# Patient Record
Sex: FEMALE | Race: WHITE | NOT HISPANIC OR LATINO | Employment: OTHER | URBAN - METROPOLITAN AREA
[De-identification: names, ages, dates, MRNs, and addresses within clinical notes are randomized per-mention and may not be internally consistent; named-entity substitution may affect disease eponyms.]

---

## 2017-01-23 ENCOUNTER — ALLIED HEALTH/NURSE VISIT (OUTPATIENT)
Dept: CARDIOLOGY | Facility: CLINIC | Age: 82
End: 2017-01-23
Payer: COMMERCIAL

## 2017-01-23 DIAGNOSIS — Z95.0 CARDIAC PACEMAKER IN SITU: Primary | ICD-10-CM

## 2017-01-23 PROCEDURE — 93296 REM INTERROG EVL PM/IDS: CPT | Performed by: INTERNAL MEDICINE

## 2017-01-23 PROCEDURE — 93294 REM INTERROG EVL PM/LDLS PM: CPT | Performed by: INTERNAL MEDICINE

## 2017-01-23 NOTE — PROGRESS NOTES
St Micky Assurity (D) Remote PPM Device Check  AP: <1 % : 88 %  Mode: DDD        Presenting Rhythm: AS/  Heart Rate: Adequate rates per histogram  Sensing: Stable    Pacing Threshold: Stable    Impedance: Stable  Battery Status: 9.3-9.6 years  Atrial Arrhythmia: 100 mode switch episodes comprising <1% of the time. Episodes lasted 6 seconds to 1 minute 16 seconds. Ventricular rates controlled. Taking ASA only.   Ventricular Arrhythmia: None  Other: 5 magnet response episodes. 1 EGM shows AS/, rates in the 90's.      Care Plan: F/u PPM Merlin q 3 months. Gave pt results over the phone. ZoniaCVT

## 2017-04-24 ENCOUNTER — ALLIED HEALTH/NURSE VISIT (OUTPATIENT)
Dept: CARDIOLOGY | Facility: CLINIC | Age: 82
End: 2017-04-24
Payer: COMMERCIAL

## 2017-04-24 DIAGNOSIS — Z95.0 CARDIAC PACEMAKER IN SITU: Primary | ICD-10-CM

## 2017-04-24 PROCEDURE — 93296 REM INTERROG EVL PM/IDS: CPT | Performed by: INTERNAL MEDICINE

## 2017-04-24 PROCEDURE — 93294 REM INTERROG EVL PM/LDLS PM: CPT | Performed by: INTERNAL MEDICINE

## 2017-04-24 NOTE — MR AVS SNAPSHOT
"              After Visit Summary   4/24/2017    Yoselyn Cui    MRN: [id number]           Patient Information     Date Of Birth          1/28/1932        Visit Information        Provider Department      4/24/2017 7:30 AM AVILES TECH1 [facility] AT [place]        Today's Diagnoses     Cardiac pacemaker in situ    -  1       Follow-ups after your visit        Additional Services     Follow-Up with Device Clinic                 Future tests that were ordered for you today     Open Future Orders        Priority Expected Expires Ordered    Follow-Up with Device Clinic Routine 7/24/2017 4/24/2018 4/24/2017            Who to contact     If you have questions or need follow up information about today's clinic visit or your schedule please contact [facility] directly at [phone].  Normal or non-critical lab and imaging results will be communicated to you by MyChart, letter or phone within 4 business days after the clinic has received the results. If you do not hear from us within 7 days, please contact the clinic through MyChart or phone. If you have a critical or abnormal lab result, we will notify you by phone as soon as possible.  Submit refill requests through media[employer]er or call your pharmacy and they will forward the refill request to us. Please allow 3 business days for your refill to be completed.          Additional Information About Your Visit        MyChart Information     media[employer]unker lets you send messages to your doctor, view your test results, renew your prescriptions, schedule appointments and more. To sign up, go to [email or web address] . Click on \"Log in\" on the left side of the screen, which will take you to the Welcome page. Then click on \"Sign up Now\" on the right side of the page.     You will be asked to enter the access code listed below, as well as some personal information. Please follow the directions to create your username " and password.     Your access code is: [email or web address]  Expires: 2017  8:54 AM     Your access code will  in 90 days. If you need help or a new code, please call your [place] clinic or [phone].        Care EveryWhere ID     This is your Care EveryWhere ID. This could be used by other organizations to access your [place] medical records  [id number]         Blood Pressure from Last 3 Encounters:   10/19/16 118/66   10/13/16 146/58   16 122/62    Weight from Last 3 Encounters:   10/19/16 54.2 kg (119 lb 8 oz)   10/12/16 56.6 kg (124 lb 12.8 oz)   16 52.6 kg (116 lb)              We Performed the Following     INTERROGATION DEVICE EVAL REMOTE, PACER/ICD (96838)     PM DEVICE INTERROGATE REMOTE (42780)        Primary Care Provider Office Phone # Fax #    Ira Barajas PA-C [phone]       [facility] [address] [address]        Thank you!     Thank you for choosing [facility] [place]  for your care. Our goal is always to provide you with excellent care. Hearing back from our patients is one way we can continue to improve our services. Please take a few minutes to complete the written survey that you may receive in the mail after your visit with us. Thank you!             Your Updated Medication List - Protect others around you: Learn how to safely use, store and throw away your medicines at www.disposemymeds.org.          This list is accurate as of: 17  8:54 AM.  Always use your most recent med list.                   Brand Name Dispense Instructions for use    acetaminophen 325 MG tablet    TYLENOL     Take 650 mg by mouth as needed for mild pain       apixaban ANTICOAGULANT 2.5 MG tablet    ELIQUIS    180 tablet    Take 1 tablet (2.5 mg) by mouth 2 times daily       ASPIRIN EC PO      Take 81 mg by mouth daily       carvedilol 25 MG tablet    COREG     Take 25 mg by mouth 2 times daily (with meals)        DETROL LA 4 MG 24 hr capsule   Generic drug:  tolterodine      Take 4 mg by mouth every morning       ferrous sulfate 325 (65 FE) MG tablet    IRON     Take 325 mg by mouth daily (with breakfast)       ICAPS AREDS FORMULA Tabs      Take 1 tablet by mouth daily       LIPITOR 20 MG tablet   Generic drug:  atorvastatin      Take 20 mg by mouth At Bedtime       lisinopril 40 MG tablet    PRINIVIL/ZESTRIL     Take 40 mg by mouth daily       OMEPRAZOLE PO      Take 20 mg by mouth every morning

## 2017-04-24 NOTE — PROGRESS NOTES
St Micky Assurity (D) Remote PPM Device Check  AP: <1 % : 62 %  Mode: DDD        Presenting Rhythm: AS/  Heart Rate: Adequate rates per histogram  Sensing: Stable    Pacing Threshold: Stable    Impedance: Stable  Battery Status: 9.3-10.2 years  Atrial Arrhythmia: 8 mode switch episodes comprising <1% of the time. EGMs show PAF/PAT. Longest episode lasted 18 minutes 46 seconds. Ventricular rates controlled. Taking Eliquis.   Ventricular Arrhythmia: None     Care Plan: F/u annual threshold in 3 months. LM with results. PATRICIA BrarT

## 2017-06-05 ENCOUNTER — APPOINTMENT (OUTPATIENT)
Dept: GENERAL RADIOLOGY | Facility: CLINIC | Age: 82
End: 2017-06-05
Attending: EMERGENCY MEDICINE
Payer: COMMERCIAL

## 2017-06-05 ENCOUNTER — HOSPITAL ENCOUNTER (EMERGENCY)
Facility: CLINIC | Age: 82
Discharge: HOME | End: 2017-06-05
Attending: EMERGENCY MEDICINE | Admitting: EMERGENCY MEDICINE
Payer: COMMERCIAL

## 2017-06-05 VITALS
RESPIRATION RATE: 16 BRPM | SYSTOLIC BLOOD PRESSURE: 170 MMHG | DIASTOLIC BLOOD PRESSURE: 90 MMHG | HEART RATE: 72 BPM | WEIGHT: 122 LBS | TEMPERATURE: 98.3 F | OXYGEN SATURATION: 99 % | BODY MASS INDEX: 23.83 KG/M2

## 2017-06-05 DIAGNOSIS — T59.811A SMOKE INHALATION: ICD-10-CM

## 2017-06-05 LAB
ANION GAP SERPL CALCULATED.3IONS-SCNC: 7 MMOL/L (ref 3–14)
BASOPHILS # BLD AUTO: 0.1 10E9/L (ref 0–0.2)
BASOPHILS NFR BLD AUTO: 1.2 %
BUN SERPL-MCNC: 22 MG/DL (ref 7–30)
CALCIUM SERPL-MCNC: 8.8 MG/DL (ref 8.5–10.1)
CHLORIDE SERPL-SCNC: 107 MMOL/L (ref 94–109)
CO2 SERPL-SCNC: 24 MMOL/L (ref 20–32)
COHGB MFR BLD: 1.3 % (ref 0–2)
CREAT SERPL-MCNC: 0.8 MG/DL (ref 0.52–1.04)
DIFFERENTIAL METHOD BLD: ABNORMAL
EOSINOPHIL # BLD AUTO: 0.3 10E9/L (ref 0–0.7)
EOSINOPHIL NFR BLD AUTO: 4.6 %
ERYTHROCYTE [DISTWIDTH] IN BLOOD BY AUTOMATED COUNT: 20.8 % (ref 10–15)
GFR SERPL CREATININE-BSD FRML MDRD: 68 ML/MIN/1.7M2
GLUCOSE SERPL-MCNC: 106 MG/DL (ref 70–99)
HCT VFR BLD AUTO: 35.3 % (ref 35–47)
HGB BLD-MCNC: 10.2 G/DL (ref 11.7–15.7)
IMM GRANULOCYTES # BLD: 0 10E9/L (ref 0–0.4)
IMM GRANULOCYTES NFR BLD: 0.3 %
INTERPRETATION ECG - MUSE: NORMAL
LYMPHOCYTES # BLD AUTO: 2.6 10E9/L (ref 0.8–5.3)
LYMPHOCYTES NFR BLD AUTO: 37.9 %
MCH RBC QN AUTO: 23.4 PG (ref 26.5–33)
MCHC RBC AUTO-ENTMCNC: 28.9 G/DL (ref 31.5–36.5)
MCV RBC AUTO: 81 FL (ref 78–100)
MONOCYTES # BLD AUTO: 0.8 10E9/L (ref 0–1.3)
MONOCYTES NFR BLD AUTO: 12.2 %
NEUTROPHILS # BLD AUTO: 3 10E9/L (ref 1.6–8.3)
NEUTROPHILS NFR BLD AUTO: 43.8 %
NRBC # BLD AUTO: 0 10*3/UL
NRBC BLD AUTO-RTO: 0 /100
PLATELET # BLD AUTO: 180 10E9/L (ref 150–450)
POTASSIUM SERPL-SCNC: 3.9 MMOL/L (ref 3.4–5.3)
RBC # BLD AUTO: 4.35 10E12/L (ref 3.8–5.2)
SODIUM SERPL-SCNC: 138 MMOL/L (ref 133–144)
TROPONIN I SERPL-MCNC: NORMAL UG/L (ref 0–0.04)
WBC # BLD AUTO: 6.8 10E9/L (ref 4–11)

## 2017-06-05 PROCEDURE — 71020 XR CHEST 2 VW: CPT

## 2017-06-05 PROCEDURE — 85025 COMPLETE CBC W/AUTO DIFF WBC: CPT | Performed by: EMERGENCY MEDICINE

## 2017-06-05 PROCEDURE — 80048 BASIC METABOLIC PNL TOTAL CA: CPT | Performed by: EMERGENCY MEDICINE

## 2017-06-05 PROCEDURE — 84484 ASSAY OF TROPONIN QUANT: CPT | Performed by: EMERGENCY MEDICINE

## 2017-06-05 PROCEDURE — 82375 ASSAY CARBOXYHB QUANT: CPT | Performed by: EMERGENCY MEDICINE

## 2017-06-05 PROCEDURE — 99285 EMERGENCY DEPT VISIT HI MDM: CPT | Mod: 25

## 2017-06-05 ASSESSMENT — ENCOUNTER SYMPTOMS
SHORTNESS OF BREATH: 0
ABDOMINAL PAIN: 0
HEADACHES: 0
COUGH: 0
NAUSEA: 0
VOMITING: 0
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
## 2017-06-05 NOTE — ED AVS SNAPSHOT
[facility] Emergency Department    [address]    [facility] [address]    Phone:  [phone]    Fax:  [phone]                                       Yoselyn Cui   MRN: [id number]    Department:  [facility] Emergency Department   Date of Visit:  6/5/2017           After Visit Summary Signature Page     I have received my discharge instructions, and my questions have been answered. I have discussed any challenges I see with this plan with the nurse or doctor.    ..........................................................................................................................................  Patient/Patient Representative Signature      ..........................................................................................................................................  Patient Representative Print Name and Relationship to Patient    ..................................................               ................................................  Date                                            Time    ..........................................................................................................................................  Reviewed by Signature/Title    ...................................................              ..............................................  Date                                                            Time
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
## 2017-07-26 ENCOUNTER — ALLIED HEALTH/NURSE VISIT (OUTPATIENT)
Dept: CARDIOLOGY | Facility: CLINIC | Age: 82
End: 2017-07-26
Attending: INTERNAL MEDICINE
Payer: COMMERCIAL

## 2017-07-26 DIAGNOSIS — Z95.0 CARDIAC PACEMAKER IN SITU: ICD-10-CM

## 2017-07-26 PROCEDURE — 93280 PM DEVICE PROGR EVAL DUAL: CPT | Performed by: INTERNAL MEDICINE

## 2017-07-26 NOTE — MR AVS SNAPSHOT
"              After Visit Summary   7/26/2017    Yoselyn Cui    MRN: [id number]           Patient Information     Date Of Birth          1/28/1932        Visit Information        Provider Department      7/26/2017 10:10 AM RU DCR2 [facility]        Today's Diagnoses     Cardiac pacemaker in situ           Follow-ups after your visit        Your next 10 appointments already scheduled     Oct 30, 2017  8:00 AM CDT   Remote PPM Check with AVILES TECH1   [facility] ([facility])    [address] [place] [id number] [phone]           This appointment is for a remote check of your pacemaker.  This is not an appointment at the office.              Who to contact     If you have questions or need follow up information about today's clinic visit or your schedule please contact [facility] directly at [phone].  Normal or non-critical lab and imaging results will be communicated to you by ViXS Systemshart, letter or phone within 4 business days after the clinic has received the results. If you do not hear from us within 7 days, please contact the clinic through [employer] Systemshart or phone. If you have a critical or abnormal lab result, we will notify you by phone as soon as possible.  Submit refill requests through Mavin or call your pharmacy and they will forward the refill request to us. Please allow 3 business days for your refill to be completed.          Additional Information About Your Visit        MyChart Information     Mavin lets you send messages to your doctor, view your test results, renew your prescriptions, schedule appointments and more. To sign up, go to [email or web address] . Click on \"Log in\" on the left side of the screen, which will take you to the Welcome page. Then click on \"Sign up Now\" on the right side of the page.     You will be asked to enter the " access code listed below, as well as some personal information. Please follow the directions to create your username and password.     Your access code is: [email or web address]  Expires: 10/24/2017 10:31 AM     Your access code will  in 90 days. If you need help or a new code, please call your [place] clinic or [phone].        Care EveryWhere ID     This is your Care EveryWhere ID. This could be used by other organizations to access your [place] medical records  [id number]         Blood Pressure from Last 3 Encounters:   17 170/90   10/19/16 118/66   10/13/16 146/58    Weight from Last 3 Encounters:   17 55.3 kg (122 lb)   10/19/16 54.2 kg (119 lb 8 oz)   10/12/16 56.6 kg (124 lb 12.8 oz)              We Performed the Following     Follow-Up with Device Clinic     PM DEVICE PROGRAMMING EVAL, DUAL LEAD PACER (95086)        Primary Care Provider Office Phone # Fax #    Ira Barajas PA-C [phone]       [facility] [address] [place] [address]        Equal Access to Services     SIMONE OCONNOR : Hadii aad ku hadasho Soomaali, waaxda luqadaha, qaybta kaalmada adeegyada, waxay malinain haymelodyn natty babcock. So [facility] [phone].    ATENCIÓN: Si habla español, tiene a goodwin disposición servicios gratuitos de asistencia lingüística. Llame al [phone].    We comply with applicable federal civil rights laws and [place] laws. We do not discriminate on the basis of race, color, national origin, age, disability sex, sexual orientation or gender identity.            Thank you!     Thank you for choosing [facility] AT [place]  for your care. Our goal is always to provide you with excellent care. Hearing back from our patients is one way we can continue to improve our services. Please take a few minutes to complete the written survey that you may receive in the mail after your visit with us. Thank you!             Your Updated  Medication List - Protect others around you: Learn how to safely use, store and throw away your medicines at www.disposemymeds.org.          This list is accurate as of: 7/26/17 10:31 AM.  Always use your most recent med list.                   Brand Name Dispense Instructions for use Diagnosis    acetaminophen 325 MG tablet    TYLENOL     Take 650 mg by mouth as needed for mild pain        apixaban ANTICOAGULANT 2.5 MG tablet    ELIQUIS    180 tablet    Take 1 tablet (2.5 mg) by mouth 2 times daily    Paroxysmal atrial fibrillation (H)       ASPIRIN EC PO      Take 81 mg by mouth daily        carvedilol 25 MG tablet    COREG     Take 25 mg by mouth 2 times daily (with meals)        DETROL LA 4 MG 24 hr capsule   Generic drug:  tolterodine      Take 4 mg by mouth every morning        ferrous sulfate 325 (65 FE) MG tablet    IRON     Take 325 mg by mouth daily (with breakfast)        ICAPS AREDS FORMULA Tabs      Take 1 tablet by mouth daily        LIPITOR 20 MG tablet   Generic drug:  atorvastatin      Take 20 mg by mouth At Bedtime        lisinopril 40 MG tablet    PRINIVIL/ZESTRIL     Take 40 mg by mouth daily        OMEPRAZOLE PO      Take 20 mg by mouth every morning

## 2017-07-26 NOTE — PROGRESS NOTES
[facility] [employer]  Pacemaker Device Check  AP: 1 % : 87 %  Mode: DDD        Underlying Rhythm: CHB with vent rate < 30  Heart Rate: Adequate variation per histogram  Sensing: stable    Pacing Threshold: stable   Impedance: stable  Battery Status: 10 years  Atrial Arrhythmia: 20 mode switches for PAF with longest 1 min. She is on eliquis  Ventricular Arrhythmia: none  Setting Change: none    Care Plan: f/u 3 months remote PPM check. Santiago

## 2017-10-30 ENCOUNTER — ALLIED HEALTH/NURSE VISIT (OUTPATIENT)
Dept: CARDIOLOGY | Facility: CLINIC | Age: 82
End: 2017-10-30
Payer: COMMERCIAL

## 2017-10-30 DIAGNOSIS — Z95.0 CARDIAC PACEMAKER IN SITU: Primary | ICD-10-CM

## 2017-10-30 PROCEDURE — 93296 REM INTERROG EVL PM/IDS: CPT | Performed by: INTERNAL MEDICINE

## 2017-10-30 PROCEDURE — 93294 REM INTERROG EVL PM/LDLS PM: CPT | Performed by: INTERNAL MEDICINE

## 2017-10-30 NOTE — MR AVS SNAPSHOT
"              After Visit Summary   10/30/2017    Yoselyn Cui    MRN: [id number]           Patient Information     Date Of Birth          1932        Visit Information        Provider Department      10/30/2017 8:00 AM AVILES TECH1 [facility] [place]        Today's Diagnoses     Cardiac pacemaker in situ    -  1       Follow-ups after your visit        Who to contact     If you have questions or need follow up information about today's clinic visit or your schedule please contact [facility] directly at [phone].  Normal or non-critical lab and imaging results will be communicated to you by CityINhart, letter or phone within 4 business days after the clinic has received the results. If you do not hear from us within 7 days, please contact the clinic through [employer] or phone. If you have a critical or abnormal lab result, we will notify you by phone as soon as possible.  Submit refill requests through Cloudnexa or call your pharmacy and they will forward the refill request to us. Please allow 3 business days for your refill to be completed.          Additional Information About Your Visit        MyChart Information     Cloudnexa lets you send messages to your doctor, view your test results, renew your prescriptions, schedule appointments and more. To sign up, go to [email or web address] . Click on \"Log in\" on the left side of the screen, which will take you to the Welcome page. Then click on \"Sign up Now\" on the right side of the page.     You will be asked to enter the access code listed below, as well as some personal information. Please follow the directions to create your username and password.     Your access code is: [email or web address]  Expires: 2018  9:34 AM     Your access code will  in 90 days. If you need help or a new code, please call your [place] clinic or [phone].        Care EveryWhere ID     This is your Care " EveryWhere ID. This could be used by other organizations to access your [place] medical records  [id number]         Blood Pressure from Last 3 Encounters:   06/05/17 170/90   10/19/16 118/66   10/13/16 146/58    Weight from Last 3 Encounters:   06/05/17 55.3 kg (122 lb)   10/19/16 54.2 kg (119 lb 8 oz)   10/12/16 56.6 kg (124 lb 12.8 oz)              We Performed the Following     INTERROGATION DEVICE EVAL REMOTE, PACER/ICD (11305)     PM DEVICE INTERROGATE REMOTE (48677)        Primary Care Provider Office Phone # Fax #    Ira ROBBY Barajas PA-C [phone]       [facility] [address]  [facility] [address]        Equal Access to Services     SIMONE OCONNOR : Hadii aad ku hadasho Soomaali, waaxda luqadaha, qaybta kaalmada adeegyada, waxay malinain hayaan natty martines . So [facility] [phone].    ATENCIÓN: Si habla español, tiene a goodwin disposición servicios gratuitos de asistencia lingüística. Alexandra al [phone].    We comply with applicable federal civil rights laws and [place] laws. We do not discriminate on the basis of race, color, national origin, age, disability, sex, sexual orientation, or gender identity.            Thank you!     Thank you for choosing [facility] AT [place]  for your care. Our goal is always to provide you with excellent care. Hearing back from our patients is one way we can continue to improve our services. Please take a few minutes to complete the written survey that you may receive in the mail after your visit with us. Thank you!             Your Updated Medication List - Protect others around you: Learn how to safely use, store and throw away your medicines at www.disposemymeds.org.          This list is accurate as of: 10/30/17  9:34 AM.  Always use your most recent med list.                   Brand Name Dispense Instructions for use Diagnosis    acetaminophen 325 MG tablet    TYLENOL     Take 650 mg by mouth as  needed for mild pain        apixaban ANTICOAGULANT 2.5 MG tablet    ELIQUIS    180 tablet    Take 1 tablet (2.5 mg) by mouth 2 times daily    Paroxysmal atrial fibrillation (H)       ASPIRIN EC PO      Take 81 mg by mouth daily        carvedilol 25 MG tablet    COREG     Take 25 mg by mouth 2 times daily (with meals)        DETROL LA 4 MG 24 hr capsule   Generic drug:  tolterodine      Take 4 mg by mouth every morning        ferrous sulfate 325 (65 FE) MG tablet    IRON     Take 325 mg by mouth daily (with breakfast)        ICAPS AREDS FORMULA Tabs      Take 1 tablet by mouth daily        LIPITOR 20 MG tablet   Generic drug:  atorvastatin      Take 20 mg by mouth At Bedtime        lisinopril 40 MG tablet    PRINIVIL/ZESTRIL     Take 40 mg by mouth daily        OMEPRAZOLE PO      Take 20 mg by mouth every morning

## 2017-10-30 NOTE — PROGRESS NOTES
[address] 2240 (D) Remote PPM Device Check  AP: <1% : >99%  Mode: DDD        Presenting Rhythm: AS/  Heart Rate: adequate heart rates per histogram  Sensing: stable    Pacing Threshold: stable    Impedance: stable  Battery Status: 10 - 10.7 years remaining  Atrial Arrhythmia: 91 mode switch episodes comprising <1% of the time. Longest episode lasted 11 min 44 sec. Controlled vent response while in mode switch. Taking Eliquis   Ventricular Arrhythmia: none     Care Plan: F/U [place] q 3 months.  Gave results and next transmission date over the phone to [place] PERDOMO

## 2017-11-06 DIAGNOSIS — I48.0 PAROXYSMAL ATRIAL FIBRILLATION (H): ICD-10-CM

## 2018-01-10 ENCOUNTER — OFFICE VISIT (OUTPATIENT)
Dept: CARDIOLOGY | Facility: CLINIC | Age: 83
End: 2018-01-10
Payer: COMMERCIAL

## 2018-01-10 VITALS
SYSTOLIC BLOOD PRESSURE: 136 MMHG | WEIGHT: 110 LBS | HEIGHT: 60 IN | DIASTOLIC BLOOD PRESSURE: 71 MMHG | BODY MASS INDEX: 21.6 KG/M2 | HEART RATE: 69 BPM

## 2018-01-10 DIAGNOSIS — I48.0 PAROXYSMAL ATRIAL FIBRILLATION (H): ICD-10-CM

## 2018-01-10 PROCEDURE — 99214 OFFICE O/P EST MOD 30 MIN: CPT | Performed by: NURSE PRACTITIONER

## 2018-01-10 RX ORDER — AMLODIPINE BESYLATE 5 MG/1
5 TABLET ORAL DAILY
COMMUNITY
Start: 2017-12-20 | End: 2021-01-01

## 2018-01-10 NOTE — PROGRESS NOTES
HPI and Plan:   See dictation    No orders of the defined types were placed in this encounter.      Orders Placed This Encounter   Medications     amLODIPine (NORVASC) 5 MG tablet     Sig: Take 5 mg by mouth     apixaban ANTICOAGULANT (ELIQUIS) 2.5 MG tablet     Sig: Take 1 tablet (2.5 mg) by mouth 2 times daily     Dispense:  180 tablet     Refill:  3       Medications Discontinued During This Encounter   Medication Reason     apixaban ANTICOAGULANT (ELIQUIS) 2.5 MG tablet Reorder         Encounter Diagnosis   Name Primary?     Paroxysmal atrial fibrillation (H)        CURRENT MEDICATIONS:  Current Outpatient Prescriptions   Medication Sig Dispense Refill     amLODIPine (NORVASC) 5 MG tablet Take 5 mg by mouth       apixaban ANTICOAGULANT (ELIQUIS) 2.5 MG tablet Take 1 tablet (2.5 mg) by mouth 2 times daily 180 tablet 3     lisinopril (PRINIVIL,ZESTRIL) 40 MG tablet Take 40 mg by mouth daily       carvedilol (COREG) 25 MG tablet Take 25 mg by mouth 2 times daily (with meals)       acetaminophen (TYLENOL) 325 MG tablet Take 650 mg by mouth as needed for mild pain       tolterodine (DETROL LA) 4 MG 24 hr capsule Take 4 mg by mouth every morning       atorvastatin (LIPITOR) 20 MG tablet Take 20 mg by mouth At Bedtime       ASPIRIN EC PO Take 81 mg by mouth daily       ferrous sulfate (IRON) 325 (65 FE) MG tablet Take 325 mg by mouth daily (with breakfast)        OMEPRAZOLE PO Take 20 mg by mouth every morning       Multiple Vitamins-Minerals (ICAPS AREDS FORMULA) TABS Take 1 tablet by mouth daily         ALLERGIES   No Known Allergies    PAST MEDICAL HISTORY:  Past Medical History:   Diagnosis Date     AV block, 2nd degree 5/16/2016     Cerebrovascular accident (H) 8/22/2016     COKER (dyspnea on exertion) 8/22/2016     Generalized weakness 10/12/2016     History of colonic polyps 8/22/2016     HTN (hypertension) 8/22/2016     Melanoma of skin (H)-rt arm 8/22/2016     Mixed hyperlipidemia 8/22/2016     Pacemaker      implanted 5-     SSS (sick sinus syndrome) (H) 8/22/2016       PAST SURGICAL HISTORY:  Past Surgical History:   Procedure Laterality Date     APPENDECTOMY  1960s     C LIGATE FALLOPIAN TUBE  1960s     C REMV CATARACT INTRACAP,INSERT LENS Right 09/20/2017     HC REMV CATARACT EXTRACAP,INSERT LENS Left 10/04/2017     IMPLANT PACEMAKER  05/17/2016       FAMILY HISTORY:  Family History   Problem Relation Age of Onset     Coronary Artery Disease Father      Coronary Artery Disease Brother      Coronary Artery Disease Sister        SOCIAL HISTORY:  Social History     Social History     Marital status:      Spouse name: N/A     Number of children: N/A     Years of education: N/A     Social History Main Topics     Smoking status: Never Smoker     Smokeless tobacco: None     Alcohol use No     Drug use: No     Sexual activity: Not Asked     Other Topics Concern     Caffeine Concern No     3+ cups daily     Special Diet No     trying to cut down on sodium, watching cholesterol     Exercise No     limited     Social History Narrative       Review of Systems:  Skin:  Positive for bruising;itching     Eyes:  Positive for glasses    ENT:  Negative for      Respiratory:  Positive for       Cardiovascular:    fatigue;Positive for    Gastroenterology: Negative for      Genitourinary:  Positive for urinary frequency    Musculoskeletal:  Negative for      Neurologic:  Positive for stroke    Psychiatric:  Negative      Heme/Lymph/Imm:  Negative      Endocrine:  Negative        Physical Exam:  Vitals: /71  Pulse 69  Ht 1.524 m (5')  Wt 49.9 kg (110 lb)  BMI 21.48 kg/m2    Constitutional:           Skin:             Head:           Eyes:           Lymph:      ENT:           Neck:           Respiratory:            Cardiac:                                                           GI:           Extremities and Muscular Skeletal:                 Neurological:           Psych:           CC  No referring provider  defined for this encounter.

## 2018-01-10 NOTE — LETTER
1/10/2018      RADHA Muller [place] [address]  [facility] [address]      RE: Yoselyn Cui       Dear Colleague,    I had the pleasure of seeing Yoselyn Cui in the [facility].    HISTORY OF PRESENT ILLNESS:  Yoselyn is an 85-year-old female presenting in clinic today for an annual followup visit.  She has a history of pacemaker implantation done last 05/2016 when she originally presented with AV block.  She also has hypertension, history of stroke, dyslipidemia, paroxysmal atrial fibrillation and is on chronic anticoagulation.  Her device checks have shown a high degree of ventricular pacing.  She has had episodes of atrial fibrillation on her device checks, most of them short in duration and asymptomatic.      In clinic today, Yoselyn tells me she has had an uneventful past year.  She has been feeling very well and denies any issues with chest pain, breathlessness, lightheadedness, dizziness, presyncope, syncope or palpitations.      In clinic today, blood pressure is 136/71, heart rate 69.      PHYSICAL EXAMINATION:   GENERAL:  Reveals a well-appearing elderly female in no acute distress.   HEENT:  Normocephalic, atraumatic.    NECK:  Supple.   LUNGS:  Clear.   CARDIAC:  Reveals an S1, S2, with regular rhythm.  Her device is well healed in the left infraclavicular area.    ABDOMEN:  Soft.   EXTREMITIES:  Showed no edema.      IMPRESSION AND PLAN:  Yoselyn Cui is a delightful 85-year-old female presenting in clinic today for an annual followup visit.  She has a device implanted which is functioning appropriately.  She does have some short paroxysms of atrial fibrillation which have been asymptomatic.  Because of an elevated CHADS-VASc score, she is on apixaban 2.5 mg twice daily which she will continue.  She appears to be very stable at this time.  We will plan on seeing her back in 1 year.        Outpatient Encounter Prescriptions as of 1/10/2018    Medication Sig Dispense Refill     amLODIPine (NORVASC) 5 MG tablet Take 5 mg by mouth       apixaban ANTICOAGULANT (ELIQUIS) 2.5 MG tablet Take 1 tablet (2.5 mg) by mouth 2 times daily 180 tablet 3     lisinopril (PRINIVIL,ZESTRIL) 40 MG tablet Take 40 mg by mouth daily       carvedilol (COREG) 25 MG tablet Take 25 mg by mouth 2 times daily (with meals)       acetaminophen (TYLENOL) 325 MG tablet Take 650 mg by mouth as needed for mild pain       tolterodine (DETROL LA) 4 MG 24 hr capsule Take 4 mg by mouth every morning       atorvastatin (LIPITOR) 20 MG tablet Take 20 mg by mouth At Bedtime       ASPIRIN EC PO Take 81 mg by mouth daily       ferrous sulfate (IRON) 325 (65 FE) MG tablet Take 325 mg by mouth daily (with breakfast)        OMEPRAZOLE PO Take 20 mg by mouth every morning       Multiple Vitamins-Minerals (ICAPS AREDS FORMULA) TABS Take 1 tablet by mouth daily       [DISCONTINUED] apixaban ANTICOAGULANT (ELIQUIS) 2.5 MG tablet Take 1 tablet (2.5 mg) by mouth 2 times daily 180 tablet 0     No facility-administered encounter medications on file as of 1/10/2018.        Again, thank you for allowing me to participate in the care of your patient.      Sincerely,    Porsche Mendoza, NP, APRN CNP     [facility]

## 2018-01-10 NOTE — MR AVS SNAPSHOT
After Visit Summary   1/10/2018    Yoselyn Cui    MRN: [id number]           Patient Information     Date Of Birth          1/28/1932        Visit Information        Provider Department      1/10/2018 10:30 AM Porsche Mendoza APRN CNP [facility]        Today's Diagnoses     Paroxysmal atrial fibrillation (H)           Follow-ups after your visit        Additional Services     Follow-Up with Cardiologist                 Your next 10 appointments already scheduled     Feb 05, 2018  4:15 PM CST   Remote PPM Check with AVILES TECH1   [facility] ([facility])    [address]  [facility] [address]   [phone]           This appointment is for a remote check of your pacemaker.  This is not an appointment at the office.              Future tests that were ordered for you today     Open Future Orders        Priority Expected Expires Ordered    Follow-Up with Cardiologist Routine 1/10/2019 1/10/2020 1/10/2018            Who to contact     If you have questions or need follow up information about today's clinic visit or your schedule please contact [facility] directly at [phone].  Normal or non-critical lab and imaging results will be communicated to you by broadbandchoiceshart, letter or phone within 4 business days after the clinic has received the results. If you do not hear from us within 7 days, please contact the clinic through broadbandchoiceshart or phone. If you have a critical or abnormal lab result, we will notify you by phone as soon as possible.  Submit refill requests through INSOMENIA or call your pharmacy and they will forward the refill request to us. Please allow 3 business days for your refill to be completed.          Additional Information About Your Visit        broadbandchoiceshart Information     INSOMENIA lets you send messages to your doctor, view your test  "results, renew your prescriptions, schedule appointments and more. To sign up, go to [email or web address] . Click on \"Log in\" on the left side of the screen, which will take you to the Welcome page. Then click on \"Sign up Now\" on the right side of the page.     You will be asked to enter the access code listed below, as well as some personal information. Please follow the directions to create your username and password.     Your access code is: [email or web address]  Expires: 2018  8:34 AM     Your access code will  in 90 days. If you need help or a new code, please call your [facility] clinic or [phone].        Care EveryWhere ID     This is your Care EveryWhere ID. This could be used by other organizations to access your [facility] medical records  [id number]        Your Vitals Were     Pulse Height BMI (Body Mass Index)             69 1.524 m (5') 21.48 kg/m2          Blood Pressure from Last 3 Encounters:   01/10/18 136/71   17 170/90   10/19/16 118/66    Weight from Last 3 Encounters:   01/10/18 49.9 kg (110 lb)   17 55.3 kg (122 lb)   10/19/16 54.2 kg (119 lb 8 oz)                 Where to get your medicines      These medications were sent to [facility] [address] [facility] [address] [address] [place] [address] & [facility]  [address] [address] [address]     Phone:  [phone]     apixaban ANTICOAGULANT 2.5 MG tablet          Primary Care Provider Office Phone # Fax #    Ira Barajas PA-C [phone]       [facility] [address]  [facility] [address]        Equal Access to Services     SIMONE OCONNOR : Juan Castellanos, wavietda luqadaha, qaybta kaalmada oswaldo, adrián babcock. [facility] [phone].    ATENCIÓN: Si habla español, tiene a goodwin disposición servicios gratuitos de asistencia lingüística. Llame al [phone].    We comply with applicable federal civil rights laws and " [place] laws. We do not discriminate on the basis of race, color, national origin, age, disability, sex, sexual orientation, or gender identity.            Thank you!     Thank you for choosing [facility]  for your care. Our goal is always to provide you with excellent care. Hearing back from our patients is one way we can continue to improve our services. Please take a few minutes to complete the written survey that you may receive in the mail after your visit with us. Thank you!             Your Updated Medication List - Protect others around you: Learn how to safely use, store and throw away your medicines at www.disposemymeds.org.          This list is accurate as of: 1/10/18  2:31 PM.  Always use your most recent med list.                   Brand Name Dispense Instructions for use Diagnosis    acetaminophen 325 MG tablet    TYLENOL     Take 650 mg by mouth as needed for mild pain        amLODIPine 5 MG tablet    NORVASC     Take 5 mg by mouth        apixaban ANTICOAGULANT 2.5 MG tablet    ELIQUIS    180 tablet    Take 1 tablet (2.5 mg) by mouth 2 times daily    Paroxysmal atrial fibrillation (H)       ASPIRIN EC PO      Take 81 mg by mouth daily        carvedilol 25 MG tablet    COREG     Take 25 mg by mouth 2 times daily (with meals)        DETROL LA 4 MG 24 hr capsule   Generic drug:  tolterodine      Take 4 mg by mouth every morning        ferrous sulfate 325 (65 FE) MG tablet    IRON     Take 325 mg by mouth daily (with breakfast)        ICAPS AREDS FORMULA Tabs      Take 1 tablet by mouth daily        LIPITOR 20 MG tablet   Generic drug:  atorvastatin      Take 20 mg by mouth At Bedtime        lisinopril 40 MG tablet    PRINIVIL/ZESTRIL     Take 40 mg by mouth daily        OMEPRAZOLE PO      Take 20 mg by mouth every morning

## 2018-01-10 NOTE — PROGRESS NOTES
HISTORY OF PRESENT ILLNESS:  Yoselyn is an 85-year-old female presenting in clinic today for an annual followup visit.  She has a history of pacemaker implantation done last 2016 when she originally presented with AV block.  She also has hypertension, history of stroke, dyslipidemia, paroxysmal atrial fibrillation and is on chronic anticoagulation.  Her device checks have shown a high degree of ventricular pacing.  She has had episodes of atrial fibrillation on her device checks, most of them short in duration and asymptomatic.      In clinic today, Yoselyn tells me she has had an uneventful past year.  She has been feeling very well and denies any issues with chest pain, breathlessness, lightheadedness, dizziness, presyncope, syncope or palpitations.      In clinic today, blood pressure is 136/71, heart rate 69.      PHYSICAL EXAMINATION:   GENERAL:  Reveals a well-appearing elderly female in no acute distress.   HEENT:  Normocephalic, atraumatic.    NECK:  Supple.   LUNGS:  Clear.   CARDIAC:  Reveals an S1, S2, with regular rhythm.  Her device is well healed in the left infraclavicular area.    ABDOMEN:  Soft.   EXTREMITIES:  Showed no edema.      IMPRESSION AND PLAN:  Yoselyn Ambrose is a delightful 85-year-old female presenting in clinic today for an annual followup visit.  She has a device implanted which is functioning appropriately.  She does have some short paroxysms of atrial fibrillation which have been asymptomatic.  Because of an elevated CHADS-VASc score, she is on apixaban 2.5 mg twice daily which she will continue.  She appears to be very stable at this time.  We will plan on seeing her back in 1 year.         STEFFANY MALCOLM, REBECCA             D: 01/10/2018 14:30   T: 01/10/2018 15:04   MT: ALVA      Name:     YOSELYN AMBROSE   MRN:      [id number]        Account:      [id number]   :      1932           Service Date: 01/10/2018      Document: [id number]

## 2018-02-05 ENCOUNTER — ALLIED HEALTH/NURSE VISIT (OUTPATIENT)
Dept: CARDIOLOGY | Facility: CLINIC | Age: 83
End: 2018-02-05
Payer: COMMERCIAL

## 2018-02-05 DIAGNOSIS — Z95.0 CARDIAC PACEMAKER IN SITU: Primary | ICD-10-CM

## 2018-02-05 PROCEDURE — 93296 REM INTERROG EVL PM/IDS: CPT | Performed by: INTERNAL MEDICINE

## 2018-02-05 PROCEDURE — 93294 REM INTERROG EVL PM/LDLS PM: CPT | Performed by: INTERNAL MEDICINE

## 2018-02-05 NOTE — MR AVS SNAPSHOT
"              After Visit Summary   2/5/2018    Yoselyn Cui    MRN: [id number]           Patient Information     Date Of Birth          1/28/1932        Visit Information        Provider Department      2/5/2018 4:15 PM TRACI MARTINI [facility]        Today's Diagnoses     Cardiac pacemaker in situ    -  1       Follow-ups after your visit        Your next 10 appointments already scheduled     Feb 05, 2018  4:15 PM CST   Remote PPM Check with AVILES TECH1   [facility] ([facility])    [address] [place], [place] [id number]  [facility] [id number] [phone]           This appointment is for a remote check of your pacemaker.  This is not an appointment at the office.              Who to contact     If you have questions or need follow up information about today's clinic visit or your schedule please contact [facility] directly at [phone].  Normal or non-critical lab and imaging results will be communicated to you by [employer] OnDemandhart, letter or phone within 4 business days after the clinic has received the results. If you do not hear from us within 7 days, please contact the clinic through Story[employer] OnDemandhart or phone. If you have a critical or abnormal lab result, we will notify you by phone as soon as possible.  Submit refill requests through [email or web address] or call your pharmacy and they will forward the refill request to us. Please allow 3 business days for your refill to be completed.          Additional Information About Your Visit        Storyworks OnDemandhart Information     [email or web address] lets you send messages to your doctor, view your test results, renew your prescriptions, schedule appointments and more. To sign up, go to [email or web address] . Click on \"Log in\" on the left side of the screen, which will take you to the Welcome page. Then click on \"Sign up Now\" on the right side of the page.     You will be asked to enter the access " code listed below, as well as some personal information. Please follow the directions to create your username and password.     Your access code is: [email or web address]  Expires: 2018  8:53 AM     Your access code will  in 90 days. If you need help or a new code, please call your [facility] clinic or [phone].        Care EveryWhere ID     This is your Care EveryWhere ID. This could be used by other organizations to access your [facility] medical records  [id number]         Blood Pressure from Last 3 Encounters:   01/10/18 136/71   17 170/90   10/19/16 118/66    Weight from Last 3 Encounters:   01/10/18 49.9 kg (110 lb)   17 55.3 kg (122 lb)   10/19/16 54.2 kg (119 lb 8 oz)              We Performed the Following     INTERROGATION DEVICE EVAL REMOTE, PACER/ICD (15813)     PM DEVICE INTERROGATE REMOTE (70708)        Primary Care Provider Office Phone # Fax #    Ira Barajas PA-C [phone]       [facility] [address] [place] [address]        Equal Access to Services     SIMONE OCONNOR : Hadii aad ku hadasho Soomaali, waaxda luqadaha, qaybta kaalmada adeegyada, waxay idiin hayaan natty martines . So [facility] [phone].    ATENCIÓN: Si habla español, tiene a goodwin disposición servicios gratuitos de asistencia lingüística. Llame al [phone].    We comply with applicable federal civil rights laws and [place] laws. We do not discriminate on the basis of race, color, national origin, age, disability, sex, sexual orientation, or gender identity.            Thank you!     Thank you for choosing [facility]  for your care. Our goal is always to provide you with excellent care. Hearing back from our patients is one way we can continue to improve our services. Please take a few minutes to complete the written survey that you may receive in the mail after your visit with us. Thank you!             Your Updated Medication List -  Protect others around you: Learn how to safely use, store and throw away your medicines at www.disposemymeds.org.          This list is accurate as of 2/5/18  8:53 AM.  Always use your most recent med list.                   Brand Name Dispense Instructions for use Diagnosis    acetaminophen 325 MG tablet    TYLENOL     Take 650 mg by mouth as needed for mild pain        amLODIPine 5 MG tablet    NORVASC     Take 5 mg by mouth        apixaban ANTICOAGULANT 2.5 MG tablet    ELIQUIS    180 tablet    Take 1 tablet (2.5 mg) by mouth 2 times daily    Paroxysmal atrial fibrillation (H)       ASPIRIN EC PO      Take 81 mg by mouth daily        carvedilol 25 MG tablet    COREG     Take 25 mg by mouth 2 times daily (with meals)        DETROL LA 4 MG 24 hr capsule   Generic drug:  tolterodine      Take 4 mg by mouth every morning        ferrous sulfate 325 (65 FE) MG tablet    IRON     Take 325 mg by mouth daily (with breakfast)        ICAPS AREDS FORMULA Tabs      Take 1 tablet by mouth daily        LIPITOR 20 MG tablet   Generic drug:  atorvastatin      Take 20 mg by mouth At Bedtime        lisinopril 40 MG tablet    PRINIVIL/ZESTRIL     Take 40 mg by mouth daily        OMEPRAZOLE PO      Take 20 mg by mouth every morning

## 2018-02-05 NOTE — PROGRESS NOTES
[facility] (D) Remote PPM Device Check  AP: <1 % : >99 %  Mode: DDD        Presenting Rhythm: AS/  Heart Rate: Adequate rates per histogram  Sensing: Stable    Pacing Threshold: Stable    Impedance: Stable  Battery Status: 9.9-10.6 years  Atrial Arrhythmia: 170 mode switch episodes comprising <1% of the time. Longest episode lasted 7 minutes 46 seconds, ventricular rates controlled. Taking Eliquis.    Ventricular Arrhythmia: None     Care Plan: F/u PPM Merlin q 3 months. Gave patient results over the phone. ZoniaCVT

## 2018-03-02 ENCOUNTER — APPOINTMENT (OUTPATIENT)
Dept: CT IMAGING | Facility: CLINIC | Age: 83
DRG: 871 | End: 2018-03-02
Attending: PHYSICIAN ASSISTANT
Payer: MEDICARE

## 2018-03-02 ENCOUNTER — APPOINTMENT (OUTPATIENT)
Dept: GENERAL RADIOLOGY | Facility: CLINIC | Age: 83
DRG: 871 | End: 2018-03-02
Attending: EMERGENCY MEDICINE
Payer: MEDICARE

## 2018-03-02 ENCOUNTER — HOSPITAL ENCOUNTER (INPATIENT)
Facility: CLINIC | Age: 83
LOS: 5 days | Discharge: HOME | DRG: 871 | End: 2018-03-07
Attending: EMERGENCY MEDICINE | Admitting: HOSPITALIST
Payer: MEDICARE

## 2018-03-02 DIAGNOSIS — J44.1 OBSTRUCTIVE CHRONIC BRONCHITIS WITH EXACERBATION (H): ICD-10-CM

## 2018-03-02 DIAGNOSIS — R53.1 GENERALIZED WEAKNESS: ICD-10-CM

## 2018-03-02 DIAGNOSIS — J98.01 ACUTE BRONCHOSPASM: Primary | ICD-10-CM

## 2018-03-02 DIAGNOSIS — R91.8 PULMONARY NODULES: ICD-10-CM

## 2018-03-02 DIAGNOSIS — J18.9 PNEUMONIA OF RIGHT LUNG DUE TO INFECTIOUS ORGANISM, UNSPECIFIED PART OF LUNG: ICD-10-CM

## 2018-03-02 LAB
ALBUMIN SERPL-MCNC: 3.6 G/DL (ref 3.4–5)
ALBUMIN UR-MCNC: NEGATIVE MG/DL
ALP SERPL-CCNC: 110 U/L (ref 40–150)
ALT SERPL W P-5'-P-CCNC: 17 U/L (ref 0–50)
ANION GAP SERPL CALCULATED.3IONS-SCNC: 10 MMOL/L (ref 3–14)
APPEARANCE UR: CLEAR
AST SERPL W P-5'-P-CCNC: 18 U/L (ref 0–45)
BASOPHILS # BLD AUTO: 0 10E9/L (ref 0–0.2)
BASOPHILS NFR BLD AUTO: 0.2 %
BILIRUB SERPL-MCNC: 0.5 MG/DL (ref 0.2–1.3)
BILIRUB UR QL STRIP: NEGATIVE
BUN SERPL-MCNC: 19 MG/DL (ref 7–30)
CALCIUM SERPL-MCNC: 8.7 MG/DL (ref 8.5–10.1)
CHLORIDE SERPL-SCNC: 104 MMOL/L (ref 94–109)
CO2 SERPL-SCNC: 23 MMOL/L (ref 20–32)
COLOR UR AUTO: YELLOW
CREAT SERPL-MCNC: 0.75 MG/DL (ref 0.52–1.04)
DIFFERENTIAL METHOD BLD: ABNORMAL
EOSINOPHIL # BLD AUTO: 0 10E9/L (ref 0–0.7)
EOSINOPHIL NFR BLD AUTO: 0.2 %
ERYTHROCYTE [DISTWIDTH] IN BLOOD BY AUTOMATED COUNT: 14.1 % (ref 10–15)
FLUAV+FLUBV AG SPEC QL: NEGATIVE
FLUAV+FLUBV AG SPEC QL: NEGATIVE
GFR SERPL CREATININE-BSD FRML MDRD: 73 ML/MIN/1.7M2
GLUCOSE SERPL-MCNC: 109 MG/DL (ref 70–99)
GLUCOSE UR STRIP-MCNC: NEGATIVE MG/DL
HCT VFR BLD AUTO: 38.7 % (ref 35–47)
HGB BLD-MCNC: 12.4 G/DL (ref 11.7–15.7)
HGB UR QL STRIP: NEGATIVE
IMM GRANULOCYTES # BLD: 0.1 10E9/L (ref 0–0.4)
IMM GRANULOCYTES NFR BLD: 0.4 %
INTERPRETATION ECG - MUSE: NORMAL
KETONES UR STRIP-MCNC: NEGATIVE MG/DL
LACTATE BLD-SCNC: 1.1 MMOL/L (ref 0.7–2)
LEUKOCYTE ESTERASE UR QL STRIP: NEGATIVE
LYMPHOCYTES # BLD AUTO: 1.5 10E9/L (ref 0.8–5.3)
LYMPHOCYTES NFR BLD AUTO: 7.1 %
MCH RBC QN AUTO: 28.4 PG (ref 26.5–33)
MCHC RBC AUTO-ENTMCNC: 32 G/DL (ref 31.5–36.5)
MCV RBC AUTO: 89 FL (ref 78–100)
MONOCYTES # BLD AUTO: 1.7 10E9/L (ref 0–1.3)
MONOCYTES NFR BLD AUTO: 7.9 %
MUCOUS THREADS #/AREA URNS LPF: PRESENT /LPF
NEUTROPHILS # BLD AUTO: 17.8 10E9/L (ref 1.6–8.3)
NEUTROPHILS NFR BLD AUTO: 84.2 %
NITRATE UR QL: NEGATIVE
NRBC # BLD AUTO: 0 10*3/UL
NRBC BLD AUTO-RTO: 0 /100
PH UR STRIP: 7 PH (ref 5–7)
PLATELET # BLD AUTO: 181 10E9/L (ref 150–450)
POTASSIUM SERPL-SCNC: 3.4 MMOL/L (ref 3.4–5.3)
PROCALCITONIN SERPL-MCNC: 0.82 NG/ML
PROT SERPL-MCNC: 7.3 G/DL (ref 6.8–8.8)
RBC # BLD AUTO: 4.37 10E12/L (ref 3.8–5.2)
RBC #/AREA URNS AUTO: 1 /HPF (ref 0–2)
SODIUM SERPL-SCNC: 137 MMOL/L (ref 133–144)
SOURCE: ABNORMAL
SP GR UR STRIP: 1.01 (ref 1–1.03)
SPECIMEN SOURCE: NORMAL
SQUAMOUS #/AREA URNS AUTO: <1 /HPF (ref 0–1)
UROBILINOGEN UR STRIP-MCNC: 0 MG/DL (ref 0–2)
WBC # BLD AUTO: 21.2 10E9/L (ref 4–11)
WBC #/AREA URNS AUTO: <1 /HPF (ref 0–5)

## 2018-03-02 PROCEDURE — 80053 COMPREHEN METABOLIC PANEL: CPT | Performed by: EMERGENCY MEDICINE

## 2018-03-02 PROCEDURE — 71046 X-RAY EXAM CHEST 2 VIEWS: CPT

## 2018-03-02 PROCEDURE — 36415 COLL VENOUS BLD VENIPUNCTURE: CPT | Performed by: PHYSICIAN ASSISTANT

## 2018-03-02 PROCEDURE — 84145 PROCALCITONIN (PCT): CPT | Performed by: PHYSICIAN ASSISTANT

## 2018-03-02 PROCEDURE — 96366 THER/PROPH/DIAG IV INF ADDON: CPT

## 2018-03-02 PROCEDURE — 85025 COMPLETE CBC W/AUTO DIFF WBC: CPT | Performed by: EMERGENCY MEDICINE

## 2018-03-02 PROCEDURE — 87804 INFLUENZA ASSAY W/OPTIC: CPT | Performed by: EMERGENCY MEDICINE

## 2018-03-02 PROCEDURE — 25000128 H RX IP 250 OP 636: Performed by: HOSPITALIST

## 2018-03-02 PROCEDURE — 25000128 H RX IP 250 OP 636: Performed by: PHYSICIAN ASSISTANT

## 2018-03-02 PROCEDURE — 87086 URINE CULTURE/COLONY COUNT: CPT | Performed by: EMERGENCY MEDICINE

## 2018-03-02 PROCEDURE — 71260 CT THORAX DX C+: CPT

## 2018-03-02 PROCEDURE — 87040 BLOOD CULTURE FOR BACTERIA: CPT | Performed by: EMERGENCY MEDICINE

## 2018-03-02 PROCEDURE — 81001 URINALYSIS AUTO W/SCOPE: CPT | Performed by: EMERGENCY MEDICINE

## 2018-03-02 PROCEDURE — 83605 ASSAY OF LACTIC ACID: CPT | Performed by: EMERGENCY MEDICINE

## 2018-03-02 PROCEDURE — 99223 1ST HOSP IP/OBS HIGH 75: CPT | Mod: AI | Performed by: HOSPITALIST

## 2018-03-02 PROCEDURE — 99285 EMERGENCY DEPT VISIT HI MDM: CPT | Mod: 25

## 2018-03-02 PROCEDURE — 96361 HYDRATE IV INFUSION ADD-ON: CPT

## 2018-03-02 PROCEDURE — 96365 THER/PROPH/DIAG IV INF INIT: CPT

## 2018-03-02 PROCEDURE — 93005 ELECTROCARDIOGRAM TRACING: CPT

## 2018-03-02 PROCEDURE — 25000132 ZZH RX MED GY IP 250 OP 250 PS 637: Mod: GY | Performed by: PHYSICIAN ASSISTANT

## 2018-03-02 PROCEDURE — 25000128 H RX IP 250 OP 636: Performed by: EMERGENCY MEDICINE

## 2018-03-02 PROCEDURE — A9270 NON-COVERED ITEM OR SERVICE: HCPCS | Mod: GY | Performed by: PHYSICIAN ASSISTANT

## 2018-03-02 PROCEDURE — 12000007 ZZH R&B INTERMEDIATE

## 2018-03-02 RX ORDER — NALOXONE HYDROCHLORIDE 0.4 MG/ML
.1-.4 INJECTION, SOLUTION INTRAMUSCULAR; INTRAVENOUS; SUBCUTANEOUS
Status: DISCONTINUED | OUTPATIENT
Start: 2018-03-02 | End: 2018-03-07 | Stop reason: HOSPADM

## 2018-03-02 RX ORDER — ONDANSETRON 2 MG/ML
4 INJECTION INTRAMUSCULAR; INTRAVENOUS EVERY 6 HOURS PRN
Status: DISCONTINUED | OUTPATIENT
Start: 2018-03-02 | End: 2018-03-07 | Stop reason: HOSPADM

## 2018-03-02 RX ORDER — CEFTRIAXONE SODIUM 2 G/50ML
2 INJECTION, SOLUTION INTRAVENOUS ONCE
Status: COMPLETED | OUTPATIENT
Start: 2018-03-02 | End: 2018-03-02

## 2018-03-02 RX ORDER — ATORVASTATIN CALCIUM 20 MG/1
20 TABLET, FILM COATED ORAL AT BEDTIME
Status: DISCONTINUED | OUTPATIENT
Start: 2018-03-02 | End: 2018-03-07 | Stop reason: HOSPADM

## 2018-03-02 RX ORDER — LIDOCAINE 40 MG/G
CREAM TOPICAL
Status: DISCONTINUED | OUTPATIENT
Start: 2018-03-02 | End: 2018-03-07 | Stop reason: HOSPADM

## 2018-03-02 RX ORDER — LISINOPRIL 40 MG/1
40 TABLET ORAL DAILY
Status: DISCONTINUED | OUTPATIENT
Start: 2018-03-02 | End: 2018-03-07 | Stop reason: HOSPADM

## 2018-03-02 RX ORDER — GUAIFENESIN/DEXTROMETHORPHAN 100-10MG/5
10 SYRUP ORAL EVERY 4 HOURS PRN
Status: DISCONTINUED | OUTPATIENT
Start: 2018-03-02 | End: 2018-03-07 | Stop reason: HOSPADM

## 2018-03-02 RX ORDER — PROCHLORPERAZINE MALEATE 5 MG
5 TABLET ORAL EVERY 6 HOURS PRN
Status: DISCONTINUED | OUTPATIENT
Start: 2018-03-02 | End: 2018-03-07 | Stop reason: HOSPADM

## 2018-03-02 RX ORDER — AMLODIPINE BESYLATE 5 MG/1
5 TABLET ORAL DAILY
Status: DISCONTINUED | OUTPATIENT
Start: 2018-03-02 | End: 2018-03-07 | Stop reason: HOSPADM

## 2018-03-02 RX ORDER — AMOXICILLIN 250 MG
2 CAPSULE ORAL 2 TIMES DAILY PRN
Status: DISCONTINUED | OUTPATIENT
Start: 2018-03-02 | End: 2018-03-07 | Stop reason: HOSPADM

## 2018-03-02 RX ORDER — ASPIRIN 81 MG/1
162 TABLET ORAL DAILY
Status: DISCONTINUED | OUTPATIENT
Start: 2018-03-02 | End: 2018-03-07 | Stop reason: HOSPADM

## 2018-03-02 RX ORDER — IOPAMIDOL 755 MG/ML
500 INJECTION, SOLUTION INTRAVASCULAR ONCE
Status: COMPLETED | OUTPATIENT
Start: 2018-03-02 | End: 2018-03-02

## 2018-03-02 RX ORDER — AMOXICILLIN 250 MG
1 CAPSULE ORAL 2 TIMES DAILY PRN
Status: DISCONTINUED | OUTPATIENT
Start: 2018-03-02 | End: 2018-03-07 | Stop reason: HOSPADM

## 2018-03-02 RX ORDER — ONDANSETRON 4 MG/1
4 TABLET, ORALLY DISINTEGRATING ORAL EVERY 6 HOURS PRN
Status: DISCONTINUED | OUTPATIENT
Start: 2018-03-02 | End: 2018-03-07 | Stop reason: HOSPADM

## 2018-03-02 RX ORDER — ACETAMINOPHEN 325 MG/1
650 TABLET ORAL EVERY 4 HOURS PRN
Status: DISCONTINUED | OUTPATIENT
Start: 2018-03-02 | End: 2018-03-07 | Stop reason: HOSPADM

## 2018-03-02 RX ORDER — PROCHLORPERAZINE 25 MG
12.5 SUPPOSITORY, RECTAL RECTAL EVERY 12 HOURS PRN
Status: DISCONTINUED | OUTPATIENT
Start: 2018-03-02 | End: 2018-03-07 | Stop reason: HOSPADM

## 2018-03-02 RX ORDER — CEFTRIAXONE SODIUM 2 G/50ML
2 INJECTION, SOLUTION INTRAVENOUS EVERY 24 HOURS
Status: DISCONTINUED | OUTPATIENT
Start: 2018-03-03 | End: 2018-03-07 | Stop reason: HOSPADM

## 2018-03-02 RX ORDER — METHYLPREDNISOLONE SODIUM SUCCINATE 40 MG/ML
40 INJECTION, POWDER, LYOPHILIZED, FOR SOLUTION INTRAMUSCULAR; INTRAVENOUS EVERY 8 HOURS
Status: DISCONTINUED | OUTPATIENT
Start: 2018-03-02 | End: 2018-03-03

## 2018-03-02 RX ORDER — CARVEDILOL 25 MG/1
25 TABLET ORAL 2 TIMES DAILY WITH MEALS
Status: DISCONTINUED | OUTPATIENT
Start: 2018-03-02 | End: 2018-03-07 | Stop reason: HOSPADM

## 2018-03-02 RX ORDER — FERROUS SULFATE 325(65) MG
325 TABLET ORAL
Status: DISCONTINUED | OUTPATIENT
Start: 2018-03-03 | End: 2018-03-07 | Stop reason: HOSPADM

## 2018-03-02 RX ORDER — SODIUM CHLORIDE 9 MG/ML
INJECTION, SOLUTION INTRAVENOUS CONTINUOUS
Status: DISCONTINUED | OUTPATIENT
Start: 2018-03-02 | End: 2018-03-03

## 2018-03-02 RX ORDER — POLYETHYLENE GLYCOL 3350 17 G/17G
17 POWDER, FOR SOLUTION ORAL DAILY PRN
Status: DISCONTINUED | OUTPATIENT
Start: 2018-03-02 | End: 2018-03-07 | Stop reason: HOSPADM

## 2018-03-02 RX ADMIN — SODIUM CHLORIDE 500 ML: 9 INJECTION, SOLUTION INTRAVENOUS at 09:55

## 2018-03-02 RX ADMIN — CEFTRIAXONE SODIUM 2 G: 2 INJECTION, SOLUTION INTRAVENOUS at 11:07

## 2018-03-02 RX ADMIN — CARVEDILOL 25 MG: 25 TABLET, FILM COATED ORAL at 19:48

## 2018-03-02 RX ADMIN — ACETAMINOPHEN 650 MG: 325 TABLET, FILM COATED ORAL at 15:24

## 2018-03-02 RX ADMIN — ATORVASTATIN CALCIUM 20 MG: 20 TABLET, FILM COATED ORAL at 21:49

## 2018-03-02 RX ADMIN — AZITHROMYCIN MONOHYDRATE 500 MG: 500 INJECTION, POWDER, LYOPHILIZED, FOR SOLUTION INTRAVENOUS at 11:48

## 2018-03-02 RX ADMIN — IOPAMIDOL 80 ML: 755 INJECTION, SOLUTION INTRAVENOUS at 14:57

## 2018-03-02 RX ADMIN — METHYLPREDNISOLONE SODIUM SUCCINATE 40 MG: 40 INJECTION, POWDER, FOR SOLUTION INTRAMUSCULAR; INTRAVENOUS at 16:52

## 2018-03-02 RX ADMIN — APIXABAN 2.5 MG: 2.5 TABLET, FILM COATED ORAL at 21:49

## 2018-03-02 RX ADMIN — SODIUM CHLORIDE 60 ML: 9 INJECTION, SOLUTION INTRAVENOUS at 14:58

## 2018-03-02 RX ADMIN — LISINOPRIL 40 MG: 40 TABLET ORAL at 19:47

## 2018-03-02 RX ADMIN — SODIUM CHLORIDE: 9 INJECTION, SOLUTION INTRAVENOUS at 19:39

## 2018-03-02 RX ADMIN — ASPIRIN 162 MG: 81 TABLET, COATED ORAL at 19:48

## 2018-03-02 ASSESSMENT — ACTIVITIES OF DAILY LIVING (ADL)
RETIRED_EATING: 0-->INDEPENDENT
WHICH_OF_THE_ABOVE_FUNCTIONAL_RISKS_HAD_A_RECENT_ONSET_OR_CHANGE?: AMBULATION;TRANSFERRING;TOILETING
TOILETING: 0-->INDEPENDENT
TRANSFERRING: 0-->INDEPENDENT
ADLS_ACUITY_SCORE: 14
FALL_HISTORY_WITHIN_LAST_SIX_MONTHS: NO
ADLS_ACUITY_SCORE: 14
BATHING: 0-->INDEPENDENT
COGNITION: 0 - NO COGNITION ISSUES REPORTED
AMBULATION: 0-->INDEPENDENT
SWALLOWING: 0-->SWALLOWS FOODS/LIQUIDS WITHOUT DIFFICULTY
RETIRED_COMMUNICATION: 0-->UNDERSTANDS/COMMUNICATES WITHOUT DIFFICULTY
DRESS: 0-->INDEPENDENT

## 2018-03-02 ASSESSMENT — ENCOUNTER SYMPTOMS
VOMITING: 0
SHORTNESS OF BREATH: 0
WEAKNESS: 1
COUGH: 1
DIARRHEA: 0
FEVER: 1
WOUND: 0

## 2018-03-02 NOTE — IP AVS SNAPSHOT
MRN:[id number]                      After Visit Summary   3/2/2018    Yoselyn Cui    MRN: [id number]           Thank you!     Thank you for choosing [facility] for your care. Our goal is always to provide you with excellent care. Hearing back from our patients is one way we can continue to improve our services. Please take a few minutes to complete the written survey that you may receive in the mail after you visit. If you would like to speak to someone directly about your visit please contact Patient Relations at [phone]. Thank you!          Patient Information     Date Of Birth          1/28/1932        Designated Caregiver       Most Recent Value    Caregiver    Will someone help with your care after discharge? yes    Name of designated caregiver Luiza hutton     Phone number of caregiver see chart     Caregiver address see chart       About your hospital stay     You were admitted on:  March 2, 2018 You last received care in the:  [place] [address] [facility]    You were discharged on:  March 7, 2018        Reason for your hospital stay       You were admitted for pneumonia with bronchospasm/wheezing.  You were treated with IV antibiotics, prednisone and breathing treatments.  At this point you are much better and will finish antibiotics at home.  We have started some inhalers as discussed.  Due to low oxygen saturations with activity we are arranging for home oxygen for use as needed with activity but hopefully you will not need this for long.                  Who to Call     For medical emergencies, please call 911.  For non-urgent questions about your medical care, please call your primary care provider or clinic, [phone]          Attending Provider     Provider Specialty    Shadi Zelaya MD Emergency Medicine    [facility] Lisa Mathews MD Internal Medicine       Primary Care Provider Office Phone # Fax #    Ira Barajas PA-C [phone]  [phone]      After Care Instructions     Activity       Your activity upon discharge: activity as tolerated            Diet       Follow this diet upon discharge: Orders Placed This Encounter      Combination Diet Regular Diet Adult            Oxygen Adult       Bath Oxygen Order 2 liter(s) by nasal cannula with activity with use of portable tank. Expected treatment length is indefinite (99 months).. Test on conserving device as applicable.    Patients who qualify for home O2 coverage under the CMS guidelines require ABG tests or O2 sat readings obtained closest to, but no earlier than 2 days prior to the discharge, as evidence of the need for home oxygen therapy. Testing must be performed while patient is in the chronic stable state. See notes for O2 sats.    I certify that this patient, Yoselyn Cui has been under my care and that I, or a nurse practitioner or physician's assistant working with me, had a face-to-face encounter that meets the face-to-face encounter requirements with this patient on 3/7/2018. The patient, Yoselyn Cui was evaluated or treated in whole, or in part, for the following medical condition, which necessitates the use of the ordered oxygen. Treatment Diagnosis: emphysema    Attending Provider: Ariel Bahena  Physician signature: See electronic signature associated with these discharge orders  Date of Order: March 7, 2018                  Follow-up Appointments     Follow-up and recommended labs and tests        Follow up with primary care provider, Ira Barajas, within 7 days for hospital follow- up.  The following labs/tests are recommended: please recheck oxygen saturations with activity and at rest to determine if you need ongoing home oxygen.    Also, as discussed, please arrange for pulmonary function tests with your primary care doctor to screen for COPD.    Finally, please arrange for a follow-up chest x-ray in 2-3 weeks to make sure your pulmonary  "infiltrates are completely gone.                  Your next 10 appointments already scheduled     May 14, 2018  4:00 PM CDT   Remote PPM Check with AVILES TECH1   [facility] ([facility])    [address]  [place] [id number] [phone]           This appointment is for a remote check of your pacemaker.  This is not an appointment at the office.              Pending Results     Date and Time Order Name Status Description    3/2/2018 0913 Blood culture Preliminary     3/2/2018 09 Blood culture Preliminary             Statement of Approval     Ordered          18 1024  I have reviewed and agree with all the recommendations and orders detailed in this document.  EFFECTIVE NOW     Approved and electronically signed by:  Ariel Bahena MD             Admission Information     Date & Time Provider Department Dept. Phone    3/2/2018 Lisa Nix MD [place] [address] [facility] [phone]      Your Vitals Were     Blood Pressure Pulse Temperature Respirations Weight Pulse Oximetry    164/66 (BP Location: Right arm) 68 97.9  F (36.6  C) (Temporal) 18 50.6 kg (111 lb 8 oz) 93%    BMI (Body Mass Index)                   21.78 kg/m2           MyChart Information     [facility] lets you send messages to your doctor, view your test results, renew your prescriptions, schedule appointments and more. To sign up, go to [email or web address] . Click on \"Log in\" on the left side of the screen, which will take you to the Welcome page. Then click on \"Sign up Now\" on the right side of the page.     You will be asked to enter the access code listed below, as well as some personal information. Please follow the directions to create your username and password.     Your access code is: [email or web address]  Expires: 2018  8:53 AM     Your access code will  in 90 days. If you need help or a new code, please call your [place] clinic or " [phone].        Care EveryWhere ID     This is your Care EveryWhere ID. This could be used by other organizations to access your [place] medical records  [id number]        Equal Access to Services     SIMONE OCONNOR : Hadii aad ku hadamichemo Castellanos, waaxda luqadaha, qaybta kaalmada oswaldo, adrián carmonalynda schultemack michel conrad babcock. So [facility] [phone].    ATENCIÓN: Si habla español, tiene a goodwin disposición servicios gratuitos de asistencia lingüística. Llame al [phone].    We comply with applicable federal civil rights laws and [place] laws. We do not discriminate on the basis of race, color, national origin, age, disability, sex, sexual orientation, or gender identity.               Review of your medicines      START taking        Dose / Directions    * albuterol 108 (90 BASE) MCG/ACT Inhaler   Commonly known as:  PROAIR HFA/PROVENTIL HFA/VENTOLIN HFA   Used for:  Acute bronchospasm        Dose:  2 puff   Inhale 2 puffs into the lungs 4 times daily as needed for other (dyspnea)   Quantity:  6.7 g   Refills:  0       * albuterol 108 (90 BASE) MCG/ACT Inhaler   Commonly known as:  PROAIR HFA/PROVENTIL HFA/VENTOLIN HFA   Used for:  Pneumonia of right lung due to infectious organism, unspecified part of lung        Dose:  2 puff   Inhale 2 puffs into the lungs 4 times daily for 5 days   Quantity:  6.7 g   Refills:  0       cefuroxime 500 MG tablet   Commonly known as:  CEFTIN   Used for:  Pneumonia of right lung due to infectious organism, unspecified part of lung        Dose:  500 mg   Take 1 tablet (500 mg) by mouth 2 times daily for 3 days   Quantity:  6 tablet   Refills:  0       fluticasone furoate 200 MCG/ACT inhalation powder   Commonly known as:  ARNUITY ELLIPTA   Used for:  Acute bronchospasm, Pneumonia of right lung due to infectious organism, unspecified part of lung        Dose:  1 puff   Inhale 1 puff into the lungs daily   Quantity:  3 Inhaler   Refills:  0       * Notice:  This list has 2  medication(s) that are the same as other medications prescribed for you. Read the directions carefully, and ask your doctor or other care provider to review them with you.      CONTINUE these medicines which have NOT CHANGED        Dose / Directions    acetaminophen 325 MG tablet   Commonly known as:  TYLENOL        Dose:  650 mg   Take 650 mg by mouth every 6 hours as needed for mild pain   Refills:  0       amLODIPine 5 MG tablet   Commonly known as:  NORVASC        Dose:  5 mg   Take 5 mg by mouth daily   Refills:  0       apixaban ANTICOAGULANT 2.5 MG tablet   Commonly known as:  ELIQUIS   Used for:  Paroxysmal atrial fibrillation (H)        Dose:  2.5 mg   Take 1 tablet (2.5 mg) by mouth 2 times daily   Quantity:  180 tablet   Refills:  3       ASPIRIN EC PO        Dose:  162 mg   Take 162 mg by mouth daily (states is taking half of 325 mg tablet daily).   Refills:  0       carvedilol 25 MG tablet   Commonly known as:  COREG        Dose:  25 mg   Take 25 mg by mouth 2 times daily (with meals)   Refills:  0       DETROL LA 4 MG 24 hr capsule   Generic drug:  tolterodine        Dose:  4 mg   Take 4 mg by mouth every morning   Refills:  0       ferrous sulfate 325 (65 FE) MG tablet   Commonly known as:  IRON        Dose:  325 mg   Take 325 mg by mouth daily (with breakfast)   Refills:  0       ICAPS AREDS FORMULA Tabs        Dose:  1 tablet   Take 1 tablet by mouth 2 times daily   Refills:  0       LIPITOR 20 MG tablet   Generic drug:  atorvastatin        Dose:  20 mg   Take 20 mg by mouth At Bedtime   Refills:  0       lisinopril 40 MG tablet   Commonly known as:  PRINIVIL/ZESTRIL        Dose:  40 mg   Take 40 mg by mouth daily   Refills:  0       OMEPRAZOLE PO        Dose:  20 mg   Take 20 mg by mouth 2 times daily (before meals)   Refills:  0            Where to get your medicines      These medications were sent to [place], [place] - [address]  [address] [address],  [facility] [address]     Phone:  [phone]     albuterol 108 (90 BASE) MCG/ACT Inhaler    albuterol 108 (90 BASE) MCG/ACT Inhaler    cefuroxime 500 MG tablet    fluticasone furoate 200 MCG/ACT inhalation powder                Protect others around you: Learn how to safely use, store and throw away your medicines at www.disposemymeds.org.        ANTIBIOTIC INSTRUCTION     You've Been Prescribed an Antibiotic - Now What?  Your healthcare team thinks that you or your loved one might have an infection. Some infections can be treated with antibiotics, which are powerful, life-saving drugs. Like all medications, antibiotics have side effects and should only be used when necessary. There are some important things you should know about your antibiotic treatment.      Your healthcare team may run tests before you start taking an antibiotic.    Your team may take samples (e.g., from your blood, urine or other areas) to run tests to look for bacteria. These test can be important to determine if you need an antibiotic at all and, if you do, which antibiotic will work best.      Within a few days, your healthcare team might change or even stop your antibiotic.    Your team may start you on an antibiotic while they are working to find out what is making you sick.    Your team might change your antibiotic because test results show that a different antibiotic would be better to treat your infection.    In some cases, once your team has more information, they learn that you do not need an antibiotic at all. They may find out that you don't have an infection, or that the antibiotic you're taking won't work against your infection. For example, an infection caused by a virus can't be treated with antibiotics. Staying on an antibiotic when you don't need it is more likely to be harmful than helpful.      You may experience side effects from your antibiotic.    Like all medications, antibiotics have side effects. Some of these can be  serious.    Let you healthcare team know if you have any known allergies when you are admitted to the hospital.    One significant side effect of nearly all antibiotics is the risk of severe and sometimes deadly diarrhea caused by Clostridium difficile (C. Difficile). This occurs when a person takes antibiotics because some good germs are destroyed. Antibiotic use allows C. diificile to take over, putting patients at high risk for this serious infection.    As a patient or caregiver, it is important to understand your or your loved one's antibiotic treatment. It is especially important for caregivers to speak up when patients can't speak for themselves. Here are some important questions to ask your healthcare team.    What infection is this antibiotic treating and how do you know I have that infection?    What side effects might occur from this antibiotic?    How long will I need to take this antibiotic?    Is it safe to take this antibiotic with other medications or supplements (e.g., vitamins) that I am taking?     Are there any special directions I need to know about taking this antibiotic? For example, should I take it with food?    How will I be monitored to know whether my infection is responding to the antibiotic?    What tests may help to make sure the right antibiotic is prescribed for me?      Information provided by:  www.cdc.gov/getsmart  U.S. Department of Health and Human Services  Centers for disease Control and Prevention  National Center for Emerging and Zoonotic Infectious Diseases  Division of Healthcare Quality Promotion             Medication List: This is a list of all your medications and when to take them. Check marks below indicate your daily home schedule. Keep this list as a reference.      Medications           Morning Afternoon Evening Bedtime As Needed    acetaminophen 325 MG tablet   Commonly known as:  TYLENOL   Take 650 mg by mouth every 6 hours as needed for mild pain   Last time  this was given:  650 mg on 3/5/2018  8:10 AM                                   * albuterol 108 (90 BASE) MCG/ACT Inhaler   Commonly known as:  PROAIR HFA/PROVENTIL HFA/VENTOLIN HFA   Inhale 2 puffs into the lungs 4 times daily as needed for other (dyspnea)   Last time this was given:  2 puffs on 3/7/2018 11:17 AM                                   * albuterol 108 (90 BASE) MCG/ACT Inhaler   Commonly known as:  PROAIR HFA/PROVENTIL HFA/VENTOLIN HFA   Inhale 2 puffs into the lungs 4 times daily for 5 days   Last time this was given:  2 puffs on 3/7/2018 11:17 AM   Next Dose Due:  This afternoon 3/7/18                                   amLODIPine 5 MG tablet   Commonly known as:  NORVASC   Take 5 mg by mouth daily   Last time this was given:  5 mg on 3/7/2018  8:57 AM   Next Dose Due:  Take tomorrow morning 3/8/18                                   apixaban ANTICOAGULANT 2.5 MG tablet   Commonly known as:  ELIQUIS   Take 1 tablet (2.5 mg) by mouth 2 times daily   Last time this was given:  2.5 mg on 3/7/2018  8:57 AM   Next Dose Due:  Take this afternoon 3/7/18                                      ASPIRIN EC PO   Take 162 mg by mouth daily (states is taking half of 325 mg tablet daily).   Last time this was given:  162 mg on 3/7/2018  9:00 AM   Next Dose Due:  Take tomorrow morning 3/8/18                                   carvedilol 25 MG tablet   Commonly known as:  COREG   Take 25 mg by mouth 2 times daily (with meals)   Last time this was given:  25 mg on 3/7/2018  8:57 AM   Next Dose Due:  Take this evening with evening meal 3/7/18                                      cefuroxime 500 MG tablet   Commonly known as:  CEFTIN   Take 1 tablet (500 mg) by mouth 2 times daily for 3 days   Next Dose Due:  Take this evening 3/7/18                                      DETROL LA 4 MG 24 hr capsule   Take 4 mg by mouth every morning   Generic drug:  tolterodine   Next Dose Due:  Take tomorrow morning 3/8/18                                    ferrous sulfate 325 (65 FE) MG tablet   Commonly known as:  IRON   Take 325 mg by mouth daily (with breakfast)   Last time this was given:  325 mg on 3/6/2018  9:29 AM   Next Dose Due:  Take tomorrow morning 3/8/18                                   fluticasone furoate 200 MCG/ACT inhalation powder   Commonly known as:  ARNUITY ELLIPTA   Inhale 1 puff into the lungs daily   Next Dose Due:  Take tomorrow 3/8/18                                   ICAPS AREDS FORMULA Tabs   Take 1 tablet by mouth 2 times daily                                      LIPITOR 20 MG tablet   Take 20 mg by mouth At Bedtime   Last time this was given:  20 mg on 3/6/2018 10:43 PM   Generic drug:  atorvastatin   Next Dose Due:  Take this evening 3/7/18                                   lisinopril 40 MG tablet   Commonly known as:  PRINIVIL/ZESTRIL   Take 40 mg by mouth daily   Last time this was given:  40 mg on 3/7/2018  8:57 AM   Next Dose Due:  Take tomorrow morning 3/8/18                                   OMEPRAZOLE PO   Take 20 mg by mouth 2 times daily (before meals)   Last time this was given:  20 mg on 3/7/2018  6:48 AM   Next Dose Due:  Take this evening 3/7/18                                      * Notice:  This list has 2 medication(s) that are the same as other medications prescribed for you. Read the directions carefully, and ask your doctor or other care provider to review them with you.              More Information        Preventing Pneumonia  Pneumonia is an infection in one or both of the lungs. Those most at risk include older adults, smokers, and people with chronic lung diseases. For example, those with conditions like chronic obstructive pulmonary disease (COPD), including emphysema or chronic bronchitis, asthma, and those with weak immune systems. There are some things you can do to lessen the chance that you will get pneumonia.    Avoid infection    Wash your hands often:    Use soap and warm water.    If soap  and water aren't available, use a hand  with alcohol in it.    Avoid touching your face and mouth with your hands.    Use disposable tissues instead of a handkerchief. Throw used tissues away.    Avoid people who have a cold or the flu.    Try to avoid crowded places.  Get vaccinated  Talk with your healthcare provider about vaccinations and whether you should get a yearly flu shot. There are now 2 different pneumonia vaccines. Both of these are needed if you have a chronic disease or fit into the higher risk category.      Get a flu shot every year as soon as it's available in your area. The flu shot helps prevent you from getting the flu and complications of the flu, such as pneumonia.    Get pneumococcal pneumonia vaccines. Talk with your healthcare provider about which pneumococcal vaccines are right for you.  Do suggested breathing exercises  Deep breathing and coughing exercises can help clear your lungs. Your healthcare provider may suggest them. If so, you will be shown how to do them. Do them as often as your healthcare provider instructs.  Take care of your body    Drink at least 6 to 8 glasses of water a day.    Eat well-balanced, healthy meals.    Avoid drinking alcohol.    Don t smoke. Avoid places where people are smoking.    Moving around helps keep your lungs clear. Ask your healthcare provider what type of activity is best for you. Walking is often a good choice.    Get enough rest. Sleep at least 8 hours each night. Rest or nap during the day as needed.    Ask your healthcare provider when you should schedule follow-up care to make sure the infection is gone.  Date Last Reviewed: 12/1/2016 2000-2017 [employer]. [address] [place] [address], [place] [address]. All rights reserved. This information is not intended as a substitute for professional medical care. Always follow your healthcare professional's instructions.                When You Have Pneumonia  You have been diagnosed  with pneumonia. This is a serious lung infection. Most cases of pneumonia are caused by bacteria. Pneumonia most often occurs in older adults, young children, and people with chronic health problems.  Home care    Take your medicine exactly as directed. Don t skip doses. Continue taking your antibiotics as until they are all gone, even if you start to feel better. This will prevent the pneumonia from coming back.    Drink at least 8 glasses of water daily, unless directed otherwise. This helps to loosen and thin secretions so that you can cough them up.    Use a cool-mist humidifier in your bedroom. Be sure to clean the humidifier daily.    Don t use medicines to suppress your cough unless your cough is dry, painful, or interferes with your sleep. Coughing up mucus is normal. You may use an expectorant if your healthcare provider says it s okay.    You can use warm compresses or a heating pad on the lowest setting to relieve chest discomfort. Use several times a day for 15-20 minutes at a time. To prevent injury to your skin, set the temperature to warm, not hot. Don t put the compress or pad directly on your skin. Make certain it has a cover or wrap it in a towel. This is to prevent skin burns.    Get plenty of rest until your fever, shortness of breath, and chest pain go away.    Plan to get a flu shot every year. The flu is a common cause of pneumonia. Getting a flu shot every year can help prevent both the flu and pneumonia.  Getting the pneumococcal vaccine  Talk with your healthcare provider about getting the pneumococcalvaccine. Pneumococcal pneumonia is caused by bacteria that spread from person to person. It can cause minor problems, such as ear infections. But it can also turn into life-threatening illnesses of the lungs (pneumonia), the covering of the brain and spinal cord (meningitis), and the blood (bacteremia).  Children under 2 years of age, adults over age 65, people with certain health conditions,  and smokers are at the highest risk of pneumococcal disease. This vaccine can help prevent pneumococcal disease in both adults and children. Some people should not have the vaccine. Make sure to ask your healthcare provider if you should have the vaccine.   Follow-up care  Make a follow-up appointment as directed by our staff.  When to call your healthcare provider  Call your healthcare provider if you have any of the following:    Fever above 100.4 F (38 C), or as directed by your healthcare provider    Mucus from the lungs (sputum) that s yellow, green, bloody, or smells bad    A large amount of sputum    Vomiting    Symptoms that get worse  When to call 911  Call 911 right away if you have any of the following:    Chest pain    Trouble breathing    Blue lips or fingernails   Date Last Reviewed: 11/1/2016 2000-2017 [employer]. [address] [place] [address], [place] [address]. All rights reserved. This information is not intended as a substitute for professional medical care. Always follow your healthcare professional's instructions.

## 2018-03-02 NOTE — ED NOTES
Pt presents via EMS. Pt reports that she started coughing last night. Pt called 911 early this morning for generalized weakness and when they arrived, refused transport because she was feeling better. She called them again a few hours later as she was unable to make it to the toilet. Pt was 92% on RA per EMS. VSS. Pt felt warm to touch per EMS. ABC intact. A/O x4.

## 2018-03-02 NOTE — ED NOTES
[facility]  ED Nurse Handoff Report    Yoselyn Cui is a 86 year old female   ED Chief complaint: Generalized Weakness  . ED Diagnosis:   Final diagnoses:   Pneumonia of right lung due to infectious organism, unspecified part of lung   Generalized weakness   Pulmonary nodules     Allergies: No Known Allergies    Code Status: Full Code  Activity level - Baseline/Home:  Independent. Activity Level - Current:   Stand with Assist of 2. Lift room needed: No. Bariatric: No   Needed: No   Isolation: No. Infection: Not Applicable.     Vital Signs:   Vitals:    03/02/18 0913 03/02/18 0915 03/02/18 0945   BP: 142/82  140/66   Temp:  99.9  F (37.7  C)    TempSrc:  Oral    SpO2: 93%     Weight: 49.9 kg (110 lb)         Cardiac Rhythm:  ,      Pain level: 0-10 Pain Scale: 0  Patient confused: No. Patient Falls Risk: Yes.   Elimination Status: Has voided   Patient Report - Initial Complaint: generalized weakness. 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 Workup in the ED reveals marked leukocytosis of 21.2 w/ absolute neutrophilia 17.8 on CBC w/ diff, o/w unremarkable CMP. Rapid influenza A/B swab negative. Blood cultures x 2 drawn and pending. CXR demonstrated ill-defined opacity projecting over right midlung; also nodular density projecting over left midlung new since 6/5/2017.      Tests Performed: CXR, labs, UA. Abnormal Results:   Labs Ordered and Resulted from Time of ED Arrival Up to the Time of Departure from the ED   CBC WITH PLATELETS DIFFERENTIAL - Abnormal; Notable for the following:        Result Value    WBC 21.2 (*)     Absolute Neutrophil 17.8 (*)     Absolute Monocytes 1.7 (*)     All other components within normal limits   COMPREHENSIVE METABOLIC PANEL - Abnormal; Notable for the  following:     Glucose 109 (*)     All other components within normal limits   LACTIC ACID WHOLE BLOOD   ROUTINE UA WITH MICROSCOPIC   PULSE OXIMETRY NURSING   PATIENT CARE ORDER   CARDIAC CONTINUOUS MONITORING   MEASURE URINE OUTPUT   BLOOD CULTURE   BLOOD CULTURE   INFLUENZA A/B ANTIGEN   URINE CULTURE AEROBIC BACTERIAL     XR Chest 2 Views   Final Result   IMPRESSION: 2-lead implanted cardiac pacer over the left chest in   unchanged position. Ill-defined opacity projects over the right   midlung, possibly representing infection. Additionally, nodular   density projects over the left midlung is new since 6/5/2017. This may   represent a pulmonary nodule, versus overlapping tissue. Recommend   short interval follow-up chest x-ray versus further characterization   with CT. No pleural effusion or pneumothorax seen on either side.      LEVAR MARIE MD      .   Treatments provided: abx, fluids  Family Comments: daughter,  present  OBS brochure/video discussed/provided to patient:  Yes  ED Medications:   Medications   azithromycin (ZITHROMAX) 500 mg in sodium chloride 0.9 % 250 mL intermittent infusion (not administered)   0.9% sodium chloride BOLUS (0 mLs Intravenous Stopped 3/2/18 1108)   cefTRIAXone IN D5W (ROCEPHIN) intermittent infusion 2 g (2 g Intravenous New Bag 3/2/18 1107)     Drips infusing:  Yes  For the majority of the shift, the patient's behavior Green. Interventions performed were frequent rounding.     Severe Sepsis OR Septic Shock Diagnosis Present: No      ED Nurse Name/Phone Number: Sherita Florez,   11:24 AM  .    RECEIVING UNIT ED HANDOFF REVIEW    Above ED Nurse Handoff Report was reviewed: Yes  Reviewed by: Mary Jo Severance on March 2, 2018 at 12:45 PM

## 2018-03-02 NOTE — PHARMACY-ADMISSION MEDICATION HISTORY
Admission medication history interview status for this patient is complete. See [facility] admission navigator for allergy information, prior to admission medications and immunization status.     Medication history interview source(s):Patient  Medication history resources (including written lists, pill bottles, clinic record): [facility] list, care everywhere  Primary pharmacy: [facility]    Changes made to PTA medication list:  Added: Tylenol PM  Deleted: none  Changed: ASA from 81 to 162 mg daily, ICAPS to bid, omeprazole to bid    Actions taken by pharmacist (provider contacted, etc):None     Additional medication history information:None    Medication reconciliation/reorder completed by provider prior to medication history? No      Prior to Admission medications    Medication Sig Last Dose Taking? Auth Provider   amLODIPine (NORVASC) 5 MG tablet Take 5 mg by mouth daily  3/1/2018 at Unknown time Yes Reported, Patient   apixaban ANTICOAGULANT (ELIQUIS) 2.5 MG tablet Take 1 tablet (2.5 mg) by mouth 2 times daily 3/1/2018 at PM Yes Porsche Mendoza APRN CNP   lisinopril (PRINIVIL,ZESTRIL) 40 MG tablet Take 40 mg by mouth daily 3/1/2018 at Unknown time Yes Reported, Patient   carvedilol (COREG) 25 MG tablet Take 25 mg by mouth 2 times daily (with meals) 3/1/2018 at Unknown time Yes Reported, Patient   acetaminophen (TYLENOL) 325 MG tablet Take 650 mg by mouth every 6 hours as needed for mild pain  3/1/2018 at Unknown time Yes Unknown, Entered By History   tolterodine (DETROL LA) 4 MG 24 hr capsule Take 4 mg by mouth every morning 3/1/2018 at Unknown time Yes Unknown, Entered By History   atorvastatin (LIPITOR) 20 MG tablet Take 20 mg by mouth At Bedtime 3/1/2018 at Unknown time Yes Unknown, Entered By History   ASPIRIN EC PO Take 162 mg by mouth daily (states is taking half of 325 mg tablet daily). 3/1/2018 at Unknown time Yes Unknown, Entered By History   ferrous sulfate (IRON) 325 (65 FE) MG tablet Take  325 mg by mouth daily (with breakfast)  Past Month at Unknown time Yes Unknown, Entered By History   OMEPRAZOLE PO Take 20 mg by mouth 2 times daily (before meals)  3/1/2018 at Unknown time Yes Unknown, Entered By History   Multiple Vitamins-Minerals (ICAPS AREDS FORMULA) TABS    Tylenol PM  Take 1 tablet by mouth 2 times daily       Take one tablet qhs prn 3/1/2018 at Unknown time    3/1/2018 Yes Unknown, Entered By History

## 2018-03-02 NOTE — LETTER
Transition Communication Hand-off for Care Transitions to Next Level of Care Provider    Name: Yoselyn Cui  MRN #: [id number]  Primary Care Provider: Ira Barajas  Primary Care MD Name: Ira Barajas PA-C  Primary Clinic: [facility] [address]  [facility] [address]  Primary Care Clinic Name: [facility]  Reason for Hospitalization:  Generalized weakness [R53.1]  Pulmonary nodules [R91.8]  Pneumonia of right lung due to infectious organism, unspecified part of lung [J18.9]  Admit Date/Time: 3/2/2018  9:04 AM  Discharge Date: ***  Payor Source: Payor: [employer] / Plan: [employer] FEDERAL / Product Type: HMO /     Key Recommendations:  Patient is fragile and was not feeling sick when she was admitted. She now has a productive cough. Discussed importance of completing all of the prescribed antibiotic after discharge.     Temitope Carpenter    AVS/Discharge Summary is the source of truth; this is a helpful guide for improved communication of patient story

## 2018-03-02 NOTE — IP AVS SNAPSHOT
[facility] [address] [facility]    [address]    [facility] [address]    Phone:  [phone]    Fax:  [phone]                                       After Visit Summary   3/2/2018    Yoselyn Cui    MRN: [id number]           After Visit Summary Signature Page     I have received my discharge instructions, and my questions have been answered. I have discussed any challenges I see with this plan with the nurse or doctor.    ..........................................................................................................................................  Patient/Patient Representative Signature      ..........................................................................................................................................  Patient Representative Print Name and Relationship to Patient    ..................................................               ................................................  Date                                            Time    ..........................................................................................................................................  Reviewed by Signature/Title    ...................................................              ..............................................  Date                                                            Time

## 2018-03-02 NOTE — H&P
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 She was found to be febrile 102.5 F in the ED with a a marked leukocytosis and CXR findings concerning for pneumonia.     1. Sepsis secondary to community acquired pneumonia: Generalized weakness, productive cough, subjective fever, and chills with known sick contacts, at least one was definitively confirmed to have influenza on outpatient testing a week or two ago.  Febrile up to 102.5 F and leukocytosis 21.2 with absolute neutrophilia of 17.8.  Rapid influenza swab negative. Blood cultures x 2 drawn and pending. CXR demonstrated ill-defined opacity projecting over right midlung; also showed ndular denisty projecting over left midlung new since 6/5/2017. May represent pulmonary nodule vs overlapping tissue. Short interval follow-up CXR vs further characterization w/ CT recommended by radiologist.  -Continue IV azithromycin and ceftriaxone  -Supportive measures with  cc/hr x 20 hours (EF 60-65% on TTE 5/2016), as needed Tylenol  -Incentive spirometry, Robitussin-DM as needed  -Blood cultures x 2 pending  -Influenza A/B PCR and procalcitonin to be added on  -Will obtain CT of chest to further characterize nodular findings on chest x-ray  -Will likely need PT and SW evaluation once clinically improved    2. Dysuria: Reported early this morning.  She is on IV ceftriaxone w/ coverage for CAP. Will add on urine culture to UA obtained in ED. Blood cultures pending.    3. History of high degree AV block s/p  pacemaker and paroxysmal atrial fibrillation: S/p pacemaker implantation 5/2016.  She has known short paroxysms of atrial fibrillation which have always been asymptomatic.  Because of an elevated chads vascular score, she is on apixaban 2.5 mg twice daily.  She follows with [facility] cardiology annually. Continue on apixaban 2.5 mg BID and carvedilol 25 mg BID w/ parameters.    4. History of prior CVA x 2: Patient reports she had a stroke when she was 50 years old and another when she was 75.  Continue PTA aspirin 162 mg daily and apixaban 2.5 mg BID.    5. HTN: Continue on PTA lisinopril 40 mg daily, carvedilol 25 mg BID, and amlodipine 5 mg daily w/ parameters.    6. HLD: Continue on PTA atorvastatin 20 mg HS.    7. GERD: Continue omeprazole 20 mg BID.    Pain Assessment:  Current Pain Score 3/2/2018 6/5/2017 10/13/2016   Patient currently in pain? - - no   Pain score (0-10) 0 0 -   Pain location - - -   Pain descriptors - - -   Yoselyn weiss pain level was assessed and she currently denies pain.        DVT Prophylaxis: On daily NOAC  Code Status: DNR  Disposition: Expected discharge in 2+ days once vitally stable and generalized weakness improved/safe discharge plan arranged.    Primary Care Physician   Ira Barajas    Chief Complaint   Cough and generalized weakness    History is obtained from the patient.    History of Present Illness   Yoselyn Cui is a 86 year old female with a PMH significant for CVA x 2, HTN, HLD, paroxysmal atrial fibrillation, hx of high degree AVB s/p permanent pacemaker placement (5/2016), GERD who presents for evaluation of generalized weakness and productive cough.     The patient currently lives in a townhouse with her  and daughter. Normally she ambulates independently in her townhouse and only requires a cane for ambulation outside her home. In the past two weeks, the patient's daughter was tested positive for influenza in the past couple weeks, and her  had similar  symptoms. She did not receive Tamiflu treatment herself despite these sick close contacts, and she reports she had actually been feeling quite well until yesterday evening. Last night she developed a deep cough that became productive as time went on overnight. She felt quite chilled and had significant nausea this morning, though no vomiting. Around 6:30 AM today the patient go up to go to the bathroom with the assistance of her daughter, as she felt profoundly weak. She was able to make it to the toilet but then ended up sliding off it to the floor because she felt so weak. Her daughter helped her back to bed and indicated that they had reached the point that they would need to call EMS for help. She was transported to [facility] Emergency Department via EMS.    Workup in the ED revealed T 99.9, , /82, SpO2 93% on room air. She had marked leukocytosis of 21.2 w/ absolute neutrophilia 17.8 on CBC w/ diff, o/w unremarkable CMP. Rapid influenza A/B swab negative. Blood cultures x 2 drawn and pending. CXR demonstrated ill-defined opacity projecting over right midlung; also nodular density projecting over left midlung new since 6/5/2017. Patient received 500 cc IV NS, 2 g IV ceftriaxone, and 500 mg IV azithromycin in the ED.    Past Medical History    Past Medical History:   Diagnosis Date     AV block, 2nd degree 5/16/2016     Cerebrovascular accident (H) 8/22/2016     COKER (dyspnea on exertion) 8/22/2016     Generalized weakness 10/12/2016     History of colonic polyps 8/22/2016     HTN (hypertension) 8/22/2016     Melanoma of skin (H)-rt arm 8/22/2016     Mixed hyperlipidemia 8/22/2016     Pacemaker     implanted 5-     SSS (sick sinus syndrome) (H) 8/22/2016         Past Surgical History   Past Surgical History:   Procedure Laterality Date     APPENDECTOMY  1960s     C LIGATE FALLOPIAN TUBE  1960s     C REMV CATARACT INTRACAP,INSERT LENS Right 09/20/2017     HC REMV CATARACT EXTRACAP,INSERT LENS Left  10/04/2017     IMPLANT PACEMAKER  05/17/2016       Prior to Admission Medications   Prior to Admission Medications   Prescriptions Last Dose Informant Patient Reported? Taking?   ASPIRIN EC PO   Yes No   Sig: Take 81 mg by mouth daily   Multiple Vitamins-Minerals (ICAPS AREDS FORMULA) TABS   Yes No   Sig: Take 1 tablet by mouth daily   OMEPRAZOLE PO   Yes No   Sig: Take 20 mg by mouth every morning   acetaminophen (TYLENOL) 325 MG tablet   Yes No   Sig: Take 650 mg by mouth as needed for mild pain   amLODIPine (NORVASC) 5 MG tablet   Yes No   Sig: Take 5 mg by mouth   apixaban ANTICOAGULANT (ELIQUIS) 2.5 MG tablet   No No   Sig: Take 1 tablet (2.5 mg) by mouth 2 times daily   atorvastatin (LIPITOR) 20 MG tablet   Yes No   Sig: Take 20 mg by mouth At Bedtime   carvedilol (COREG) 25 MG tablet   Yes No   Sig: Take 25 mg by mouth 2 times daily (with meals)   ferrous sulfate (IRON) 325 (65 FE) MG tablet   Yes No   Sig: Take 325 mg by mouth daily (with breakfast)    lisinopril (PRINIVIL,ZESTRIL) 40 MG tablet   Yes No   Sig: Take 40 mg by mouth daily   tolterodine (DETROL LA) 4 MG 24 hr capsule   Yes No   Sig: Take 4 mg by mouth every morning      Facility-Administered Medications: None     Allergies   No Known Allergies    Social History   Social History   Substance Use Topics     Smoking status: Never Smoker     Smokeless tobacco: Not on file     Alcohol use No     Social History     Social History Narrative       Family History   Family history reviewed with patient and is noncontributory.    Review of Systems   A Comprehensive greater than 10 system review of systems was carried out.  Pertinent positives and negatives are noted above.  Otherwise negative for contributory information.    Physical Exam   Temp: 99.9  F (37.7  C) Temp src: Oral BP: 142/82   Heart Rate: 105   SpO2: 93 % O2 Device: None (Room air)    Vital Signs with Ranges  Temp:  [99.9  F (37.7  C)] 99.9  F (37.7  C)  Heart Rate:  [105] 105  BP: (142)/(82)  142/82  SpO2:  [93 %] 93 %  110 lbs 0 oz    GEN:  Alert, frail-appearing, oriented x 3, appears comfortable, no overt distress  HEENT:  Normocephalic/atraumatic, no scleral icterus, no nasal discharge, mouth moist.  CV:  Regular rate and rhythm, no murmur or JVD.  S1 + S2 noted, no S3 or S4.  LUNGS:  Coarse wheezing in left lower lobe, o/w diminished lung sounds throughout.  Symmetric chest rise on inhalation noted.  ABD:  Active bowel sounds, soft, non-tender/non-distended.  No rebound/guarding/rigidity.  EXT:  No edema.  No cyanosis.  No acute joint synovitis noted.  SKIN:  Dry and quite warm to touch, no exanthems noted in the visualized areas.  NEURO:  Symmetric muscle strength, sensation to touch grossly intact.  Coordination symmetric on general exam.  No new focal deficits appreciated.    Data   Data reviewed today:  I personally reviewed EKG demonstrating atrial-sensed V-paced rhythm . CXR  demonstrated ill-defined opacity projecting over right midlung; also nodular density projecting over left midlung new since 6/5/2017.     Results for orders placed or performed during the hospital encounter of 03/02/18 (from the past 48 hour(s))   EKG 12-lead, tracing only   Result Value Ref Range    Interpretation ECG Click View Image link to view waveform and result    Influenza A/B antigen   Result Value Ref Range    Influenza A/B Agn Specimen Nasal     Influenza A Negative NEG^Negative    Influenza B Negative NEG^Negative   CBC with platelets differential   Result Value Ref Range    WBC 21.2 (H) 4.0 - 11.0 10e9/L    RBC Count 4.37 3.8 - 5.2 10e12/L    Hemoglobin 12.4 11.7 - 15.7 g/dL    Hematocrit 38.7 35.0 - 47.0 %    MCV 89 78 - 100 fl    MCH 28.4 26.5 - 33.0 pg    MCHC 32.0 31.5 - 36.5 g/dL    RDW 14.1 10.0 - 15.0 %    Platelet Count 181 150 - 450 10e9/L    Diff Method Automated Method     % Neutrophils 84.2 %    % Lymphocytes 7.1 %    % Monocytes 7.9 %    % Eosinophils 0.2 %    % Basophils 0.2 %    %  Immature Granulocytes 0.4 %    Nucleated RBCs 0 0 /100    Absolute Neutrophil 17.8 (H) 1.6 - 8.3 10e9/L    Absolute Lymphocytes 1.5 0.8 - 5.3 10e9/L    Absolute Monocytes 1.7 (H) 0.0 - 1.3 10e9/L    Absolute Eosinophils 0.0 0.0 - 0.7 10e9/L    Absolute Basophils 0.0 0.0 - 0.2 10e9/L    Abs Immature Granulocytes 0.1 0 - 0.4 10e9/L    Absolute Nucleated RBC 0.0    Comprehensive metabolic panel   Result Value Ref Range    Sodium 137 133 - 144 mmol/L    Potassium 3.4 3.4 - 5.3 mmol/L    Chloride 104 94 - 109 mmol/L    Carbon Dioxide 23 20 - 32 mmol/L    Anion Gap 10 3 - 14 mmol/L    Glucose 109 (H) 70 - 99 mg/dL    Urea Nitrogen 19 7 - 30 mg/dL    Creatinine 0.75 0.52 - 1.04 mg/dL    GFR Estimate 73 >60 mL/min/1.7m2    GFR Estimate If Black 88 >60 mL/min/1.7m2    Calcium 8.7 8.5 - 10.1 mg/dL    Bilirubin Total 0.5 0.2 - 1.3 mg/dL    Albumin 3.6 3.4 - 5.0 g/dL    Protein Total 7.3 6.8 - 8.8 g/dL    Alkaline Phosphatase 110 40 - 150 U/L    ALT 17 0 - 50 U/L    AST 18 0 - 45 U/L   Lactic acid whole blood   Result Value Ref Range    Lactic Acid 1.1 0.7 - 2.0 mmol/L   XR Chest 2 Views    Narrative    XR CHEST 2 VW 3/2/2018 10:20 AM    COMPARISON: 6/5/2017    HISTORY: Fever      Impression    IMPRESSION: 2-lead implanted cardiac pacer over the left chest in  unchanged position. Ill-defined opacity projects over the right  midlung, possibly representing infection. Additionally, nodular  density projects over the left midlung is new since 6/5/2017. This may  represent a pulmonary nodule, versus overlapping tissue. Recommend  short interval follow-up chest x-ray versus further characterization  with CT. No pleural effusion or pneumothorax seen on either side.    MD Kayla REYES PA-C    I discussed the patient with Dr. Nix who is in agreement with the above plan.

## 2018-03-02 NOTE — PLAN OF CARE
Settled and orientated to room at 1315.  Appears frail , weak and undistressed.   Lungs clear.  Down now for CT of chest

## 2018-03-02 NOTE — ED PROVIDER NOTES
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 The patient reports last night she developed a dry cough with associated weakness and subjective fever. She woke up with persistent symptoms this morning and unable to make it to the toilet, therefore, prompting the call to EMS and presents to the ED for further evaluation. EMS report the patient was hot to the touch, heart rate 108, respiration rate 14, blood pressure 157/84, orthostatic changes from lying to sitting, and initial O2 sat 92% on room air which improved to 95% on 2L nasal cannula. No nebs en route. Upon ED presentation, patient states she accidentally slid down to the ground this morning from a seating position, but did not sustain any injuries. Furthermore, patient has known ill flu like contacts within the past week. The patient denies vomiting, diarrhea, and shortness of breath. Of note, the patient got her flu shot this year.     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  Smoking Status: Former -  25 years  Alcohol Use: No  Marital Status:       Review of Systems   Constitutional: Positive for fever (subjective).   Respiratory: Positive for cough. Negative for shortness of breath.    Gastrointestinal: Negative for diarrhea and vomiting.   Skin: Negative for wound.   Neurological: Positive for weakness.   All other systems reviewed and are negative.    Physical Exam   First Vitals:  BP: 142/82  Heart Rate: 105  Temp: 99.9  F (37.7  C)  Weight: 49.9 kg (110 lb)  SpO2: 93 %    Physical Exam  General:                        Frail appearing elderly female                        Intermittent harsh, wet-sounding cough  Eyes:                        Conjunctiva without injection or scleral icterus                        PERRL  ENT:                        Moist mucous membranes                        Posterior oropharynx clear without erythema or exudate                        Nares patent                        Pinnae normal  Neck:                        Full ROM                        No stiffness appreciated  Resp:                        Diminished breath sounds to right base                        Upper lungs are CTA  CV:                                        Tachycardic rate, regular rhythm                        S1 and S2 present                        No murmur, gallop or rub  GI:                        BS present                        Abdomen soft without distention                        Non-tender to light and deep palpation                        No guarding or rebound tenderness  Skin:                        Warm, dry, well perfused                        Old bruise to left shoulder  MSK:                        Moves all extremities                        No focal deformities or swelling                        No calf tenderness  Neuro:                        Alert                        Answers questions appropriately                        Moves all extremities equally  Psych:                         Normal affect, normal mood       Emergency Department Course     ECG:  Indication: Generalized weakness  Completed at 0917.  Read at 0923.   Atrial-sensed ventricular-paced rhythm  Abnormal ECG   Rate 106 bpm. SC interval 172. QRS duration 142. QT/QTc 394/523. P-R-T axes 58 266 72.    Imaging:  Radiology findings were communicated with the patient who voiced understanding of the findings.  XR Chest 2 Views  IMPRESSION: 2-lead implanted cardiac pacer over the left chest in  unchanged position. Ill-defined opacity projects over the right  midlung, possibly representing infection. Additionally, nodular  density projects over the left midlung is new since 6/5/2017. This may  represent a pulmonary nodule, versus overlapping tissue. Recommend  short interval follow-up chest x-ray versus further characterization  with CT. No pleural effusion or pneumothorax seen on either side.  Report per radiology     Laboratory:  Laboratory findings were communicated with the patient who voiced understanding of the findings.  CBC: WBC 21.2 (H) o/w WNL. (HGB 12.4, )   CMP: Glucose 109 (H) o/w WNL (Creatinine 0.75)  Lactic Acid: 1.1  Influenza A/B Antigen: Negative   UA: clear yellow urine, mucous present o/w WNL    Blood cultures: Pending  Urine Culture Aerobic Bacterial: Pending    Interventions:  0955 NS Bolus 500mL IV  1107 Rocephin 2g IV  1148 Azithromycin 500mg IV     Emergency Department Course:  Nursing notes and vitals reviewed.  The patient was sent for a XR Chest 2 Views while in the emergency department, results above.   IV was inserted and blood was drawn for laboratory testing, results above.  The patient provided a urine sample here in the emergency department. This was sent for laboratory testing, findings above.  0910: I performed an exam of the patient as documented above.   1043: Patient rechecked and updated.   1054: I spoke with Kaylynn GARCIA for Dr. Nix of the hospitalist service regarding patient's  presentation, findings, and plan of care.  1059: Patient rechecked and updated.   Findings and plan explained to the Patient who consents to admission. Discussed the patient with Kaylynn GARCIA for Dr. Villarreal, who will admit the patient to a med/surg bed for further monitoring, evaluation, and treatment.  I personally reviewed the laboratory and imaging results with the Patient and answered all related questions prior to admission.    Impression & Plan      Medical Decision Making:  Yoselyn Cui is an 86-year-old female presenting to the ER for evaluation of generalized weakness via EMS.  VS on presentation reveal mild tachycardia though otherwise are unremarkable.  Workup included imaging and laboratory studies.  Clinical exam demonstrates diminished breath sounds to the right lower lobe and x-ray identifies an ill-defined opacity over the right midlung, concerning for infection.  There is additional nodular densities over the left midlung, which are new compared with June of last year.  This was discussed with the patient as well as need for follow-up..  Patient has not required noninvasive positive pressure ventilation nor further respiratory support.  White blood cell count is currently elevated.  Lactate presently normal.  Patient not meeting criteria for severe sepsis or septic shock.  Rapid influenza swab negative. BC x 2 obtained and are pending. Patient will be admitted to the hospitalist service under the care of .  She was given Rocephin and azithromycin here in the ED.  Questions answered prior to admission.    Diagnosis:    ICD-10-CM    1. Pneumonia of right lung due to infectious organism, unspecified part of lung J18.9    2. Generalized weakness R53.1    3. Pulmonary nodules R91.8      Disposition:  Admitted to med/surg    Scribe Disclosure:  Roxana PADILLA, am serving as a scribe at 9:06 AM on 3/2/2018 to document services personally performed by Shadi Zelaya MD based on my  observations and the provider's statements to me.     3/2/2018   [facility] EMERGENCY DEPARTMENT       Shadi Zelaya MD  03/02/18 1521

## 2018-03-02 NOTE — LETTER
Transition Communication Hand-off for Care Transitions to Next Level of Care Provider    Hand-off for Care Transitions to Next Level of Care Provider  Name: Yoselyn Cui  : 1932  MRN #: [id number]  Reason for Hospitalization:  Generalized weakness [R53.1]  Pulmonary nodules [R91.8]  Pneumonia of right lung due to infectious organism, unspecified part of lung [J18.9]  Admit Date/Time: 3/2/2018  9:04 AM  Discharge Date: 3/7/2018    Reason for Communication Hand-off Referral: Admission diagnoses: PN    Discharge Plan:  Discharged to: Home with support                   Patient agreeable to post-hospital support suggestions:  Yes  Discharge Plan:  Home with support           Patient is on new medications:   Yes           MTM follow up recommended: No           Tel-Assurance program:  Ineligible  Follow-up specialty is recommended: Yes. Pulmonary function tests to screen for COPD and repeat chest Xray in 2-3 weeks to make sure the pulmonary infiltrates have cleared.     Follow-up plan:  Future Appointments  Date Time Provider Department Center   2018 4:00 PM [facility]ILES TECH [facility]     Any outstanding tests or procedures: No. Recommend repeat chest Xray in 2-3 weeks.      Procedures     Future Labs/Procedures    Oxygen Adult     Comments:    New Home Oxygen Order 2 liter(s) by nasal cannula with activity with use of portable tank. Expected treatment length is indefinite (99 months).. Test on conserving device as applicable.    Patients who qualify for home O2 coverage under the CMS guidelines require ABG tests or O2 sat readings obtained closest to, but no earlier than 2 days prior to the discharge, as evidence of the need for home oxygen therapy. Testing must be performed while patient is in the chronic stable state. See notes for O2 sats.    I certify that this patient, Yoselyn Cui has been under my care and that I, or a nurse practitioner or physician's assistant working with me, had a  face-to-face encounter that meets the face-to-face encounter requirements with this patient on 3/7/2018. The patient, Yoselyn Cui was evaluated or treated in whole, or in part, for the following medical condition, which necessitates the use of the ordered oxygen. Treatment Diagnosis: emphysema    Attending Provider: Ariel Bahena  Physician signature: See electronic signature associated with these discharge orders  Date of Order: March 7, 2018          Key Recommendations:  Patient is fragile and was not feeling sick when she was admitted. She now has a productive cough. Discussed importance of completing all of the prescribed antibiotic after discharge. Patient discharged home on new home oxygen.     Communicated handoff via [employer] to (PCP's CC) at (fax number)     Jessica Carpenter RN, BSN, CTS  [facility]  [phone]    AVS/Discharge Summary is the source of truth; this is a helpful guide for improved communication of patient story

## 2018-03-03 LAB
ANION GAP SERPL CALCULATED.3IONS-SCNC: 11 MMOL/L (ref 3–14)
BACTERIA SPEC CULT: NO GROWTH
BASOPHILS # BLD AUTO: 0 10E9/L (ref 0–0.2)
BASOPHILS NFR BLD AUTO: 0.2 %
BUN SERPL-MCNC: 27 MG/DL (ref 7–30)
CALCIUM SERPL-MCNC: 8.2 MG/DL (ref 8.5–10.1)
CHLORIDE SERPL-SCNC: 110 MMOL/L (ref 94–109)
CO2 SERPL-SCNC: 20 MMOL/L (ref 20–32)
CREAT SERPL-MCNC: 0.68 MG/DL (ref 0.52–1.04)
DIFFERENTIAL METHOD BLD: ABNORMAL
EOSINOPHIL # BLD AUTO: 0 10E9/L (ref 0–0.7)
EOSINOPHIL NFR BLD AUTO: 0 %
ERYTHROCYTE [DISTWIDTH] IN BLOOD BY AUTOMATED COUNT: 14.6 % (ref 10–15)
FLUAV+FLUBV RNA SPEC QL NAA+PROBE: NEGATIVE
FLUAV+FLUBV RNA SPEC QL NAA+PROBE: NEGATIVE
GFR SERPL CREATININE-BSD FRML MDRD: 82 ML/MIN/1.7M2
GLUCOSE SERPL-MCNC: 146 MG/DL (ref 70–99)
HCT VFR BLD AUTO: 36.9 % (ref 35–47)
HGB BLD-MCNC: 11.9 G/DL (ref 11.7–15.7)
IMM GRANULOCYTES # BLD: 0.2 10E9/L (ref 0–0.4)
IMM GRANULOCYTES NFR BLD: 1 %
LYMPHOCYTES # BLD AUTO: 1.9 10E9/L (ref 0.8–5.3)
LYMPHOCYTES NFR BLD AUTO: 10 %
Lab: NORMAL
MCH RBC QN AUTO: 28.4 PG (ref 26.5–33)
MCHC RBC AUTO-ENTMCNC: 32.2 G/DL (ref 31.5–36.5)
MCV RBC AUTO: 88 FL (ref 78–100)
MONOCYTES # BLD AUTO: 0.3 10E9/L (ref 0–1.3)
MONOCYTES NFR BLD AUTO: 1.4 %
NEUTROPHILS # BLD AUTO: 16.9 10E9/L (ref 1.6–8.3)
NEUTROPHILS NFR BLD AUTO: 87.4 %
NRBC # BLD AUTO: 0 10*3/UL
NRBC BLD AUTO-RTO: 0 /100
PLATELET # BLD AUTO: 160 10E9/L (ref 150–450)
POTASSIUM SERPL-SCNC: 4 MMOL/L (ref 3.4–5.3)
RBC # BLD AUTO: 4.19 10E12/L (ref 3.8–5.2)
RSV RNA SPEC NAA+PROBE: NEGATIVE
SODIUM SERPL-SCNC: 141 MMOL/L (ref 133–144)
SPECIMEN SOURCE: NORMAL
SPECIMEN SOURCE: NORMAL
WBC # BLD AUTO: 19.4 10E9/L (ref 4–11)

## 2018-03-03 PROCEDURE — 25000128 H RX IP 250 OP 636: Performed by: PHYSICIAN ASSISTANT

## 2018-03-03 PROCEDURE — 25000132 ZZH RX MED GY IP 250 OP 250 PS 637: Mod: GY | Performed by: PHYSICIAN ASSISTANT

## 2018-03-03 PROCEDURE — 85025 COMPLETE CBC W/AUTO DIFF WBC: CPT | Performed by: PHYSICIAN ASSISTANT

## 2018-03-03 PROCEDURE — 12000007 ZZH R&B INTERMEDIATE

## 2018-03-03 PROCEDURE — 80048 BASIC METABOLIC PNL TOTAL CA: CPT | Performed by: PHYSICIAN ASSISTANT

## 2018-03-03 PROCEDURE — A9270 NON-COVERED ITEM OR SERVICE: HCPCS | Mod: GY | Performed by: PHYSICIAN ASSISTANT

## 2018-03-03 PROCEDURE — 36415 COLL VENOUS BLD VENIPUNCTURE: CPT | Performed by: PHYSICIAN ASSISTANT

## 2018-03-03 PROCEDURE — 99233 SBSQ HOSP IP/OBS HIGH 50: CPT | Performed by: HOSPITALIST

## 2018-03-03 PROCEDURE — 87631 RESP VIRUS 3-5 TARGETS: CPT | Performed by: PHYSICIAN ASSISTANT

## 2018-03-03 PROCEDURE — 25000128 H RX IP 250 OP 636: Performed by: HOSPITALIST

## 2018-03-03 RX ORDER — PREDNISONE 20 MG/1
40 TABLET ORAL DAILY
Status: DISCONTINUED | OUTPATIENT
Start: 2018-03-03 | End: 2018-03-07

## 2018-03-03 RX ADMIN — OMEPRAZOLE 20 MG: 20 CAPSULE, DELAYED RELEASE ORAL at 07:27

## 2018-03-03 RX ADMIN — METHYLPREDNISOLONE SODIUM SUCCINATE 40 MG: 40 INJECTION, POWDER, FOR SOLUTION INTRAMUSCULAR; INTRAVENOUS at 07:30

## 2018-03-03 RX ADMIN — APIXABAN 2.5 MG: 2.5 TABLET, FILM COATED ORAL at 07:27

## 2018-03-03 RX ADMIN — LISINOPRIL 40 MG: 40 TABLET ORAL at 07:27

## 2018-03-03 RX ADMIN — AZITHROMYCIN MONOHYDRATE 250 MG: 500 INJECTION, POWDER, LYOPHILIZED, FOR SOLUTION INTRAVENOUS at 11:18

## 2018-03-03 RX ADMIN — CEFTRIAXONE SODIUM 2 G: 2 INJECTION, SOLUTION INTRAVENOUS at 10:48

## 2018-03-03 RX ADMIN — CARVEDILOL 25 MG: 25 TABLET, FILM COATED ORAL at 07:27

## 2018-03-03 RX ADMIN — APIXABAN 2.5 MG: 2.5 TABLET, FILM COATED ORAL at 20:18

## 2018-03-03 RX ADMIN — OMEPRAZOLE 20 MG: 20 CAPSULE, DELAYED RELEASE ORAL at 16:04

## 2018-03-03 RX ADMIN — ASPIRIN 162 MG: 81 TABLET, COATED ORAL at 07:26

## 2018-03-03 RX ADMIN — METHYLPREDNISOLONE SODIUM SUCCINATE 40 MG: 40 INJECTION, POWDER, FOR SOLUTION INTRAMUSCULAR; INTRAVENOUS at 00:04

## 2018-03-03 RX ADMIN — AMLODIPINE BESYLATE 5 MG: 5 TABLET ORAL at 07:28

## 2018-03-03 RX ADMIN — SODIUM CHLORIDE: 9 INJECTION, SOLUTION INTRAVENOUS at 06:08

## 2018-03-03 RX ADMIN — ATORVASTATIN CALCIUM 20 MG: 20 TABLET, FILM COATED ORAL at 20:18

## 2018-03-03 RX ADMIN — CARVEDILOL 25 MG: 25 TABLET, FILM COATED ORAL at 16:55

## 2018-03-03 ASSESSMENT — ACTIVITIES OF DAILY LIVING (ADL)
ADLS_ACUITY_SCORE: 14

## 2018-03-03 NOTE — CONSULTS
Care Transition Initial Assessment - RN    Reason For Consult: care coordination/care conference, discharge planning   Met with: Patient and Family (spouse, Gordy & daughter, Pily).  DATA   Active Problems:    Community acquired pneumonia     PNA Action Plan discussed with pt, spouse & daughter.   Cognitive Status: awake, alert and oriented.  Primary Care Clinic Name: [facility]  Primary Care MD Name: Ira Barajas PA-C  Contact information and PCP information verified: Yes  Lives With: child(bill), adult, spouse  Living Arrangements: house     Description of Support System: Supportive, Involved   Who is your support system?: , Children       Insurance concerns: No Insurance issues identified  ASSESSMENT  Patient currently receives the following services:  None. Her  and daughter assist her with any needs.         Identified issues/concerns regarding health management: Patient is fragile and was not feeling sick when she was admitted. She now has a productive cough. Discussed importance of completing all of the prescribed antibiotic after discharge.   PLAN  Financial costs for the patient were not discussed.  Patient given options and choices for discharge.  Patient/family is agreeable to the plan?  Yes  Patient anticipates discharging back to home.     Other Resources: Other (see comment) (PNA Action Plan)  Patient anticipates needs for home equipment: No, she uses a straight cane outside of the home.   Discharge Planner   Discharge Plans in progress: Yes  Barriers to discharge plan: IV antibiotic  Plan/Disposition: Home   Appointments: CM offered to schedule f/u appt. Patient & family declined. They will make appt after daughter checks her schedule. CM stressed importance of f/u to make sure PNA is clearing.     CM will continue to follow patient until discharge for any additional needs.     Jessica Carpenter RN, BSN, CTS  [facility]  [phone]

## 2018-03-03 NOTE — PLAN OF CARE
Feeling much better today but still weak and non-productive cough   Ambulated in frausto, 50 feet or so with mild-mod dyspnea at the end. Recovered with rest.  Left side weakness and contracture from previous stroke .  SBA with cane. Appetite good today.   Tele demand pacing

## 2018-03-03 NOTE — PLAN OF CARE
Problem: Patient Care Overview  Goal: Plan of Care/Patient Progress Review  Outcome: Improving  A/O x 4. VSS. L sided weakness from previous CVA. IVF infusing at 100 ml/hr. Denies pain. Denies shortness of breath. Using bedside commode. Up with assist x 1. Calls appropriately.

## 2018-03-03 NOTE — PLAN OF CARE
Problem: Patient Care Overview  Goal: Plan of Care/Patient Progress Review  Outcome: Therapy, progress toward functional goals as expected  Orientation: Alert and oriented x4  VSS. 94% on RA. Afebrile.   Tele: SR, demand paced. HR 70s.   LS: clear and equal bilaterally.   GI: Passing gas. no BM. Denies N/V.   : Adequate urine output.   Skin: intact. No apparent issues  Activity: Assist of 1 to pivot to bedside commode. Pt slept comfortably throughout shift.   Pain: 0/10. Denies pain throughout shift. No prn intervention utilized.   Plan: Continue with current cares.

## 2018-03-04 LAB
ANION GAP SERPL CALCULATED.3IONS-SCNC: 6 MMOL/L (ref 3–14)
BUN SERPL-MCNC: 25 MG/DL (ref 7–30)
CALCIUM SERPL-MCNC: 8.5 MG/DL (ref 8.5–10.1)
CHLORIDE SERPL-SCNC: 109 MMOL/L (ref 94–109)
CO2 SERPL-SCNC: 26 MMOL/L (ref 20–32)
CREAT SERPL-MCNC: 0.64 MG/DL (ref 0.52–1.04)
ERYTHROCYTE [DISTWIDTH] IN BLOOD BY AUTOMATED COUNT: 14.6 % (ref 10–15)
GFR SERPL CREATININE-BSD FRML MDRD: 89 ML/MIN/1.7M2
GLUCOSE SERPL-MCNC: 127 MG/DL (ref 70–99)
HCT VFR BLD AUTO: 35.6 % (ref 35–47)
HGB BLD-MCNC: 11 G/DL (ref 11.7–15.7)
LACTATE BLD-SCNC: 1.7 MMOL/L (ref 0.7–2)
MCH RBC QN AUTO: 27.7 PG (ref 26.5–33)
MCHC RBC AUTO-ENTMCNC: 30.9 G/DL (ref 31.5–36.5)
MCV RBC AUTO: 90 FL (ref 78–100)
PLATELET # BLD AUTO: 174 10E9/L (ref 150–450)
POTASSIUM SERPL-SCNC: 3.7 MMOL/L (ref 3.4–5.3)
RBC # BLD AUTO: 3.97 10E12/L (ref 3.8–5.2)
SODIUM SERPL-SCNC: 141 MMOL/L (ref 133–144)
WBC # BLD AUTO: 23.9 10E9/L (ref 4–11)

## 2018-03-04 PROCEDURE — 80048 BASIC METABOLIC PNL TOTAL CA: CPT | Performed by: HOSPITALIST

## 2018-03-04 PROCEDURE — 12000007 ZZH R&B INTERMEDIATE

## 2018-03-04 PROCEDURE — 85027 COMPLETE CBC AUTOMATED: CPT | Performed by: HOSPITALIST

## 2018-03-04 PROCEDURE — 83605 ASSAY OF LACTIC ACID: CPT | Performed by: INTERNAL MEDICINE

## 2018-03-04 PROCEDURE — 25000132 ZZH RX MED GY IP 250 OP 250 PS 637: Mod: GY | Performed by: PHYSICIAN ASSISTANT

## 2018-03-04 PROCEDURE — 94150 VITAL CAPACITY TEST: CPT

## 2018-03-04 PROCEDURE — 94640 AIRWAY INHALATION TREATMENT: CPT | Mod: 76

## 2018-03-04 PROCEDURE — 36415 COLL VENOUS BLD VENIPUNCTURE: CPT | Performed by: HOSPITALIST

## 2018-03-04 PROCEDURE — 40000275 ZZH STATISTIC RCP TIME EA 10 MIN

## 2018-03-04 PROCEDURE — A9270 NON-COVERED ITEM OR SERVICE: HCPCS | Mod: GY | Performed by: PHYSICIAN ASSISTANT

## 2018-03-04 PROCEDURE — 99233 SBSQ HOSP IP/OBS HIGH 50: CPT | Performed by: INTERNAL MEDICINE

## 2018-03-04 PROCEDURE — 36415 COLL VENOUS BLD VENIPUNCTURE: CPT | Performed by: INTERNAL MEDICINE

## 2018-03-04 PROCEDURE — 25000125 ZZHC RX 250: Performed by: HOSPITALIST

## 2018-03-04 PROCEDURE — 40000274 ZZH STATISTIC RCP CONSULT EA 30 MIN

## 2018-03-04 PROCEDURE — 25000128 H RX IP 250 OP 636: Performed by: PHYSICIAN ASSISTANT

## 2018-03-04 PROCEDURE — 94640 AIRWAY INHALATION TREATMENT: CPT

## 2018-03-04 PROCEDURE — 25000125 ZZHC RX 250: Performed by: INTERNAL MEDICINE

## 2018-03-04 RX ORDER — ALBUTEROL SULFATE 0.83 MG/ML
2.5 SOLUTION RESPIRATORY (INHALATION) EVERY 4 HOURS PRN
Status: DISCONTINUED | OUTPATIENT
Start: 2018-03-04 | End: 2018-03-07 | Stop reason: HOSPADM

## 2018-03-04 RX ORDER — IPRATROPIUM BROMIDE AND ALBUTEROL SULFATE 2.5; .5 MG/3ML; MG/3ML
3 SOLUTION RESPIRATORY (INHALATION)
Status: DISCONTINUED | OUTPATIENT
Start: 2018-03-04 | End: 2018-03-04

## 2018-03-04 RX ORDER — IPRATROPIUM BROMIDE AND ALBUTEROL SULFATE 2.5; .5 MG/3ML; MG/3ML
3 SOLUTION RESPIRATORY (INHALATION)
Status: DISCONTINUED | OUTPATIENT
Start: 2018-03-04 | End: 2018-03-07

## 2018-03-04 RX ADMIN — ATORVASTATIN CALCIUM 20 MG: 20 TABLET, FILM COATED ORAL at 21:02

## 2018-03-04 RX ADMIN — ASPIRIN 162 MG: 81 TABLET, COATED ORAL at 07:51

## 2018-03-04 RX ADMIN — LISINOPRIL 40 MG: 40 TABLET ORAL at 07:55

## 2018-03-04 RX ADMIN — IPRATROPIUM BROMIDE AND ALBUTEROL SULFATE 3 ML: .5; 3 SOLUTION RESPIRATORY (INHALATION) at 08:12

## 2018-03-04 RX ADMIN — AZITHROMYCIN MONOHYDRATE 250 MG: 500 INJECTION, POWDER, LYOPHILIZED, FOR SOLUTION INTRAVENOUS at 12:33

## 2018-03-04 RX ADMIN — AMLODIPINE BESYLATE 5 MG: 5 TABLET ORAL at 07:54

## 2018-03-04 RX ADMIN — APIXABAN 2.5 MG: 2.5 TABLET, FILM COATED ORAL at 21:02

## 2018-03-04 RX ADMIN — APIXABAN 2.5 MG: 2.5 TABLET, FILM COATED ORAL at 07:52

## 2018-03-04 RX ADMIN — PREDNISONE 40 MG: 20 TABLET ORAL at 07:56

## 2018-03-04 RX ADMIN — CARVEDILOL 25 MG: 25 TABLET, FILM COATED ORAL at 07:54

## 2018-03-04 RX ADMIN — OMEPRAZOLE 20 MG: 20 CAPSULE, DELAYED RELEASE ORAL at 16:13

## 2018-03-04 RX ADMIN — CEFTRIAXONE SODIUM 2 G: 2 INJECTION, SOLUTION INTRAVENOUS at 10:29

## 2018-03-04 RX ADMIN — IPRATROPIUM BROMIDE AND ALBUTEROL SULFATE 3 ML: .5; 3 SOLUTION RESPIRATORY (INHALATION) at 19:29

## 2018-03-04 RX ADMIN — OMEPRAZOLE 20 MG: 20 CAPSULE, DELAYED RELEASE ORAL at 07:54

## 2018-03-04 RX ADMIN — IPRATROPIUM BROMIDE AND ALBUTEROL SULFATE 3 ML: .5; 3 SOLUTION RESPIRATORY (INHALATION) at 12:03

## 2018-03-04 RX ADMIN — CARVEDILOL 25 MG: 25 TABLET, FILM COATED ORAL at 17:37

## 2018-03-04 ASSESSMENT — ACTIVITIES OF DAILY LIVING (ADL)
ADLS_ACUITY_SCORE: 14

## 2018-03-04 NOTE — PLAN OF CARE
This morning was dyspneic even sitting in bed and worse with activity.  Loose course cough but non-productive,  Much improved after neb treatments ordered...  Continue with steroids and iv antibiotics Tele demand pacing

## 2018-03-04 NOTE — PLAN OF CARE
Problem: Patient Care Overview  Goal: Plan of Care/Patient Progress Review  Outcome: Improving  Patient alert and oriented x4.  VSS. Up w/ SBA and cane.  Left sided weakness.  Eating well and voiding well.  RA.  Tele demand pacing.  Saline locked.  Ambulated halls w/ staff.  Plans to d/c in 1-2 days.

## 2018-03-04 NOTE — PROGRESS NOTES
"[facility] RCAT     Date:3/4/2018  Admission Dx: Pneumonia  Pulmonary History: None  Home Nebulizer/MDI Use: None  Home Oxygen: Room air  Acuity Level (RCAT flow sheet): 3  Aerosol Therapy initiated: Changed Duoneb to QID and added albuterol Q4 hours prn      Pulmonary Hygiene initiated: Deep breath and cough TID      Volume Expansion initiated: IS TID      Current Oxygen Requirements: Room air  Current SpO2: 93%    Re-evaluation date: 3/7/2018    Patient Education: Informed Pt of RCAT.  Gave instruction in deep breathing and IS use.      See \"RT Assessments\" flow sheet for patient assessment scoring and Acuity Level Details.     Fco Medina  March 4, 2018.8:12 AM              "

## 2018-03-04 NOTE — PLAN OF CARE
Problem: Patient Care Overview  Goal: Plan of Care/Patient Progress Review  Outcome: Improving  Patient alert and oriented x4.  VSS. Up w/ SBA and cane to the  BR.   Left sided weakness from previous CVA. Denies pain. 100% V paced on tele.   Up and ambulating well to the BR.  Has intermittent non productive cough. Needs sputum specimen  Plans to d/c in 1-2 days.

## 2018-03-04 NOTE — PROGRESS NOTES
[facility]  Hospitalist Progress Note  Patient Name: Yoselyn Cui    MRN: [id number]  Provider:  Ariel Bahena MD  Date:03/04/2018    Initial presenting complaint/issue to hospital (Diagnosis): cough      Summary of Stay: Yoselyn Cui is a 86 year old female with a PMH significant for CVA x 2, HTN, HLD, paroxysmal atrial fibrillation, hx of high degree AVB s/p permanent pacemaker placement (5/2016), GERD who presented on 3/2/2018 for evaluation of generalized weakness and productive cough. She was found to be febrile 102.5 F in the ED with a a marked leukocytosis and CXR findings concerning for pneumonia.  She is a former smoker and likely has some degree of bronchospasm as well.  She is clinically improving and likely 1-2 days from discharge home with family.  Her persistent WBC elevation seems most likely due to steroids but would check in the outpatient setting to ensure normalization.         Assessment and Plan:      Sepsis secondary to community acquired pneumonia: Generalized weakness, productive cough, subjective fever, and chills with known sick contacts, at least one was definitively confirmed to have influenza on outpatient testing a week or two ago.  Febrile up to 102.5 F and leukocytosis 21.2 with absolute neutrophilia of 17.8.  Rapid influenza swab negative. Blood cultures x 2 drawn and pending. CXR demonstrated ill-defined opacity projecting over right midlung; also showed ndular denisty projecting over left midlung new since 6/5/2017. May represent pulmonary nodule vs overlapping tissue. Short interval follow-up CXR vs further characterization w/ CT recommended by radiologist.  -Continue azithromycin and ceftriaxone  -Incentive spirometry, Robitussin-DM as needed  -Blood cultures x 2 NGTD  -Influenza A/B PCR negative and procalcitonin elevated  -CT of chest to further characterize nodular findings on chest x-ray showed bilateral infiltrates w/ diffuse emphysematous  changes w/ mild posterior LLL opacity c/w atelectasis w/ bronchiectasis.  -continue prednisone 40, added scheduled nebs.  -needs outpatient PFTs, discussed w/ family.     Dysuria:   She is on IV ceftriaxone w/ coverage for CAP. UA bland.     History of high degree AV block s/p pacemaker and paroxysmal atrial fibrillation: S/p pacemaker implantation 5/2016.  She has known short paroxysms of atrial fibrillation which have always been asymptomatic.  Because of an elevated chads vascular score, she is on apixaban 2.5 mg twice daily.  She follows with [facility] cardiology annually. Continue on apixaban 2.5 mg BID and carvedilol 25 mg BID w/ parameters.     History of prior CVA x 2: Patient reports she had a stroke when she was 50 years old and another when she was 75.  Continue PTA aspirin 162 mg daily and apixaban 2.5 mg BID.     HTN: Continue on PTA lisinopril 40 mg daily, carvedilol 25 mg BID, and amlodipine 5 mg daily w/ parameters.     HLD: Continue on PTA atorvastatin 20 mg HS.     GERD: Continue omeprazole 20 mg BID.     DVT Prophylaxis: On daily NOAC  Code Status: DNR  Disposition: hopefully home tomorrow      # Pain Assessment:    Current Pain Score 3/4/2018 3/4/2018 3/3/2018   Patient currently in pain? denies denies denies   Pain score (0-10) - - -   Pain location - - -   Pain descriptors - - -   Yoselyn s pain level was assessed and she currently denies pain.          Interval History:      Was paged early this AM that she was working harder to breath, felt worse  Gave duoneb, she improved dramatically  Still weak but now feels she is improving  Met w/ family, hopefully home tomorrow with them if she continues to improve  WBC still elevated (on steroids)    Data reviewed today: I reviewed all new labs and imaging reports over the last 24 hours. I personally reviewed no images or EKG's today.         Physical Exam:      Last Vital Signs:  /72  Pulse 82  Temp 99.2  F (37.3  C) (Oral)  Resp 26  Wt 51.3 kg (113  lb 3.2 oz)  SpO2 93%  BMI 22.11 kg/m2     GENERAL: No apparent distress. Awake, alert, and fully oriented.  HEENT: Normocephalic, atraumatic. Extraocular movements intact.  CARDIOVASCULAR: Regular rate and rhythm without murmurs or rubs. No S3.  PULMONARY: Clear bilaterally.  ABDOMINAL: Soft, non-tender, non-distended. Bowel sounds normoactive. No hepatosplenomegaly.  EXTREMITIES: No cyanosis or clubbing. No edema.  NEUROLOGICAL: CN 2-12 grossly intact, no focal neurological deficits.  DERMATOLOGICAL: No rash, ulcer, ecchymoses, jaundice.  PSYCH: appropriate         Medications:      All current medications were reviewed.         Data:      All new lab and imaging data was reviewed.   Data       Recent Labs  Lab 03/04/18  0707 03/03/18 0712 03/02/18  0948   WBC 23.9* 19.4* 21.2*   HGB 11.0* 11.9 12.4   HCT 35.6 36.9 38.7   MCV 90 88 89    160 181       Recent Labs  Lab 03/04/18  0707 03/03/18  0712 03/02/18  0948    141 137   POTASSIUM 3.7 4.0 3.4   CHLORIDE 109 110* 104   CO2 26 20 23   ANIONGAP 6 11 10   * 146* 109*   BUN 25 27 19   CR 0.64 0.68 0.75   GFRESTIMATED 89 82 73   GFRESTBLACK >90 >90 88   GOLDY 8.5 8.2* 8.7       No results found for this or any previous visit (from the past 24 hour(s)).    Ariel Bahena MD

## 2018-03-05 PROCEDURE — 94640 AIRWAY INHALATION TREATMENT: CPT

## 2018-03-05 PROCEDURE — 94640 AIRWAY INHALATION TREATMENT: CPT | Mod: 76

## 2018-03-05 PROCEDURE — 99233 SBSQ HOSP IP/OBS HIGH 50: CPT | Performed by: INTERNAL MEDICINE

## 2018-03-05 PROCEDURE — A9270 NON-COVERED ITEM OR SERVICE: HCPCS | Mod: GY | Performed by: PHYSICIAN ASSISTANT

## 2018-03-05 PROCEDURE — 25000128 H RX IP 250 OP 636: Performed by: PHYSICIAN ASSISTANT

## 2018-03-05 PROCEDURE — 25000132 ZZH RX MED GY IP 250 OP 250 PS 637: Mod: GY | Performed by: PHYSICIAN ASSISTANT

## 2018-03-05 PROCEDURE — 25000125 ZZHC RX 250: Performed by: HOSPITALIST

## 2018-03-05 PROCEDURE — 25000125 ZZHC RX 250: Performed by: INTERNAL MEDICINE

## 2018-03-05 PROCEDURE — 12000007 ZZH R&B INTERMEDIATE

## 2018-03-05 PROCEDURE — 40000275 ZZH STATISTIC RCP TIME EA 10 MIN

## 2018-03-05 RX ORDER — ALBUTEROL SULFATE 90 UG/1
2 AEROSOL, METERED RESPIRATORY (INHALATION) 4 TIMES DAILY PRN
Status: DISCONTINUED | OUTPATIENT
Start: 2018-03-05 | End: 2018-03-07 | Stop reason: HOSPADM

## 2018-03-05 RX ADMIN — OMEPRAZOLE 20 MG: 20 CAPSULE, DELAYED RELEASE ORAL at 17:03

## 2018-03-05 RX ADMIN — FERROUS SULFATE TAB 325 MG (65 MG ELEMENTAL FE) 325 MG: 325 (65 FE) TAB at 08:10

## 2018-03-05 RX ADMIN — CEFTRIAXONE SODIUM 2 G: 2 INJECTION, SOLUTION INTRAVENOUS at 10:57

## 2018-03-05 RX ADMIN — LISINOPRIL 40 MG: 40 TABLET ORAL at 08:10

## 2018-03-05 RX ADMIN — IPRATROPIUM BROMIDE AND ALBUTEROL SULFATE 3 ML: .5; 3 SOLUTION RESPIRATORY (INHALATION) at 19:29

## 2018-03-05 RX ADMIN — ATORVASTATIN CALCIUM 20 MG: 20 TABLET, FILM COATED ORAL at 21:02

## 2018-03-05 RX ADMIN — ASPIRIN 162 MG: 81 TABLET, COATED ORAL at 08:10

## 2018-03-05 RX ADMIN — AMLODIPINE BESYLATE 5 MG: 5 TABLET ORAL at 08:10

## 2018-03-05 RX ADMIN — APIXABAN 2.5 MG: 2.5 TABLET, FILM COATED ORAL at 19:52

## 2018-03-05 RX ADMIN — IPRATROPIUM BROMIDE AND ALBUTEROL SULFATE 3 ML: .5; 3 SOLUTION RESPIRATORY (INHALATION) at 08:32

## 2018-03-05 RX ADMIN — CARVEDILOL 25 MG: 25 TABLET, FILM COATED ORAL at 08:10

## 2018-03-05 RX ADMIN — IPRATROPIUM BROMIDE AND ALBUTEROL SULFATE 3 ML: .5; 3 SOLUTION RESPIRATORY (INHALATION) at 12:08

## 2018-03-05 RX ADMIN — OMEPRAZOLE 20 MG: 20 CAPSULE, DELAYED RELEASE ORAL at 06:50

## 2018-03-05 RX ADMIN — PREDNISONE 40 MG: 20 TABLET ORAL at 08:10

## 2018-03-05 RX ADMIN — CARVEDILOL 25 MG: 25 TABLET, FILM COATED ORAL at 17:03

## 2018-03-05 RX ADMIN — IPRATROPIUM BROMIDE AND ALBUTEROL SULFATE 3 ML: .5; 3 SOLUTION RESPIRATORY (INHALATION) at 16:11

## 2018-03-05 RX ADMIN — ACETAMINOPHEN 650 MG: 325 TABLET, FILM COATED ORAL at 08:10

## 2018-03-05 RX ADMIN — APIXABAN 2.5 MG: 2.5 TABLET, FILM COATED ORAL at 08:10

## 2018-03-05 RX ADMIN — AZITHROMYCIN MONOHYDRATE 250 MG: 500 INJECTION, POWDER, LYOPHILIZED, FOR SOLUTION INTRAVENOUS at 12:37

## 2018-03-05 RX ADMIN — ALBUTEROL SULFATE 2.5 MG: 2.5 SOLUTION RESPIRATORY (INHALATION) at 00:58

## 2018-03-05 ASSESSMENT — ACTIVITIES OF DAILY LIVING (ADL)
ADLS_ACUITY_SCORE: 14
ADLS_ACUITY_SCORE: 14
ADLS_ACUITY_SCORE: 13
ADLS_ACUITY_SCORE: 14

## 2018-03-05 NOTE — PLAN OF CARE
Problem: Patient Care Overview  Goal: Plan of Care/Patient Progress Review  Outcome: Improving  Patient alert and oriented.  VSS.  Breathing much improved from this AM.  On RA.  Up w/ SBA and cane.  Left sided weakness from previous CVA.  Tolerating regular diet and voiding well. Tele 100% demand pacing. Possible d/c home tomorrow.

## 2018-03-05 NOTE — PLAN OF CARE
Problem: Patient Care Overview  Goal: Plan of Care/Patient Progress Review  Outcome: Improving  Cared for patient 4 hours: A/Ox4, VSS, tele is occasionally V paced, SBA w/cane, left sided wkns d/t previous CVA, ambulated to chair, LS dim, COKER, BS+, skin C/D/I, tolerating regular diet, voiding. IV zithromax and rocephin, PO prednisone and scheduled nebs. Plan is possible D/C to home tomorrow. POC reviewed with patient, questions answered.

## 2018-03-05 NOTE — PLAN OF CARE
Problem: Patient Care Overview  Goal: Plan of Care/Patient Progress Review  Outcome: Improving  Pt VSS, denies pain. o2 sats at 95% on 2L via NC. LS wheezing exp/insp and coarse. COKER. Nebs. IV Rocephin and Zithromax. Lt sided weakness due to previous CVA. SBA w/cane. Tele shows occ/Vpaced. Will continue with current POC. Possible discharge tomorrow.

## 2018-03-05 NOTE — PROGRESS NOTES
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 She was found to be febrile 102.5 F in the ED with a a marked leukocytosis and CXR findings concerning for pneumonia.  She was started on IV ceftriaxone/azithromycin for community acquired pneumonia.  She is a former smoker and likely has some degree of bronchospasm as well and was started on steroids/nebs.  She is clinically improving gradually and likely 1-2 days from discharge home with family.  Her persistent WBC elevation seems most likely due to steroids but would check in the outpatient setting to ensure normalization.         Assessment and Plan:      Sepsis secondary to community acquired pneumonia: Generalized weakness, productive cough, subjective fever, and chills with known sick contacts, at least one was definitively confirmed to have influenza on outpatient testing a week or two ago.  Febrile up to 102.5 F and leukocytosis 21.2 with absolute neutrophilia of 17.8.  Rapid influenza swab negative. Blood cultures x 2 drawn and NGTD. CXR demonstrated ill-defined opacity projecting over right midlung; also showed ndular denisty projecting over left midlung new since 6/5/2017. May represent pulmonary nodule vs overlapping tissue. Short interval follow-up CXR vs further characterization w/ CT recommended by radiologist.  -Continue azithromycin and ceftriaxone  -Incentive spirometry, Robitussin-DM as needed  -Blood cultures x 2 NGTD  -Influenza A/B PCR negative and procalcitonin elevated  -CT  of chest to further characterize nodular findings on chest x-ray showed bilateral infiltrates w/ diffuse emphysematous changes w/ mild posterior LLL opacity c/w atelectasis w/ bronchiectasis.  -continue prednisone 40, added scheduled nebs.  -needs outpatient PFTs, discussed w/ family.     Dysuria:   She is on IV ceftriaxone w/ coverage for CAP. UA bland.     History of high degree AV block s/p pacemaker and paroxysmal atrial fibrillation: S/p pacemaker implantation 5/2016.  She has known short paroxysms of atrial fibrillation which have always been asymptomatic.  Because of an elevated chads vascular score, she is on apixaban 2.5 mg twice daily.  She follows with [facility] cardiology annually. Continue on apixaban 2.5 mg BID and carvedilol 25 mg BID w/ parameters.     History of prior CVA x 2: Patient reports she had a stroke when she was 50 years old and another when she was 75.  Continue PTA aspirin 162 mg daily and apixaban 2.5 mg BID.     HTN: Continue on PTA lisinopril 40 mg daily, carvedilol 25 mg BID, and amlodipine 5 mg daily w/ parameters.     HLD: Continue on PTA atorvastatin 20 mg HS.     GERD: Continue omeprazole 20 mg BID.     DVT Prophylaxis: On daily NOAC  Code Status: DNR  Disposition: hopefully home tomorrow      # Pain Assessment:    Current Pain Score 3/5/2018 3/5/2018 3/5/2018   Patient currently in pain? denies denies -   Pain score (0-10) 0 - 0   Pain location - - -   Pain descriptors - - -   Yoselyn s pain level was assessed and she currently denies pain.          Interval History:      Back on supplemental O2 - she feels she has nasal congestion   Gave duoneb, she improved dramatically this AM  Still weak but now feels she is improving  Hopeful for DC home tomorrow    Data reviewed today: I reviewed all new labs and imaging reports over the last 24 hours. I personally reviewed no images or EKG's today.         Physical Exam:      Last Vital Signs:  /79 (BP Location: Right arm)  Pulse 80  Temp  99.5  F (37.5  C) (Oral)  Resp 20  Wt 50.9 kg (112 lb 3.2 oz)  SpO2 97%  BMI 21.91 kg/m2       GENERAL: No apparent distress. Awake, alert, and fully oriented.  HEENT: Normocephalic, atraumatic. Extraocular movements intact.  CARDIOVASCULAR: Regular rate and rhythm without murmurs or rubs. No S3.  PULMONARY: Clear bilaterally.  ABDOMINAL: Soft, non-tender, non-distended. Bowel sounds normoactive. No hepatosplenomegaly.  EXTREMITIES: No cyanosis or clubbing. No edema.  NEUROLOGICAL: CN 2-12 grossly intact, no focal neurological deficits.  DERMATOLOGICAL: No rash, ulcer, ecchymoses, jaundice.  PSYCH: appropriate         Medications:      All current medications were reviewed.         Data:      All new lab and imaging data was reviewed.   Data       Recent Labs  Lab 03/04/18  0707 03/03/18  0712 03/02/18  0948   WBC 23.9* 19.4* 21.2*   HGB 11.0* 11.9 12.4   HCT 35.6 36.9 38.7   MCV 90 88 89    160 181       Recent Labs  Lab 03/04/18  0707 03/03/18  0712 03/02/18  0948    141 137   POTASSIUM 3.7 4.0 3.4   CHLORIDE 109 110* 104   CO2 26 20 23   ANIONGAP 6 11 10   * 146* 109*   BUN 25 27 19   CR 0.64 0.68 0.75   GFRESTIMATED 89 82 73   GFRESTBLACK >90 >90 88   GOLDY 8.5 8.2* 8.7       No results found for this or any previous visit (from the past 24 hour(s)).    Ariel Bahena MD

## 2018-03-05 NOTE — PLAN OF CARE
Problem: Patient Care Overview  Goal: Plan of Care/Patient Progress Review  Outcome: No Change  Patient alert and oriented x4.  VSS except O2 sat 875 RA. Up w/ SBA and cane to the  BR.   Left sided weakness from previous CVA. Denies pain. 100% \ deman paced on tele.  Noted to be having Increase SOB and wheezing O2 sat 87% RA. Using IS with little improvement. Placed on 2 L of oxygen,  Paged RT and PRN neb administered, O2 sat  93%. Pt comfortable but noted to have COKER.  Has intermittent non productive cough.  Will continue to monitor

## 2018-03-06 LAB
ANION GAP SERPL CALCULATED.3IONS-SCNC: 6 MMOL/L (ref 3–14)
BASOPHILS # BLD AUTO: 0 10E9/L (ref 0–0.2)
BASOPHILS NFR BLD AUTO: 0.2 %
BUN SERPL-MCNC: 19 MG/DL (ref 7–30)
CALCIUM SERPL-MCNC: 8.7 MG/DL (ref 8.5–10.1)
CHLORIDE SERPL-SCNC: 105 MMOL/L (ref 94–109)
CO2 SERPL-SCNC: 30 MMOL/L (ref 20–32)
CREAT SERPL-MCNC: 0.73 MG/DL (ref 0.52–1.04)
DIFFERENTIAL METHOD BLD: ABNORMAL
EOSINOPHIL # BLD AUTO: 0.1 10E9/L (ref 0–0.7)
EOSINOPHIL NFR BLD AUTO: 0.5 %
ERYTHROCYTE [DISTWIDTH] IN BLOOD BY AUTOMATED COUNT: 14.4 % (ref 10–15)
GFR SERPL CREATININE-BSD FRML MDRD: 76 ML/MIN/1.7M2
GLUCOSE SERPL-MCNC: 88 MG/DL (ref 70–99)
HCT VFR BLD AUTO: 38.6 % (ref 35–47)
HGB BLD-MCNC: 12.1 G/DL (ref 11.7–15.7)
IMM GRANULOCYTES # BLD: 0.1 10E9/L (ref 0–0.4)
IMM GRANULOCYTES NFR BLD: 0.9 %
LYMPHOCYTES # BLD AUTO: 1.9 10E9/L (ref 0.8–5.3)
LYMPHOCYTES NFR BLD AUTO: 17.4 %
MCH RBC QN AUTO: 27.9 PG (ref 26.5–33)
MCHC RBC AUTO-ENTMCNC: 31.3 G/DL (ref 31.5–36.5)
MCV RBC AUTO: 89 FL (ref 78–100)
MONOCYTES # BLD AUTO: 1.5 10E9/L (ref 0–1.3)
MONOCYTES NFR BLD AUTO: 13.8 %
NEUTROPHILS # BLD AUTO: 7.4 10E9/L (ref 1.6–8.3)
NEUTROPHILS NFR BLD AUTO: 67.2 %
NRBC # BLD AUTO: 0 10*3/UL
NRBC BLD AUTO-RTO: 0 /100
PLATELET # BLD AUTO: 200 10E9/L (ref 150–450)
POTASSIUM SERPL-SCNC: 3.3 MMOL/L (ref 3.4–5.3)
RBC # BLD AUTO: 4.34 10E12/L (ref 3.8–5.2)
SODIUM SERPL-SCNC: 141 MMOL/L (ref 133–144)
WBC # BLD AUTO: 11 10E9/L (ref 4–11)

## 2018-03-06 PROCEDURE — 25000128 H RX IP 250 OP 636: Performed by: PHYSICIAN ASSISTANT

## 2018-03-06 PROCEDURE — 94640 AIRWAY INHALATION TREATMENT: CPT | Mod: 76

## 2018-03-06 PROCEDURE — 25000132 ZZH RX MED GY IP 250 OP 250 PS 637: Mod: GY | Performed by: PHYSICIAN ASSISTANT

## 2018-03-06 PROCEDURE — 94640 AIRWAY INHALATION TREATMENT: CPT

## 2018-03-06 PROCEDURE — 25000125 ZZHC RX 250: Performed by: HOSPITALIST

## 2018-03-06 PROCEDURE — 36415 COLL VENOUS BLD VENIPUNCTURE: CPT | Performed by: INTERNAL MEDICINE

## 2018-03-06 PROCEDURE — A9270 NON-COVERED ITEM OR SERVICE: HCPCS | Mod: GY | Performed by: PHYSICIAN ASSISTANT

## 2018-03-06 PROCEDURE — 85025 COMPLETE CBC W/AUTO DIFF WBC: CPT | Performed by: INTERNAL MEDICINE

## 2018-03-06 PROCEDURE — 80048 BASIC METABOLIC PNL TOTAL CA: CPT | Performed by: INTERNAL MEDICINE

## 2018-03-06 PROCEDURE — 40000275 ZZH STATISTIC RCP TIME EA 10 MIN

## 2018-03-06 PROCEDURE — 12000007 ZZH R&B INTERMEDIATE

## 2018-03-06 PROCEDURE — 99233 SBSQ HOSP IP/OBS HIGH 50: CPT | Performed by: INTERNAL MEDICINE

## 2018-03-06 PROCEDURE — 25000125 ZZHC RX 250: Performed by: INTERNAL MEDICINE

## 2018-03-06 RX ADMIN — APIXABAN 2.5 MG: 2.5 TABLET, FILM COATED ORAL at 20:20

## 2018-03-06 RX ADMIN — ASPIRIN 162 MG: 81 TABLET, COATED ORAL at 09:29

## 2018-03-06 RX ADMIN — CARVEDILOL 25 MG: 25 TABLET, FILM COATED ORAL at 18:54

## 2018-03-06 RX ADMIN — ATORVASTATIN CALCIUM 20 MG: 20 TABLET, FILM COATED ORAL at 22:43

## 2018-03-06 RX ADMIN — AMLODIPINE BESYLATE 5 MG: 5 TABLET ORAL at 09:29

## 2018-03-06 RX ADMIN — PREDNISONE 40 MG: 20 TABLET ORAL at 09:29

## 2018-03-06 RX ADMIN — IPRATROPIUM BROMIDE AND ALBUTEROL SULFATE 3 ML: .5; 3 SOLUTION RESPIRATORY (INHALATION) at 19:42

## 2018-03-06 RX ADMIN — IPRATROPIUM BROMIDE AND ALBUTEROL SULFATE 3 ML: .5; 3 SOLUTION RESPIRATORY (INHALATION) at 11:53

## 2018-03-06 RX ADMIN — FERROUS SULFATE TAB 325 MG (65 MG ELEMENTAL FE) 325 MG: 325 (65 FE) TAB at 09:29

## 2018-03-06 RX ADMIN — OMEPRAZOLE 20 MG: 20 CAPSULE, DELAYED RELEASE ORAL at 06:18

## 2018-03-06 RX ADMIN — CEFTRIAXONE SODIUM 2 G: 2 INJECTION, SOLUTION INTRAVENOUS at 11:19

## 2018-03-06 RX ADMIN — AZITHROMYCIN MONOHYDRATE 250 MG: 500 INJECTION, POWDER, LYOPHILIZED, FOR SOLUTION INTRAVENOUS at 12:41

## 2018-03-06 RX ADMIN — APIXABAN 2.5 MG: 2.5 TABLET, FILM COATED ORAL at 09:29

## 2018-03-06 RX ADMIN — OMEPRAZOLE 20 MG: 20 CAPSULE, DELAYED RELEASE ORAL at 18:54

## 2018-03-06 RX ADMIN — CARVEDILOL 25 MG: 25 TABLET, FILM COATED ORAL at 09:29

## 2018-03-06 RX ADMIN — IPRATROPIUM BROMIDE AND ALBUTEROL SULFATE 3 ML: .5; 3 SOLUTION RESPIRATORY (INHALATION) at 08:30

## 2018-03-06 RX ADMIN — IPRATROPIUM BROMIDE AND ALBUTEROL SULFATE 3 ML: .5; 3 SOLUTION RESPIRATORY (INHALATION) at 15:34

## 2018-03-06 RX ADMIN — LISINOPRIL 40 MG: 40 TABLET ORAL at 09:29

## 2018-03-06 ASSESSMENT — ACTIVITIES OF DAILY LIVING (ADL)
ADLS_ACUITY_SCORE: 14

## 2018-03-06 NOTE — PROGRESS NOTES
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 She was found to be febrile 102.5 F in the ED with a a marked leukocytosis and CXR findings concerning for pneumonia.  She was started on IV ceftriaxone/azithromycin for community acquired pneumonia.  She is a former smoker and likely has some degree of bronchospasm as well and was started on steroids/nebs.  She is clinically improving gradually and likely 1-2 days from discharge home with family.  She remains on low rate supplemental oxygen which we are weaning.             Assessment and Plan:      Sepsis secondary to community acquired pneumonia: Generalized weakness, productive cough, subjective fever, and chills with known sick contacts, at least one was definitively confirmed to have influenza on outpatient testing a week or two ago.  Febrile up to 102.5 F and leukocytosis 21.2 with absolute neutrophilia of 17.8.  Rapid influenza swab negative. Blood cultures x 2 drawn and NGTD. CXR demonstrated ill-defined opacity projecting over right midlung; also showed ndular denisty projecting over left midlung new since 6/5/2017.  CT scan was obtained and showed bilateral infiltrates with recommended follow-up imaging to ensure complete resolution.  -Continue azithromycin and ceftriaxone  -Incentive spirometry, Robitussin-DM as needed  -Blood cultures x 2 NGTD  -Influenza A/B PCR negative and procalcitonin elevated  -CT of chest to further characterize nodular findings on chest x-ray showed bilateral  infiltrates w/ diffuse emphysematous changes w/ mild posterior LLL opacity c/w atelectasis w/ bronchiectasis.  -continue prednisone 40, added scheduled nebs.  -needs outpatient PFTs, discussed w/ family.     Dysuria:   She is on IV ceftriaxone w/ coverage for CAP. UA bland.     History of high degree AV block s/p pacemaker and paroxysmal atrial fibrillation: S/p pacemaker implantation 5/2016.  She has known short paroxysms of atrial fibrillation which have always been asymptomatic.  Because of an elevated chads vascular score, she is on apixaban 2.5 mg twice daily.  She follows with [facility] cardiology annually. Continue on apixaban 2.5 mg BID and carvedilol 25 mg BID w/ parameters.     History of prior CVA x 2: Patient reports she had a stroke when she was 50 years old and another when she was 75.  Continue PTA aspirin 162 mg daily and apixaban 2.5 mg BID.     HTN: Continue on PTA lisinopril 40 mg daily, carvedilol 25 mg BID, and amlodipine 5 mg daily w/ parameters.     HLD: Continue on PTA atorvastatin 20 mg HS.     GERD: Continue omeprazole 20 mg BID.     DVT Prophylaxis: On daily NOAC  Code Status: DNR  Disposition: hopefully home tomorrow      # Pain Assessment:    Current Pain Score 3/6/2018 3/5/2018 3/5/2018   Patient currently in pain? denies denies denies   Pain score (0-10) 0 0 0   Pain location - - -   Pain descriptors - - -   Yoselyn s pain level was assessed and she currently denies pain.          Interval History:      Overall she feels better today, remains weak but is gradually improving  Remains on 2 L supplemental oxygen  I updated her family at the bedside  Denies pain complaints.    Data reviewed today: I reviewed all new labs and imaging reports over the last 24 hours. I personally reviewed no images or EKG's today.         Physical Exam:      Last Vital Signs:  /58 (BP Location: Left arm)  Pulse 73  Temp 99.5  F (37.5  C) (Oral)  Resp 20  Wt 49.9 kg (110 lb 1.6 oz)  SpO2 96%  BMI 21.5  kg/m2       GENERAL: No apparent distress. Awake, alert, and fully oriented.  HEENT: Normocephalic, atraumatic. Extraocular movements intact.  CARDIOVASCULAR: Regular rate and rhythm without murmurs or rubs. No S3.  PULMONARY: Clear bilaterally.  ABDOMINAL: Soft, non-tender, non-distended. Bowel sounds normoactive. No hepatosplenomegaly.  EXTREMITIES: No cyanosis or clubbing. No edema.  NEUROLOGICAL: CN 2-12 grossly intact, no focal neurological deficits.  DERMATOLOGICAL: No rash, ulcer, ecchymoses, jaundice.  PSYCH: appropriate         Medications:      All current medications were reviewed.         Data:      All new lab and imaging data was reviewed.   Data       Recent Labs  Lab 03/06/18 0700 03/04/18 0707 03/03/18 0712   WBC 11.0 23.9* 19.4*   HGB 12.1 11.0* 11.9   HCT 38.6 35.6 36.9   MCV 89 90 88    174 160       Recent Labs  Lab 03/06/18 0700 03/04/18 0707 03/03/18 0712    141 141   POTASSIUM 3.3* 3.7 4.0   CHLORIDE 105 109 110*   CO2 30 26 20   ANIONGAP 6 6 11   GLC 88 127* 146*   BUN 19 25 27   CR 0.73 0.64 0.68   GFRESTIMATED 76 89 82   GFRESTBLACK >90 >90 >90   GOLDY 8.7 8.5 8.2*       No results found for this or any previous visit (from the past 24 hour(s)).    Ariel Bahena MD

## 2018-03-06 NOTE — PLAN OF CARE
Problem: Pneumonia (Adult)  Goal: Signs and Symptoms of Listed Potential Problems Will be Absent, Minimized or Managed (Pneumonia)  Signs and symptoms of listed potential problems will be absent, minimized or managed by discharge/transition of care (reference Pneumonia (Adult) CPG).   Outcome: Improving  Pt VSS, denies pain. o2 sats at 96% on 2L via NC. LS dim, wheezing insp. COKER. Nebs. IV Rocephin and Zithromax. Eliquis.Lt sided weakness due to previous CVA. SBA w/cane. Urinary frequency/inc. Tele shows occ/Vpaced. Will continue with current POC. Possible discharge tomorrow.

## 2018-03-06 NOTE — PLAN OF CARE
Problem: Patient Care Overview  Goal: Plan of Care/Patient Progress Review  Outcome: No Change  Pt A&Ox4.  VSS.  V-paced on telemetry.  Occasionally 100% V-paced, also V-paced, 1st AVB with occasional non-paced beats.  VENITA MACHUCA at bedside. On 2L NC at night.  Lungs dim throughout.  +4 BS, last BM 3/5/18.  Tolerating regular diet.  Up SBA with cane.  Plan:  IV Abx, prednisone, monitor WBC, possible DC home today.  Will continue to monitor.

## 2018-03-07 ENCOUNTER — DOCUMENTATION ONLY (OUTPATIENT)
Dept: MEDSURG UNIT | Facility: CLINIC | Age: 83
End: 2018-03-07

## 2018-03-07 VITALS
BODY MASS INDEX: 21.78 KG/M2 | OXYGEN SATURATION: 93 % | TEMPERATURE: 97.9 F | DIASTOLIC BLOOD PRESSURE: 66 MMHG | HEART RATE: 68 BPM | WEIGHT: 111.5 LBS | SYSTOLIC BLOOD PRESSURE: 164 MMHG | RESPIRATION RATE: 18 BRPM

## 2018-03-07 PROCEDURE — 99239 HOSP IP/OBS DSCHRG MGMT >30: CPT | Performed by: INTERNAL MEDICINE

## 2018-03-07 PROCEDURE — 25000128 H RX IP 250 OP 636: Performed by: PHYSICIAN ASSISTANT

## 2018-03-07 PROCEDURE — A9270 NON-COVERED ITEM OR SERVICE: HCPCS | Mod: GY | Performed by: INTERNAL MEDICINE

## 2018-03-07 PROCEDURE — 94664 DEMO&/EVAL PT USE INHALER: CPT

## 2018-03-07 PROCEDURE — 25000125 ZZHC RX 250: Performed by: HOSPITALIST

## 2018-03-07 PROCEDURE — A9270 NON-COVERED ITEM OR SERVICE: HCPCS | Mod: GY | Performed by: PHYSICIAN ASSISTANT

## 2018-03-07 PROCEDURE — 25000125 ZZHC RX 250: Performed by: INTERNAL MEDICINE

## 2018-03-07 PROCEDURE — 25000132 ZZH RX MED GY IP 250 OP 250 PS 637: Mod: GY | Performed by: INTERNAL MEDICINE

## 2018-03-07 PROCEDURE — 25000132 ZZH RX MED GY IP 250 OP 250 PS 637: Mod: GY | Performed by: PHYSICIAN ASSISTANT

## 2018-03-07 PROCEDURE — 94640 AIRWAY INHALATION TREATMENT: CPT | Mod: 76

## 2018-03-07 PROCEDURE — 94640 AIRWAY INHALATION TREATMENT: CPT

## 2018-03-07 PROCEDURE — 40000275 ZZH STATISTIC RCP TIME EA 10 MIN

## 2018-03-07 RX ORDER — ALBUTEROL SULFATE 90 UG/1
2 AEROSOL, METERED RESPIRATORY (INHALATION) 4 TIMES DAILY PRN
Qty: 6.7 G | Refills: 0 | Status: ON HOLD | OUTPATIENT
Start: 2018-03-07 | End: 2019-07-16

## 2018-03-07 RX ORDER — FLUTICASONE PROPIONATE 220 UG/1
1 AEROSOL, METERED RESPIRATORY (INHALATION) EVERY 12 HOURS
Status: DISCONTINUED | OUTPATIENT
Start: 2018-03-07 | End: 2018-03-07 | Stop reason: CLARIF

## 2018-03-07 RX ORDER — CEFUROXIME AXETIL 500 MG/1
500 TABLET ORAL 2 TIMES DAILY
Qty: 6 TABLET | Refills: 0 | Status: SHIPPED | OUTPATIENT
Start: 2018-03-07 | End: 2019-01-30

## 2018-03-07 RX ORDER — ALBUTEROL SULFATE 90 UG/1
2 AEROSOL, METERED RESPIRATORY (INHALATION) 4 TIMES DAILY
Qty: 6.7 G | Refills: 0 | Status: SHIPPED | OUTPATIENT
Start: 2018-03-07 | End: 2019-01-30

## 2018-03-07 RX ORDER — ALBUTEROL SULFATE 90 UG/1
2 AEROSOL, METERED RESPIRATORY (INHALATION) 4 TIMES DAILY
Status: DISCONTINUED | OUTPATIENT
Start: 2018-03-07 | End: 2018-03-07 | Stop reason: HOSPADM

## 2018-03-07 RX ADMIN — IPRATROPIUM BROMIDE AND ALBUTEROL SULFATE 3 ML: .5; 3 SOLUTION RESPIRATORY (INHALATION) at 08:27

## 2018-03-07 RX ADMIN — PREDNISONE 40 MG: 20 TABLET ORAL at 08:57

## 2018-03-07 RX ADMIN — AMLODIPINE BESYLATE 5 MG: 5 TABLET ORAL at 08:57

## 2018-03-07 RX ADMIN — CEFTRIAXONE SODIUM 2 G: 2 INJECTION, SOLUTION INTRAVENOUS at 10:52

## 2018-03-07 RX ADMIN — APIXABAN 2.5 MG: 2.5 TABLET, FILM COATED ORAL at 08:57

## 2018-03-07 RX ADMIN — ALBUTEROL SULFATE 2 PUFF: 90 AEROSOL, METERED RESPIRATORY (INHALATION) at 11:17

## 2018-03-07 RX ADMIN — OMEPRAZOLE 20 MG: 20 CAPSULE, DELAYED RELEASE ORAL at 06:48

## 2018-03-07 RX ADMIN — ASPIRIN 162 MG: 81 TABLET, COATED ORAL at 09:00

## 2018-03-07 RX ADMIN — CARVEDILOL 25 MG: 25 TABLET, FILM COATED ORAL at 08:57

## 2018-03-07 RX ADMIN — LISINOPRIL 40 MG: 40 TABLET ORAL at 08:57

## 2018-03-07 ASSESSMENT — ACTIVITIES OF DAILY LIVING (ADL)
ADLS_ACUITY_SCORE: 14

## 2018-03-07 NOTE — PROGRESS NOTES
RT- Patient given verbal instruction on administration of Albuterol via spacer for home use. Patient then demonstrated understanding of self administration of inhaler at home with spacer.

## 2018-03-07 NOTE — PROGRESS NOTES
Patient was given an oxygen order and was shown how to use the [employer] unit. Patient was present with her daughter and her daughter understood instructions. Patient was offered choice of oxygen and patient was satisfied with [facility] providing the portable O2. Order originally had the wrong diagnosis for oxygen and so the order was corrected.

## 2018-03-07 NOTE — PLAN OF CARE
Problem: Pneumonia (Adult)  Goal: Signs and Symptoms of Listed Potential Problems Will be Absent, Minimized or Managed (Pneumonia)  Signs and symptoms of listed potential problems will be absent, minimized or managed by discharge/transition of care (reference Pneumonia (Adult) CPG).   Outcome: Improving  AOx4, assist x1 with ambulation using cane, VSS. Pt is dyspneic upon exertion, O2 95% and above 2L.. Lungs clear bilaterally upon auscultation. Pt continues IV Zithromax. Fluids encouraged.

## 2018-03-07 NOTE — PLAN OF CARE
Problem: Patient Care Overview  Goal: Plan of Care/Patient Progress Review  Outcome: Improving  VSS, 95% on 1L at rest, COKER, sats decreased to 81 while ambulating on RA. Tele: V-paced 90's. SBA with cane. Discharge to home today with home O2.       Pt discharged to home via family, verbalized understanding of discharge instructions.

## 2018-03-07 NOTE — PLAN OF CARE
Problem: Pneumonia (Adult)  Goal: Signs and Symptoms of Listed Potential Problems Will be Absent, Minimized or Managed (Pneumonia)  Signs and symptoms of listed potential problems will be absent, minimized or managed by discharge/transition of care (reference Pneumonia (Adult) CPG).   Outcome: Improving  A&OX4.VSS, on 1 L Nasal cannula sating 95% COKER.Wpkp-C-Guvew, denies pain. On regular diet. Up with SBA with cane. On IV Rocephin. Plan to discharge home today with family.

## 2018-03-07 NOTE — PROGRESS NOTES
Patient has been assessed for Home Oxygen needs. Oxygen readings:    *Pulse oximetry (SpO2) = 92% on room air at rest while awake.    *SpO2 improved to 95% on 1liters/minute at rest.    *SpO2 = 81% on room air during activity/with exercise.    *SpO2 improved to 95% on 1liters/minute during activity/with exercise.

## 2018-03-08 LAB
BACTERIA SPEC CULT: NO GROWTH
BACTERIA SPEC CULT: NO GROWTH
Lab: NORMAL
Lab: NORMAL
SPECIMEN SOURCE: NORMAL
SPECIMEN SOURCE: NORMAL

## 2018-03-08 NOTE — PROGRESS NOTES
Hand-off for Care Transitions to Next Level of Care Provider  Name: Yoselyn Cui  : 1932  MRN #: [id number]  Reason for Hospitalization:  Generalized weakness [R53.1]  Pulmonary nodules [R91.8]  Pneumonia of right lung due to infectious organism, unspecified part of lung [J18.9]  Admit Date/Time: 3/2/2018  9:04 AM  Discharge Date: 3/7/2018    Reason for Communication Hand-off Referral: Admission diagnoses: PN    Discharge Plan:  Discharged to: Home with support                   Patient agreeable to post-hospital support suggestions:  Yes  Discharge Plan:  Home with support           Patient is on new medications:   Yes           MTM follow up recommended: No           Tel-Assurance program:  Ineligible  Follow-up specialty is recommended: Yes. Pulmonary function tests to screen for COPD and repeat chest Xray in 2-3 weeks to make sure the pulmonary infiltrates have cleared.     Follow-up plan:  Future Appointments  Date Time Provider Department Center   2018 4:00 PM AVILES TECH1 [facility]     Any outstanding tests or procedures: No. Recommend repeat chest Xray in 2-3 weeks.      Procedures     Future Labs/Procedures    Oxygen Adult     Comments:    New Home Oxygen Order 2 liter(s) by nasal cannula with activity with use of portable tank. Expected treatment length is indefinite (99 months).. Test on conserving device as applicable.    Patients who qualify for home O2 coverage under the CMS guidelines require ABG tests or O2 sat readings obtained closest to, but no earlier than 2 days prior to the discharge, as evidence of the need for home oxygen therapy. Testing must be performed while patient is in the chronic stable state. See notes for O2 sats.    I certify that this patient, Yoselyn Cui has been under my care and that I, or a nurse practitioner or physician's assistant working with me, had a face-to-face encounter that meets the face-to-face encounter requirements with this patient on  3/7/2018. The patient, Yoselyn Cui was evaluated or treated in whole, or in part, for the following medical condition, which necessitates the use of the ordered oxygen. Treatment Diagnosis: emphysema    Attending Provider: Ariel Bahena  Physician signature: See electronic signature associated with these discharge orders  Date of Order: March 7, 2018          Key Recommendations:  Patient is fragile and was not feeling sick when she was admitted. She now has a productive cough. Discussed importance of completing all of the prescribed antibiotic after discharge. Patient discharged home on new home oxygen.     Communicated handoff via [employer] to (PCP's CC) at (fax number)     Jessica Carpenter RN, BSN, CTS  [facility]  [phone]

## 2018-05-14 ENCOUNTER — ALLIED HEALTH/NURSE VISIT (OUTPATIENT)
Dept: CARDIOLOGY | Facility: CLINIC | Age: 83
End: 2018-05-14
Payer: COMMERCIAL

## 2018-05-14 DIAGNOSIS — Z95.0 CARDIAC PACEMAKER IN SITU: Primary | ICD-10-CM

## 2018-05-14 PROCEDURE — 93296 REM INTERROG EVL PM/IDS: CPT | Performed by: INTERNAL MEDICINE

## 2018-05-14 PROCEDURE — 93294 REM INTERROG EVL PM/LDLS PM: CPT | Performed by: INTERNAL MEDICINE

## 2018-05-14 NOTE — MR AVS SNAPSHOT
After Visit Summary   5/14/2018    Yoselyn Cui    MRN: [id number]           Patient Information     Date Of Birth          1/28/1932        Visit Information        Provider Department      5/14/2018 4:00 PM AVILES TECH1 [facility]        Today's Diagnoses     Cardiac pacemaker in situ    -  1       Follow-ups after your visit        Your next 10 appointments already scheduled     May 14, 2018  4:00 PM CDT   Remote PPM Check with AVILES TECH1   [facility] ([facility])    [address]  [place] [id number] [phone] OPT 2           This appointment is for a remote check of your pacemaker.  This is not an appointment at the office.            Aug 15, 2018 10:50 AM CDT   Pacemaker Check with TOBY AMBRIZN   [facility] ([facility])    [address]  [place] [address] [phone]              Who to contact     If you have questions or need follow up information about today's clinic visit or your schedule please contact [facility] directly at [phone].  Normal or non-critical lab and imaging results will be communicated to you by [employer], letter or phone within 4 business days after the clinic has received the results. If you do not hear from us within 7 days, please contact the clinic through [employer] or phone. If you have a critical or abnormal lab result, we will notify you by phone as soon as possible.  Submit refill requests through [employer] or call your pharmacy and they will forward the refill request to us. Please allow 3 business days for your refill to be completed.          Additional Information About Your Visit        MyChart Information     [employer] lets you send messages to your doctor, view your test results, renew your prescriptions, schedule appointments and more. To sign up,  "go to [email or web address] . Click on \"Log in\" on the left side of the screen, which will take you to the Welcome page. Then click on \"Sign up Now\" on the right side of the page.     You will be asked to enter the access code listed below, as well as some personal information. Please follow the directions to create your username and password.     Your access code is: [email or web address]  Expires: 2018 11:02 AM     Your access code will  in 90 days. If you need help or a new code, please call your [facility] clinic or [phone].        Care EveryWhere ID     This is your Care EveryWhere ID. This could be used by other organizations to access your [facility] medical records  [id number]         Blood Pressure from Last 3 Encounters:   18 164/66   01/10/18 136/71   17 170/90    Weight from Last 3 Encounters:   18 50.6 kg (111 lb 8 oz)   01/10/18 49.9 kg (110 lb)   17 55.3 kg (122 lb)              We Performed the Following     INTERROGATION DEVICE EVAL REMOTE, PACER/ICD (23550)     PM DEVICE INTERROGATE REMOTE (25667)        Primary Care Provider Office Phone # Fax #    Ira Barajas PA-C [phone]       [facility] [address] [place] [address]        Equal Access to Services     [facility]: Hadii aad ku hadasho Soomaali, waaxda luqadaha, qaybta kaalmada adeegyada, adrián martines . So [facility] [phone].    ATENCIÓN: Si habla español, tiene a goodwin disposición servicios gratuitos de asistencia lingüística. Kaneame al [phone].    We comply with applicable federal civil rights laws and [place] laws. We do not discriminate on the basis of race, color, national origin, age, disability, sex, sexual orientation, or gender identity.            Thank you!     Thank you for choosing [facility]  for your care. Our goal is always to provide you with excellent care. Hearing back from our " patients is one way we can continue to improve our services. Please take a few minutes to complete the written survey that you may receive in the mail after your visit with us. Thank you!             Your Updated Medication List - Protect others around you: Learn how to safely use, store and throw away your medicines at www.disposemymeds.org.          This list is accurate as of 5/14/18 11:02 AM.  Always use your most recent med list.                   Brand Name Dispense Instructions for use Diagnosis    acetaminophen 325 MG tablet    TYLENOL     Take 650 mg by mouth every 6 hours as needed for mild pain        albuterol 108 (90 Base) MCG/ACT Inhaler    PROAIR HFA/PROVENTIL HFA/VENTOLIN HFA    6.7 g    Inhale 2 puffs into the lungs 4 times daily as needed for other (dyspnea)    Acute bronchospasm       amLODIPine 5 MG tablet    NORVASC     Take 5 mg by mouth daily        apixaban ANTICOAGULANT 2.5 MG tablet    ELIQUIS    180 tablet    Take 1 tablet (2.5 mg) by mouth 2 times daily    Paroxysmal atrial fibrillation (H)       ASPIRIN EC PO      Take 162 mg by mouth daily (states is taking half of 325 mg tablet daily).        carvedilol 25 MG tablet    COREG     Take 25 mg by mouth 2 times daily (with meals)        DETROL LA 4 MG 24 hr capsule   Generic drug:  tolterodine      Take 4 mg by mouth every morning        ferrous sulfate 325 (65 Fe) MG tablet    IRON     Take 325 mg by mouth daily (with breakfast)        fluticasone furoate 200 MCG/ACT inhalation powder    ARNUITY ELLIPTA    3 Inhaler    Inhale 1 puff into the lungs daily    Acute bronchospasm, Pneumonia of right lung due to infectious organism, unspecified part of lung       ICAPS AREDS FORMULA Tabs      Take 1 tablet by mouth 2 times daily        LIPITOR 20 MG tablet   Generic drug:  atorvastatin      Take 20 mg by mouth At Bedtime        lisinopril 40 MG tablet    PRINIVIL/ZESTRIL     Take 40 mg by mouth daily        OMEPRAZOLE PO      Take 20 mg by mouth  2 times daily (before meals)

## 2018-10-09 ENCOUNTER — TELEPHONE (OUTPATIENT)
Dept: CARDIOLOGY | Facility: CLINIC | Age: 83
End: 2018-10-09

## 2018-10-09 NOTE — TELEPHONE ENCOUNTER
Received call from ELLYN Rai needing hold orders for eliquis for upper endoscopy and colonoscopy.  Per periprocedural management of AC in AF patient to hold eliquis x3 days, no bridging and resume eliquis day after procedure.  Information given to Doug Membreno RN

## 2018-10-10 ENCOUNTER — ALLIED HEALTH/NURSE VISIT (OUTPATIENT)
Dept: CARDIOLOGY | Facility: CLINIC | Age: 83
End: 2018-10-10
Payer: COMMERCIAL

## 2018-10-10 DIAGNOSIS — Z95.0 CARDIAC PACEMAKER IN SITU: Primary | ICD-10-CM

## 2018-10-10 PROCEDURE — 93280 PM DEVICE PROGR EVAL DUAL: CPT | Performed by: INTERNAL MEDICINE

## 2018-10-10 NOTE — PROGRESS NOTES
[address] 2240 Pacemaker Device Check  AP: <1 % : 100 %  Mode: DDDR  bpm        Underlying Rhythm: CHB with junctional escape in the 30's  Heart Rate: Stable with good variability.  Sensing: WNL    Pacing Threshold: WNL   Impedance: WNL  Battery Status: Approximately 9.8-10 years longevity.  Device Site: Intact  Atrial Arrhythmia: 331 mode switches comprising <1% of the time, EGMs showed PAT with controlled VR, longest lasting 1 minute. Patient is on Eliquis.  Ventricular Arrhythmia: 0  Setting Change: Cybersecurity upgrade completed.    Care Plan: Reminded to make appt for annual OV. Follow up with Q 3 month remote checks. JEFF Marquis

## 2018-10-10 NOTE — MR AVS SNAPSHOT
"              After Visit Summary   10/10/2018    Yoselyn Cui    MRN: [id number]           Patient Information     Date Of Birth          1/28/1932        Visit Information        Provider Department      10/10/2018 8:50 AM RU DCR2 [facility]        Today's Diagnoses     Cardiac pacemaker in situ    -  1       Follow-ups after your visit        Your next 10 appointments already scheduled     Jan 21, 2019  2:15 PM CST   Remote PPM Check with AVILES TECH1   [facility] ([facility])    [address] [place], [place] [address]  [facility] [address] [phone] OPT 2           This appointment is for a remote check of your pacemaker.  This is not an appointment at the office.              Who to contact     If you have questions or need follow up information about today's clinic visit or your schedule please contact [facility] directly at [phone].  Normal or non-critical lab and imaging results will be communicated to you by [employer], letter or phone within 4 business days after the clinic has received the results. If you do not hear from us within 7 days, please contact the clinic through [employer] or phone. If you have a critical or abnormal lab result, we will notify you by phone as soon as possible.  Submit refill requests through [facility] or call your pharmacy and they will forward the refill request to us. Please allow 3 business days for your refill to be completed.          Additional Information About Your Visit        [employer] Information     [facility] lets you send messages to your doctor, view your test results, renew your prescriptions, schedule appointments and more. To sign up, go to [email or web address] . Click on \"Log in\" on the left side of the screen, which will take you to the Welcome page. Then click on \"Sign up Now\" on the right side of the page.     You will be asked to " enter the access code listed below, as well as some personal information. Please follow the directions to create your username and password.     Your access code is: [email or web address]  Expires: 2019  9:20 AM     Your access code will  in 90 days. If you need help or a new code, please call your [facility] clinic or [phone].        Care EveryWhere ID     This is your Care EveryWhere ID. This could be used by other organizations to access your [facility] medical records  [id number]         Blood Pressure from Last 3 Encounters:   18 164/66   01/10/18 136/71   17 170/90    Weight from Last 3 Encounters:   18 50.6 kg (111 lb 8 oz)   01/10/18 49.9 kg (110 lb)   17 55.3 kg (122 lb)              We Performed the Following     PM DEVICE PROGRAMMING EVAL, DUAL LEAD PACER (10871)        Primary Care Provider Office Phone # Fax #    Ira Barajas PA-C [phone]       [facility] [address] [place] [address]        Equal Access to Services     KEREN OCONNOR : Hadii aad ku hadasho Soomaali, waaxda luqadaha, qaybta kaalmada adeegyada, waxay idiin hayaan adeeg kharakai la'aan ah. So [facility] [phone].    ATENCIÓN: Si habla español, tiene a goodwin disposición servicios gratuitos de asistencia lingüística. Kaneame al [phone].    We comply with applicable federal civil rights laws and [place] laws. We do not discriminate on the basis of race, color, national origin, age, disability, sex, sexual orientation, or gender identity.            Thank you!     Thank you for choosing [facility]  for your care. Our goal is always to provide you with excellent care. Hearing back from our patients is one way we can continue to improve our services. Please take a few minutes to complete the written survey that you may receive in the mail after your visit with us. Thank you!             Your Updated Medication List - Protect others  around you: Learn how to safely use, store and throw away your medicines at www.disposemymeds.org.          This list is accurate as of 10/10/18  9:20 AM.  Always use your most recent med list.                   Brand Name Dispense Instructions for use Diagnosis    acetaminophen 325 MG tablet    TYLENOL     Take 650 mg by mouth every 6 hours as needed for mild pain        albuterol 108 (90 Base) MCG/ACT inhaler    PROAIR HFA/PROVENTIL HFA/VENTOLIN HFA    6.7 g    Inhale 2 puffs into the lungs 4 times daily as needed for other (dyspnea)    Acute bronchospasm       amLODIPine 5 MG tablet    NORVASC     Take 5 mg by mouth daily        apixaban ANTICOAGULANT 2.5 MG tablet    ELIQUIS    180 tablet    Take 1 tablet (2.5 mg) by mouth 2 times daily    Paroxysmal atrial fibrillation (H)       ASPIRIN EC PO      Take 162 mg by mouth daily (states is taking half of 325 mg tablet daily).        carvedilol 25 MG tablet    COREG     Take 25 mg by mouth 2 times daily (with meals)        DETROL LA 4 MG 24 hr capsule   Generic drug:  tolterodine      Take 4 mg by mouth every morning        ferrous sulfate 325 (65 Fe) MG tablet    IRON     Take 325 mg by mouth daily (with breakfast)        fluticasone furoate 200 MCG/ACT inhaler    ARNUITY ELLIPTA    3 Inhaler    Inhale 1 puff into the lungs daily    Acute bronchospasm, Pneumonia of right lung due to infectious organism, unspecified part of lung       ICAPS AREDS FORMULA Tabs      Take 1 tablet by mouth 2 times daily        LIPITOR 20 MG tablet   Generic drug:  atorvastatin      Take 20 mg by mouth At Bedtime        lisinopril 40 MG tablet    PRINIVIL/ZESTRIL     Take 40 mg by mouth daily        OMEPRAZOLE PO      Take 20 mg by mouth 2 times daily (before meals)

## 2018-10-11 ENCOUNTER — HOSPITAL ENCOUNTER (OUTPATIENT)
Facility: CLINIC | Age: 83
End: 2018-10-11
Attending: INTERNAL MEDICINE | Admitting: INTERNAL MEDICINE
Payer: COMMERCIAL

## 2018-11-19 ENCOUNTER — HOSPITAL ENCOUNTER (INPATIENT)
Facility: CLINIC | Age: 83
LOS: 3 days | Discharge: HOME | DRG: 812 | End: 2018-11-22
Attending: EMERGENCY MEDICINE | Admitting: INTERNAL MEDICINE
Payer: MEDICARE

## 2018-11-19 ENCOUNTER — TRANSFERRED RECORDS (OUTPATIENT)
Dept: HEALTH INFORMATION MANAGEMENT | Facility: CLINIC | Age: 83
End: 2018-11-19

## 2018-11-19 DIAGNOSIS — Z79.01 ANTICOAGULATED: ICD-10-CM

## 2018-11-19 DIAGNOSIS — D64.9 ANEMIA, UNSPECIFIED TYPE: ICD-10-CM

## 2018-11-19 DIAGNOSIS — R53.1 GENERALIZED WEAKNESS: ICD-10-CM

## 2018-11-19 LAB
ABO + RH BLD: NORMAL
ABO + RH BLD: NORMAL
ALBUMIN SERPL-MCNC: 3.5 G/DL (ref 3.4–5)
ALP SERPL-CCNC: 80 U/L (ref 40–150)
ALT SERPL W P-5'-P-CCNC: 19 U/L (ref 0–50)
ANION GAP SERPL CALCULATED.3IONS-SCNC: 4 MMOL/L (ref 3–14)
ANISOCYTOSIS BLD QL SMEAR: ABNORMAL
APTT PPP: 32 SEC (ref 22–37)
AST SERPL W P-5'-P-CCNC: 22 U/L (ref 0–45)
BASOPHILS # BLD AUTO: 0 10E9/L (ref 0–0.2)
BASOPHILS NFR BLD AUTO: 0.7 %
BILIRUB SERPL-MCNC: 0.3 MG/DL (ref 0.2–1.3)
BLD GP AB SCN SERPL QL: NORMAL
BLD PROD TYP BPU: NORMAL
BLD PROD TYP BPU: NORMAL
BLD UNIT ID BPU: 0
BLOOD BANK CMNT PATIENT-IMP: NORMAL
BLOOD PRODUCT CODE: NORMAL
BPU ID: NORMAL
BUN SERPL-MCNC: 16 MG/DL (ref 7–30)
CALCIUM SERPL-MCNC: 8.4 MG/DL (ref 8.5–10.1)
CHLORIDE SERPL-SCNC: 111 MMOL/L (ref 94–109)
CO2 SERPL-SCNC: 27 MMOL/L (ref 20–32)
CREAT SERPL-MCNC: 0.68 MG/DL (ref 0.52–1.04)
DIFFERENTIAL METHOD BLD: ABNORMAL
EOSINOPHIL # BLD AUTO: 0.2 10E9/L (ref 0–0.7)
EOSINOPHIL NFR BLD AUTO: 3.6 %
ERYTHROCYTE [DISTWIDTH] IN BLOOD BY AUTOMATED COUNT: 20.2 % (ref 10–15)
FERRITIN SERPL-MCNC: 7 NG/ML (ref 8–252)
GFR SERPL CREATININE-BSD FRML MDRD: 82 ML/MIN/1.7M2
GLUCOSE SERPL-MCNC: 105 MG/DL (ref 70–99)
HCT VFR BLD AUTO: 23.6 % (ref 35–47)
HEMOCCULT STL QL: POSITIVE
HGB BLD-MCNC: 6.5 G/DL (ref 11.7–15.7)
HGB BLD-MCNC: 8.4 G/DL (ref 11.7–15.7)
HYPOCHROMIA BLD QL: PRESENT
IMM GRANULOCYTES # BLD: 0 10E9/L (ref 0–0.4)
IMM GRANULOCYTES NFR BLD: 0.4 %
INR PPP: 1.31 (ref 0.86–1.14)
IRON SATN MFR SERPL: 6 % (ref 15–46)
IRON SERPL-MCNC: 24 UG/DL (ref 35–180)
LYMPHOCYTES # BLD AUTO: 2.1 10E9/L (ref 0.8–5.3)
LYMPHOCYTES NFR BLD AUTO: 38.9 %
MCH RBC QN AUTO: 20.6 PG (ref 26.5–33)
MCHC RBC AUTO-ENTMCNC: 27.5 G/DL (ref 31.5–36.5)
MCV RBC AUTO: 75 FL (ref 78–100)
MICROCYTES BLD QL SMEAR: PRESENT
MONOCYTES # BLD AUTO: 0.7 10E9/L (ref 0–1.3)
MONOCYTES NFR BLD AUTO: 12.1 %
NEUTROPHILS # BLD AUTO: 2.4 10E9/L (ref 1.6–8.3)
NEUTROPHILS NFR BLD AUTO: 44.3 %
NRBC # BLD AUTO: 0 10*3/UL
NRBC BLD AUTO-RTO: 0 /100
NUM BPU REQUESTED: 2
OVALOCYTES BLD QL SMEAR: SLIGHT
PLATELET # BLD AUTO: 207 10E9/L (ref 150–450)
PLATELET # BLD EST: ABNORMAL 10*3/UL
POTASSIUM SERPL-SCNC: 4.6 MMOL/L (ref 3.4–5.3)
PROT SERPL-MCNC: 6.4 G/DL (ref 6.8–8.8)
RBC # BLD AUTO: 3.16 10E12/L (ref 3.8–5.2)
SODIUM SERPL-SCNC: 142 MMOL/L (ref 133–144)
SPECIMEN EXP DATE BLD: NORMAL
TARGETS BLD QL SMEAR: SLIGHT
TIBC SERPL-MCNC: 372 UG/DL (ref 240–430)
TRANSFUSION STATUS PATIENT QL: NORMAL
TRANSFUSION STATUS PATIENT QL: NORMAL
WBC # BLD AUTO: 5.4 10E9/L (ref 4–11)

## 2018-11-19 PROCEDURE — 86923 COMPATIBILITY TEST ELECTRIC: CPT | Performed by: EMERGENCY MEDICINE

## 2018-11-19 PROCEDURE — 85025 COMPLETE CBC W/AUTO DIFF WBC: CPT | Performed by: EMERGENCY MEDICINE

## 2018-11-19 PROCEDURE — 85730 THROMBOPLASTIN TIME PARTIAL: CPT | Performed by: EMERGENCY MEDICINE

## 2018-11-19 PROCEDURE — 25000128 H RX IP 250 OP 636: Performed by: INTERNAL MEDICINE

## 2018-11-19 PROCEDURE — 12000000 ZZH R&B MED SURG/OB

## 2018-11-19 PROCEDURE — P9016 RBC LEUKOCYTES REDUCED: HCPCS | Performed by: EMERGENCY MEDICINE

## 2018-11-19 PROCEDURE — 86850 RBC ANTIBODY SCREEN: CPT | Performed by: EMERGENCY MEDICINE

## 2018-11-19 PROCEDURE — 86900 BLOOD TYPING SEROLOGIC ABO: CPT | Performed by: EMERGENCY MEDICINE

## 2018-11-19 PROCEDURE — 25000132 ZZH RX MED GY IP 250 OP 250 PS 637: Performed by: INTERNAL MEDICINE

## 2018-11-19 PROCEDURE — 93005 ELECTROCARDIOGRAM TRACING: CPT

## 2018-11-19 PROCEDURE — 82272 OCCULT BLD FECES 1-3 TESTS: CPT | Performed by: EMERGENCY MEDICINE

## 2018-11-19 PROCEDURE — 99285 EMERGENCY DEPT VISIT HI MDM: CPT | Mod: 25

## 2018-11-19 PROCEDURE — 80053 COMPREHEN METABOLIC PANEL: CPT | Performed by: EMERGENCY MEDICINE

## 2018-11-19 PROCEDURE — 83550 IRON BINDING TEST: CPT | Performed by: EMERGENCY MEDICINE

## 2018-11-19 PROCEDURE — C9113 INJ PANTOPRAZOLE SODIUM, VIA: HCPCS | Performed by: INTERNAL MEDICINE

## 2018-11-19 PROCEDURE — 85018 HEMOGLOBIN: CPT | Performed by: INTERNAL MEDICINE

## 2018-11-19 PROCEDURE — 82728 ASSAY OF FERRITIN: CPT | Performed by: EMERGENCY MEDICINE

## 2018-11-19 PROCEDURE — 86901 BLOOD TYPING SEROLOGIC RH(D): CPT | Performed by: EMERGENCY MEDICINE

## 2018-11-19 PROCEDURE — 85610 PROTHROMBIN TIME: CPT | Performed by: EMERGENCY MEDICINE

## 2018-11-19 PROCEDURE — 99223 1ST HOSP IP/OBS HIGH 75: CPT | Mod: AI | Performed by: INTERNAL MEDICINE

## 2018-11-19 PROCEDURE — A9270 NON-COVERED ITEM OR SERVICE: HCPCS | Performed by: INTERNAL MEDICINE

## 2018-11-19 PROCEDURE — 36415 COLL VENOUS BLD VENIPUNCTURE: CPT | Performed by: INTERNAL MEDICINE

## 2018-11-19 PROCEDURE — 83540 ASSAY OF IRON: CPT | Performed by: EMERGENCY MEDICINE

## 2018-11-19 RX ORDER — CARVEDILOL 25 MG/1
25 TABLET ORAL 2 TIMES DAILY WITH MEALS
Status: DISCONTINUED | OUTPATIENT
Start: 2018-11-19 | End: 2018-11-22 | Stop reason: HOSPADM

## 2018-11-19 RX ORDER — LIDOCAINE 40 MG/G
CREAM TOPICAL
Status: DISCONTINUED | OUTPATIENT
Start: 2018-11-19 | End: 2018-11-22 | Stop reason: HOSPADM

## 2018-11-19 RX ORDER — NALOXONE HYDROCHLORIDE 0.4 MG/ML
.1-.4 INJECTION, SOLUTION INTRAMUSCULAR; INTRAVENOUS; SUBCUTANEOUS
Status: DISCONTINUED | OUTPATIENT
Start: 2018-11-19 | End: 2018-11-22 | Stop reason: HOSPADM

## 2018-11-19 RX ORDER — ACETAMINOPHEN 325 MG/1
650 TABLET ORAL EVERY 4 HOURS PRN
Status: DISCONTINUED | OUTPATIENT
Start: 2018-11-19 | End: 2018-11-22 | Stop reason: HOSPADM

## 2018-11-19 RX ORDER — ONDANSETRON 2 MG/ML
4 INJECTION INTRAMUSCULAR; INTRAVENOUS EVERY 6 HOURS PRN
Status: DISCONTINUED | OUTPATIENT
Start: 2018-11-19 | End: 2018-11-22 | Stop reason: HOSPADM

## 2018-11-19 RX ORDER — ALBUTEROL SULFATE 90 UG/1
2 AEROSOL, METERED RESPIRATORY (INHALATION) 4 TIMES DAILY PRN
Status: DISCONTINUED | OUTPATIENT
Start: 2018-11-19 | End: 2018-11-22 | Stop reason: HOSPADM

## 2018-11-19 RX ORDER — LISINOPRIL 40 MG/1
40 TABLET ORAL DAILY
Status: DISCONTINUED | OUTPATIENT
Start: 2018-11-20 | End: 2018-11-22 | Stop reason: HOSPADM

## 2018-11-19 RX ORDER — TOLTERODINE 2 MG/1
4 CAPSULE, EXTENDED RELEASE ORAL EVERY MORNING
Status: DISCONTINUED | OUTPATIENT
Start: 2018-11-20 | End: 2018-11-22 | Stop reason: HOSPADM

## 2018-11-19 RX ORDER — PANTOPRAZOLE SODIUM 40 MG/10ML
40 INJECTION, POWDER, LYOPHILIZED, FOR SOLUTION INTRAVENOUS 2 TIMES DAILY
Status: DISCONTINUED | OUTPATIENT
Start: 2018-11-19 | End: 2018-11-21

## 2018-11-19 RX ORDER — ONDANSETRON 4 MG/1
4 TABLET, ORALLY DISINTEGRATING ORAL EVERY 6 HOURS PRN
Status: DISCONTINUED | OUTPATIENT
Start: 2018-11-19 | End: 2018-11-22 | Stop reason: HOSPADM

## 2018-11-19 RX ORDER — POLYETHYLENE GLYCOL 3350 17 G/17G
17 POWDER, FOR SOLUTION ORAL DAILY PRN
Status: DISCONTINUED | OUTPATIENT
Start: 2018-11-19 | End: 2018-11-22 | Stop reason: HOSPADM

## 2018-11-19 RX ORDER — ATORVASTATIN CALCIUM 20 MG/1
20 TABLET, FILM COATED ORAL AT BEDTIME
Status: DISCONTINUED | OUTPATIENT
Start: 2018-11-19 | End: 2018-11-22 | Stop reason: HOSPADM

## 2018-11-19 RX ORDER — AMLODIPINE BESYLATE 5 MG/1
5 TABLET ORAL DAILY
Status: DISCONTINUED | OUTPATIENT
Start: 2018-11-20 | End: 2018-11-22 | Stop reason: HOSPADM

## 2018-11-19 RX ADMIN — PANTOPRAZOLE SODIUM 40 MG: 40 INJECTION, POWDER, FOR SOLUTION INTRAVENOUS at 18:14

## 2018-11-19 RX ADMIN — ATORVASTATIN CALCIUM 20 MG: 20 TABLET, FILM COATED ORAL at 21:01

## 2018-11-19 RX ADMIN — CARVEDILOL 25 MG: 25 TABLET, FILM COATED ORAL at 18:03

## 2018-11-19 ASSESSMENT — ACTIVITIES OF DAILY LIVING (ADL): ADLS_ACUITY_SCORE: 13

## 2018-11-19 ASSESSMENT — ENCOUNTER SYMPTOMS
WEAKNESS: 1
BLOOD IN STOOL: 0
VOMITING: 0
CONSTIPATION: 1
NAUSEA: 0
SHORTNESS OF BREATH: 1
HEMATURIA: 0
ABDOMINAL PAIN: 0
DIARRHEA: 0
DYSURIA: 0

## 2018-11-19 NOTE — IP AVS SNAPSHOT
` ` Patient Information     Patient Name Sex     Yoselyn Cui ([id number]) Female 1932       Room Bed    [facility] 0523-01      Patient Demographics     Address Phone    [address] [place] [address] [phone] (Home) *Preferred*  NONE (Work)  [phone] (Mobile)      Patient Ethnicity & Race     Ethnic Group Patient Race     White      Emergency Contact(s)     Name Relation Home Work Mobile    Gordy Cui Spouse   [phone]    Pily Cui Daughter [phone]    Argelia Carroll  [phone]      Maryjane Beltran Daughter   [phone]    Sylvie Jasbir  [phone]        Documents on File        Status Date Received Description       Documents for the Patient    Consent for EHR Access Received 16     Insurance Card Received 01/10/18  Fed     External Medication Information Consent Accepted 16     Patient ID Received 01/10/18     [facility] Specified Other       Consent for Services/Privacy Notice - Hospital/Clinic Received () 16     Privacy Notice - [facility] Received 16     Consent for Services/Privacy Notice - Hospital/Clinic Received () 17     Care Everywhere Prospective Auth Received 10/30/17     Consent for Services - Hospital and Clinic Received 10/10/18     HIE Auth Received 10/10/18     Consent to Communicate Received 10/10/18     Advance Directives and Living Will Not Received  Validation of AD 2010    Advance Directives and Living Will Received 18 Health Care Directive 2010    HIM CORNELIO Authorization  18 INFO CHECKED       Documents for the Encounter    CMS IM for Patient Signature Received 18       Admission Information     Attending Provider Admitting Provider Admission Type Admission Date/Time     Kris Moran MD Emergency 18  1137    Discharge Date Hospital Service Auth/Cert Status Service Area     [facility]  SERVICES    Unit Room/Bed Admission Status        5 MEDICAL SURGICAL 0523/0523-01 Admission (Confirmed)       Admission     Complaint    Anemia, anemia, anemia      Hospital Account     Name Acct ID Class Status Primary Coverage    Yoselyn Cui [id number] Inpatient Open MEDICARE - MEDICARE PT A ONLY            Guarantor Account (for Hospital Account #[id number])     Name Relation to Pt Service Area Active? Acct Type    Yoselyn Cui Self FCS Yes Personal/Family    Address Phone          [address] [place] [address] [phone](H)  none(O)              Coverage Information (for Hospital Account #[id number])     1. MEDICARE/MEDICARE PT A ONLY     F/O Payor/Plan Precert #    MEDICARE/MEDICARE PT A ONLY     Subscriber Subscriber #    Yoselyn Cui [id number]    Address Phone    [employer]  [address]  [place] [address] [phone]          2. HEALTH[employer]NERS/HEALTH[employer]ERS FEDERAL     F/O Payor/Plan Precert #    HEALTH[employer]NERS/HEALTH[employer]NERS FEDERAL     Subscriber Subscriber #    Yoselyn Cui [id number]    Address Phone    [address]  [place] [address] [phone]

## 2018-11-19 NOTE — PHARMACY-ADMISSION MEDICATION HISTORY
Admission medication history interview status for this patient is complete. See [facility] admission navigator for allergy information, prior to admission medications and immunization status.     Medication history interview source(s):Patient and Family (daughter)  Medication history resources (including written lists, pill bottles, clinic record):None  Primary pharmacy: [facility] pharmacy in [place], [place].    Changes made to PTA medication list:  Added: none  Deleted: none  Changed: omeprazole from bid to daily.    Actions taken by pharmacist (provider contacted, etc): called [facility] pharmacy in [place] to clarify Omeprazole strength - omeprazole is not on File with them (might be taking OTC), per Care Everywhere - Omeprazole 20mg.     Additional medication history information:None    Medication reconciliation/reorder completed by provider prior to medication history? No    Do you take OTC medications (eg tylenol, ibuprofen, fish oil, eye/ear drops, etc)? Yes (Y/N)    For patients on insulin therapy: No (Y/N)    Prior to Admission medications    Medication Sig Last Dose Taking? Auth Provider   acetaminophen (TYLENOL) 325 MG tablet Take 650 mg by mouth every 6 hours as needed for mild pain  prn Yes Unknown, Entered By History   albuterol (PROAIR HFA/PROVENTIL HFA/VENTOLIN HFA) 108 (90 BASE) MCG/ACT Inhaler Inhale 2 puffs into the lungs 4 times daily as needed for other (dyspnea) prn Yes Ariel Bahena MD   amLODIPine (NORVASC) 5 MG tablet Take 5 mg by mouth daily  11/19/2018 at Unknown time Yes Reported, Patient   apixaban ANTICOAGULANT (ELIQUIS) 2.5 MG tablet Take 1 tablet (2.5 mg) by mouth 2 times daily 11/19/2018 at am Yes Porsceh Mendoza APRN CNP   ASPIRIN EC PO Take 162 mg by mouth daily (states is taking half of 325 mg tablet daily). 11/19/2018 at Unknown time Yes Unknown, Entered By History   atorvastatin (LIPITOR) 20 MG tablet Take 20 mg by mouth At Bedtime 11/18/2018 at Unknown time Yes Unknown,  Entered By History   carvedilol (COREG) 25 MG tablet Take 25 mg by mouth 2 times daily (with meals) 11/19/2018 at am Yes Reported, Patient   ferrous sulfate (IRON) 325 (65 FE) MG tablet Take 325 mg by mouth daily (with breakfast)  Past Week at Unknown time Yes Unknown, Entered By History   fluticasone furoate (ARNUITY ELLIPTA) 200 MCG/ACT inhalation powder Inhale 1 puff into the lungs daily 11/18/2018 at Unknown time Yes Ariel Bahena MD   lisinopril (PRINIVIL,ZESTRIL) 40 MG tablet Take 40 mg by mouth daily 11/19/2018 at am Yes Reported, Patient   Multiple Vitamins-Minerals (ICAPS AREDS FORMULA) TABS Take 1 tablet by mouth 2 times daily  11/19/2018 at am Yes Unknown, Entered By History   OMEPRAZOLE PO Take 20 mg by mouth every morning  11/19/2018 at Unknown time Yes Unknown, Entered By History   tolterodine (DETROL LA) 4 MG 24 hr capsule Take 4 mg by mouth every morning 11/19/2018 at Unknown time Yes Unknown, Entered By History

## 2018-11-19 NOTE — ED PROVIDER NOTES
"  History     Chief Complaint:  Generalized Weakness    HPI   Yoselyn Cui is a 86 year old female, anticoagulated on Eliquis, with a history of hypertension, hyperlipidemia, and a cerebrovascular accident who presents to the emergency department for evaluation of generalized weakness. She was at her primary clinic this morning where her hemoglobin was noted to be low at 6.8. With her history of anemia and blood transfusions, she was referred to the ED. Here, she reports that she has been \"feeling weak as a kitten\" for the last week, notably she has been having troubles walking or with any form of exertion. She denies any chest pain, diarrhea, vomiting, nausea, abdominal pain, melena, hematuria, dysuria, or increased shortness of breath or constipation from baseline. She reports that she has not had trouble eating or drinking over the past week, however her daughter reports she eats \"like a bird\".  She also reports a weight loss of 1-2 pounds a week over the last year. She denies noticing any abnormal bleeding. She has needed a blood transfusion in the past for her anemia.     Allergies:  NKDA    Medications:    Albuterol inhaler  Amlodipine  Eliquis  Aspirin  Atorvastatin  Carvedilol  Fluticasone powder  Lisinopril  Omeprazole  Tolterodine    Past Medical History:    CVA  HTN  Hyperlipidemia  SSS    Past Surgical History:    Appendectomy  Tubal ligation  Cataract removal, lens insertion - bilateral  Pacemaker implanted    Family History:    CAD    Social History:  Marital Status:   [2]  Presents with daughter.   Negative for tobacco use.  Negative for alcohol use.     Review of Systems   Respiratory: Positive for shortness of breath ( Chronic).    Cardiovascular: Negative for chest pain and leg swelling.   Gastrointestinal: Positive for constipation (chronic). Negative for abdominal pain, blood in stool, diarrhea, nausea and vomiting.   Genitourinary: Negative for dysuria and hematuria.   Neurological: " "Positive for weakness.   All other systems reviewed and are negative.      Physical Exam     Patient Vitals for the past 24 hrs:   BP Temp Heart Rate Resp SpO2 Height Weight   11/19/18 1336 142/64 98.6  F (37  C) 83 18 96 % - -   11/19/18 1326 127/56 98.6  F (37  C) 78 18 - - -   11/19/18 1245 139/60 - 77 18 98 % - -   11/19/18 1230 135/80 - - - 98 % - -   11/19/18 1215 141/61 - - - 98 % - -   11/19/18 1200 138/62 - - - (!) 88 % - -   11/19/18 1145 163/77 - - - 97 % - -   11/19/18 1144 146/69 98  F (36.7  C) 83 18 96 % 1.499 m (4' 11\") 47.6 kg (105 lb)     Physical Exam  General: Elderly female sitting upright  Eyes: PERRL, Pale conjunctiva  ENT: Moist mucous membranes, oropharynx clear. Dentures in place.  CV: Normal S1S2, no murmur, rub or gallop. Regular rate and rhythm. Pacemaker in place left upper chest wall.  Resp: Clear to auscultation bilaterally, no wheezes, rales or rhonchi. Normal respiratory effort.  GI: Abdomen is soft, nontender and nondistended. No palpable masses. No rebound or guarding.  : Normal rectal tone. Soft dark brown/maroon stool in the rectal vault. Nontender.  MSK: No edema. Nontender. Normal active range of motion.  Skin: Warm and dry. Generalized pallor. Scattered ecchymoses over bilateral upper extremities. No visible rashes.  Neuro: Alert and oriented. Responds appropriately to all questions and commands. No focal findings appreciated. Normal muscle tone.  Psych: Normal mood and affect. Pleasant.      Emergency Department Course   ECG:  Indication: Weakness  Time: 1154  Vent. Rate 78 bpm. HI interval 196. QRS duration 150. QT/QTc 448/510. P-R-T axis 63 262 62.  Atrial-sensed ventricular paced rhythm. Abnormal ECG. Read time: 1157.    Laboratory:  CBC: WBC: 5.4, HGB: 6.5 (LL), PLT: 207    CMP: Glucose 105 (H), Cl: 111 (H), Ca: 8.4 (L), Protein: 6.4 (L), o/w WNL (Creatinine: 0.68)    INR: 1.31 (H)    PTT: 32    Blood type: O+    Occult blood: Positive (A)    Emergency Department " Course:  Nursing notes and vitals reviewed. (1150) I performed an exam of the patient as documented above.      IV inserted. Medicine administered as documented above. Blood drawn. This was sent to the lab for further testing, results above.     EKG obtained in the ED, see results above.      She was given one unit of blood in the the emergency department.     (1300) I rechecked the patient and discussed the results of her workup thus far. She denies any new concerns. She is agreeable to blood tranfusion and admission.     (1352) I consulted with Dr. Moran of the hospitalist services. He is in agreement to accept the patient for admission.    Findings and plan explained to the Patient who consents to admission. Discussed the patient with Dr. Morel, who will admit the patient to a medical bed for further monitoring, evaluation, and treatment.     I personally reviewed the laboratory results with the Patient and answered all related questions prior to admission.      Impression & Plan      Medical Decision Making:  Yoselyn Cui is a 86 year old female, on Eliquis, with a history of previous upper GI bleed, who presents to the emergency department with concerns for generalized weakness, specifically worse over the last week. Seems to be primarily exertional. She was noted to be anemic at outpatient testing today, and was sent here for further evaluation. She denies any abnormal bleeding, she has some bruising of the upper extremities, but otherwise no increase in bruising. She has occult positive stool, but no black or bloody stool. She was not hypotensive, nor tachycardic, nor otherwise symptomatic with regards to examination or history. She is anemic with a hemoglobin of 6.5, I suspect possible GI cause again, and she is scheduled for outpatient endoscopy, but may require this on admission. She was given one unit of packed red blood cells here in the emergency department after discussion on risks and benefits.  She will be admitted for further care and evaluation. I discussed this with the patient and her family. They expressed understanding. All questions were answered prior to admission.     Diagnosis:    ICD-10-CM    1. Anemia, unspecified type D64.9    2. Generalized weakness R53.1    3. Anticoagulated Z79.01        Disposition:  Admitted to a medical bed under the care of Dr. Moran in stable condition    Scribe Disclosure:  I, Brittany Heredia, am serving as a scribe on 11/19/2018 at 11:50 AM to personally document services performed by Ira Montalvo MD based on my observations and the provider's statements to me.       Brittany Heredia  11/19/2018   [facility] EMERGENCY DEPARTMENT       Ira Montalvo MD  11/19/18 1400

## 2018-11-19 NOTE — H&P
Admitted:     11/19/2018      CHIEF COMPLAINT:  Fatigue, weakness, and anemia.      HISTORY OF PRESENT ILLNESS:  Ms. Cui is a very pleasant 86-year-old female with a history of CVA and left-sided residual weakness, history of pacemaker placement, and history of a distant upper GI bleed related to gastric ulcer.  The patient is currently on anticoagulation with Eliquis for stroke prevention.  She also takes an aspirin every day, along with omeprazole.      The patient presented with concerns about generalized weakness and fatigue.  She has noticed worsening over the past 2-3 days.  She has not noticed any bloody stools or black stools.      No other concerning symptoms.      On arrival to the ER today, vital signs include a blood pressure 165/75 with a heart rate of 85.  Afebrile, saturation 97% on room air.  Workup included labs.  Hemoglobin 6.5 with a low MCV at 75.  Complete metabolic panel fairly unremarkable.  The INR was 1.3.  Stool guaiac was positive.  She is being transfused 1 unit packed red blood cell currently.  I met with both the patient and her daughter who is helping provide historical information.  I also spoke with Dr. Montalvo of the ER as well.  Plan is for admission to the hospital with a gastroenterology consultation.      PAST MEDICAL HISTORY:   1.  History of pacemaker placement.   2.  History of sick sinus syndrome.   3.  Distant history of peptic ulcer.   4.  Previous need for blood transfusion, for anemia.  Last transfusion in 2012.   5.  History of CVA with residual left-sided weakness.  The patient is able to mobilize with the use of a cane and went out with family using a wheelchair.  She is able to move her left arm and left leg, but has significant weakness throughout the entire left side, and difficulty grasping things with the left arm.   6.  Chronic anticoagulation with Eliquis for CVA prevention.   7.  Macular degeneration.      CURRENT MEDICATIONS:   1.  Albuterol.   2.   Amlodipine.   3.  Atorvastatin.   4.  Carvedilol.   5.  Fluticasone.   6.  Lisinopril.   7.  Omeprazole.   8.  Detrol.   9.  Acetaminophen.   10.  Eliquis.   11.  Aspirin.   12.  Ferrous sulfate.   13.  Multivitamin.      ALLERGIES:  NONE.      FAMILY HISTORY:  Reviewed.  Nothing contributory to this admission.      SOCIAL HISTORY:  The patient quit smoking in 2007.  Denies alcohol use.  Lives with her daughter and her .  She is a full code.      REVIEW OF SYSTEMS:  Please see chart for details.  Comprehensive greater than 10-point review of systems otherwise negative except as detailed above.      PHYSICAL EXAMINATION:   VITAL SIGNS:  Blood pressure is currently 165/70 with a heart rate of 85.  Afebrile.  Saturation 95% on room air.   GENERAL:  The patient appears nontoxic, in no acute distress.  She is awake, alert and oriented x3.  She is a  very pleasant woman.  She is a good historian.  Daughter  Britni was at bedside.   HEAD/NECK EXAM:  Head is atraumatic.  Sclerae are white.  Eyelids are normal.  Conjunctivae are normal.  Extraocular movements are intact.   NECK:  Supple.   LYMPH:  No cervical or supraclavicular lymphadenopathy.   CARDIOVASCULAR:  Heart is regular rate and rhythm.  No significant murmurs.  No lower extremity edema.   LUNGS:  Clear to auscultation bilaterally.  No intercostal retractions.  No conversational dyspnea.   ABDOMEN:  Nontender, nondistended, soft.  No masses.  No organomegaly.   EXTREMITIES:  Show no edema.     SKIN:  Exam shows reveals no rash.  No jaundice.  Skin is red to touch.   NEUROLOGIC:  Cranial nerves II-XII are intact.  Moves all extremities well.  Sensation intact to light touch in the upper and lower extremities bilaterally.   PSYCHIATRIC:  Patient is awake, alert and oriented x3.  Mood and affect are normal and appropriate.      LABORATORY DATA AND IMAGING DATA:  Reviewed above in HPI.      IMPRESSION AND PLAN:  Ms. Cui is a very pleasant 86-year-old  female with a history of pacemaker placement, chronic anticoagulation for prevention of CVA which includes both Eliquis and aspirin, chronic use of omeprazole, who presents to the hospital today for concerns about fatigue and generalized weakness.  She was found to have a severe anemia.   1.  Symptomatic microcytic anemia:  Suspect likely iron deficiency anemia.  Unclear if upper GI losses or lower GI losses at this point.   2.  Distant history of peptic ulcer, for which the patient takes a daily omeprazole.   3.  History of cerebrovascular accident for which the patient takes Eliquis and aspirin.   4.  Previous pacemaker placement.      PLAN:   1.  Admit to inpatient status in the setting of her symptomatic anemia and need for blood transfusion, and further GI workup.   2.  Gastroenterology consultation for consideration of endoscopy.   3.  We will transfuse another unit of packed red blood cells.  She is receiving 1 here in the ER.   4.  Repeat hemoglobin this evening and again in the morning.   5.  Check ferritin and TIBC.   6.  For now hold aspirin and Eliquis.   7.  If the patient is found to have blood loss, would consider stopping aspirin and treating instead with Eliquis alone.  No history of myocardial infarction.   8.  N.p.o. after midnight for possible GI workup tomorrow.         HAVEN AMES MD             D: 2018   T: 2018   MT: BARTOLO      Name:     ANNE MARIE AMBROSE   MRN:      [id number]        Account:      [id number]   :      1932        Admitted:     2018                   Document: [id number]

## 2018-11-19 NOTE — IP AVS SNAPSHOT
[place] [address] [facility]    [address]    [facility] [address]    Phone:  [phone]    Fax:  [phone]                                       After Visit Summary   11/19/2018    Yoselyn Cui    MRN: [id number]           After Visit Summary Signature Page     I have received my discharge instructions, and my questions have been answered. I have discussed any challenges I see with this plan with the nurse or doctor.    ..........................................................................................................................................  Patient/Patient Representative Signature      ..........................................................................................................................................  Patient Representative Print Name and Relationship to Patient    ..................................................               ................................................  Date                                   Time    ..........................................................................................................................................  Reviewed by Signature/Title    ...................................................              ..............................................  Date                                               Time          22EPIC Rev 08/18

## 2018-11-19 NOTE — IP AVS SNAPSHOT
` [place] [address] MEDICAL SURGICAL: [phone]            Medication Administration Report for Yoselyn Cui as of 11/22/18 1159   Legend:    Given Hold Not Given Due Canceled Entry Other Actions    Time Time (Time) Time  Time-Action       Inactive    Active    Linked        Medications 11/16/18 11/17/18 11/18/18 11/19/18 11/20/18 11/21/18 11/22/18    acetaminophen (TYLENOL) tablet 650 mg  Dose: 650 mg  Freq: EVERY 4 HOURS PRN Route: PO  PRN Reason: mild pain  Start: 11/19/18 1612   Admin Instructions: Alternate ibuprofen (if ordered) with acetaminophen.  Maximum acetaminophen dose from all sources = 75 mg/kg/day not to exceed 4 grams/day.    Admin. Amount: 2 tablet (2 × 325 mg tablet)  Dispense Loc: [facility]               albuterol (PROAIR HFA/PROVENTIL HFA/VENTOLIN HFA) 108 (90 Base) MCG/ACT inhaler 2 puff  Dose: 2 puff  Freq: 4 TIMES DAILY PRN Route: IN  PRN Reason: other  PRN Comment: dyspnea  Start: 11/19/18 1612   Admin. Amount: 2 puff  Dispense Loc: [facility]               amLODIPine (NORVASC) tablet 5 mg  Dose: 5 mg  Freq: DAILY Route: PO  Start: 11/20/18 0900   Admin. Amount: 1 tablet (1 × 5 mg tablet)  Last Admin: 11/22/18 0810  Dispense Loc: [facility]         1028 (5 mg)-Given        1357 (5 mg)-Given        0810 (5 mg)-Given           apixaban ANTICOAGULANT (ELIQUIS) tablet 2.5 mg  Dose: 2.5 mg  Freq: 2 TIMES DAILY Route: PO  Start: 11/22/18 0900   Admin. Amount: 1 tablet (1 × 2.5 mg tablet)  Last Admin: 11/22/18 0926  Dispense Loc: [facility]           0926 (2.5 mg)-Given       [ ] 2100           atorvastatin (LIPITOR) tablet 20 mg  Dose: 20 mg  Freq: AT BEDTIME Route: PO  Start: 11/19/18 2200   Admin. Amount: 1 tablet (1 × 20 mg tablet)  Last Admin: 11/21/18 2059  Dispense Loc: [facility]        2101 (20 mg)-Given        2212 (20 mg)-Given        2059 (20 mg)-Given               [ ] 2200           carvedilol (COREG) tablet 25 mg  Dose: 25 mg  Freq: 2 TIMES DAILY WITH MEALS Route:  "PO  Start: 11/19/18 1800   Admin. Amount: 1 tablet (1 × 25 mg tablet)  Last Admin: 11/22/18 0810  Dispense Loc:  [facility]        1803 (25 mg)-Given        1029 (25 mg)-Given       1836 (25 mg)-Given        (1402)-Not Given [C]       1919 (25 mg)-Given        0810 (25 mg)-Given       [ ] 1800             Dose: 0.2 mg  Freq: EVERY 1 MIN PRN Route: IV  PRN Reason: benzodiazepine reversal  PRN Comment: over sedation  Start: 11/21/18 1244   End: 11/22/18 0043   Admin Instructions: Give over 15 seconds. If inadequate response after 45 seconds, may repeat up to a MAX total dose of 1 mg)  Irritant. For ordered IV doses 0.1-1 mg, give IV Push undiluted. Administer each 0.2mg over 15 seconds.    Admin. Amount: 0.2 mg = 2 mL Conc: 0.1 mg/mL  Dispense Loc: [facility]  Infused Over: 1 Minutes  Volume: 5 mL  POC: Post-procedure           0043-Med Discontinued       fluticasone furoate (ARNUITY ELLIPTA) 200 MCG/ACT inhaler 1 puff  Dose: 1 puff  Freq: DAILY Route: IN  Start: 11/19/18 1615   Admin Instructions: *Do not use more frequently than once daily.*  Rinse mouth after use.    Admin. Amount: 1 puff  Last Admin: 11/22/18 0917  Dispense Loc:  Main Pharmacy                0746 (1 puff)-Given        0745 (1 puff)-Given        0917 (1 puff)-Given           lidocaine (LMX4) cream  Freq: EVERY 1 HOUR PRN Route: Top  PRN Reason: pain  PRN Comment: with VAD insertion or accessing implanted port.  Start: 11/19/18 1146   Admin Instructions: Do NOT give if patient has a history of allergy to any local anesthetic or any \"janice\" product.   Apply 30 minutes prior to VAD insertion or port access.  MAX Dose:  2.5 g (  of 5 g tube)    Dispense Loc: [facility]               lidocaine 1 % 1 mL  Dose: 1 mL  Freq: EVERY 1 HOUR PRN Route: OTHER  PRN Comment: mild pain with VAD insertion or accessing implanted port  Start: 11/19/18 1146   Admin Instructions: Do NOT give if patient has a history of allergy to any local anesthetic or any \"janice\" " product. MAX dose 1 mL subcutaneous OR intradermal in divided doses.    Admin. Amount: 1 mL  Dispense Loc: [facility] Floor Stock  Volume: 2 mL               lisinopril (PRINIVIL/ZESTRIL) tablet 40 mg  Dose: 40 mg  Freq: DAILY Route: PO  Start: 18 0900   Admin. Amount: 1 tablet (1 × 40 mg tablet)  Last Admin: 18 0810  Dispense Loc:  [facility] MS[facility]         1029 (40 mg)-Given        1357 (40 mg)-Given        0810 (40 mg)-Given           magnesium citrate solution 296 mL  Dose: 296 mL  Freq: ONCE Route: PO  Start: 18 0600   Admin. Amount: 296 mL  Dispense Loc:  Main Pharmacy  Administrations Remainin  Volume: 296 mL          (636)-Not Given [C]            May continue current IV fluids if patient has IV fluids infusing.  Freq: CONTINUOUS PRN Route: XX  Start: 18 1244   Dispense Loc: Non Rx dispense  POC: Post-procedure               melatonin tablet 1 mg  Dose: 1 mg  Freq: AT BEDTIME PRN Route: PO  PRN Reason: sleep  Start: 18 1613   Admin Instructions: Do not give unless at least 6 hours of uninterrupted sleep is expected.    Admin. Amount: 1 tablet (1 × 1 mg tablet)  Dispense Loc:  [facility]               naloxone (NARCAN) injection 0.1-0.4 mg  Dose: 0.1-0.4 mg  Freq: EVERY 2 MIN PRN Route: IV  PRN Reason: opioid reversal  Start: 18   End: 18 1243   Admin Instructions: For apnea or imminent respiratory arrest: give 0.4 mg IV undiluted Q 2 minutes PRN until desired degree of reversal is obtained, stop opioid and notify provider. Continue monitoring until discharge criteria are met for a minimum of 2 hours.  For severe sedation, decrease in respiratory depth, quality or respiratory rate less than 8: give 0.1 mg IV Q 2 minutes x 3 doses, stop opioid and notify provider.  Try to minimize reversal of analgesia especially in end-of-life patients  For ordered IV doses 0.1-2mg give IVP. Give each 0.4mg over 15 seconds in emergency situations. For non-emergent situations further  dilute in 9mL of NS to facilitate titration of response.    Admin. Amount: 0.1-0.4 mg = 0.25-1 mL Conc: 0.4 mg/mL  Dispense Loc: [facility]  Volume: 1 mL  POC: Post-procedure           1243-Med Discontinued       naloxone (NARCAN) injection 0.1-0.4 mg  Dose: 0.1-0.4 mg  Freq: EVERY 2 MIN PRN Route: IV  PRN Reason: opioid reversal  Start: 11/19/18 1613   Admin Instructions: For respiratory rate LESS than or EQUAL to 8.  Partial reversal dose:  0.1 mg titrated q 2 minutes for Analgesia Side Effects Monitoring Sedation Level of 3 (frequently drowsy, arousable, drifts to sleep during conversation).Full reversal dose:  0.4 mg bolus for Analgesia Side Effects Monitoring Sedation Level of 4 (somnolent, minimal or no response to stimulation).  For ordered IV doses 0.1-2mg give IVP. Give each 0.4mg over 15 seconds in emergency situations. For non-emergent situations further dilute in 9mL of NS to facilitate titration of response.    Admin. Amount: 0.1-0.4 mg = 0.25-1 mL Conc: 0.4 mg/mL  Dispense Loc: [facility]  Volume: 1 mL               ondansetron (ZOFRAN-ODT) ODT tab 4 mg  Dose: 4 mg  Freq: EVERY 6 HOURS PRN Route: PO  PRN Reasons: nausea,vomiting  Start: 11/19/18 1613   Admin Instructions: This is Step 1 of nausea and vomiting management.  If nausea not resolved in 15 minutes, go to Step 2 prochlorperazine (COMPAZINE). Do not push through foil backing. Peel back foil and gently remove. Place on tongue immediately. Administration with liquid unnecessary  With dry hands, peel back foil backing and gently remove tablet; do not push oral disintegrating tablet through foil backing; administer immediately on tongue and oral disintegrating tablet dissolves in seconds; then swallow with saliva; liquid not required.    Admin. Amount: 1 tablet (1 × 4 mg tablet)  Dispense Loc: [facility]              Or  ondansetron (ZOFRAN) injection 4 mg  Dose: 4 mg  Freq: EVERY 6 HOURS PRN Route: IV  PRN Reasons: nausea,vomiting  Start:  11/19/18 1613   Admin Instructions: This is Step 1 of nausea and vomiting management.  If nausea not resolved in 15 minutes, go to Step 2 prochlorperazine (COMPAZINE).  Irritant. For ordered IV doses 0.1-4 mg, give IV Push undiluted over 2-5 minutes.    Admin. Amount: 4 mg = 2 mL Conc: 4 mg/2 mL  Dispense Loc: [facility]  Infused Over: 2-5 Minutes  Volume: 2 mL               pantoprazole (PROTONIX) EC tablet 40 mg  Dose: 40 mg  Freq: 2 TIMES DAILY BEFORE MEALS Route: PO  Start: 11/21/18 1630   Admin Instructions: DO NOT CRUSH.  Iv to po per protocol.    Admin. Amount: 1 tablet (1 × 40 mg tablet)  Last Admin: 11/22/18 0640  Dispense Loc: [facility]          1735 (40 mg)-Given        0640 (40 mg)-Given       [ ] 1630           polyethylene glycol (MIRALAX/GLYCOLAX) Packet 17 g  Dose: 17 g  Freq: DAILY PRN Route: PO  PRN Reason: constipation  Start: 11/19/18 1613   Admin Instructions: Give in 8oz of  water, juice, or soda. Hold for loose stools.  This is the second step of a three step constipation treatment.  1 Packet = 17 grams. Mixed prescribed dose in 8 ounces of water. Follow with 8 oz. of water.    Admin. Amount: 17 g  Dispense Loc: [facility]               sodium chloride (PF) 0.9% PF flush 3 mL  Dose: 3 mL  Freq: EVERY 1 MIN PRN Route: IV  PRN Reason: line flush  PRN Comment: after medication administration. For peripheral IV flush post IV meds  Start: 11/21/18 1244   Admin. Amount: 3 mL  Dispense Loc: [facility] Floor Stock  Volume: 4 mL  POC: Post-procedure               sodium chloride (PF) 0.9% PF flush 3 mL  Dose: 3 mL  Freq: EVERY 8 HOURS Route: IK  Start: 11/19/18 1148   Admin Instructions: And Q1H PRN, to lock peripheral IV dormant line.    Admin. Amount: 3 mL  Last Admin: 11/20/18 2213  Dispense Loc: [facility] Floor Stock  Volume: 4 mL   Current Line: Peripheral IV 11/19/18 Right Hand       2030 (3 mL)-Given               0828 (3 mL)-Given       1544 (3 mL)-Given       2213 (3 mL)-Given               (1402)-Not  Given       ()-Not Given        (622)-Not Given       [ ] 1400       [ ] 2200           sodium chloride (PF) 0.9% PF flush 3 mL  Dose: 3 mL  Freq: EVERY 1 HOUR PRN Route: IK  PRN Reason: line flush  PRN Comment: for peripheral IV flush post IV meds  Start: 18 1146   Admin. Amount: 3 mL  Last Admin: 18 09  Dispense Loc: [facility] Floor Stock  Volume: 4 mL   Current Line: Peripheral IV 18 Right Hand          0926 (3 mL)-Given           tolterodine (DETROL LA) 24 hr capsule 4 mg  Dose: 4 mg  Freq: EVERY MORNING Route: PO  Start: 18 0900   Admin Instructions: DO NOT CRUSH.    Admin. Amount: 2 capsule (2 × 2 mg capsule)  Last Admin: 18 0809  Dispense Loc:  [facility] MS5S         1028 (4 mg)-Given        1357 (4 mg)-Given        0809 (4 mg)-Given          Completed Medications  Medications 18         Dose: 4,000 mL  Freq: ONCE Route: ORAL OR NG T  Start: 18 1500   End: 18   Admin Instructions: FOR Procedure PREP ONLY   If patient is scheduled BEFORE 12 noon, give DAY BEFORE PROCEDURE from [id number].  One 8 ounce glass every 15 minutes until finished.   If unable to drink Golytely/Colyte, give per NG at rate of 8 ounces every 15 minutes until finished.      Admin. Amount: 4,000 mL  Last Admin: 18  Dispense Loc:  Main Pharmacy  Administrations Remainin  Volume: 4,000 mL          (4,000 mL)-Given            Discontinued Medications  Medications 18         Freq: PRN  Start: 18 1303   End: 18 1302   Last Admin: 18 1303  POC: Intra-procedure         1303 (1 spray)-Given        1302-Med Discontinued          Freq: PRN  Start: 18 1201   End: 18 1302   Last Admin: 18 1201  Infused Over: 3-5 Minutes  POC: Intra-procedure          1201 (50 mcg)-Given       1302-Med Discontinued          Freq: PRN  Start: 18  "1303   End: 11/20/18 1408   Last Admin: 11/20/18 1303  Infused Over: 3-5 Minutes  POC: Intra-procedure         1303 (25 mcg)-Given       1408-Med Discontinued           Freq: EVERY 1 HOUR PRN Route: Top  PRN Reason: pain  PRN Comment: with VAD insertion or accessing implanted port.  Start: 11/21/18 1244   End: 11/21/18 1302   Admin Instructions: Do NOT give if patient has a history of allergy to any local anesthetic or any \"janice\" product.   Apply 30 minutes prior to VAD insertion or port access.  MAX Dose:  2.5 g (  of 5 g tube)    Dispense Loc:  Main Pharmacy  POC: Pre-procedure          1302-Med Discontinued          Freq: EVERY 1 HOUR PRN Route: Top  PRN Reason: pain  PRN Comment: with VAD insertion or accessing implanted port.  Start: 11/20/18 1334   End: 11/20/18 1408   Admin Instructions: Do NOT give if patient has a history of allergy to any local anesthetic or any \"janice\" product.   Apply 30 minutes prior to VAD insertion or port access.  MAX Dose:  2.5 g (  of 5 g tube)    Dispense Loc:  Main Pharmacy  POC: Pre-procedure         1408-Med Discontinued           Freq: EVERY 1 HOUR PRN Route: Top  PRN Reason: pain  PRN Comment: with VAD insertion or accessing implanted port.  Start: 11/20/18 1240   End: 11/20/18 1408   Admin Instructions: Do NOT give if patient has a history of allergy to any local anesthetic or any \"janice\" product.   Apply 30 minutes prior to VAD insertion or port access.  MAX Dose:  2.5 g (  of 5 g tube)    Dispense Loc:  Main Pharmacy  POC: Pre-procedure         1408-Med Discontinued           Dose: 1 mL  Freq: EVERY 1 HOUR PRN Route: OTHER  PRN Comment: mild pain with VAD insertion or accessing implanted port  Start: 11/21/18 1244   End: 11/21/18 1302   Admin Instructions: Do NOT give if patient has a history of allergy to any local anesthetic or any \"janice\" product. MAX dose 1 mL subcutaneous OR intradermal in divided doses.    Admin. Amount: 1 mL  Dispense Loc:  ADS " "ENDO  Volume: 30 mL  POC: Pre-procedure          1302-Med Discontinued          Dose: 1 mL  Freq: EVERY 1 HOUR PRN Route: OTHER  PRN Comment: mild pain with VAD insertion or accessing implanted port  Start: 11/20/18 1334   End: 11/20/18 1408   Admin Instructions: Do NOT give if patient has a history of allergy to any local anesthetic or any \"janice\" product. MAX dose 1 mL subcutaneous OR intradermal in divided doses.    Admin. Amount: 1 mL  Dispense Loc: [facility] ENDO  Volume: 30 mL  POC: Pre-procedure         1408-Med Discontinued           Dose: 1 mL  Freq: EVERY 1 HOUR PRN Route: OTHER  PRN Comment: mild pain with VAD insertion or accessing implanted port  Start: 11/20/18 1240   End: 11/20/18 1408   Admin Instructions: Do NOT give if patient has a history of allergy to any local anesthetic or any \"janice\" product. MAX dose 1 mL subcutaneous OR intradermal in divided doses.    Admin. Amount: 1 mL  Dispense Loc: [facility] END[facility]  Volume: 30 mL  POC: Pre-procedure         1408-Med Discontinued           Freq: CONTINUOUS PRN Route: XX  Start: 11/21/18 1244   End: 11/21/18 1302   Dispense Loc: Non Rx dispense  POC: Pre-procedure          1302-Med Discontinued          Freq: CONTINUOUS PRN Route: XX  Start: 11/20/18 1334   End: 11/20/18 1408   Dispense Loc: Non Rx dispense  POC: Pre-procedure         1408-Med Discontinued           Freq: CONTINUOUS PRN Route: XX  Start: 11/20/18 1240   End: 11/20/18 1408   Dispense Loc: Non Rx dispense  POC: Pre-procedure         1408-Med Discontinued           Freq: CONTINUOUS PRN Route: XX  Start: 11/21/18 1244   End: 11/21/18 1302   Admin Instructions: May take regular AM medications except those listed below    Dispense Loc: Non Rx dispense  POC: Pre-procedure          1302-Med Discontinued          Freq: CONTINUOUS PRN Route: XX  Start: 11/20/18 1334   End: 11/20/18 1408   Admin Instructions: May take regular AM medications except those listed below    Dispense Loc: Non Rx dispense  POC: " Pre-procedure         1408-Med Discontinued           Freq: CONTINUOUS PRN Route: XX  Start: 11/20/18 1240   End: 11/20/18 1408   Admin Instructions: May take regular AM medications except those listed below    Dispense Loc: Non Rx dispense  POC: Pre-procedure         1408-Med Discontinued           Freq: PRN  Start: 11/21/18 1202   End: 11/21/18 1302   Last Admin: 11/21/18 1205  Infused Over: 2 Minutes  POC: Intra-procedure          1202 (1 mg)-Given       1205 (1 mg)-Given [C]       1302-Med Discontinued          Freq: PRN  Start: 11/20/18 1303   End: 11/20/18 1408   Last Admin: 11/20/18 1308  Infused Over: 2 Minutes  POC: Intra-procedure         1303 (1 mg)-Given       1308 (1 mg)-Given [C]       1408-Med Discontinued           Dose: 20 mg  Freq: EVERY MORNING Route: PO  Start: 11/20/18 0900   End: 11/19/18 1717   Admin. Amount: 1 capsule (1 × 20 mg capsule)  Dispense Loc:  [facility] MS5S        1717-Med Discontinued            Dose: 40 mg  Freq: 2 TIMES DAILY Route: IV  Start: 11/19/18 1730   End: 11/21/18 1524   Admin. Amount: 40 mg  Last Admin: 11/21/18 1358  Dispense Loc:  [facility] MS5S  Infused Over: 15 Minutes  Volume: 10 mL   Current Line: Peripheral IV 11/19/18 Right Hand       1814 (40 mg)-Given               1029 (40 mg)-Given       2212 (40 mg)-Given        1358 (40 mg)-Given       1524-Med Discontinued          Dose: 3 mL  Freq: EVERY 8 HOURS Route: IK  Start: 11/21/18 1245   End: 11/21/18 1302   Admin Instructions: And Q1H PRN, to lock peripheral IV dormant line.    Admin. Amount: 3 mL  Dispense Loc: [facility] Stock  Volume: 4 mL  POC: Pre-procedure                 1302-Med Discontinued          Dose: 3 mL  Freq: EVERY 1 HOUR PRN Route: IK  PRN Reason: line flush  PRN Comment: for peripheral IV flush post IV meds  Start: 11/21/18 1244   End: 11/21/18 1302   Admin. Amount: 3 mL  Dispense Loc: [facility] Floor Stock  Volume: 4 mL  POC: Pre-procedure          1302-Med Discontinued          Dose: 3 mL  Freq: EVERY 8  HOURS Route: IK  Start: 11/20/18 1345   End: 11/20/18 1408   Admin Instructions: And Q1H PRN, to lock peripheral IV dormant line.    Admin. Amount: 3 mL  Dispense Loc: [facility] Floor Stock  Volume: 4 mL  POC: Pre-procedure                1408-Med Discontinued           Dose: 3 mL  Freq: EVERY 1 HOUR PRN Route: IK  PRN Reason: line flush  PRN Comment: for peripheral IV flush post IV meds  Start: 11/20/18 1334   End: 11/20/18 1408   Admin. Amount: 3 mL  Dispense Loc: [facility] Floor Stock  Volume: 4 mL  POC: Pre-procedure         1408-Med Discontinued           Dose: 3 mL  Freq: EVERY 8 HOURS Route: IK  Start: 11/20/18 1245   End: 11/20/18 1408   Admin Instructions: And Q1H PRN, to lock peripheral IV dormant line.    Admin. Amount: 3 mL  Dispense Loc: [facility] Floor Stock  Volume: 4 mL  POC: Pre-procedure                1408-Med Discontinued           Dose: 3 mL  Freq: EVERY 1 HOUR PRN Route: IK  PRN Reason: line flush  PRN Comment: for peripheral IV flush post IV meds  Start: 11/20/18 1240   End: 11/20/18 1408   Admin. Amount: 3 mL  Dispense Loc: [facility] Floor Stock  Volume: 4 mL  POC: Pre-procedure         1408-Med Discontinued           Rate: 50 mL/hr   Freq: CONTINUOUS Route: IV  Last Dose: Stopped (11/22/18 0926)  Start: 11/20/18 1545   End: 11/22/18 0827   Last Admin: 11/22/18 0749  [facility]  Volume: 1,000 mL   Current Line: Peripheral IV 11/19/18 Right Hand        1546 ( )-New Bag       1838 ( )-Rate/Dose Verify        1703 ( )-Rate/Dose Verify        0749 ( )-Rate/Dose Verify       0827-Med Discontinued  0926-Stopped [C]        Medications 11/16/18 11/17/18 11/18/18 11/19/18 11/20/18 11/21/18 11/22/18

## 2018-11-19 NOTE — IP AVS SNAPSHOT
MRN:[id number]                      After Visit Summary   11/19/2018    Yoselyn Cui    MRN: [id number]           Thank you!     Thank you for choosing [facility] for your care. Our goal is always to provide you with excellent care. Hearing back from our patients is one way we can continue to improve our services. Please take a few minutes to complete the written survey that you may receive in the mail after you visit. If you would like to speak to someone directly about your visit please contact Patient Relations at [phone]. Thank you!          Patient Information     Date Of Birth          1/28/1932        Designated Caregiver       Most Recent Value    Caregiver    Will someone help with your care after discharge? yes    Name of designated caregiver Pily    Phone number of caregiver [phone]     Caregiver address see chart      About your hospital stay     You were admitted on:  November 19, 2018 You last received care in the:  [place] [address] [facility]    You were discharged on:  November 22, 2018        Reason for your hospital stay       Symptomatic anemia, GI bleeding, no identifiable source                  Who to Call     For medical emergencies, please call 911.  For non-urgent questions about your medical care, please call your primary care provider or clinic, [phone]  For questions related to your surgery, please call your surgery clinic        Attending Provider     Provider Specialty    Ira Montalvo MD Emergency Medicine    [facility]       Primary Care Provider Office Phone # Fax #    Ira Barajas PA-C [phone]      After Care Instructions     Activity       Your activity upon discharge: activity as tolerated            Diet       Follow this diet upon discharge: Orders Placed This Encounter      Regular Diet Adult                  Follow-up Appointments     Follow-up and  recommended labs and tests        Follow up with primary care provider, Ira Barajas, within 5 days for hospital follow- up.  The following labs/tests are recommended: CBC in 5 days result to PCP.  CT chest Abd /plevis with contrast per GI recommendation as an out patient in 1 weeks, ordered, result will go to PCP and Gastroentologist, Dr. Soto.                  Your next 10 appointments already scheduled     Jignesh 10, 2019 10:15 AM CST   Return Visit with Hunter Gregorio MD   [facility] ([facility])    [address]  [facility] [address] [phone]            Jan 21, 2019  2:15 PM CST   Remote PPM Check with AVILES TECH1   [facility] ([facility])    [address]  [facility] [address] [phone] OPT 2           This appointment is for a remote check of your pacemaker.  This is not an appointment at the office.              Future tests that were ordered for you     CT Abdomen pelvis w contrast*                 Further instructions from your care team         Understanding Colon and Rectal Polyps     The colon has a smooth lining composed of millions of cells.     The colon (also called the large intestine) is a muscular tube that forms the last part of the digestive tract. It absorbs water and stores food waste. The colon is about 4 to 6 feet long. The rectum is the last 6 inches of the colon. The colon and rectum have a smooth lining composed of millions of cells. Changes in these cells can lead to growths in the colon that can become cancerous and should be removed.     When the Colon Lining Changes  Changes that occur in the cells that line the colon or rectum can lead to growths called polyps. Over a period of years, polyps can turn cancerous. Removing polyps early may prevent cancer from ever forming.      Polyps  Polyps are fleshy clumps of tissue that  form on the lining of the colon or rectum. Small polyps are usually benign (not cancerous). However, over time, cells in a polyp can change and become cancerous. The larger a polyp grows, the more likely this is to happen. Also, certain types of polyps known as adenomatous polyps are considered premalignant. This means that they will almost always become cancerous if they re not removed.          Cancer  Almost all colorectal cancers start when polyp cells begin growing abnormally. As a cancerous tumor grows, it may involve more and more of the colon or rectum. In time, cancer can also grow beyond the colon or rectum and spread to nearby organs or to glands called lymph nodes. The cells can also travel to other parts of the body. This is known as metastasis. The earlier a cancerous tumor is removed, the better the chance of preventing its spread.        5589-9037 [facility], [address] [place] [address]. All rights reserved. This information is not intended as a substitute for professional medical care. Always follow your healthcare professional's instructions.    Understanding Diverticulosis and Diverticulitis     Pouches or diverticula usually occur in the lower part of the colon called the sigmoid.      Diverticulitis occurs when the pouches become inflamed.     The colon (large intestine) is the last part of the digestive tract. It absorbs water from stool and changes it from a liquid to a solid. In certain cases, small pouches called diverticula can form in the colon wall. This condition is called diverticulosis. The pouches can become infected. If this happens, it becomes a more serious problem called diverticulitis. These problems can be painful. But they can be managed.   Managing Your Condition  Diet changes or taking medications are often tried first. These may be enough to bring relief. If the case is bad, surgery may be done. You and your doctor can discuss the plan that is best for  you.  If You Have Diverticulosis  Diet changes are often enough to control symptoms. The main changes are adding fiber (roughage) and drinking more water. Fiber absorbs water as it travels through your colon. This helps your stool stay soft and move smoothly. Water helps this process. If needed, you may be told to take over-the-counter stool softeners. To help relieve pain, antispasmodic medications may be prescribed.  If You Have Diverticulitis  Treatment depends on how bad your symptoms are.  For mild symptoms: You may be put on a liquid diet for a short time. You may also be prescribed antibiotics. If these two steps relieve your symptoms, you may then be prescribed a high-fiber diet. If you still have symptoms, your doctor will discuss further treatment options with you.  For severe symptoms: You may need to be admitted to the hospital. There, you can be given IV antibiotics and fluids. Once symptoms are under control, the above treatments may be tried. If these don t control your condition, your doctor may discuss the option of having surgery with you.  [place] to Colon Health  Help keep your colon healthy with a diet that includes plenty of high-fiber fruits, vegetables, and whole grains. Drink plenty of liquids like water and juice. Your doctor may also recommend avoiding seeds and nuts.          9590-0021 [facility], [address] [place] [address], [place] [id number]. All rights reserved. This information is not intended as a substitute for professional medical care. Always follow your healthcare professional's instructions.    Eating a High-Fiber Diet  Fiber is what gives strength and structure to plants. Most grains, beans, vegetables, and fruits contain fiber. Foods rich in fiber are often low in calories and fat, and they fill you up more. They may also reduce your risks for certain health problems. To find out the amount of fiber in canned, packaged, or frozen foods, read the  Nutrition Facts  label. It tells  you how much fiber is in a serving.      Types of Fiber and Their Benefits  There are two types of fiber: insoluble and soluble. They both aid digestion and help you maintain a healthy weight.  Insoluble fiber: This is found in whole grains, cereals, certain fruits and vegetables (such as apple skin, corn, and carrots). Insoluble fiber may prevent constipation and reduce the risk of certain types of cancer.   Soluble fiber: This type of fiber is in oats, beans, and certain fruits and vegetables (such as strawberries and peas). Soluble fiber can reduce cholesterol (which may help lower the risk of heart disease), and helps control blood sugar levels.  Look for High-Fiber Foods  Whole-grain breads and cereals: Try to eat 6-8 ounces a day. Include wheat and oat bran cereals, whole-wheat muffins or toast, and corn tortillas in your meals.  Fruits: Try to eat 2 cups a day. Apples, oranges, strawberries, pears, and bananas are good sources. (Note: Fruit juice is low in fiber.)  Vegetables: Try to eat 3 cups a day. Add asparagus, carrots, broccoli, peas, and corn to your meals.  Legumes (beans): One cup of cooked lentils gives you over 15 grams of fiber. Try navy beans, lentils, and chickpeas.  Seeds:  A small handful of seeds gives you about 3 grams of fiber. Try sunflower seeds.    Keep Track of Your Fiber  A healthy diet includes 31 grams of fiber a day if you have a 2,000-calorie diet. Keep track of how much fiber you eat. Start by reading food labels. Then eat a variety of foods high in fiber. Ask your doctor about supplemental fiber products.            2440-3901 [employer], [address] [place] [address], [place] [address]. All rights reserved. This information is not intended as a substitute for professional medical care. Always follow your healthcare professional's instructions.    Pending Results     Date and Time Order Name Status Description    11/21/2018 1220 Surgical pathology exam In process            "  Statement of Approval     Ordered          18 1155  I have reviewed and agree with all the recommendations and orders detailed in this document.  EFFECTIVE NOW     Approved and electronically signed by:  Jose R Tiwari MD             Admission Information     Date & Time Department Dept. Phone    [address] [place] [address] Medical Surgical [phone]      Your Vitals Were     Blood Pressure Pulse Temperature Respirations Height Weight    143/65 (BP Location: Right arm) 81 96.6  F (35.9  C) (Oral) 18 1.499 m (4' 11\") 47.8 kg (105 lb 6.4 oz)    Pulse Oximetry BMI (Body Mass Index)                96% 21.29 kg/m2          MyChart Information     [employer] lets you send messages to your doctor, view your test results, renew your prescriptions, schedule appointments and more. To sign up, go to [email or web address] . Click on \"Log in\" on the left side of the screen, which will take you to the Welcome page. Then click on \"Sign up Now\" on the right side of the page.     You will be asked to enter the access code listed below, as well as some personal information. Please follow the directions to create your username and password.     Your access code is: [email or web address]  Expires: 2019  8:20 AM     Your access code will  in 90 days. If you need help or a new code, please call your [facility] clinic or [phone].        Care EveryWhere ID     This is your Care EveryWhere ID. This could be used by other organizations to access your [facility] medical records  [id number]        Equal Access to Services     [facility]: Hadii bear nolen hadvicki Sokimberly, waaxda luqadaha, qaybta kaalmaadrián gonzalez . So [facility] [phone].    ATENCIÓN: Si habla español, tiene a goodwin disposición servicios gratuitos de asistencia lingüística. Llame al [phone].    We comply with applicable federal civil rights laws and [place] laws. We do not discriminate on the basis of race, " color, national origin, age, disability, sex, sexual orientation, or gender identity.               Review of your medicines      CONTINUE these medicines which have NOT CHANGED        Dose / Directions    acetaminophen 325 MG tablet   Commonly known as:  TYLENOL        Dose:  650 mg   Take 650 mg by mouth every 6 hours as needed for mild pain   Refills:  0       albuterol 108 (90 Base) MCG/ACT inhaler   Commonly known as:  PROAIR HFA/PROVENTIL HFA/VENTOLIN HFA   Used for:  Acute bronchospasm        Dose:  2 puff   Inhale 2 puffs into the lungs 4 times daily as needed for other (dyspnea)   Quantity:  6.7 g   Refills:  0       amLODIPine 5 MG tablet   Commonly known as:  NORVASC        Dose:  5 mg   Take 5 mg by mouth daily   Refills:  0       apixaban ANTICOAGULANT 2.5 MG tablet   Commonly known as:  ELIQUIS   Used for:  Paroxysmal atrial fibrillation (H)        Dose:  2.5 mg   Take 1 tablet (2.5 mg) by mouth 2 times daily   Quantity:  180 tablet   Refills:  3       carvedilol 25 MG tablet   Commonly known as:  COREG        Dose:  25 mg   Take 25 mg by mouth 2 times daily (with meals)   Refills:  0       DETROL LA 4 MG 24 hr capsule   Generic drug:  tolterodine        Dose:  4 mg   Take 4 mg by mouth every morning   Refills:  0       ferrous sulfate 325 (65 Fe) MG tablet   Commonly known as:  IRON        Dose:  325 mg   Take 325 mg by mouth daily (with breakfast)   Refills:  0       fluticasone furoate 200 MCG/ACT inhaler   Commonly known as:  ARNUITY ELLIPTA   Used for:  Acute bronchospasm, Pneumonia of right lung due to infectious organism, unspecified part of lung        Dose:  1 puff   Inhale 1 puff into the lungs daily   Quantity:  3 Inhaler   Refills:  0       ICAPS AREDS FORMULA Tabs        Dose:  1 tablet   Take 1 tablet by mouth 2 times daily   Refills:  0       LIPITOR 20 MG tablet   Generic drug:  atorvastatin        Dose:  20 mg   Take 20 mg by mouth At Bedtime   Refills:  0       lisinopril 40 MG tablet    Commonly known as:  PRINIVIL/ZESTRIL        Dose:  40 mg   Take 40 mg by mouth daily   Refills:  0       OMEPRAZOLE PO        Dose:  20 mg   Take 20 mg by mouth every morning   Refills:  0         STOP taking     ASPIRIN EC PO                    Protect others around you: Learn how to safely use, store and throw away your medicines at www.disposemymeds.org.             Medication List: This is a list of all your medications and when to take them. Check marks below indicate your daily home schedule. Keep this list as a reference.      Medications           Morning Afternoon Evening Bedtime As Needed    acetaminophen 325 MG tablet   Commonly known as:  TYLENOL   Take 650 mg by mouth every 6 hours as needed for mild pain   Last time this was given:  Was not given during this hospital stay.   Next Dose Due:  Take as directed.                                albuterol 108 (90 Base) MCG/ACT inhaler   Commonly known as:  PROAIR HFA/PROVENTIL HFA/VENTOLIN HFA   Inhale 2 puffs into the lungs 4 times daily as needed for other (dyspnea)   Last time this was given:  Was not given during this hospital stay.   Next Dose Due:  Take as directed.                                amLODIPine 5 MG tablet   Commonly known as:  NORVASC   Take 5 mg by mouth daily   Last time this was given:  5 mg on 11/22/2018  8:10 AM   Next Dose Due:  Tomorrow morning.                                   apixaban ANTICOAGULANT 2.5 MG tablet   Commonly known as:  ELIQUIS   Take 1 tablet (2.5 mg) by mouth 2 times daily   Last time this was given:  2.5 mg on 11/22/2018  9:26 AM   Next Dose Due:  This evening.                                      carvedilol 25 MG tablet   Commonly known as:  COREG   Take 25 mg by mouth 2 times daily (with meals)   Last time this was given:  25 mg on 11/22/2018  8:10 AM   Next Dose Due:  This evening, 11/22                                      DETROL LA 4 MG 24 hr capsule   Take 4 mg by mouth every morning   Last time this was  given:  4 mg on 11/22/2018  8:09 AM   Generic drug:  tolterodine   Next Dose Due:  Tomorrow morning.                                   ferrous sulfate 325 (65 Fe) MG tablet   Commonly known as:  IRON   Take 325 mg by mouth daily (with breakfast)   Last time this was given:  Was not given during this hospital stay.   Next Dose Due:  Take as directed.                                fluticasone furoate 200 MCG/ACT inhaler   Commonly known as:  ARNUITY ELLIPTA   Inhale 1 puff into the lungs daily   Last time this was given:  1 puff on 11/22/2018  9:17 AM   Next Dose Due:  Tomorrow morning.                                    ICAPS AREDS FORMULA Tabs   Take 1 tablet by mouth 2 times daily   Last time this was given:  Was not given during this hospital stay.   Next Dose Due:  Take as directed.                                LIPITOR 20 MG tablet   Take 20 mg by mouth At Bedtime   Last time this was given:  20 mg on 11/21/2018  8:59 PM   Generic drug:  atorvastatin   Next Dose Due:  At bedtime.                                   lisinopril 40 MG tablet   Commonly known as:  PRINIVIL/ZESTRIL   Take 40 mg by mouth daily   Last time this was given:  40 mg on 11/22/2018  8:10 AM   Next Dose Due:  Tomorrow morning.                                   OMEPRAZOLE PO   Take 20 mg by mouth every morning   Last time this was given:  Was not given during this hospital stay.   Next Dose Due:  Take as directed.

## 2018-11-19 NOTE — LETTER
Transition Communication Hand-off for Care Transitions to Next Level of Care Provider    Name: Yoselyn Cui  : 1932  MRN #: [id number]  Primary Care Provider: Ira Barajas     Primary Clinic: [facility] [place] [address]  [facility] [address]     Reason for Hospitalization:  Generalized weakness [R53.1]  Anticoagulated [Z79.01]  Anemia, unspecified type [D64.9]  Admit Date/Time: 2018 11:37 AM  Discharge Date: 18  Payor Source: Payor: [employer] / Plan: [employer] ([employer]) / Product Type: HMO /     Readmission Assessment Measure (RACHELLE) Risk Score/category: Elevated           Reason for Communication Hand-off Referral: Fragility    Discharge Plan: Home       Concern for non-adherence with plan of care:   No  Discharge Needs Assessment:      Already enrolled in Tele-monitoring program and name of program:  NA  Follow-up specialty is recommended: Yes    Follow-up plan:  Future Appointments  Date Time Provider Department Center   1/10/2019 10:15 AM Hunter Gregorio MD [facility]   2019 2:15 PM AVILES TECH1 [facility]       Any outstanding tests or procedures:        Radiology & Cardiology Orders     Future Labs/Procedures Complete By Expires    CT Abdomen pelvis w contrast*  As directed 2019    Process Instructions:    Administration of IV contrast (contrast agent, dose, and amount) will be tailored to this examination per the appropriate written protocol listed in the Protocol E-Book, or by the supervising imaging provider.           Key Recommendations:  Pt was admitted with Anemia.  GI was consulted and an EGD and a colonoscopy were performed and found to be negative.  Pt is frail and has left sided weakness.    It is recommended that patient follow up with PCP and have CBC labs.   Patient is to follow up with GI for an Abdominal/ Pelvis CT.       Eloise Dick & Brittany Williamson    AVS/Discharge Summary is the source of truth; this is a  helpful guide for improved communication of patient story

## 2018-11-19 NOTE — IP AVS SNAPSHOT
"` `     [place] [address] MEDICAL SURGICAL: [phone]                                              INTERAGENCY TRANSFER FORM - NURSING   2018                    Hospital Admission Date: 2018  ANNE MARIE AMBROSE   : 1932  Sex: Female        Attending Provider: (none)    Allergies:  No Known Allergies    Infection:  None   Service:  GENERAL MEDI    Ht:  1.499 m (4' 11\")   Wt:  47.8 kg (105 lb 6.4 oz)   Admission Wt:  47.6 kg (105 lb)    BMI:  21.29 kg/m 2   BSA:  1.41 m 2            Patient PCP Information     Provider PCP Type    Ira Barajas PA-C General      Current Code Status     Date Active Code Status Order ID Comments User Context       Prior      Code Status History     Date Active Date Inactive Code Status Order ID Comments User Context    2018 11:55 AM  Full Code [id number]  Jose R Tiwari MD Outpatient    2018  4:13 PM 2018 11:55 AM Full Code [id number]  Kris Moran MD Inpatient    3/7/2018 10:23 AM 2018  4:13 PM Full Code [id number]  Ariel Bahena MD Outpatient    3/2/2018  7:16 PM 3/7/2018 10:23 AM DNR [id number]  Kayla Chaidez PA-C Inpatient    3/2/2018  2:05 PM 3/2/2018  7:16 PM Full Code [id number]  Kayla Chaidez PA-C Inpatient    10/12/2016  4:55 PM 10/13/2016  7:36 PM Full Code [id number]  Kayla Chaidez PA-C ED    2016 10:30 AM 10/12/2016  4:55 PM Full Code [id number]  Darrell Whitehead MD Outpatient    2016  4:33 PM 2016 10:30 AM Full Code [id number]  Shruthi Hwang MD Inpatient      Advance Directives        Scanned docmt in ACP Activity?           Yes, scanned ACP docmt        Hospital Problems as of 2018              Priority Class Noted POA    Anemia Medium  2018 Yes      Non-Hospital Problems as of 2018              Priority Class Noted    AV block, 2nd degree Medium  2016    SSS (sick sinus syndrome) (H) Medium  2016    HTN (hypertension) " Medium  8/22/2016    Cerebrovascular accident (H) Medium  8/22/2016    COKER (dyspnea on exertion) Medium  8/22/2016    Mixed hyperlipidemia Medium  8/22/2016    History of colonic polyps Medium  8/22/2016    Melanoma of skin (H)-rt arm Medium  8/22/2016    Generalized weakness Medium  10/12/2016    Community acquired pneumonia Medium  3/2/2018      Immunizations     None         END      ASSESSMENT     Discharge Profile Flowsheet     EXPECTED DISCHARGE     FINAL RESOURCES      Expected Discharge Date  11/22/18 ( /dtg) 11/21/18 0819   Other Resources  Other (see comment) (PNA Action Plan) 03/03/18 1144    DISCHARGE NEEDS ASSESSMENT     SKIN      # of Referrals Placed by CTS  Communication hand-offs to next level of Care Providers 03/08/18 1314   Inspection of bony prominences  Full 11/22/18 1021    Does Patient Need a Referral for Clinic CC  Yes 03/03/18 1144   Inspection under devices  Full 11/22/18 1021    Primary Care Clinic Name  [facility] [place] 03/03/18 1144   Skin WDL  ex 11/22/18 1021    Primary Care MD Name  Ira Barajas PA-C 03/03/18 1144   Skin Color/Characteristics  bruised (ecchymotic) 11/22/18 1021    GASTROINTESTINAL (ADULT,PEDIATRIC,OB)     Skin Temperature  warm 11/22/18 1021    GI WDL  WDL 11/22/18 1020   Skin Moisture  dry 11/22/18 1021    Last Bowel Movement  11/21/18 11/22/18 1021   Skin Elasticity  quick return to original state 11/22/18 1021    GI Signs/Symptoms  -- (bowel prep) 11/21/18 1348   Skin Integrity  bruise(s) (thin skin) 11/22/18 1021    Passing flatus  yes 11/22/18 1020   SAFETY      COMMUNICATION ASSESSMENT     Safety WDL  WDL 11/22/18 1026    Patient's communication style  spoken language (English or Bilingual) 11/19/18 1142   All Alarms  none present 11/22/18 1026                 Assessment WDL (Within Defined Limits) Definitions           Safety WDL     Effective: 09/28/15    Row Information: <b>WDL Definition:</b> Bed in low position, wheels locked; call light  "in reach; upper side rails up x 2; ID band on<br> <font color=\"gray\"><i>Item=AS safety wdl>>List=AS safety wdl>>Version=F14</i></font>      Skin WDL     Effective: 09/28/15    Row Information: <b>WDL Definition:</b> Warm; dry; intact; elastic; without discoloration; pressure points without redness<br> <font color=\"gray\"><i>Item=AS skin wdl>>List=AS skin wdl>>Version=F14</i></font>      Vitals     Vital Signs Flowsheet     VITAL SIGNS     Height  1.499 m (4' 11\") 11/19/18 1146    Temp  96.6  F (35.9  C) 11/22/18 0807   Height Method  Stated 11/19/18 1146    Temp src  Oral 11/22/18 0807   Weight  47.8 kg (105 lb 6.4 oz) 11/19/18 1614    Resp  18 11/22/18 0807   Weight Method  Standing scale 11/19/18 1614    Pulse  81 11/21/18 1919   BSA (Calculated - sq m)  1.41 11/19/18 1146    Heart Rate  86 11/22/18 0807   BMI (Calculated)  21.25 11/19/18 1146    Pulse/Heart Rate Source  Monitor 11/22/18 0807   POSITIONING      BP  143/65 11/22/18 0807   Body Position  independently positioning 11/22/18 0150    BP Location  Right arm 11/22/18 0807   Head of Bed (HOB)  HOB flat 11/22/18 0150    OXYGEN THERAPY     Positioning/Transfer Devices  pillows;in use 11/22/18 0150    SpO2  96 % 11/22/18 0919   Chair  Upright in chair 11/22/18 0812    O2 Device  None (Room air) 11/22/18 0919   DAILY CARE      Oxygen Delivery  2 LPM 11/21/18 1220   Activity Management  activity adjusted per tolerance 11/22/18 1026    PACEMAKER     Activity Assistance Provided  assistance, 1 person 11/22/18 1017    Pacemaker  Permanent 11/22/18 1020   Assistive Device Utilized  gait belt 11/22/18 1017    PAIN/COMFORT     Additional Documentation  Activity Device Assistance (Row) 11/20/18 1919    Patient Currently in Pain  denies 11/21/18 1250   ECG      Preferred Pain Scale  number (Numeric Rating Pain Scale) 11/20/18 1723   ECG Rhythm  Paced rhythm 11/21/18 1006    Patient's Stated Pain Goal  1 11/19/18 2014   Ectopy  None 11/21/18 1006    0-10 Pain Scale  0 " 11/19/18 2014   Lead Monitored  Lead II 11/21/18 1006    Pain Location  Abdomen 11/21/18 1206   ANALGESIA SIDE EFFECTS MONITORING      Pain Descriptors  Cramping 11/21/18 1206   Side Effects Monitoring: Respiratory Quality  R 11/21/18 1243    Haylee Scale Score  4-->exhibits brisk response to light glabellar tap or loud auditory stimulus 11/21/18 1220   Side Effects Monitoring: Respiratory Depth  N 11/21/18 1243    Pain Intervention(s)  Medication (See eMAR) 11/21/18 1206   Side Effects Monitoring: Sedation Level  1 11/21/18 1243    HEIGHT AND WEIGHT                   Patient Lines/Drains/Airways Status    Active LINES/DRAINS/AIRWAYS     Name: Placement date: Placement time: Site: Days: Last dressing change:    Peripheral IV 11/19/18 Right Hand 11/19/18   1215   Hand   2             Patient Lines/Drains/Airways Status    Active PICC/CVC     None            Intake/Output Detail Report     Date Intake       Output Net    Shift P.O. I.V. IV Piggyback Blood Components Total Urine Total       Noc 11/20/18 2300 - 11/21/18 0659 -- -- -- -- -- -- -- 0    Day 11/21/18 0700 - 11/21/18 1459 480 -- -- -- 480 -- -- 480    Heather 11/21/18 1500 - 11/21/18 2259 -- 930 -- -- 930 -- -- 930    Noc 11/21/18 2300 - 11/22/18 0659 -- -- -- -- -- -- -- 0    Day 11/22/18 0700 - 11/22/18 1459 240 212 -- -- 452 -- -- 452      Last Void/BM       Most Recent Value    Urine Occurrence 1 at 11/22/2018 0807    Stool Occurrence 1 at 11/21/2018 0900      Case Management/Discharge Planning     Case Management/Discharge Planning Flowsheet     REFERRAL INFORMATION     DISCHARGE PLANNING      Arrived From  home or self-care 03/03/18 1141   Does Patient Need a Referral for Clinic CC  Yes 03/03/18 1144    # of Referrals Placed by CTS  Communication hand-offs to next level of Care Providers 03/08/18 1314   FINAL RESOURCES      CTS Assigned to Orlando Greene RN CTS 11/20/18 1435   Other Resources  Other (see comment) (PNA Action Plan) 03/03/18 1144    Primary  Care Clinic Name  [facility] 03/03/18 1144   ABUSE RISK SCREEN      Primary Care MD Name  Ira Barajas PA-C 03/03/18 1144   QUESTION TO PATIENT:  Has a member of your family or a partner(now or in the past) intimidated, hurt, manipulated, or controlled you in any way?  no 11/19/18 1147    LIVING ENVIRONMENT     QUESTION TO PATIENT: Do you feel safe going back to the place where you are living?  yes 11/19/18 1147    Lives With  child(bill), adult;spouse 11/19/18 1835   OBSERVATION: Is there reason to believe there has been maltreatment of a vulnerable adult (ie. Physical/Sexual/Emotional abuse, self neglect, lack of adequate food, shelter, medical care, or financial exploitation)?  no 11/19/18 1147    Living Arrangements  [place] 03/03/18 1144   OTHER      COPING/STRESS     Are you depressed or being treated for depression?  No 11/19/18 1835    Major Change/Loss/Stressor  denies 11/19/18 1835   HOMICIDE RISK      EXPECTED DISCHARGE     Feels Like Hurting Others  no 11/19/18 1147    Expected Discharge Date  11/22/18 ( /dtg) 11/21/18 0819

## 2018-11-19 NOTE — PROGRESS NOTES
Consult received late today.  Chart reviewed. Spoke with patient's RN.  Patient clinically quite stable with no evidence of active GI bleeding.   We will formally see the patient tomorrow morning.  NPO after midnight in case of EGD.    Ayala Soto MD  [facility]  Cell/Pager: [phone]  After 5pm please call [phone]

## 2018-11-19 NOTE — ED NOTES
[facility]  ED Nurse Handoff Report    Yoselyn Cui is a 86 year old female   ED Chief complaint: Anemia; Generalized Weakness; and Shortness of Breath  . ED Diagnosis:   Final diagnoses:   Anemia, unspecified type   Generalized weakness   Anticoagulated     Allergies: No Known Allergies    Code Status: pt has a living will at home at daughter instructed to bring in a copy for hospital.  Activity level - Baseline/Home:  Independent. Activity Level - Current:   Stand with Assist. Lift room needed: No. Bariatric: No   Needed: No   Isolation: No. Infection: Not Applicable.     Vital Signs:   Vitals:    11/19/18 1200 11/19/18 1215 11/19/18 1230 11/19/18 1245   BP: 138/62 141/61 135/80 139/60   Resp:    18   Temp:       SpO2: (!) 88% 98% 98% 98%   Weight:       Height:           Cardiac Rhythm:  ,      Pain level: 0-10 Pain Scale: 0  Patient confused: No. Patient Falls Risk: Yes.   Elimination Status: no void while in ED   Patient Report - Initial Complaint: Pt has generalized weakness with shortness of breath for the past week.  Hgb done in clinic is 6.  Pt has hx of stroke with left sided weakness.. Focused Assessment:  Cardiac - Cardiac Comment:  (Pt has generalized weakness with shortness of breath for past week. Hgb noted to be low in clnic this AM. Pt has left sided weakness from previous stroke. Pt has pacemaker. Monitor shows rate in 70s.)  Tests Performed: IV, labs. Abnormal Results:   Labs Ordered and Resulted from Time of ED Arrival Up to the Time of Departure from the ED   CBC WITH PLATELETS DIFFERENTIAL - Abnormal; Notable for the following:        Result Value    RBC Count 3.16 (*)     Hemoglobin 6.5 (*)     Hematocrit 23.6 (*)     MCV 75 (*)     MCH 20.6 (*)     MCHC 27.5 (*)     RDW 20.2 (*)     All other components within normal limits   COMPREHENSIVE METABOLIC PANEL - Abnormal; Notable for the following:     Chloride 111 (*)     Glucose 105 (*)     Calcium 8.4 (*)     Protein  Total 6.4 (*)     All other components within normal limits   INR - Abnormal; Notable for the following:     INR 1.31 (*)     All other components within normal limits   OCCULT BLOOD STOOL - Abnormal; Notable for the following:     Occult Blood Positive (*)     All other components within normal limits   PARTIAL THROMBOPLASTIN TIME   PULSE OXIMETRY NURSING   PERIPHERAL IV CATHETER   CARDIAC CONTINUOUS MONITORING   ABO/RH TYPE AND SCREEN   RED BLOOD CELL PREPARE ORDER UNIT     .   Treatments provided: IV access, plan for blood transfusion.  Family Comments: daughter is at bedside.  Many family members live nearby.  Pt lives with  who recently had admission with CHF  OBS brochure/video discussed/provided to patient:  N/A  ED Medications:   Medications   lidocaine 1 % 1 mL (not administered)   lidocaine (LMX4) cream (not administered)   sodium chloride (PF) 0.9% PF flush 3 mL (not administered)   sodium chloride (PF) 0.9% PF flush 3 mL (not administered)     Drips infusing:  Yes blood transfusion  For the majority of the shift, the patient's behavior Green. Interventions performed were routine care.     Severe Sepsis OR Septic Shock Diagnosis Present: No      ED Nurse Name/Phone Number: Gayathri Christianreza,   12:59 PM    RECEIVING UNIT ED HANDOFF REVIEW    Above ED Nurse Handoff Report was reviewed: Yes  Reviewed by: Katlin Jimenez on November 19, 2018 at 3:35 PM

## 2018-11-19 NOTE — ED TRIAGE NOTES
Pt has generalized weakness with shortness of breath for the past week.  Hgb done in clinic is 6.  Pt has hx of stroke with left sided weakness.

## 2018-11-19 NOTE — IP AVS SNAPSHOT
"[place] [address] MEDICAL SURGICAL: [phone]                                              INTERAGENCY TRANSFER FORM - PHYSICIAN ORDERS   2018                    Hospital Admission Date: 2018  ANNE MARIE AMBROSE   : 1932  Sex: Female        Attending Provider: (none)    Allergies:  No Known Allergies    Infection:  None   Service:  GENERAL MEDI    Ht:  1.499 m (4' 11\")   Wt:  47.8 kg (105 lb 6.4 oz)   Admission Wt:  47.6 kg (105 lb)    BMI:  21.29 kg/m 2   BSA:  1.41 m 2            Patient PCP Information     Provider PCP Type    Ira Barajas PA-C General      ED Clinical Impression     Diagnosis Description Comment Added By Time Added    Anemia, unspecified type [D64.9] Anemia, unspecified type [D64.9]  Ira Montalvo MD 2018 12:49 PM    Generalized weakness [R53.1] Generalized weakness [R53.1]  Ira Montalvo MD 2018 12:49 PM    Anticoagulated [Z79.01] Anticoagulated [Z79.01]  Ira Monatlvo MD 2018 12:50 PM      Hospital Problems as of 2018              Priority Class Noted POA    Anemia Medium  2018 Yes      Non-Hospital Problems as of 2018              Priority Class Noted    AV block, 2nd degree Medium  2016    SSS (sick sinus syndrome) (H) Medium  2016    HTN (hypertension) Medium  2016    Cerebrovascular accident (H) Medium  2016    COKER (dyspnea on exertion) Medium  2016    Mixed hyperlipidemia Medium  2016    History of colonic polyps Medium  2016    Melanoma of skin (H)-rt arm Medium  2016    Generalized weakness Medium  10/12/2016    Community acquired pneumonia Medium  3/2/2018      Code Status History     Date Active Date Inactive Code Status Order ID Comments User Context    2018 11:55 AM  Full Code [id number]  Jose R Tiwari MD Outpatient    2018  4:13 PM 2018 11:55 AM Full Code [id number]  Kris Moran MD Inpatient    3/7/2018 10:23 AM " 11/19/2018  4:13 PM Full Code [id number]  Arile Bahena MD Outpatient    3/2/2018  7:16 PM 3/7/2018 10:23 AM DNR [id number]  Kayla Chaidez PA-C Inpatient    3/2/2018  2:05 PM 3/2/2018  7:16 PM Full Code [id number]  Kayla Chaidez PA-C Inpatient    10/12/2016  4:55 PM 10/13/2016  7:36 PM Full Code [id number]  Kayla Chaidez PA-C ED    5/18/2016 10:30 AM 10/12/2016  4:55 PM Full Code [id number]  Darrell Whitehead MD Outpatient    5/16/2016  4:33 PM 5/18/2016 10:30 AM Full Code [id number]  Shruthi Hwang MD Inpatient         Medication Review      CONTINUE these medications which have NOT CHANGED        Dose / Directions Comments    acetaminophen 325 MG tablet   Commonly known as:  TYLENOL        Dose:  650 mg   Take 650 mg by mouth every 6 hours as needed for mild pain   Refills:  0        albuterol 108 (90 Base) MCG/ACT inhaler   Commonly known as:  PROAIR HFA/PROVENTIL HFA/VENTOLIN HFA   Used for:  Acute bronchospasm        Dose:  2 puff   Inhale 2 puffs into the lungs 4 times daily as needed for other (dyspnea)   Quantity:  6.7 g   Refills:  0        amLODIPine 5 MG tablet   Commonly known as:  NORVASC        Dose:  5 mg   Take 5 mg by mouth daily   Refills:  0        apixaban ANTICOAGULANT 2.5 MG tablet   Commonly known as:  ELIQUIS   Used for:  Paroxysmal atrial fibrillation (H)        Dose:  2.5 mg   Take 1 tablet (2.5 mg) by mouth 2 times daily   Quantity:  180 tablet   Refills:  3        carvedilol 25 MG tablet   Commonly known as:  COREG        Dose:  25 mg   Take 25 mg by mouth 2 times daily (with meals)   Refills:  0        DETROL LA 4 MG 24 hr capsule   Generic drug:  tolterodine        Dose:  4 mg   Take 4 mg by mouth every morning   Refills:  0        ferrous sulfate 325 (65 Fe) MG tablet   Commonly known as:  IRON        Dose:  325 mg   Take 325 mg by mouth daily (with breakfast)   Refills:  0        fluticasone furoate 200 MCG/ACT inhaler   Commonly known as:   ARNUITY ELLIPTA   Used for:  Acute bronchospasm, Pneumonia of right lung due to infectious organism, unspecified part of lung        Dose:  1 puff   Inhale 1 puff into the lungs daily   Quantity:  3 Inhaler   Refills:  0        ICAPS AREDS FORMULA Tabs        Dose:  1 tablet   Take 1 tablet by mouth 2 times daily   Refills:  0        LIPITOR 20 MG tablet   Generic drug:  atorvastatin        Dose:  20 mg   Take 20 mg by mouth At Bedtime   Refills:  0        lisinopril 40 MG tablet   Commonly known as:  PRINIVIL/ZESTRIL        Dose:  40 mg   Take 40 mg by mouth daily   Refills:  0        OMEPRAZOLE PO        Dose:  20 mg   Take 20 mg by mouth every morning   Refills:  0          STOP taking     ASPIRIN EC PO                     Further instructions from your care team         Understanding Colon and Rectal Polyps     The colon has a smooth lining composed of millions of cells.     The colon (also called the large intestine) is a muscular tube that forms the last part of the digestive tract. It absorbs water and stores food waste. The colon is about 4 to 6 feet long. The rectum is the last 6 inches of the colon. The colon and rectum have a smooth lining composed of millions of cells. Changes in these cells can lead to growths in the colon that can become cancerous and should be removed.     When the Colon Lining Changes  Changes that occur in the cells that line the colon or rectum can lead to growths called polyps. Over a period of years, polyps can turn cancerous. Removing polyps early may prevent cancer from ever forming.      Polyps  Polyps are fleshy clumps of tissue that form on the lining of the colon or rectum. Small polyps are usually benign (not cancerous). However, over time, cells in a polyp can change and become cancerous. The larger a polyp grows, the more likely this is to happen. Also, certain types of polyps known as adenomatous polyps are considered premalignant. This means that they will almost  always become cancerous if they re not removed.          Cancer  Almost all colorectal cancers start when polyp cells begin growing abnormally. As a cancerous tumor grows, it may involve more and more of the colon or rectum. In time, cancer can also grow beyond the colon or rectum and spread to nearby organs or to glands called lymph nodes. The cells can also travel to other parts of the body. This is known as metastasis. The earlier a cancerous tumor is removed, the better the chance of preventing its spread.        [id number] [facility], [address] [place], [place] [id number]. All rights reserved. This information is not intended as a substitute for professional medical care. Always follow your healthcare professional's instructions.    Understanding Diverticulosis and Diverticulitis     Pouches or diverticula usually occur in the lower part of the colon called the sigmoid.      Diverticulitis occurs when the pouches become inflamed.     The colon (large intestine) is the last part of the digestive tract. It absorbs water from stool and changes it from a liquid to a solid. In certain cases, small pouches called diverticula can form in the colon wall. This condition is called diverticulosis. The pouches can become infected. If this happens, it becomes a more serious problem called diverticulitis. These problems can be painful. But they can be managed.   Managing Your Condition  Diet changes or taking medications are often tried first. These may be enough to bring relief. If the case is bad, surgery may be done. You and your doctor can discuss the plan that is best for you.  If You Have Diverticulosis  Diet changes are often enough to control symptoms. The main changes are adding fiber (roughage) and drinking more water. Fiber absorbs water as it travels through your colon. This helps your stool stay soft and move smoothly. Water helps this process. If needed, you may be told to take over-the-counter stool  softeners. To help relieve pain, antispasmodic medications may be prescribed.  If You Have Diverticulitis  Treatment depends on how bad your symptoms are.  For mild symptoms: You may be put on a liquid diet for a short time. You may also be prescribed antibiotics. If these two steps relieve your symptoms, you may then be prescribed a high-fiber diet. If you still have symptoms, your doctor will discuss further treatment options with you.  For severe symptoms: You may need to be admitted to the hospital. There, you can be given IV antibiotics and fluids. Once symptoms are under control, the above treatments may be tried. If these don t control your condition, your doctor may discuss the option of having surgery with you.  [place] to Colon Health  Help keep your colon healthy with a diet that includes plenty of high-fiber fruits, vegetables, and whole grains. Drink plenty of liquids like water and juice. Your doctor may also recommend avoiding seeds and nuts.          0247-7798 [facility], [address] [place] [address], [place] [address]. All rights reserved. This information is not intended as a substitute for professional medical care. Always follow your healthcare professional's instructions.    Eating a High-Fiber Diet  Fiber is what gives strength and structure to plants. Most grains, beans, vegetables, and fruits contain fiber. Foods rich in fiber are often low in calories and fat, and they fill you up more. They may also reduce your risks for certain health problems. To find out the amount of fiber in canned, packaged, or frozen foods, read the  Nutrition Facts  label. It tells you how much fiber is in a serving.      Types of Fiber and Their Benefits  There are two types of fiber: insoluble and soluble. They both aid digestion and help you maintain a healthy weight.  Insoluble fiber: This is found in whole grains, cereals, certain fruits and vegetables (such as apple skin, corn, and carrots). Insoluble fiber may  prevent constipation and reduce the risk of certain types of cancer.   Soluble fiber: This type of fiber is in oats, beans, and certain fruits and vegetables (such as strawberries and peas). Soluble fiber can reduce cholesterol (which may help lower the risk of heart disease), and helps control blood sugar levels.  Look for High-Fiber Foods  Whole-grain breads and cereals: Try to eat 6-8 ounces a day. Include wheat and oat bran cereals, whole-wheat muffins or toast, and corn tortillas in your meals.  Fruits: Try to eat 2 cups a day. Apples, oranges, strawberries, pears, and bananas are good sources. (Note: Fruit juice is low in fiber.)  Vegetables: Try to eat 3 cups a day. Add asparagus, carrots, broccoli, peas, and corn to your meals.  Legumes (beans): One cup of cooked lentils gives you over 15 grams of fiber. Try navy beans, lentils, and chickpeas.  Seeds:  A small handful of seeds gives you about 3 grams of fiber. Try sunflower seeds.    Keep Track of Your Fiber  A healthy diet includes 31 grams of fiber a day if you have a 2,000-calorie diet. Keep track of how much fiber you eat. Start by reading food labels. Then eat a variety of foods high in fiber. Ask your doctor about supplemental fiber products.            [address] [place] [address]. All rights reserved. This information is not intended as a substitute for professional medical care. Always follow your healthcare professional's instructions.    Summary of Visit     Reason for your hospital stay       Symptomatic anemia, GI bleeding, no identifiable source             After Care     Activity       Your activity upon discharge: activity as tolerated       Diet       Follow this diet upon discharge: Orders Placed This Encounter      Regular Diet Adult             Radiology & Cardiology Orders     Future Labs/Procedures Complete By Expires    CT Abdomen pelvis w contrast*  As directed 2/20/2019    Process Instructions:     Administration of IV contrast (contrast agent, dose, and amount) will be tailored to this examination per the appropriate written protocol listed in the Protocol E-Book, or by the supervising imaging provider.       Radiology & Cardiology Orders     CT Abdomen pelvis w contrast*       Administration of IV contrast (contrast agent, dose, and amount) will be tailored to this examination per the appropriate written protocol listed in the Protocol E-Book, or by the supervising imaging provider.              Your next 10 appointments already scheduled     Jignesh 10, 2019 10:15 AM CST   Return Visit with Hunter Gregorio MD   [facility] ([facility])    [address]  [facility] [address]   [phone]            Jan 21, 2019  2:15 PM CST   Remote PPM Check with AVILES TECH1   [facility] ([facility])    [address]  [facility] [address] [phone] OPT 2           This appointment is for a remote check of your pacemaker.  This is not an appointment at the office.              Follow-Up Appointment Instructions     Future Labs/Procedures    Follow-up and recommended labs and tests      Comments:    Follow up with primary care provider, Ira Barajas, within 5 days for hospital follow- up.  The following labs/tests are recommended: CBC in 5 days result to PCP.  CT chest Abd /plevis with contrast per GI recommendation as an out patient in 1 weeks, ordered, result will go to PCP and Gastroentologist, Dr. Soto.      Follow-Up Appointment Instructions     Follow-up and recommended labs and tests        Follow up with primary care provider, Ira Barajas, within 5 days for hospital follow- up.  The following labs/tests are recommended: CBC in 5 days result to PCP.  CT chest Abd /plevis with contrast per GI recommendation as an out patient in 1 weeks, ordered, result will  go to PCP and Gastroentologist, Dr. Soto.             Statement of Approval     Ordered          11/22/18 1155  I have reviewed and agree with all the recommendations and orders detailed in this document.  EFFECTIVE NOW     Approved and electronically signed by:  Jose R Tiwari MD

## 2018-11-20 LAB
ANION GAP SERPL CALCULATED.3IONS-SCNC: 5 MMOL/L (ref 3–14)
BUN SERPL-MCNC: 13 MG/DL (ref 7–30)
CALCIUM SERPL-MCNC: 8.5 MG/DL (ref 8.5–10.1)
CHLORIDE SERPL-SCNC: 109 MMOL/L (ref 94–109)
CO2 SERPL-SCNC: 28 MMOL/L (ref 20–32)
CREAT SERPL-MCNC: 0.8 MG/DL (ref 0.52–1.04)
ERYTHROCYTE [DISTWIDTH] IN BLOOD BY AUTOMATED COUNT: 21.5 % (ref 10–15)
GFR SERPL CREATININE-BSD FRML MDRD: 68 ML/MIN/1.7M2
GLUCOSE SERPL-MCNC: 90 MG/DL (ref 70–99)
HCT VFR BLD AUTO: 30.4 % (ref 35–47)
HGB BLD-MCNC: 8.7 G/DL (ref 11.7–15.7)
INTERPRETATION ECG - MUSE: NORMAL
MCH RBC QN AUTO: 22 PG (ref 26.5–33)
MCHC RBC AUTO-ENTMCNC: 28.6 G/DL (ref 31.5–36.5)
MCV RBC AUTO: 77 FL (ref 78–100)
PLATELET # BLD AUTO: 194 10E9/L (ref 150–450)
POTASSIUM SERPL-SCNC: 3.6 MMOL/L (ref 3.4–5.3)
RBC # BLD AUTO: 3.95 10E12/L (ref 3.8–5.2)
SODIUM SERPL-SCNC: 142 MMOL/L (ref 133–144)
UPPER GI ENDOSCOPY: NORMAL
WBC # BLD AUTO: 6.4 10E9/L (ref 4–11)

## 2018-11-20 PROCEDURE — 40000275 ZZH STATISTIC RCP TIME EA 10 MIN

## 2018-11-20 PROCEDURE — 25000125 ZZHC RX 250: Performed by: INTERNAL MEDICINE

## 2018-11-20 PROCEDURE — 25000128 H RX IP 250 OP 636: Performed by: INTERNAL MEDICINE

## 2018-11-20 PROCEDURE — 88305 TISSUE EXAM BY PATHOLOGIST: CPT | Performed by: INTERNAL MEDICINE

## 2018-11-20 PROCEDURE — A9270 NON-COVERED ITEM OR SERVICE: HCPCS | Performed by: INTERNAL MEDICINE

## 2018-11-20 PROCEDURE — 94640 AIRWAY INHALATION TREATMENT: CPT

## 2018-11-20 PROCEDURE — 36415 COLL VENOUS BLD VENIPUNCTURE: CPT | Performed by: INTERNAL MEDICINE

## 2018-11-20 PROCEDURE — 25000132 ZZH RX MED GY IP 250 OP 250 PS 637: Performed by: INTERNAL MEDICINE

## 2018-11-20 PROCEDURE — 12000000 ZZH R&B MED SURG/OB

## 2018-11-20 PROCEDURE — 88305 TISSUE EXAM BY PATHOLOGIST: CPT | Mod: 26 | Performed by: INTERNAL MEDICINE

## 2018-11-20 PROCEDURE — 99232 SBSQ HOSP IP/OBS MODERATE 35: CPT | Performed by: INTERNAL MEDICINE

## 2018-11-20 PROCEDURE — 85027 COMPLETE CBC AUTOMATED: CPT | Performed by: INTERNAL MEDICINE

## 2018-11-20 PROCEDURE — C9113 INJ PANTOPRAZOLE SODIUM, VIA: HCPCS | Performed by: INTERNAL MEDICINE

## 2018-11-20 PROCEDURE — 43239 EGD BIOPSY SINGLE/MULTIPLE: CPT | Performed by: INTERNAL MEDICINE

## 2018-11-20 PROCEDURE — 80048 BASIC METABOLIC PNL TOTAL CA: CPT | Performed by: INTERNAL MEDICINE

## 2018-11-20 PROCEDURE — 0DB98ZX EXCISION OF DUODENUM, VIA NATURAL OR ARTIFICIAL OPENING ENDOSCOPIC, DIAGNOSTIC: ICD-10-PCS | Performed by: INTERNAL MEDICINE

## 2018-11-20 PROCEDURE — 40000104 ZZH STATISTIC MODERATE SEDATION < 10 MIN: Performed by: INTERNAL MEDICINE

## 2018-11-20 RX ORDER — SODIUM CHLORIDE 9 MG/ML
INJECTION, SOLUTION INTRAVENOUS CONTINUOUS
Status: DISCONTINUED | OUTPATIENT
Start: 2018-11-20 | End: 2018-11-22

## 2018-11-20 RX ORDER — MAGNESIUM CARB/ALUMINUM HYDROX 105-160MG
296 TABLET,CHEWABLE ORAL ONCE
Status: DISCONTINUED | OUTPATIENT
Start: 2018-11-21 | End: 2018-11-22 | Stop reason: HOSPADM

## 2018-11-20 RX ORDER — LIDOCAINE 40 MG/G
CREAM TOPICAL
Status: DISCONTINUED | OUTPATIENT
Start: 2018-11-20 | End: 2018-11-20 | Stop reason: HOSPADM

## 2018-11-20 RX ORDER — FENTANYL CITRATE 50 UG/ML
INJECTION, SOLUTION INTRAMUSCULAR; INTRAVENOUS PRN
Status: DISCONTINUED | OUTPATIENT
Start: 2018-11-20 | End: 2018-11-20 | Stop reason: HOSPADM

## 2018-11-20 RX ADMIN — TOLTERODINE TARTRATE 4 MG: 2 CAPSULE, EXTENDED RELEASE ORAL at 10:28

## 2018-11-20 RX ADMIN — SODIUM CHLORIDE: 9 INJECTION, SOLUTION INTRAVENOUS at 15:46

## 2018-11-20 RX ADMIN — CARVEDILOL 25 MG: 25 TABLET, FILM COATED ORAL at 18:36

## 2018-11-20 RX ADMIN — CARVEDILOL 25 MG: 25 TABLET, FILM COATED ORAL at 10:29

## 2018-11-20 RX ADMIN — PANTOPRAZOLE SODIUM 40 MG: 40 INJECTION, POWDER, FOR SOLUTION INTRAVENOUS at 22:12

## 2018-11-20 RX ADMIN — AMLODIPINE BESYLATE 5 MG: 5 TABLET ORAL at 10:28

## 2018-11-20 RX ADMIN — PANTOPRAZOLE SODIUM 40 MG: 40 INJECTION, POWDER, FOR SOLUTION INTRAVENOUS at 10:29

## 2018-11-20 RX ADMIN — FLUTICASONE FUROATE 1 PUFF: 200 POWDER RESPIRATORY (INHALATION) at 07:46

## 2018-11-20 RX ADMIN — ATORVASTATIN CALCIUM 20 MG: 20 TABLET, FILM COATED ORAL at 22:12

## 2018-11-20 RX ADMIN — LISINOPRIL 40 MG: 40 TABLET ORAL at 10:29

## 2018-11-20 RX ADMIN — POLYETHYLENE GLYCOL-3350 AND ELECTROLYTES 4000 ML: 236; 6.74; 5.86; 2.97; 22.74 POWDER, FOR SOLUTION ORAL at 17:14

## 2018-11-20 ASSESSMENT — ACTIVITIES OF DAILY LIVING (ADL)
ADLS_ACUITY_SCORE: 13

## 2018-11-20 NOTE — PLAN OF CARE
Problem: Patient Care Overview  Goal: Plan of Care/Patient Progress Review  Outcome: No Change  Patient hospitalized for Aneima due to possible GI bleed this evening 11/19.   Pertinent assessments: Alert and oriented. LS diminished, COKER. BS active and audible. LBM this am. Patient has left sided weakness. SBA. Patient uses cane for longer distances at home. VSS.   Plan (Upcoming Events): GI consult placed. Possible EGD tomorrow. Hgb recheck 8.4  Discharge/Transfer Needs: TBD.

## 2018-11-20 NOTE — PLAN OF CARE
Problem: Patient Care Overview  Goal: Plan of Care/Patient Progress Review  Outcome: Improving  Uneventful night. Slept well. Up with assist of 1 to BR. Reports feeling much better after transfusion. NPO for EGD today, patient aware of POC.

## 2018-11-20 NOTE — CONSULTS
"CLINICAL NUTRITION SERVICES  -  ASSESSMENT NOTE      Future/Additional Recommendations:   Once diet advances, chocolate Boost BID, 10am and 2pm    Malnutrition:   % Weight Loss:  Up to 10% in 6 months (non-severe malnutrition)  % Intake:  </= 50% for >/= 1 month (severe malnutrition)  Subcutaneous Fat Loss:  Orbital region mild depletion  Muscle Loss:  Temporal region mild depletion and Clavicle bone region mild depletion  Fluid Retention:  None noted    Malnutrition Diagnosis: Non-Severe malnutrition  In Context of:  Acute on chronic illness or injury        REASON FOR ASSESSMENT  Yoselyn Cui is a 86 year old female seen by Registered Dietitian for Admission Nutrition Risk Screen - unintentional loss of 10lbs or more over the last 2 months, reduced oral intake over the last month       NUTRITION HISTORY  - Information obtained from pt and daughter at bedside  -Pt follows a regular diet, consuming meals 3 times daily  -Diet recall:  Breakfast: special K, with strawberries or english muffin with coffee  Lunch: apple sauce, cottage cheese, english muffin   Dinner: varies, turkey, chicken, pork, spaghetti, chili   -Pt lives with daughter (and son-in-law) and her .  and daughter do most of the cooking and grocery shopping       CURRENT NUTRITION ORDERS  Diet Order:     Clear Liquid     Current Intake/Tolerance:  -Pt has consumed less than 50% of her typical intake over the last month   -Per MD note : \"She reports that she has not had trouble eating or drinking over the past week, however her daughter reports she eats \"like a bird\"; She also reports a weight loss of 1-2 pounds a week over the last year \"        PHYSICAL FINDINGS  Observed  Muscle Wasting   Subcutaneous fat loss   Obtained from Chart/Interdisciplinary Team  11/20: Per GI \"EGD unremarkable, small bowel biopsies taken for celiac.  Given the 2010 colonoscopy report were 2 polyps were biopsied (adenomas) but not removed, I think we should " "repeat a colonoscopy to rule out colon cancer as the cause of the anemia.\"  11/21: Plan for colonoscopy     ANTHROPOMETRICS  Height: 4' 11\"  Weight: 105 lbs 6.4 oz (47.8 kg)  Body mass index is 21.29 kg/(m^2).  Weight Status:  Normal BMI  IBW:44.3 kg  % IBW: 108%  Weight History: wt loss of 2.8kg (5.5%) over the last 8 months  Wt Readings from Last 10 Encounters:   11/19/18 47.8 kg (105 lb 6.4 oz)   03/07/18 50.6 kg (111 lb 8 oz)   01/10/18 49.9 kg (110 lb)   06/05/17 55.3 kg (122 lb)   10/19/16 54.2 kg (119 lb 8 oz)   10/12/16 56.6 kg (124 lb 12.8 oz)   08/23/16 52.6 kg (116 lb)   05/18/16 52.7 kg (116 lb 3.2 oz)         LABS  Labs reviewed    MEDICATIONS  Medications reviewed      ASSESSED NUTRITION NEEDS PER APPROVED PRACTICE GUIDELINES:    Dosing Weight 47.8 kg (actual wt)  Estimated Energy Needs: 6816-4986 kcals (25-30 Kcal/Kg)  Justification: maintenance  Estimated Protein Needs: 57-72  grams protein (1.2-1.5 g pro/Kg)  Justification: maintenance  Estimated Fluid Needs: 2857-1809 mL (1 mL/Kcal)  Justification: maintenance    MALNUTRITION:  % Weight Loss:  Up to 10% in 6 months (non-severe malnutrition)  % Intake:  </= 50% for >/= 1 month (severe malnutrition)  Subcutaneous Fat Loss:  Orbital region mild depletion  Muscle Loss:  Temporal region mild depletion and Clavicle bone region mild depletion  Fluid Retention:  None noted    Malnutrition Diagnosis: Non-Severe malnutrition  In Context of:  Acute on chronic illness or injury    NUTRITION DIAGNOSIS:  Inadequate oral intake related to decreased appetite and overall medical status as evidenced by wt loss of 2.8kg (5.5%) over the last 8 monhts      NUTRITION INTERVENTIONS  Recommendations / Nutrition Prescription  Diet per MD and SLP  Once diet advancement, chocolate Boost BID    Implementation  Nutrition education: Provided education on oral nutrition supplements and protein. Discussed protein sources and increasing protein with supplements       Nutrition " Goals  Diet advancement with in the next 24 hours       MONITORING AND EVALUATION:  Progress towards goals will be monitored and evaluated per protocol and Practice Guidelines      Luana Arredondo RD, LD

## 2018-11-20 NOTE — CONSULTS
GASTROENTEROLOGY CONSULTATION      Yoselyn Cui  [address] [place] [id number]  86 year old female     Admission Date/Time: 11/19/2018  Primary Care Provider: Ira Barajas  Referring / Attending Physician:  Dr. Moran     We were asked to see the patient in consultation by Dr. Moran for evaluation of anemia.        HPI:  Yoselyn Cui is a 86 year old female with medical history of stroke with left-sided weakness on antiplatelet therapy, pacemaker, history of gastric ulcer who presents to the hospital for evaluation of generalized weakness and fatigue.    Patient is a good historian.  She reports that over the past few weeks she has noticed decreased exercise capacity.  Typically she is able to go up a few stairs and walk around her apartment.  However in the last few days she has been short of breath and fatigued.  A few years ago she was also found to be anemic.  She was given a blood transfusion but cannot recall with any further workup.  The emergency room her hemoglobin was 6.5 with an MCV of 75.  INR was 1.3.  Stool guaiac was positive.  Patient denies any melena, hematochezia, diarrhea, nausea or vomiting.  At baseline she tends towards constipation.    Last colonoscopy was in 2010. She has had a history of polyps   And was to be seen in 2012 for repeat colonoscopy.  Patient cannot recall any previous upper endoscopy except for a number of years ago when she was found to have a gastric ulcer.  The patient does use aspirin along with her Eliquis.  She denies any ibuprofen use.         PAST MEDICAL HISTORY:  Patient Active Problem List    Diagnosis Date Noted     Anemia 11/19/2018     Priority: Medium     Community acquired pneumonia 03/02/2018     Priority: Medium     Generalized weakness 10/12/2016     Priority: Medium     SSS (sick sinus syndrome) (H) 08/22/2016     Priority: Medium     HTN (hypertension) 08/22/2016     Priority: Medium     Cerebrovascular accident (H)  08/22/2016     Priority: Medium     COKER (dyspnea on exertion) 08/22/2016     Priority: Medium     Mixed hyperlipidemia 08/22/2016     Priority: Medium     History of colonic polyps 08/22/2016     Priority: Medium     Melanoma of skin (H)-rt arm 08/22/2016     Priority: Medium     AV block, 2nd degree 05/16/2016     Priority: Medium          ROS: A comprehensive ten point review of systems was negative aside from those in mentioned in the HPI.       MEDICATIONS:   Prior to Admission medications    Medication Sig Start Date End Date Taking? Authorizing Provider   acetaminophen (TYLENOL) 325 MG tablet Take 650 mg by mouth every 6 hours as needed for mild pain    Yes Unknown, Entered By History   albuterol (PROAIR HFA/PROVENTIL HFA/VENTOLIN HFA) 108 (90 BASE) MCG/ACT Inhaler Inhale 2 puffs into the lungs 4 times daily as needed for other (dyspnea) 3/7/18  Yes Ariel Bahena MD   amLODIPine (NORVASC) 5 MG tablet Take 5 mg by mouth daily  12/20/17  Yes Reported, Patient   apixaban ANTICOAGULANT (ELIQUIS) 2.5 MG tablet Take 1 tablet (2.5 mg) by mouth 2 times daily 1/10/18  Yes Porsche Mendoza, APRN CNP   ASPIRIN EC PO Take 162 mg by mouth daily (states is taking half of 325 mg tablet daily).   Yes Unknown, Entered By History   atorvastatin (LIPITOR) 20 MG tablet Take 20 mg by mouth At Bedtime   Yes Unknown, Entered By History   carvedilol (COREG) 25 MG tablet Take 25 mg by mouth 2 times daily (with meals)   Yes Reported, Patient   ferrous sulfate (IRON) 325 (65 FE) MG tablet Take 325 mg by mouth daily (with breakfast)    Yes Unknown, Entered By History   fluticasone furoate (ARNUITY ELLIPTA) 200 MCG/ACT inhalation powder Inhale 1 puff into the lungs daily 3/7/18  Yes Ariel Bahena MD   lisinopril (PRINIVIL,ZESTRIL) 40 MG tablet Take 40 mg by mouth daily   Yes Reported, Patient   Multiple Vitamins-Minerals (ICAPS AREDS FORMULA) TABS Take 1 tablet by mouth 2 times daily    Yes  "Unknown, Entered By History   OMEPRAZOLE PO Take 20 mg by mouth every morning    Yes Unknown, Entered By History   tolterodine (DETROL LA) 4 MG 24 hr capsule Take 4 mg by mouth every morning   Yes Unknown, Entered By History        ALLERGIES: No Known Allergies     SOCIAL HISTORY:  Social History   Substance Use Topics     Smoking status: Never Smoker     Smokeless tobacco: Not on file     Alcohol use No        FAMILY HISTORY:  Family History   Problem Relation Age of Onset     Coronary Artery Disease Father      Coronary Artery Disease Brother      Coronary Artery Disease Sister         PHYSICAL EXAM:     /64 (BP Location: Right arm)  Temp 97.1  F (36.2  C) (Oral)  Resp 20  Ht 1.499 m (4' 11\")  Wt 47.8 kg (105 lb 6.4 oz)  SpO2 97%  BMI 21.29 kg/m2     PHYSICAL EXAM:  GENERAL:  NAD, thin, elderly  SKIN: no suspicious lesions, rashes, jaundice  HEAD: Normocephalic. Atraumatic.  NECK: Neck supple. No adenopathy.   EYES: No scleral icterus  RESPIRATORY: Good transmission. CTA bilaterally.   CARDIOVASCULAR: RRR, normal S1, S2,  No murmur appreciated  GASTROINTESTINAL: +BS, soft, non tender, non distended, no guarding/rebound  NEURO: CN 2-12 grossly intact, left sided weakness  PSYCH: Normal affect        ADDITIONAL COMMENTS:   I reviewed the patient's new clinical lab test results.   Recent Labs   Lab Test  11/20/18 0639  11/19/18   2217  11/19/18   1216  03/06/18   0700   10/12/16   1300  05/17/16   1006   WBC  6.4   --   5.4  11.0   < >  9.3  8.9   HGB  8.7*  8.4*  6.5*  12.1   < >  11.5*  13.9   MCV  77*   --   75*  89   < >  90  90   PLT  194   --   207  200   < >  181  237   INR   --    --   1.31*   --    --   1.16*  1.06    < > = values in this interval not displayed.     Recent Labs   Lab Test  11/20/18   0639  11/19/18   1216  03/06/18   0700   POTASSIUM  3.6  4.6  3.3*   CHLORIDE  109  111*  105   CO2  28  27  30   BUN  13  16  19   ANIONGAP  5  4  6     Recent Labs   Lab Test  11/19/18   1216  " 03/02/18   1140  03/02/18   0948  10/12/16   1405  10/12/16   1300  05/16/16   1321   ALBUMIN  3.5   --   3.6   --   3.2*   --    BILITOTAL  0.3   --   0.5   --   0.6   --    ALT  19   --   17   --   14   --    AST  22   --   18   --   18   --    PROTEIN   --   Negative   --   30*   --   Negative        CONSULTATION ASSESSMENT AND PLAN:    Yoselyn ROBBY Cui is a 86 year old with medical history of stroke with left-sided weakness on antiplatelet therapy, pacemaker, history of gastric ulcer who presents to the hospital for evaluation of generalized weakness and fatigue found to be anemic with a hemoglobin 6.5.    1.  Microcytic anemia: Hemoglobin 6.5 on admission.  Iron stores are low.  She was given 2 units of packed red cells with good response.  Hemoglobin now 8.7.  She had been using both aspirin and Eliquis.  Both are on hold currently.  She continues to be hemodynamically stable without evidence of occult GI bleeding.  Continue PPI.  She does use oral iron at home in addition to omeprazole 20 mg daily.    --Plan for EGD this afternoon.  Patient is n.p.o.      I discussed the patient plan with Dr. Soto. Thank you for asking us to participate in the care of this patient.    Saadia Deluca PA-C  [facility]

## 2018-11-20 NOTE — PROGRESS NOTES
GI Brief Note    EGD unremarkable, small bowel biopsies taken for celiac.    Given the 2010 colonoscopy report were 2 polyps were biopsied (adenomas) but not removed, I think we should repeat a colonoscopy to rule out colon cancer as the cause of the anemia.    Clear liquids today, I will order a prep.    Ayala Soto MD  [facility]  Cell/Pager: [phone]  After 5pm please call [phone]

## 2018-11-20 NOTE — PROGRESS NOTES
PRE-PROCEDURE NOTE    CHIEF COMPLAINT / REASON FOR PROCEDURE:  anemia    History and Physical Reviewed:  yes    PRE-SEDATION ASSESSMENT:    Lung Exam:  normal  Heart Exam:  normal  Mallampati Airway Classification:  2   Previous reaction to anesthesia/sedation:   no  Sedation plan based on assessment:  moderate  Comment(s):  none  ASA Classification:  3    IMPRESSION:  Rule out upper GI bleeding    PLAN:  egd    Ayala Soto MD

## 2018-11-20 NOTE — PROGRESS NOTES
"  [facility]  Hospitalist Progress Note  Kris Moran MD 11/20/2018    Reason for Stay (Diagnosis): Iron deficiency anemia         Assessment and Plan:      Summary of Stay: Yoselyn ROBBY Cui is a 86-year-old female with a history of pacemaker placement, chronic anticoagulation for prevention of CVA which includes both Eliquis and aspirin, chronic use of omeprazole, who presents to the hospital today for concerns about fatigue and generalized weakness.  She was found to have a severe microcytic anemia.  Workup has included ferritin, which confirmed iron deficiency anemia    Patient underwent EGD on 11/20/2018, with no explanation for her anemia.  Plan is to undergo colonoscopy tomorrow.  Appreciate GI input this admission.  No evidence of active GI bleeding this admission.  Status post 2 units packed red blood cell transfusion this admission with good response      1.  Symptomatic iron deficiency anemia:   EGD unremarkable, plan for colonoscopy tomorrow.  Appreciate GI input.  For now aspirin and Eliquis on hold.  No evidence of active GI bleeding and I suspect chronic losses over time.  Status post 2 units packed red blood cell transfusion  2.  Distant history of peptic ulcer, for which the patient takes a daily omeprazole chronically.   3.  History of cerebrovascular accident for which the patient takes Eliquis and aspirin.   4.  Previous pacemaker placement.     DVT Prophylaxis: Low Risk/Ambulatory with no VTE prophylaxis indicated  Code Status: Full Code  Discharge Dispo: home  Estimated Disch Date / # of Days until Disch: 1-2 days, after workup for anemia is completed        Interval History (Subjective):      No complaints.  Feels well following 2 units of packed red blood cell transfusion                  Physical Exam:      Last Vital Signs:  BP 98/45  Temp 97.1  F (36.2  C) (Oral)  Resp 20  Ht 1.499 m (4' 11\")  Wt 47.8 kg (105 lb 6.4 oz)  SpO2 100%  BMI 21.29 " kg/m2      Intake/Output Summary (Last 24 hours) at 11/20/18 1454  Last data filed at 11/20/18 1013   Gross per 24 hour   Intake                0 ml   Output              750 ml   Net             -750 ml       Constitutional: Awake, alert, cooperative, no apparent distress   Respiratory: Clear to auscultation bilaterally, no crackles or wheezing   Cardiovascular: Regular rate and rhythm, normal S1 and S2, and no murmur noted   Abdomen: Normal bowel sounds, soft, non-distended, non-tender   Skin: No rashes, no cyanosis, dry to touch   Neuro: Alert and oriented x3, no weakness, numbness, memory loss   Extremities: No edema, normal range of motion   Other(s):        All other systems: Negative          Medications:      All current medications were reviewed with changes reflected in problem list.         Data:      All new lab and imaging data was reviewed.   Labs:    Recent Labs  Lab 11/20/18  0639   WBC 6.4   HGB 8.7*   HCT 30.4*   MCV 77*         Imaging:   No results found for this or any previous visit (from the past 24 hour(s)).

## 2018-11-20 NOTE — PLAN OF CARE
Problem: Patient Care Overview  Goal: Plan of Care/Patient Progress Review  Outcome: No Change  A&Ox4, VSS, SBA, tolerating regular diet, NPO after midnight, heart sounds-WNL, lungs- clear, bowel sounds- active, voiding without difficulty- some frequency, denies pain, PIV SL. GI consult. Hgb recheck this evening

## 2018-11-20 NOTE — CONSULTS
RACHELLE ELEVATED.  Will follow along for needs.  Will also assist in making f/u appts and give hand off to Clinic RN CTS.  Ramona RN CTS [phone]

## 2018-11-21 LAB
COLONOSCOPY: NORMAL
COPATH REPORT: NORMAL
HGB BLD-MCNC: 9 G/DL (ref 11.7–15.7)

## 2018-11-21 PROCEDURE — 94640 AIRWAY INHALATION TREATMENT: CPT

## 2018-11-21 PROCEDURE — 40000275 ZZH STATISTIC RCP TIME EA 10 MIN

## 2018-11-21 PROCEDURE — A9270 NON-COVERED ITEM OR SERVICE: HCPCS | Performed by: INTERNAL MEDICINE

## 2018-11-21 PROCEDURE — 45385 COLONOSCOPY W/LESION REMOVAL: CPT | Performed by: INTERNAL MEDICINE

## 2018-11-21 PROCEDURE — 36415 COLL VENOUS BLD VENIPUNCTURE: CPT | Performed by: INTERNAL MEDICINE

## 2018-11-21 PROCEDURE — 25000132 ZZH RX MED GY IP 250 OP 250 PS 637: Performed by: INTERNAL MEDICINE

## 2018-11-21 PROCEDURE — C9113 INJ PANTOPRAZOLE SODIUM, VIA: HCPCS | Performed by: INTERNAL MEDICINE

## 2018-11-21 PROCEDURE — 99232 SBSQ HOSP IP/OBS MODERATE 35: CPT | Performed by: INTERNAL MEDICINE

## 2018-11-21 PROCEDURE — 45382 COLONOSCOPY W/CONTROL BLEED: CPT | Performed by: INTERNAL MEDICINE

## 2018-11-21 PROCEDURE — 85018 HEMOGLOBIN: CPT | Performed by: INTERNAL MEDICINE

## 2018-11-21 PROCEDURE — G0500 MOD SEDAT ENDO SERVICE >5YRS: HCPCS | Performed by: INTERNAL MEDICINE

## 2018-11-21 PROCEDURE — 88305 TISSUE EXAM BY PATHOLOGIST: CPT | Performed by: INTERNAL MEDICINE

## 2018-11-21 PROCEDURE — 25000128 H RX IP 250 OP 636: Performed by: INTERNAL MEDICINE

## 2018-11-21 PROCEDURE — 88305 TISSUE EXAM BY PATHOLOGIST: CPT | Mod: 26 | Performed by: INTERNAL MEDICINE

## 2018-11-21 PROCEDURE — 99207 ZZC CDG-MDM COMPONENT: MEETS LOW - DOWN CODED: CPT | Performed by: INTERNAL MEDICINE

## 2018-11-21 PROCEDURE — 0DBL8ZX EXCISION OF TRANSVERSE COLON, VIA NATURAL OR ARTIFICIAL OPENING ENDOSCOPIC, DIAGNOSTIC: ICD-10-PCS | Performed by: INTERNAL MEDICINE

## 2018-11-21 PROCEDURE — 12000000 ZZH R&B MED SURG/OB

## 2018-11-21 RX ORDER — FLUMAZENIL 0.1 MG/ML
0.2 INJECTION, SOLUTION INTRAVENOUS
Status: ACTIVE | OUTPATIENT
Start: 2018-11-21 | End: 2018-11-22

## 2018-11-21 RX ORDER — PANTOPRAZOLE SODIUM 40 MG/1
40 TABLET, DELAYED RELEASE ORAL
Status: DISCONTINUED | OUTPATIENT
Start: 2018-11-21 | End: 2018-11-22 | Stop reason: HOSPADM

## 2018-11-21 RX ORDER — NALOXONE HYDROCHLORIDE 0.4 MG/ML
.1-.4 INJECTION, SOLUTION INTRAMUSCULAR; INTRAVENOUS; SUBCUTANEOUS
Status: DISCONTINUED | OUTPATIENT
Start: 2018-11-21 | End: 2018-11-22 | Stop reason: HOSPADM

## 2018-11-21 RX ORDER — LIDOCAINE 40 MG/G
CREAM TOPICAL
Status: DISCONTINUED | OUTPATIENT
Start: 2018-11-21 | End: 2018-11-21 | Stop reason: HOSPADM

## 2018-11-21 RX ORDER — FENTANYL CITRATE 50 UG/ML
INJECTION, SOLUTION INTRAMUSCULAR; INTRAVENOUS PRN
Status: DISCONTINUED | OUTPATIENT
Start: 2018-11-21 | End: 2018-11-21 | Stop reason: HOSPADM

## 2018-11-21 RX ADMIN — TOLTERODINE TARTRATE 4 MG: 2 CAPSULE, EXTENDED RELEASE ORAL at 13:57

## 2018-11-21 RX ADMIN — ATORVASTATIN CALCIUM 20 MG: 20 TABLET, FILM COATED ORAL at 20:59

## 2018-11-21 RX ADMIN — CARVEDILOL 25 MG: 25 TABLET, FILM COATED ORAL at 19:19

## 2018-11-21 RX ADMIN — LISINOPRIL 40 MG: 40 TABLET ORAL at 13:57

## 2018-11-21 RX ADMIN — PANTOPRAZOLE SODIUM 40 MG: 40 TABLET, DELAYED RELEASE ORAL at 17:35

## 2018-11-21 RX ADMIN — PANTOPRAZOLE SODIUM 40 MG: 40 INJECTION, POWDER, FOR SOLUTION INTRAVENOUS at 13:58

## 2018-11-21 RX ADMIN — FLUTICASONE FUROATE 1 PUFF: 200 POWDER RESPIRATORY (INHALATION) at 07:45

## 2018-11-21 RX ADMIN — AMLODIPINE BESYLATE 5 MG: 5 TABLET ORAL at 13:57

## 2018-11-21 ASSESSMENT — ACTIVITIES OF DAILY LIVING (ADL)
ADLS_ACUITY_SCORE: 13

## 2018-11-21 NOTE — PLAN OF CARE
Problem: Patient Care Overview  Goal: Plan of Care/Patient Progress Review  Outcome: Improving  Pt up x5 tonight with fecal urgency and some incontinence as she preps for colonoscopy today. NPO.  Denies pain. A/Ox4. Slept lightly between bathroom visits. Refused Mag Citrate this am. Uneventful night.

## 2018-11-21 NOTE — PLAN OF CARE
Problem: Patient Care Overview  Goal: Plan of Care/Patient Progress Review  Outcome: No Change  Ambulatory Status:  Pt up A1 with cane and gait belt.  VS:  Stable; afebrile.  Pain:  Denies.  Resp: LS clear on RA.  GI:  denies nausea.  good appetite and on clear liquid diet.  NPO at midnight  BS active.  Passing flatus.  Last BM 11/20. Started colon prep for colonoscopy on 11/21.  :  Voiding.  Disposition:  TBD.

## 2018-11-21 NOTE — OR NURSING
Patient tolerated the procedure well. Report called to patients charge nurse.patient in recovery in a stable condition.

## 2018-11-21 NOTE — DISCHARGE INSTRUCTIONS

## 2018-11-21 NOTE — PROGRESS NOTES
PRE-PROCEDURE NOTE    CHIEF COMPLAINT / REASON FOR PROCEDURE:  anemia    History and Physical Reviewed:  yes    PRE-SEDATION ASSESSMENT:    Lung Exam:  normal  Heart Exam:  janelle  Mallampati Airway Classification:  2   Previous reaction to anesthesia/sedation:   no  Sedation plan based on assessment:  IV sed  Comment(s):  none  ASA Classification:  3    IMPRESSION:  Rule out lower GI blood loss    PLAN:  colonoscopy    Ayala Soto MD  [facility]  Cell/Pager: [phone]  After 5pm please call [phone]

## 2018-11-21 NOTE — PROGRESS NOTES
[facility]  Hospitalist Progress Note  Jose R Tiwari MD 11/21/2018    Reason for Stay (Diagnosis): Iron deficiency anemia.         Assessment and Plan:      Summary of Stay: Yoselyn Cui is a 86-year-old female with a history of pacemaker placement, chronic anticoagulation for prevention of CVA which includes both Eliquis and aspirin, chronic use of omeprazole, who presents to the hospital today for concerns about fatigue and generalized weakness.  She was found to have a severe microcytic anemia.  Workup has included ferritin, which confirmed iron deficiency anemia    Patient underwent EGD on 11/20/2018, with no explanation for her anemia.  Plan is to undergo colonoscopy tomorrow.  Appreciate GI input this admission.  No evidence of active GI bleeding this admission.  Status post 2 units packed red blood cell transfusion this admission with good response    1.  Symptomatic iron deficiency anemia:   EGD unremarkable, colonoscopy done today is also unremarkable, except 1 polyp removed, no cause for the GI bleeding identified.  Appreciate GI input.    -Continue to hold Aspirin and Eliquis.  No evidence of active GI bleeding and I suspect chronic losses over time.  Status post 2 units packed red blood cell transfusion  2.  Distant history of peptic ulcer, for which the patient takes a daily omeprazole.   3.  History of cerebrovascular accident for which the patient takes Eliquis and aspirin, needs to be on hold for now.   4.  Previous pacemaker placement.     DVT Prophylaxis: Low Risk/Ambulatory with no VTE prophylaxis indicated  Code Status: Full Code  Discharge Dispo: home  Estimated Disch Date / # of Days until Disch: 1 day, after workup for anemia is completed, if hemoglobin is stable after patient starts oral intake        Interval History (Subjective):      Patient seen and examined, assumed care today, no new issues overnight, her energy level improved after blood transfusion.          "         Physical Exam:      Last Vital Signs:  /50  Temp 97.8  F (36.6  C) (Oral)  Resp 16  Ht 1.499 m (4' 11\")  Wt 47.8 kg (105 lb 6.4 oz)  SpO2 97%  BMI 21.29 kg/m2    Intake/Output Summary (Last 24 hours)    Intake/Output Summary (Last 24 hours) at 11/21/18 1239  Last data filed at 11/20/18 2224   Gross per 24 hour   Intake              320 ml   Output                0 ml   Net              320 ml       Constitutional: Awake, alert, cooperative, no apparent distress   Respiratory: Clear to auscultation bilaterally, no crackles or wheezing   Cardiovascular: Regular rate and rhythm, normal S1 and S2, and no murmur noted   Abdomen: Normal bowel sounds, soft, non-distended, non-tender   Skin: No rashes, no cyanosis, dry to touch   Neuro: Alert and oriented x3, no weakness, numbness, memory loss   Extremities: No edema, normal range of motion   Other(s):        All other systems: Negative          Medications:      All current medications were reviewed with changes reflected in problem list.         Data:      All new lab and imaging data was reviewed.   Labs:       Recent Labs  Lab 11/21/18  0724 11/20/18  0639 11/19/18  2217 11/19/18  1216   WBC  --  6.4  --  5.4   HGB 9.0* 8.7* 8.4* 6.5*   HCT  --  30.4*  --  23.6*   MCV  --  77*  --  75*   PLT  --  194  --  207       Recent Labs  Lab 11/20/18  0639 11/19/18  1216    142   POTASSIUM 3.6 4.6   CHLORIDE 109 111*   CO2 28 27   ANIONGAP 5 4   GLC 90 105*   BUN 13 16   CR 0.80 0.68   GFRESTIMATED 68 82   GFRESTBLACK 82 >90   GOLDY 8.5 8.4*       Imaging:   No results found for this or any previous visit (from the past 24 hour(s)).   "

## 2018-11-21 NOTE — PLAN OF CARE
Problem: Patient Care Overview  Goal: Plan of Care/Patient Progress Review  Outcome: Improving  Ambulatory Status:  Pt up Ax1.  VS:  VSS  Pain:  Denies  Resp: LS CTA  GI:  Denies nausea.  fair appetite and on Regular diet.  BS +.  Passing flatus.  Last BM today.  :  Without difficulty  Disposition:  Potential Discharge tomorrow    Pt had colonoscopy this shift 1 polyp removed and clipped  Will continue to monitor and provide supportive cares.

## 2018-11-21 NOTE — PROGRESS NOTES
Gi Brief Note    Colonoscopy completed. Please see full report under Chart Review --> Procedures tab    Findings:  Prep only fair, small polyps could have been missed.  Severe left sided diverticulosis  10mm polyp removed from transverse colon with cold snare - clip placed due to likely restarting of Eliquis.  No cause for anemia identified.    A/P:  -EGD and colonoscopy without source of anemia.  Celiac biopsies pending - but unlikely celiac.  -I would recommend CT abdomen/pelvis to rule out pancreatic, pelvic malignancy. This could be completed as outpatient.  -Small bowel pill capsule could be considered -but with lack of GI bleeding symptoms likely to be low yield and CT will see some of bowel.  - If CT negative, consider outpatient Hematology input.    Ok for regular diet from my standpoint.  Ok for restart of Eliquis tomorrow morning (if able to hold until then) given polypectomy today.    We will not actively follow. Please call if further GI issues arise and we would be happy to see the patient again.    Ayala Soto MD  [facility]  Cell/Pager: [phone]  After 5pm please call [phone]

## 2018-11-22 VITALS
DIASTOLIC BLOOD PRESSURE: 65 MMHG | OXYGEN SATURATION: 96 % | HEART RATE: 81 BPM | RESPIRATION RATE: 18 BRPM | HEIGHT: 59 IN | TEMPERATURE: 96.6 F | SYSTOLIC BLOOD PRESSURE: 143 MMHG | WEIGHT: 105.4 LBS | BODY MASS INDEX: 21.25 KG/M2

## 2018-11-22 PROCEDURE — 94640 AIRWAY INHALATION TREATMENT: CPT

## 2018-11-22 PROCEDURE — 25000132 ZZH RX MED GY IP 250 OP 250 PS 637: Performed by: INTERNAL MEDICINE

## 2018-11-22 PROCEDURE — A9270 NON-COVERED ITEM OR SERVICE: HCPCS | Performed by: INTERNAL MEDICINE

## 2018-11-22 PROCEDURE — 40000275 ZZH STATISTIC RCP TIME EA 10 MIN

## 2018-11-22 PROCEDURE — 99239 HOSP IP/OBS DSCHRG MGMT >30: CPT | Performed by: INTERNAL MEDICINE

## 2018-11-22 RX ADMIN — TOLTERODINE TARTRATE 4 MG: 2 CAPSULE, EXTENDED RELEASE ORAL at 08:09

## 2018-11-22 RX ADMIN — FLUTICASONE FUROATE 1 PUFF: 200 POWDER RESPIRATORY (INHALATION) at 09:17

## 2018-11-22 RX ADMIN — AMLODIPINE BESYLATE 5 MG: 5 TABLET ORAL at 08:10

## 2018-11-22 RX ADMIN — CARVEDILOL 25 MG: 25 TABLET, FILM COATED ORAL at 08:10

## 2018-11-22 RX ADMIN — APIXABAN 2.5 MG: 2.5 TABLET, FILM COATED ORAL at 09:26

## 2018-11-22 RX ADMIN — LISINOPRIL 40 MG: 40 TABLET ORAL at 08:10

## 2018-11-22 RX ADMIN — PANTOPRAZOLE SODIUM 40 MG: 40 TABLET, DELAYED RELEASE ORAL at 06:40

## 2018-11-22 ASSESSMENT — ACTIVITIES OF DAILY LIVING (ADL)
ADLS_ACUITY_SCORE: 12
ADLS_ACUITY_SCORE: 12
ADLS_ACUITY_SCORE: 13
ADLS_ACUITY_SCORE: 13

## 2018-11-22 NOTE — PLAN OF CARE
Pt up Ax1. A&O. L side deficit. LS clear. BS+ Voiding adequate amount, freq-baseline. Tolerating regular diet, denies nausea. IVF infusing via PIV.

## 2018-11-22 NOTE — PLAN OF CARE
Problem: Patient Care Overview  Goal: Plan of Care/Patient Progress Review  Outcome: Improving  Ambulatory Status:  Pt up SBA with cane. Walking to restroom with cane.  VS:  Vital signs:  Temp: 96.6  F (35.9  C) Temp src: Oral BP: 143/65 Pulse: 81 Heart Rate: 86 Resp: 18 SpO2: 96 % O2 Device: None (Room air)  Pain:  None noted  Resp: LS clear  GI:  no nausea.  fair appetite and on regauylr diet.  BS active.  Passing flatus.  :  Voiding well  Skin:  normal  Tx:  PO aspirin discontinued and will continue rest of home medication  Labs:  K 3.6 and hemoglobin 9  Consults:  Gastro   Disposition:  Home later today

## 2018-11-22 NOTE — PLAN OF CARE
Problem: Patient Care Overview  Goal: Plan of Care/Patient Progress Review  Outcome: Adequate for Discharge Date Met: 11/22/18  Pt to D/C to home.  Pt provided with d/c instructions, including new medications, when medications were last given, and when to take them again. PO Asprin was discontinued and patient showed understanding. Pt also informed to f/u with primary care provider in 5 days and CT scan in 1 week. Pt verbalized understanding of all d/c and f/u instructions.  All questions were answered at this time.  Copy of paperwork sent with pt.  No medications were sent with pt.  Family to provide transport. Patient left via wheelchair to family member's car.  All personal belongings sent with pt.

## 2018-11-22 NOTE — DISCHARGE SUMMARY
Admit Date:     11/19/2018   Discharge Date:     11/22/2018      PRIMARY CARE PHYSICIAN:  Ira Barajsa MD      DISCHARGE DIAGNOSES:   1.  Symptomatic iron deficiency anemia, suspected secondary to chronic gastrointestinal blood loss with negative esophagogastroduodenoscopy and colonoscopy.   2.  Previous history of peptic ulcer disease.   3.  History of cerebrovascular accident.   4.  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      DISPOSITION:  Home.      DISCHARGE MEDICATIONS:  Reviewed and reconciled in electronic medical record.      Review of your medicines      CONTINUE these medicines which have NOT CHANGED       Dose / Directions    acetaminophen 325 MG tablet   Commonly known as:  TYLENOL        Dose:  650 mg   Take 650 mg by mouth every 6 hours as needed for mild pain   Refills:  0       albuterol 108 (90 Base) MCG/ACT inhaler   Commonly known as:  PROAIR HFA/PROVENTIL HFA/VENTOLIN HFA   Used for:  Acute bronchospasm        Dose:  2 puff   Inhale 2 puffs into the lungs 4 times daily as needed for other (dyspnea)   Quantity:  6.7 g   Refills:  0       amLODIPine 5 MG tablet   Commonly known as:  NORVASC        Dose:  5 mg   Take 5 mg by mouth daily   Refills:  0       apixaban ANTICOAGULANT 2.5 MG tablet   Commonly known as:  ELIQUIS   Used for:  Paroxysmal atrial fibrillation (H)        Dose:  2.5 mg   Take 1 tablet (2.5 mg) by mouth 2 times daily   Quantity:  180 tablet   Refills:  3       carvedilol 25 MG tablet   Commonly known as:  COREG        Dose:  25 mg   Take 25 mg by mouth 2 times daily (with meals)   Refills:  0       DETROL LA 4 MG 24 hr capsule   Generic drug:  tolterodine ER        Dose:  4 mg   Take 4 mg by mouth every morning   Refills:  0       ferrous sulfate 325 (65 Fe) MG tablet   Commonly known as:  FEROSUL        Dose:  325 mg   Take 325 mg by mouth daily (with breakfast)   Refills:  0       fluticasone 200 MCG/ACT inhaler   Commonly known as:  ARNUITY  ELLIPTA   Used for:  Acute bronchospasm, Pneumonia of right lung due to infectious organism, unspecified part of lung        Dose:  1 puff   Inhale 1 puff into the lungs daily   Quantity:  3 Inhaler   Refills:  0       ICAPS AREDS FORMULA Tabs        Dose:  1 tablet   Take 1 tablet by mouth 2 times daily   Refills:  0       LIPITOR 20 MG tablet   Generic drug:  atorvastatin        Dose:  20 mg   Take 20 mg by mouth At Bedtime   Refills:  0       lisinopril 40 MG tablet   Commonly known as:  PRINIVIL/ZESTRIL        Dose:  40 mg   Take 40 mg by mouth daily   Refills:  0       OMEPRAZOLE PO        Dose:  20 mg   Take 20 mg by mouth every morning   Refills:  0         STOP taking          ASPIRIN EC PO                 DISCHARGE CONDITION:  Stable.      DISCHARGE VITAL SIGNS:  Blood pressure 140/60, pulse rate 81, temperature 98.3, oxygen saturation 96%.      DISCHARGE PHYSICAL EXAMINATION:   GENERAL:  Awake, alert, oriented, comfortable, not in any form of distress.   HEENT:  Pink, anicteric.  Extraocular muscle movement intact.   NECK:  Supple, no JVD, no thyromegaly.   CHEST:  Good air entry bilaterally.  No wheezing, crackles or rales.   CARDIOVASCULAR:  S1, S2 were heard.  No gallop or murmur.   ABDOMEN:  Obese, soft, nontender, nondistended, positive bowel sounds.   EXTREMITIES:  No edema, cyanosis or clubbing.   NEUROLOGIC:  No focal neurologic deficits.      PROCEDURES DONE DURING ADMISSION:  Esophagogastroduodenoscopy and colonoscopy.      CONSULTATIONS:  Obtained from Gastroenterology and Nutrition.      PENDING LABS:  Biopsy for possible celiac illness pending.      DISCHARGE DIET:  Regular.      DISCHARGE ACTIVITY:  As tolerated.      DISCHARGE FOLLOWUP:    Followup with primary care provider in 1 week.    The patient will followup with GI as an outpatient.    CT of abdomen and pelvis with contrast to rule out any underlying pancreatic lesion or pelvic lesion as an outpatient.      BRIEF HISTORY AND HOSPITAL  COURSE:  Yoselyn Ambrose is an 86-year-old female with history of high-grade AV block status post permanent pacemaker placement, on chronic anticoagulation with Eliquis for CVA prophylaxis and also aspirin, who presented to the hospital with generalized weakness and fatigue.  The patient was found to have severe microcytic anemia.  On arrival, her hemoglobin was 6.5 and iron studies showed significant iron deficiency.  The patient has had EGD done on 11/20/2018 with no explanation for her anemia from the upper GI.  She then underwent colonoscopy the next day which showed no evidence of active GI bleeding from the lower GI either.  She got a total of 2 units of packed red blood cell transfusion this admission with good response.  Her energy level improved.  Initially, her aspirin and Eliquis were held.  At this point, Eliquis was resumed, but completely discontinued aspirin as the risk of bleeding outweighs the benefit.  Discussed with the patient regarding this and will only be on Eliquis.  The patient advised to take her medications as instructed and have followup as an outpatient.  The patient will have CT scan of the abdomen and pelvis in 1 week and results will go to her primary care provider as well as GI doctor, Dr. Soto.  CT scan was recommended by the gastroenterologist.  If there are no abnormal findings on the CT scan, there could be a capsule endoscopy as a second line planned and GI will decide regarding that.  The patient wanted to be discharged.  Her hemoglobin is stable today.  Electrolytes were normal.  Hemoglobin is 9.  At this point, there is no sign of bleeding from any site.  All her questions and concerns addressed, she showed understanding and was discharged.      Total time spent coordinating her discharge face-to-face was over 30 minutes.         ALEXANDRA DALEY MD             D: 11/22/2018   T: 11/22/2018   MT: PHILLIP      Name:     YOSELYN AMBROSE   MRN:      [id number]        Account:         [id number]   :      1932           Admit Date:     2018                                  Discharge Date: 2018      Document: [id number]       cc: Ira BERNAL

## 2018-11-22 NOTE — PLAN OF CARE
Problem: Patient Care Overview  Goal: Plan of Care/Patient Progress Review  Outcome: Improving  Uneventful night. Slept well, denies pain. No stools, voiding well. Anticipate home today

## 2018-11-23 LAB
BLD PROD TYP BPU: NORMAL
BLD UNIT ID BPU: 0
BLOOD PRODUCT CODE: NORMAL
BPU ID: NORMAL
COPATH REPORT: NORMAL
TRANSFUSION STATUS PATIENT QL: NORMAL
TRANSFUSION STATUS PATIENT QL: NORMAL

## 2018-11-26 NOTE — PROGRESS NOTES
Transition Communication Hand-off for Care Transitions to Next Level of Care Provider    Name: Yoselyn Cui  : 1932  MRN #: [id number]  Primary Care Provider: Ira Barajas     Primary Clinic: [facility] [address]  [facility] [address]     Reason for Hospitalization:  Generalized weakness [R53.1]  Anticoagulated [Z79.01]  Anemia, unspecified type [D64.9]  Admit Date/Time: 2018 11:37 AM  Discharge Date: 18  Payor Source: Payor: [employer] / Plan: [employer] / Product Type: HMO /     Readmission Assessment Measure (RACHELLE) Risk Score/category: Elevated           Reason for Communication Hand-off Referral: Fragility    Discharge Plan: Home       Concern for non-adherence with plan of care:   No  Discharge Needs Assessment:      Already enrolled in Tele-monitoring program and name of program:  NA  Follow-up specialty is recommended: Yes    Follow-up plan:  Future Appointments  Date Time Provider Department Center   1/10/2019 10:15 AM Hunter Gregorio MD [facility]   2019 2:15 PM [facility] TE[facility]1 [facility]       Any outstanding tests or procedures:        Radiology & Cardiology Orders     Future Labs/Procedures Complete By Expires    CT Abdomen pelvis w contrast*  As directed 2019    Process Instructions:    Administration of IV contrast (contrast agent, dose, and amount) will be tailored to this examination per the appropriate written protocol listed in the Protocol E-Book, or by the supervising imaging provider.           Key Recommendations:  Pt was admitted with Anemia.  GI was consulted and an EGD and a colonoscopy were performed and found to be negative.  Pt is frail and has left sided weakness.    It is recommended that patient follow up with PCP and have CBC labs.   Patient is to follow up with GI for an Abdominal/ Pelvis CT.       Eloise Dick & Brittany Williamson    AVS/Discharge Summary is the source of truth; this is a  helpful guide for improved communication of patient story

## 2019-01-21 ENCOUNTER — ANCILLARY PROCEDURE (OUTPATIENT)
Dept: CARDIOLOGY | Facility: CLINIC | Age: 84
End: 2019-01-21
Attending: INTERNAL MEDICINE
Payer: COMMERCIAL

## 2019-01-21 DIAGNOSIS — I44.1 AV BLOCK, 2ND DEGREE: ICD-10-CM

## 2019-01-21 PROCEDURE — 93296 REM INTERROG EVL PM/IDS: CPT | Performed by: INTERNAL MEDICINE

## 2019-01-21 PROCEDURE — 93294 REM INTERROG EVL PM/LDLS PM: CPT | Performed by: INTERNAL MEDICINE

## 2019-01-23 DIAGNOSIS — I48.0 PAROXYSMAL ATRIAL FIBRILLATION (H): ICD-10-CM

## 2019-01-30 ENCOUNTER — OFFICE VISIT (OUTPATIENT)
Dept: CARDIOLOGY | Facility: CLINIC | Age: 84
End: 2019-01-30
Payer: COMMERCIAL

## 2019-01-30 VITALS
BODY MASS INDEX: 21.37 KG/M2 | WEIGHT: 106 LBS | HEIGHT: 59 IN | SYSTOLIC BLOOD PRESSURE: 144 MMHG | HEART RATE: 80 BPM | DIASTOLIC BLOOD PRESSURE: 80 MMHG

## 2019-01-30 DIAGNOSIS — I48.0 PAROXYSMAL ATRIAL FIBRILLATION (H): ICD-10-CM

## 2019-01-30 DIAGNOSIS — I44.1 AV BLOCK, 2ND DEGREE: Primary | ICD-10-CM

## 2019-01-30 PROCEDURE — 93005 ELECTROCARDIOGRAM TRACING: CPT | Performed by: INTERNAL MEDICINE

## 2019-01-30 PROCEDURE — 99213 OFFICE O/P EST LOW 20 MIN: CPT | Performed by: INTERNAL MEDICINE

## 2019-01-30 ASSESSMENT — MIFFLIN-ST. JEOR: SCORE: 821.44

## 2019-01-30 NOTE — PROGRESS NOTES
Service Date: 01/30/2019      HISTORY OF PRESENT ILLNESS:    I had the pleasure of seeing Ms. Yoselyn Cui, a pleasant 87-year-old female with the following cardiac/medical issues:   A.  Symptomatic second-degree AV block 2:1 with permanent pacemaker implantation in 05/2016.   B.  History of CVA x 2 in the past with residual deficits.   C.  Hypertension.   D.  Dyslipidemia.   E.  Iron deficiency anemia with recent negative EGD and colonoscopy.   F.  Admission in 03/2018 with pneumonia and sepsis.      Ms. Cui was hospitalized in 11/2018 with severe anemia due to iron deficiency of unclear source.  Endoscopy and colonoscopy failed to disclose a source of bleeding.  The patient had been taking both low-dose Eliquis and aspirin, and the aspirin was discontinued.  She has been stable since then.      The patient has taken Eliquis for the past 2 years because of paroxysmal atrial fibrillation documented on device interrogation.  AF burden has been less than 1%, but certain episodes last for several hours.      PHYSICAL EXAMINATION:   VITAL SIGNS:  Blood pressure 144/80, pulse 80 and regular.  Weight 48 kilos, height 150 cm.   GENERAL:  She is an elderly woman sitting in a wheelchair.  She is accompanied by her daughter.   NECK:  Supple, with 2+ carotid pulses, no bruits.   LUNGS:  Clear.  No crackles or wheezes.   CARDIOVASCULAR:  Nicely healed left pectoral incision.  Regular rhythm.  No gallop, murmur or rub.   ABDOMEN:  Soft, nontender.  Negative HJR.   EXTREMITIES:  No edema.      IMPRESSION:   1.  Second-degree AV block with pacemaker implantation in 2016.  The patient's pacemaker is functioning well, with estimated battery longevity of approximately 10 years.     2.  Paroxysmal atrial fibrillation.  Iron-deficiency anemia.  She has a [id number]-[id number] score of at least 6.  Apixaban was briefly held in 11/2018 when she presented with iron-deficiency anemia, but has been resumed since then.  She is no longer on  aspirin.  She follows with her primary care physician for this issue.      RECOMMENDATIONS:    Stable from a cardiac standpoint.  Follow-up visit with provider requested in 2 years.  Continue routine Device Clinic checks.        TOMÁS ROSARIO MD, FACC         cc:   GABE Ramos    [facility] [place] [facility] [address] [place] [address]          D: 2019   T: 2019   MT: KARLI      Name:     ANNE MARIE AMBROSE   MRN:      [id number]        Account:      [id number]   :      1932           Service Date: 2019      Document: [id number]

## 2019-01-30 NOTE — PROGRESS NOTES
HPI and Plan:   See dictation    Orders Placed This Encounter   Procedures     Follow-Up with Cardiac Advanced Practice Provider     EKG 12-lead complete w/read - Clinics (performed today)       No orders of the defined types were placed in this encounter.      Medications Discontinued During This Encounter   Medication Reason     albuterol (PROAIR HFA/PROVENTIL HFA/VENTOLIN HFA) 108 (90 BASE) MCG/ACT Inhaler Medication Reconciliation Clean Up     cefuroxime (CEFTIN) 500 MG tablet Medication Reconciliation Clean Up         Encounter Diagnosis   Name Primary?     Paroxysmal atrial fibrillation (H)        CURRENT MEDICATIONS:  Current Outpatient Medications   Medication Sig Dispense Refill     acetaminophen (TYLENOL) 325 MG tablet Take 650 mg by mouth every 6 hours as needed for mild pain        albuterol (PROAIR HFA/PROVENTIL HFA/VENTOLIN HFA) 108 (90 BASE) MCG/ACT Inhaler Inhale 2 puffs into the lungs 4 times daily as needed for other (dyspnea) 6.7 g 0     amLODIPine (NORVASC) 5 MG tablet Take 5 mg by mouth daily        apixaban ANTICOAGULANT (ELIQUIS) 2.5 MG tablet Take 1 tablet (2.5 mg) by mouth 2 times daily 180 tablet 0     atorvastatin (LIPITOR) 20 MG tablet Take 20 mg by mouth At Bedtime       carvedilol (COREG) 25 MG tablet Take 25 mg by mouth 2 times daily (with meals)       ferrous sulfate (IRON) 325 (65 FE) MG tablet Take 325 mg by mouth daily (with breakfast)        fluticasone furoate (ARNUITY ELLIPTA) 200 MCG/ACT inhalation powder Inhale 1 puff into the lungs daily 3 Inhaler 0     lisinopril (PRINIVIL,ZESTRIL) 40 MG tablet Take 40 mg by mouth daily       Multiple Vitamins-Minerals (ICAPS AREDS FORMULA) TABS Take 1 tablet by mouth 2 times daily        OMEPRAZOLE PO Take 20 mg by mouth every morning        tolterodine (DETROL LA) 4 MG 24 hr capsule Take 4 mg by mouth every morning         ALLERGIES   No Known Allergies    PAST MEDICAL HISTORY:  Past Medical History:   Diagnosis Date     AV block, 2nd  degree 5/16/2016     Cerebrovascular accident (H) 8/22/2016     COKER (dyspnea on exertion) 8/22/2016     Generalized weakness 10/12/2016     GIB (gastrointestinal bleeding)      History of colonic polyps 8/22/2016     HTN (hypertension) 8/22/2016     Iron deficiency anemia      Melanoma of skin (H)-rt arm 8/22/2016     Mixed hyperlipidemia 8/22/2016     Pacemaker     implanted 5-     PAF (paroxysmal atrial fibrillation) (H)      SSS (sick sinus syndrome) (H) 8/22/2016       PAST SURGICAL HISTORY:  Past Surgical History:   Procedure Laterality Date     APPENDECTOMY  1960s     C LIGATE FALLOPIAN TUBE  1960s     C REMV CATARACT INTRACAP,INSERT LENS Right 09/20/2017     ESOPHAGOSCOPY, GASTROSCOPY, DUODENOSCOPY (EGD), COMBINED N/A 11/20/2018    Procedure: ESOPHAGOSCOPY, GASTROSCOPY, DUODENOSCOPY (EGD)  Room 523 with biopsies using cold biopsy forceps;  Surgeon: Ayala Soto MD;  Location: [facility]     HC REMV CATARACT EXTRACAP,INSERT LENS Left 10/04/2017     IMPLANT PACEMAKER  05/17/2016       FAMILY HISTORY:  Family History   Problem Relation Age of Onset     Coronary Artery Disease Father      Coronary Artery Disease Brother      Coronary Artery Disease Sister      Colon Cancer No family hx of        SOCIAL HISTORY:  Social History     Socioeconomic History     Marital status:      Spouse name: None     Number of children: None     Years of education: None     Highest education level: None   Social Needs     Financial resource strain: None     Food insecurity - worry: None     Food insecurity - inability: None     Transportation needs - medical: None     Transportation needs - non-medical: None   Occupational History     None   Tobacco Use     Smoking status: Never Smoker     Smokeless tobacco: Never Used   Substance and Sexual Activity     Alcohol use: No     Alcohol/week: 0.0 oz     Drug use: No     Sexual activity: None   Other Topics Concern     Parent/sibling w/ CABG, MI or angioplasty before  65F 55M? Not Asked      Service Not Asked     Blood Transfusions Not Asked     Caffeine Concern No     Comment: 3+ cups daily     Occupational Exposure Not Asked     Hobby Hazards Not Asked     Sleep Concern Not Asked     Stress Concern Not Asked     Weight Concern Not Asked     Special Diet No     Comment: trying to cut down on sodium, watching cholesterol     Back Care Not Asked     Exercise No     Comment: limited     Bike Helmet Not Asked     Seat Belt Not Asked     Self-Exams Not Asked   Social History Narrative     None       Review of Systems:  Skin:  Negative       Eyes:  Positive for glasses    ENT:  Negative for hearing loss    Respiratory:  Positive for shortness of breath;dyspnea on exertion     Cardiovascular:    fatigue;Positive for    Gastroenterology: Negative for      Genitourinary:         Musculoskeletal:  Negative      Neurologic:  Positive for headaches    Psychiatric:  Negative      Heme/Lymph/Imm:  Negative      Endocrine:  Negative        [id number]

## 2019-01-30 NOTE — LETTER
1/30/2019      RADHA Muller Peggs [address]  [facility] [address]      RE: Yoselyn Cui       Dear Colleague,    I had the pleasure of seeing Yoselyn Cui in the [facility].    Service Date: 01/30/2019      HISTORY OF PRESENT ILLNESS:    I had the pleasure of seeing Ms. Yoselyn Cui, a pleasant 87-year-old female with the following cardiac/medical issues:   A.  Symptomatic second-degree AV block 2:1 with permanent pacemaker implantation in 05/2016.   B.  History of CVA x 2 in the past with residual deficits.   C.  Hypertension.   D.  Dyslipidemia.   E.  Iron deficiency anemia with recent negative EGD and colonoscopy.   F.  Admission in 03/2018 with pneumonia and sepsis.      Ms. Cui was hospitalized in 11/2018 with severe anemia due to iron deficiency of unclear source.  Endoscopy and colonoscopy failed to disclose a source of bleeding.  The patient had been taking both low-dose Eliquis and aspirin, and the aspirin was discontinued.  She has been stable since then.      The patient has taken Eliquis for the past 2 years because of paroxysmal atrial fibrillation documented on device interrogation.  AF burden has been less than 1%, but certain episodes last for several hours.      PHYSICAL EXAMINATION:   VITAL SIGNS:  Blood pressure 144/80, pulse 80 and regular.  Weight 48 kilos, height 150 cm.   GENERAL:  She is an elderly woman sitting in a wheelchair.  She is accompanied by her daughter.   NECK:  Supple, with 2+ carotid pulses, no bruits.   LUNGS:  Clear.  No crackles or wheezes.   CARDIOVASCULAR:  Nicely healed left pectoral incision.  Regular rhythm.  No gallop, murmur or rub.   ABDOMEN:  Soft, nontender.  Negative HJR.   EXTREMITIES:  No edema.      IMPRESSION:   1.  Second-degree AV block with pacemaker implantation in 2016.  The patient's pacemaker is functioning well, with estimated battery longevity of approximately 10 years.      2.  Paroxysmal atrial fibrillation.  Iron-deficiency anemia.  She has a [id number]-TRQp score of at least 6.  Apixaban was briefly held in 2018 when she presented with iron-deficiency anemia, but has been resumed since then.  She is no longer on aspirin.  She follows with her primary care physician for this issue.      RECOMMENDATIONS:    Stable from a cardiac standpoint.  Follow-up visit with provider requested in 2 years.  Continue routine Device Clinic checks.        TOMÁS ROSARIO MD, [facility]         cc:   GABE Ramos    [facility] [place] [facility] [address] [place] [address]          D: 2019   T: 2019   MT: KARLI      Name:     ANNE MARIE AMBROSE   MRN:      [id number]        Account:      [id number]   :      1932           Service Date: 2019      Document: [id number]           Outpatient Encounter Medications as of 2019   Medication Sig Dispense Refill     acetaminophen (TYLENOL) 325 MG tablet Take 650 mg by mouth every 6 hours as needed for mild pain        albuterol (PROAIR HFA/PROVENTIL HFA/VENTOLIN HFA) 108 (90 BASE) MCG/ACT Inhaler Inhale 2 puffs into the lungs 4 times daily as needed for other (dyspnea) 6.7 g 0     amLODIPine (NORVASC) 5 MG tablet Take 5 mg by mouth daily        apixaban ANTICOAGULANT (ELIQUIS) 2.5 MG tablet Take 1 tablet (2.5 mg) by mouth 2 times daily 180 tablet 0     atorvastatin (LIPITOR) 20 MG tablet Take 20 mg by mouth At Bedtime       carvedilol (COREG) 25 MG tablet Take 25 mg by mouth 2 times daily (with meals)       ferrous sulfate (IRON) 325 (65 FE) MG tablet Take 325 mg by mouth daily (with breakfast)        fluticasone furoate (ARNUITY ELLIPTA) 200 MCG/ACT inhalation powder Inhale 1 puff into the lungs daily 3 Inhaler 0     lisinopril (PRINIVIL,ZESTRIL) 40 MG tablet Take 40 mg by mouth daily       Multiple Vitamins-Minerals (ICAPS AREDS FORMULA) TABS Take 1 tablet by mouth 2 times daily        OMEPRAZOLE PO Take 20 mg  by mouth every morning        tolterodine (DETROL LA) 4 MG 24 hr capsule Take 4 mg by mouth every morning       [DISCONTINUED] albuterol (PROAIR HFA/PROVENTIL HFA/VENTOLIN HFA) 108 (90 BASE) MCG/ACT Inhaler Inhale 2 puffs into the lungs 4 times daily for 5 days 6.7 g 0     [DISCONTINUED] cefuroxime (CEFTIN) 500 MG tablet Take 1 tablet (500 mg) by mouth 2 times daily for 3 days 6 tablet 0     No facility-administered encounter medications on file as of 1/30/2019.        Again, thank you for allowing me to participate in the care of your patient.      Sincerely,    Jose R Goel MD     [facility]

## 2019-01-30 NOTE — LETTER
1/30/2019    RADHA Muller [place] [address]  [facility] [address]    RE: Yoselyn Cui       Dear Colleague,    I had the pleasure of seeing Yoselyn Cui in the [facility].    HPI and Plan:   See dictation    Orders Placed This Encounter   Procedures     Follow-Up with Cardiac Advanced Practice Provider     EKG 12-lead complete w/read - Clinics (performed today)       No orders of the defined types were placed in this encounter.      Medications Discontinued During This Encounter   Medication Reason     albuterol (PROAIR HFA/PROVENTIL HFA/VENTOLIN HFA) 108 (90 BASE) MCG/ACT Inhaler Medication Reconciliation Clean Up     cefuroxime (CEFTIN) 500 MG tablet Medication Reconciliation Clean Up         Encounter Diagnosis   Name Primary?     Paroxysmal atrial fibrillation (H)        CURRENT MEDICATIONS:  Current Outpatient Medications   Medication Sig Dispense Refill     acetaminophen (TYLENOL) 325 MG tablet Take 650 mg by mouth every 6 hours as needed for mild pain        albuterol (PROAIR HFA/PROVENTIL HFA/VENTOLIN HFA) 108 (90 BASE) MCG/ACT Inhaler Inhale 2 puffs into the lungs 4 times daily as needed for other (dyspnea) 6.7 g 0     amLODIPine (NORVASC) 5 MG tablet Take 5 mg by mouth daily        apixaban ANTICOAGULANT (ELIQUIS) 2.5 MG tablet Take 1 tablet (2.5 mg) by mouth 2 times daily 180 tablet 0     atorvastatin (LIPITOR) 20 MG tablet Take 20 mg by mouth At Bedtime       carvedilol (COREG) 25 MG tablet Take 25 mg by mouth 2 times daily (with meals)       ferrous sulfate (IRON) 325 (65 FE) MG tablet Take 325 mg by mouth daily (with breakfast)        fluticasone furoate (ARNUITY ELLIPTA) 200 MCG/ACT inhalation powder Inhale 1 puff into the lungs daily 3 Inhaler 0     lisinopril (PRINIVIL,ZESTRIL) 40 MG tablet Take 40 mg by mouth daily       Multiple Vitamins-Minerals (ICAPS AREDS FORMULA) TABS Take 1 tablet by mouth 2 times daily        OMEPRAZOLE PO  Take 20 mg by mouth every morning        tolterodine (DETROL LA) 4 MG 24 hr capsule Take 4 mg by mouth every morning         ALLERGIES   No Known Allergies    PAST MEDICAL HISTORY:  Past Medical History:   Diagnosis Date     AV block, 2nd degree 5/16/2016     Cerebrovascular accident (H) 8/22/2016     COKER (dyspnea on exertion) 8/22/2016     Generalized weakness 10/12/2016     GIB (gastrointestinal bleeding)      History of colonic polyps 8/22/2016     HTN (hypertension) 8/22/2016     Iron deficiency anemia      Melanoma of skin (H)-rt arm 8/22/2016     Mixed hyperlipidemia 8/22/2016     Pacemaker     implanted 5-     PAF (paroxysmal atrial fibrillation) (H)      SSS (sick sinus syndrome) (H) 8/22/2016       PAST SURGICAL HISTORY:  Past Surgical History:   Procedure Laterality Date     APPENDECTOMY  1960s     C LIGATE FALLOPIAN TUBE  1960s     C REMV CATARACT INTRACAP,INSERT LENS Right 09/20/2017     ESOPHAGOSCOPY, GASTROSCOPY, DUODENOSCOPY (EGD), COMBINED N/A 11/20/2018    Procedure: ESOPHAGOSCOPY, GASTROSCOPY, DUODENOSCOPY (EGD)  Room 523 with biopsies using cold biopsy forceps;  Surgeon: Ayala Soto MD;  Location: [facility]     HC REMV CATARACT EXTRACAP,INSERT LENS Left 10/04/2017     IMPLANT PACEMAKER  05/17/2016       FAMILY HISTORY:  Family History   Problem Relation Age of Onset     Coronary Artery Disease Father      Coronary Artery Disease Brother      Coronary Artery Disease Sister      Colon Cancer No family hx of        SOCIAL HISTORY:  Social History     Socioeconomic History     Marital status:      Spouse name: None     Number of children: None     Years of education: None     Highest education level: None   Social Needs     Financial resource strain: None     Food insecurity - worry: None     Food insecurity - inability: None     Transportation needs - medical: None     Transportation needs - non-medical: None   Occupational History     None   Tobacco Use     Smoking status:  Never Smoker     Smokeless tobacco: Never Used   Substance and Sexual Activity     Alcohol use: No     Alcohol/week: 0.0 oz     Drug use: No     Sexual activity: None   Other Topics Concern     Parent/sibling w/ CABG, MI or angioplasty before 65F 55M? Not Asked      Service Not Asked     Blood Transfusions Not Asked     Caffeine Concern No     Comment: 3+ cups daily     Occupational Exposure Not Asked     Hobby Hazards Not Asked     Sleep Concern Not Asked     Stress Concern Not Asked     Weight Concern Not Asked     Special Diet No     Comment: trying to cut down on sodium, watching cholesterol     Back Care Not Asked     Exercise No     Comment: limited     Bike Helmet Not Asked     Seat Belt Not Asked     Self-Exams Not Asked   Social History Narrative     None       Review of Systems:  Skin:  Negative       Eyes:  Positive for glasses    ENT:  Negative for hearing loss    Respiratory:  Positive for shortness of breath;dyspnea on exertion     Cardiovascular:    fatigue;Positive for    Gastroenterology: Negative for      Genitourinary:         Musculoskeletal:  Negative      Neurologic:  Positive for headaches    Psychiatric:  Negative      Heme/Lymph/Imm:  Negative      Endocrine:  Negative        [id number]            Thank you for allowing me to participate in the care of your patient.      Sincerely,     Jose R Goel MD     [facility]    cc:   Porsche Mendoza, APRN CNP  [address]  [place] [address]

## 2019-02-15 LAB
MDC_IDC_EPISODE_DTM: NORMAL
MDC_IDC_EPISODE_DURATION: 10 S
MDC_IDC_EPISODE_DURATION: 12 S
MDC_IDC_EPISODE_DURATION: 12 S
MDC_IDC_EPISODE_DURATION: 14 S
MDC_IDC_EPISODE_DURATION: 156 S
MDC_IDC_EPISODE_DURATION: 16 S
MDC_IDC_EPISODE_DURATION: 18 S
MDC_IDC_EPISODE_DURATION: 22 S
MDC_IDC_EPISODE_DURATION: 276 S
MDC_IDC_EPISODE_DURATION: 30 S
MDC_IDC_EPISODE_DURATION: 46 S
MDC_IDC_EPISODE_DURATION: 48 S
MDC_IDC_EPISODE_DURATION: 56 S
MDC_IDC_EPISODE_DURATION: 6 S
MDC_IDC_EPISODE_DURATION: 748 S
MDC_IDC_EPISODE_DURATION: 8 S
MDC_IDC_EPISODE_DURATION: 8 S
MDC_IDC_EPISODE_ID: NORMAL
MDC_IDC_EPISODE_TYPE: NORMAL
MDC_IDC_LEAD_IMPLANT_DT: NORMAL
MDC_IDC_LEAD_IMPLANT_DT: NORMAL
MDC_IDC_LEAD_LOCATION: NORMAL
MDC_IDC_LEAD_LOCATION: NORMAL
MDC_IDC_LEAD_LOCATION_DETAIL_1: NORMAL
MDC_IDC_LEAD_LOCATION_DETAIL_1: NORMAL
MDC_IDC_LEAD_MFG: NORMAL
MDC_IDC_LEAD_MFG: NORMAL
MDC_IDC_LEAD_MODEL: NORMAL
MDC_IDC_LEAD_MODEL: NORMAL
MDC_IDC_LEAD_POLARITY_TYPE: NORMAL
MDC_IDC_LEAD_POLARITY_TYPE: NORMAL
MDC_IDC_LEAD_SERIAL: NORMAL
MDC_IDC_LEAD_SERIAL: NORMAL
MDC_IDC_MSMT_BATTERY_DTM: NORMAL
MDC_IDC_MSMT_BATTERY_REMAINING_LONGEVITY: 122 MO
MDC_IDC_MSMT_BATTERY_REMAINING_PERCENTAGE: 95.5 %
MDC_IDC_MSMT_BATTERY_RRT_TRIGGER: NORMAL
MDC_IDC_MSMT_BATTERY_STATUS: NORMAL
MDC_IDC_MSMT_BATTERY_VOLTAGE: 2.99 V
MDC_IDC_MSMT_LEADCHNL_RA_IMPEDANCE_VALUE: 430 OHM
MDC_IDC_MSMT_LEADCHNL_RA_LEAD_CHANNEL_STATUS: NORMAL
MDC_IDC_MSMT_LEADCHNL_RA_PACING_THRESHOLD_AMPLITUDE: 0.75 V
MDC_IDC_MSMT_LEADCHNL_RA_PACING_THRESHOLD_PULSEWIDTH: 0.5 MS
MDC_IDC_MSMT_LEADCHNL_RA_SENSING_INTR_AMPL: 2.6 MV
MDC_IDC_MSMT_LEADCHNL_RV_IMPEDANCE_VALUE: 460 OHM
MDC_IDC_MSMT_LEADCHNL_RV_LEAD_CHANNEL_STATUS: NORMAL
MDC_IDC_MSMT_LEADCHNL_RV_PACING_THRESHOLD_AMPLITUDE: 0.62 V
MDC_IDC_MSMT_LEADCHNL_RV_PACING_THRESHOLD_PULSEWIDTH: 0.5 MS
MDC_IDC_MSMT_LEADCHNL_RV_SENSING_INTR_AMPL: 11 MV
MDC_IDC_PG_IMPLANT_DTM: NORMAL
MDC_IDC_PG_MFG: NORMAL
MDC_IDC_PG_MODEL: NORMAL
MDC_IDC_PG_SERIAL: NORMAL
MDC_IDC_PG_TYPE: NORMAL
MDC_IDC_SESS_CLINIC_NAME: NORMAL
MDC_IDC_SESS_DTM: NORMAL
MDC_IDC_SESS_REPROGRAMMED: NO
MDC_IDC_SESS_TYPE: NORMAL
MDC_IDC_SET_BRADY_AT_MODE_SWITCH_MODE: NORMAL
MDC_IDC_SET_BRADY_AT_MODE_SWITCH_RATE: 180 {BEATS}/MIN
MDC_IDC_SET_BRADY_LOWRATE: 60 {BEATS}/MIN
MDC_IDC_SET_BRADY_MAX_SENSOR_RATE: 120 {BEATS}/MIN
MDC_IDC_SET_BRADY_MAX_TRACKING_RATE: 120 {BEATS}/MIN
MDC_IDC_SET_BRADY_MODE: NORMAL
MDC_IDC_SET_BRADY_PAV_DELAY_HIGH: 100 MS
MDC_IDC_SET_BRADY_PAV_DELAY_LOW: 200 MS
MDC_IDC_SET_BRADY_SAV_DELAY_HIGH: 100 MS
MDC_IDC_SET_BRADY_SAV_DELAY_LOW: 170 MS
MDC_IDC_SET_LEADCHNL_RA_PACING_AMPLITUDE: 1.75 V
MDC_IDC_SET_LEADCHNL_RA_PACING_ANODE_ELECTRODE_1: NORMAL
MDC_IDC_SET_LEADCHNL_RA_PACING_ANODE_LOCATION_1: NORMAL
MDC_IDC_SET_LEADCHNL_RA_PACING_CAPTURE_MODE: NORMAL
MDC_IDC_SET_LEADCHNL_RA_PACING_CATHODE_ELECTRODE_1: NORMAL
MDC_IDC_SET_LEADCHNL_RA_PACING_CATHODE_LOCATION_1: NORMAL
MDC_IDC_SET_LEADCHNL_RA_PACING_POLARITY: NORMAL
MDC_IDC_SET_LEADCHNL_RA_PACING_PULSEWIDTH: 0.5 MS
MDC_IDC_SET_LEADCHNL_RA_SENSING_ADAPTATION_MODE: NORMAL
MDC_IDC_SET_LEADCHNL_RA_SENSING_ANODE_ELECTRODE_1: NORMAL
MDC_IDC_SET_LEADCHNL_RA_SENSING_ANODE_LOCATION_1: NORMAL
MDC_IDC_SET_LEADCHNL_RA_SENSING_CATHODE_ELECTRODE_1: NORMAL
MDC_IDC_SET_LEADCHNL_RA_SENSING_CATHODE_LOCATION_1: NORMAL
MDC_IDC_SET_LEADCHNL_RA_SENSING_POLARITY: NORMAL
MDC_IDC_SET_LEADCHNL_RA_SENSING_SENSITIVITY: 0.5 MV
MDC_IDC_SET_LEADCHNL_RV_PACING_AMPLITUDE: 0.88
MDC_IDC_SET_LEADCHNL_RV_PACING_ANODE_ELECTRODE_1: NORMAL
MDC_IDC_SET_LEADCHNL_RV_PACING_ANODE_LOCATION_1: NORMAL
MDC_IDC_SET_LEADCHNL_RV_PACING_CAPTURE_MODE: NORMAL
MDC_IDC_SET_LEADCHNL_RV_PACING_CATHODE_ELECTRODE_1: NORMAL
MDC_IDC_SET_LEADCHNL_RV_PACING_CATHODE_LOCATION_1: NORMAL
MDC_IDC_SET_LEADCHNL_RV_PACING_POLARITY: NORMAL
MDC_IDC_SET_LEADCHNL_RV_PACING_PULSEWIDTH: 0.5 MS
MDC_IDC_SET_LEADCHNL_RV_SENSING_ADAPTATION_MODE: NORMAL
MDC_IDC_SET_LEADCHNL_RV_SENSING_ANODE_ELECTRODE_1: NORMAL
MDC_IDC_SET_LEADCHNL_RV_SENSING_ANODE_LOCATION_1: NORMAL
MDC_IDC_SET_LEADCHNL_RV_SENSING_CATHODE_ELECTRODE_1: NORMAL
MDC_IDC_SET_LEADCHNL_RV_SENSING_CATHODE_LOCATION_1: NORMAL
MDC_IDC_SET_LEADCHNL_RV_SENSING_POLARITY: NORMAL
MDC_IDC_SET_LEADCHNL_RV_SENSING_SENSITIVITY: 2 MV
MDC_IDC_STAT_AT_BURDEN_PERCENT: 1 %
MDC_IDC_STAT_AT_DTM_END: NORMAL
MDC_IDC_STAT_AT_DTM_START: NORMAL
MDC_IDC_STAT_AT_MODE_SW_COUNT: 95
MDC_IDC_STAT_AT_MODE_SW_COUNT_PER_DAY: 1
MDC_IDC_STAT_AT_MODE_SW_MAX_DURATION: 748 S
MDC_IDC_STAT_AT_MODE_SW_PERCENT_TIME: 1 %
MDC_IDC_STAT_BRADY_AP_VP_PERCENT: 1 %
MDC_IDC_STAT_BRADY_AP_VS_PERCENT: 1 %
MDC_IDC_STAT_BRADY_AS_VP_PERCENT: 99 %
MDC_IDC_STAT_BRADY_AS_VS_PERCENT: 1 %
MDC_IDC_STAT_BRADY_DTM_END: NORMAL
MDC_IDC_STAT_BRADY_DTM_START: NORMAL
MDC_IDC_STAT_BRADY_RA_PERCENT_PACED: 1 %
MDC_IDC_STAT_BRADY_RV_PERCENT_PACED: 99 %
MDC_IDC_STAT_CRT_DTM_END: NORMAL
MDC_IDC_STAT_CRT_DTM_START: NORMAL
MDC_IDC_STAT_HEART_RATE_ATRIAL_MAX: 330 {BEATS}/MIN
MDC_IDC_STAT_HEART_RATE_ATRIAL_MEAN: 79 {BEATS}/MIN
MDC_IDC_STAT_HEART_RATE_ATRIAL_MIN: 30 {BEATS}/MIN
MDC_IDC_STAT_HEART_RATE_DTM_END: NORMAL
MDC_IDC_STAT_HEART_RATE_DTM_START: NORMAL
MDC_IDC_STAT_HEART_RATE_VENTRICULAR_MAX: 180 {BEATS}/MIN
MDC_IDC_STAT_HEART_RATE_VENTRICULAR_MEAN: 79 {BEATS}/MIN
MDC_IDC_STAT_HEART_RATE_VENTRICULAR_MIN: 40 {BEATS}/MIN

## 2019-05-06 ENCOUNTER — ANCILLARY PROCEDURE (OUTPATIENT)
Dept: CARDIOLOGY | Facility: CLINIC | Age: 84
End: 2019-05-06
Payer: COMMERCIAL

## 2019-05-06 DIAGNOSIS — I44.1 AV BLOCK, 2ND DEGREE: ICD-10-CM

## 2019-05-06 LAB
MDC_IDC_EPISODE_DTM: NORMAL
MDC_IDC_EPISODE_DURATION: 10 S
MDC_IDC_EPISODE_DURATION: 10 S
MDC_IDC_EPISODE_DURATION: 12 S
MDC_IDC_EPISODE_DURATION: 16 S
MDC_IDC_EPISODE_DURATION: 16 S
MDC_IDC_EPISODE_DURATION: 20 S
MDC_IDC_EPISODE_DURATION: 20 S
MDC_IDC_EPISODE_DURATION: 28 S
MDC_IDC_EPISODE_DURATION: 28 S
MDC_IDC_EPISODE_DURATION: 38 S
MDC_IDC_EPISODE_DURATION: 44 S
MDC_IDC_EPISODE_DURATION: 8 S
MDC_IDC_EPISODE_DURATION: 96 S
MDC_IDC_EPISODE_DURATION: 96 S
MDC_IDC_EPISODE_ID: NORMAL
MDC_IDC_EPISODE_TYPE: NORMAL
MDC_IDC_LEAD_IMPLANT_DT: NORMAL
MDC_IDC_LEAD_IMPLANT_DT: NORMAL
MDC_IDC_LEAD_LOCATION: NORMAL
MDC_IDC_LEAD_LOCATION: NORMAL
MDC_IDC_LEAD_LOCATION_DETAIL_1: NORMAL
MDC_IDC_LEAD_LOCATION_DETAIL_1: NORMAL
MDC_IDC_LEAD_MFG: NORMAL
MDC_IDC_LEAD_MFG: NORMAL
MDC_IDC_LEAD_MODEL: NORMAL
MDC_IDC_LEAD_MODEL: NORMAL
MDC_IDC_LEAD_POLARITY_TYPE: NORMAL
MDC_IDC_LEAD_POLARITY_TYPE: NORMAL
MDC_IDC_LEAD_SERIAL: NORMAL
MDC_IDC_LEAD_SERIAL: NORMAL
MDC_IDC_MSMT_BATTERY_DTM: NORMAL
MDC_IDC_MSMT_BATTERY_REMAINING_LONGEVITY: 126 MO
MDC_IDC_MSMT_BATTERY_REMAINING_PERCENTAGE: 95.5 %
MDC_IDC_MSMT_BATTERY_RRT_TRIGGER: NORMAL
MDC_IDC_MSMT_BATTERY_STATUS: NORMAL
MDC_IDC_MSMT_BATTERY_VOLTAGE: 2.99 V
MDC_IDC_MSMT_LEADCHNL_RA_IMPEDANCE_VALUE: 490 OHM
MDC_IDC_MSMT_LEADCHNL_RA_LEAD_CHANNEL_STATUS: NORMAL
MDC_IDC_MSMT_LEADCHNL_RA_PACING_THRESHOLD_AMPLITUDE: 0.62 V
MDC_IDC_MSMT_LEADCHNL_RA_PACING_THRESHOLD_PULSEWIDTH: 0.5 MS
MDC_IDC_MSMT_LEADCHNL_RA_SENSING_INTR_AMPL: 3.5 MV
MDC_IDC_MSMT_LEADCHNL_RV_IMPEDANCE_VALUE: 490 OHM
MDC_IDC_MSMT_LEADCHNL_RV_LEAD_CHANNEL_STATUS: NORMAL
MDC_IDC_MSMT_LEADCHNL_RV_PACING_THRESHOLD_AMPLITUDE: 0.5 V
MDC_IDC_MSMT_LEADCHNL_RV_PACING_THRESHOLD_PULSEWIDTH: 0.5 MS
MDC_IDC_MSMT_LEADCHNL_RV_SENSING_INTR_AMPL: 12 MV
MDC_IDC_PG_IMPLANT_DTM: NORMAL
MDC_IDC_PG_MFG: NORMAL
MDC_IDC_PG_MODEL: NORMAL
MDC_IDC_PG_SERIAL: NORMAL
MDC_IDC_PG_TYPE: NORMAL
MDC_IDC_SESS_CLINIC_NAME: NORMAL
MDC_IDC_SESS_DTM: NORMAL
MDC_IDC_SESS_REPROGRAMMED: NO
MDC_IDC_SESS_TYPE: NORMAL
MDC_IDC_SET_BRADY_AT_MODE_SWITCH_MODE: NORMAL
MDC_IDC_SET_BRADY_AT_MODE_SWITCH_RATE: 180 {BEATS}/MIN
MDC_IDC_SET_BRADY_LOWRATE: 60 {BEATS}/MIN
MDC_IDC_SET_BRADY_MAX_SENSOR_RATE: 120 {BEATS}/MIN
MDC_IDC_SET_BRADY_MAX_TRACKING_RATE: 120 {BEATS}/MIN
MDC_IDC_SET_BRADY_MODE: NORMAL
MDC_IDC_SET_BRADY_PAV_DELAY_HIGH: 100 MS
MDC_IDC_SET_BRADY_PAV_DELAY_LOW: 200 MS
MDC_IDC_SET_BRADY_SAV_DELAY_HIGH: 100 MS
MDC_IDC_SET_BRADY_SAV_DELAY_LOW: 170 MS
MDC_IDC_SET_LEADCHNL_RA_PACING_AMPLITUDE: 1.62
MDC_IDC_SET_LEADCHNL_RA_PACING_ANODE_ELECTRODE_1: NORMAL
MDC_IDC_SET_LEADCHNL_RA_PACING_ANODE_LOCATION_1: NORMAL
MDC_IDC_SET_LEADCHNL_RA_PACING_CAPTURE_MODE: NORMAL
MDC_IDC_SET_LEADCHNL_RA_PACING_CATHODE_ELECTRODE_1: NORMAL
MDC_IDC_SET_LEADCHNL_RA_PACING_CATHODE_LOCATION_1: NORMAL
MDC_IDC_SET_LEADCHNL_RA_PACING_POLARITY: NORMAL
MDC_IDC_SET_LEADCHNL_RA_PACING_PULSEWIDTH: 0.5 MS
MDC_IDC_SET_LEADCHNL_RA_SENSING_ADAPTATION_MODE: NORMAL
MDC_IDC_SET_LEADCHNL_RA_SENSING_ANODE_ELECTRODE_1: NORMAL
MDC_IDC_SET_LEADCHNL_RA_SENSING_ANODE_LOCATION_1: NORMAL
MDC_IDC_SET_LEADCHNL_RA_SENSING_CATHODE_ELECTRODE_1: NORMAL
MDC_IDC_SET_LEADCHNL_RA_SENSING_CATHODE_LOCATION_1: NORMAL
MDC_IDC_SET_LEADCHNL_RA_SENSING_POLARITY: NORMAL
MDC_IDC_SET_LEADCHNL_RA_SENSING_SENSITIVITY: 0.5 MV
MDC_IDC_SET_LEADCHNL_RV_PACING_AMPLITUDE: 0.75 V
MDC_IDC_SET_LEADCHNL_RV_PACING_ANODE_ELECTRODE_1: NORMAL
MDC_IDC_SET_LEADCHNL_RV_PACING_ANODE_LOCATION_1: NORMAL
MDC_IDC_SET_LEADCHNL_RV_PACING_CAPTURE_MODE: NORMAL
MDC_IDC_SET_LEADCHNL_RV_PACING_CATHODE_ELECTRODE_1: NORMAL
MDC_IDC_SET_LEADCHNL_RV_PACING_CATHODE_LOCATION_1: NORMAL
MDC_IDC_SET_LEADCHNL_RV_PACING_POLARITY: NORMAL
MDC_IDC_SET_LEADCHNL_RV_PACING_PULSEWIDTH: 0.5 MS
MDC_IDC_SET_LEADCHNL_RV_SENSING_ADAPTATION_MODE: NORMAL
MDC_IDC_SET_LEADCHNL_RV_SENSING_ANODE_ELECTRODE_1: NORMAL
MDC_IDC_SET_LEADCHNL_RV_SENSING_ANODE_LOCATION_1: NORMAL
MDC_IDC_SET_LEADCHNL_RV_SENSING_CATHODE_ELECTRODE_1: NORMAL
MDC_IDC_SET_LEADCHNL_RV_SENSING_CATHODE_LOCATION_1: NORMAL
MDC_IDC_SET_LEADCHNL_RV_SENSING_POLARITY: NORMAL
MDC_IDC_SET_LEADCHNL_RV_SENSING_SENSITIVITY: 2 MV
MDC_IDC_STAT_AT_BURDEN_PERCENT: 1 %
MDC_IDC_STAT_AT_DTM_END: NORMAL
MDC_IDC_STAT_AT_DTM_START: NORMAL
MDC_IDC_STAT_AT_MODE_SW_COUNT: 34
MDC_IDC_STAT_AT_MODE_SW_COUNT_PER_DAY: 0
MDC_IDC_STAT_AT_MODE_SW_MAX_DURATION: 96 S
MDC_IDC_STAT_AT_MODE_SW_PERCENT_TIME: 1 %
MDC_IDC_STAT_BRADY_AP_VP_PERCENT: 1 %
MDC_IDC_STAT_BRADY_AP_VS_PERCENT: 1 %
MDC_IDC_STAT_BRADY_AS_VP_PERCENT: 99 %
MDC_IDC_STAT_BRADY_AS_VS_PERCENT: 1 %
MDC_IDC_STAT_BRADY_DTM_END: NORMAL
MDC_IDC_STAT_BRADY_DTM_START: NORMAL
MDC_IDC_STAT_BRADY_RA_PERCENT_PACED: 1 %
MDC_IDC_STAT_BRADY_RV_PERCENT_PACED: 99 %
MDC_IDC_STAT_CRT_DTM_END: NORMAL
MDC_IDC_STAT_CRT_DTM_START: NORMAL
MDC_IDC_STAT_HEART_RATE_ATRIAL_MAX: 330 {BEATS}/MIN
MDC_IDC_STAT_HEART_RATE_ATRIAL_MEAN: 79 {BEATS}/MIN
MDC_IDC_STAT_HEART_RATE_ATRIAL_MIN: 40 {BEATS}/MIN
MDC_IDC_STAT_HEART_RATE_DTM_END: NORMAL
MDC_IDC_STAT_HEART_RATE_DTM_START: NORMAL
MDC_IDC_STAT_HEART_RATE_VENTRICULAR_MAX: 190 {BEATS}/MIN
MDC_IDC_STAT_HEART_RATE_VENTRICULAR_MEAN: 79 {BEATS}/MIN
MDC_IDC_STAT_HEART_RATE_VENTRICULAR_MIN: 40 {BEATS}/MIN

## 2019-05-06 PROCEDURE — 93296 REM INTERROG EVL PM/IDS: CPT | Performed by: INTERNAL MEDICINE

## 2019-05-06 PROCEDURE — 93294 REM INTERROG EVL PM/LDLS PM: CPT | Performed by: INTERNAL MEDICINE

## 2019-05-11 NOTE — PROGRESS NOTES
[address] 2240 (D) Remote PPM Device Check  AP: <1% : >99%  Mode: DDDR        Presenting Rhythm: AS/  Heart Rate: adequate heart rates per histogram  Sensing: RA: stable RV: not performed    Pacing Threshold: stable    Impedance: stable  Battery Status: 9.8 - 10.5 years remaining  Atrial Arrhythmia: 64 mode switch episodes comprising <1% of the time. Longest episode lasted 4 hrs 24 min. Controlled vent response while in mode switch. Taking Eliquis.  Ventricular Arrhythmia: none     Care Plan: Annual threshold scheduled in August. Order in for annual OV to be scheduled in January 2019. Gave results over the phone to [place] PERDOMO                     Yes

## 2019-07-16 ENCOUNTER — APPOINTMENT (OUTPATIENT)
Dept: CARDIOLOGY | Facility: CLINIC | Age: 84
DRG: 247 | End: 2019-07-16
Attending: PHYSICIAN ASSISTANT
Payer: MEDICARE

## 2019-07-16 ENCOUNTER — HOSPITAL ENCOUNTER (INPATIENT)
Facility: CLINIC | Age: 84
LOS: 1 days | Discharge: ANOTHER HOSPITAL | DRG: 247 | End: 2019-07-17
Attending: EMERGENCY MEDICINE | Admitting: INTERNAL MEDICINE
Payer: MEDICARE

## 2019-07-16 ENCOUNTER — APPOINTMENT (OUTPATIENT)
Dept: GENERAL RADIOLOGY | Facility: CLINIC | Age: 84
DRG: 247 | End: 2019-07-16
Attending: EMERGENCY MEDICINE
Payer: MEDICARE

## 2019-07-16 DIAGNOSIS — I20.0 UNSTABLE ANGINA (H): ICD-10-CM

## 2019-07-16 DIAGNOSIS — I21.4 NSTEMI (NON-ST ELEVATED MYOCARDIAL INFARCTION) (H): ICD-10-CM

## 2019-07-16 PROBLEM — I24.9 ACS (ACUTE CORONARY SYNDROME) (H): Status: ACTIVE | Noted: 2019-07-16

## 2019-07-16 LAB
ANION GAP SERPL CALCULATED.3IONS-SCNC: 6 MMOL/L (ref 3–14)
APTT PPP: 42 SEC (ref 22–37)
BASOPHILS # BLD AUTO: 0.1 10E9/L (ref 0–0.2)
BASOPHILS NFR BLD AUTO: 0.6 %
BUN SERPL-MCNC: 18 MG/DL (ref 7–30)
CALCIUM SERPL-MCNC: 8.7 MG/DL (ref 8.5–10.1)
CHLORIDE SERPL-SCNC: 108 MMOL/L (ref 94–109)
CO2 SERPL-SCNC: 25 MMOL/L (ref 20–32)
CREAT SERPL-MCNC: 0.67 MG/DL (ref 0.52–1.04)
DIFFERENTIAL METHOD BLD: ABNORMAL
EOSINOPHIL # BLD AUTO: 0.2 10E9/L (ref 0–0.7)
EOSINOPHIL NFR BLD AUTO: 2.2 %
ERYTHROCYTE [DISTWIDTH] IN BLOOD BY AUTOMATED COUNT: 14.5 % (ref 10–15)
ERYTHROCYTE [DISTWIDTH] IN BLOOD BY AUTOMATED COUNT: 14.6 % (ref 10–15)
GFR SERPL CREATININE-BSD FRML MDRD: 79 ML/MIN/{1.73_M2}
GLUCOSE SERPL-MCNC: 120 MG/DL (ref 70–99)
HCT VFR BLD AUTO: 40.5 % (ref 35–47)
HCT VFR BLD AUTO: 42.2 % (ref 35–47)
HGB BLD-MCNC: 13 G/DL (ref 11.7–15.7)
HGB BLD-MCNC: 13.2 G/DL (ref 11.7–15.7)
IMM GRANULOCYTES # BLD: 0.1 10E9/L (ref 0–0.4)
IMM GRANULOCYTES NFR BLD: 0.6 %
INTERPRETATION ECG - MUSE: NORMAL
LMWH PPP CHRO-ACNC: 0.42 IU/ML
LYMPHOCYTES # BLD AUTO: 2.2 10E9/L (ref 0.8–5.3)
LYMPHOCYTES NFR BLD AUTO: 22.2 %
MCH RBC QN AUTO: 29.1 PG (ref 26.5–33)
MCH RBC QN AUTO: 29.6 PG (ref 26.5–33)
MCHC RBC AUTO-ENTMCNC: 31.3 G/DL (ref 31.5–36.5)
MCHC RBC AUTO-ENTMCNC: 32.1 G/DL (ref 31.5–36.5)
MCV RBC AUTO: 92 FL (ref 78–100)
MCV RBC AUTO: 93 FL (ref 78–100)
MONOCYTES # BLD AUTO: 1 10E9/L (ref 0–1.3)
MONOCYTES NFR BLD AUTO: 10 %
NEUTROPHILS # BLD AUTO: 6.5 10E9/L (ref 1.6–8.3)
NEUTROPHILS NFR BLD AUTO: 64.4 %
NRBC # BLD AUTO: 0 10*3/UL
NRBC BLD AUTO-RTO: 0 /100
PLATELET # BLD AUTO: 171 10E9/L (ref 150–450)
PLATELET # BLD AUTO: 203 10E9/L (ref 150–450)
POTASSIUM SERPL-SCNC: 4 MMOL/L (ref 3.4–5.3)
RBC # BLD AUTO: 4.39 10E12/L (ref 3.8–5.2)
RBC # BLD AUTO: 4.54 10E12/L (ref 3.8–5.2)
SODIUM SERPL-SCNC: 139 MMOL/L (ref 133–144)
TROPONIN I SERPL-MCNC: 0.99 UG/L (ref 0–0.04)
TROPONIN I SERPL-MCNC: 18.29 UG/L (ref 0–0.04)
TROPONIN I SERPL-MCNC: 23.94 UG/L (ref 0–0.04)
WBC # BLD AUTO: 10.1 10E9/L (ref 4–11)
WBC # BLD AUTO: 11.7 10E9/L (ref 4–11)

## 2019-07-16 PROCEDURE — 99223 1ST HOSP IP/OBS HIGH 75: CPT | Mod: AI | Performed by: INTERNAL MEDICINE

## 2019-07-16 PROCEDURE — 80048 BASIC METABOLIC PNL TOTAL CA: CPT | Performed by: EMERGENCY MEDICINE

## 2019-07-16 PROCEDURE — 85027 COMPLETE CBC AUTOMATED: CPT | Performed by: PHYSICIAN ASSISTANT

## 2019-07-16 PROCEDURE — 93306 TTE W/DOPPLER COMPLETE: CPT | Mod: 26 | Performed by: INTERNAL MEDICINE

## 2019-07-16 PROCEDURE — 84484 ASSAY OF TROPONIN QUANT: CPT | Performed by: EMERGENCY MEDICINE

## 2019-07-16 PROCEDURE — 85730 THROMBOPLASTIN TIME PARTIAL: CPT | Performed by: PHYSICIAN ASSISTANT

## 2019-07-16 PROCEDURE — 85025 COMPLETE CBC W/AUTO DIFF WBC: CPT | Performed by: EMERGENCY MEDICINE

## 2019-07-16 PROCEDURE — 25500064 ZZH RX 255 OP 636: Performed by: INTERNAL MEDICINE

## 2019-07-16 PROCEDURE — 96376 TX/PRO/DX INJ SAME DRUG ADON: CPT

## 2019-07-16 PROCEDURE — 99223 1ST HOSP IP/OBS HIGH 75: CPT | Performed by: INTERNAL MEDICINE

## 2019-07-16 PROCEDURE — 12000000 ZZH R&B MED SURG/OB

## 2019-07-16 PROCEDURE — 99285 EMERGENCY DEPT VISIT HI MDM: CPT | Mod: 25

## 2019-07-16 PROCEDURE — 25000128 H RX IP 250 OP 636: Performed by: INTERNAL MEDICINE

## 2019-07-16 PROCEDURE — 84484 ASSAY OF TROPONIN QUANT: CPT | Performed by: PHYSICIAN ASSISTANT

## 2019-07-16 PROCEDURE — 36415 COLL VENOUS BLD VENIPUNCTURE: CPT | Performed by: PHYSICIAN ASSISTANT

## 2019-07-16 PROCEDURE — 71046 X-RAY EXAM CHEST 2 VIEWS: CPT

## 2019-07-16 PROCEDURE — 85520 HEPARIN ASSAY: CPT | Performed by: PHYSICIAN ASSISTANT

## 2019-07-16 PROCEDURE — 25000132 ZZH RX MED GY IP 250 OP 250 PS 637: Performed by: PHYSICIAN ASSISTANT

## 2019-07-16 PROCEDURE — 96365 THER/PROPH/DIAG IV INF INIT: CPT

## 2019-07-16 PROCEDURE — 25000128 H RX IP 250 OP 636

## 2019-07-16 PROCEDURE — 93005 ELECTROCARDIOGRAM TRACING: CPT

## 2019-07-16 PROCEDURE — 40000264 ECHOCARDIOGRAM COMPLETE

## 2019-07-16 RX ORDER — ASPIRIN 81 MG/1
81 TABLET, CHEWABLE ORAL DAILY
Status: DISCONTINUED | OUTPATIENT
Start: 2019-07-17 | End: 2019-07-16

## 2019-07-16 RX ORDER — AMOXICILLIN 250 MG
1 CAPSULE ORAL 2 TIMES DAILY PRN
Status: DISCONTINUED | OUTPATIENT
Start: 2019-07-16 | End: 2019-07-17 | Stop reason: HOSPADM

## 2019-07-16 RX ORDER — ASPIRIN 81 MG/1
324 TABLET, CHEWABLE ORAL ONCE
Status: COMPLETED | OUTPATIENT
Start: 2019-07-16 | End: 2019-07-16

## 2019-07-16 RX ORDER — LISINOPRIL 40 MG/1
40 TABLET ORAL DAILY
Status: DISCONTINUED | OUTPATIENT
Start: 2019-07-16 | End: 2019-07-17 | Stop reason: HOSPADM

## 2019-07-16 RX ORDER — AMLODIPINE BESYLATE 5 MG/1
5 TABLET ORAL DAILY
Status: DISCONTINUED | OUTPATIENT
Start: 2019-07-16 | End: 2019-07-17 | Stop reason: HOSPADM

## 2019-07-16 RX ORDER — ASPIRIN 81 MG/1
162 TABLET, CHEWABLE ORAL ONCE
Status: DISCONTINUED | OUTPATIENT
Start: 2019-07-16 | End: 2019-07-16

## 2019-07-16 RX ORDER — PROCHLORPERAZINE MALEATE 5 MG
5 TABLET ORAL EVERY 6 HOURS PRN
Status: DISCONTINUED | OUTPATIENT
Start: 2019-07-16 | End: 2019-07-17 | Stop reason: HOSPADM

## 2019-07-16 RX ORDER — LIDOCAINE 40 MG/G
CREAM TOPICAL
Status: DISCONTINUED | OUTPATIENT
Start: 2019-07-16 | End: 2019-07-17

## 2019-07-16 RX ORDER — PROCHLORPERAZINE 25 MG
12.5 SUPPOSITORY, RECTAL RECTAL EVERY 12 HOURS PRN
Status: DISCONTINUED | OUTPATIENT
Start: 2019-07-16 | End: 2019-07-16

## 2019-07-16 RX ORDER — ALUMINA, MAGNESIA, AND SIMETHICONE 2400; 2400; 240 MG/30ML; MG/30ML; MG/30ML
30 SUSPENSION ORAL EVERY 4 HOURS PRN
Status: DISCONTINUED | OUTPATIENT
Start: 2019-07-16 | End: 2019-07-17 | Stop reason: HOSPADM

## 2019-07-16 RX ORDER — MORPHINE SULFATE 4 MG/ML
1-2 INJECTION, SOLUTION INTRAMUSCULAR; INTRAVENOUS
Status: DISCONTINUED | OUTPATIENT
Start: 2019-07-16 | End: 2019-07-17 | Stop reason: HOSPADM

## 2019-07-16 RX ORDER — ALUMINA, MAGNESIA, AND SIMETHICONE 2400; 2400; 240 MG/30ML; MG/30ML; MG/30ML
30 SUSPENSION ORAL EVERY 4 HOURS PRN
Status: DISCONTINUED | OUTPATIENT
Start: 2019-07-16 | End: 2019-07-16

## 2019-07-16 RX ORDER — ATORVASTATIN CALCIUM 20 MG/1
20 TABLET, FILM COATED ORAL AT BEDTIME
Status: DISCONTINUED | OUTPATIENT
Start: 2019-07-16 | End: 2019-07-17 | Stop reason: HOSPADM

## 2019-07-16 RX ORDER — ACETAMINOPHEN 650 MG/1
650 SUPPOSITORY RECTAL EVERY 4 HOURS PRN
Status: DISCONTINUED | OUTPATIENT
Start: 2019-07-16 | End: 2019-07-17 | Stop reason: HOSPADM

## 2019-07-16 RX ORDER — ACETAMINOPHEN 650 MG/1
650 SUPPOSITORY RECTAL EVERY 4 HOURS PRN
Status: DISCONTINUED | OUTPATIENT
Start: 2019-07-16 | End: 2019-07-16

## 2019-07-16 RX ORDER — NALOXONE HYDROCHLORIDE 0.4 MG/ML
.1-.4 INJECTION, SOLUTION INTRAMUSCULAR; INTRAVENOUS; SUBCUTANEOUS
Status: DISCONTINUED | OUTPATIENT
Start: 2019-07-16 | End: 2019-07-17 | Stop reason: HOSPADM

## 2019-07-16 RX ORDER — ONDANSETRON 2 MG/ML
4 INJECTION INTRAMUSCULAR; INTRAVENOUS EVERY 6 HOURS PRN
Status: DISCONTINUED | OUTPATIENT
Start: 2019-07-16 | End: 2019-07-17 | Stop reason: HOSPADM

## 2019-07-16 RX ORDER — ONDANSETRON 4 MG/1
4 TABLET, ORALLY DISINTEGRATING ORAL EVERY 6 HOURS PRN
Status: DISCONTINUED | OUTPATIENT
Start: 2019-07-16 | End: 2019-07-17

## 2019-07-16 RX ORDER — AMOXICILLIN 250 MG
2 CAPSULE ORAL 2 TIMES DAILY PRN
Status: DISCONTINUED | OUTPATIENT
Start: 2019-07-16 | End: 2019-07-17 | Stop reason: HOSPADM

## 2019-07-16 RX ORDER — ASPIRIN 81 MG/1
81 TABLET, CHEWABLE ORAL DAILY
Status: DISCONTINUED | OUTPATIENT
Start: 2019-07-17 | End: 2019-07-17 | Stop reason: HOSPADM

## 2019-07-16 RX ORDER — POLYETHYLENE GLYCOL 3350 17 G/17G
17 POWDER, FOR SOLUTION ORAL DAILY PRN
Status: DISCONTINUED | OUTPATIENT
Start: 2019-07-16 | End: 2019-07-17 | Stop reason: HOSPADM

## 2019-07-16 RX ORDER — ACETAMINOPHEN 325 MG/1
650 TABLET ORAL EVERY 4 HOURS PRN
Status: DISCONTINUED | OUTPATIENT
Start: 2019-07-16 | End: 2019-07-16

## 2019-07-16 RX ORDER — FERROUS SULFATE 325(65) MG
325 TABLET ORAL
Status: DISCONTINUED | OUTPATIENT
Start: 2019-07-17 | End: 2019-07-17 | Stop reason: HOSPADM

## 2019-07-16 RX ORDER — ONDANSETRON 4 MG/1
4 TABLET, ORALLY DISINTEGRATING ORAL EVERY 6 HOURS PRN
Status: DISCONTINUED | OUTPATIENT
Start: 2019-07-16 | End: 2019-07-17 | Stop reason: HOSPADM

## 2019-07-16 RX ORDER — PROCHLORPERAZINE MALEATE 5 MG
5 TABLET ORAL EVERY 6 HOURS PRN
Status: DISCONTINUED | OUTPATIENT
Start: 2019-07-16 | End: 2019-07-16

## 2019-07-16 RX ORDER — ACETAMINOPHEN 325 MG/1
650 TABLET ORAL EVERY 4 HOURS PRN
Status: DISCONTINUED | OUTPATIENT
Start: 2019-07-16 | End: 2019-07-17 | Stop reason: HOSPADM

## 2019-07-16 RX ORDER — ONDANSETRON 2 MG/ML
4 INJECTION INTRAMUSCULAR; INTRAVENOUS EVERY 6 HOURS PRN
Status: DISCONTINUED | OUTPATIENT
Start: 2019-07-16 | End: 2019-07-17

## 2019-07-16 RX ORDER — NALOXONE HYDROCHLORIDE 0.4 MG/ML
.1-.4 INJECTION, SOLUTION INTRAMUSCULAR; INTRAVENOUS; SUBCUTANEOUS
Status: DISCONTINUED | OUTPATIENT
Start: 2019-07-16 | End: 2019-07-17

## 2019-07-16 RX ORDER — TOLTERODINE 2 MG/1
4 CAPSULE, EXTENDED RELEASE ORAL EVERY MORNING
Status: DISCONTINUED | OUTPATIENT
Start: 2019-07-17 | End: 2019-07-17 | Stop reason: HOSPADM

## 2019-07-16 RX ORDER — CARVEDILOL 25 MG/1
25 TABLET ORAL 2 TIMES DAILY WITH MEALS
Status: DISCONTINUED | OUTPATIENT
Start: 2019-07-16 | End: 2019-07-17 | Stop reason: HOSPADM

## 2019-07-16 RX ORDER — PROCHLORPERAZINE 25 MG
12.5 SUPPOSITORY, RECTAL RECTAL EVERY 12 HOURS PRN
Status: DISCONTINUED | OUTPATIENT
Start: 2019-07-16 | End: 2019-07-17 | Stop reason: HOSPADM

## 2019-07-16 RX ORDER — MORPHINE SULFATE 4 MG/ML
1-2 INJECTION, SOLUTION INTRAMUSCULAR; INTRAVENOUS
Status: DISCONTINUED | OUTPATIENT
Start: 2019-07-16 | End: 2019-07-17

## 2019-07-16 RX ADMIN — ATORVASTATIN CALCIUM 20 MG: 20 TABLET, FILM COATED ORAL at 21:19

## 2019-07-16 RX ADMIN — HEPARIN SODIUM 550 UNITS/HR: 10000 INJECTION, SOLUTION INTRAVENOUS at 10:54

## 2019-07-16 RX ADMIN — LISINOPRIL 40 MG: 40 TABLET ORAL at 14:32

## 2019-07-16 RX ADMIN — HUMAN ALBUMIN MICROSPHERES AND PERFLUTREN 3 ML: 10; .22 INJECTION, SOLUTION INTRAVENOUS at 13:05

## 2019-07-16 RX ADMIN — AMLODIPINE BESYLATE 5 MG: 5 TABLET ORAL at 14:32

## 2019-07-16 RX ADMIN — FLUTICASONE FUROATE 1 PUFF: 200 POWDER RESPIRATORY (INHALATION) at 14:32

## 2019-07-16 RX ADMIN — ASPIRIN 81 MG 324 MG: 81 TABLET ORAL at 14:32

## 2019-07-16 RX ADMIN — HEPARIN SODIUM 650 UNITS/HR: 10000 INJECTION, SOLUTION INTRAVENOUS at 18:07

## 2019-07-16 RX ADMIN — Medication 1400 UNITS: at 10:54

## 2019-07-16 RX ADMIN — CARVEDILOL 25 MG: 25 TABLET, FILM COATED ORAL at 17:50

## 2019-07-16 RX ADMIN — Medication 1000 UNITS: at 18:07

## 2019-07-16 ASSESSMENT — ENCOUNTER SYMPTOMS
HEADACHES: 1
CONSTIPATION: 1
MYALGIAS: 1

## 2019-07-16 ASSESSMENT — ACTIVITIES OF DAILY LIVING (ADL)
ADLS_ACUITY_SCORE: 19
ADLS_ACUITY_SCORE: 21
ADLS_ACUITY_SCORE: 18

## 2019-07-16 ASSESSMENT — MIFFLIN-ST. JEOR
SCORE: 837.31
SCORE: 848.65

## 2019-07-16 NOTE — ED TRIAGE NOTES
Patient presents with complaints of midsternum chest pain which woke her from a sleep at 0300 this morning. Patient has had similar episodes in the past week and say PCP. Patient was given 324 mg ASA, 4 mg Zofran, and 0.4 mg nitroglycerin PTA. ABC intact without need for intervention at this time.

## 2019-07-16 NOTE — PLAN OF CARE
VS /89   Pulse 99   Temp 96.2  F (35.7  C) (Axillary)   Resp 25   Ht 1.524 m (5')   Wt 49.2 kg (108 lb 8 oz)   SpO2 98%   BMI 21.19 kg/m    Lung sounds clear  O2 weaned to room air  Tele SR BB long QT  Bowel sounds active, BM yesterday  Tolerating 2 gm Na diet  IVF heparin drip infusing  Tests echo completed  CMS intact  Activity up with stand by assist  Pain denies  Patient/family centered care family present at bedside during and following admission  Discharge plan plan for angiogram tomorrow

## 2019-07-16 NOTE — PROGRESS NOTES
Cardiology consult dictated.  5 days of nocturnal unstable angina pectoris with chest pressure radiating to the arms and to the jaw.  Prolonged episode today which was relieved by nitroglycerin in the rig.  Troponin rise to approximately 18 on the second troponin.  EKG is unhelpful as the patient is ventricularly paced and atrially sensed.  Patient was rendered pain-free in the emergency room.  Hemodynamically stable.  Multiple risk factors for coronary artery disease.  Agree with aspirin heparin and carvedilol.  For coronary angiography tomorrow.  Awaiting the results of the echocardiogram which was performed today.

## 2019-07-16 NOTE — H&P
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    1. NSTEMI  ECG difficult to interpret due to paced rhythm. Troponin elevated at 0.990. Patient has been pain-free since arrival to ER after receiving sublingual nitroglycerin x 1 en route from EMS. Given 325 mg ASA prior to arrival in ER. Started on heparin drip in ER.  -Continue heparin drip started in ER (last dose of apixaban was last evening)  -Hold PTA apixaban while on heparin drip  -Continue PTA carvedilol 25 mg twice daily, lisinopril 40 mg daily, and atorvastatin 20 mg at bedtime  -Defer further aspirin dosing to cardiology service given history of remote upper GI bleed from gastric ulcer as well as hospitalization in 11/2018 for severe microcytic anemia (was taken off aspirin during that hospital stay and resumed on apixaban alone)  -Monitor on telemetry  -Trend troponins  -PRN oxygen, nitroglycerin, morphine available  -TTE  -Cardiology consult  -NPO status for possible coronary catheterization today      2. Second degree AV block s/p pacemaker implantation (5/2016)  5/2019 cardiac device check demonstrated normal functioning within device parameters. Did have brief episodes of asymptomatic PAF, longest episode lasting 1 minute 36 seconds, over all ventricular rates controlled.  Follows with Dr. Goel.    3. Paroxysmal atrial fibrillation on apixaban  ECG today shows paced rhythm with HR 80-90s. CHADSVASC score is at least 6. Last dose of apixaban was last evening.  -Hold PTA apixaban as she is on a heparin drip at this  time  -Continue PTA carvedilol 25 mg twice daily    4. HTN  -Resume PTA lisinopril 40 mg daily, carvedilol 25 mg twice daily, and amlodipine 5 mg daily    5. HLD  -Resume PTA atorvastatin 20 mg at bedtime    6. Prior CVA x 2 with residual left-sided weakness  Patient reports she had a stroke when she was 50 years old and another when she was 75.  Regarding mobility status, baseline is that she can use of cane at home, and when out with family uses a wheelchair.  Able to move her left arm and leg, but has significant weakness throughout the entire left side and difficulty grasping things with the left arm.  Prior to admission on apixaban for CVA prevention.  -Hold PTA apixaban as she is on a heparin drip at this time  -Resume PTA atorvastatin 20 mg at bedtime    7. Distant upper GI bleed secondary to gastric ulcer  History of bleeding peptic ulcer over 30 years ago and has been on lifelong omeprazole since.  Had EGD and colonoscopy in 2018 that were negative for acute pathology (see #8).    8. History of iron deficiency anemia  Hospitalized in 11/2018 for generalized weakness and fatigue and found to have severe microcytic anemia.  During that hospital stay she had an EGD done with no explanation for her anemia.  Colonoscopy the next day was negative for active GI bleeding from the lower GI standpoint either.  She had a total of 2 units packed red blood cells transfusion during that admission with good response and improvement in her symptoms.  Resumed on Eliquis, aspirin was discontinued at that time given risk for bleeding outweighing the benefit.   -Resume PTA iron supplementation daily     Diet: NPO for Medical/Clinical Reasons Except for: Meds    DVT Prophylaxis: On heparin drip at this time  Garcia Catheter: not present  Code Status: DNR/DNI, discussed with patient, 2 of her 4 daughters at bedside during that conversation    Disposition Plan   Expected discharge: 2 - 3 days, recommended to prior living  arrangement once cleared from cardiac standpoint.  Entered: Kayla Chaidez PA-C 07/16/2019, 10:53 AM     The patient's care was discussed with the Attending Physician, Dr. Gudino, Patient and Patient's Family.    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    Of note, the patient states that her  of 61 years passed away 8 days ago. His memorial service is in 2 days' time and she would like to attend that event if possible. She reports that for the past week she has had multiple evenings where she has been woken up around 3 AM with a burning central chest pain with radiation to her neck and jaw. The pain would last approximately 15-60 minutes and then dissipate on its own. No associated diaphoresis, shortness of breath, or nausea or vomiting. No exertional chest discomfort. She saw her PCP yesterday, but at that time was asymptomatic and encouraged to seek evaluation in the ER if her symptoms recurred.  However, this morning when she had a recurrent episode of chest pain that did not resolve, so she came to the ER for further evaluation. EMS gave her 324 mg ASA, 4 mg Zofran, and 0.4 mg sublingual nitroglycerin en route with resolution in the patient's pain by the time she arrived to the ER.     In the ER, temp 99, pulse 91, blood pressure 164/83, respiratory rate 18, SPO2 92% on room air.  ECG demonstrates an atrial sensed ventricular paced rhythm that is difficult  to interpret for ischemic findings.  BMP within normal limits aside from blood glucose of 120.  Creatinine is 0.67.  CBC with differential demonstrates a stable hemoglobin of 13.2, normal white count of 10.1, and normal platelet count of 203.  Troponin elevated at 0.990.     Regarding cardiovascular risk factors, she does have a 55 pack-year history of cigarette smoking, but quit after her 2nd stroke at age 75 years old. She notes that her father  of an MI in his 70s and she has had multiple brothers and 1 sister who had heart attacks in their 70s. She has a history of HTN and HLD but no personal history of DM II. No prior known history of CAD or MI personally.    Review of Systems    The 10 point Review of Systems is negative other than noted in the HPI.    Past Medical History    I have reviewed this patient's medical history and updated it with pertinent information if needed.   Past Medical History:   Diagnosis Date     AV block, 2nd degree 2016     Cerebrovascular accident (H) 2016     COKER (dyspnea on exertion) 2016     Generalized weakness 10/12/2016     GIB (gastrointestinal bleeding)      History of colonic polyps 2016     HTN (hypertension) 2016     Iron deficiency anemia      Melanoma of skin (H)-rt arm 2016     Mixed hyperlipidemia 2016     Pacemaker     implanted 2016     PAF (paroxysmal atrial fibrillation) (H)      SSS (sick sinus syndrome) (H) 2016     Past Surgical History   I have reviewed this patient's surgical history and updated it with pertinent information if needed.  Past Surgical History:   Procedure Laterality Date     APPENDECTOMY  1960s     C LIGATE FALLOPIAN TUBE  1960s     C REMV CATARACT INTRACAP,INSERT LENS Right 2017     ESOPHAGOSCOPY, GASTROSCOPY, DUODENOSCOPY (EGD), COMBINED N/A 2018    Procedure: ESOPHAGOSCOPY, GASTROSCOPY, DUODENOSCOPY (EGD)  Room 523 with biopsies using cold biopsy forceps;  Surgeon: Ayala Soto  MD Maryjane;  Location: [facility]     HC REMV CATARACT EXTRACAP,INSERT LENS Left 10/04/2017     IMPLANT PACEMAKER  05/17/2016       Social History   I have reviewed this patient's social history and updated it with pertinent information if needed.  Social History     Tobacco Use     Smoking status: Never Smoker     Smokeless tobacco: Never Used   Substance Use Topics     Alcohol use: No     Alcohol/week: 0.0 oz     Drug use: No       Family History   I have reviewed this patient's family history and updated it with pertinent information if needed.   Family History   Problem Relation Age of Onset     Coronary Artery Disease Father      Coronary Artery Disease Brother      Coronary Artery Disease Sister      Colon Cancer No family hx of        Prior to Admission Medications   Prior to Admission Medications   Prescriptions Last Dose Informant Patient Reported? Taking?   Multiple Vitamins-Minerals (ICAPS AREDS FORMULA) TABS   Yes No   Sig: Take 1 tablet by mouth 2 times daily    OMEPRAZOLE PO   Yes No   Sig: Take 20 mg by mouth every morning    acetaminophen (TYLENOL) 325 MG tablet   Yes No   Sig: Take 650 mg by mouth every 6 hours as needed for mild pain    albuterol (PROAIR HFA/PROVENTIL HFA/VENTOLIN HFA) 108 (90 BASE) MCG/ACT Inhaler   No No   Sig: Inhale 2 puffs into the lungs 4 times daily as needed for other (dyspnea)   amLODIPine (NORVASC) 5 MG tablet   Yes No   Sig: Take 5 mg by mouth daily    apixaban ANTICOAGULANT (ELIQUIS) 2.5 MG tablet   No No   Sig: Take 1 tablet (2.5 mg) by mouth 2 times daily   atorvastatin (LIPITOR) 20 MG tablet   Yes No   Sig: Take 20 mg by mouth At Bedtime   carvedilol (COREG) 25 MG tablet   Yes No   Sig: Take 25 mg by mouth 2 times daily (with meals)   ferrous sulfate (IRON) 325 (65 FE) MG tablet   Yes No   Sig: Take 325 mg by mouth daily (with breakfast)    fluticasone furoate (ARNUITY ELLIPTA) 200 MCG/ACT inhalation powder   No No   Sig: Inhale 1 puff into the lungs daily   lisinopril  (PRINIVIL,ZESTRIL) 40 MG tablet   Yes No   Sig: Take 40 mg by mouth daily   tolterodine (DETROL LA) 4 MG 24 hr capsule   Yes No   Sig: Take 4 mg by mouth every morning      Facility-Administered Medications: None     Allergies   No Known Allergies    Physical Exam   Vital Signs: Temp: 99  F (37.2  C)   BP: 144/68 Pulse: 83 Heart Rate: 85 Resp: 24 SpO2: 97 % O2 Device: Nasal cannula Oxygen Delivery: 3 LPM  Weight: 106 lbs 0 oz    GENERAL:  Comfortable.  PSYCH: pleasant, oriented, No acute distress.  HEENT:  Atraumatic, normocephalic. PERRLA. Normal conjunctiva, normal hearing, and oropharynx is normal.  NECK:  Supple, no neck vein distention, adenopathy or bruits, normal thyroid.  HEART:  Normal S1, S2 with no murmur, no pericardial rub, gallops or S3 or S4.  LUNGS:  Clear to auscultation, normal respiratory effort. No wheezing, rales or ronchi.  GI:  Soft, no hepatosplenomegaly, normal bowel sounds. Non-tender, non distended.   EXTREMITIES:  No pedal edema, +2 pulses bilateral and equal.  SKIN:  Dry to touch, No rash, wound or ulcerations.  NEUROLOGIC:  CN 2-12 intact, BL 5/5 symmetric upper and lower extremity strength, sensation is intact with no focal deficits.     Data   Data reviewed today: I reviewed all medications, new labs and imaging results over the last 24 hours. I personally reviewed:    Results for orders placed or performed during the hospital encounter of 07/16/19   CBC with platelets differential   Result Value Ref Range    WBC 10.1 4.0 - 11.0 10e9/L    RBC Count 4.54 3.8 - 5.2 10e12/L    Hemoglobin 13.2 11.7 - 15.7 g/dL    Hematocrit 42.2 35.0 - 47.0 %    MCV 93 78 - 100 fl    MCH 29.1 26.5 - 33.0 pg    MCHC 31.3 (L) 31.5 - 36.5 g/dL    RDW 14.6 10.0 - 15.0 %    Platelet Count 203 150 - 450 10e9/L    Diff Method Automated Method     % Neutrophils 64.4 %    % Lymphocytes 22.2 %    % Monocytes 10.0 %    % Eosinophils 2.2 %    % Basophils 0.6 %    % Immature Granulocytes 0.6 %    Nucleated RBCs 0 0  /100    Absolute Neutrophil 6.5 1.6 - 8.3 10e9/L    Absolute Lymphocytes 2.2 0.8 - 5.3 10e9/L    Absolute Monocytes 1.0 0.0 - 1.3 10e9/L    Absolute Eosinophils 0.2 0.0 - 0.7 10e9/L    Absolute Basophils 0.1 0.0 - 0.2 10e9/L    Abs Immature Granulocytes 0.1 0 - 0.4 10e9/L    Absolute Nucleated RBC 0.0    Basic metabolic panel   Result Value Ref Range    Sodium 139 133 - 144 mmol/L    Potassium 4.0 3.4 - 5.3 mmol/L    Chloride 108 94 - 109 mmol/L    Carbon Dioxide 25 20 - 32 mmol/L    Anion Gap 6 3 - 14 mmol/L    Glucose 120 (H) 70 - 99 mg/dL    Urea Nitrogen 18 7 - 30 mg/dL    Creatinine 0.67 0.52 - 1.04 mg/dL    GFR Estimate 79 >60 mL/min/[1.73_m2]    GFR Estimate If Black >90 >60 mL/min/[1.73_m2]    Calcium 8.7 8.5 - 10.1 mg/dL   Troponin I   Result Value Ref Range    Troponin I ES 0.990 (HH) 0.000 - 0.045 ug/L   EKG 12 lead   Result Value Ref Range    Interpretation ECG Click View Image link to view waveform and result

## 2019-07-16 NOTE — ED PROVIDER NOTES
"  History     Chief Complaint:  Chest Pain      HPI   Yoselyn Cui is an anticoagulated 87 year old female with a history of hypertension, hyperlipidemia, CVA, pneumonia, and sick sinus syndrome s/p pacemaker implant, who presents to the ED via EMS for evaluation of chest pain. The patient reports that for the past several days she has been experiencing multiple episodes of nightly chest pain. She describes this as a \"burning\" sensation that typically wakes her from sleep at 0300 and resolved on its own after 30-60 minutes. The patient had this evaluated by her PCP yesterday, at which time she was asymptomatic and was encouraged to present to the ED should symptoms recur. This morning, the patient was again woken from sleep by midsternal discomfort radiating to her left arm and jaw. She tried drinking water secondary to concerns for indigestion and called EMS after her symptoms did not resolve. EMS reportedly administered 324 mg Aspirin, 4 mg Zofran, and 0.4 mg Nitroglycerin en route with improvement in the patient's discomfort from a 10 to 7 in severity. This has resolved here in the ED, although the patient does endorse a headache. She does report having similar unprovoked jaw pain in the past and denies any association with exertion. The patient has no history of of stent placement or CABG. She does have residual left-sided weakness from her stroke. Here in the ED, she additionally reports several years of constipation and states that her pain is better after having a bowel movement. She denies any significant history of reflux aside from an ulcer several years ago. Of note, the patient states that her  of 61 years passed away 8 days ago. His memorial service is in 2 days' time and she would like to attend.     Allergies:  NKDA     Medications:    Albuterol  Amlodipine  Eliquis  Lipitor  Coreg  Iron  Arnuity ellipta  Lisinopril  Omeprazole  Tolterodine    Past Medical History:    Anemia  CAP  Sick sinus " syndrome  Hypertension  CVA  Hyperlipidemia  Colonic polyps  Melanoma of skin  AV block, 2nd degree  GI bleed  Paroxysmal Afib    Past Surgical History:    Appendectomy  Fallopian tube ligation  EGD  Bilateral cataract removal and lens insertion  Pacemaker implant    Family History:    CAD    Social History:  Negative for tobacco use.  Negative for alcohol use.  Negative for drug use.   Marital Status:   [2]     Review of Systems   HENT:        Jaw pain   Cardiovascular: Positive for chest pain.   Gastrointestinal: Positive for constipation.   Musculoskeletal: Positive for myalgias (L arm).   Neurological: Positive for headaches.   All other systems reviewed and are negative.      Physical Exam     Patient Vitals for the past 24 hrs:   BP Temp Pulse Heart Rate Resp SpO2 Height Weight   07/16/19 1142 142/83 -- 88 -- -- -- -- --   07/16/19 1140 142/83 -- 88 -- -- 95 % -- --   07/16/19 1120 -- -- -- 85 -- 98 % -- --   07/16/19 1115 140/73 -- 80 84 25 98 % -- --   07/16/19 1110 -- -- -- 81 21 99 % -- --   07/16/19 1105 -- -- -- 81 28 99 % -- --   07/16/19 1100 144/70 -- 81 83 22 99 % -- --   07/16/19 1055 -- -- -- 81 23 98 % -- --   07/16/19 1050 -- -- -- 81 21 98 % -- --   07/16/19 1045 142/73 -- 80 81 22 98 % -- --   07/16/19 1040 -- -- -- 82 22 99 % -- --   07/16/19 1035 -- -- -- 81 22 98 % -- --   07/16/19 1030 144/68 -- 83 85 24 97 % -- --   07/16/19 1025 -- -- -- 85 27 98 % -- --   07/16/19 1024 -- -- -- -- -- -- 1.524 m (5') 48.1 kg (106 lb)   07/16/19 1020 -- -- -- 84 24 98 % -- --   07/16/19 1015 142/75 -- 85 84 22 97 % -- --   07/16/19 1010 -- -- -- 83 25 98 % -- --   07/16/19 1005 -- -- -- 85 22 98 % -- --   07/16/19 1000 126/69 -- 79 80 22 98 % -- --   07/16/19 0945 139/75 -- 82 84 24 99 % -- --   07/16/19 0930 144/75 -- 84 85 25 97 % -- --   07/16/19 0915 148/77 -- 89 91 18 91 % -- --   07/16/19 0909 -- -- -- -- -- 95 % -- --   07/16/19 0908 -- -- -- -- -- 91 % -- --   07/16/19 0905 164/83 99  F  (37.2  C) 91 91 18 92 % -- --   07/16/19 0900 164/83 -- -- -- -- -- -- --        Physical Exam  Vital signs and nursing notes reviewed.     Constitutional: laying on gurney appears comfortable  HENT: Oropharynx is clear and moist  Eyes: Conjunctivae are normal bilaterally. Pupils equal  Neck: normal range of motion  Cardiovascular: Normal rate, regular rhythm, normal heart sounds.   Pulmonary/Chest: Pacemaker in place left anterior chest. No overlying erythema or pain to palpation. Effort normal and breath sounds normal. No respiratory distress.   Abdominal: Soft. Bowel sounds are normal. No tenderness to palpation. No rebound or guarding.   Musculoskeletal: No joint swelling or edema.   Neurological: Alert and oriented. No focal weakness  Skin: Skin is warm and dry. No rash noted.   Psych: normal affect     Emergency Department Course   ECG:  Indication: Chest pain  Time: 0903  Vent. Rate 93 bpm. MI interval 190. QRS duration 146. QT/QTc 414/514. P-R-T axis 68 269 76.  Atrial-sensed ventricular-paced rhythm. Abnormal ECG. Read time: 0908     Imaging:  Radiographic findings were communicated with the patient who voiced understanding of the findings.  XR Chest 2 views:   No acute cardiopulmonary disease, as per radiology.    Laboratory:  CBC: WBC: 10.1, HGB: 13.2, PLT: 203  BMP: Glucose 120 (H), o/w WNL (Creatinine: 0.67)    0902 Troponin: 0.990 (HH)     Interventions:  1054 Heparin bolus 1,400 units IV   Heparin infusion 25,000 units IV, 550 units/hr    Emergency Department Course:  EKG obtained in the ED, see results above.     Nursing notes and vitals reviewed. (0904) I performed an exam of the patient as documented above.     IV inserted. Medicine administered as documented above. Blood drawn. This was sent to the lab for further testing, results above.    (0959) I was notified of the patient's critically elevated troponin.    (1003) I rechecked the patient and discussed the results of her workup thus far. Her  pain had almost entirely resolved.    (1012) I consulted with Dr. Meza, Cardiology, regarding the patient's history and presentation here in the emergency department. He advised that we start the patient on Heparin despite her anticoagulation status.     (1055)  I consulted with Kayla Chaidez PA-C of the hospitalist services. They are in agreement to accept the patient for admission to Dr. Gudino.    The patient was sent for a chest x-ray while in the emergency department, findings above.     Findings and plan explained to the Patient who consents to admission. Discussed the patient with Kayla Chaidez for Dr. Gudino, who will admit the patient to a cardiac telemetry bed for further monitoring, evaluation, and treatment.    Impression & Plan    Medical Decision Making:  Yoselyn Cui is a 87 year old female who presents with chest pain. Patient has had symptoms of what sounds like probable unstable angina over the past week. On arrival the patient's pain had improved after receiving aspirin and nitroglycerin from EMS. EKG shows a paced rhythm so I cannot delineate acute ST segment elevation changes. Her initial troponin was 0.99. Therefore I contacted Dr. Meza from Cardiology, who recommended starting Heparin despite her being on Eliquis. Her last dose of Eliquis was last night, so I talked with Pharmacy and we will do a half loading dose, followed by a drip. I reviewed all of the results and the plan with patient and family, and they understand and have no questions. On recheck the patient was essentially pain-free. Chest x-ray is essentially normal. Patient and daughter understand the plan and reasons for admission and are in agreement.     Diagnosis:    ICD-10-CM    1. NSTEMI (non-ST elevated myocardial infarction) (H) I21.4    2. Unstable angina (H) I20.0        Disposition:  Admitted to Dr. Shahab Chand Disclosure:  VALERIE, Kayla Bradford, am serving as a scribe on 7/16/2019 at 9:04 AM to  personally document services performed by Klaus Elizabeth MD based on my observations and the provider's statements to me.      Kayla Bradford  7/16/2019   [facility] EMERGENCY DEPARTMENT       Klaus Elizabeth MD  07/16/19 153

## 2019-07-16 NOTE — Clinical Note
The ECG shows a paced rhythm. The patient has permanent pacemaker. Pacer on demand mode. ECG rate  = 79 bpm.

## 2019-07-16 NOTE — ED NOTES
"[facility]  ED Nurse Handoff Report    Yoselyn Cui is a 87 year old female   ED Chief complaint: Chest Pain  . ED Diagnosis:   Final diagnoses:   Unstable angina pectoris (H)   Elevated troponin     Allergies: No Known Allergies    Code Status:  Activity level - Baseline/Home:  Independent. Activity Level - Current:   Stand by Assist. Lift room needed: No. Bariatric: No   Needed: No   Isolation: No. Infection: Not Applicable.     Vital Signs:   Vitals:    07/16/19 1000 07/16/19 1015 07/16/19 1024 07/16/19 1030   BP: 126/69 142/75  144/68   Pulse: 79 85  83   Resp: 22 22  24   Temp:       SpO2: 98% 97%  97%   Weight:   48.1 kg (106 lb)    Height:   1.524 m (5')        Cardiac Rhythm:  ,   Cardiac  Cardiac Rhythm: Ventricular paced  Pain level:    Patient confused: No. Patient Falls Risk: Yes.   Elimination Status: Has not voided yet   Patient Report - Initial Complaint: Chest Pain. Focused Assessment: Yoselyn Cui is an anticoagulated 87 year old female with a history of hypertension, hyperlipidemia, CVA, pneumonia, and sick sinus syndrome s/p pacemaker implant, who presents to the ED via EMS for evaluation of chest pain. The patient reports that for the past several days she has been experiencing multiple episodes of nightly chest pain. She describes this as a \"burning\" sensation that typically wakes her from sleep at 0300 and resolved on its own after 30-60 minutes. The patient had this evaluated by her PCP yesterday, at which time she was asymptomatic and was encouraged to present to the ED should symptoms recur. This morning, the patient was again woken from sleep by midsternal discomfort radiating to her left arm and jaw. She tried drinking water secondary to concerns for indigestion and called EMS after her symptoms did not resolve. EMS reportedly administered 324 mg Aspirin, 4 mg Zofran, and 0.4 mg Nitroglycerin en route with improvement in the patient's discomfort from a 10 to " 7 in severity. This has resolved here in the ED, although the patient does endorse a headache. She does report having similar unprovoked jaw pain in the past and denies any association with exertion. The patient has no history of of stent placement or CABG. She does have residual left-sided weakness from her stroke. Here in the ED, she additionally reports several years of constipation and states that her pain is better after having a bowel movement. She denies any significant history of reflux aside from an ulcer several years ago. Of note, the patient states that her  of 61 years passed away 8 days ago. His [facility] service is in 2 days' time and she would like to attend.      09:09 Cardiac Cardiac - Cardiac WDL: -WDL except; chest pain   Chest Pain Assessment - Chest Pain Location: midsternal  Chest Pain Radiation: jaw; shoulder (Left side) Precipitating Factors: unknown  Associated Signs/Symptoms: nausea   Cardiac Monitoring - EKG Monitoring: Yes  Cardiac Regularity: Regular  Cardiac Rhythm: Ventricular paced   Chest Pain Assessment - Character: heart burn   Manage Acute Chest Pain - Chest Pain Intervention: cardiac biomarkers drawn; cardiac monitor placed; 12-lead ECG obtained; activity minimized; aspirin given; nitroglycerin SL given; oxygen applied        Tests Performed: EKG, Blood Work. Abnormal Results:   Labs Ordered and Resulted from Time of ED Arrival Up to the Time of Departure from the ED   CBC WITH PLATELETS DIFFERENTIAL - Abnormal; Notable for the following components:       Result Value    MCHC 31.3 (*)     All other components within normal limits   BASIC METABOLIC PANEL - Abnormal; Notable for the following components:    Glucose 120 (*)     All other components within normal limits   TROPONIN I - Abnormal; Notable for the following components:    Troponin I ES 0.990 (*)     All other components within normal limits   PULSE OXIMETRY NURSING   CARDIAC CONTINUOUS MONITORING   PERIPHERAL IV  CATHETER   PATIENT CARE ORDER   MEASURE WEIGHT   NOTIFY PHYSICIAN   NOTIFY PHYSICIAN        Treatments provided: nitroglycerin, Oxygen   Family Comments: Present in room   OBS brochure/video discussed/provided to patient:  N/A  ED Medications:   Medications   heparin infusion 25,000 units in 0.45% NaCl 250 mL (550 Units/hr Intravenous New Bag 7/16/19 1054)   heparin Loading Dose bolus dose from infusion pump 1,400 Units (1,400 Units Intravenous Given 7/16/19 1054)     Drips infusing:  Yes  For the majority of the shift, the patient's behavior Green. Interventions performed were NA.     Severe Sepsis OR Septic Shock Diagnosis Present: No      ED Nurse Name/Phone Number: Haylee Garzon,   10:34 AM    RECEIVING UNIT ED HANDOFF REVIEW    Above ED Nurse Handoff Report was reviewed: Yes  Reviewed by: Margarita Gonzalez on July 16, 2019 at 11:25 AM  (for MS3)  Did you vocera the ED RN: Yes

## 2019-07-16 NOTE — PROGRESS NOTES
SPIRITUAL HEALTH SERVICES Progress Note  FR Med Surg 3    Spiritual Health visit per consult order for emotional/spiritual support.   Pt spouse  8 days ago and pt is hoping to attend [facility] service scheduled for Thursday in [place].    Two of pt four daughters and grand daughter at bedside.      Yoselyn engaged in life review and shared about her spouse, their 62 year marriage and love of family.  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  Prayer requested and provided.    Plan: Spiritual Health Services remains available for additional emotional/spiritual support.    Duane Rand MA  Staff   Pager: [phone]  Phone: [phone]

## 2019-07-16 NOTE — PROGRESS NOTES
Preliminary report on echo shows severe hypokinesis of the mid to basal inferolateral wall with septal motion consistent with paced rhythm. This is a new wall motion abnormality which was not reported on previous echoes.

## 2019-07-16 NOTE — CONSULTS
Consult Date:  07/16/2019      CARDIOLOGY CONSULTATION      REQUESTING PROVIDER:  Hospitalist Service.      INDICATION FOR THE CARDIAC CONSULTATION:  Non-Q-wave myocardial infarct.   Angina pectoris.      Dear Doctors,       It is a pleasure to see your patient, Yoselyn Cui, who is a patient followed by Dr. Elbert Goel.  This is a patient, who in the past, was diagnosed with high-grade AV block, for which she had a permanent pacemaker implanted.  The patient was doing relatively well, until about 5 days ago when she began to develop chest discomfort, which she describes as a pressure feeling of fullness in her chest radiating up into her jaw and down both of her arms.  The discomfort was occurring at nighttime, at around 3:00 a.m., and would last anywhere between a half an hour and an hour.  She would notice if she would sit up or stand up or go to the bathroom that the discomfort would go away.  She had an episode again this morning, and however, this discomfort was not going away, so therefore 911 was called.  The EMT did give the patient sublingual nitroglycerin, which significantly improved the chest discomfort.  The chest discomfort, then was eliminated in the emergency room.  The first troponin was already raised at 0.990.  The second troponin is significantly raised at 18.294.  The 12-lead electrocardiogram revealed that the patient was atrially sensed and ventricularly paced.  The patient has multiple risk factors for coronary artery disease.  She smoked until 2007.  Her father, two of her brothers, and her sister had heart attacks.  She has a history of treated hypertension, and she has a history of treated hyperlipidemia.      This patient has a history of paroxysmal atrial fibrillation, for which she takes Eliquis.       PAST MEDICAL HISTORY:     1.  Second-degree AV block, which resulted in the placement of a dual-chamber permanent pacemaker.   2.  Two cerebrovascular accidents, the most recent one  being in .   3.  Gastrointestinal bleeding in the past.   4.  Colonic polyps.   5.  Essential hypertension.   6.  Iron deficiency anemia.   7.  Melanoma removal on the right arm.     8.  Essential hypertension.   9.  Hyperlipidemia.   10.  Paroxysmal atrial fibrillation.      PAST SURGICAL HISTORY:     1.  Appendectomy.   2.  Fallopian tube ligation.   3.  Cataract removal with lens insertion on the right.   4.  Esophagoscopy, gastroscopy, duodenoscopy in 2018.     5.  Cataract removal and insertion of lens on the left eye, laser in 2017.   6.  Permanent pacemaker implantation in .      FAMILY HISTORY:  Strong family history of coronary artery disease with her father, 2 of her brothers, and her sister having myocardial infarcts.  It appears that her father and her 2 brothers  of the myocardial infarcts, but the sister survived.      SOCIAL HISTORY:  She just became a  in the last week and a half.  Her   approximately a week ago, and his [employer] service is actually in 2 days' time.      MEDICATIONS PRIOR TO ADMISSION:   1.  Omeprazole 20 mg per day.   2.  Acetaminophen 325 mg per day.   3.  Amlodipine 5 mg per day.   4.  Apixaban 2.5 mg twice a day.   5.  Atorvastatin 20 mg at bedtime.   6.  Carvedilol 25 mg 2 times a day.   7.  Ferrous sulfate 325 mg by mouth per day with breakfast.   8.  Fluticasone furoate 200 mcg inhaled into the lungs daily.   9.  Lisinopril 40 mg per day.   10.  Detrol-LA 4 mg every 24 hours.      ALLERGIES:  SHE HAS NO KNOWN DRUG ALLERGIES.      REVIEW OF SYSTEMS:   CONSTITUTIONAL:  Negative.   EYES:  Negative.   ENT:  Negative.   CARDIOVASCULAR:  As above.   RESPIRATORY:  Nil.   GASTROINTESTINAL:  Nil.   GENITOURINARY:  Nil.   MUSCULOSKELETAL:  Nil.   NEUROLOGICAL:  Nil.   PSYCHIATRIC:  Nil.   ENDOCRINE:  Nil.   HEMATOLYMPHATIC:  Nil.   ALLERGY/IMMUNOLOGY:  Nil.      PHYSICAL EXAMINATION:   GENERAL:  She is a very pleasant patient, and he is in no apparent  distress.   VITAL SIGNS:  Blood pressure is 142/86.  Her pulse is 85 beats per minute, atrially sensed, ventricularly paced.  Respirations are 18.  O2 sats 98%.  Temperature 96.2 degrees Fahrenheit.   HEAD, EYES, EARS, NECK, NOSE, THROAT:  The patient has normal facial symmetry.  She has geographic wrinkling on her face, consistent with chronic tobacco abuse in the past.  She has normal facial symmetry.  Her jugular venous pulse is not raised.  Her carotids are normal with no bruits.   HEART:  [place] beat is not displaced.  Heart sounds 1 and 2 are normal.  No added sounds.  There is a pacemaker generator in the left infraclavicular region.  The pacemaker site is well healed.   CHEST:  Clear to percussion and auscultation with no added sounds.   ABDOMEN:  Unremarkable with no organomegaly or tenderness.   EXTREMITIES:  She has no peripheral edema.  Her pedal pulses are 1 to 2+ bilaterally.   NEUROLOGIC:  She moves all 4 limbs appropriately with no obvious sensory or motor loss.  She is fully oriented to time, place and person with a pleasant affect.      LABORATORY INVESTIGATIONS:  Sodium is 139, potassium is 4.0, BUN is 18, creatinine is 0.67, GFR is normal at 79.  Troponin as described above.  White cell count 10.1, hemoglobin 13.2, platelet count is 203,000.      Chest x-ray:  No acute cardiopulmonary disease.        A 12-lead electrocardiogram:  Atrial sensed, ventricularly paced.      IMPRESSION:   1.  New onset unstable nocturnal angina pectoris, which has started 5 days ago, culminating in a non-Q-wave myocardial infarct today with so far a peak troponin of 18.294.  The patient is pain-free now and comfortable.   2.  Multiple risk factors for coronary artery disease.   3.  Borderline hypertension.      PLAN:  Coronary angiography is indicated in this patient, who has had a new onset of nocturnal unstable angina pectoris, 5 days ago, culminating in a prolonged episode of discomfort this morning with a troponin  rise of 18.9.  I explained the risks and benefits of the procedure to the patient including the risk of stroke, heart attack, death, and bleeding, bruising, hematoma formation, vascular damage, dye allergy, dye nephropathy, limb loss, the risks associated with conscious sedation including aspiration, intubation, and the risks associated with blood transfusions.  I have also explained the special risks associated with coronary interventions including increased risk of death, myocardial infarction and emergency bypass surgery.  The patient told me she understood why we are doing the procedure.  She told me she understood the risks associated with the procedure, she told me she understood the alternatives to the procedure and finally she told me that she wished to proceed.      I fully agree with the introduction of aspirin and intravenous heparin.  The apixaban will be put on hold until the coronary angiogram has been performed.  I agree also with the introduction of amlodipine for better blood pressure control.      We await the results of the echocardiogram, which has just been performed.      Many thanks for allowing me to be involved in the care of this nice patient.         RENEE STACY MD, [facility]             D: 2019   T: 2019   MT: SUZIE      Name:     ANNE MARIE AMBROSE   MRN:      [id number]        Account:       [id number]   :      1932           Consult Date:  2019      Document: [id number]

## 2019-07-16 NOTE — ED NOTES
Bed: ED14  Expected date: 7/16/19  Expected time: 8:53 AM  Means of arrival: Hospital Transport  Comments:  JEANNE 88yo F CP

## 2019-07-16 NOTE — PHARMACY-ADMISSION MEDICATION HISTORY
Admission medication history interview status for this patient is complete. See [facility] admission navigator for allergy information, prior to admission medications and immunization status.     Medication history interview source(s):Patient and Family  Medication history resources (including written lists, pill bottles, clinic record):family list  [facility] pharmacy[facility]    Changes made to PTA medication list:  Added: none  Deleted: albuterol  Changed: multiple-vitamins-minerals     Actions taken by pharmacist (provider contacted, etc):None     Additional medication history information:None    Medication reconciliation/reorder completed by provider prior to medication history? No    Do you take OTC medications (eg tylenol, ibuprofen, fish oil, eye/ear drops, etc)? Y    For patients on insulin therapy: N    Prior to Admission medications    Medication Sig Last Dose Taking? Auth Provider   acetaminophen (TYLENOL) 325 MG tablet Take 650 mg by mouth every 6 hours as needed for mild pain  Past Week at Unknown time Yes Unknown, Entered By History   amLODIPine (NORVASC) 5 MG tablet Take 5 mg by mouth daily  7/15/2019 at Unknown time Yes Reported, Patient   apixaban ANTICOAGULANT (ELIQUIS) 2.5 MG tablet Take 1 tablet (2.5 mg) by mouth 2 times daily 7/15/2019 at Unknown time Yes Jose R Goel MD   atorvastatin (LIPITOR) 20 MG tablet Take 20 mg by mouth At Bedtime 7/15/2019 at Unknown time Yes Unknown, Entered By History   carvedilol (COREG) 25 MG tablet Take 25 mg by mouth 2 times daily (with meals) 7/15/2019 at Unknown time Yes Reported, Patient   ferrous sulfate (IRON) 325 (65 FE) MG tablet Take 325 mg by mouth daily (with breakfast)  7/15/2019 at Unknown time Yes Unknown, Entered By History   fluticasone furoate (ARNUITY ELLIPTA) 200 MCG/ACT inhalation powder Inhale 1 puff into the lungs daily 7/15/2019 at Unknown time Yes Ariel Bahena MD   lisinopril (PRINIVIL,ZESTRIL) 40 MG  tablet Take 40 mg by mouth daily 7/15/2019 at Unknown time Yes Reported, Patient   Multiple Vitamins-Minerals (ICAPS AREDS FORMULA) TABS Take 1 tablet by mouth daily  7/15/2019 at Unknown time Yes Unknown, Entered By History   OMEPRAZOLE PO Take 20 mg by mouth every morning  7/15/2019 at Unknown time Yes Unknown, Entered By History   tolterodine (DETROL LA) 4 MG 24 hr capsule Take 4 mg by mouth every morning 7/15/2019 at Unknown time Yes Unknown, Entered By History

## 2019-07-17 ENCOUNTER — HOSPITAL ENCOUNTER (INPATIENT)
Facility: CLINIC | Age: 84
LOS: 7 days | Discharge: HOME | DRG: 246 | End: 2019-07-24
Attending: INTERNAL MEDICINE | Admitting: INTERNAL MEDICINE
Payer: MEDICARE

## 2019-07-17 ENCOUNTER — APPOINTMENT (OUTPATIENT)
Dept: CT IMAGING | Facility: CLINIC | Age: 84
DRG: 247 | End: 2019-07-17
Attending: INTERNAL MEDICINE
Payer: MEDICARE

## 2019-07-17 VITALS
HEART RATE: 81 BPM | BODY MASS INDEX: 21.42 KG/M2 | HEIGHT: 60 IN | DIASTOLIC BLOOD PRESSURE: 58 MMHG | RESPIRATION RATE: 19 BRPM | WEIGHT: 109.1 LBS | SYSTOLIC BLOOD PRESSURE: 134 MMHG | TEMPERATURE: 97.1 F | OXYGEN SATURATION: 92 %

## 2019-07-17 DIAGNOSIS — I25.10 CORONARY ARTERY DISEASE INVOLVING NATIVE CORONARY ARTERY OF NATIVE HEART WITHOUT ANGINA PECTORIS: Primary | ICD-10-CM

## 2019-07-17 DIAGNOSIS — R53.81 PHYSICAL DECONDITIONING: ICD-10-CM

## 2019-07-17 LAB
APTT PPP: 55 SEC (ref 22–37)
APTT PPP: 55 SEC (ref 22–37)
CATH EF ESTIMATED: 53 %
GLUCOSE BLDC GLUCOMTR-MCNC: 83 MG/DL (ref 70–99)
LMWH PPP CHRO-ACNC: 0.35 IU/ML
LMWH PPP CHRO-ACNC: 0.4 IU/ML
POTASSIUM SERPL-SCNC: 4.2 MMOL/L (ref 3.4–5.3)
TROPONIN I SERPL-MCNC: 7.85 UG/L (ref 0–0.04)

## 2019-07-17 PROCEDURE — 85730 THROMBOPLASTIN TIME PARTIAL: CPT | Performed by: PHYSICIAN ASSISTANT

## 2019-07-17 PROCEDURE — 84484 ASSAY OF TROPONIN QUANT: CPT | Performed by: INTERNAL MEDICINE

## 2019-07-17 PROCEDURE — 25000128 H RX IP 250 OP 636: Performed by: INTERNAL MEDICINE

## 2019-07-17 PROCEDURE — 85730 THROMBOPLASTIN TIME PARTIAL: CPT | Performed by: INTERNAL MEDICINE

## 2019-07-17 PROCEDURE — 71260 CT THORAX DX C+: CPT

## 2019-07-17 PROCEDURE — 21000001 ZZH R&B HEART CARE

## 2019-07-17 PROCEDURE — 99223 1ST HOSP IP/OBS HIGH 75: CPT | Mod: AI | Performed by: INTERNAL MEDICINE

## 2019-07-17 PROCEDURE — 36415 COLL VENOUS BLD VENIPUNCTURE: CPT | Performed by: INTERNAL MEDICINE

## 2019-07-17 PROCEDURE — 25000125 ZZHC RX 250: Performed by: INTERNAL MEDICINE

## 2019-07-17 PROCEDURE — 85520 HEPARIN ASSAY: CPT | Performed by: PHYSICIAN ASSISTANT

## 2019-07-17 PROCEDURE — 99232 SBSQ HOSP IP/OBS MODERATE 35: CPT | Performed by: INTERNAL MEDICINE

## 2019-07-17 PROCEDURE — 25800030 ZZH RX IP 258 OP 636: Performed by: INTERNAL MEDICINE

## 2019-07-17 PROCEDURE — 84132 ASSAY OF SERUM POTASSIUM: CPT | Performed by: PHYSICIAN ASSISTANT

## 2019-07-17 PROCEDURE — 99239 HOSP IP/OBS DSCHRG MGMT >30: CPT | Performed by: INTERNAL MEDICINE

## 2019-07-17 PROCEDURE — 94640 AIRWAY INHALATION TREATMENT: CPT

## 2019-07-17 PROCEDURE — 25000132 ZZH RX MED GY IP 250 OP 250 PS 637: Performed by: INTERNAL MEDICINE

## 2019-07-17 PROCEDURE — 027035Z DILATION OF CORONARY ARTERY, ONE ARTERY WITH TWO DRUG-ELUTING INTRALUMINAL DEVICES, PERCUTANEOUS APPROACH: ICD-10-PCS | Performed by: INTERNAL MEDICINE

## 2019-07-17 PROCEDURE — 99207 ZZC CDG-MDM COMPONENT: MEETS LOW - DOWN CODED: CPT | Performed by: INTERNAL MEDICINE

## 2019-07-17 PROCEDURE — 99152 MOD SED SAME PHYS/QHP 5/>YRS: CPT | Performed by: INTERNAL MEDICINE

## 2019-07-17 PROCEDURE — B2111ZZ FLUOROSCOPY OF MULTIPLE CORONARY ARTERIES USING LOW OSMOLAR CONTRAST: ICD-10-PCS | Performed by: INTERNAL MEDICINE

## 2019-07-17 PROCEDURE — C1769 GUIDE WIRE: HCPCS | Performed by: INTERNAL MEDICINE

## 2019-07-17 PROCEDURE — 25000132 ZZH RX MED GY IP 250 OP 250 PS 637: Performed by: PHYSICIAN ASSISTANT

## 2019-07-17 PROCEDURE — 00000146 ZZHCL STATISTIC GLUCOSE BY METER IP

## 2019-07-17 PROCEDURE — 99233 SBSQ HOSP IP/OBS HIGH 50: CPT | Mod: 25 | Performed by: INTERNAL MEDICINE

## 2019-07-17 PROCEDURE — 4A023N7 MEASUREMENT OF CARDIAC SAMPLING AND PRESSURE, LEFT HEART, PERCUTANEOUS APPROACH: ICD-10-PCS | Performed by: INTERNAL MEDICINE

## 2019-07-17 PROCEDURE — 93458 L HRT ARTERY/VENTRICLE ANGIO: CPT | Performed by: INTERNAL MEDICINE

## 2019-07-17 PROCEDURE — 27210794 ZZH OR GENERAL SUPPLY STERILE: Performed by: INTERNAL MEDICINE

## 2019-07-17 PROCEDURE — C1894 INTRO/SHEATH, NON-LASER: HCPCS | Performed by: INTERNAL MEDICINE

## 2019-07-17 PROCEDURE — 85520 HEPARIN ASSAY: CPT | Performed by: INTERNAL MEDICINE

## 2019-07-17 PROCEDURE — 40000275 ZZH STATISTIC RCP TIME EA 10 MIN

## 2019-07-17 PROCEDURE — 36415 COLL VENOUS BLD VENIPUNCTURE: CPT | Performed by: PHYSICIAN ASSISTANT

## 2019-07-17 RX ORDER — NITROGLYCERIN 5 MG/ML
VIAL (ML) INTRAVENOUS
Status: DISCONTINUED
Start: 2019-07-17 | End: 2019-07-17 | Stop reason: HOSPADM

## 2019-07-17 RX ORDER — MORPHINE SULFATE 2 MG/ML
1 INJECTION, SOLUTION INTRAMUSCULAR; INTRAVENOUS EVERY 4 HOURS PRN
Status: DISCONTINUED | OUTPATIENT
Start: 2019-07-17 | End: 2019-07-24 | Stop reason: HOSPADM

## 2019-07-17 RX ORDER — SODIUM CHLORIDE 9 MG/ML
INJECTION, SOLUTION INTRAVENOUS CONTINUOUS
Status: DISCONTINUED | OUTPATIENT
Start: 2019-07-17 | End: 2019-07-17 | Stop reason: HOSPADM

## 2019-07-17 RX ORDER — HEPARIN SODIUM 1000 [USP'U]/ML
INJECTION, SOLUTION INTRAVENOUS; SUBCUTANEOUS
Status: DISCONTINUED | OUTPATIENT
Start: 2019-07-17 | End: 2019-07-17 | Stop reason: HOSPADM

## 2019-07-17 RX ORDER — LIDOCAINE HYDROCHLORIDE 10 MG/ML
INJECTION, SOLUTION EPIDURAL; INFILTRATION; INTRACAUDAL; PERINEURAL
Status: DISCONTINUED
Start: 2019-07-17 | End: 2019-07-17 | Stop reason: HOSPADM

## 2019-07-17 RX ORDER — ONDANSETRON 2 MG/ML
4 INJECTION INTRAMUSCULAR; INTRAVENOUS EVERY 6 HOURS PRN
Status: DISCONTINUED | OUTPATIENT
Start: 2019-07-17 | End: 2019-07-24 | Stop reason: HOSPADM

## 2019-07-17 RX ORDER — NITROGLYCERIN 0.4 MG/1
0.4 TABLET SUBLINGUAL EVERY 5 MIN PRN
Status: DISCONTINUED | OUTPATIENT
Start: 2019-07-17 | End: 2019-07-18

## 2019-07-17 RX ORDER — FENTANYL CITRATE 50 UG/ML
INJECTION, SOLUTION INTRAMUSCULAR; INTRAVENOUS
Status: DISCONTINUED
Start: 2019-07-17 | End: 2019-07-17 | Stop reason: HOSPADM

## 2019-07-17 RX ORDER — NALOXONE HYDROCHLORIDE 0.4 MG/ML
.1-.4 INJECTION, SOLUTION INTRAMUSCULAR; INTRAVENOUS; SUBCUTANEOUS
Status: DISCONTINUED | OUTPATIENT
Start: 2019-07-17 | End: 2019-07-18

## 2019-07-17 RX ORDER — ACETAMINOPHEN 325 MG/1
650 TABLET ORAL EVERY 4 HOURS PRN
Status: DISCONTINUED | OUTPATIENT
Start: 2019-07-17 | End: 2019-07-17

## 2019-07-17 RX ORDER — LIDOCAINE 40 MG/G
CREAM TOPICAL
Status: DISCONTINUED | OUTPATIENT
Start: 2019-07-17 | End: 2019-07-17 | Stop reason: HOSPADM

## 2019-07-17 RX ORDER — SODIUM NITROPRUSSIDE 25 MG/ML
INJECTION INTRAVENOUS
Status: DISCONTINUED | OUTPATIENT
Start: 2019-07-17 | End: 2019-07-17 | Stop reason: HOSPADM

## 2019-07-17 RX ORDER — ATROPINE SULFATE 0.1 MG/ML
0.5 INJECTION INTRAVENOUS EVERY 5 MIN PRN
Status: DISCONTINUED | OUTPATIENT
Start: 2019-07-17 | End: 2019-07-17 | Stop reason: HOSPADM

## 2019-07-17 RX ORDER — AMLODIPINE BESYLATE 5 MG/1
5 TABLET ORAL DAILY
Status: DISCONTINUED | OUTPATIENT
Start: 2019-07-18 | End: 2019-07-19

## 2019-07-17 RX ORDER — HYDROCODONE BITARTRATE AND ACETAMINOPHEN 5; 325 MG/1; MG/1
1-2 TABLET ORAL EVERY 4 HOURS PRN
Status: DISCONTINUED | OUTPATIENT
Start: 2019-07-17 | End: 2019-07-18

## 2019-07-17 RX ORDER — LISINOPRIL 40 MG/1
40 TABLET ORAL DAILY
Status: DISCONTINUED | OUTPATIENT
Start: 2019-07-18 | End: 2019-07-24 | Stop reason: HOSPADM

## 2019-07-17 RX ORDER — CARVEDILOL 25 MG/1
25 TABLET ORAL 2 TIMES DAILY WITH MEALS
Status: DISCONTINUED | OUTPATIENT
Start: 2019-07-18 | End: 2019-07-22

## 2019-07-17 RX ORDER — FENTANYL CITRATE 50 UG/ML
25-50 INJECTION, SOLUTION INTRAMUSCULAR; INTRAVENOUS
Status: DISCONTINUED | OUTPATIENT
Start: 2019-07-17 | End: 2019-07-17 | Stop reason: HOSPADM

## 2019-07-17 RX ORDER — DOPAMINE HYDROCHLORIDE 160 MG/100ML
2-20 INJECTION, SOLUTION INTRAVENOUS CONTINUOUS PRN
Status: DISCONTINUED | OUTPATIENT
Start: 2019-07-17 | End: 2019-07-17 | Stop reason: HOSPADM

## 2019-07-17 RX ORDER — ARGATROBAN 1 MG/ML
150 INJECTION, SOLUTION INTRAVENOUS
Status: DISCONTINUED | OUTPATIENT
Start: 2019-07-17 | End: 2019-07-17 | Stop reason: HOSPADM

## 2019-07-17 RX ORDER — LORAZEPAM 0.5 MG/1
0.5 TABLET ORAL
Status: DISCONTINUED | OUTPATIENT
Start: 2019-07-17 | End: 2019-07-17 | Stop reason: HOSPADM

## 2019-07-17 RX ORDER — ONDANSETRON 4 MG/1
4 TABLET, ORALLY DISINTEGRATING ORAL EVERY 6 HOURS PRN
Status: DISCONTINUED | OUTPATIENT
Start: 2019-07-17 | End: 2019-07-24 | Stop reason: HOSPADM

## 2019-07-17 RX ORDER — EPTIFIBATIDE 2 MG/ML
2 INJECTION, SOLUTION INTRAVENOUS CONTINUOUS PRN
Status: DISCONTINUED | OUTPATIENT
Start: 2019-07-17 | End: 2019-07-17 | Stop reason: HOSPADM

## 2019-07-17 RX ORDER — FERROUS SULFATE 325(65) MG
325 TABLET ORAL
Status: DISCONTINUED | OUTPATIENT
Start: 2019-07-18 | End: 2019-07-24 | Stop reason: HOSPADM

## 2019-07-17 RX ORDER — FLUMAZENIL 0.1 MG/ML
0.2 INJECTION, SOLUTION INTRAVENOUS
Status: DISCONTINUED | OUTPATIENT
Start: 2019-07-17 | End: 2019-07-17 | Stop reason: HOSPADM

## 2019-07-17 RX ORDER — VERAPAMIL HYDROCHLORIDE 2.5 MG/ML
INJECTION, SOLUTION INTRAVENOUS
Status: DISCONTINUED | OUTPATIENT
Start: 2019-07-17 | End: 2019-07-17 | Stop reason: HOSPADM

## 2019-07-17 RX ORDER — NALOXONE HYDROCHLORIDE 0.4 MG/ML
.1-.4 INJECTION, SOLUTION INTRAMUSCULAR; INTRAVENOUS; SUBCUTANEOUS
Status: DISCONTINUED | OUTPATIENT
Start: 2019-07-17 | End: 2019-07-17

## 2019-07-17 RX ORDER — POTASSIUM CHLORIDE 1500 MG/1
20 TABLET, EXTENDED RELEASE ORAL
Status: DISCONTINUED | OUTPATIENT
Start: 2019-07-17 | End: 2019-07-17 | Stop reason: HOSPADM

## 2019-07-17 RX ORDER — IOPAMIDOL 755 MG/ML
500 INJECTION, SOLUTION INTRAVASCULAR ONCE
Status: COMPLETED | OUTPATIENT
Start: 2019-07-17 | End: 2019-07-17

## 2019-07-17 RX ORDER — HYDROCODONE BITARTRATE AND ACETAMINOPHEN 5; 325 MG/1; MG/1
1-2 TABLET ORAL EVERY 4 HOURS PRN
Status: DISCONTINUED | OUTPATIENT
Start: 2019-07-17 | End: 2019-07-17 | Stop reason: HOSPADM

## 2019-07-17 RX ORDER — IOPAMIDOL 755 MG/ML
INJECTION, SOLUTION INTRAVASCULAR
Status: DISCONTINUED | OUTPATIENT
Start: 2019-07-17 | End: 2019-07-17 | Stop reason: HOSPADM

## 2019-07-17 RX ORDER — NITROGLYCERIN 20 MG/100ML
.07-2 INJECTION INTRAVENOUS CONTINUOUS PRN
Status: DISCONTINUED | OUTPATIENT
Start: 2019-07-17 | End: 2019-07-17 | Stop reason: HOSPADM

## 2019-07-17 RX ORDER — ATORVASTATIN CALCIUM 20 MG/1
20 TABLET, FILM COATED ORAL AT BEDTIME
Status: DISCONTINUED | OUTPATIENT
Start: 2019-07-18 | End: 2019-07-24 | Stop reason: HOSPADM

## 2019-07-17 RX ORDER — NOREPINEPHRINE BITARTRATE 0.06 MG/ML
.03-.4 INJECTION, SOLUTION INTRAVENOUS CONTINUOUS PRN
Status: DISCONTINUED | OUTPATIENT
Start: 2019-07-17 | End: 2019-07-17 | Stop reason: HOSPADM

## 2019-07-17 RX ORDER — DOCUSATE SODIUM 100 MG/1
100 CAPSULE, LIQUID FILLED ORAL 2 TIMES DAILY PRN
Status: DISCONTINUED | OUTPATIENT
Start: 2019-07-17 | End: 2019-07-24 | Stop reason: HOSPADM

## 2019-07-17 RX ORDER — LIDOCAINE 40 MG/G
CREAM TOPICAL
Status: DISCONTINUED | OUTPATIENT
Start: 2019-07-17 | End: 2019-07-24 | Stop reason: HOSPADM

## 2019-07-17 RX ORDER — EPTIFIBATIDE 2 MG/ML
180 INJECTION, SOLUTION INTRAVENOUS EVERY 10 MIN PRN
Status: DISCONTINUED | OUTPATIENT
Start: 2019-07-17 | End: 2019-07-17 | Stop reason: HOSPADM

## 2019-07-17 RX ORDER — HEPARIN SODIUM 1000 [USP'U]/ML
INJECTION, SOLUTION INTRAVENOUS; SUBCUTANEOUS
Status: DISCONTINUED
Start: 2019-07-17 | End: 2019-07-17 | Stop reason: HOSPADM

## 2019-07-17 RX ORDER — ARGATROBAN 1 MG/ML
350 INJECTION, SOLUTION INTRAVENOUS
Status: DISCONTINUED | OUTPATIENT
Start: 2019-07-17 | End: 2019-07-17 | Stop reason: HOSPADM

## 2019-07-17 RX ORDER — LORAZEPAM 2 MG/ML
0.5 INJECTION INTRAMUSCULAR
Status: DISCONTINUED | OUTPATIENT
Start: 2019-07-17 | End: 2019-07-17 | Stop reason: HOSPADM

## 2019-07-17 RX ORDER — NALOXONE HYDROCHLORIDE 0.4 MG/ML
.2-.4 INJECTION, SOLUTION INTRAMUSCULAR; INTRAVENOUS; SUBCUTANEOUS
Status: DISCONTINUED | OUTPATIENT
Start: 2019-07-17 | End: 2019-07-17 | Stop reason: HOSPADM

## 2019-07-17 RX ORDER — EPTIFIBATIDE 2 MG/ML
1 INJECTION, SOLUTION INTRAVENOUS CONTINUOUS PRN
Status: DISCONTINUED | OUTPATIENT
Start: 2019-07-17 | End: 2019-07-17 | Stop reason: HOSPADM

## 2019-07-17 RX ORDER — ACETAMINOPHEN 325 MG/1
650 TABLET ORAL EVERY 6 HOURS PRN
Status: DISCONTINUED | OUTPATIENT
Start: 2019-07-17 | End: 2019-07-18

## 2019-07-17 RX ORDER — DOBUTAMINE HYDROCHLORIDE 200 MG/100ML
2-20 INJECTION INTRAVENOUS CONTINUOUS PRN
Status: DISCONTINUED | OUTPATIENT
Start: 2019-07-17 | End: 2019-07-17 | Stop reason: HOSPADM

## 2019-07-17 RX ORDER — POLYETHYLENE GLYCOL 3350 17 G/17G
17 POWDER, FOR SOLUTION ORAL DAILY PRN
Status: DISCONTINUED | OUTPATIENT
Start: 2019-07-17 | End: 2019-07-24 | Stop reason: HOSPADM

## 2019-07-17 RX ORDER — FENTANYL CITRATE 50 UG/ML
INJECTION, SOLUTION INTRAMUSCULAR; INTRAVENOUS
Status: DISCONTINUED | OUTPATIENT
Start: 2019-07-17 | End: 2019-07-17 | Stop reason: HOSPADM

## 2019-07-17 RX ORDER — TOLTERODINE 4 MG/1
4 CAPSULE, EXTENDED RELEASE ORAL EVERY MORNING
Status: DISCONTINUED | OUTPATIENT
Start: 2019-07-18 | End: 2019-07-24 | Stop reason: HOSPADM

## 2019-07-17 RX ORDER — VERAPAMIL HYDROCHLORIDE 2.5 MG/ML
INJECTION, SOLUTION INTRAVENOUS
Status: DISCONTINUED
Start: 2019-07-17 | End: 2019-07-17 | Stop reason: HOSPADM

## 2019-07-17 RX ADMIN — ASPIRIN 325 MG: 325 TABLET, DELAYED RELEASE ORAL at 08:36

## 2019-07-17 RX ADMIN — CARVEDILOL 25 MG: 25 TABLET, FILM COATED ORAL at 08:35

## 2019-07-17 RX ADMIN — SODIUM CHLORIDE 60 ML: 9 INJECTION, SOLUTION INTRAVENOUS at 10:16

## 2019-07-17 RX ADMIN — ACETAMINOPHEN 650 MG: 325 TABLET, FILM COATED ORAL at 04:19

## 2019-07-17 RX ADMIN — IOPAMIDOL 80 ML: 755 INJECTION, SOLUTION INTRAVENOUS at 10:14

## 2019-07-17 RX ADMIN — OMEPRAZOLE 20 MG: 20 CAPSULE, DELAYED RELEASE ORAL at 08:35

## 2019-07-17 RX ADMIN — FERROUS SULFATE TAB 325 MG (65 MG ELEMENTAL FE) 325 MG: 325 (65 FE) TAB at 08:36

## 2019-07-17 RX ADMIN — CARVEDILOL 25 MG: 25 TABLET, FILM COATED ORAL at 18:20

## 2019-07-17 RX ADMIN — SODIUM CHLORIDE, PRESERVATIVE FREE: 5 INJECTION INTRAVENOUS at 15:31

## 2019-07-17 RX ADMIN — LISINOPRIL 40 MG: 40 TABLET ORAL at 08:36

## 2019-07-17 RX ADMIN — AMLODIPINE BESYLATE 5 MG: 5 TABLET ORAL at 08:36

## 2019-07-17 RX ADMIN — FLUTICASONE FUROATE 1 PUFF: 200 POWDER RESPIRATORY (INHALATION) at 07:51

## 2019-07-17 RX ADMIN — SODIUM CHLORIDE: 9 INJECTION, SOLUTION INTRAVENOUS at 11:22

## 2019-07-17 RX ADMIN — TOLTERODINE TARTRATE 4 MG: 2 CAPSULE, EXTENDED RELEASE ORAL at 08:35

## 2019-07-17 RX ADMIN — ATORVASTATIN CALCIUM 20 MG: 20 TABLET, FILM COATED ORAL at 21:13

## 2019-07-17 ASSESSMENT — ACTIVITIES OF DAILY LIVING (ADL)
ADLS_ACUITY_SCORE: 21

## 2019-07-17 ASSESSMENT — MIFFLIN-ST. JEOR: SCORE: 851.37

## 2019-07-17 NOTE — PROGRESS NOTES
[facility] Cardiology Progress Note            Interval History:   Non-Q wave myocardial infarction.  Mid to basal inferolateral and mid to basal anterolateral severe hypokinesis.  Troponin has risen to 23.94.  Echocardiogram suggests possible mass in the right atrium.  I reviewed the echo myself it is more likely that this is due to echoes from the atrial and ventricular leads in the right atrium.  In the subcostal view there is no evidence of the mass at all.  This patient is also been anticoagulated for over 2 years so it is highly unlikely given that clinical scenario that thrombus is present in the right atrium.  We can perform transesophageal echocardiography tomorrow after the cardiac catheterization.  However I will discuss this with the family.  We may also be able to perform a CT scan but the quite amount of shadowing from the pacemaker leads.  No chest discomfort.  Blood pressure is better controlled.              Medications:       amLODIPine  5 mg Oral Daily     aspirin  81 mg Oral Daily     aspirin  325 mg Oral Once     atorvastatin  20 mg Oral At Bedtime     carvedilol  25 mg Oral BID w/meals     ferrous sulfate  325 mg Oral Daily with breakfast     fluticasone  1 puff Inhalation Daily     lisinopril  40 mg Oral Daily     omeprazole  20 mg Oral QAM     sodium chloride (PF)  3 mL Intracatheter Q8H     sodium chloride (PF)  3 mL Intracatheter Q8H     tolterodine ER  4 mg Oral QAM               Physical Exam:   Blood pressure 122/68, pulse 81, temperature 98.6  F (37  C), temperature source Oral, resp. rate 20, height 1.524 m (5'), weight 49.5 kg (109 lb 1.6 oz), SpO2 94 %, not currently breastfeeding.  Rhythm: Sinus with ventricular pacing.   Constitutional:   awake, alert, cooperative, no apparent distress, and appears stated age     Neck:   no jugular venous distension and no carotid bruits     Lungs:   No increased work of breathing, good air exchange, clear to auscultation bilaterally, no  crackles or wheezing     Cardiovascular:   regular rate and rhythm, normal S1 and S2, S4 present, no murmur noted and no edema            Data:          Lab Results   Component Value Date     2019     2018     2018    Lab Results   Component Value Date    CHLORIDE 108 2019    CHLORIDE 109 2018    CHLORIDE 111 2018    Lab Results   Component Value Date    BUN 18 2019    BUN 13 2018    BUN 16 2018      Lab Results   Component Value Date    POTASSIUM 4.2 2019    POTASSIUM 4.0 2019    POTASSIUM 3.6 2018    Lab Results   Component Value Date    CO2 25 2019    CO2 28 2018    CO2 27 2018    Lab Results   Component Value Date    CR 0.67 2019    CR 0.80 2018    CR 0.68 2018        Lab Results   Component Value Date    HGB 13.0 2019    HGB 13.2 2019    HGB 9.0 (L) 2018     Lab Results   Component Value Date     2019     2019     2018     Lab Results   Component Value Date    TROPONIN <0.04 10/19/2007    TROPI 23.940 (HH) 2019    TROPI 18.294 (HH) 2019    TROPI 0.990 (HH) 2019                   Assessment and Plan:   Assessment:   Problem List    ACS (acute coronary syndrome) (H)  Myocardial infarct appears to involve the inferolateral and anterolateral walls from mid to base.  Possible mass in the right atrium however this most likely is artifact from the pacemaker leads.  We will perform a CT today rule out and then going to an invasive procedure like MIKE in this elderly patient who has had an MI and is going for coronary angiography today.  If the CT does not resolve the issue then MIKE can be performed as an outpatient.  The patient's   only 6 days ago when she wants to go to his memorial service tomorrow so I think that the MIKE can be postponed until that time if the CT does not answer the question.  We will check  another troponin this morning.    * No resolved hospital problems. *       Plan:   Coronary angiogram with or without intervention today.  CT of the chest to look at the right atrium to see if there is any evidence of a mass.  If the CT does not answer the question then transesophageal echocardiography can be performed as an outpatient.       Attestation:  I have reviewed today's vital signs, notes, medications, labs and imaging.  Care coordination / counseling time: 25 minutes  Total time: 35 minutes         Philipp Meza MD, MD 7/17/2019  7:49 AM

## 2019-07-17 NOTE — PROGRESS NOTES
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    1. NSTEMI  -Continue heparin drip started in ER   -Hold PTA apixaban while on heparin drip  -Continue PTA carvedilol 25 mg twice daily, lisinopril 40 mg daily, and atorvastatin 20 mg at bedtime  -on ASA.  -Monitor on telemetry  -Trend troponins which has been as high 23.9  -PRN oxygen, nitroglycerin, morphine available  -TTE showed EF 35-40%, with severe hypokinesis to akinesis of the mid to basal inferolateral and anterolateral walls. ? RA thrombus and MIKE recommended. CT chest pending.  -Cardiology following.  -NPO status for possible coronary catheterization today      2. Second degree AV block s/p pacemaker implantation (5/2016)  5/2019 cardiac device check demonstrated normal functioning within device parameters. Did have brief episodes of asymptomatic PAF, longest episode lasting 1 minute 36 seconds, over all ventricular rates controlled.   Follows with Dr. Goel.    3. Paroxysmal atrial fibrillation on apixaban  -Hold PTA apixaban as she is on a heparin drip at this time  -Continue PTA carvedilol 25 mg twice daily    4. HTN  -Resume PTA lisinopril 40 mg daily, carvedilol 25 mg twice daily, and amlodipine 5 mg daily    5. HLD  -Resume PTA atorvastatin 20 mg at bedtime    6. Prior CVA x 2 with residual left-sided weakness  Patient reports she had a stroke when she was 50 years old and another when she was 75.  Regarding mobility status, baseline is that she can use of cane at home, and when out with family uses a wheelchair.   Able to move her left arm and leg, but has significant weakness throughout the entire left side and difficulty grasping things with the left arm.  Prior to admission on apixaban for CVA prevention.  -Hold PTA apixaban as she is on a heparin drip at this time  -Resume PTA atorvastatin 20 mg at bedtime    7. Distant upper GI bleed secondary to gastric ulcer  History of bleeding peptic ulcer over 30 years ago and has been on lifelong omeprazole since.  Had EGD and colonoscopy in 2018 that were negative for acute pathology (see #8).    8. History of iron deficiency anemia  Hospitalized in 11/2018 for generalized weakness and fatigue and found to have severe microcytic anemia.  During that hospital stay she had an EGD done with no explanation for her anemia.  Colonoscopy the next day was negative for active GI bleeding from the lower GI standpoint either.  She had a total of 2 units packed red blood cells transfusion during that admission with good response and improvement in her symptoms.  Resumed on Eliquis, aspirin was discontinued at that time given risk for bleeding outweighing the benefit.   -Resume PTA iron supplementation daily     Diet: NPO for Medical/Clinical Reasons Except for: Meds  NPO for Medical/Clinical Reasons Except for: Meds, Ice Chips    DVT Prophylaxis: IV heparin.  Garcia Catheter: not present  Code Status: DNR/DNI      Disposition Plan   Expected discharge: 1-2 days, recommended to prior living arrangement once cath done.  Entered: All Gudino MD 07/17/2019, 8:10 AM       The patient's care was discussed with the Patient and Patient's Family.    All Gudino MD  Hospitalist Service  [facility]    ______________________________________________________________________    Interval History     No chest pain overnight. No SOB/fever/chills.    Data reviewed today: I reviewed all medications, new labs and imaging results over the last 24 hours. I personally reviewed no images or  EKG's today.    Physical Exam   Vital Signs: Temp: 98.6  F (37  C) Temp src: Oral BP: 122/68 Pulse: 81 Heart Rate: 80 Resp: 20 SpO2: 92 % O2 Device: None (Room air) Oxygen Delivery: 1 LPM  Weight: 109 lbs 1.6 oz    Gen - AAO x 3 in NAD.  Lungs - CTA B.  Heart - RR,S1+S2 nml, no m/g/r.  Abd - soft, NT, ND, + BS.  Ext - no edema, decreased power LUE and LLE chronic.    Data   Recent Labs   Lab 07/17/19  0643 07/16/19  1703 07/16/19  1219 07/16/19  0902   WBC  --   --  11.7* 10.1   HGB  --   --  13.0 13.2   MCV  --   --  92 93   PLT  --   --  171 203   NA  --   --   --  139   POTASSIUM 4.2  --   --  4.0   CHLORIDE  --   --   --  108   CO2  --   --   --  25   BUN  --   --   --  18   CR  --   --   --  0.67   ANIONGAP  --   --   --  6   GOLDY  --   --   --  8.7   GLC  --   --   --  120*   TROPI  --  23.940* 18.294* 0.990*     Recent Results (from the past 24 hour(s))   XR Chest 2 Views    Narrative    CHEST TWO VIEWS   7/16/2019 11:36 AM     HISTORY: Chest pain.    COMPARISON: Chest x-ray 3/2/2018.      Impression    IMPRESSION: No acute cardiopulmonary disease.    ROSA PEREZ MD     Medications     Continuing ACE inhibitor/ARB/ARNI from home medication list OR ACE inhibitor/ARB order already placed during this visit       Continuing ACE inhibitor/ARB/ARNI from home medication list OR ACE inhibitor/ARB order already placed during this visit       - MEDICATION INSTRUCTIONS -       - MEDICATION INSTRUCTIONS -       - MEDICATION INSTRUCTIONS -       HEParin 650 Units/hr (07/16/19 4255)     - MEDICATION INSTRUCTIONS -       - MEDICATION INSTRUCTIONS -       - MEDICATION INSTRUCTIONS -       - MEDICATION INSTRUCTIONS -       - MEDICATION INSTRUCTIONS -       sodium chloride         amLODIPine  5 mg Oral Daily     aspirin  81 mg Oral Daily     aspirin  325 mg Oral Once     atorvastatin  20 mg Oral At Bedtime     carvedilol  25 mg Oral BID w/meals     ferrous sulfate  325 mg Oral Daily with breakfast     fluticasone  1 puff  Inhalation Daily     lisinopril  40 mg Oral Daily     omeprazole  20 mg Oral QAM     sodium chloride (PF)  3 mL Intracatheter Q8H     sodium chloride (PF)  3 mL Intracatheter Q8H     tolterodine ER  4 mg Oral QAM

## 2019-07-17 NOTE — Clinical Note
Stent deployed in the proximal left anterior descending. Max pressure = 10 sharon. Total duration = 30 seconds.

## 2019-07-17 NOTE — PLAN OF CARE
Tele-SR w/BBB.  Pt is A&O x4.  NPO, VSS, Tylenol x1 for upper abd pain, 1L O2 via nc.  AM recheck Hep10a, last result 0.40, and has hep gtt at 6.5.  EF 35-40%. Angio this morning.  CArds and OT following.  SBA.  Possible discharge in 2-3 days.  Continue plan of care.

## 2019-07-17 NOTE — Clinical Note
The first balloon was inserted into the left main.Max pressure = 6 sharon. Total duration = 5 seconds.     Max pressure = 10 sharon. Total duration = 13 seconds.    Balloon reinflated a second time: Max pressure = 10 sharon. Total duration = 13 seconds.

## 2019-07-17 NOTE — Clinical Note
The first balloon was inserted into the left anterior descending.Max pressure = 10 sharon. Total duration = 60 seconds.     Max pressure = 12 sharon. Total duration = 12 seconds.    Balloon reinflated a second time: Max pressure = 12 sharon. Total duration = 12 seconds.

## 2019-07-17 NOTE — Clinical Note
The first balloon was inserted into the left main.Max pressure = 12 sharon. Total duration = 12 seconds.

## 2019-07-17 NOTE — Clinical Note
Max pressure = 14 sharon. Total duration = 12 seconds. Balloon reinflated a second time: Max pressure = 20 sharon. Total duration = 10 seconds.

## 2019-07-17 NOTE — Clinical Note
The first balloon was inserted into the left anterior descending.Max pressure = 12 sharon. Total duration = 45 seconds.

## 2019-07-17 NOTE — PROGRESS NOTES
Severe three-vessel coronary artery disease.  The catheter damped significantly in the ostium of the right coronary artery indicating flow-limiting disease and there is also a 90% stenosis in the distal right coronary artery.  The proximal circumflex is the culprit lesion with a hazy eccentric stenosis which is also calcified.  The left anterior descending artery in its midportion with the associated diagonal artery have severe stenoses also.  This vessel is significantly calcified also.  I discussed the case at length with Dr. Rogers.  He felt and I also feel that the patient at least needs to get the opinion of a cardiac surgeon since in general bypass surgery would be best for a patient with these lesions.  However, the patient is 87 years of age and somewhat frail and she may not survive bypass surgery.  Then one could consider coronary intervention.  Dr. Rogers feels a Rotablator would probably be necessary given the calcification of the vessels.  We will transfer the patient to [facility] so the patient can be seen by the surgeons in the opinion of the interventional cardiologist if the surgeon refuses to operate.  I will contact All Gudino MD for transfer to the hospitalist service.  I also spoke to Balaji Hillman MD, cardiologist at [facility] about the transfer.  The CT of the heart did not show any convincing evidence of a mass in the right atrium which is strong evidence that the findings on the echo were due to artifact from the pacemaker leads in the right atrium.  This is not surprising given that the patient has been chronically anticoagulated since 2016 and will be highly unlikely that thrombus would ever develop in that area.  Also at the time of placement of the permanent pacemaker 2016 there was no evidence of a mass.  Given her unstable angina pectoris and recent myocardial infarct and three-vessel coronary artery disease and her frail condition MIKE probably would not be the best approach given  the significant lack of evidence and low probability that there is a mass there.

## 2019-07-17 NOTE — Clinical Note
The first balloon was inserted into the left anterior descending.Max pressure = 20 sharon. Total duration = 52 seconds.

## 2019-07-17 NOTE — DISCHARGE SUMMARY
Admit Date:     2019   Discharge Date:           DISCHARGE DIAGNOSES:   1.  Non-ST elevation myocardial infarction with peak troponin of 23.940.   2.  Coronary angiogram, which showed triple-vessel disease in the distal right coronary artery (RCA), proximal circumflex, mid left anterior descending (LAD) and ostial second diagonal.   3.  Paroxysmal atrial fibrillation.   4.  Hypertension.   5.  Hyperlipidemia.   6.  Prior cerebrovascular accident with residual left-sided weakness.   7.  Distal upper gastrointestinal bleed secondary to gastric ulcer.   8.  History of iron-deficiency anemia.      HOSPITAL COURSE:  This is an 87-year-old female who presented with chest pain, found to have a non-ST elevation MI.  Her apixaban was held, and she was placed on IV heparin.  She was monitored on telemetry, and her troponins were trended.  She was placed on IV unfractionated heparin.  Her troponin peaked at 23.9.  She was chest pain-free during her hospital course over here.  She was seen by Cardiology and had an echocardiogram, which showed multiple wall motion abnormalities with an EF of 35-40%.  It also showed a question of possible mass in her right atrium; and hence, she underwent a CT of the chest which showed no evidence of a right atrial mass.  She underwent a coronary angiogram today, results of which are in Epic.  Cardiology is seeing her post-angiogram, and they plan to transfer her to [facility] for consideration of possible bypass and if not deemed a good candidate for further intervention on her multiple vessels, Dr. eKvin Carpenter has spoken to his colleagues over there.  Please see the progress note today for further details.     > 30 mins on discharge summary, with coordination.        JESSICA ESPINOZA MD             D: 2019   T: 2019   MT:       Name:     ANNE MARIE AMBROSE   MRN:      [id number]        Account:        [id number]   :      1932           Admit Date:      07/16/2019                                  Discharge Date:       Document: [id number]

## 2019-07-17 NOTE — Clinical Note
Max pressure = 20 sharon. Total duration = 10 seconds.     Max pressure = 20 sharon. Total duration = 11 seconds.    Balloon reinflated a second time: Max pressure = 20 sharon. Total duration = 11 seconds.  Balloon reinflated a third time: Max pressure = 20 sharon. Total duration = 10 seconds.

## 2019-07-17 NOTE — PLAN OF CARE
ORIENTATION: A&O  VS: Temp 99.1  TELE: SR w/BBB  LUNGS: 92% 1L O2; Diminished w/fine crackles bilateral bases  : WNL  GI: Last BM 7/16/19  IV: Hep 6.5mL/hr  PAIN: Denies  ACTIVITY: SBA  DIET: 2gm Na+  PLAN: angiogram tomorrow; discharge 2-3 days  OTHER: Trop 18.294 and 23.940

## 2019-07-18 ENCOUNTER — SURGERY (OUTPATIENT)
Age: 84
End: 2019-07-18
Payer: COMMERCIAL

## 2019-07-18 ENCOUNTER — APPOINTMENT (OUTPATIENT)
Dept: CARDIOLOGY | Facility: CLINIC | Age: 84
DRG: 246 | End: 2019-07-18
Attending: INTERNAL MEDICINE
Payer: MEDICARE

## 2019-07-18 ENCOUNTER — APPOINTMENT (OUTPATIENT)
Dept: PHYSICAL THERAPY | Facility: CLINIC | Age: 84
DRG: 246 | End: 2019-07-18
Attending: INTERNAL MEDICINE
Payer: MEDICARE

## 2019-07-18 LAB
ABO + RH BLD: NORMAL
ABO + RH BLD: NORMAL
ANION GAP SERPL CALCULATED.3IONS-SCNC: 8 MMOL/L (ref 3–14)
BASOPHILS # BLD AUTO: NORMAL 10E9/L (ref 0–0.2)
BASOPHILS NFR BLD AUTO: NORMAL %
BLD GP AB SCN SERPL QL: NORMAL
BLOOD BANK CMNT PATIENT-IMP: NORMAL
BUN SERPL-MCNC: 15 MG/DL (ref 7–30)
CALCIUM SERPL-MCNC: 8.5 MG/DL (ref 8.5–10.1)
CHLORIDE SERPL-SCNC: 108 MMOL/L (ref 94–109)
CHOLEST SERPL-MCNC: 118 MG/DL
CHOLEST SERPL-MCNC: NORMAL MG/DL
CO2 SERPL-SCNC: 26 MMOL/L (ref 20–32)
CREAT SERPL-MCNC: 0.75 MG/DL (ref 0.52–1.04)
DIFFERENTIAL METHOD BLD: NORMAL
EOSINOPHIL # BLD AUTO: NORMAL 10E9/L (ref 0–0.7)
EOSINOPHIL NFR BLD AUTO: NORMAL %
ERYTHROCYTE [DISTWIDTH] IN BLOOD BY AUTOMATED COUNT: 14.8 % (ref 10–15)
ERYTHROCYTE [DISTWIDTH] IN BLOOD BY AUTOMATED COUNT: NORMAL % (ref 10–15)
GFR SERPL CREATININE-BSD FRML MDRD: 72 ML/MIN/{1.73_M2}
GLUCOSE BLDC GLUCOMTR-MCNC: 97 MG/DL (ref 70–99)
GLUCOSE SERPL-MCNC: 89 MG/DL (ref 70–99)
HCT VFR BLD AUTO: 35 % (ref 35–47)
HCT VFR BLD AUTO: NORMAL % (ref 35–47)
HDLC SERPL-MCNC: 38 MG/DL
HDLC SERPL-MCNC: NORMAL MG/DL
HGB BLD-MCNC: 11.2 G/DL (ref 11.7–15.7)
HGB BLD-MCNC: 11.4 G/DL (ref 11.7–15.7)
HGB BLD-MCNC: NORMAL G/DL (ref 11.7–15.7)
IMM GRANULOCYTES # BLD: NORMAL 10E9/L (ref 0–0.4)
IMM GRANULOCYTES NFR BLD: NORMAL %
INR PPP: NORMAL (ref 0.86–1.14)
KCT BLD-ACNC: 138 SEC (ref 75–150)
KCT BLD-ACNC: 219 SEC (ref 75–150)
KCT BLD-ACNC: 243 SEC (ref 75–150)
KCT BLD-ACNC: 308 SEC (ref 75–150)
LDLC SERPL CALC-MCNC: 60 MG/DL
LDLC SERPL CALC-MCNC: NORMAL MG/DL (ref 0–130)
LMWH PPP CHRO-ACNC: 0.13 IU/ML
LYMPHOCYTES # BLD AUTO: NORMAL 10E9/L (ref 0.8–5.3)
LYMPHOCYTES NFR BLD AUTO: NORMAL %
MCH RBC QN AUTO: 29.6 PG (ref 26.5–33)
MCH RBC QN AUTO: NORMAL PG (ref 26.5–33)
MCHC RBC AUTO-ENTMCNC: 32.6 G/DL (ref 31.5–36.5)
MCHC RBC AUTO-ENTMCNC: NORMAL G/DL (ref 31.5–36.5)
MCV RBC AUTO: 91 FL (ref 78–100)
MCV RBC AUTO: NORMAL FL (ref 78–100)
MONOCYTES # BLD AUTO: NORMAL 10E9/L (ref 0–1.3)
MONOCYTES NFR BLD AUTO: NORMAL %
NEUTROPHILS # BLD AUTO: NORMAL 10E9/L (ref 1.6–8.3)
NEUTROPHILS NFR BLD AUTO: NORMAL %
NONHDLC SERPL-MCNC: 80 MG/DL
NONHDLC SERPL-MCNC: NORMAL MG/DL
NRBC # BLD AUTO: NORMAL 10*3/UL
NRBC BLD AUTO-RTO: NORMAL /100
PLATELET # BLD AUTO: 154 10E9/L (ref 150–450)
PLATELET # BLD AUTO: NORMAL 10E9/L (ref 150–450)
POTASSIUM SERPL-SCNC: 3.9 MMOL/L (ref 3.4–5.3)
RBC # BLD AUTO: 3.85 10E12/L (ref 3.8–5.2)
RBC # BLD AUTO: NORMAL 10E12/L (ref 3.8–5.2)
SODIUM SERPL-SCNC: 142 MMOL/L (ref 133–144)
SPECIMEN EXP DATE BLD: NORMAL
TRIGL SERPL-MCNC: 100 MG/DL
TRIGL SERPL-MCNC: NORMAL MG/DL
WBC # BLD AUTO: 8 10E9/L (ref 4–11)
WBC # BLD AUTO: NORMAL 10E9/L (ref 4–11)

## 2019-07-18 PROCEDURE — 93308 TTE F-UP OR LMTD: CPT

## 2019-07-18 PROCEDURE — 97161 PT EVAL LOW COMPLEX 20 MIN: CPT | Mod: GP

## 2019-07-18 PROCEDURE — C1725 CATH, TRANSLUMIN NON-LASER: HCPCS | Performed by: INTERNAL MEDICINE

## 2019-07-18 PROCEDURE — 80048 BASIC METABOLIC PNL TOTAL CA: CPT | Performed by: INTERNAL MEDICINE

## 2019-07-18 PROCEDURE — 93321 DOPPLER ECHO F-UP/LMTD STD: CPT | Mod: 26 | Performed by: INTERNAL MEDICINE

## 2019-07-18 PROCEDURE — 85520 HEPARIN ASSAY: CPT | Performed by: INTERNAL MEDICINE

## 2019-07-18 PROCEDURE — 93010 ELECTROCARDIOGRAM REPORT: CPT | Mod: 76 | Performed by: INTERNAL MEDICINE

## 2019-07-18 PROCEDURE — 00000146 ZZHCL STATISTIC GLUCOSE BY METER IP

## 2019-07-18 PROCEDURE — 25000132 ZZH RX MED GY IP 250 OP 250 PS 637: Performed by: INTERNAL MEDICINE

## 2019-07-18 PROCEDURE — 20000003 ZZH R&B ICU

## 2019-07-18 PROCEDURE — 27210794 ZZH OR GENERAL SUPPLY STERILE: Performed by: INTERNAL MEDICINE

## 2019-07-18 PROCEDURE — C9600 PERC DRUG-EL COR STENT SING: HCPCS | Mod: LM | Performed by: INTERNAL MEDICINE

## 2019-07-18 PROCEDURE — 99153 MOD SED SAME PHYS/QHP EA: CPT | Performed by: INTERNAL MEDICINE

## 2019-07-18 PROCEDURE — 99152 MOD SED SAME PHYS/QHP 5/>YRS: CPT | Performed by: INTERNAL MEDICINE

## 2019-07-18 PROCEDURE — 99233 SBSQ HOSP IP/OBS HIGH 50: CPT | Mod: 25 | Performed by: INTERNAL MEDICINE

## 2019-07-18 PROCEDURE — 85347 COAGULATION TIME ACTIVATED: CPT

## 2019-07-18 PROCEDURE — 25000128 H RX IP 250 OP 636

## 2019-07-18 PROCEDURE — 85027 COMPLETE CBC AUTOMATED: CPT | Performed by: INTERNAL MEDICINE

## 2019-07-18 PROCEDURE — C1769 GUIDE WIRE: HCPCS | Performed by: INTERNAL MEDICINE

## 2019-07-18 PROCEDURE — 25800030 ZZH RX IP 258 OP 636: Performed by: INTERNAL MEDICINE

## 2019-07-18 PROCEDURE — 86901 BLOOD TYPING SEROLOGIC RH(D): CPT | Performed by: INTERNAL MEDICINE

## 2019-07-18 PROCEDURE — C1874 STENT, COATED/COV W/DEL SYS: HCPCS | Performed by: INTERNAL MEDICINE

## 2019-07-18 PROCEDURE — 36415 COLL VENOUS BLD VENIPUNCTURE: CPT | Performed by: SURGERY

## 2019-07-18 PROCEDURE — 99232 SBSQ HOSP IP/OBS MODERATE 35: CPT | Performed by: HOSPITALIST

## 2019-07-18 PROCEDURE — 93325 DOPPLER ECHO COLOR FLOW MAPG: CPT | Mod: 26 | Performed by: INTERNAL MEDICINE

## 2019-07-18 PROCEDURE — 93308 TTE F-UP OR LMTD: CPT | Mod: 26 | Performed by: INTERNAL MEDICINE

## 2019-07-18 PROCEDURE — 85018 HEMOGLOBIN: CPT | Performed by: SURGERY

## 2019-07-18 PROCEDURE — 25000132 ZZH RX MED GY IP 250 OP 250 PS 637: Performed by: SURGERY

## 2019-07-18 PROCEDURE — 93454 CORONARY ARTERY ANGIO S&I: CPT | Performed by: INTERNAL MEDICINE

## 2019-07-18 PROCEDURE — 92928 PRQ TCAT PLMT NTRAC ST 1 LES: CPT | Mod: LD | Performed by: INTERNAL MEDICINE

## 2019-07-18 PROCEDURE — 97530 THERAPEUTIC ACTIVITIES: CPT | Mod: GP

## 2019-07-18 PROCEDURE — 25000125 ZZHC RX 250: Performed by: INTERNAL MEDICINE

## 2019-07-18 PROCEDURE — 36415 COLL VENOUS BLD VENIPUNCTURE: CPT | Performed by: INTERNAL MEDICINE

## 2019-07-18 PROCEDURE — 86850 RBC ANTIBODY SCREEN: CPT | Performed by: INTERNAL MEDICINE

## 2019-07-18 PROCEDURE — 93005 ELECTROCARDIOGRAM TRACING: CPT

## 2019-07-18 PROCEDURE — 027036Z DILATION OF CORONARY ARTERY, ONE ARTERY WITH THREE DRUG-ELUTING INTRALUMINAL DEVICES, PERCUTANEOUS APPROACH: ICD-10-PCS | Performed by: INTERNAL MEDICINE

## 2019-07-18 PROCEDURE — 25000128 H RX IP 250 OP 636: Performed by: INTERNAL MEDICINE

## 2019-07-18 PROCEDURE — 80061 LIPID PANEL: CPT | Performed by: INTERNAL MEDICINE

## 2019-07-18 PROCEDURE — C1887 CATHETER, GUIDING: HCPCS | Performed by: INTERNAL MEDICINE

## 2019-07-18 PROCEDURE — B2111ZZ FLUOROSCOPY OF MULTIPLE CORONARY ARTERIES USING LOW OSMOLAR CONTRAST: ICD-10-PCS | Performed by: INTERNAL MEDICINE

## 2019-07-18 PROCEDURE — C9600 PERC DRUG-EL COR STENT SING: HCPCS | Mod: LD

## 2019-07-18 PROCEDURE — C1894 INTRO/SHEATH, NON-LASER: HCPCS | Performed by: INTERNAL MEDICINE

## 2019-07-18 PROCEDURE — 86900 BLOOD TYPING SEROLOGIC ABO: CPT | Performed by: INTERNAL MEDICINE

## 2019-07-18 DEVICE — STENT SYNERGY DRUG ELUTING 4.00X12MM  H7493926012400: Type: IMPLANTABLE DEVICE | Status: FUNCTIONAL

## 2019-07-18 DEVICE — IMPLANTABLE DEVICE: Type: IMPLANTABLE DEVICE | Status: FUNCTIONAL

## 2019-07-18 DEVICE — STENT RESOLUTE ONYX DE 2.7FR 3.00X12MM RONYX DE30012UX: Type: IMPLANTABLE DEVICE | Status: FUNCTIONAL

## 2019-07-18 RX ORDER — SODIUM CHLORIDE 9 MG/ML
INJECTION, SOLUTION INTRAVENOUS CONTINUOUS
Status: ACTIVE | OUTPATIENT
Start: 2019-07-18 | End: 2019-07-19

## 2019-07-18 RX ORDER — EPTIFIBATIDE 2 MG/ML
2 INJECTION, SOLUTION INTRAVENOUS CONTINUOUS PRN
Status: DISCONTINUED | OUTPATIENT
Start: 2019-07-18 | End: 2019-07-18 | Stop reason: HOSPADM

## 2019-07-18 RX ORDER — CLOPIDOGREL BISULFATE 75 MG/1
TABLET ORAL
Status: DISCONTINUED | OUTPATIENT
Start: 2019-07-18 | End: 2019-07-18 | Stop reason: HOSPADM

## 2019-07-18 RX ORDER — NITROGLYCERIN 20 MG/100ML
.07-2 INJECTION INTRAVENOUS CONTINUOUS PRN
Status: DISCONTINUED | OUTPATIENT
Start: 2019-07-18 | End: 2019-07-18 | Stop reason: HOSPADM

## 2019-07-18 RX ORDER — LORAZEPAM 0.5 MG/1
0.5 TABLET ORAL
Status: DISCONTINUED | OUTPATIENT
Start: 2019-07-18 | End: 2019-07-18 | Stop reason: HOSPADM

## 2019-07-18 RX ORDER — NOREPINEPHRINE BITARTRATE 0.06 MG/ML
.03-.4 INJECTION, SOLUTION INTRAVENOUS CONTINUOUS PRN
Status: DISCONTINUED | OUTPATIENT
Start: 2019-07-18 | End: 2019-07-18 | Stop reason: HOSPADM

## 2019-07-18 RX ORDER — EPTIFIBATIDE 2 MG/ML
1 INJECTION, SOLUTION INTRAVENOUS CONTINUOUS PRN
Status: DISCONTINUED | OUTPATIENT
Start: 2019-07-18 | End: 2019-07-18 | Stop reason: HOSPADM

## 2019-07-18 RX ORDER — NALOXONE HYDROCHLORIDE 0.4 MG/ML
.1-.4 INJECTION, SOLUTION INTRAMUSCULAR; INTRAVENOUS; SUBCUTANEOUS
Status: DISCONTINUED | OUTPATIENT
Start: 2019-07-18 | End: 2019-07-24 | Stop reason: HOSPADM

## 2019-07-18 RX ORDER — DOBUTAMINE HYDROCHLORIDE 200 MG/100ML
2-20 INJECTION INTRAVENOUS CONTINUOUS PRN
Status: DISCONTINUED | OUTPATIENT
Start: 2019-07-18 | End: 2019-07-18 | Stop reason: HOSPADM

## 2019-07-18 RX ORDER — NITROGLYCERIN 0.4 MG/1
0.4 TABLET SUBLINGUAL EVERY 5 MIN PRN
Status: DISCONTINUED | OUTPATIENT
Start: 2019-07-18 | End: 2019-07-24 | Stop reason: HOSPADM

## 2019-07-18 RX ORDER — NALOXONE HYDROCHLORIDE 0.4 MG/ML
.2-.4 INJECTION, SOLUTION INTRAMUSCULAR; INTRAVENOUS; SUBCUTANEOUS
Status: ACTIVE | OUTPATIENT
Start: 2019-07-18 | End: 2019-07-19

## 2019-07-18 RX ORDER — NITROGLYCERIN 20 MG/100ML
0.07-2 INJECTION INTRAVENOUS CONTINUOUS
Status: DISCONTINUED | OUTPATIENT
Start: 2019-07-18 | End: 2019-07-24 | Stop reason: HOSPADM

## 2019-07-18 RX ORDER — CALCIUM CARBONATE 500 MG/1
1000 TABLET, CHEWABLE ORAL EVERY 4 HOURS PRN
Status: DISCONTINUED | OUTPATIENT
Start: 2019-07-18 | End: 2019-07-24 | Stop reason: HOSPADM

## 2019-07-18 RX ORDER — SODIUM CHLORIDE 9 MG/ML
INJECTION, SOLUTION INTRAVENOUS CONTINUOUS
Status: DISCONTINUED | OUTPATIENT
Start: 2019-07-18 | End: 2019-07-18 | Stop reason: HOSPADM

## 2019-07-18 RX ORDER — FENTANYL CITRATE 50 UG/ML
INJECTION, SOLUTION INTRAMUSCULAR; INTRAVENOUS
Status: DISCONTINUED | OUTPATIENT
Start: 2019-07-18 | End: 2019-07-18 | Stop reason: HOSPADM

## 2019-07-18 RX ORDER — METOPROLOL TARTRATE 1 MG/ML
5 INJECTION, SOLUTION INTRAVENOUS EVERY 6 HOURS PRN
Status: DISCONTINUED | OUTPATIENT
Start: 2019-07-18 | End: 2019-07-24 | Stop reason: HOSPADM

## 2019-07-18 RX ORDER — CLOPIDOGREL BISULFATE 75 MG/1
75 TABLET ORAL DAILY
Status: DISCONTINUED | OUTPATIENT
Start: 2019-07-19 | End: 2019-07-24 | Stop reason: HOSPADM

## 2019-07-18 RX ORDER — HEPARIN SODIUM 1000 [USP'U]/ML
INJECTION, SOLUTION INTRAVENOUS; SUBCUTANEOUS
Status: DISCONTINUED | OUTPATIENT
Start: 2019-07-18 | End: 2019-07-18 | Stop reason: HOSPADM

## 2019-07-18 RX ORDER — ARGATROBAN 1 MG/ML
150 INJECTION, SOLUTION INTRAVENOUS
Status: DISCONTINUED | OUTPATIENT
Start: 2019-07-18 | End: 2019-07-18 | Stop reason: HOSPADM

## 2019-07-18 RX ORDER — ATROPINE SULFATE 0.1 MG/ML
0.5 INJECTION INTRAVENOUS EVERY 5 MIN PRN
Status: ACTIVE | OUTPATIENT
Start: 2019-07-18 | End: 2019-07-19

## 2019-07-18 RX ORDER — ASPIRIN 81 MG/1
81 TABLET ORAL DAILY
Status: DISCONTINUED | OUTPATIENT
Start: 2019-07-19 | End: 2019-07-24 | Stop reason: HOSPADM

## 2019-07-18 RX ORDER — EPTIFIBATIDE 2 MG/ML
180 INJECTION, SOLUTION INTRAVENOUS EVERY 10 MIN PRN
Status: DISCONTINUED | OUTPATIENT
Start: 2019-07-18 | End: 2019-07-18 | Stop reason: HOSPADM

## 2019-07-18 RX ORDER — HYDROCODONE BITARTRATE AND ACETAMINOPHEN 5; 325 MG/1; MG/1
1-2 TABLET ORAL EVERY 4 HOURS PRN
Status: DISCONTINUED | OUTPATIENT
Start: 2019-07-18 | End: 2019-07-24 | Stop reason: HOSPADM

## 2019-07-18 RX ORDER — NITROGLYCERIN 5 MG/ML
VIAL (ML) INTRAVENOUS CONTINUOUS PRN
Status: COMPLETED | OUTPATIENT
Start: 2019-07-18 | End: 2019-07-18

## 2019-07-18 RX ORDER — FENTANYL CITRATE 50 UG/ML
25-50 INJECTION, SOLUTION INTRAMUSCULAR; INTRAVENOUS
Status: ACTIVE | OUTPATIENT
Start: 2019-07-18 | End: 2019-07-19

## 2019-07-18 RX ORDER — POTASSIUM CHLORIDE 1500 MG/1
20 TABLET, EXTENDED RELEASE ORAL
Status: DISCONTINUED | OUTPATIENT
Start: 2019-07-18 | End: 2019-07-18 | Stop reason: HOSPADM

## 2019-07-18 RX ORDER — FLUMAZENIL 0.1 MG/ML
0.2 INJECTION, SOLUTION INTRAVENOUS
Status: ACTIVE | OUTPATIENT
Start: 2019-07-18 | End: 2019-07-19

## 2019-07-18 RX ORDER — LORAZEPAM 2 MG/ML
0.5 INJECTION INTRAMUSCULAR
Status: DISCONTINUED | OUTPATIENT
Start: 2019-07-18 | End: 2019-07-18 | Stop reason: HOSPADM

## 2019-07-18 RX ORDER — LIDOCAINE 40 MG/G
CREAM TOPICAL
Status: DISCONTINUED | OUTPATIENT
Start: 2019-07-18 | End: 2019-07-18 | Stop reason: HOSPADM

## 2019-07-18 RX ORDER — DOPAMINE HYDROCHLORIDE 160 MG/100ML
2-20 INJECTION, SOLUTION INTRAVENOUS CONTINUOUS PRN
Status: DISCONTINUED | OUTPATIENT
Start: 2019-07-18 | End: 2019-07-18 | Stop reason: HOSPADM

## 2019-07-18 RX ORDER — ARGATROBAN 1 MG/ML
350 INJECTION, SOLUTION INTRAVENOUS
Status: DISCONTINUED | OUTPATIENT
Start: 2019-07-18 | End: 2019-07-18 | Stop reason: HOSPADM

## 2019-07-18 RX ORDER — ACETAMINOPHEN 325 MG/1
650 TABLET ORAL EVERY 4 HOURS PRN
Status: DISCONTINUED | OUTPATIENT
Start: 2019-07-18 | End: 2019-07-24 | Stop reason: HOSPADM

## 2019-07-18 RX ADMIN — FERROUS SULFATE TAB 325 MG (65 MG ELEMENTAL FE) 325 MG: 325 (65 FE) TAB at 08:49

## 2019-07-18 RX ADMIN — CARVEDILOL 25 MG: 25 TABLET, FILM COATED ORAL at 20:09

## 2019-07-18 RX ADMIN — AMLODIPINE BESYLATE 5 MG: 5 TABLET ORAL at 08:49

## 2019-07-18 RX ADMIN — FENTANYL CITRATE 25 MCG: 50 INJECTION, SOLUTION INTRAMUSCULAR; INTRAVENOUS at 17:08

## 2019-07-18 RX ADMIN — NITROGLYCERIN 0.15 MCG/KG/MIN: 5 INJECTION, SOLUTION INTRAVENOUS at 17:03

## 2019-07-18 RX ADMIN — MIDAZOLAM 0.5 MG: 1 INJECTION INTRAMUSCULAR; INTRAVENOUS at 15:07

## 2019-07-18 RX ADMIN — CALCIUM CARBONATE (ANTACID) CHEW TAB 500 MG 1000 MG: 500 CHEW TAB at 20:18

## 2019-07-18 RX ADMIN — DOCUSATE SODIUM 100 MG: 100 CAPSULE, LIQUID FILLED ORAL at 08:49

## 2019-07-18 RX ADMIN — FENTANYL CITRATE 25 MCG: 50 INJECTION, SOLUTION INTRAMUSCULAR; INTRAVENOUS at 16:13

## 2019-07-18 RX ADMIN — HEPARIN SODIUM 5000 UNITS: 1000 INJECTION, SOLUTION INTRAVENOUS; SUBCUTANEOUS at 15:19

## 2019-07-18 RX ADMIN — NITROGLYCERIN 0.07 MCG/KG/MIN: 20 INJECTION INTRAVENOUS at 16:50

## 2019-07-18 RX ADMIN — CARVEDILOL 25 MG: 25 TABLET, FILM COATED ORAL at 08:49

## 2019-07-18 RX ADMIN — FENTANYL CITRATE 25 MCG: 50 INJECTION, SOLUTION INTRAMUSCULAR; INTRAVENOUS at 17:00

## 2019-07-18 RX ADMIN — ASPIRIN 325 MG: 325 TABLET, DELAYED RELEASE ORAL at 14:31

## 2019-07-18 RX ADMIN — FENTANYL CITRATE 25 MCG: 50 INJECTION, SOLUTION INTRAMUSCULAR; INTRAVENOUS at 16:20

## 2019-07-18 RX ADMIN — ATORVASTATIN CALCIUM 20 MG: 20 TABLET, FILM COATED ORAL at 21:38

## 2019-07-18 RX ADMIN — MIDAZOLAM 0.5 MG: 1 INJECTION INTRAMUSCULAR; INTRAVENOUS at 17:02

## 2019-07-18 RX ADMIN — LIDOCAINE HYDROCHLORIDE 8 ML: 10 INJECTION, SOLUTION EPIDURAL; INFILTRATION; INTRACAUDAL; PERINEURAL at 15:10

## 2019-07-18 RX ADMIN — SODIUM CHLORIDE: 9 INJECTION, SOLUTION INTRAVENOUS at 19:53

## 2019-07-18 RX ADMIN — MIDAZOLAM 0.5 MG: 1 INJECTION INTRAMUSCULAR; INTRAVENOUS at 15:20

## 2019-07-18 RX ADMIN — Medication 1400 UNITS: at 10:36

## 2019-07-18 RX ADMIN — HEPARIN SODIUM 1000 UNITS: 1000 INJECTION, SOLUTION INTRAVENOUS; SUBCUTANEOUS at 16:24

## 2019-07-18 RX ADMIN — LISINOPRIL 40 MG: 40 TABLET ORAL at 08:49

## 2019-07-18 RX ADMIN — OMEPRAZOLE 20 MG: 20 CAPSULE, DELAYED RELEASE ORAL at 08:49

## 2019-07-18 RX ADMIN — MIDAZOLAM 0.5 MG: 1 INJECTION INTRAMUSCULAR; INTRAVENOUS at 16:23

## 2019-07-18 RX ADMIN — CLOPIDOGREL BISULFATE 600 MG: 75 TABLET ORAL at 16:59

## 2019-07-18 RX ADMIN — HEPARIN SODIUM 550 UNITS/HR: 10000 INJECTION, SOLUTION INTRAVENOUS at 00:16

## 2019-07-18 RX ADMIN — TOLTERODINE 4 MG: 4 CAPSULE, EXTENDED RELEASE ORAL at 08:49

## 2019-07-18 RX ADMIN — SODIUM CHLORIDE: 9 INJECTION, SOLUTION INTRAVENOUS at 14:31

## 2019-07-18 RX ADMIN — FENTANYL CITRATE 25 MCG: 50 INJECTION, SOLUTION INTRAMUSCULAR; INTRAVENOUS at 15:07

## 2019-07-18 ASSESSMENT — ACTIVITIES OF DAILY LIVING (ADL)
ADLS_ACUITY_SCORE: 19
ADLS_ACUITY_SCORE: 22
ADLS_ACUITY_SCORE: 19

## 2019-07-18 NOTE — PLAN OF CARE
VSS on RA. Tele- V-Paced. Hep gtt infusing. Left sided weakness d/t prior CVA. Assist of 1. Previous angio on 7/17, right radial CDI. Angio showed 3-vessel CAD.  Pt and family decided PCI vs CABG. Left for cath lab ~1430. Continue to monitor.

## 2019-07-18 NOTE — PROGRESS NOTES
SPIRITUAL HEALTH SERVICES  Spiritual Assessment Progress Note  [facility] Cath Lab    Responded to code blue in the cath lab.   met with pt's daughter and son in law in [facility].  Family tearful as pt's   10 days ago here at [facility] and their family is at his [facility] service at this time.      Pt is Buddhism and Fr Villela has been notified and will come and anoint pt once she is out of the Cath lab.       listened to family stories and saw pictures of pt's life on families I pad.  Provided emotional support.     SH will continue to follow for support.                                                                                                                                   Mary Ann Pearson M.Div., [facility]  Staff    Pager [phone]

## 2019-07-18 NOTE — PLAN OF CARE
Discharge Planner PT   Patient plan for discharge: Home with family.  Current status: PT eval completed, treatment initiated. Pt is a 87 yr old female admitted for chest pain and NSTEMI. Pt is s/p day 1 for right radial angiogram. Pt is scheduled for a PCI later today. Pt lives with family (daughter and son -in-law) in a split level home with no NOMAN. Daughter is home to assist pt as needed. Pt has to mange 8 steps into the bottom floor with L rail support, where the bed and bathroom is located. Pt has hx of CVA with L sided residual weakness. Pt's daughter has been assisting pt going up/down the stairway. Pt reported being ind with ambulation inside the house and relying on WC when going out. Pt owns a RW, std cane and WC at home.   Currently pt at min assist with supine>sit due to L sided residual weakness and R radial angiogram precautions. Pt c/o 2/10 SOB at rest and 3/10 after PT session. Pt sat at EOB x 12 min with supervision for safety. Pt stood with min assist x 1 and verbal cues. Pt ambulated 30 ft using L hand held assistance and min assist x 1. Pt was left sitting up in the WC with multiple family members present. Discussed PT session with RN.   Barriers to return to prior living situation: Multiple steps to mange at home, decreased strength, balance and activity tolerance.   Recommendations for discharge: TCU  Rationale for recommendations: Pt will need continued skilled PT at TCU to achieve PLOF and independence. If pt progress all mobility to SBA, then pt may be able to return home with assist of family and HH PT.        Entered by: Rhina Alanis 07/18/2019 11:34 AM

## 2019-07-18 NOTE — CONSULTS
Consult Date:  2019      CARDIOVASCULAR CONSULTATION      REFERRING CARDIOLOGIST:  Philipp Meza MD      REASON FOR CONSULTATION:  Evaluation for possible coronary artery bypass grafting.      HISTORY OF PRESENT ILLNESS:  Ms. Cui is a very pleasant 87-year-old female who presented to [facility] yesterday with chest pain radiating to her jaw and both of her arms.  Coronary angiogram was performed that demonstrated severe 3-vessel coronary artery disease including severe 3-vessel coronary artery disease.  The patient was referred to us for evaluation for coronary bypass surgery.      PAST MEDICAL HISTORY:  Second-degree AV block resulting in a dual-chamber pacemaker placement, prior strokes, history of GI bleed, hypertension, iron deficiency anemia, melanoma of the right arm, hypertension, hyperlipidemia, paroxysmal atrial fibrillation.      PAST SURGICAL HISTORY:  Appendectomy, fallopian tube ligation, cataract removal on the right and left, permanent pacemaker implantation in 2016.      ALLERGIES:  NO KNOWN DRUG ALLERGIES.      CURRENT MEDICATIONS:  Reviewed in Epic chart.      FAMILY HISTORY:  Significant for coronary artery disease.      SOCIAL HISTORY:  She was  in the last 1-1/2 weeks.  Her 's  is today.      REVIEW OF SYSTEMS:  As per HPI.  All other 10-point review of systems were completed and were otherwise negative unless stated above.      PHYSICAL EXAMINATION:   VITAL SIGNS:  All vital signs are stable.   GENERAL:  She appears frail but in no acute distress.   HEENT:  Within normal limits.   NECK:  Supple, no lymphadenopathy.   CARDIOVASCULAR:  Regular rate and rhythm, normal S1 and S2.  No murmurs.   LUNGS:  Clear bilaterally.   ABDOMEN:  Soft, nontender, nondistended.   EXTREMITIES:  Negative for edema, cyanosis or clubbing.   NEUROLOGIC:  She is A and O x3, no focal deficits.      LABORATORY DATA:  Coronary angiogram performed yesterday demonstrates  severe 3-vessel coronary artery disease with a culprit lesion appearing to be in the proximal circumflex that is heavily calcified and 95% stenosed.  There is 60% ostial RCA disease and very calcified mid-LAD disease as well.  Her LV function is preserved.      IMPRESSION AND PLAN:  This is a very pleasant 87-year-old female with newly diagnosed severe 3-vessel coronary artery disease.  I think given her frailty and advanced age, she is not a good surgical candidate and I think if PCI is anatomically feasible, that will be the preferred route.  I discussed this with Cardiology and they are in agreement.  The patient will be taken to the Cath Lab later today for PCI.      Thank you very much for this referral.         DONALD SANTORO MD             D: 2019   T: 2019   MT: PHILLIP      Name:     ANNE MARIE AMBROSE   MRN:      [id number]        Account:       [id number]   :      1932           Consult Date:  2019      Document: [id number]       cc: Philipp Carpenter MD, [facility]

## 2019-07-18 NOTE — PROGRESS NOTES
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  Transferred to [facility] on 7/17/2019     NSTEMI  Cardiomyopathy/CHF with reduced LVEF  -Patient initially presented to [facility] on 7/16/2019 with chest pain going on for about a week with burning central chest pain with radiation to her neck and jaw, lasting 15 to 60 minutes. Troponin on arrival was 0.990 and peaked to 23.90 then down trended. EKG showed paced rhythm.  Patient was started on heparin drip, aspirin and continued on PTA carvedilol 25 mg twice daily, lisinopril 40 mg daily and atorvastatin 20 mg at bedtime  -She underwent coronary angiogram on 7/17/2019 which showed severe three-vessel coronary artery disease.  She was found to have significant obstruction at the ostium of right coronary artery disease and also 90% stenosis in the distal right coronary artery with proximal circumflex with hazy eccentric stenosis calcified and left anterior descending artery in its mid midportion with associated diagonal artery with severe stenosis.  -Patient transferred to [facility] to get an opinion from cardiovascular surgery with possible bypass surgery versus Rotablator/PCI  -evaluated CV surgery and cardiology this am, plan is PCI.  -PRN oxygen, nitroglycerin, morphine available  -TTE showed EF 35-40%, with severe hypokinesis to akinesis of the mid to basal inferolateral and anterolateral walls, presumed ischemic. On BB and ACEI.  -Continue to  monitor in telemetry     Second degree AV block s/p pacemaker implantation (5/2016)  5/2019 cardiac device check demonstrated normal functioning within device parameters. Did have brief episodes of asymptomatic PAF, longest episode lasting 1 minute 36 seconds, over all ventricular rates controlled.   Follows with Dr. Goel.  -Echocardiogram done this admission shows possible mass on right atrium, however CT scan shows quite amount of shadowing from pacemakers leads so cardiology believes it is probably her pacer leads.     Paroxysmal atrial fibrillation on apixaban  -Holding PTA apixaban as she is on a heparin drip at this time  -Continue PTA carvedilol 25 mg twice daily     HTN  -Resumed PTA lisinopril 40 mg daily, carvedilol 25 mg twice daily and amlodipine 5 mg daily with holding parameters  -Monitor and adjust medication as needed     HLD  -Resumed PTA atorvastatin 20 mg at bedtime     Prior CVA x 2 with residual left-sided weakness  Patient reports she had a stroke when she was 50 years old and another when she was 75.  Regarding mobility status, baseline is that she can use of cane at home, and when out with family uses a wheelchair.  Able to move her left arm and leg, but has significant weakness throughout the entire left side and difficulty grasping things with the left arm.  Prior to admission on apixaban for CVA prevention.  -Holding PTA apixaban as she is on a heparin drip at this time  -Resumed PTA atorvastatin 20 mg at bedtime     Distant upper GI bleed secondary to gastric ulcer  History of bleeding peptic ulcer over 30 years ago and has been on lifelong omeprazole since.  Had EGD and colonoscopy in 2018 that were negative for acute pathology (see #8).     History of iron deficiency anemia  Hospitalized in 11/2018 for generalized weakness and fatigue and found to have severe microcytic anemia.  During that hospital stay she had an EGD done with no explanation for her anemia.  Colonoscopy the next day  was negative for active GI bleeding from the lower GI standpoint either.  She had a total of 2 units packed red blood cells transfusion during that admission with good response and improvement in her symptoms.  Resumed on Eliquis, aspirin was discontinued at that time given risk for bleeding outweighing the benefit.   -Resumed PTA iron supplementation daily      Diet: Combination Diet Low Saturated Fat Na <2400mg Diet, No Caffeine Diet  NPO for Medical/Clinical Reasons Except for: Meds    DVT Prophylaxis: heparin drip  Garcia Catheter: not present  Code Status: DNR/DNI      Disposition Plan   Expected discharge: 2+ days, discharge planning based on therapy evals post procedure.    Entered: Atul Ramos MD 07/18/2019, 12:12 PM       The patient's care was discussed with the Bedside Nurse and Patient.    Atul Ramos MD  Hospitalist Service  [facility]  07/18/2019    ______________________________________________________________________    Interval History   Patient felt slightly dyspneic while moving to a different room and has resolved already, denies chest pain or dizziness.     Data reviewed today: I reviewed all medications, new labs and imaging results over the last 24 hours. I personally reviewed no images or EKG's today.    Physical Exam   Vital Signs: Temp: 98.6  F (37  C) Temp src: Oral BP: 144/63   Heart Rate: 80 Resp: 18 SpO2: 99 % O2 Device: Nasal cannula Oxygen Delivery: 2 LPM  Weight: 109 lbs 1.6 oz    Constitutional: Awake, alert and no distress. Appears comfortable  HEENT: PERRLA EOMI  Cardiovascular: RRR. 3+ murmurs   Respiratory: Normal WOB,b/l equal air entry, no wheezes or crackles.   Gastrointestinal: Abdomen soft, non-tender. BS normal. No masses, organomegaly  : Deferred  Extremities : No edema , no clubbing or cyanosis.  Has a splint in place on the right arm at the site of recent angiogram  Neurologic: Cranial nerves II-XII grossly intact , power symmetrical  bilaterally, Reflexes normal and symmetric. Sensation grossly WNL.    Data   Recent Labs   Lab 07/18/19  0523 07/17/19  0904 07/17/19  0643 07/16/19  1703 07/16/19  1219 07/16/19  0902   WBC 8.0  --   --   --  11.7* 10.1   HGB 11.4*  --   --   --  13.0 13.2   MCV 91  --   --   --  92 93     --   --   --  171 203     --   --   --   --  139   POTASSIUM 3.9  --  4.2  --   --  4.0   CHLORIDE 108  --   --   --   --  108   CO2 26  --   --   --   --  25   BUN 15  --   --   --   --  18   CR 0.75  --   --   --   --  0.67   ANIONGAP 8  --   --   --   --  6   GOLDY 8.5  --   --   --   --  8.7   GLC 89  --   --   --   --  120*   TROPI  --  7.854*  --  23.940* 18.294* 0.990*     No results found for this or any previous visit (from the past 24 hour(s)).  Medications     HEParin 600 Units/hr (07/18/19 1035)     - MEDICATION INSTRUCTIONS -       - MEDICATION INSTRUCTIONS -       sodium chloride         amLODIPine  5 mg Oral Daily     aspirin  325 mg Oral Once     atorvastatin  20 mg Oral At Bedtime     carvedilol  25 mg Oral BID w/meals     ferrous sulfate  325 mg Oral Daily with breakfast     fluticasone  1 puff Inhalation Daily     lisinopril  40 mg Oral Daily     omeprazole  20 mg Oral QAM     sodium chloride (PF)  3 mL Intracatheter Q8H     sodium chloride (PF)  3 mL Intracatheter Q8H     tolterodine ER  4 mg Oral QAM

## 2019-07-18 NOTE — PROGRESS NOTES
07/18/19 1100   Quick Adds   Type of Visit Initial PT Evaluation   Living Environment   Lives With child(bill), adult   Living Arrangements house   Home Accessibility stairs within home   Number of Stairs, Within Home, Primary none   Stair Railings, Within Home, Primary railing on right side (ascending)   Transportation Anticipated family or friend will provide   Living Environment Comment Pt lives with daughter and son in law in a split level home with no steps to enter, had 8 steps to access bed and bathroom downstairs with L rail support. Pt has L sided residual weakness from CVA. Therfore daughter assisst with stair management at home.    Self-Care   Usual Activity Tolerance good   Current Activity Tolerance fair   Regular Exercise No   Equipment Currently Used at Home none   Activity/Exercise/Self-Care Comment Pt owns a RW, std cane and WC at home. Pt reproted being ind with all ADL's.   Functional Level Prior   Ambulation 0-->independent   Transferring 0-->independent   Toileting 0-->independent   Bathing 0-->independent   Communication 0-->understands/communicates without difficulty   Swallowing 0-->swallows foods/liquids without difficulty   Cognition 1 - attention or memory deficits   Fall history within last six months yes  (per pt, however family denied having any falls.)   Number of times patient has fallen within last six months 2   Which of the above functional risks had a recent onset or change? none   Prior Functional Level Comment Pt reproted being ind with ambualtion. Denied furniture walking.    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  Transferred to [facility] on 7/17/2019   Precautions/Limitations fall precautions   Weight-Bearing Status - LLE full weight-bearing   Weight-Bearing Status - RLE full weight-bearing   General Observations Pleasant,cooperative, [place]   Cognitive Status Examination   Orientation orientation to person, place and time   Level of Consciousness alert   Follows Commands and Answers Questions 100% of the time   Personal Safety and Judgment intact   Memory intact   Pain Assessment   Patient Currently in Pain No   Range of Motion (ROM)   ROM Comment BLE: WFL   Strength   Strength Comments LLE: 4-/5, RLE: 4/5   Bed Mobility   Bed Mobility Comments Supine>sit: min assist x 1 with verbal cues on safety   Transfer Skills   Transfer Comments STS at min assist x 1    Gait   Gait Comments 20 ft using HHA for support and min assist x 1    Balance   Balance Comments Sitting: good   Sensory Examination   Sensory Perception no deficits were identified   Sensory Perception Comments For touch awareness. Pt reported having numbness in the L dorsum of the foot.   Muscle Tone   Muscle Tone no deficits were identified   General Therapy Interventions   Planned Therapy Interventions balance training;bed mobility training;gait training;strengthening;transfer training;wheelchair management/propulsion training;risk factor education;home program guidelines;progressive activity/exercise   Clinical Impression   Criteria for Skilled Therapeutic Intervention yes, treatment indicated   PT Diagnosis Impaired gait   Influenced by the following impairments decreased strength, activity toelrance and balance   Functional limitations due to impairments assistance needed with mobility  "  Clinical Presentation Evolving/Changing   Clinical Presentation Rationale clinical judgement   Clinical Decision Making (Complexity) Moderate complexity   Therapy Frequency Daily   Predicted Duration of Therapy Intervention (days/wks) 7   Anticipated Discharge Disposition Transitional Care Facility   Risk & Benefits of therapy have been explained Yes   Patient, Family & other staff in agreement with plan of care Yes   Clinical Impression Comments Pt presents with decreased strength, activity toelrance and balance limiting fucntional mobility. Pt will benefit from continued skilled PT to acheive PLOF and independence.    [facility] AM-PAC  \"6 Clicks\" V.2 Basic Mobility Inpatient Short Form   1. Turning from your back to your side while in a flat bed without using bedrails? 3 - A Little   2. Moving from lying on your back to sitting on the side of a flat bed without using bedrails? 3 - A Little   3. Moving to and from a bed to a chair (including a wheelchair)? 3 - A Little   4. Standing up from a chair using your arms (e.g., wheelchair, or bedside chair)? 3 - A Little   5. To walk in hospital room? 3 - A Little   6. Climbing 3-5 steps with a railing? 2 - A Lot   Basic Mobility Raw Score (Score out of 24.Lower scores equate to lower levels of function) 17   Total Evaluation Time   Total Evaluation Time (Minutes) 17     "

## 2019-07-18 NOTE — CODE/RAPID RESPONSE
Brief House SCARLET Note  7/18/2019  2182    A/P:  The Code Blue team was summoned to the cath lab d/t a suspected dissection during coronary cath, high risk of cardiopulmonary arrest. Upon our arrival, the patient had stable vitals signs (BP /60-70s, HR 70-80s, sats 95%) and appeared in no respiratory distress of any kind. The code blue team (including anesthesia) remained on standby until dismissed by CV interventionalist. The OR was prepared.     Interventions by House NP  --STAT type and screen  --STAT 2 units PRBCs prepared on standby  --STAT CBC, k+, Mag  --will repeat Hgb this evening at 10    The patient remained stable, and the code team was dismissed after echo completed. The House team will remain available should the patient deteriorate further.       STEFFANY Fuchs [facility]  Hospitalist Service  House Officer  Pager: [phone] (5f - 6p)

## 2019-07-18 NOTE — PROGRESS NOTES
Cardiology Progress Note          Assessment and Plan:     1) NSTEMI  Patient with significant 3V CAD  Transferred to [facility] for consideration of CABG vs PCI  Patient 87, frail, and limited mobility   Prior stroke  Feel PCI better approach.  Patient and family not wanting CABG  Awaiting their decision and [facility] consultation    2) S/P PPM    3) Afib    On apixaban being held  On IV heparin    4) htn  Lisinopril    5) Prior CVA X 2    6) Frail  Only walks with walker.  Minimal activity at home          Interval History:   no new complaints and doing well; no cp, sob, n/v/d, or abd pain.                   Review of Systems:   The 10 point Review of Systems is negative other than noted in the HPI          Medications:   I have reviewed this patient's current medications             Physical Exam:   Blood pressure 144/63, temperature 98.6  F (37  C), temperature source Oral, resp. rate 18, weight 49.5 kg (109 lb 1.6 oz), SpO2 99 %, not currently breastfeeding.        Vital Sign Ranges  Temperature Temp  Av.9  F (36.6  C)  Min: 97.1  F (36.2  C)  Max: 98.6  F (37  C)   Blood pressure Systolic (24hrs), Av , Min:116 , Max:146        Diastolic (24hrs), Av, Min:52, Max:73      Pulse No data recorded   Respirations Resp  Av.4  Min: 18  Max: 24   Pulse oximetry SpO2  Av.8 %  Min: 92 %  Max: 99 %         Intake/Output Summary (Last 24 hours) at 2019 0950  Last data filed at 2019 0846  Gross per 24 hour   Intake 240 ml   Output --   Net 240 ml       Constitutional:   Alert and oriented, well developed, in no acute distress.   Skin:   Warm and dry to touch; no apparent skin lesions.   Head:   Normocephalic, atraumatic.   Eyes:   Pupils equal and round, conjunctivae and lids unremarkable, no xanthalasma.   Neck:   cartoid pulses are full and equalbilaterally, JVP normal, no carotid bruit.   Chest:   No tenderness to palpation.   Lungs:   No increased work of breathing, good air exchange, clear to  auscultation bilaterally.   Cardiovascular:   Regular rhythm, S1 normal, S2 normal, No S3 or S4, no murmurs or rubs detected.   Abdomen:   Abdomen soft, bowel sounds normoactive, no hepatosplenomegaly, non-tender.   Peripheral Pulses:   Pulses full and equal in all extremities, no bruits auscultated.   Extremities and Back:   No clubbing, cyanosis.  No edema observed.   Neurological:   No gross motor deficits noted, affect appropriate, oriented to time, person and place.   Hematologic/Lymphatic:    Hematologic / Lymphatic: No cervical lymphadenopathy and no supraclavicular lymphadenopathy.   Additional Exam Findings:                    Data:     Lab Results   Component Value Date    WBC 8.0 07/18/2019    HGB 11.4 (L) 07/18/2019    HCT 35.0 07/18/2019     07/18/2019     07/18/2019    POTASSIUM 3.9 07/18/2019    CHLORIDE 108 07/18/2019    CO2 26 07/18/2019    BUN 15 07/18/2019    CR 0.75 07/18/2019    GLC 89 07/18/2019    SED 12 11/05/2007    DD 0.7 (H) 11/05/2007    NTBNPI 1,950 (H) 05/16/2016    TROPONIN <0.04 10/19/2007    TROPI 7.854 (HH) 07/17/2019    AST 22 11/19/2018    ALT 19 11/19/2018    ALKPHOS 80 11/19/2018    BILITOTAL 0.3 11/19/2018    ELAINE <10 (L) 10/12/2016    INR 1.31 (H) 11/19/2018

## 2019-07-18 NOTE — PLAN OF CARE
Pt VSS on 1L NC. v paced. A&Ox4. Left-sided weakness due to hx of CVA. Up with assist of 1. Denies pain. Slept. Right radial angio site CDI. Waiting for surgical consult. Continue to monitor.

## 2019-07-18 NOTE — PROGRESS NOTES
CV Surgery    Patient seen and examined, [facility] reviewed. Recommend PCI. Full consult note to follow.    Stephy Henderson MD

## 2019-07-18 NOTE — H&P
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  Transferred to [facility] on 7/17/2019     NSTEMI  -Patient initially presented to [facility] on 7/16/2019 with chest pain going on for about a week with burning central chest pain with radiation to her neck and jaw, lasting 15 to 60 minutes.  Troponin on arrival was 0.990 and peaked to 23.90.  Now has down trended to 7.8.  EKG showed paced rhythm.  Patient was started on heparin drip, aspirin and continued on PTA carvedilol 25 mg twice daily, lisinopril 40 mg daily, and atorvastatin 20 mg at bedtime  -She underwent coronary angiogram on 7/17/2019 which showed severe three-vessel coronary artery disease.  She was found to have significant obstruction at the ostium of right coronary artery disease and also 90% stenosis in the distal right coronary artery with proximal circumflex with hazy eccentric stenosis calcified and left anterior descending artery in its mid midportion with associated diagonal artery with severe stenosis.  -Patient transferred to [facility] to get an opinion from cardiovascular surgery with possible bypass surgery versus Rotablator  -PRN oxygen, nitroglycerin, morphine available  -TTE showed EF 35-40%, with severe hypokinesis to akinesis of the mid to basal inferolateral and anterolateral walls.  -Consult cardiology and CV surgery  -Continue to monitor in telemetry     2. Second degree AV block s/p pacemaker implantation  (5/2016)  5/2019 cardiac device check demonstrated normal functioning within device parameters. Did have brief episodes of asymptomatic PAF, longest episode lasting 1 minute 36 seconds, over all ventricular rates controlled.   Follows with Dr. Goel.  -Echocardiogram done this admission shows possible mass on right atrium, however CT scan shows quite amount of shadowing from pacemakers leads so cardiology believes it is probably her pacer leads.     3. Paroxysmal atrial fibrillation on apixaban  -Hold PTA apixaban as she is on a heparin drip at this time  -Continue PTA carvedilol 25 mg twice daily     4. HTN  -Resume PTA lisinopril 40 mg daily, carvedilol 25 mg twice daily, and amlodipine 5 mg daily with holding parameters  -Monitor and adjust medication as needed     5. HLD  -Resume PTA atorvastatin 20 mg at bedtime     6. Prior CVA x 2 with residual left-sided weakness  Patient reports she had a stroke when she was 50 years old and another when she was 75.  Regarding mobility status, baseline is that she can use of cane at home, and when out with family uses a wheelchair.  Able to move her left arm and leg, but has significant weakness throughout the entire left side and difficulty grasping things with the left arm.  Prior to admission on apixaban for CVA prevention.  -Hold PTA apixaban as she is on a heparin drip at this time  -Resume PTA atorvastatin 20 mg at bedtime     7. Distant upper GI bleed secondary to gastric ulcer  History of bleeding peptic ulcer over 30 years ago and has been on lifelong omeprazole since.  Had EGD and colonoscopy in 2018 that were negative for acute pathology (see #8).     8. History of iron deficiency anemia  Hospitalized in 11/2018 for generalized weakness and fatigue and found to have severe microcytic anemia.  During that hospital stay she had an EGD done with no explanation for her anemia.  Colonoscopy the next day was negative for active GI bleeding from the lower GI standpoint  either.  She had a total of 2 units packed red blood cells transfusion during that admission with good response and improvement in her symptoms.  Resumed on Eliquis, aspirin was discontinued at that time given risk for bleeding outweighing the benefit.   -Resume PTA iron supplementation daily     Diet: Combination Diet Low Saturated Fat Na <2400mg Diet, No Caffeine Diet    DVT Prophylaxis: Heparin drip  Garcia Catheter: not present  Code Status: DNR/DNI.  CODE STATUS was discussed with the patient with her daughter by bedside and confirmed DNR/DNI CODE STATUS    Disposition Plan   Expected discharge: 2 - 3 days, recommended to Prior living arrangements versus TCU pending recovery and PT OT evaluation once All cardiac evaluation and procedure completed and patient clinical improvement and patient.  Entered: Denice Rashid MD 07/17/2019, 10:53 PM     The patient's care was discussed with the Bedside Nurse, Patient and Patient's Family.    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  Troponin on arrival was 0.990 and peaked to 23.90.  Now has down trended to 7.8.  EKG showed paced rhythm.  Patient was started on heparin drip, aspirin and continued on PTA carvedilol 25 mg twice daily, lisinopril 40 mg daily, and atorvastatin 20 mg at bedtime  She underwent coronary  angiogram on 7/17/2019 which showed severe three-vessel coronary artery disease with significant obstruction at the ostium of right coronary artery disease and also 90% stenosis in the distal right coronary artery with proximal circumflex with hazy eccentric stenosis calcified and left anterior descending artery in its mid midportion with associated diagonal artery with severe stenosis.  Therefore, patient transferred to [facility] to get an opinion from cardiovascular surgery .    Currently patient is chest pain-free and denies any shortness of breath nausea vomiting.  Of note her  passed away about a week ago and tomorrow is her 's memorial and patient is disappointed that she will not be able to go to his Memorial tomorrow otherwise she denies any other new complaints.    Review of Systems    The 10 point Review of Systems is negative other than noted in the HPI or here.     Past Medical History    I have reviewed this patient's medical history and updated it with pertinent information if needed.   Past Medical History:   Diagnosis Date     AV block, 2nd degree 5/16/2016     Cerebrovascular accident (H) 8/22/2016     COKER (dyspnea on exertion) 8/22/2016     Generalized weakness 10/12/2016     GIB (gastrointestinal bleeding)      History of colonic polyps 8/22/2016     HTN (hypertension) 8/22/2016     Iron deficiency anemia      Melanoma of skin (H)-rt arm 8/22/2016     Mixed hyperlipidemia 8/22/2016     Pacemaker     implanted 5-     PAF (paroxysmal atrial fibrillation) (H)      SSS (sick sinus syndrome) (H) 8/22/2016       Past Surgical History   I have reviewed this patient's surgical history and updated it with pertinent information if needed.  Past Surgical History:   Procedure Laterality Date     APPENDECTOMY  1960s     C LIGATE FALLOPIAN TUBE  1960s     C REMV CATARACT INTRACAP,INSERT LENS Right 09/20/2017     CV CORONARY ANGIOGRAM N/A 7/17/2019    Procedure: Coronary  Angiogram;  Surgeon: Irvin Hutson MD;  Location:  HEART CARDIAC CATH LAB     CV LEFT HEART CATH N/A 7/17/2019    Procedure: Left Heart Cath;  Surgeon: Irvin Hutson MD;  Location:  HEART CARDIAC CATH LAB     CV LEFT VENTRICULOGRAM N/A 7/17/2019    Procedure: Left Ventriculogram;  Surgeon: Irvin Hutson MD;  Location:  HEART CARDIAC CATH LAB     ESOPHAGOSCOPY, GASTROSCOPY, DUODENOSCOPY (EGD), COMBINED N/A 11/20/2018    Procedure: ESOPHAGOSCOPY, GASTROSCOPY, DUODENOSCOPY (EGD)  Room 523 with biopsies using cold biopsy forceps;  Surgeon: Ayala Soto MD;  Location:  GI     HC REMV CATARACT EXTRACAP,INSERT LENS Left 10/04/2017     IMPLANT PACEMAKER  05/17/2016       Social History   I have reviewed this patient's social history and updated it with pertinent information if needed.  Social History     Tobacco Use     Smoking status: Never Smoker     Smokeless tobacco: Never Used   Substance Use Topics     Alcohol use: No     Alcohol/week: 0.0 oz     Drug use: No       Family History   I have reviewed this patient's family history and updated it with pertinent information if needed.   Family History   Problem Relation Age of Onset     Coronary Artery Disease Father      Coronary Artery Disease Brother      Coronary Artery Disease Sister      Colon Cancer No family hx of        Prior to Admission Medications   Prior to Admission Medications   Prescriptions Last Dose Informant Patient Reported? Taking?   Multiple Vitamins-Minerals (ICAPS AREDS FORMULA) TABS   Yes No   Sig: Take 1 tablet by mouth daily    OMEPRAZOLE PO   Yes No   Sig: Take 20 mg by mouth every morning    acetaminophen (TYLENOL) 325 MG tablet   Yes No   Sig: Take 650 mg by mouth every 6 hours as needed for mild pain    amLODIPine (NORVASC) 5 MG tablet   Yes No   Sig: Take 5 mg by mouth daily    apixaban ANTICOAGULANT (ELIQUIS) 2.5 MG tablet   No No   Sig: Take 1 tablet (2.5 mg) by mouth 2 times daily   atorvastatin  (LIPITOR) 20 MG tablet   Yes No   Sig: Take 20 mg by mouth At Bedtime   carvedilol (COREG) 25 MG tablet   Yes No   Sig: Take 25 mg by mouth 2 times daily (with meals)   ferrous sulfate (IRON) 325 (65 FE) MG tablet   Yes No   Sig: Take 325 mg by mouth daily (with breakfast)    fluticasone furoate (ARNUITY ELLIPTA) 200 MCG/ACT inhalation powder   No No   Sig: Inhale 1 puff into the lungs daily   lisinopril (PRINIVIL,ZESTRIL) 40 MG tablet   Yes No   Sig: Take 40 mg by mouth daily   tolterodine (DETROL LA) 4 MG 24 hr capsule   Yes No   Sig: Take 4 mg by mouth every morning      Facility-Administered Medications: None     Allergies   No Known Allergies    Physical Exam   Vital Signs:     BP: 146/71   Heart Rate: 79 Resp: 18 SpO2: 96 % O2 Device: Nasal cannula Oxygen Delivery: 2 LPM  Weight: 0 lbs 0 oz    Exam:  Constitutional: Awake, alert and no distress. Appears comfortable  Head: Normocephalic. No masses, lesions, tenderness or abnormalities  ENT: ENT exam normal, no neck nodes or sinus tenderness  Cardiovascular: RRR.  3+ murmurs, no rubs or JVD  Respiratory: Normal WOB,b/l equal air entry, no wheezes or crackles   Gastrointestinal: Abdomen soft, non-tender. BS normal. No masses, organomegaly  : Deferred  Extremities : No edema , no clubbing or cyanosis.  Has a splint in place on the right arm at the site of recent angiogram  Neurologic: Cranial nerves II-XII grossly intact , power symmetrical bilaterally, Reflexes normal and symmetric. Sensation grossly WNL.      Data   Data reviewed today: I reviewed all medications, new labs and imaging results over the last 24 hours. I personally reviewed the chest CT image(s) showing No convincing evidence for right atrial mass.  Indeterminate left upper lobe pulmonary nodule unchanged since 3/2/2018.    Recent Labs   Lab 07/17/19  0904 07/17/19  0643 07/16/19  1703 07/16/19  1219 07/16/19  0902   WBC  --   --   --  11.7* 10.1   HGB  --   --   --  13.0 13.2   MCV  --   --   --   92 93   PLT  --   --   --  171 203   NA  --   --   --   --  139   POTASSIUM  --  4.2  --   --  4.0   CHLORIDE  --   --   --   --  108   CO2  --   --   --   --  25   BUN  --   --   --   --  18   CR  --   --   --   --  0.67   ANIONGAP  --   --   --   --  6   GOLDY  --   --   --   --  8.7   GLC  --   --   --   --  120*   TROPI 7.854*  --  23.940* 18.294* 0.990*     Recent Results (from the past 24 hour(s))   CT Chest w Contrast    Narrative    CT CHEST WITH CONTRAST July 17, 2019 10:24 AM    HISTORY: Possible right atrial mass.    TECHNIQUE: 80mL Isovue-370 were injected intravenously. After contrast  injection, volumetric helical acquisition was performed from the  thoracic inlet through the upper abdomen. Coronal images were also  reconstructed. Radiation dose for this scan was reduced using  automated exposure control, adjustment of the mA and/or kV according  to patient size, or iterative reconstruction technique.    COMPARISON: Chest CT performed 3/2/2018.    FINDINGS: Emphysematous changes are again noted in both upper lungs. A  0.4 cm indeterminate pulmonary nodule in the left upper lobe (series 6  image 156) is not significantly changed, given differences in  technique. Mild scattered scarring and/or atelectasis at both lung  bases. No pleural or pericardial effusions. Dual-lead cardiac device  left chest wall, with lead tips in the RA and RV. No convincing  evidence for a right atrial mass.     Atherosclerotic calcification of the thoracic aorta and coronary  arteries. No enlarged lymph nodes are identified in the chest.  Cortical scarring in the upper pole of the right kidney is unchanged.  Limited views of the upper abdomen are otherwise unremarkable.  Degenerative changes are noted throughout the visualized thoracolumbar  spine.        Impression    IMPRESSION:   1. Emphysema.  2. Indeterminate left upper lobe pulmonary nodule measures 0.4 cm, and  is unchanged since 3/2/2018. Please refer to pulmonary  nodule  follow-up guidelines below.  3. No convincing evidence for a right atrial mass.    Recommendations for one or multiple incidental lung nodules < 6mm :    Low risk patients: No routine follow-up.    High risk patients: Optional follow-up CT at 12 months; if  unchanged, no further follow-up.    *Low Risk: Minimal or absent history of smoking or other known risk  factors.  *Nonsolid (ground glass) or partly solid nodules may require longer  follow-up to exclude indolent adenocarcinoma.  *Recommendations based on Guidelines for the Management of Incidental  Pulmonary Nodules Detected at CT: From the Fleischner Society 2017,  Radiology 2017.       YAA JUNIOR MD

## 2019-07-18 NOTE — PROGRESS NOTES
Transfer from cath lab. A&O. On NC 4L. Clear lung sounds. Paced rhythm. SBP goal of 100-140. Nitroglycerin gtt infusing. Titrating gtt according to BP cuff. Arterial sheath WDL and transducing. Waveform is dampened and inaccurate. Patient reporting pain in chest, heart burn, headache, and nausea. Declined tylenol or pain meds at this time. Requested sips of water. Daughter updated on POC. Will continue to monitor.

## 2019-07-18 NOTE — PROGRESS NOTES
SPIRITUAL HEALTH SERVICES Progress Note  [facility] Cath Lab     responded to code blue in the cath lab.   met with pt's daughter and son in law in [facility]. Pt's daughter was tearful and concerned about her pt's health since her father passed away 10 days ago. Pt is Shinto.  offered emotional support and saw family photos on ipad.  paged Fr Villela for anointing of the sick and stayed with family until Fr Villela arrived.      remains available should any needs arise.     Stephen Mishra   Intern  Pager:  [phone]

## 2019-07-19 ENCOUNTER — APPOINTMENT (OUTPATIENT)
Dept: CARDIOLOGY | Facility: CLINIC | Age: 84
DRG: 246 | End: 2019-07-19
Attending: INTERNAL MEDICINE
Payer: MEDICARE

## 2019-07-19 LAB
ANION GAP SERPL CALCULATED.3IONS-SCNC: 10 MMOL/L (ref 3–14)
BUN SERPL-MCNC: 16 MG/DL (ref 7–30)
CALCIUM SERPL-MCNC: 8.8 MG/DL (ref 8.5–10.1)
CHLORIDE SERPL-SCNC: 107 MMOL/L (ref 94–109)
CO2 SERPL-SCNC: 23 MMOL/L (ref 20–32)
CREAT SERPL-MCNC: 0.65 MG/DL (ref 0.52–1.04)
ERYTHROCYTE [DISTWIDTH] IN BLOOD BY AUTOMATED COUNT: 14.5 % (ref 10–15)
GFR SERPL CREATININE-BSD FRML MDRD: 79 ML/MIN/{1.73_M2}
GLUCOSE BLDC GLUCOMTR-MCNC: 104 MG/DL (ref 70–99)
GLUCOSE BLDC GLUCOMTR-MCNC: 79 MG/DL (ref 70–99)
GLUCOSE SERPL-MCNC: 80 MG/DL (ref 70–99)
HCT VFR BLD AUTO: 34.2 % (ref 35–47)
HGB BLD-MCNC: 11.3 G/DL (ref 11.7–15.7)
INTERPRETATION ECG - MUSE: NORMAL
INTERPRETATION ECG - MUSE: NORMAL
MCH RBC QN AUTO: 29.7 PG (ref 26.5–33)
MCHC RBC AUTO-ENTMCNC: 33 G/DL (ref 31.5–36.5)
MCV RBC AUTO: 90 FL (ref 78–100)
PLATELET # BLD AUTO: 158 10E9/L (ref 150–450)
POTASSIUM SERPL-SCNC: 3.5 MMOL/L (ref 3.4–5.3)
RBC # BLD AUTO: 3.8 10E12/L (ref 3.8–5.2)
SODIUM SERPL-SCNC: 140 MMOL/L (ref 133–144)
TROPONIN I SERPL-MCNC: 2.94 UG/L (ref 0–0.04)
TROPONIN I SERPL-MCNC: 3.11 UG/L (ref 0–0.04)
TROPONIN I SERPL-MCNC: 3.34 UG/L (ref 0–0.04)
WBC # BLD AUTO: 10.4 10E9/L (ref 4–11)

## 2019-07-19 PROCEDURE — 80048 BASIC METABOLIC PNL TOTAL CA: CPT | Performed by: INTERNAL MEDICINE

## 2019-07-19 PROCEDURE — 85027 COMPLETE CBC AUTOMATED: CPT | Performed by: INTERNAL MEDICINE

## 2019-07-19 PROCEDURE — 25800030 ZZH RX IP 258 OP 636: Performed by: HOSPITALIST

## 2019-07-19 PROCEDURE — 25000132 ZZH RX MED GY IP 250 OP 250 PS 637: Performed by: SURGERY

## 2019-07-19 PROCEDURE — 36415 COLL VENOUS BLD VENIPUNCTURE: CPT | Performed by: INTERNAL MEDICINE

## 2019-07-19 PROCEDURE — 25000132 ZZH RX MED GY IP 250 OP 250 PS 637: Performed by: INTERNAL MEDICINE

## 2019-07-19 PROCEDURE — 84484 ASSAY OF TROPONIN QUANT: CPT | Performed by: INTERNAL MEDICINE

## 2019-07-19 PROCEDURE — 93308 TTE F-UP OR LMTD: CPT

## 2019-07-19 PROCEDURE — 93308 TTE F-UP OR LMTD: CPT | Mod: 26 | Performed by: INTERNAL MEDICINE

## 2019-07-19 PROCEDURE — 25000131 ZZH RX MED GY IP 250 OP 636 PS 637: Performed by: INTERNAL MEDICINE

## 2019-07-19 PROCEDURE — 99232 SBSQ HOSP IP/OBS MODERATE 35: CPT | Performed by: HOSPITALIST

## 2019-07-19 PROCEDURE — 93321 DOPPLER ECHO F-UP/LMTD STD: CPT | Mod: 26 | Performed by: INTERNAL MEDICINE

## 2019-07-19 PROCEDURE — 25000132 ZZH RX MED GY IP 250 OP 250 PS 637: Performed by: HOSPITALIST

## 2019-07-19 PROCEDURE — 21000001 ZZH R&B HEART CARE

## 2019-07-19 PROCEDURE — 93325 DOPPLER ECHO COLOR FLOW MAPG: CPT | Mod: 26 | Performed by: INTERNAL MEDICINE

## 2019-07-19 PROCEDURE — 25000125 ZZHC RX 250: Performed by: HOSPITALIST

## 2019-07-19 PROCEDURE — 99233 SBSQ HOSP IP/OBS HIGH 50: CPT | Performed by: INTERNAL MEDICINE

## 2019-07-19 PROCEDURE — 00000146 ZZHCL STATISTIC GLUCOSE BY METER IP

## 2019-07-19 RX ORDER — AMLODIPINE BESYLATE 5 MG/1
5 TABLET ORAL 2 TIMES DAILY
Status: DISCONTINUED | OUTPATIENT
Start: 2019-07-19 | End: 2019-07-22

## 2019-07-19 RX ORDER — SODIUM CHLORIDE 9 MG/ML
INJECTION, SOLUTION INTRAVENOUS CONTINUOUS
Status: DISCONTINUED | OUTPATIENT
Start: 2019-07-19 | End: 2019-07-24 | Stop reason: HOSPADM

## 2019-07-19 RX ORDER — PROCHLORPERAZINE MALEATE 5 MG
5 TABLET ORAL EVERY 6 HOURS PRN
Status: DISCONTINUED | OUTPATIENT
Start: 2019-07-19 | End: 2019-07-24 | Stop reason: HOSPADM

## 2019-07-19 RX ORDER — PROCHLORPERAZINE 25 MG
12.5 SUPPOSITORY, RECTAL RECTAL EVERY 12 HOURS PRN
Status: DISCONTINUED | OUTPATIENT
Start: 2019-07-19 | End: 2019-07-24 | Stop reason: HOSPADM

## 2019-07-19 RX ORDER — SODIUM CHLORIDE 9 MG/ML
INJECTION, SOLUTION INTRAVENOUS CONTINUOUS
Status: ACTIVE | OUTPATIENT
Start: 2019-07-19 | End: 2019-07-20

## 2019-07-19 RX ADMIN — CARVEDILOL 25 MG: 25 TABLET, FILM COATED ORAL at 08:38

## 2019-07-19 RX ADMIN — OMEPRAZOLE 20 MG: 20 CAPSULE, DELAYED RELEASE ORAL at 17:46

## 2019-07-19 RX ADMIN — SODIUM CHLORIDE: 9 INJECTION, SOLUTION INTRAVENOUS at 00:57

## 2019-07-19 RX ADMIN — CLOPIDOGREL BISULFATE 75 MG: 75 TABLET ORAL at 08:38

## 2019-07-19 RX ADMIN — DOCUSATE SODIUM 100 MG: 100 CAPSULE, LIQUID FILLED ORAL at 02:33

## 2019-07-19 RX ADMIN — CARVEDILOL 25 MG: 25 TABLET, FILM COATED ORAL at 17:46

## 2019-07-19 RX ADMIN — TOLTERODINE 4 MG: 4 CAPSULE, EXTENDED RELEASE ORAL at 08:39

## 2019-07-19 RX ADMIN — AMLODIPINE BESYLATE 5 MG: 5 TABLET ORAL at 08:38

## 2019-07-19 RX ADMIN — SODIUM CHLORIDE: 9 INJECTION, SOLUTION INTRAVENOUS at 23:02

## 2019-07-19 RX ADMIN — LIDOCAINE HYDROCHLORIDE 30 ML: 20 SOLUTION ORAL; TOPICAL at 10:31

## 2019-07-19 RX ADMIN — ONDANSETRON 4 MG: 4 TABLET, ORALLY DISINTEGRATING ORAL at 10:37

## 2019-07-19 RX ADMIN — CALCIUM CARBONATE (ANTACID) CHEW TAB 500 MG 1000 MG: 500 CHEW TAB at 09:12

## 2019-07-19 RX ADMIN — ASPIRIN 81 MG: 81 TABLET, COATED ORAL at 08:39

## 2019-07-19 RX ADMIN — ATORVASTATIN CALCIUM 20 MG: 20 TABLET, FILM COATED ORAL at 21:41

## 2019-07-19 RX ADMIN — AMLODIPINE BESYLATE 5 MG: 5 TABLET ORAL at 21:41

## 2019-07-19 RX ADMIN — FERROUS SULFATE TAB 325 MG (65 MG ELEMENTAL FE) 325 MG: 325 (65 FE) TAB at 08:38

## 2019-07-19 RX ADMIN — DOCUSATE SODIUM 100 MG: 100 CAPSULE, LIQUID FILLED ORAL at 21:42

## 2019-07-19 RX ADMIN — LISINOPRIL 40 MG: 40 TABLET ORAL at 08:38

## 2019-07-19 RX ADMIN — OMEPRAZOLE 20 MG: 20 CAPSULE, DELAYED RELEASE ORAL at 08:38

## 2019-07-19 ASSESSMENT — ACTIVITIES OF DAILY LIVING (ADL)
ADLS_ACUITY_SCORE: 19
ADLS_ACUITY_SCORE: 18
ADLS_ACUITY_SCORE: 19

## 2019-07-19 NOTE — CONSULTS
Received standard consult to instruct patient on AHA diet guidelines s/p angio.  Patient currently resting in ICU with [facility].  Will check education needs once out of the ICU, closer to discharge.     Colleen Hollingsworth RD, LD, CNSC   Clinical Dietitian - [facility]

## 2019-07-19 NOTE — PROGRESS NOTES
SPIRITUAL HEALTH SERVICES  Spiritual Assessment Progress Note  [facility] ICU   had a follow up visit with pt.  She was doing well and very reflective of her life, her family and the recent death of he .  Pt talked about her loving family and all that they do for her.    Pt talked about her love for dogs and was visited by the therapy dogKirk.    Pt is Spiritism and was anointed by the  yesterday.      Overall, pt is coping well with her health issues and her grief.  She has good support from her family and strenght from her raffaele.                                                                                                                                              Mary Ann Pearson M.Div., [facility]  Staff    Pager [phone]

## 2019-07-19 NOTE — PROGRESS NOTES
Cardiology Progress Note          Assessment and Plan:     1) CAD   S/P Left main and LAD stenting.  Complicated by dissection into the left main and aortic hematoma.  Patient currently stable.  No chest pain. At this point remove femoral line and transfer to CCU.     Check serial troponins.    2) Aortic Wall Hematoma   Stable by echocardiogram and organizing.  NO HEPARIN.  BP control  Increase Norvasc 5 bid  Continue IV NTG for now    3) LVEF    50-55%    4) PPM    Atrial paced rhythm.  No OAC at present.         Interval History:   GERD.  No angina or SOB                   Review of Systems:   The 10 point Review of Systems is negative other than noted in the HPI          Medications:   I have reviewed this patient's current medications             Physical Exam:   Blood pressure 141/74, pulse 104, temperature 97.9  F (36.6  C), resp. rate 14, weight 50.1 kg (110 lb 7.2 oz), SpO2 94 %, not currently breastfeeding.        Vital Sign Ranges  Temperature Temp  Av.7  F (36.5  C)  Min: 94.6  F (34.8  C)  Max: 99  F (37.2  C)   Blood pressure Systolic (24hrs), Av , Min:86 , Max:154        Diastolic (24hrs), Av, Min:43, Max:82      Pulse Pulse  Av.8  Min: 69  Max: 104   Respirations Resp  Av.4  Min: 12  Max: 28   Pulse oximetry SpO2  Av.2 %  Min: 91 %  Max: 99 %         Intake/Output Summary (Last 24 hours) at 2019 0909  Last data filed at 2019 0800  Gross per 24 hour   Intake 583.68 ml   Output 1265 ml   Net -681.32 ml       Constitutional:   Alert and oriented, well developed, in no acute distress.   Skin:   Warm and dry to touch; no apparent skin lesions.   Head:   Normocephalic, atraumatic.   Eyes:   Pupils equal and round, conjunctivae and lids unremarkable, no xanthalasma.   Neck:   cartoid pulses are full and equalbilaterally, JVP normal, no carotid bruit.   Chest:   No tenderness to palpation.   Lungs:   No increased work of breathing, good air exchange, clear to  auscultation bilaterally.   Cardiovascular:   Regular rhythm, S1 normal, S2 normal, No S3 or S4, no murmurs or rubs detected.   Abdomen:   Abdomen soft, bowel sounds normoactive, no hepatosplenomegaly, non-tender.   Peripheral Pulses:   Pulses full and equal in all extremities, no bruits auscultated.   Extremities and Back:   No clubbing, cyanosis.  No edema observed.   Neurological:   No gross motor deficits noted, affect appropriate, oriented to time, person and place.   Hematologic/Lymphatic:    Hematologic / Lymphatic: No cervical lymphadenopathy and no supraclavicular lymphadenopathy.   Additional Exam Findings:                    Data:     Lab Results   Component Value Date    WBC 10.4 07/19/2019    HGB 11.3 (L) 07/19/2019    HCT 34.2 (L) 07/19/2019     07/19/2019     07/19/2019    POTASSIUM 3.5 07/19/2019    CHLORIDE 107 07/19/2019    CO2 23 07/19/2019    BUN 16 07/19/2019    CR 0.65 07/19/2019    GLC 80 07/19/2019    SED 12 11/05/2007    DD 0.7 (H) 11/05/2007    NTBNPI 1,950 (H) 05/16/2016    TROPONIN <0.04 10/19/2007    TROPI 7.854 (HH) 07/17/2019    AST 22 11/19/2018    ALT 19 11/19/2018    ALKPHOS 80 11/19/2018    BILITOTAL 0.3 11/19/2018    ELAINE <10 (L) 10/12/2016    INR Canceled, Test credited, specimen discarded 07/18/2019         EKG results:  Reviewed if available.   Atrial paced rhythm.  No acute changes.

## 2019-07-19 NOTE — PLAN OF CARE
Patient A&O, on RA sats in 90s. C/o heartburn, tums ordered and given providing relief. C/o headache and off and on jaw pain earlier in night, symptoms resolved later. Titrating nitroglycerin drip according to ordered BP goal according to cuff pressure due to dampened arterial waveform. Was able to turn off for couple hours but BP later high causing to titrate back on. Family at bedside and on telephone updated on care plan, questions answered. Continue to monitor.

## 2019-07-19 NOTE — PLAN OF CARE
Continue nitroglycerin gtt for tight BP control. Pt denies chest pain. Midsternal heartburn relieved w/ GI cocktail. Nausea resolved. R femoral arterial sheath dc'd at 1133 w/o complications. Baseline left hemiparesis. Minimal PO intake. DEEPTHI waters MD notified- awaiting call back. Pt's daughters updated via phone and at bedside on POC, supportive. Spiritual health services following r/t recent death of spouse. Bedrest until 1730. Report called to JEFF Jean. Tx to CCU.  Kayla Laws RN

## 2019-07-19 NOTE — PROGRESS NOTES
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  Transferred to [facility] on 7/17/2019     NSTEMI  Cardiomyopathy/CHF with reduced LVEF  -Patient initially presented to [facility] on 7/16/2019 with chest pain going on for about a week with burning central chest pain with radiation to her neck and jaw, lasting 15 to 60 minutes. Troponin on arrival was 0.990 and peaked to 23.90 then down trended. EKG showed paced rhythm. Patient was started on heparin drip, aspirin and continued on PTA carvedilol 25 mg twice daily, lisinopril 40 mg daily and atorvastatin 20 mg at bedtime  -She underwent coronary angiogram on 7/17/2019 which showed severe three-vessel coronary artery disease. TTE showed EF 35-40%, with severe hypokinesis to akinesis of the mid to basal inferolateral and anterolateral walls, presumed ischemic.    -Patient transferred to [facility] to get an opinion from cardiovascular surgery, and deemed not a candidate for for CABG per CV surgery.  -Then underwent successful PCI of mid LAD with BK 7/18 but had LAD dissection with retrograde extension into the left main and left coronary cusp. Successfully treated by placement of BK in the left main proximal to the distal left main bifurcation. Additional stent placed in the proximal LAD.  -now on ASA 81 mg daily and Plavix 75 mg p.o. daily.  -Managed by cardiology post procedure, on nitroglycerin drip for tight BP control.  On BB and ACEI.  -PRN  oxygen, morphine available  -On telemetry, paced rhythm.      Paroxysmal atrial fibrillation on apixaban  Second degree AV block s/p pacemaker implantation (5/2016)  5/2019 cardiac device check demonstrated normal functioning within device parameters. Did have brief episodes of asymptomatic PAF, longest episode lasting 1 minute 36 seconds, over all ventricular rates controlled.  Follows with Dr. Goel.  -TTE prior to transfer showed possible mass on right atrium, however CT scan shows quite amount of shadowing from pacemakers leads so cardiology believes it is probably her pacer leads.  - PTA Apixaban was transitioned to heparin given ACS, now post PCI, on aspirin Plavix.  Defer anticoagulation to cardiology.      HTN  -Resumed PTA lisinopril 40 mg daily, carvedilol 25 mg twice daily and amlodipine 5 mg daily with holding parameters  -On nitro drip post PCI for more strict BP control given coronary dissection.     HLD  -Resumed PTA atorvastatin 20 mg at bedtime     Prior CVA x 2 with residual left-sided weakness  Patient reports she had a stroke when she was 50 years old and another when she was 75.  Regarding mobility status, baseline is that she can use of cane at home, and when out with family uses a wheelchair.  Able to move her left arm and leg, but has significant weakness throughout the entire left side and difficulty grasping things with the left arm.  Prior to admission on apixaban for CVA prevention.  -Holding PTA apixaban and now on ASA and Plavix.   -Resumed PTA atorvastatin 20 mg at bedtime     Distant upper GI bleed secondary to gastric ulcer  GERD  History of bleeding peptic ulcer over 30 years ago and has been on lifelong omeprazole since.  Had EGD and colonoscopy in 2018 that were negative for acute pathology   -now with increased heart burn, resolved with GI cocktail.  - increase PPI to BID.  - PRN tums, if severe, repeat GI cocktail.      History of iron deficiency anemia  Hospitalized in 11/2018  for generalized weakness and fatigue and found to have severe microcytic anemia.  During that hospital stay she had an EGD done with no explanation for her anemia.  Colonoscopy the next day was negative for active GI bleeding from the lower GI standpoint either.  She had a total of 2 units packed red blood cells transfusion during that admission with good response and improvement in her symptoms.  Resumed on Eliquis, aspirin was discontinued at that time given risk for bleeding outweighing the benefit.   -Resumed PTA iron supplementation daily      Diet: Regular Diet Adult    DVT Prophylaxis: heparin drip  Garcia Catheter: in place, indication: Strict 1-2 Hour I&O  Code Status: DNR/DNI      Disposition Plan   Expected discharge: 2-3, pending course and when okay with cardiology. Transferring to CICU today.   Entered: Atul Ramos MD 07/19/2019, 2:31 PM       The patient's care was discussed with the Bedside Nurse and Patient and patient's daughter/son-in-law.    Atul Ramos MD  Hospitalist Service  [facility]  07/19/2019    ______________________________________________________________________    Interval History   Patient reported heartburn and increased nausea this morning.  Her troponin was trending down. GI cocktail was given with good relief.  Currently denies nausea, heartburn, dyspnea, chest pain.  Denies dizziness.  Reports poor appetite.  No BM for 2 days but has poor appetite and decreased p.o.    Data reviewed today: I reviewed all medications, new labs and imaging results over the last 24 hours. I personally reviewed result of ECHO done today.     Physical Exam   Vital Signs: Temp: 100.2  F (37.9  C) Temp src: Bladder BP: 108/55 Pulse: 70 Heart Rate: 70 Resp: 26 SpO2: 97 % O2 Device: None (Room air) Oxygen Delivery: 2 LPM  Weight: 110 lbs 7.21 oz    Constitutional: Awake, alert and no distress. Appears comfortable  HEENT: PERRLA EOMI  Cardiovascular: RRR. 3+ murmurs    Respiratory: Normal WOB,b/l equal air entry, no wheezes or crackles.   Gastrointestinal: Abdomen soft, non-tender. BS normal. No masses, organomegaly  Extremities : No edema , no clubbing or cyanosis.    Skin: dry dressing over the Rt groin. No bruise or swelling.   Neurologic: AAOx3.     Data   Recent Labs   Lab 07/19/19  1032 07/19/19  0518 07/18/19  2152 07/18/19  1615 07/18/19  0523 07/17/19  0904 07/17/19  0643 07/16/19  1703  07/16/19  0902   WBC  --  10.4  --  Canceled, Test credited, specimen discarded 8.0  --   --   --    < > 10.1   HGB  --  11.3* 11.2* Canceled, Test credited, specimen discarded 11.4*  --   --   --    < > 13.2   MCV  --  90  --  Canceled, Test credited, specimen discarded 91  --   --   --    < > 93   PLT  --  158  --  Canceled, Test credited, specimen discarded 154  --   --   --    < > 203   INR  --   --   --  Canceled, Test credited, specimen discarded  --   --   --   --   --   --    NA  --  140  --   --  142  --   --   --   --  139   POTASSIUM  --  3.5  --   --  3.9  --  4.2  --   --  4.0   CHLORIDE  --  107  --   --  108  --   --   --   --  108   CO2  --  23  --   --  26  --   --   --   --  25   BUN  --  16  --   --  15  --   --   --   --  18   CR  --  0.65  --   --  0.75  --   --   --   --  0.67   ANIONGAP  --  10  --   --  8  --   --   --   --  6   GOLDY  --  8.8  --   --  8.5  --   --   --   --  8.7   GLC  --  80  --   --  89  --   --   --   --  120*   TROPI 3.339*  --   --   --   --  7.854*  --  23.940*   < > 0.990*    < > = values in this interval not displayed.     No results found for this or any previous visit (from the past 24 hour(s)).  Medications     Continuing ACE inhibitor/ARB/ARNI from home medication list OR ACE inhibitor/ARB order already placed during this visit       - MEDICATION INSTRUCTIONS -       - MEDICATION INSTRUCTIONS -       nitroGLYcerin 0.1 mcg/kg/min (07/19/19 0758)     - MEDICATION INSTRUCTIONS -       sodium chloride 10 mL/hr at 07/19/19 1451        amLODIPine  5 mg Oral BID     aspirin  81 mg Oral Daily     atorvastatin  20 mg Oral At Bedtime     carvedilol  25 mg Oral BID w/meals     clopidogrel  75 mg Oral Daily     ferrous sulfate  325 mg Oral Daily with breakfast     fluticasone  1 puff Inhalation Daily     lisinopril  40 mg Oral Daily     omeprazole  20 mg Oral BID AC     sodium chloride (PF)  3 mL Intracatheter Q8H     tolterodine ER  4 mg Oral QAM

## 2019-07-20 ENCOUNTER — APPOINTMENT (OUTPATIENT)
Dept: PHYSICAL THERAPY | Facility: CLINIC | Age: 84
DRG: 246 | End: 2019-07-20
Attending: INTERNAL MEDICINE
Payer: MEDICARE

## 2019-07-20 LAB
ANION GAP SERPL CALCULATED.3IONS-SCNC: 3 MMOL/L (ref 3–14)
BASOPHILS # BLD AUTO: 0 10E9/L (ref 0–0.2)
BASOPHILS NFR BLD AUTO: 0.2 %
BUN SERPL-MCNC: 19 MG/DL (ref 7–30)
CALCIUM SERPL-MCNC: 7.9 MG/DL (ref 8.5–10.1)
CHLORIDE SERPL-SCNC: 111 MMOL/L (ref 94–109)
CO2 SERPL-SCNC: 27 MMOL/L (ref 20–32)
CREAT SERPL-MCNC: 0.64 MG/DL (ref 0.52–1.04)
DIFFERENTIAL METHOD BLD: ABNORMAL
EOSINOPHIL # BLD AUTO: 0.1 10E9/L (ref 0–0.7)
EOSINOPHIL NFR BLD AUTO: 1.1 %
ERYTHROCYTE [DISTWIDTH] IN BLOOD BY AUTOMATED COUNT: 14.8 % (ref 10–15)
GFR SERPL CREATININE-BSD FRML MDRD: 80 ML/MIN/{1.73_M2}
GLUCOSE SERPL-MCNC: 80 MG/DL (ref 70–99)
HCT VFR BLD AUTO: 32.2 % (ref 35–47)
HGB BLD-MCNC: 10.4 G/DL (ref 11.7–15.7)
IMM GRANULOCYTES # BLD: 0 10E9/L (ref 0–0.4)
IMM GRANULOCYTES NFR BLD: 0.1 %
LYMPHOCYTES # BLD AUTO: 2 10E9/L (ref 0.8–5.3)
LYMPHOCYTES NFR BLD AUTO: 21.1 %
MCH RBC QN AUTO: 29.5 PG (ref 26.5–33)
MCHC RBC AUTO-ENTMCNC: 32.3 G/DL (ref 31.5–36.5)
MCV RBC AUTO: 92 FL (ref 78–100)
MONOCYTES # BLD AUTO: 1.2 10E9/L (ref 0–1.3)
MONOCYTES NFR BLD AUTO: 13.3 %
NEUTROPHILS # BLD AUTO: 6 10E9/L (ref 1.6–8.3)
NEUTROPHILS NFR BLD AUTO: 64.2 %
PLATELET # BLD AUTO: 142 10E9/L (ref 150–450)
POTASSIUM SERPL-SCNC: 3.8 MMOL/L (ref 3.4–5.3)
RBC # BLD AUTO: 3.52 10E12/L (ref 3.8–5.2)
SODIUM SERPL-SCNC: 141 MMOL/L (ref 133–144)
WBC # BLD AUTO: 9.3 10E9/L (ref 4–11)

## 2019-07-20 PROCEDURE — 99232 SBSQ HOSP IP/OBS MODERATE 35: CPT | Performed by: INTERNAL MEDICINE

## 2019-07-20 PROCEDURE — 97116 GAIT TRAINING THERAPY: CPT | Mod: GP | Performed by: PHYSICAL THERAPIST

## 2019-07-20 PROCEDURE — 25000132 ZZH RX MED GY IP 250 OP 250 PS 637: Performed by: HOSPITALIST

## 2019-07-20 PROCEDURE — 85025 COMPLETE CBC W/AUTO DIFF WBC: CPT | Performed by: HOSPITALIST

## 2019-07-20 PROCEDURE — 97530 THERAPEUTIC ACTIVITIES: CPT | Mod: GP | Performed by: PHYSICAL THERAPIST

## 2019-07-20 PROCEDURE — 25000132 ZZH RX MED GY IP 250 OP 250 PS 637: Performed by: INTERNAL MEDICINE

## 2019-07-20 PROCEDURE — 97110 THERAPEUTIC EXERCISES: CPT | Mod: GP | Performed by: PHYSICAL THERAPIST

## 2019-07-20 PROCEDURE — 25800030 ZZH RX IP 258 OP 636: Performed by: HOSPITALIST

## 2019-07-20 PROCEDURE — 80048 BASIC METABOLIC PNL TOTAL CA: CPT | Performed by: HOSPITALIST

## 2019-07-20 PROCEDURE — 21000001 ZZH R&B HEART CARE

## 2019-07-20 PROCEDURE — 36415 COLL VENOUS BLD VENIPUNCTURE: CPT | Performed by: HOSPITALIST

## 2019-07-20 RX ADMIN — CLOPIDOGREL BISULFATE 75 MG: 75 TABLET ORAL at 09:01

## 2019-07-20 RX ADMIN — SODIUM CHLORIDE: 9 INJECTION, SOLUTION INTRAVENOUS at 12:31

## 2019-07-20 RX ADMIN — TOLTERODINE 4 MG: 4 CAPSULE, EXTENDED RELEASE ORAL at 09:01

## 2019-07-20 RX ADMIN — AMLODIPINE BESYLATE 5 MG: 5 TABLET ORAL at 09:01

## 2019-07-20 RX ADMIN — OMEPRAZOLE 20 MG: 20 CAPSULE, DELAYED RELEASE ORAL at 16:23

## 2019-07-20 RX ADMIN — OMEPRAZOLE 20 MG: 20 CAPSULE, DELAYED RELEASE ORAL at 06:56

## 2019-07-20 RX ADMIN — FERROUS SULFATE TAB 325 MG (65 MG ELEMENTAL FE) 325 MG: 325 (65 FE) TAB at 09:02

## 2019-07-20 RX ADMIN — ASPIRIN 81 MG: 81 TABLET, COATED ORAL at 09:02

## 2019-07-20 RX ADMIN — CARVEDILOL 25 MG: 25 TABLET, FILM COATED ORAL at 09:01

## 2019-07-20 RX ADMIN — LISINOPRIL 40 MG: 40 TABLET ORAL at 09:02

## 2019-07-20 RX ADMIN — AMLODIPINE BESYLATE 5 MG: 5 TABLET ORAL at 22:11

## 2019-07-20 RX ADMIN — CARVEDILOL 25 MG: 25 TABLET, FILM COATED ORAL at 18:53

## 2019-07-20 RX ADMIN — ATORVASTATIN CALCIUM 20 MG: 20 TABLET, FILM COATED ORAL at 22:11

## 2019-07-20 NOTE — PLAN OF CARE
Discharge Planner PT   Patient plan for discharge: Home with family  Current status: Pt now POD#1 Left main and LAD stenting, procedure complicated by dissection into the left main and aortic hematoma. Pt presents today alert and oriented. Family at bedside. Sup>sit Min A. Sit<>Stand Min A. Min A for balance ambulating around the bed to the chair-difficulty with balance as pt has limited use of the L UE and currently restricted on the R after R radial angio/stent placement. (Usually uses a cane on the R). Up in chair, all needs in reach upon PT exit. Tolerated activity with normal CV response. Will increase pt to twice daily with CR.    PM: Ambulated 15', 5;' and 15' with CGA, use of single end cane on the R-did well with use only for balance and doesn't place much weight into the can. Required a seated rest, requested to return to bed. Not BP did drop systolic from 130s to 110s supine to sitting post activity. Pt asymptomatic, see VSFS. Recommended getting back up to the chair for her dinner.   Barriers to return to prior living situation: Assist with all mobility, decreased balance and strength from baseline. Fall risk.  Recommendations for discharge: TCU  Rationale for recommendations: At this time recommend TCU at discharge for ongoing strength and balance training, prior to return home. Should pt decrease assist needed for household distances, and ease with transfers and bed mobility, could discharge to home with 24 hr supervision and assistance on the stairs, as well as OP CR phase II.       Entered by: Palak Ny 07/20/2019 10:07 AM

## 2019-07-20 NOTE — PLAN OF CARE
Plan: Monitor groin site and blood pressures    Neuro: Alert and oriented x4, Left foot numbness baseline d/t stroke history    Cardiac: V paced with PPM, intermittent afib-on eliquis, keep systolic blood pressures between 100-125.     Respiratory: room air, 92%, lungs clear    Drips: nitroglycerin for pressure support, sodium chloride for urine output    Drains/Tubes: waters, R radial-CDI, R groin - thrombix patch in place, CDI, dopplerable pulses    GI/: had severe heartburn today. Prilosec started. constipation. Urine output low today. Started on fluids    Activity: up with cane or walker. Left side weaker due to history of strokes.    Discharge:  TBD, home with daughter Maryjane ALICEA: please have spiritual health see patient during stay.  recently passed away.

## 2019-07-20 NOTE — PLAN OF CARE
7am-3pm Saturday July 20th.   Pt doing well, she sat up in the chair for breakfast and lunch. Denies any pain or discomfort. Angio sites to right radial and right groin stable, CDI. Offers no c/o anything, Vpaced on the monitor. Bp stable on ntg gtt.

## 2019-07-20 NOTE — PROGRESS NOTES
Cardiac Electrophysiology Progress Note          Assessment and Plan:     CAD (coronary artery disease) with severe 3v disease, opted for complex PCI over CABG, s/p BK to mid LAD complicated by dissection into LM and aorta on 7/18. Successful rescue PCI. Residual intramural hematoma noted at the aortic root stable from echo    pAF with h/o CVA - Eliquis held    HTN    Continue with DAPT, bb, acei and statins. Unclear of optimal timing in restarting Eliquis in light of aortic root intramural hematoma and DAPT. Will defer to our interventional colleague. Continue to hold Eliquis for now.                 Interval History:   doing well; no cp, sob, n/v/d, or abd pain.              Review of Systems:   As per subjective, otherwise 5 systems reviewed and negative.           Physical Exam:   Blood pressure 120/64, pulse 81, temperature 98.6  F (37  C), temperature source Oral, resp. rate 18, weight 50.3 kg (111 lb), SpO2 90 %, not currently breastfeeding.          Intake/Output Summary (Last 24 hours) at 7/20/2019 1244  Last data filed at 7/20/2019 0725  Gross per 24 hour   Intake 46.5 ml   Output 470 ml   Net -423.5 ml       Constitutional:   NAD   Skin:   Warm and dry   Head:   Nontraumatic   Neck:   Supple, symmetrical, trachea midline, no adenopathy, thyroid symmetric, not enlarged and no tenderness, skin normal   Lungs:   normal   Cardiovascular:   Normal apical impulse, regular rate and rhythm, normal S1 and S2, no S3 or S4, and no murmur noted    Abdomen:   Benign   Extremities and Back:   Symmetric, no curvature, spinous processes are non-tender on palpation, paraspinous muscles are non-tender on palpation, no costal vertebral tenderness   Neurological:   Grossly nonfocal            Medications:       amLODIPine  5 mg Oral BID     aspirin  81 mg Oral Daily     atorvastatin  20 mg Oral At Bedtime     carvedilol  25 mg Oral BID w/meals     clopidogrel  75 mg Oral Daily     ferrous sulfate  325 mg Oral Daily with  breakfast     fluticasone  1 puff Inhalation Daily     lisinopril  40 mg Oral Daily     omeprazole  20 mg Oral BID AC     sodium chloride (PF)  3 mL Intracatheter Q8H     tolterodine ER  4 mg Oral QAM     Admission on 07/16/2019, Discharged on 07/17/2019   Component Date Value Ref Range Status     WBC 07/16/2019 10.1  4.0 - 11.0 10e9/L Final     RBC Count 07/16/2019 4.54  3.8 - 5.2 10e12/L Final     Hemoglobin 07/16/2019 13.2  11.7 - 15.7 g/dL Final     Hematocrit 07/16/2019 42.2  35.0 - 47.0 % Final     MCV 07/16/2019 93  78 - 100 fl Final     MCH 07/16/2019 29.1  26.5 - 33.0 pg Final     MCHC 07/16/2019 31.3* 31.5 - 36.5 g/dL Final     RDW 07/16/2019 14.6  10.0 - 15.0 % Final     Platelet Count 07/16/2019 203  150 - 450 10e9/L Final     Diff Method 07/16/2019 Automated Method   Final     % Neutrophils 07/16/2019 64.4  % Final     % Lymphocytes 07/16/2019 22.2  % Final     % Monocytes 07/16/2019 10.0  % Final     % Eosinophils 07/16/2019 2.2  % Final     % Basophils 07/16/2019 0.6  % Final     % Immature Granulocytes 07/16/2019 0.6  % Final     Nucleated RBCs 07/16/2019 0  0 /100 Final     Absolute Neutrophil 07/16/2019 6.5  1.6 - 8.3 10e9/L Final     Absolute Lymphocytes 07/16/2019 2.2  0.8 - 5.3 10e9/L Final     Absolute Monocytes 07/16/2019 1.0  0.0 - 1.3 10e9/L Final     Absolute Eosinophils 07/16/2019 0.2  0.0 - 0.7 10e9/L Final     Absolute Basophils 07/16/2019 0.1  0.0 - 0.2 10e9/L Final     Abs Immature Granulocytes 07/16/2019 0.1  0 - 0.4 10e9/L Final     Absolute Nucleated RBC 07/16/2019 0.0   Final     Sodium 07/16/2019 139  133 - 144 mmol/L Final     Potassium 07/16/2019 4.0  3.4 - 5.3 mmol/L Final     Chloride 07/16/2019 108  94 - 109 mmol/L Final     Carbon Dioxide 07/16/2019 25  20 - 32 mmol/L Final     Anion Gap 07/16/2019 6  3 - 14 mmol/L Final     Glucose 07/16/2019 120* 70 - 99 mg/dL Final     Urea Nitrogen 07/16/2019 18  7 - 30 mg/dL Final     Creatinine 07/16/2019 0.67  0.52 - 1.04 mg/dL Final      GFR Estimate 07/16/2019 79  >60 mL/min/[1.73_m2] Final    Comment: Non  GFR Calc  Starting 12/18/2018, serum creatinine based estimated GFR (eGFR) will be   calculated using the Chronic Kidney Disease Epidemiology Collaboration   (CKD-EPI) equation.       GFR Estimate If Black 07/16/2019 >90  >60 mL/min/[1.73_m2] Final    Comment:  GFR Calc  Starting 12/18/2018, serum creatinine based estimated GFR (eGFR) will be   calculated using the Chronic Kidney Disease Epidemiology Collaboration   (CKD-EPI) equation.       Calcium 07/16/2019 8.7  8.5 - 10.1 mg/dL Final     Troponin I ES 07/16/2019 0.990* 0.000 - 0.045 ug/L Final    Comment: The 99th percentile for upper reference range is 0.045 ug/L.  Troponin values   in the range of 0.045 - 0.120 ug/L may be associated with risks of adverse   clinical events.  Critical Value called to and read back by  YESY GASCA IN [facility] @ 0959 7.16.19 LNP       Interpretation ECG 07/16/2019 Click View Image link to view waveform and result   Final     WBC 07/16/2019 11.7* 4.0 - 11.0 10e9/L Final     RBC Count 07/16/2019 4.39  3.8 - 5.2 10e12/L Final     Hemoglobin 07/16/2019 13.0  11.7 - 15.7 g/dL Final     Hematocrit 07/16/2019 40.5  35.0 - 47.0 % Final     MCV 07/16/2019 92  78 - 100 fl Final     MCH 07/16/2019 29.6  26.5 - 33.0 pg Final     MCHC 07/16/2019 32.1  31.5 - 36.5 g/dL Final     RDW 07/16/2019 14.5  10.0 - 15.0 % Final     Platelet Count 07/16/2019 171  150 - 450 10e9/L Final     Troponin I ES 07/16/2019 18.294* 0.000 - 0.045 ug/L Final    Comment: The 99th percentile for upper reference range is 0.045 ug/L.  Troponin values   in the range of 0.045 - 0.120 ug/L may be associated with risks of adverse   clinical events.  Critical result, provider not notified due to previous critical result   notification.       Troponin I ES 07/16/2019 23.940* 0.000 - 0.045 ug/L Final    Comment: The 99th percentile for upper reference range is 0.045  ug/L.  Troponin values   in the range of 0.045 - 0.120 ug/L may be associated with risks of adverse   clinical events.  Critical result, provider not notified due to previous critical result   notification.       Heparin 10A Level 2019 0.42  IU/mL Final    Comment: Therapeutic Range:  UFH: 0.15-0.35 IU/mL for low intensity dosing       0.30-0.70 IU/mL for high intensity dosing       for DVT and PE  Enoxaparin:       If administered once daily with dose       1.5 mg/k.0-2.0 IU/mL       If administered twice daily with dose       1 mg/k.60-1.0 IU/mL  This test is not validated for other direct factor X inhibitors (e.g.   rivaroxaban, apixaban, edoxaban, betrixaban, fondaparinux) and should not be   used for monitoring of other medications.       PTT 2019 42* 22 - 37 sec Final     Cath EF Estimated 2019 53  % Final     Heparin 10A Level 2019 0.40  IU/mL Final    Comment: Therapeutic Range:  UFH: 0.15-0.35 IU/mL for low intensity dosing       0.30-0.70 IU/mL for high intensity dosing       for DVT and PE  Enoxaparin:       If administered once daily with dose       1.5 mg/k.0-2.0 IU/mL       If administered twice daily with dose       1 mg/k.60-1.0 IU/mL  This test is not validated for other direct factor X inhibitors (e.g.   rivaroxaban, apixaban, edoxaban, betrixaban, fondaparinux) and should not be   used for monitoring of other medications.       PTT 2019 55* 22 - 37 sec Final     Heparin 10A Level 2019 0.35  IU/mL Final    Comment: Therapeutic Range:  UFH: 0.15-0.35 IU/mL for low intensity dosing       0.30-0.70 IU/mL for high intensity dosing       for DVT and PE  Enoxaparin:       If administered once daily with dose       1.5 mg/k.0-2.0 IU/mL       If administered twice daily with dose       1 mg/k.60-1.0 IU/mL  This test is not validated for other direct factor X inhibitors (e.g.   rivaroxaban, apixaban, edoxaban, betrixaban, fondaparinux) and should  not be   used for monitoring of other medications.       PTT 07/17/2019 55* 22 - 37 sec Final     Troponin I ES 07/17/2019 7.854* 0.000 - 0.045 ug/L Final    Comment: The 99th percentile for upper reference range is 0.045 ug/L.  Troponin values   in the range of 0.045 - 0.120 ug/L may be associated with risks of adverse   clinical events.  Critical result, provider not notified due to previous critical result   notification.       Potassium 07/17/2019 4.2  3.4 - 5.3 mmol/L Final     Glucose 07/17/2019 83  70 - 99 mg/dL Final                  Rohith Dobbs MD

## 2019-07-20 NOTE — PLAN OF CARE
Neuro- A&Ox4  Most Recent Vitals- Temp: 98.6  F (37  C) Temp src: Oral BP: 113/55 Pulse: 82 Heart Rate: 75 Resp: 20 SpO2: 90 % O2 Device: None (Room air) Oxygen Delivery: 2 LPM  Tele/Cardiac- 100% V-Paced, denies CP, BP within MD set parameters  Resp- stable on RA  Activity- A-1 with cane or walker  Pain- denies  Drips- nitroglycerin @ 0.1 mcg/min and NS @ 75ml/hr  Drains/Tubes- waters-patent  Skin- Blanchable coccyx, right groin CMS intact  GI/- constipated, given one dose colace PRN  Plan- Continue to monitor BP's  Misc- Pt had 2 V runs, 1- 8 beat and 1-9 beat run- asymptomatic

## 2019-07-20 NOTE — PROGRESS NOTES
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  Transferred to [facility] on 7/17/2019     NSTEMI  Cardiomyopathy/CHF with reduced LVEF  -Patient initially presented to [facility] on 7/16/2019 with chest pain going on for about a week with burning central chest pain with radiation to her neck and jaw, lasting 15 to 60 minutes. Troponin on arrival was 0.990 and peaked to 23.90 then down trended. EKG showed paced rhythm. Patient was started on heparin drip, aspirin and continued on PTA carvedilol 25 mg twice daily, lisinopril 40 mg daily and atorvastatin 20 mg at bedtime  -She underwent coronary angiogram on 7/17/2019 which showed severe three-vessel coronary artery disease. TTE showed EF 35-40%, with severe hypokinesis to akinesis of the mid to basal inferolateral and anterolateral walls, presumed ischemic.    -Patient transferred to [facility] to get an opinion from cardiovascular surgery, and deemed not a candidate for for CABG per CV surgery.  -Then underwent successful PCI of mid LAD with BK 7/18 but complicated with LAD dissection with retrograde extension into the left main and left coronary cusp and the aortic hematoma. Successfully treated by placement of BK in the left main proximal to the distal left main bifurcation. Additional stent placed in the proximal LAD.  -now on ASA 81 mg daily and Plavix 75 mg p.o. daily.  -Managed by cardiology post procedure, on nitroglycerin drip for tight  BP control.  On BB and ACEI.  -PRN oxygen, morphine available  -On telemetry, paced rhythm.   I will let the cardiology manage her nitroglycerin drip and the blood pressure targets.  Once okay with the cardiology they can discontinue the nitroglycerin drip.  Patient is asymptomatic at this time.  Her IV heparin is not discontinued.     Paroxysmal atrial fibrillation on apixaban  Second degree AV block s/p pacemaker implantation (5/2016)  5/2019 cardiac device check demonstrated normal functioning within device parameters. Did have brief episodes of asymptomatic PAF, longest episode lasting 1 minute 36 seconds, over all ventricular rates controlled.  Follows with Dr. Goel.  -TTE prior to transfer showed possible mass on right atrium, however CT scan shows quite amount of shadowing from pacemakers leads so cardiology believes it is probably her pacer leads.  - PTA Apixaban was transitioned to heparin given ACS, now post PCI, on aspirin Plavix.  Defer anticoagulation to cardiology.   Anticoagulation is on hold at this time because of aortic hematoma.  CT chest with contrast showed no convincing evidence of right atrial mass.     HTN  -Resumed PTA lisinopril 40 mg daily, carvedilol 25 mg twice daily and amlodipine 5 mg daily with holding parameters  -On nitro drip post PCI for more strict BP control given coronary dissection and aortic hematoma.     HLD  -Resumed PTA atorvastatin 20 mg at bedtime     Prior CVA x 2 with residual left-sided weakness  Patient reports she had a stroke when she was 50 years old and another when she was 75.  Regarding mobility status, baseline is that she can use of cane at home, and when out with family uses a wheelchair.  Able to move her left arm and leg, but has significant weakness throughout the entire left side and difficulty grasping things with the left arm.  Prior to admission on apixaban for CVA prevention.  -Holding PTA apixaban and now on ASA and Plavix.   -Resumed PTA  atorvastatin 20 mg at bedtime     Distant upper GI bleed secondary to gastric ulcer  GERD  History of bleeding peptic ulcer over 30 years ago and has been on lifelong omeprazole since.  Had EGD and colonoscopy in 2018 that were negative for acute pathology   -now with increased heart burn, resolved with GI cocktail.  - increase PPI to BID.  - PRN tums, if severe, repeat GI cocktail.      History of iron deficiency anemia  Hospitalized in 11/2018 for generalized weakness and fatigue and found to have severe microcytic anemia.  During that hospital stay she had an EGD done with no explanation for her anemia.  Colonoscopy the next day was negative for active GI bleeding from the lower GI standpoint either.  She had a total of 2 units packed red blood cells transfusion during that admission with good response and improvement in her symptoms.  Resumed on Eliquis, aspirin was discontinued at that time given risk for bleeding outweighing the benefit.   -Resumed PTA iron supplementation daily       Overall stable at this time.  Cardiology is following and appreciate their recommendation.  IV nitroglycerin and tight blood pressure control as per cardiology.    Diet: Regular Diet Adult    DVT Prophylaxis: heparin drip  Garcia Catheter: in place, indication: Strict 1-2 Hour I&O  Code Status: DNR/DNI      Disposition Plan   Expected discharge: 2-3, pending course and when okay with cardiology. Transferring to CICU today.   Entered: Alex Ramsay MD 07/20/2019, 1:50 PM       The patient's care was discussed with the Bedside Nurse and Patient and patient's daughter/son-in-law.    Alex Ramsay MD  Hospitalist Service  [facility]  07/20/2019    ______________________________________________________________________    Interval History   Patient feeling well this morning, offers no complaints.  Specifically denies chest pain shortness of breath orthopnea PND palpitation headache or dizziness.    No other significant  event overnight    Data reviewed today: I reviewed all medications, new labs and imaging results over the last 24 hours. I personally reviewed result of ECHO done today.     Physical Exam   Vital Signs: Temp: 98.6  F (37  C) Temp src: Oral BP: 120/64 Pulse: 81 Heart Rate: 81 Resp: 18 SpO2: 90 % O2 Device: None (Room air)    Weight: 111 lbs 0 oz    Constitutional: Awake, alert and no distress. Appears comfortable  HEENT: PERRLA EOMI  Cardiovascular   S1-S2 normal  Respiratory: Normal WOB,b/l equal air entry, no wheezes or crackles.   Gastrointestinal: Abdomen soft, non-tender. BS normal. No masses, organomegaly  Extremities : No edema , no clubbing or cyanosis.    Skin: dry dressing over the Rt groin. No bruise or swelling.   Neurologic: AAOx3.     Data   Recent Labs   Lab 07/20/19  0535 07/19/19  1854 07/19/19  1412 07/19/19  1032 07/19/19  0518 07/18/19  2152 07/18/19  1615 07/18/19  0523   WBC 9.3  --   --   --  10.4  --  Canceled, Test credited, specimen discarded 8.0   HGB 10.4*  --   --   --  11.3* 11.2* Canceled, Test credited, specimen discarded 11.4*   MCV 92  --   --   --  90  --  Canceled, Test credited, specimen discarded 91   *  --   --   --  158  --  Canceled, Test credited, specimen discarded 154   INR  --   --   --   --   --   --  Canceled, Test credited, specimen discarded  --      --   --   --  140  --   --  142   POTASSIUM 3.8  --   --   --  3.5  --   --  3.9   CHLORIDE 111*  --   --   --  107  --   --  108   CO2 27  --   --   --  23  --   --  26   BUN 19  --   --   --  16  --   --  15   CR 0.64  --   --   --  0.65  --   --  0.75   ANIONGAP 3  --   --   --  10  --   --  8   GOLDY 7.9*  --   --   --  8.8  --   --  8.5   GLC 80  --   --   --  80  --   --  89   TROPI  --  2.940* 3.113* 3.339*  --   --   --   --      No results found for this or any previous visit (from the past 24 hour(s)).  Medications     Continuing ACE inhibitor/ARB/ARNI from home medication list OR ACE inhibitor/ARB  order already placed during this visit       - MEDICATION INSTRUCTIONS -       - MEDICATION INSTRUCTIONS -       nitroGLYcerin 0.1 mcg/kg/min (07/19/19 5072)     - MEDICATION INSTRUCTIONS -       sodium chloride 10 mL/hr at 07/20/19 1231       amLODIPine  5 mg Oral BID     aspirin  81 mg Oral Daily     atorvastatin  20 mg Oral At Bedtime     carvedilol  25 mg Oral BID w/meals     clopidogrel  75 mg Oral Daily     ferrous sulfate  325 mg Oral Daily with breakfast     fluticasone  1 puff Inhalation Daily     lisinopril  40 mg Oral Daily     omeprazole  20 mg Oral BID AC     sodium chloride (PF)  3 mL Intracatheter Q8H     tolterodine ER  4 mg Oral QAM

## 2019-07-21 ENCOUNTER — APPOINTMENT (OUTPATIENT)
Dept: PHYSICAL THERAPY | Facility: CLINIC | Age: 84
DRG: 246 | End: 2019-07-21
Attending: INTERNAL MEDICINE
Payer: MEDICARE

## 2019-07-21 ENCOUNTER — APPOINTMENT (OUTPATIENT)
Dept: CARDIOLOGY | Facility: CLINIC | Age: 84
DRG: 246 | End: 2019-07-21
Attending: INTERNAL MEDICINE
Payer: MEDICARE

## 2019-07-21 LAB
ALBUMIN SERPL-MCNC: 2.5 G/DL (ref 3.4–5)
ANION GAP SERPL CALCULATED.3IONS-SCNC: 8 MMOL/L (ref 3–14)
BASOPHILS # BLD AUTO: 0 10E9/L (ref 0–0.2)
BASOPHILS NFR BLD AUTO: 0.3 %
BUN SERPL-MCNC: 19 MG/DL (ref 7–30)
CALCIUM SERPL-MCNC: 8 MG/DL (ref 8.5–10.1)
CHLORIDE SERPL-SCNC: 108 MMOL/L (ref 94–109)
CO2 SERPL-SCNC: 24 MMOL/L (ref 20–32)
CREAT SERPL-MCNC: 0.71 MG/DL (ref 0.52–1.04)
DIFFERENTIAL METHOD BLD: ABNORMAL
EOSINOPHIL # BLD AUTO: 0.1 10E9/L (ref 0–0.7)
EOSINOPHIL NFR BLD AUTO: 0.7 %
ERYTHROCYTE [DISTWIDTH] IN BLOOD BY AUTOMATED COUNT: 14.6 % (ref 10–15)
GFR SERPL CREATININE-BSD FRML MDRD: 76 ML/MIN/{1.73_M2}
GLUCOSE SERPL-MCNC: 103 MG/DL (ref 70–99)
HCT VFR BLD AUTO: 31.2 % (ref 35–47)
HGB BLD-MCNC: 10.4 G/DL (ref 11.7–15.7)
IMM GRANULOCYTES # BLD: 0.1 10E9/L (ref 0–0.4)
IMM GRANULOCYTES NFR BLD: 0.5 %
LYMPHOCYTES # BLD AUTO: 2.4 10E9/L (ref 0.8–5.3)
LYMPHOCYTES NFR BLD AUTO: 22.9 %
MCH RBC QN AUTO: 29.8 PG (ref 26.5–33)
MCHC RBC AUTO-ENTMCNC: 33.3 G/DL (ref 31.5–36.5)
MCV RBC AUTO: 89 FL (ref 78–100)
MONOCYTES # BLD AUTO: 1.6 10E9/L (ref 0–1.3)
MONOCYTES NFR BLD AUTO: 15.3 %
NEUTROPHILS # BLD AUTO: 6.4 10E9/L (ref 1.6–8.3)
NEUTROPHILS NFR BLD AUTO: 60.3 %
NRBC # BLD AUTO: 0 10*3/UL
NRBC BLD AUTO-RTO: 0 /100
PHOSPHATE SERPL-MCNC: 2.3 MG/DL (ref 2.5–4.5)
PLATELET # BLD AUTO: 160 10E9/L (ref 150–450)
POTASSIUM SERPL-SCNC: 3.6 MMOL/L (ref 3.4–5.3)
RBC # BLD AUTO: 3.49 10E12/L (ref 3.8–5.2)
SODIUM SERPL-SCNC: 140 MMOL/L (ref 133–144)
WBC # BLD AUTO: 10.6 10E9/L (ref 4–11)

## 2019-07-21 PROCEDURE — 93308 TTE F-UP OR LMTD: CPT | Mod: 26 | Performed by: INTERNAL MEDICINE

## 2019-07-21 PROCEDURE — 97110 THERAPEUTIC EXERCISES: CPT | Mod: GP | Performed by: PHYSICAL THERAPIST

## 2019-07-21 PROCEDURE — 93308 TTE F-UP OR LMTD: CPT

## 2019-07-21 PROCEDURE — 93321 DOPPLER ECHO F-UP/LMTD STD: CPT | Mod: 26 | Performed by: INTERNAL MEDICINE

## 2019-07-21 PROCEDURE — 25000132 ZZH RX MED GY IP 250 OP 250 PS 637: Performed by: INTERNAL MEDICINE

## 2019-07-21 PROCEDURE — 80069 RENAL FUNCTION PANEL: CPT | Performed by: INTERNAL MEDICINE

## 2019-07-21 PROCEDURE — 99291 CRITICAL CARE FIRST HOUR: CPT | Mod: 25 | Performed by: INTERNAL MEDICINE

## 2019-07-21 PROCEDURE — 85025 COMPLETE CBC W/AUTO DIFF WBC: CPT | Performed by: INTERNAL MEDICINE

## 2019-07-21 PROCEDURE — 25000132 ZZH RX MED GY IP 250 OP 250 PS 637: Performed by: HOSPITALIST

## 2019-07-21 PROCEDURE — 99232 SBSQ HOSP IP/OBS MODERATE 35: CPT | Performed by: INTERNAL MEDICINE

## 2019-07-21 PROCEDURE — 97116 GAIT TRAINING THERAPY: CPT | Mod: GP | Performed by: PHYSICAL THERAPIST

## 2019-07-21 PROCEDURE — 93325 DOPPLER ECHO COLOR FLOW MAPG: CPT | Mod: 26 | Performed by: INTERNAL MEDICINE

## 2019-07-21 PROCEDURE — 97530 THERAPEUTIC ACTIVITIES: CPT | Mod: GP | Performed by: PHYSICAL THERAPIST

## 2019-07-21 PROCEDURE — 21000001 ZZH R&B HEART CARE

## 2019-07-21 PROCEDURE — 36415 COLL VENOUS BLD VENIPUNCTURE: CPT | Performed by: INTERNAL MEDICINE

## 2019-07-21 RX ADMIN — OMEPRAZOLE 20 MG: 20 CAPSULE, DELAYED RELEASE ORAL at 07:05

## 2019-07-21 RX ADMIN — ASPIRIN 81 MG: 81 TABLET, COATED ORAL at 09:23

## 2019-07-21 RX ADMIN — TOLTERODINE 4 MG: 4 CAPSULE, EXTENDED RELEASE ORAL at 09:23

## 2019-07-21 RX ADMIN — OMEPRAZOLE 20 MG: 20 CAPSULE, DELAYED RELEASE ORAL at 16:43

## 2019-07-21 RX ADMIN — DOCUSATE SODIUM 100 MG: 100 CAPSULE, LIQUID FILLED ORAL at 09:23

## 2019-07-21 RX ADMIN — CARVEDILOL 25 MG: 25 TABLET, FILM COATED ORAL at 17:48

## 2019-07-21 RX ADMIN — DIBASIC SODIUM PHOSPHATE, MONOBASIC POTASSIUM PHOSPHATE AND MONOBASIC SODIUM PHOSPHATE 500 MG: 852; 155; 130 TABLET ORAL at 20:59

## 2019-07-21 RX ADMIN — AMLODIPINE BESYLATE 5 MG: 5 TABLET ORAL at 09:23

## 2019-07-21 RX ADMIN — AMLODIPINE BESYLATE 5 MG: 5 TABLET ORAL at 21:00

## 2019-07-21 RX ADMIN — ATORVASTATIN CALCIUM 20 MG: 20 TABLET, FILM COATED ORAL at 21:01

## 2019-07-21 RX ADMIN — FERROUS SULFATE TAB 325 MG (65 MG ELEMENTAL FE) 325 MG: 325 (65 FE) TAB at 09:23

## 2019-07-21 RX ADMIN — DIBASIC SODIUM PHOSPHATE, MONOBASIC POTASSIUM PHOSPHATE AND MONOBASIC SODIUM PHOSPHATE 500 MG: 852; 155; 130 TABLET ORAL at 13:25

## 2019-07-21 RX ADMIN — CARVEDILOL 25 MG: 25 TABLET, FILM COATED ORAL at 09:23

## 2019-07-21 RX ADMIN — CLOPIDOGREL BISULFATE 75 MG: 75 TABLET ORAL at 09:23

## 2019-07-21 RX ADMIN — LISINOPRIL 40 MG: 40 TABLET ORAL at 09:23

## 2019-07-21 ASSESSMENT — ACTIVITIES OF DAILY LIVING (ADL)
BATHING: 2-->ASSISTIVE PERSON
FALL_HISTORY_WITHIN_LAST_SIX_MONTHS: NO
DRESS: 2-->ASSISTIVE PERSON
TRANSFERRING: 3-->ASSISTIVE EQUIPMENT AND PERSON
RETIRED_COMMUNICATION: 0-->UNDERSTANDS/COMMUNICATES WITHOUT DIFFICULTY
AMBULATION: 3-->ASSISTIVE EQUIPMENT AND PERSON
COGNITION: 0 - NO COGNITION ISSUES REPORTED
RETIRED_EATING: 2-->ASSISTIVE PERSON
NUMBER_OF_TIMES_PATIENT_HAS_FALLEN_WITHIN_LAST_SIX_MONTHS: 0
TOILETING: 3-->ASSISTIVE EQUIPMENT AND PERSON
SWALLOWING: 0-->SWALLOWS FOODS/LIQUIDS WITHOUT DIFFICULTY
WHICH_OF_THE_ABOVE_FUNCTIONAL_RISKS_HAD_A_RECENT_ONSET_OR_CHANGE?: AMBULATION;TRANSFERRING

## 2019-07-21 NOTE — PROVIDER NOTIFICATION
Late Entry:    Spoke with Dr Mace with Cards this am who gave verbal orders for repeat limited echo to check status of pt's aortic root hematoma. Orders placed. Also received verbal order to wean off nitroglycerin gtt which is now off. Pt BP stable.

## 2019-07-21 NOTE — PLAN OF CARE
VSS. Tele VPaced. Up with A1 to chair. Denies pain, sob. Right groin site CDI, CMS intact to RLE. Ntg gtt continues. Pt working with therapies. Garcia in place, slightly leaky. Appetite improving.

## 2019-07-21 NOTE — PROGRESS NOTES
EP- Cardiology Progress Note           Assessment and Plan:     87-yo F with Hx of HTN, HL, PAF, prior CVA and upper GI bleed, who was admitted with NSTEMI (7/16/2019).  She was found to have 3V-disease and underwent complex PCI. Procedure was complicated by LM/ Aorta dissection resulting in intramural hematoma in the aortic root.    Plan:  1. Ao root hematoma.  Stable on echo.  Continue to hold Eliquis. We will defer to interventional team regarding ideal time to start OAC (as patient had strokes in the past).  Repeat a limited echo today.  2. CAD.  S/p PCI to LAD. She is CP free. Continue ASA, plavix, coreg, lipitor and amlodipine.  3. HTN.  BP is well controlled.  She is current on nitro drip.  Will wean off the drip.  We will consider adding hydralazine if blood pressure starts to increase.  4. PAF with h/o CVA. SERENA. Eliquis held.    I spent 40 minutes in the case, and over 50% of the time was spent reviewing chart, counseling and coordinating critical care as described above.    Physical Exam:  Vitals: /54   Pulse 77   Temp 98.2  F (36.8  C) (Oral)   Resp 28   Wt 50.8 kg (112 lb)   SpO2 95%   BMI 21.87 kg/m        Intake/Output Summary (Last 24 hours) at 7/21/2019 0759  Last data filed at 7/21/2019 0530  Gross per 24 hour   Intake 390 ml   Output 750 ml   Net -360 ml     Vitals:    07/18/19 0610 07/19/19 0630 07/20/19 0700 07/21/19 0530   Weight: 49.5 kg (109 lb 1.6 oz) 50.1 kg (110 lb 7.2 oz) 50.3 kg (111 lb) 50.8 kg (112 lb)       Constitutional:  AAO x3.  Pt is in NAD.  HEAD: Normocephalic.  SKIN: Skin normal color, texture and turgor with no lesions or eruptions.  Eyes: PERRL, EOMI.  ENT:  Supple, normal JVP. No lymphadenopathy or thyroid enlargement.  Chest:  Decrease breath sound in base B/L.   Cardiac: RRR, normal  S1 and S2.  No murmurs rubs or gallop.    Abdomen:  Normal BS.  Soft, non-tender and non-distended.  No rebound or guarding.    Extremities:  Pedious pulses palpable B/L.  No LE  edema noticed.   Neurological: Strength and sensation grossly symmetric and intact throughout.                            Review of Systems:   As per subjective, otherwise 5 systems reviewed and negative.         Medications:          amLODIPine  5 mg Oral BID     aspirin  81 mg Oral Daily     atorvastatin  20 mg Oral At Bedtime     carvedilol  25 mg Oral BID w/meals     clopidogrel  75 mg Oral Daily     ferrous sulfate  325 mg Oral Daily with breakfast     fluticasone  1 puff Inhalation Daily     lisinopril  40 mg Oral Daily     omeprazole  20 mg Oral BID AC     sodium chloride (PF)  3 mL Intracatheter Q8H     tolterodine ER  4 mg Oral QAM     PRN Meds: acetaminophen, calcium carbonate, Continuing ACE inhibitor/ARB/ARNI from home medication list OR ACE inhibitor/ARB order already placed during this visit, - MEDICATION INSTRUCTIONS -, - MEDICATION INSTRUCTIONS -, docusate sodium, HYDROcodone-acetaminophen, hypromellose-dextran, lidocaine 4%, lidocaine (buffered or not buffered), melatonin, metoprolol, morphine, naloxone, nitroGLYcerin, ondansetron **OR** ondansetron, - MEDICATION INSTRUCTIONS -, polyethylene glycol, prochlorperazine **OR** prochlorperazine **OR** prochlorperazine, sodium chloride (PF)             Data:     Recent Labs   Lab 07/21/19  0542 07/20/19  0535 07/19/19  1854 07/19/19  1412 07/19/19  1032 07/19/19  0518  07/18/19  1615   WBC 10.6 9.3  --   --   --  10.4  --  Canceled, Test credited, specimen discarded   HGB 10.4* 10.4*  --   --   --  11.3*   < > Canceled, Test credited, specimen discarded   MCV 89 92  --   --   --  90  --  Canceled, Test credited, specimen discarded    142*  --   --   --  158  --  Canceled, Test credited, specimen discarded   INR  --   --   --   --   --   --   --  Canceled, Test credited, specimen discarded    141  --   --   --  140  --   --    POTASSIUM 3.6 3.8  --   --   --  3.5  --   --    CHLORIDE 108 111*  --   --   --  107  --   --    CO2 24 27  --   --    --  23  --   --    BUN 19 19  --   --   --  16  --   --    CR 0.71 0.64  --   --   --  0.65  --   --    ANIONGAP 8 3  --   --   --  10  --   --    GOLDY 8.0* 7.9*  --   --   --  8.8  --   --    * 80  --   --   --  80  --   --    ALBUMIN 2.5*  --   --   --   --   --   --   --    TROPI  --   --  2.940* 3.113* 3.339*  --   --   --     < > = values in this interval not displayed.

## 2019-07-21 NOTE — PLAN OF CARE
"Discharge Planner PT   Patient plan for discharge: None stated  Current status: sup>sit SBA. Sit>supine Min A. Sit>stand Min A. Ambulated 60' with single end cane and Min A for balance, pt with improved stability today. Pt states she feels better with \"less wires.\" Stayed stable with room air 94% post ambulation, did have softer BPs, but asymptomatic. See VS FS.  Barriers to return to prior living situation: Assist with all mobility, decreased balance and strength from baseline. Fall risk.  Recommendations for discharge: TCU  Rationale for recommendations: At this time recommend TCU at discharge for ongoing strength and balance training, prior to return home. Should pt decrease assist needed for household distances, and ease with transfers and bed mobility, could discharge to home with 24 hr supervision and assistance on the stairs, as well as OP CR phase II.       Entered by: Palak Ny 07/21/2019 12:36 PM       "

## 2019-07-21 NOTE — PLAN OF CARE
VSS. Denies CP. C/o some COKER. Up A1 with gait belt and cane, tolerating well.  Garcia removed. NTG gtt stopped, BP stable. Limited echo done today, report states no changes to aortic hematoma. Family at bedside.

## 2019-07-21 NOTE — PLAN OF CARE
VSS, no c/o pain, voided post waters removal, radial and groin sites CDI, no hematoma, pulses intact.  Up in chair for meals. Working with cardiac rehab, continue to monitor closely.

## 2019-07-21 NOTE — PLAN OF CARE
Pt stable over night, VSS on NTG gtt.  Angio site c/d/I.  Pt in paced rhythm without ectopy.  Pt c/o SOB this morning after getting up to have her weight taken, LS are clear, heart tones are normal, O2 sat's 89-92 on RA.  O2 per NC was started and sat's increased to mid 90's at 2L.  Pt also becomes very fatigued with ambulation to the BR.  Pt slept well, will continue to monitor.

## 2019-07-21 NOTE — PROGRESS NOTES
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 on 7/16/2019 for NSTEMI.  Transferred to 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 on 7/16/2019 with chest pain going on for about a week with burning central chest pain with radiation to her neck and jaw, lasting 15 to 60 minutes. Troponin on arrival was 0.990 and peaked to 23.90 then down trended. EKG showed paced rhythm. Patient was started on heparin drip, aspirin and continued on PTA carvedilol 25 mg twice daily, lisinopril 40 mg daily and atorvastatin 20 mg at bedtime  -She underwent coronary angiogram on 7/17/2019 which showed severe three-vessel coronary artery disease. TTE showed EF 35-40%, with severe hypokinesis to akinesis of the mid to basal inferolateral and anterolateral walls, presumed ischemic.    -Patient transferred to [facility] to get an opinion from cardiovascular surgery, and deemed not a candidate for for CABG per CV surgery.  -Then underwent successful PCI of mid LAD with BK 7/18 but complicated with LAD dissection with retrograde extension into the left main and left coronary cusp and the aortic hematoma. Successfully treated by placement of BK in the left main proximal to the distal left main bifurcation. Additional stent placed in the proximal LAD.  -now on ASA 81 mg daily and Plavix 75 mg  p.o. daily.  -Managed by cardiology post procedure, on nitroglycerin drip for tight BP control.  On BB and ACEI.  -PRN oxygen, morphine available  -On telemetry, paced rhythm.   I will let the cardiology manage her nitroglycerin drip and the blood pressure targets.  Once okay with the cardiology they can discontinue the nitroglycerin drip.  Patient is asymptomatic at this time.  Her IV heparin is now discontinued.  We will recommend to wean off of nitroglycerin drip as blood pressure is on lower side.     Paroxysmal atrial fibrillation on apixaban  Second degree AV block s/p pacemaker implantation (5/2016)  5/2019 cardiac device check demonstrated normal functioning within device parameters. Did have brief episodes of asymptomatic PAF, longest episode lasting 1 minute 36 seconds, over all ventricular rates controlled.  Follows with Dr. Goel.  -TTE prior to transfer showed possible mass on right atrium, however CT scan shows quite amount of shadowing from pacemakers leads so cardiology believes it is probably her pacer leads.  - PTA Apixaban was transitioned to heparin given ACS, now post PCI, on aspirin Plavix.  Defer anticoagulation to cardiology.   Anticoagulation is on hold at this time because of aortic hematoma.  CT chest with contrast showed no convincing evidence of right atrial mass.     HTN  -Resumed PTA lisinopril 40 mg daily, carvedilol 25 mg twice daily and amlodipine 5 mg daily with holding parameters  -On nitro drip post PCI for more strict BP control given coronary dissection and aortic hematoma.  Agree with weaning of the nitroglycerin drip as the blood pressure is in good control and in fact on the lower side at this time.     HLD  -Resumed PTA atorvastatin 20 mg at bedtime     Prior CVA x 2 with residual left-sided weakness  Patient reports she had a stroke when she was 50 years old and another when she was 75.  Regarding mobility status, baseline is that she can use of cane at home, and when out  with family uses a wheelchair.  Able to move her left arm and leg, but has significant weakness throughout the entire left side and difficulty grasping things with the left arm.  Prior to admission on apixaban for CVA prevention.  -Holding PTA apixaban and now on ASA and Plavix.   -Resumed PTA atorvastatin 20 mg at bedtime     Distant upper GI bleed secondary to gastric ulcer  GERD  History of bleeding peptic ulcer over 30 years ago and has been on lifelong omeprazole since.  Had EGD and colonoscopy in 2018 that were negative for acute pathology   -now with increased heart burn, resolved with GI cocktail.  - increase PPI to BID.  - PRN tums, if severe, repeat GI cocktail.      History of iron deficiency anemia  Hospitalized in 11/2018 for generalized weakness and fatigue and found to have severe microcytic anemia.  During that hospital stay she had an EGD done with no explanation for her anemia.  Colonoscopy the next day was negative for active GI bleeding from the lower GI standpoint either.  She had a total of 2 units packed red blood cells transfusion during that admission with good response and improvement in her symptoms.  Resumed on Eliquis, aspirin was discontinued at that time given risk for bleeding outweighing the benefit.   -Resumed PTA iron supplementation daily       Overall stable at this time.  Cardiology is following and appreciate their recommendation.  IV nitroglycerin and tight blood pressure control as per cardiology.  Try to wean off from the nitroglycerin drip at this time.    Diet: Regular Diet Adult    DVT Prophylaxis: heparin drip  Garcia Catheter: not present  Code Status: DNR/DNI      Disposition Plan   Expected discharge: 2-3, pending course and when okay with cardiology. Transferring to CICU today.   Entered: Alex Ramsay MD 07/21/2019, 11:40 AM       The patient's care was discussed with the Bedside Nurse and Patient and patient's daughter/son-in-law.    Alex Ramsay MD  Hospitalist  Service  [facility]  07/21/2019    ______________________________________________________________________    Interval History   Patient was complaining of some shortness of breath earlier this morning but now feeling much better.  Unable to sleep well overnight because of the heart pillows.  At this point denies any fever chills chest pain shortness of breath orthopnea PND palpitation.    Pressure on the lower side at this time.    No other significant event overnight    Data reviewed today: I reviewed all medications, new labs and imaging results over the last 24 hours. I personally reviewed result of ECHO done today.     Physical Exam   Vital Signs: Temp: 98.5  F (36.9  C) Temp src: Oral BP: 107/69 Pulse: 80 Heart Rate: 81 Resp: 20 SpO2: 97 % O2 Device: Nasal cannula Oxygen Delivery: 2 LPM  Weight: 112 lbs 0 oz    Constitutional: Awake, alert and no distress. Appears comfortable  HEENT: PERRLA EOMI  Cardiovascular   S1-S2 normal  Respiratory: Normal WOB,b/l equal air entry, no wheezes or crackles.   Gastrointestinal: Abdomen soft, non-tender. BS normal. No masses, organomegaly  Extremities : No edema , no clubbing or cyanosis.    Skin: dry dressing over the Rt groin. No bruise or swelling.   Neurologic: AAOx3.     Data   Recent Labs   Lab 07/21/19  0542 07/20/19  0535 07/19/19  1854 07/19/19  1412 07/19/19  1032 07/19/19  0518  07/18/19  1615   WBC 10.6 9.3  --   --   --  10.4  --  Canceled, Test credited, specimen discarded   HGB 10.4* 10.4*  --   --   --  11.3*   < > Canceled, Test credited, specimen discarded   MCV 89 92  --   --   --  90  --  Canceled, Test credited, specimen discarded    142*  --   --   --  158  --  Canceled, Test credited, specimen discarded   INR  --   --   --   --   --   --   --  Canceled, Test credited, specimen discarded    141  --   --   --  140  --   --    POTASSIUM 3.6 3.8  --   --   --  3.5  --   --    CHLORIDE 108 111*  --   --   --  107  --   --    CO2  24 27  --   --   --  23  --   --    BUN 19 19  --   --   --  16  --   --    CR 0.71 0.64  --   --   --  0.65  --   --    ANIONGAP 8 3  --   --   --  10  --   --    GOLDY 8.0* 7.9*  --   --   --  8.8  --   --    * 80  --   --   --  80  --   --    ALBUMIN 2.5*  --   --   --   --   --   --   --    TROPI  --   --  2.940* 3.113* 3.339*  --   --   --     < > = values in this interval not displayed.     No results found for this or any previous visit (from the past 24 hour(s)).  Medications     Continuing ACE inhibitor/ARB/ARNI from home medication list OR ACE inhibitor/ARB order already placed during this visit       - MEDICATION INSTRUCTIONS -       - MEDICATION INSTRUCTIONS -       nitroGLYcerin Stopped (07/21/19 1049)     - MEDICATION INSTRUCTIONS -       sodium chloride 10 mL/hr at 07/20/19 1231       amLODIPine  5 mg Oral BID     aspirin  81 mg Oral Daily     atorvastatin  20 mg Oral At Bedtime     carvedilol  25 mg Oral BID w/meals     clopidogrel  75 mg Oral Daily     ferrous sulfate  325 mg Oral Daily with breakfast     fluticasone  1 puff Inhalation Daily     lisinopril  40 mg Oral Daily     omeprazole  20 mg Oral BID AC     phosphorus tablet 250 mg  500 mg Oral BID     sodium chloride (PF)  3 mL Intracatheter Q8H     tolterodine ER  4 mg Oral QAM

## 2019-07-21 NOTE — PROGRESS NOTES
Garcia catheter removed, OK per verbal from hospitalist. Pt tolerated well. Will use bedside commode d/t incontinence and urinary frequency.

## 2019-07-22 ENCOUNTER — APPOINTMENT (OUTPATIENT)
Dept: PHYSICAL THERAPY | Facility: CLINIC | Age: 84
DRG: 246 | End: 2019-07-22
Attending: INTERNAL MEDICINE
Payer: MEDICARE

## 2019-07-22 ENCOUNTER — APPOINTMENT (OUTPATIENT)
Dept: GENERAL RADIOLOGY | Facility: CLINIC | Age: 84
DRG: 246 | End: 2019-07-22
Attending: INTERNAL MEDICINE
Payer: MEDICARE

## 2019-07-22 PROCEDURE — 99232 SBSQ HOSP IP/OBS MODERATE 35: CPT | Performed by: INTERNAL MEDICINE

## 2019-07-22 PROCEDURE — 25000132 ZZH RX MED GY IP 250 OP 250 PS 637: Performed by: INTERNAL MEDICINE

## 2019-07-22 PROCEDURE — 25000132 ZZH RX MED GY IP 250 OP 250 PS 637: Performed by: HOSPITALIST

## 2019-07-22 PROCEDURE — 71045 X-RAY EXAM CHEST 1 VIEW: CPT

## 2019-07-22 PROCEDURE — 21000000 ZZH R&B IMCU HEART CARE

## 2019-07-22 PROCEDURE — 97530 THERAPEUTIC ACTIVITIES: CPT | Mod: GP | Performed by: PHYSICAL THERAPIST

## 2019-07-22 PROCEDURE — 25000132 ZZH RX MED GY IP 250 OP 250 PS 637: Performed by: NURSE PRACTITIONER

## 2019-07-22 RX ORDER — AMLODIPINE BESYLATE 5 MG/1
5 TABLET ORAL DAILY
Status: DISCONTINUED | OUTPATIENT
Start: 2019-07-23 | End: 2019-07-24 | Stop reason: HOSPADM

## 2019-07-22 RX ORDER — CARVEDILOL 12.5 MG/1
12.5 TABLET ORAL 2 TIMES DAILY WITH MEALS
Status: DISCONTINUED | OUTPATIENT
Start: 2019-07-22 | End: 2019-07-22

## 2019-07-22 RX ORDER — CARVEDILOL 12.5 MG/1
12.5 TABLET ORAL 2 TIMES DAILY WITH MEALS
Status: DISCONTINUED | OUTPATIENT
Start: 2019-07-22 | End: 2019-07-24 | Stop reason: HOSPADM

## 2019-07-22 RX ORDER — POLYETHYLENE GLYCOL 3350 17 G/17G
17 POWDER, FOR SOLUTION ORAL 2 TIMES DAILY
Status: DISCONTINUED | OUTPATIENT
Start: 2019-07-22 | End: 2019-07-24 | Stop reason: HOSPADM

## 2019-07-22 RX ADMIN — CARVEDILOL 12.5 MG: 12.5 TABLET, FILM COATED ORAL at 21:36

## 2019-07-22 RX ADMIN — POLYETHYLENE GLYCOL 3350 17 G: 17 POWDER, FOR SOLUTION ORAL at 12:41

## 2019-07-22 RX ADMIN — ATORVASTATIN CALCIUM 20 MG: 20 TABLET, FILM COATED ORAL at 21:36

## 2019-07-22 RX ADMIN — OMEPRAZOLE 20 MG: 20 CAPSULE, DELAYED RELEASE ORAL at 08:51

## 2019-07-22 RX ADMIN — ASPIRIN 81 MG: 81 TABLET, COATED ORAL at 08:51

## 2019-07-22 RX ADMIN — FERROUS SULFATE TAB 325 MG (65 MG ELEMENTAL FE) 325 MG: 325 (65 FE) TAB at 08:51

## 2019-07-22 RX ADMIN — DIBASIC SODIUM PHOSPHATE, MONOBASIC POTASSIUM PHOSPHATE AND MONOBASIC SODIUM PHOSPHATE 500 MG: 852; 155; 130 TABLET ORAL at 21:36

## 2019-07-22 RX ADMIN — DIBASIC SODIUM PHOSPHATE, MONOBASIC POTASSIUM PHOSPHATE AND MONOBASIC SODIUM PHOSPHATE 500 MG: 852; 155; 130 TABLET ORAL at 08:51

## 2019-07-22 RX ADMIN — TOLTERODINE 4 MG: 4 CAPSULE, EXTENDED RELEASE ORAL at 08:51

## 2019-07-22 RX ADMIN — POLYETHYLENE GLYCOL 3350 17 G: 17 POWDER, FOR SOLUTION ORAL at 21:39

## 2019-07-22 RX ADMIN — ACETAMINOPHEN 650 MG: 325 TABLET, FILM COATED ORAL at 21:36

## 2019-07-22 RX ADMIN — AMLODIPINE BESYLATE 5 MG: 5 TABLET ORAL at 08:51

## 2019-07-22 RX ADMIN — LISINOPRIL 40 MG: 40 TABLET ORAL at 08:50

## 2019-07-22 RX ADMIN — DOCUSATE SODIUM 100 MG: 100 CAPSULE, LIQUID FILLED ORAL at 08:51

## 2019-07-22 RX ADMIN — OMEPRAZOLE 20 MG: 20 CAPSULE, DELAYED RELEASE ORAL at 16:45

## 2019-07-22 RX ADMIN — CARVEDILOL 25 MG: 25 TABLET, FILM COATED ORAL at 08:51

## 2019-07-22 RX ADMIN — CLOPIDOGREL BISULFATE 75 MG: 75 TABLET ORAL at 08:51

## 2019-07-22 ASSESSMENT — ACTIVITIES OF DAILY LIVING (ADL)
ADLS_ACUITY_SCORE: 30
ADLS_ACUITY_SCORE: 31

## 2019-07-22 NOTE — CONSULTS
CLINICAL NUTRITION SERVICES  -  ASSESSMENT NOTE      Recommendations Ordered by Registered Dietitian (RD):   Continue on regular diet. Pt does not follow any diet restrictions at home and currently with poor po.   Boost Plus BID between meals (chocolate)   Malnutrition:   % Weight Loss:  None noted  % Intake:  </= 50% for >/= 5 days (severe malnutrition)  Subcutaneous Fat Loss:  None observed  Muscle Loss:  Temporal region depletion and Clavicle bone region depletion: Moderate   Fluid Retention:  Mild (likely not related to malnutrition)     Malnutrition Diagnosis: Non-Severe malnutrition  In Context of:  Acute illness or injury     REASON FOR ASSESSMENT  Yoselyn Cui is a 87 year old female seen by Registered Dietitian for Provider Order - Nutrition Education - Dietitian to see for Heart Healthy Diet education      NUTRITION HISTORY  - Information obtained from the pt and her daughters  - Pt was eating well at home PTA. Pt lives with her daughter and she cooks all 3 meals for the pt. Yoselyn typically eats 3 meals/day and snacks.   - The pt does not have any dietary restrictions and does not follow any specific diet at home  -Pt was seen by [facility] RD ~6 months ago for unintentional weight loss and reduced intake. Pt was receiving Boost BID at this time.     CURRENT NUTRITION ORDERS  Diet Order:     Regular     Current Intake/Tolerance:  Per flowsheets, pt is eating 50% of meals TID.   Pt's daughter reported that her appetite is below baseline, however it is improving from where it was at the beginning of her hospital stay  7/17:  Low Na diet   7/18: NPO   7/19: CL Diet --> Regular Diet   7/18:  Code blue during angio   7/18:  Left main and LAD stenting    NUTRITION FOCUSED PHYSICAL ASSESSMENT FOR DIAGNOSING MALNUTRITION)  Completed:  Yes Full assessment         Observed:    Muscle wasting (refer to documentation in Malnutrition section)    Obtained from Chart/Interdisciplinary Team:  1+ Generalized Trace Edema      ANTHROPOMETRICS  Height: 5'  Weight: 50.6 kg (7/22)  Body mass index is 21.8 kg/m .  Weight Status:  Normal BMI  IBW: 45.5 kg   % IBW: 111%  Weight History:   Wt Readings from Last 10 Encounters:   07/22/19 50.6 kg (111 lb 9.6 oz)   07/17/19 49.5 kg (109 lb 1.6 oz)   01/30/19 48.1 kg (106 lb)   11/19/18 47.8 kg (105 lb 6.4 oz)   03/07/18 50.6 kg (111 lb 8 oz)   01/10/18 49.9 kg (110 lb)   06/05/17 55.3 kg (122 lb)   10/19/16 54.2 kg (119 lb 8 oz)   10/12/16 56.6 kg (124 lb 12.8 oz)   08/23/16 52.6 kg (116 lb)   Pt stated a #.       LABS  Labs reviewed    MEDICATIONS  Medications reviewed      ASSESSED NUTRITION NEEDS PER APPROVED PRACTICE GUIDELINES:    Dosing Weight 49.5 kg (admit weight)  Estimated Energy Needs: 9198-3858 kcals (25-30 Kcal/Kg)  Justification: maintenance  Estimated Protein Needs: 60-74 grams protein (1.2-1.5 g pro/Kg)  Justification: preservation of lean body mass  Estimated Fluid Needs: 2442-1543  mL (25-30 mL/kg)  Justification: maintenance    MALNUTRITION:  % Weight Loss:  None noted  % Intake:  </= 50% for >/= 5 days (severe malnutrition)  Subcutaneous Fat Loss:  None observed  Muscle Loss:  Temporal region depletion and Clavicle bone region depletion: Moderate   Fluid Retention:  Mild (likely not related to malnutrition)     Malnutrition Diagnosis: Non-Severe malnutrition  In Context of:  Acute illness or injury    NUTRITION DIAGNOSIS:  Inadequate oral intake related to hospital treatment course and poor appetite as evidenced by intake </= 50% for the past 5 days.       NUTRITION INTERVENTIONS  Recommendations / Nutrition Prescription  Continue on regular diet. Pt does not follow any diet restrictions at home and currently with poor po.   Boost Plus BID between meals (chocolate)    Implementation  Nutrition education: Provided education on increasing oral intake, discussed low sodium diet  Medical Food Supplement      Nutrition Goals  Pt will consume >/= 75% of meals TID and  supplements BID.       MONITORING AND EVALUATION:  Progress towards goals will be monitored and evaluated per protocol and Practice Guidelines      [facility]

## 2019-07-22 NOTE — PROVIDER NOTIFICATION
MD Notification    Notified Person: MD    Notified Person Name: Jennyfer Hopkins     Notification Date/Time: 7/22 1230     Purpose of Notification: SBP 80's during therapy. Pt felt slightly weak/dizzy. Jennyfer Hopkins at bedside shortly after and updated.     Orders Received: Will adjust cardiac meds

## 2019-07-22 NOTE — PROGRESS NOTES
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  Transferred to [facility] on 7/17/2019     NSTEMI  Cardiomyopathy/CHF with reduced LVEF  Coronary artery dissection and aortic dissection with intramural hematoma post PCI  -Patient initially presented to [facility] on 7/16/2019 with chest pain going on for about a week with burning central chest pain with radiation to her neck and jaw, lasting 15 to 60 minutes. Troponin on arrival was 0.990 and peaked to 23.90 then down trended. EKG showed paced rhythm. Patient was started on heparin drip, aspirin and continued on PTA carvedilol 25 mg twice daily, lisinopril 40 mg daily and atorvastatin 20 mg at bedtime  -She underwent coronary angiogram on 7/17/2019 which showed severe three-vessel coronary artery disease. TTE showed EF 35-40%, with severe hypokinesis to akinesis of the mid to basal inferolateral and anterolateral walls, presumed ischemic.    -Patient transferred to [facility] to get an opinion from cardiovascular surgery, and deemed not a candidate for for CABG per CV surgery.  -Then underwent successful PCI of mid LAD with BK 7/18 but complicated with LAD dissection with retrograde extension into the left main and left coronary cusp and the aortic hematoma. Successfully treated by placement of BK in the left main proximal to the distal left main bifurcation. Additional stent placed in the proximal LAD.  -now on ASA 81 mg daily and Plavix 75 mg  p.o. daily.  -Managed by cardiology post procedure, on nitroglycerin drip for tight BP control.  On BB and ACEI.  -PRN oxygen, morphine available  -On telemetry, paced rhythm.   I will let the cardiology manage her nitroglycerin drip and the blood pressure targets.  Once okay with the cardiology they can discontinue the nitroglycerin drip.  Patient is asymptomatic at this time.  Her IV heparin is now discontinued.  We will recommend to wean off of nitroglycerin drip as blood pressure is on lower side.     Paroxysmal atrial fibrillation on apixaban  Second degree AV block s/p pacemaker implantation (5/2016)  5/2019 cardiac device check demonstrated normal functioning within device parameters. Did have brief episodes of asymptomatic PAF, longest episode lasting 1 minute 36 seconds, over all ventricular rates controlled.  Follows with Dr. Goel.  -TTE prior to transfer showed possible mass on right atrium, however CT scan shows quite amount of shadowing from pacemakers leads so cardiology believes it is probably her pacer leads.  - PTA Apixaban was transitioned to heparin given ACS, now post PCI, on aspirin Plavix.  Defer anticoagulation to cardiology.   Anticoagulation is on hold at this time because of aortic hematoma.  CT chest with contrast showed no convincing evidence of right atrial mass.     HTN  -Resumed PTA lisinopril 40 mg daily, carvedilol 25 mg twice daily and amlodipine 5 mg daily with holding parameters  -On nitro drip post PCI for more strict BP control given coronary dissection and aortic hematoma.  Agree with weaning of the nitroglycerin drip as the blood pressure is in good control and in fact on the lower side at this time.     HLD  -Resumed PTA atorvastatin 20 mg at bedtime     Prior CVA x 2 with residual left-sided weakness  Patient reports she had a stroke when she was 50 years old and another when she was 75.  Regarding mobility status, baseline is that she can use of cane at home, and when out  with family uses a wheelchair.  Able to move her left arm and leg, but has significant weakness throughout the entire left side and difficulty grasping things with the left arm.  Prior to admission on apixaban for CVA prevention.  -Holding PTA apixaban and now on ASA and Plavix.   -Resumed PTA atorvastatin 20 mg at bedtime     Distant upper GI bleed secondary to gastric ulcer  GERD  History of bleeding peptic ulcer over 30 years ago and has been on lifelong omeprazole since.  Had EGD and colonoscopy in 2018 that were negative for acute pathology   -now with increased heart burn, resolved with GI cocktail.  - increase PPI to BID.  - PRN tums, if severe, repeat GI cocktail.      History of iron deficiency anemia  Hospitalized in 11/2018 for generalized weakness and fatigue and found to have severe microcytic anemia.  During that hospital stay she had an EGD done with no explanation for her anemia.  Colonoscopy the next day was negative for active GI bleeding from the lower GI standpoint either.  She had a total of 2 units packed red blood cells transfusion during that admission with good response and improvement in her symptoms.  Resumed on Eliquis, aspirin was discontinued at that time given risk for bleeding outweighing the benefit.   -Resumed PTA iron supplementation daily       Constipation  Started on MiraLAX 17 g twice daily    Overall stable at this time.  Cardiology is following and appreciate their recommendation.  Blood pressure stable in good control at this time.  Chest x-ray this morning and changes from before.  Continue to hold anticoagulation as per cardiology recommendation.  Overall remains stable, will await for cardiology recommendation about disposition.    Diet: Regular Diet Adult    DVT Prophylaxis: PCD  Garcia Catheter: not present  Code Status: DNR/DNI      Disposition Plan   Expected discharge: 1 to 2 days once cleared by the cardiology..   Entered: Alex Ramsay MD 07/22/2019, 12:00 PM        The patient's care was discussed with the Bedside Nurse and Patient and patient's daughter/son-in-law.    Alex Ramsay MD  Hospitalist Service  [facility]  07/22/2019    ______________________________________________________________________    Interval History   Some shortness of breath on exertion this morning when going to the bathroom.  Feeling better now denies any chest pain headache dizziness lightheadedness no fever chills or cough.  She told me that the main problem is the constipation she did not had any bowel movement since admission        No other significant event overnight    Data reviewed today: I reviewed all medications, new labs and imaging results over the last 24 hours. I personally reviewed result of ECHO done today.     Physical Exam   Vital Signs: Temp: 99.1  F (37.3  C) Temp src: Oral BP: 118/83 Pulse: 71   Resp: 16 SpO2: 90 % O2 Device: None (Room air)    Weight: 111 lbs 9.6 oz    Constitutional: Awake, alert and no distress. Appears comfortable  HEENT: PERRLA EOMI  Cardiovascular   S1-S2 normal  Respiratory: Normal WOB,b/l equal air entry, occasional crackles at the bases..   Gastrointestinal: Abdomen soft, non-tender. BS normal. No masses, organomegaly  Extremities : No edema , no clubbing or cyanosis.    Skin: dry dressing over the Rt groin. No bruise or swelling.   Neurologic: AAOx3.     Data   Recent Labs   Lab 07/21/19  0542 07/20/19  0535 07/19/19  1854 07/19/19  1412 07/19/19  1032 07/19/19  0518  07/18/19  1615   WBC 10.6 9.3  --   --   --  10.4  --  Canceled, Test credited, specimen discarded   HGB 10.4* 10.4*  --   --   --  11.3*   < > Canceled, Test credited, specimen discarded   MCV 89 92  --   --   --  90  --  Canceled, Test credited, specimen discarded    142*  --   --   --  158  --  Canceled, Test credited, specimen discarded   INR  --   --   --   --   --   --   --  Canceled, Test credited, specimen discarded    141  --   --   --  140  --    --    POTASSIUM 3.6 3.8  --   --   --  3.5  --   --    CHLORIDE 108 111*  --   --   --  107  --   --    CO2 24 27  --   --   --  23  --   --    BUN 19 19  --   --   --  16  --   --    CR 0.71 0.64  --   --   --  0.65  --   --    ANIONGAP 8 3  --   --   --  10  --   --    GOLDY 8.0* 7.9*  --   --   --  8.8  --   --    * 80  --   --   --  80  --   --    ALBUMIN 2.5*  --   --   --   --   --   --   --    TROPI  --   --  2.940* 3.113* 3.339*  --   --   --     < > = values in this interval not displayed.     No results found for this or any previous visit (from the past 24 hour(s)).  Medications     Continuing ACE inhibitor/ARB/ARNI from home medication list OR ACE inhibitor/ARB order already placed during this visit       - MEDICATION INSTRUCTIONS -       - MEDICATION INSTRUCTIONS -       nitroGLYcerin Stopped (07/21/19 1049)     - MEDICATION INSTRUCTIONS -       sodium chloride 10 mL/hr at 07/20/19 1231       amLODIPine  5 mg Oral BID     aspirin  81 mg Oral Daily     atorvastatin  20 mg Oral At Bedtime     carvedilol  25 mg Oral BID w/meals     clopidogrel  75 mg Oral Daily     ferrous sulfate  325 mg Oral Daily with breakfast     fluticasone  1 puff Inhalation Daily     lisinopril  40 mg Oral Daily     omeprazole  20 mg Oral BID AC     phosphorus tablet 250 mg  500 mg Oral BID     polyethylene glycol  17 g Oral BID     sodium chloride (PF)  3 mL Intracatheter Q8H     tolterodine ER  4 mg Oral QAM

## 2019-07-22 NOTE — PROGRESS NOTES
[facility]  Cardiology Progress Note  Date of Service: 07/22/2019  Primary Cardiologist: Dr. Goel, EP, will be Dr. 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     Assessment:  1. 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  s/p successful  PCI of mid LAD [2.5x18mm Biotronik BK], BK to LM [4.0x12mm Synergy BK], additional stent placed in proximal LAD [3.0x 12mm Medtronic [place] stent].   Presented with chest pain, troponin peaked at 23.9, coronary angiogram 7/17/19 demonstrated 3 vessel complex coronary artery disease, culprit lesion appears to be ruptured placed in proximal circumflex, 95% stenosis, LAD heavily calcified 90% stenosis, ostium of 2nd diagonal 80% stenosis, ost RCA 60%, distal RCA 90% stenosis. LVEDP 12mmHg. CV surgery evaluated given frailty and advanced age she is not a good surgical candidate, recommended PCI. Patient went for complex PCI 7/18/2019, s/p successful  PCI of mid LAD [2.5x18mm Biotronik BK]. Complicated by LAD dissection into the left main/left coronary cusp and aortic hematoma. Successfully treated with placement of BK to LM [4.0x12mm Synergy BK], additional stent placed in proximal LAD [3.0x 12mm Medtronic Tes[place]aye stent]. Stable no signs/symptoms of angina.    - Echo 7/18/19 showed EF 45-50%, 7/16/19 EF 35-40%. 7/19 50-55%. 7/21 55-60%. Most recent echo 7/21 demonstrates aortic root at left coronary cusp thickened, echogenic,consisitent with small left cusp hematoma, no significant change compared to prior studies.   - Continue aspirin and plavix 75mg daily   - Continue  carvedilol (decreased due to hypotension), atorvastatin and lisinopril   - Cardiac rehab  2. Paroxsymal atrial fibrillation - stable, rate controlled, telemetry continues to show 100% . Most recent device interrogation 5/6/19 showed 34 mode switches, which compromise < 1% of the time, longest episode 1 min 36 seconds.    - Remain off oral anticoagulation [eliquis] due to aortic ehmatoma.    - Continue carvedilol 12.5mg BID (decreased r/t hypotension)   - [id number] score at least 7 (age, female, HTN, CVA, vascular)  3. Second degree block s/p PPM  5. History of CVA x 2, residual left sided weakness  6. Hypertension - hypotensive this morning, 86/46, symptomatic   - DECREASE amlodipine to 5mg daily    - DECREASE carvedilol to 12.5mg BID  7. Hyperlipidemia   - Lipid panel 7/18/19 total cholesterol 118, HDL 38, LDL 60, triglycerides 100   - Continue atorvastatin 20mg daily   Plan:   1. DECREASE amlodipine to 5mg daily   2. DECREASE carvedilol to 12.5  3. Limited echocardiogram tomorrow  4. Cardiology follow up with [facility] in [place] 1 week after discharge  5. Follow up with Dr. Meza in 1 month in [place]          STEFFANY Manzano CNP  Text Page  (M-F, 7:30 - 4:00 pm)     Interval History   Patient resting in bed, reports today feeling a little off. Blood pressure low this morning 86/46, patient reports feeling in a fog. Denies chest pain or chest tightness. Denies dizziness, lightheadedness or other presyncopal symptoms currently. Denies palpitations. Denies shortness of breath or exertional dyspnea. Denies lower extremity edema. Denies orthopnea or PND. Sleeping good.     Physical Exam   Temp: 98.7  F (37.1  C) Temp src: Oral BP: 99/49 Pulse: 68 Heart Rate: 69 Resp: 16 SpO2: 95 % O2 Device: None (Room air)    Vitals:    07/20/19 0700 07/21/19 0530 07/22/19 0126   Weight: 50.3 kg (111 lb) 50.8 kg (112 lb) 50.6 kg (111 lb 9.6 oz)       Constitutional alert and oriented, in no acute distress.  Skin warm  and dry to touch  ENT no pallor or cyanosis  Neck JVP slightly elevated (known mild to moderate TR)  Lungs clear bilaterally  Cardiac regular rate/rhythm, no murmur appreciated  Abdomen  abdomen soft, bowel sounds normoactive, no hepatosplenomegaly.   Extremities and Back   no clubbing, cyanosis. No edema. Right groin access site with dressing, no drainage, non tender, soft, no ecchymosis, no bruit. Bilateral pedal pulses +2   Neurological no gross motor deficits noted, affect appropriate, oriented to time, person and place.    Medications     Continuing ACE inhibitor/ARB/ARNI from home medication list OR ACE inhibitor/ARB order already placed during this visit       - MEDICATION INSTRUCTIONS -       - MEDICATION INSTRUCTIONS -       nitroGLYcerin Stopped (07/21/19 1049)     - MEDICATION INSTRUCTIONS -       sodium chloride 10 mL/hr at 07/20/19 1231       amLODIPine  5 mg Oral BID     aspirin  81 mg Oral Daily     atorvastatin  20 mg Oral At Bedtime     carvedilol  25 mg Oral BID w/meals     clopidogrel  75 mg Oral Daily     ferrous sulfate  325 mg Oral Daily with breakfast     fluticasone  1 puff Inhalation Daily     lisinopril  40 mg Oral Daily     omeprazole  20 mg Oral BID AC     phosphorus tablet 250 mg  500 mg Oral BID     polyethylene glycol  17 g Oral BID     sodium chloride (PF)  3 mL Intracatheter Q8H     tolterodine ER  4 mg Oral QAM       Data   Most Recent 3 CBC's:  Recent Labs   Lab Test 07/21/19  0542 07/20/19  0535 07/19/19  0518   WBC 10.6 9.3 10.4   HGB 10.4* 10.4* 11.3*   MCV 89 92 90    142* 158     Most Recent 3 BMP's:  Recent Labs   Lab Test 07/21/19  0542 07/20/19  0535 07/19/19  0518    141 140   POTASSIUM 3.6 3.8 3.5   CHLORIDE 108 111* 107   CO2 24 27 23   BUN 19 19 16   CR 0.71 0.64 0.65   ANIONGAP 8 3 10   GOLDY 8.0* 7.9* 8.8   * 80 80     Most Recent 3 Troponin's:  Recent Labs   Lab Test 07/19/19  1854 07/19/19  1412 07/19/19  1032   TROPI 2.940* 3.113* 3.339*     Most  Recent 3 BNP's:  Recent Labs   Lab Test 05/16/16  1120   NTBNPI 1,950*     Last 24 hours labs reviewed     Tele: 100% V paced    Echo: limited 7/21/19  Left ventricular systolic function is normal.  The visual ejection fraction is estimated at 55-60%.  The aortic root at left coronary cusp again appears thickened, echogenic,  consisitent with small left cusp hematoma, no significant change compared to  prior studies.  There is moderate (2+) mitral regurgitation.  The study was technically limited.    Last ischemic eval: coronary angiogram 7/18/19    Mid LM to Ost LAD lesion is 10% stenosed.    Ost 2nd Diag to 2nd Diag lesion is 80% stenosed.    Mid LAD lesion is 90% stenosed.    Prox Cx to Mid Cx lesion is 95% stenosed.    Dist Cx lesion is 50% stenosed.    Mid Cx lesion is 50% stenosed.    Ost RCA lesion is 60% stenosed.    Dist RCA lesion is 90% stenosed.    Device: PPM, most recent device interrogation 5/6/19  [employer] 2240 Assurity (D) Remote PPM Device CheckAP: 1%: 99%Mode: DDDPresenting Rhythm: AS/VPHeart Rate: Adequate heart rates per histogram Sensing: RA: stable RV: not preformed Pacing Threshold: stableImpedance: stable Battery Status: 10.5 years remaining Atrial Arrhythmia: 34 mode switch episodes comprising <1% of the time. 18 EGMs available show As>Vs for PAT/PAF, longest episode lasting 1 minute 36 seconds, over all ventricular rates controlled.  Taking Eliquis. Ventricular Arrhythmia: none Care Plan: FU merlin q 3 months. Left message with results and next transmission date. [employer] CVT/Mark CVTI have reviewed and interpreted the device interrogation, settings, programming and nurse's summary. The device is functioning within normal device parameters. I agree with the current findings, assessment and plan.

## 2019-07-22 NOTE — PLAN OF CARE
VSS.  Pt denies pain, nausea, dizziness and shortness of breath.  Pt alert and oriented x4.  Tele: 100% V-paced. R groin angio site is covered/CDI.  R radial angio site is open to air, WDL.  CMS intact.  Left sided weakness from prior stroke.  Pt up with A1, GB and cane.  Plan of care: continue to monitor.

## 2019-07-22 NOTE — PLAN OF CARE
A&O x4. VSS on room air, except BP. SBP 80's while working with CR. Cards notified. Tele v paced. C/o SOB on exertion this AM. Up with min assist and cane, hx of left sided weakness. Colace and miralax given, small BM. Pt transferred to [facility], report given and all belongings sent with patient. Daughter Maryjane updated.

## 2019-07-22 NOTE — PLAN OF CARE
"Discharge Planner PT   Patient plan for discharge: Home with Daughter  Current status: Sit<>supine SBA. Sit<>stand SBA. Able to take a few steps and reports feeling \"not right.\" BP re-assessed with a drop in BP. 80s/40s. Sat in the chair for a few minutes, still not feeling well and requested to return to bed. BP still 80s/40s-HR 69-70 and O2 95% on room air. RN notified.   Note pt's daughter had requested PT call her regarding pt's mobility and activity tolerance. Will hold off on calling the daughter until after the PM session, to see if pt is feeling better and able to participate. Of note yesterday's PM session pt was able to tolerate ambulation with Min A and use of cane x 200' and stable CV response.    PM: Pt able to ambulate 60' use of single end cane and Min A for balance, improved BPs from this am; however, with time upright pt states she isn't feeling \"as good.\" BPs trending down from 110 to 95 systolic. Returned to supine with improved comfort following activity. Discussed with RN.     Barriers to return to prior living situation: Assist with all mobility, decreased balance and strength from baseline. Fall risk.  Recommendations for discharge: TCU  Rationale for recommendations: At this time recommend TCU at discharge for ongoing strength and balance training, prior to return home. Should pt decrease assist needed for household distances, and ease with transfers and bed mobility, could discharge to home with 24 hr supervision and assistance on the stairs, as well as OP CR phase II.       Entered by: Palak Ny 07/22/2019 12:36 PM       "

## 2019-07-22 NOTE — PLAN OF CARE
AOx4/Forgetful at times, VSS RA, Tele: 100% , up SBA w/GB/Cane, Radial and Groin sites CDI/WDL, CMS intact, pt rested well overnight - Continue to monitor/discharge pending

## 2019-07-23 ENCOUNTER — APPOINTMENT (OUTPATIENT)
Dept: PHYSICAL THERAPY | Facility: CLINIC | Age: 84
DRG: 246 | End: 2019-07-23
Attending: INTERNAL MEDICINE
Payer: MEDICARE

## 2019-07-23 ENCOUNTER — APPOINTMENT (OUTPATIENT)
Dept: CARDIOLOGY | Facility: CLINIC | Age: 84
DRG: 246 | End: 2019-07-23
Attending: NURSE PRACTITIONER
Payer: MEDICARE

## 2019-07-23 LAB
ALBUMIN SERPL-MCNC: 2.4 G/DL (ref 3.4–5)
ANION GAP SERPL CALCULATED.3IONS-SCNC: 6 MMOL/L (ref 3–14)
BASOPHILS # BLD AUTO: 0 10E9/L (ref 0–0.2)
BASOPHILS NFR BLD AUTO: 0.2 %
BUN SERPL-MCNC: 29 MG/DL (ref 7–30)
CALCIUM SERPL-MCNC: 7.9 MG/DL (ref 8.5–10.1)
CHLORIDE SERPL-SCNC: 108 MMOL/L (ref 94–109)
CO2 SERPL-SCNC: 28 MMOL/L (ref 20–32)
CREAT SERPL-MCNC: 0.9 MG/DL (ref 0.52–1.04)
DIFFERENTIAL METHOD BLD: ABNORMAL
EOSINOPHIL # BLD AUTO: 0.1 10E9/L (ref 0–0.7)
EOSINOPHIL NFR BLD AUTO: 1.3 %
ERYTHROCYTE [DISTWIDTH] IN BLOOD BY AUTOMATED COUNT: 14.6 % (ref 10–15)
GFR SERPL CREATININE-BSD FRML MDRD: 57 ML/MIN/{1.73_M2}
GLUCOSE SERPL-MCNC: 101 MG/DL (ref 70–99)
HCT VFR BLD AUTO: 29.1 % (ref 35–47)
HGB BLD-MCNC: 9.7 G/DL (ref 11.7–15.7)
IMM GRANULOCYTES # BLD: 0 10E9/L (ref 0–0.4)
IMM GRANULOCYTES NFR BLD: 0.1 %
LYMPHOCYTES # BLD AUTO: 2.1 10E9/L (ref 0.8–5.3)
LYMPHOCYTES NFR BLD AUTO: 21.1 %
MCH RBC QN AUTO: 29.8 PG (ref 26.5–33)
MCHC RBC AUTO-ENTMCNC: 33.3 G/DL (ref 31.5–36.5)
MCV RBC AUTO: 89 FL (ref 78–100)
MONOCYTES # BLD AUTO: 1.6 10E9/L (ref 0–1.3)
MONOCYTES NFR BLD AUTO: 16.6 %
NEUTROPHILS # BLD AUTO: 6 10E9/L (ref 1.6–8.3)
NEUTROPHILS NFR BLD AUTO: 60.7 %
NRBC # BLD AUTO: 0 10*3/UL
NRBC BLD AUTO-RTO: 0 /100
PHOSPHATE SERPL-MCNC: 4.1 MG/DL (ref 2.5–4.5)
PLATELET # BLD AUTO: 179 10E9/L (ref 150–450)
POTASSIUM SERPL-SCNC: 3.4 MMOL/L (ref 3.4–5.3)
RBC # BLD AUTO: 3.26 10E12/L (ref 3.8–5.2)
SODIUM SERPL-SCNC: 142 MMOL/L (ref 133–144)
WBC # BLD AUTO: 9.9 10E9/L (ref 4–11)

## 2019-07-23 PROCEDURE — 93308 TTE F-UP OR LMTD: CPT

## 2019-07-23 PROCEDURE — 21000000 ZZH R&B IMCU HEART CARE

## 2019-07-23 PROCEDURE — 99232 SBSQ HOSP IP/OBS MODERATE 35: CPT | Performed by: INTERNAL MEDICINE

## 2019-07-23 PROCEDURE — 25000132 ZZH RX MED GY IP 250 OP 250 PS 637: Performed by: INTERNAL MEDICINE

## 2019-07-23 PROCEDURE — 93325 DOPPLER ECHO COLOR FLOW MAPG: CPT | Mod: 26 | Performed by: INTERNAL MEDICINE

## 2019-07-23 PROCEDURE — 93321 DOPPLER ECHO F-UP/LMTD STD: CPT | Mod: 26 | Performed by: INTERNAL MEDICINE

## 2019-07-23 PROCEDURE — 99232 SBSQ HOSP IP/OBS MODERATE 35: CPT | Mod: 25 | Performed by: INTERNAL MEDICINE

## 2019-07-23 PROCEDURE — 25000132 ZZH RX MED GY IP 250 OP 250 PS 637: Performed by: HOSPITALIST

## 2019-07-23 PROCEDURE — 36415 COLL VENOUS BLD VENIPUNCTURE: CPT | Performed by: INTERNAL MEDICINE

## 2019-07-23 PROCEDURE — 97530 THERAPEUTIC ACTIVITIES: CPT | Mod: GP | Performed by: PHYSICAL THERAPIST

## 2019-07-23 PROCEDURE — 93308 TTE F-UP OR LMTD: CPT | Mod: 26 | Performed by: INTERNAL MEDICINE

## 2019-07-23 PROCEDURE — 80069 RENAL FUNCTION PANEL: CPT | Performed by: INTERNAL MEDICINE

## 2019-07-23 PROCEDURE — 25000132 ZZH RX MED GY IP 250 OP 250 PS 637: Performed by: NURSE PRACTITIONER

## 2019-07-23 PROCEDURE — 97116 GAIT TRAINING THERAPY: CPT | Mod: GP | Performed by: PHYSICAL THERAPIST

## 2019-07-23 PROCEDURE — 85025 COMPLETE CBC W/AUTO DIFF WBC: CPT | Performed by: INTERNAL MEDICINE

## 2019-07-23 RX ADMIN — ATORVASTATIN CALCIUM 20 MG: 20 TABLET, FILM COATED ORAL at 20:41

## 2019-07-23 RX ADMIN — AMLODIPINE BESYLATE 5 MG: 5 TABLET ORAL at 09:15

## 2019-07-23 RX ADMIN — FERROUS SULFATE TAB 325 MG (65 MG ELEMENTAL FE) 325 MG: 325 (65 FE) TAB at 09:15

## 2019-07-23 RX ADMIN — CARVEDILOL 12.5 MG: 12.5 TABLET, FILM COATED ORAL at 09:15

## 2019-07-23 RX ADMIN — CLOPIDOGREL BISULFATE 75 MG: 75 TABLET ORAL at 09:15

## 2019-07-23 RX ADMIN — POLYETHYLENE GLYCOL 3350 17 G: 17 POWDER, FOR SOLUTION ORAL at 09:16

## 2019-07-23 RX ADMIN — OMEPRAZOLE 20 MG: 20 CAPSULE, DELAYED RELEASE ORAL at 17:46

## 2019-07-23 RX ADMIN — TOLTERODINE 4 MG: 4 CAPSULE, EXTENDED RELEASE ORAL at 09:15

## 2019-07-23 RX ADMIN — LISINOPRIL 40 MG: 40 TABLET ORAL at 09:15

## 2019-07-23 RX ADMIN — CARVEDILOL 12.5 MG: 12.5 TABLET, FILM COATED ORAL at 20:41

## 2019-07-23 RX ADMIN — OMEPRAZOLE 20 MG: 20 CAPSULE, DELAYED RELEASE ORAL at 09:15

## 2019-07-23 RX ADMIN — ASPIRIN 81 MG: 81 TABLET, COATED ORAL at 09:15

## 2019-07-23 ASSESSMENT — ACTIVITIES OF DAILY LIVING (ADL)
ADLS_ACUITY_SCORE: 30
ADLS_ACUITY_SCORE: 31
ADLS_ACUITY_SCORE: 30

## 2019-07-23 NOTE — PROGRESS NOTES
[facility]  Cardiology Progress Note  Date of Service: 07/23/2019  Primary Cardiologist: Dr. Goel, EP, will be Dr. 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     Assessment:  1. 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  s/p successful  PCI of mid LAD [2.5x18mm Biotronik BK], BK to LM [4.0x12mm Synergy BK], additional stent placed in proximal LAD [3.0x 12mm Medtronic [place] stent].   Presented with chest pain, troponin peaked at 23.9, coronary angiogram 7/17/19 demonstrated 3 vessel complex coronary artery disease, culprit lesion appears to be ruptured placed in proximal circumflex, 95% stenosis, LAD heavily calcified 90% stenosis, ostium of 2nd diagonal 80% stenosis, ost RCA 60%, distal RCA 90% stenosis. LVEDP 12mmHg. CV surgery evaluated given frailty and advanced age she is not a good surgical candidate, recommended PCI. Patient went for complex PCI 7/18/2019, s/p successful  PCI of mid LAD [2.5x18mm Biotronik BK]. Complicated by LAD dissection into the left main/left coronary cusp and aortic hematoma. Successfully treated with placement of BK to LM [4.0x12mm Synergy BK], additional stent placed in proximal LAD [3.0x 12mm Medtronic Tesfaye stent]. Stable no signs/symptoms of angina.    - Echo 7/18/19 showed EF 45-50%, 7/16/19 EF 35-40%. 7/19 50-55%. 7/21 55-60%. Most recent echo 7/21 demonstrates aortic root at left coronary cusp thickened, echogenic,consisitent with small left cusp hematoma, no significant change compared to prior studies.   - Continue aspirin and plavix 75mg daily   - Continue  carvedilol (decreased due to hypotension), atorvastatin and lisinopril   - Cardiac rehab  2. Paroxsymal atrial fibrillation - stable, rate controlled, telemetry continues to show 100% . Most recent device interrogation 5/6/19 showed 34 mode switches, which compromise < 1% of the time, longest episode 1 min 36 seconds.    - Remain off oral anticoagulation [eliquis] due to aortic hematoma, reinitiation can be determined outpatient.    - Continue carvedilol 12.5mg BID (decreased r/t hypotension)   - [id number] score at least 7 (age, female, HTN, CVA, vascular)  3. Second degree block s/p PPM  5. History of CVA x 2, residual left sided weakness  6. Hypertension - controlled, episodes of hypotension improved with decrease in amlodipine and carvedilol.   7. Hyperlipidemia   - Lipid panel 7/18/19 total cholesterol 118, HDL 38, LDL 60, triglycerides 100   - Continue atorvastatin 20mg daily   8. Anemia - hemoglobin 9.7 today, down slightly further from 10.4. Limited echocardiogram today.       Plan:   1. Limited echocardiogram today  2. Cardiology follow up with [facility] in [place] 1 week after discharge, with Cheikh Carrizales 8/2/19 with labs prior (BMP/CBC)  3. Follow up with Dr. Meza in [place], scheduled for 10/25/19        STEFFANY Manzano CNP  Text Page  (M-F, 7:30 - 4:00 pm)     Interval History   Patient up in chair, reports feeling significantly better today. Blood pressures have improved with decrease in amlodipine/carvedilol. Denies chest pain or chest tightness. Denies dizziness, lightheadedness or other presyncopal symptoms. Denies palpitations. Denies shortness of breath. Some exertional dyspnea when working with PT or when doing her morning routine. Denies lower extremity edema. Denies orthopnea or PND.     Physical Exam   Temp: 98.6  F (37  C) Temp src: Oral BP: 115/52 Pulse: 70 Heart Rate: 67 Resp: 19 SpO2: 94 % O2 Device: None (Room air)    Vitals:    07/21/19 0530 07/22/19 0126 07/23/19 0607    Weight: 50.8 kg (112 lb) 50.6 kg (111 lb 9.6 oz) 50.6 kg (111 lb 9.6 oz)       Constitutional alert and oriented, in no acute distress.  Skin warm and dry to touch  ENT no pallor or cyanosis  Neck JVP appears normal at 90 degrees  Lungs clear bilaterally  Cardiac regular rate/rhythm, no murmur appreciated  Abdomen  abdomen soft, bowel sounds normoactive, no hepatosplenomegaly.   Extremities and Back   no clubbing, cyanosis. No edema. Right groin access site with dressing, no drainage, non tender, soft, no ecchymosis, no bruit. Bilateral pedal pulses +2   Neurological no gross motor deficits noted, affect appropriate, oriented to time, person and place.    Medications     Continuing ACE inhibitor/ARB/ARNI from home medication list OR ACE inhibitor/ARB order already placed during this visit       - MEDICATION INSTRUCTIONS -       - MEDICATION INSTRUCTIONS -       nitroGLYcerin Stopped (07/21/19 1049)     - MEDICATION INSTRUCTIONS -       sodium chloride 10 mL/hr at 07/20/19 1231       amLODIPine  5 mg Oral Daily     aspirin  81 mg Oral Daily     atorvastatin  20 mg Oral At Bedtime     carvedilol  12.5 mg Oral BID w/meals     clopidogrel  75 mg Oral Daily     ferrous sulfate  325 mg Oral Daily with breakfast     fluticasone  1 puff Inhalation Daily     lisinopril  40 mg Oral Daily     omeprazole  20 mg Oral BID AC     polyethylene glycol  17 g Oral BID     sodium chloride (PF)  3 mL Intracatheter Q8H     tolterodine ER  4 mg Oral QAM       Data   Most Recent 3 CBC's:  Recent Labs   Lab Test 07/23/19  0538 07/21/19  0542 07/20/19  0535   WBC 9.9 10.6 9.3   HGB 9.7* 10.4* 10.4*   MCV 89 89 92    160 142*     Most Recent 3 BMP's:  Recent Labs   Lab Test 07/23/19  0538 07/21/19  0542 07/20/19  0535    140 141   POTASSIUM 3.4 3.6 3.8   CHLORIDE 108 108 111*   CO2 28 24 27   BUN 29 19 19   CR 0.90 0.71 0.64   ANIONGAP 6 8 3   GOLDY 7.9* 8.0* 7.9*   * 103* 80     Most Recent 3 Troponin's:  Recent Labs    Lab Test 07/19/19  1854 07/19/19  1412 07/19/19  1032   TROPI 2.940* 3.113* 3.339*     Most Recent 3 BNP's:  Recent Labs   Lab Test 05/16/16  1120   NTBNPI 1,950*     Last 24 hours labs reviewed     Tele: 100% V paced    Echo: limited 7/21/19  Left ventricular systolic function is normal.  The visual ejection fraction is estimated at 55-60%.  The aortic root at left coronary cusp again appears thickened, echogenic,  consisitent with small left cusp hematoma, no significant change compared to  prior studies.  There is moderate (2+) mitral regurgitation.  The study was technically limited.    Last ischemic eval: coronary angiogram 7/18/19    Mid LM to Ost LAD lesion is 10% stenosed.    Ost 2nd Diag to 2nd Diag lesion is 80% stenosed.    Mid LAD lesion is 90% stenosed.    Prox Cx to Mid Cx lesion is 95% stenosed.    Dist Cx lesion is 50% stenosed.    Mid Cx lesion is 50% stenosed.    Ost RCA lesion is 60% stenosed.    Dist RCA lesion is 90% stenosed.    Device: PPM, most recent device interrogation 5/6/19  [employer] 2240 Assurity (D) Remote PPM Device CheckAP: 1%: 99%Mode: DDDPresenting Rhythm: AS/VPHeart Rate: Adequate heart rates per histogram Sensing: RA: stable RV: not preformed Pacing Threshold: stableImpedance: stable Battery Status: 10.5 years remaining Atrial Arrhythmia: 34 mode switch episodes comprising <1% of the time. 18 EGMs available show As>Vs for PAT/PAF, longest episode lasting 1 minute 36 seconds, over all ventricular rates controlled.  Taking Eliquis. Ventricular Arrhythmia: none Care Plan: FU merlin q 3 months. Left message with results and next transmission date. [employer] CVT/Mark CVTI have reviewed and interpreted the device interrogation, settings, programming and nurse's summary. The device is functioning within normal device parameters. I agree with the current findings, assessment and plan.

## 2019-07-23 NOTE — PLAN OF CARE
"Discharge Planner PT   Patient plan for discharge: Home with Daughter's Assist  Current status: Pt feeling much stronger today, BP much improved pre/during and post activity. Remained 120's systolic throughout. Ambulated with single end cane, CGA at the gait belt 150' and 200'. Also negotiated 6 stairs (2x3) with single rail and CGA. Pt able to lead therapist on the stairs states \"This is how Maryjane helps me at home.\" Pt's daughter Britni present, reports pt does look like she is moving close to her baseline. Pt admits she is \"just a little SOB,\" and takes a minute or two to recover after activity.   Barriers to return to prior living situation: None.  Recommendations for discharge: Home with 24/7 supervision for ambulation, CGA (contact guard assist) on the stairs. Home PT/OT services.   Rationale for recommendations: Pt will benefit from continued skilled rehab services in home, in order to progress strength and activity tolerance for optimal independence and safety with ADLs and functional mobility. Pt's daughter, Maryjane, reports assist with medication management and nursing services would be a great deal of help. Pt would require significant, taxing effort to leave the home and therefore would benefit from in-home services.        Entered by: Palak Ny 07/23/2019 12:09 PM       "

## 2019-07-23 NOTE — PROGRESS NOTES
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 Troponin on arrival was 0.990 and peaked to 23.90 then down trended. EKG showed paced rhythm. Patient was started on heparin drip, aspirin and continued on PTA carvedilol 25 mg twice daily, lisinopril 40 mg daily and atorvastatin 20 mg at bedtime  -She underwent coronary angiogram on 7/17/2019 which showed severe three-vessel coronary artery disease. TTE showed EF 35-40%, with severe hypokinesis to akinesis of the mid to basal inferolateral and anterolateral walls, presumed ischemic.    -Patient transferred to [facility] to get an opinion from cardiovascular surgery, and deemed not a candidate for for CABG per CV surgery.  -Then underwent successful PCI of mid LAD with BK 7/18 but complicated with LAD dissection with retrograde extension into the left main and left coronary cusp and the aortic hematoma. Successfully treated by placement of BK in the left main proximal to the distal left main bifurcation. Additional stent placed in the proximal LAD.  -now on ASA 81 mg daily  and Plavix 75 mg p.o. daily.  -Managed by cardiology post procedure, on nitroglycerin drip for tight BP control.  On BB and ACEI.  -PRN oxygen, morphine available  -On telemetry, paced rhythm.   I will let the cardiology manage her nitroglycerin drip and the blood pressure targets.  Once okay with the cardiology they can discontinue the nitroglycerin drip.  Patient is asymptomatic at this time.  Her IV heparin is now discontinued.  Off nitroglycerin drip as well.  Cardiology is following, recommending repeating the echo today     Paroxysmal atrial fibrillation on apixaban  Second degree AV block s/p pacemaker implantation (5/2016)  5/2019 cardiac device check demonstrated normal functioning within device parameters. Did have brief episodes of asymptomatic PAF, longest episode lasting 1 minute 36 seconds, over all ventricular rates controlled.  Follows with Dr. Goel.  -TTE prior to transfer showed possible mass on right atrium, however CT scan shows quite amount of shadowing from pacemakers leads so cardiology believes it is probably her pacer leads.  - PTA Apixaban was transitioned to heparin given ACS, now post PCI, on aspirin Plavix.  Defer anticoagulation to cardiology.   Anticoagulation is on hold at this time because of aortic hematoma.  CT chest with contrast showed no convincing evidence of right atrial mass.     HTN  -Resumed PTA lisinopril 40 mg daily, carvedilol 25 mg twice daily and amlodipine 5 mg daily with holding parameters  -On nitro drip post PCI for more strict BP control given coronary dissection and aortic hematoma.  Agree with weaning of the nitroglycerin drip as the blood pressure is in good control and in fact on the lower side at this time.     HLD  -Resumed PTA atorvastatin 20 mg at bedtime     Prior CVA x 2 with residual left-sided weakness  Patient reports she had a stroke when she was 50 years old and another when she was 75.  Regarding mobility status, baseline is that she can use of  cane at home, and when out with family uses a wheelchair.  Able to move her left arm and leg, but has significant weakness throughout the entire left side and difficulty grasping things with the left arm.  Prior to admission on apixaban for CVA prevention.  -Holding PTA apixaban and now on ASA and Plavix.   -Resumed PTA atorvastatin 20 mg at bedtime     Distant upper GI bleed secondary to gastric ulcer  GERD  History of bleeding peptic ulcer over 30 years ago and has been on lifelong omeprazole since.  Had EGD and colonoscopy in 2018 that were negative for acute pathology   -now with increased heart burn, resolved with GI cocktail.  - increase PPI to BID.  - PRN tums, if severe, repeat GI cocktail.      History of iron deficiency anemia  Hospitalized in 11/2018 for generalized weakness and fatigue and found to have severe microcytic anemia.  During that hospital stay she had an EGD done with no explanation for her anemia.  Colonoscopy the next day was negative for active GI bleeding from the lower GI standpoint either.  She had a total of 2 units packed red blood cells transfusion during that admission with good response and improvement in her symptoms.  Resumed on Eliquis, aspirin was discontinued at that time given risk for bleeding outweighing the benefit.   -Resumed PTA iron supplementation daily       Constipation  Started on MiraLAX 17 g twice daily    Overall stable at this time.  Cardiology is following and appreciate their recommendation.  Blood pressure stable in good control at this time.  Continue to hold anticoagulation as per cardiology recommendation.  Overall remains stable, will await for cardiology recommendation about disposition.  Consult  for disposition.    Diet: Regular Diet Adult  Snacks/Supplements Adult: Boost Plus; Between Meals    DVT Prophylaxis: PCD  Garcia Catheter: not present  Code Status: DNR/DNI      Disposition Plan   Expected discharge: 1 to 2 days once cleared by the  cardiology..   Entered: Alex Ramsay MD 07/23/2019, 12:36 PM       The patient's care was discussed with the Bedside Nurse and Patient and patient's daughter/son-in-law.    Alex Ramsay MD  Hospitalist Service  [facility]  07/23/2019    ______________________________________________________________________    Interval History   Feeling better this morning, no active complaint denies any chest pain fever chills nausea vomiting headache dizziness lightheadedness.      No other significant event overnight    Data reviewed today: I reviewed all medications, new labs and imaging results over the last 24 hours. I personally reviewed result of ECHO done today.     Physical Exam   Vital Signs: Temp: 98.5  F (36.9  C) Temp src: Oral BP: 120/66 Pulse: 70 Heart Rate: 90 Resp: 18 SpO2: 91 % O2 Device: None (Room air)    Weight: 111 lbs 9.6 oz    Constitutional: Awake, alert and no distress. Appears comfortable  HEENT: PERRLA EOMI  Cardiovascular   S1-S2 normal  Respiratory: Normal WOB,b/l equal air entry, occasional crackles at the bases..   Gastrointestinal: Abdomen soft, non-tender. BS normal. No masses, organomegaly  Extremities : No edema , no clubbing or cyanosis.    Skin: dry dressing over the Rt groin. No bruise or swelling.   Neurologic: AAOx3.     Data   Recent Labs   Lab 07/23/19  0538 07/21/19  0542 07/20/19  0535 07/19/19  1854 07/19/19  1412 07/19/19  1032  07/18/19  1615   WBC 9.9 10.6 9.3  --   --   --    < > Canceled, Test credited, specimen discarded   HGB 9.7* 10.4* 10.4*  --   --   --    < > Canceled, Test credited, specimen discarded   MCV 89 89 92  --   --   --    < > Canceled, Test credited, specimen discarded    160 142*  --   --   --    < > Canceled, Test credited, specimen discarded   INR  --   --   --   --   --   --   --  Canceled, Test credited, specimen discarded    140 141  --   --   --    < >  --    POTASSIUM 3.4 3.6 3.8  --   --   --    < >  --    CHLORIDE 108 108  111*  --   --   --    < >  --    CO2 28 24 27  --   --   --    < >  --    BUN 29 19 19  --   --   --    < >  --    CR 0.90 0.71 0.64  --   --   --    < >  --    ANIONGAP 6 8 3  --   --   --    < >  --    GOLDY 7.9* 8.0* 7.9*  --   --   --    < >  --    * 103* 80  --   --   --    < >  --    ALBUMIN 2.4* 2.5*  --   --   --   --   --   --    TROPI  --   --   --  2.940* 3.113* 3.339*  --   --     < > = values in this interval not displayed.     No results found for this or any previous visit (from the past 24 hour(s)).  Medications     Continuing ACE inhibitor/ARB/ARNI from home medication list OR ACE inhibitor/ARB order already placed during this visit       - MEDICATION INSTRUCTIONS -       - MEDICATION INSTRUCTIONS -       nitroGLYcerin Stopped (07/21/19 1049)     - MEDICATION INSTRUCTIONS -       sodium chloride 10 mL/hr at 07/20/19 1231       amLODIPine  5 mg Oral Daily     aspirin  81 mg Oral Daily     atorvastatin  20 mg Oral At Bedtime     carvedilol  12.5 mg Oral BID w/meals     clopidogrel  75 mg Oral Daily     ferrous sulfate  325 mg Oral Daily with breakfast     fluticasone  1 puff Inhalation Daily     lisinopril  40 mg Oral Daily     omeprazole  20 mg Oral BID AC     polyethylene glycol  17 g Oral BID     sodium chloride (PF)  3 mL Intracatheter Q8H     tolterodine ER  4 mg Oral QAM

## 2019-07-23 NOTE — PROGRESS NOTES
SPIRITUAL HEALTH SERVICES  Spiritual Assessment Progress Note  [facility]   attempted visit with pt but she was sleeping.  SH chose not to wake her.    SH is available as needed.                                                                                                                                           Mary Ann Pearson M.Div., [facility]  Staff    Pager [phone]

## 2019-07-23 NOTE — CONSULTS
Therapy has recommended C RN/PT/OT, no need for TCU as daughter Maryjane lives with her and 24/7 assist. Discussed with Maryjane- she would like to use [facility]. Referral sent- poss discharge tomorrow

## 2019-07-23 NOTE — PLAN OF CARE
A&Ox4 w/ VSS. RA. Tele is 100% V paced. Tylenol given x1 for temp of 100.3. Small BM x1 overnight. Possible discharge today to TCU.

## 2019-07-23 NOTE — PLAN OF CARE
Pt is A&Ox4 and an assist of 1 with a gait belt. VS were stable throughout shift. Heart sounds heard were full S1 and S2 sounds with random spurts of irregular heart sounds. Lung sounds were clear and equal bilaterally. Pt was in positive spirits and appreciated talking about the passing of her  and her feelings throughout todays shift. She would tear up but stated she felt better after this and it lifted her spirits.

## 2019-07-24 VITALS
SYSTOLIC BLOOD PRESSURE: 119 MMHG | RESPIRATION RATE: 16 BRPM | HEART RATE: 88 BPM | OXYGEN SATURATION: 95 % | BODY MASS INDEX: 21.93 KG/M2 | DIASTOLIC BLOOD PRESSURE: 64 MMHG | WEIGHT: 112.3 LBS | TEMPERATURE: 98.6 F

## 2019-07-24 PROCEDURE — 99239 HOSP IP/OBS DSCHRG MGMT >30: CPT | Performed by: INTERNAL MEDICINE

## 2019-07-24 PROCEDURE — 25000132 ZZH RX MED GY IP 250 OP 250 PS 637: Performed by: INTERNAL MEDICINE

## 2019-07-24 PROCEDURE — 99232 SBSQ HOSP IP/OBS MODERATE 35: CPT | Performed by: INTERNAL MEDICINE

## 2019-07-24 PROCEDURE — 25000132 ZZH RX MED GY IP 250 OP 250 PS 637: Performed by: HOSPITALIST

## 2019-07-24 PROCEDURE — 25000132 ZZH RX MED GY IP 250 OP 250 PS 637: Performed by: NURSE PRACTITIONER

## 2019-07-24 RX ORDER — CLOPIDOGREL BISULFATE 75 MG/1
75 TABLET ORAL DAILY
Qty: 90 TABLET | Refills: 0 | Status: SHIPPED | OUTPATIENT
Start: 2019-07-25 | End: 2019-10-28

## 2019-07-24 RX ORDER — NITROGLYCERIN 0.4 MG/1
TABLET SUBLINGUAL
Qty: 30 TABLET | Refills: 0 | Status: SHIPPED | OUTPATIENT
Start: 2019-07-24 | End: 2022-01-01

## 2019-07-24 RX ORDER — CARVEDILOL 12.5 MG/1
12.5 TABLET ORAL 2 TIMES DAILY WITH MEALS
Qty: 60 TABLET | Refills: 0 | Status: SHIPPED | OUTPATIENT
Start: 2019-07-24 | End: 2020-02-18

## 2019-07-24 RX ADMIN — CARVEDILOL 12.5 MG: 12.5 TABLET, FILM COATED ORAL at 08:32

## 2019-07-24 RX ADMIN — TOLTERODINE 4 MG: 4 CAPSULE, EXTENDED RELEASE ORAL at 08:32

## 2019-07-24 RX ADMIN — AMLODIPINE BESYLATE 5 MG: 5 TABLET ORAL at 08:32

## 2019-07-24 RX ADMIN — FERROUS SULFATE TAB 325 MG (65 MG ELEMENTAL FE) 325 MG: 325 (65 FE) TAB at 08:32

## 2019-07-24 RX ADMIN — CLOPIDOGREL BISULFATE 75 MG: 75 TABLET ORAL at 08:32

## 2019-07-24 RX ADMIN — ASPIRIN 81 MG: 81 TABLET, COATED ORAL at 08:32

## 2019-07-24 RX ADMIN — LISINOPRIL 40 MG: 40 TABLET ORAL at 08:32

## 2019-07-24 RX ADMIN — OMEPRAZOLE 20 MG: 20 CAPSULE, DELAYED RELEASE ORAL at 08:32

## 2019-07-24 ASSESSMENT — ACTIVITIES OF DAILY LIVING (ADL)
ADLS_ACUITY_SCORE: 30

## 2019-07-24 NOTE — PROGRESS NOTES
[facility]  Cardiology Progress Note  Date of Service: 07/24/2019  Primary Cardiologist: Dr. Goel, EP, will be Dr. Meza    Assessment & Plan    Yoselyn Cui is a 87 year old female with past medical history significant for second degree block s/p PPM (5/2016), paroxsymal atrial fibrillation, hx CVA x 2 with residual left-sided weakness, hypertension, hyperlipidemia admitted to [facility] on 7/16/19 for an NSTEMI transferred to [facility] on 7/17/2019.     Assessment:  1. 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  s/p successful  PCI of mid LAD [2.5x18mm Biotronik BK], BK to LM [4.0x12mm Synergy BK], additional stent placed in proximal LAD [3.0x 12mm Medtronic [place] stent].   Presented with chest pain, troponin peaked at 23.9, coronary angiogram 7/17/19 demonstrated 3 vessel complex coronary artery disease, culprit lesion appears to be ruptured placed in proximal circumflex, 95% stenosis, LAD heavily calcified 90% stenosis, ostium of 2nd diagonal 80% stenosis, ost RCA 60%, distal RCA 90% stenosis. LVEDP 12mmHg. CV surgery evaluated given frailty and advanced age she is not a good surgical candidate, recommended PCI. Patient went for complex PCI 7/18/2019, s/p successful  PCI of mid LAD [2.5x18mm Biotronik BK]. Complicated by LAD dissection into the left main/left coronary cusp and aortic hematoma. Successfully treated with placement of BK to LM [4.0x12mm Synergy BK], additional stent placed in proximal LAD [3.0x 12mm Medtronic [place] stent]. Stable no signs/symptoms of angina.    - Echo 7/18/19 showed EF 45-50%, 7/16/19 EF 35-40%. 7/19 50-55%. 7/21 55-60%. Most recent echo 7/23 demonstrates aortic root at left coronary cusp thickened, unchanged from prior studies.   - Continue aspirin and plavix 75mg daily   - Continue carvedilol (decreased due to hypotension), atorvastatin and lisinopril   -  Cardiac rehab  2. Paroxsymal atrial fibrillation - stable, rate controlled, telemetry continues to show sinus rhythm with . Most recent device interrogation 5/6/19 showed 34 mode switches, which compromise < 1% of the time, longest episode 1 min 36 seconds.    - Remain off oral anticoagulation [eliquis] due to aortic hematoma, timing of reinitiation can be determined outpatient.    - Continue carvedilol 12.5mg BID (decreased r/t hypotension)   - [id number] score at least 7 (age, female, HTN, CVA, vascular)  3. Second degree block s/p PPM  5. History of CVA x 2, residual left sided weakness  6. Hypertension - controlled, episodes of hypotension improved with adjustments in medication (decrease in amlodipine and carvedilol).   7. Hyperlipidemia   - Lipid panel 7/18/19 total cholesterol 118, HDL 38, LDL 60, triglycerides 100   - Continue atorvastatin 20mg daily   8. Anemia - hemoglobin 9.7 7/23/19, down slightly further from 10.4. Limited echocardiogram showed stable aortic root at left coronary cusp. Will check CBC at outpatient follow up.       Plan:   1. Stable for discharge from cardiology perspective. Plan for discharge home with daughter with HHC.   2. Cardiology follow up with [facility] in [place] 1 week after discharge, with Cheikh Carrizales 8/2/19 with labs prior (BMP/CBC)  3. Follow up with Dr. Meza in [place], scheduled for 10/25/19        STEFFANY Manzano CNP  Text Page  (M-F, 7:30 - 4:00 pm)     Interval History   Patient reports feeling good. Good appetite. Denies chest pain or chest tightness. Denies dizziness, lightheadedness or other presyncopal symptoms. Denies palpitations. Denies shortness of breath. Some exertional dyspnea when working with PT or when doing her morning routine. Denies lower extremity edema. Denies orthopnea or PND. Denies tenderness or drainage from right groin access site.     Physical Exam   Temp: 98.9  F (37.2  C) Temp src: Oral BP: 148/66 Pulse: 88 Heart Rate: 90  Resp: 16 SpO2: 98 % O2 Device: None (Room air)    Vitals:    07/22/19 0126 07/23/19 0607 07/24/19 0500   Weight: 50.6 kg (111 lb 9.6 oz) 50.6 kg (111 lb 9.6 oz) 50.9 kg (112 lb 4.8 oz)       Constitutional alert and oriented, in no acute distress.  Skin warm and dry to touch  ENT no pallor or cyanosis  Neck JVP appears normal   Lungs clear bilaterally  Cardiac regular rate/rhythm, no murmur appreciated  Abdomen  abdomen soft, bowel sounds normoactive, no hepatosplenomegaly.   Extremities and Back   no clubbing, cyanosis. No edema. Right groin access site with dressing, no drainage, non tender, soft, no ecchymosis, no bruit. Bilateral pedal pulses +2   Neurological no gross motor deficits noted, affect appropriate, oriented to time, person and place.    Medications     Continuing ACE inhibitor/ARB/ARNI from home medication list OR ACE inhibitor/ARB order already placed during this visit       - MEDICATION INSTRUCTIONS -       - MEDICATION INSTRUCTIONS -       nitroGLYcerin Stopped (07/21/19 1049)     - MEDICATION INSTRUCTIONS -       sodium chloride 10 mL/hr at 07/20/19 1231       amLODIPine  5 mg Oral Daily     aspirin  81 mg Oral Daily     atorvastatin  20 mg Oral At Bedtime     carvedilol  12.5 mg Oral BID w/meals     clopidogrel  75 mg Oral Daily     ferrous sulfate  325 mg Oral Daily with breakfast     fluticasone  1 puff Inhalation Daily     lisinopril  40 mg Oral Daily     omeprazole  20 mg Oral BID AC     polyethylene glycol  17 g Oral BID     sodium chloride (PF)  3 mL Intracatheter Q8H     tolterodine ER  4 mg Oral QAM       Data   Most Recent 3 CBC's:  Recent Labs   Lab Test 07/23/19  0538 07/21/19  0542 07/20/19  0535   WBC 9.9 10.6 9.3   HGB 9.7* 10.4* 10.4*   MCV 89 89 92    160 142*     Most Recent 3 BMP's:  Recent Labs   Lab Test 07/23/19  0538 07/21/19  0542 07/20/19  0535    140 141   POTASSIUM 3.4 3.6 3.8   CHLORIDE 108 108 111*   CO2 28 24 27   BUN 29 19 19   CR 0.90 0.71 0.64    ANIONGAP 6 8 3   GOLDY 7.9* 8.0* 7.9*   * 103* 80     Most Recent 3 Troponin's:  Recent Labs   Lab Test 07/19/19  1854 07/19/19  1412 07/19/19  1032   TROPI 2.940* 3.113* 3.339*     Most Recent 3 BNP's:  Recent Labs   Lab Test 05/16/16  1120   NTBNPI 1,950*     Last 24 hours labs reviewed     Tele: sinus rhythm with ventricular pacing     Echo: limited 7/21/19  Left ventricular systolic function is normal.  The visual ejection fraction is estimated at 55-60%.  The aortic root at left coronary cusp again appears thickened, echogenic,  consisitent with small left cusp hematoma, no significant change compared to  prior studies.  There is moderate (2+) mitral regurgitation.  The study was technically limited.    Last ischemic eval: coronary angiogram 7/18/19    Mid LM to Ost LAD lesion is 10% stenosed.    Ost 2nd Diag to 2nd Diag lesion is 80% stenosed.    Mid LAD lesion is 90% stenosed.    Prox Cx to Mid Cx lesion is 95% stenosed.    Dist Cx lesion is 50% stenosed.    Mid Cx lesion is 50% stenosed.    Ost RCA lesion is 60% stenosed.    Dist RCA lesion is 90% stenosed.    Device: PPM, most recent device interrogation 5/6/19  [employer] (D) Remote PPM Device CheckAP: 1%: 99%Mode: DDDPresenting Rhythm: AS/VPHeart Rate: Adequate heart rates per histogram Sensing: RA: stable RV: not preformed Pacing Threshold: stableImpedance: stable Battery Status: 10.5 years remaining Atrial Arrhythmia: 34 mode switch episodes comprising <1% of the time. 18 EGMs available show As>Vs for PAT/PAF, longest episode lasting 1 minute 36 seconds, over all ventricular rates controlled.  Taking Eliquis. Ventricular Arrhythmia: none Care Plan: FU merlin q 3 months. Left message with results and next transmission date. [employer]ana CVT/Mark CVTI have reviewed and interpreted the device interrogation, settings, programming and nurse's summary. The device is functioning within normal device parameters. I agree with the  current findings, assessment and plan.

## 2019-07-24 NOTE — PROVIDER NOTIFICATION
MD Notification    Notified Person: MD    Notified Person Name: Dr. Ramsay    Notification Date/Time: 0900 7/24/2019    Notification Interaction: Web page and phone call    Purpose of Notification: Loss of IV access    Orders Received: No need for new IV    Comments:

## 2019-07-24 NOTE — DISCHARGE SUMMARY
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  Transferred to [facility] on 7/17/2019     NSTEMI  Coronary artery disease  Left main/aortic dissection resulting in intramural hematoma in the aortic root status post PCI  Cardiomyopathy/acute on chronic CHF with reduced LVEF    -Patient initially presented to [facility] on 7/16/2019 with chest pain going on for about a week with burning central chest pain with radiation to her neck and jaw, lasting 15 to 60 minutes. Troponin on arrival was 0.990 and peaked to 23.90 then down trended. EKG showed paced rhythm. Patient was started on heparin drip, aspirin and continued on PTA carvedilol 25 mg twice daily, lisinopril 40 mg daily and atorvastatin 20 mg at bedtime  -She underwent coronary angiogram on 7/17/2019 which showed severe three-vessel coronary artery disease. TTE showed EF 35-40%, with severe hypokinesis to akinesis of the mid to basal inferolateral and anterolateral walls, presumed ischemic.    -Patient transferred to [facility]  hospital to get an opinion from cardiovascular surgery, and deemed not a candidate for for CABG per CV surgery.  -Then underwent successful PCI of mid LAD with BK 7/18 but complicated with LAD dissection with retrograde extension into the left main and left coronary cusp and the aortic hematoma. Successfully treated by placement of BK in the left main proximal to the distal left main bifurcation. Additional stent placed in the proximal LAD.  -now on ASA 81 mg daily and Plavix 75 mg p.o. daily.  -Managed by cardiology post procedure, on nitroglycerin drip for tight BP control.  On BB and ACEI.  -PRN oxygen, morphine available  -On telemetry, paced rhythm.   Blood pressure was tightly controlled while she was in the hospital initially with IV nitroglycerin drip and was weaned off successfully.  Her blood pressure has been good control with current oral medications.  We have a serial echocardiogram done to see the stability of aortic hematoma and if remains stable.  Etiologies were falling her very closely, She is on aspirin and Plavix, cardiology recommended to keep holding her apixaban at this time because of the aortic hematoma.  She will be evaluated by the cardiology as an outpatient to determine when to put her back on anticoagulation.  She is euvolemic at this time.  And her EF improved to 50 to 55% from initial 30 to 40% EF.       Paroxysmal atrial fibrillation on apixaban  Second degree AV block s/p pacemaker implantation (5/2016)  5/2019 cardiac device check demonstrated normal functioning within device parameters. Did have brief episodes of asymptomatic PAF, longest episode lasting 1 minute 36 seconds, over all ventricular rates controlled.  Follows with Dr. Goel.  -TTE prior to transfer showed possible mass on right atrium, however CT scan shows quite amount of shadowing from pacemakers leads so cardiology believes it is probably her pacer leads.  - PTA Apixaban was transitioned to heparin given ACS, now  post PCI, on aspirin Plavix.  Defer anticoagulation to cardiology.   Anticoagulation is on hold at this time because of aortic hematoma.  CT chest with contrast showed no convincing evidence of right atrial mass.     HTN  -Resumed PTA lisinopril 40 mg daily, carvedilol 25 mg twice daily and amlodipine 5 mg daily with holding parameters  -On nitro drip post PCI for more strict BP control given coronary dissection and aortic hematoma.  Now weaned off  Now weaned off the nitroglycerin drip as the blood pressure is in good control and in fact on the lower side at this time.     HLD  -Resumed PTA atorvastatin 20 mg at bedtime     Prior CVA x 2 with residual left-sided weakness  Patient reports she had a stroke when she was 50 years old and another when she was 75.  Regarding mobility status, baseline is that she can use of cane at home, and when out with family uses a wheelchair.  Able to move her left arm and leg, but has significant weakness throughout the entire left side and difficulty grasping things with the left arm.  Prior to admission on apixaban for CVA prevention.  -Holding PTA apixaban and now on ASA and Plavix.   -Resumed PTA atorvastatin 20 mg at bedtime  Cardiology will decide when to put her back on apixaban as an outpatient basis.           Constipation  Started on MiraLAX 17 g twice daily  Patient resolved with that.     At this time the patient is doing really well, she denies any chest pain shortness of breath orthopnea PND palpitation no headache dizziness lightheadedness.  She is up and about and walking with the use of a cane without any problem.  She will be discharged home in a stable condition at this time.  Continue aspirin Plavix at this time.  And all other antihypertensive medication  Follow-up with PCP in 1 week  Follow with the cardiology as scheduled  Keep holding the apixaban at this time, cardiology will decide when to restart her back on anticoagulation as an outpatient  basis.    Patient discharged home with home health in a stable condition today.     Alex Ramsay MD    Significant Results and Procedures   Please refer to left heart catheterization and PCI reports    Pending Results   These results will be followed up by PCP  Unresulted Labs Ordered in the Past 30 Days of this Admission     No orders found from 6/17/2019 to 7/18/2019.          Code Status   DNR / DNI       Primary Care Physician   Brennen Collier    Physical Exam   Temp: 98.6  F (37  C) Temp src: Oral BP: 119/64 Pulse: 88 Heart Rate: 82 Resp: 16 SpO2: 95 % O2 Device: None (Room air)    Vitals:    07/22/19 0126 07/23/19 0607 07/24/19 0500   Weight: 50.6 kg (111 lb 9.6 oz) 50.6 kg (111 lb 9.6 oz) 50.9 kg (112 lb 4.8 oz)     Vital Signs with Ranges  Temp:  [98.6  F (37  C)-99  F (37.2  C)] 98.6  F (37  C)  Pulse:  [88] 88  Heart Rate:  [82-90] 82  Resp:  [16-18] 16  BP: (109-148)/(54-66) 119/64  SpO2:  [95 %-98 %] 95 %  I/O last 3 completed shifts:  In: -   Out: 100 [Urine:100]    Constitutional: awake, alert, cooperative, no apparent distress, and appears stated age  Eyes: Lids and lashes normal, pupils equal, round and reactive to light, extra ocular muscles intact, sclera clear, conjunctiva normal  Respiratory: No increased work of breathing, good air exchange, clear to auscultation bilaterally, no crackles or wheezing  Cardiovascular: Normal apical impulse, regular rate and rhythm, normal S1 and S2, no S3 or S4, and no murmur noted  GI: No scars, normal bowel sounds, soft, non-distended, non-tender, no masses palpated, no hepatosplenomegally  Skin: no bruising or bleeding  Musculoskeletal: no lower extremity pitting edema present  Neurologic: No focal deficit    Discharge Disposition   Discharged to home with home health  Condition at discharge: Stable    Consultations This Hospital Stay   CARDIOLOGY IP CONSULT  PHARMACY TO DOSE HEPARIN  CARDIOVASCULAR SURGERY IP CONSULT  PHYSICAL THERAPY ADULT IP  CONSULT  NUTRITION SERVICES ADULT IP CONSULT  CARDIAC REHAB IP CONSULT  PHARMACY IP CONSULT  PHARMACY IP CONSULT  SOCIAL WORK IP CONSULT  CARE TRANSITION RN/SW IP CONSULT  SMOKING CESSATION PROGRAM IP CONSULT    Time Spent on this Encounter   IAlex, personally saw the patient today and spent greater than 30 minutes discharging this patient.    Discharge Orders      Basic metabolic panel     CBC with platelets    Last Lab Result: Hemoglobin (g/dL)       Date                     Value                 07/23/2019               9.7 (L)          ----------     CARDIAC REHAB REFERRAL      Discharge Order: F/U with Cardiac  [facility]      Follow-Up with Cardiologist      Home care nursing referral      Home Care PT Referral for Hospital Discharge      Home Care OT Referral for Hospital Discharge      Reason for your hospital stay    NSTEMI     Follow-up and recommended labs and tests     Follow up with primary care provider, Brennen Collier, within 7 days for hospital follow- up.  The following labs/tests are recommended: CBC, BMP.  Please call to schedule this appointment: [phone]     Activity    Your activity upon discharge: activity as tolerated     MD face to face encounter    Documentation of Face to Face and Certification for Home Health Services    I certify that patient: Yoselyn Cui is under my care and that I, or a nurse practitioner or physician's assistant working with me, had a face-to-face encounter that meets the physician face-to-face encounter requirements with this patient on: 7/24/2019.    This encounter with the patient was in whole, or in part, for the following medical condition, which is the primary reason for home health care: Patient Active Problem List: NSTEMI, ACS, LAD and aorta dissection and hematoma. Deconditioning.        AV block, 2nd degree     SSS (sick sinus syndrome) (H)     HTN (hypertension)     Cerebrovascular accident (H)     COKER (dyspnea on exertion)     Mixed  hyperlipidemia     History of colonic polyps     Melanoma of skin (H)-rt arm     Generalized weakness     Community acquired pneumonia     Anemia     ACS (acute coronary syndrome) (H)     CAD (coronary artery disease)    .    I certify that, based on my findings, the following services are medically necessary home health services: Nursing, Occupational Therapy and Physical Therapy.    My clinical findings support the need for the above services because: Nurse is needed: To assess medical condtion after changes in medications or other medical regimen. and To teach and train about the disease and treatments for CHF, NSTEMI illness, because of patient age and comorbidites, Occupational Therapy Services are needed to assess and treat cognitive ability and address ADL safety due to impairment because of decondition and illness. and Physical Therapy Services are needed to assess and treat the following functional impairments:  because of decondition and illness.  As per social service, PT and OT recommendations.     Further, I certify that my clinical findings support that this patient is homebound (i.e. absences from home require considerable and taxing effort and are for medical reasons or Christian services or infrequently or of short duration when for other reasons) because: Requires assistance of another person or specialized equipment to access medical services because patient: Requires supervision of another for safe transfer...    Based on the above findings. I certify that this patient is confined to the home and needs intermittent skilled nursing care, physical therapy and/or speech therapy.  The patient is under my care, and I have initiated the establishment of the plan of care.  This patient will be followed by a physician who will periodically review the plan of care.  Physician/Provider to provide follow up care: Brennen Collier    Attending hospital physician (the Medicare certified [facility] provider): Alex  GILBERTO Ramsay  Physician Signature: See electronic signature associated with these discharge orders.  Date: 7/24/2019     DNR/DNI     Diet    Follow this diet upon discharge: Orders Placed This Encounter      Snacks/Supplements Adult: Boost Plus; Between Meals      Regular Diet Adult     Discharge Medications   Current Discharge Medication List      START taking these medications    Details   aspirin (ASA) 81 MG EC tablet Take 1 tablet (81 mg) by mouth daily  Qty: 60 tablet, Refills: 0    Comments: Further refill from her PCP  Associated Diagnoses: Coronary artery disease involving native coronary artery of native heart without angina pectoris      clopidogrel (PLAVIX) 75 MG tablet Take 1 tablet (75 mg) by mouth daily  Qty: 90 tablet, Refills: 0    Comments: Further refill from cardiology or PCP  Associated Diagnoses: Coronary artery disease involving native coronary artery of native heart without angina pectoris      nitroGLYcerin (NITROSTAT) 0.4 MG sublingual tablet For chest pain place 1 tablet under the tongue every 5 minutes for 3 doses. If symptoms persist 5 minutes after 1st dose call 911.  Qty: 30 tablet, Refills: 0    Associated Diagnoses: Coronary artery disease involving native coronary artery of native heart without angina pectoris         CONTINUE these medications which have CHANGED    Details   carvedilol (COREG) 12.5 MG tablet Take 1 tablet (12.5 mg) by mouth 2 times daily (with meals)  Qty: 60 tablet, Refills: 0    Comments: Further refill from her PCP  Associated Diagnoses: Coronary artery disease involving native coronary artery of native heart without angina pectoris         CONTINUE these medications which have NOT CHANGED    Details   acetaminophen (TYLENOL) 325 MG tablet Take 650 mg by mouth every 6 hours as needed for mild pain       amLODIPine (NORVASC) 5 MG tablet Take 5 mg by mouth daily       atorvastatin (LIPITOR) 20 MG tablet Take 20 mg by mouth At Bedtime      ferrous sulfate (IRON) 325 (65  FE) MG tablet Take 325 mg by mouth daily (with breakfast)       fluticasone furoate (ARNUITY ELLIPTA) 200 MCG/ACT inhalation powder Inhale 1 puff into the lungs daily  Qty: 3 Inhaler, Refills: 0    Associated Diagnoses: Acute bronchospasm; Pneumonia of right lung due to infectious organism, unspecified part of lung      lisinopril (PRINIVIL,ZESTRIL) 40 MG tablet Take 40 mg by mouth daily      Multiple Vitamins-Minerals (ICAPS AREDS FORMULA) TABS Take 1 tablet by mouth daily       OMEPRAZOLE PO Take 20 mg by mouth every morning       tolterodine (DETROL LA) 4 MG 24 hr capsule Take 4 mg by mouth every morning         STOP taking these medications       apixaban ANTICOAGULANT (ELIQUIS) 2.5 MG tablet Comments:   Reason for Stopping:             Allergies   No Known Allergies  Data   Most Recent 3 CBC's:  Recent Labs   Lab Test 07/23/19  0538 07/21/19  0542 07/20/19  0535   WBC 9.9 10.6 9.3   HGB 9.7* 10.4* 10.4*   MCV 89 89 92    160 142*      Most Recent 3 BMP's:  Recent Labs   Lab Test 07/23/19  0538 07/21/19  0542 07/20/19  0535    140 141   POTASSIUM 3.4 3.6 3.8   CHLORIDE 108 108 111*   CO2 28 24 27   BUN 29 19 19   CR 0.90 0.71 0.64   ANIONGAP 6 8 3   GOLDY 7.9* 8.0* 7.9*   * 103* 80     Most Recent 2 LFT's:  Recent Labs   Lab Test 11/19/18  1216 03/02/18  0948   AST 22 18   ALT 19 17   ALKPHOS 80 110   BILITOTAL 0.3 0.5     Most Recent INR's and Anticoagulation Dosing History:  Anticoagulation Dose History     Recent Dosing and Labs Latest Ref Rng & Units 6/10/2007 5/16/2016 5/17/2016 10/12/2016 11/19/2018 7/18/2019    INR 0.86 - 1.14 1.03 1.10 1.06 1.16(H) 1.31(H) Canceled, Test credited, specimen discarded         Most Recent 3 Troponin's:  Recent Labs   Lab Test 07/19/19  1854 07/19/19  1412 07/19/19  1032   TROPI 2.940* 3.113* 3.339*     Most Recent Cholesterol Panel:  Recent Labs   Lab Test 07/18/19  1615   CHOL Canceled, Test credited   LDL Canceled, Test credited   HDL Canceled, Test  credited   TRIG Canceled, Test credited     Most Recent 6 Bacteria Isolates From Any Culture (See EPIC Reports for Culture Details):  Recent Labs   Lab Test 03/02/18  1140 03/02/18  0948 03/02/18  0940 10/12/16  1425 10/12/16  1405 10/12/16  1300   CULT No growth No growth No growth No growth No growth No growth     Most Recent TSH, T4 and A1c Labs:  Recent Labs   Lab Test 10/12/16  1300   TSH 1.00     Results for orders placed or performed during the hospital encounter of 07/17/19   XR Chest Port 1 View    Narrative    CHEST PORTABLE ONE VIEW July 22, 2019 10:30 AM     HISTORY: Shortness of breath on exertion, crackles at the bases.    COMPARISON: Chest x-ray 7/16/2019.      Impression    IMPRESSION: Stable cardiac silhouette and left chest pacemaker.  Ill-defined focal new opacity at the right costophrenic angle. This  could be atelectasis or small developing airspace disease. Mild  atelectasis at the left costophrenic angle as well. Mild bilateral  vascular and interstitial prominence again identified, stable.    ROSA PEREZ MD     Most Recent 3 CBC's:  Recent Labs   Lab Test 07/23/19  0538 07/21/19  0542 07/20/19  0535   WBC 9.9 10.6 9.3   HGB 9.7* 10.4* 10.4*   MCV 89 89 92    160 142*     Most Recent 3 BMP's:  Recent Labs   Lab Test 07/23/19  0538 07/21/19  0542 07/20/19  0535    140 141   POTASSIUM 3.4 3.6 3.8   CHLORIDE 108 108 111*   CO2 28 24 27   BUN 29 19 19   CR 0.90 0.71 0.64   ANIONGAP 6 8 3   GOLDY 7.9* 8.0* 7.9*   * 103* 80

## 2019-07-25 ENCOUNTER — CARE COORDINATION (OUTPATIENT)
Dept: CARDIOLOGY | Facility: CLINIC | Age: 84
End: 2019-07-25

## 2019-07-25 NOTE — PROGRESS NOTES
Patient was evaluated by cardiology while inpatient for Non-Q-wave myocardial infarct, PCI/BK stent to left main/LAD. Developed an aorta dissection. Called patient to discuss any post hospital d/c questions, review medication changes, and confirm f/u appts. RN confirmed patient was d/c with the antiplatelet Plavix. Pt has an Rx for PRN SL Nitroglycerin. Patient denied any questions regarding new medications or changes to PTA medications. Patient denied any chest pain, fever or light headedness. Is having some exertional SOB but resolves with rest. Advised pt to seek medical help for increase in frequency or duration. Right groin cardiac cath site is without bleeding, swelling, redness or tenderness. RN confirmed patient has an apt scheduled on 8/2/19 with [facility]. Cardiac rehab is ordered but not yet scheduled. Patient advised to call clinic with any cardiac related questions or concerns prior to this [facility]. Patient verbalized understanding and agreed with plan.

## 2019-07-26 NOTE — PROGRESS NOTES
LATE ENTRY  [facility]  Spoke with daughter Maryjane to discuss plans for HC. Patient to be discharged home 7/24/19 and family has agreed to have [facility] follow with Skilled Nursing, PT, OT. Patient care support center processing referral. Patient verbalized understanding that initial visit is scheduled for 7/25 or 7/26. Patient has 24 hour phone number for [facility] for any questions or concerns.

## 2019-08-04 ENCOUNTER — HOSPITAL ENCOUNTER (INPATIENT)
Facility: CLINIC | Age: 84
LOS: 4 days | Discharge: HOME HEALTH | DRG: 871 | End: 2019-08-08
Attending: EMERGENCY MEDICINE | Admitting: INTERNAL MEDICINE
Payer: MEDICARE

## 2019-08-04 ENCOUNTER — APPOINTMENT (OUTPATIENT)
Dept: CT IMAGING | Facility: CLINIC | Age: 84
DRG: 871 | End: 2019-08-04
Attending: EMERGENCY MEDICINE
Payer: MEDICARE

## 2019-08-04 ENCOUNTER — APPOINTMENT (OUTPATIENT)
Dept: GENERAL RADIOLOGY | Facility: CLINIC | Age: 84
DRG: 871 | End: 2019-08-04
Attending: EMERGENCY MEDICINE
Payer: MEDICARE

## 2019-08-04 DIAGNOSIS — I50.9 ACUTE ON CHRONIC CONGESTIVE HEART FAILURE, UNSPECIFIED HEART FAILURE TYPE (H): ICD-10-CM

## 2019-08-04 DIAGNOSIS — A41.9 SEPSIS, DUE TO UNSPECIFIED ORGANISM: ICD-10-CM

## 2019-08-04 DIAGNOSIS — A41.51 SEPSIS DUE TO ESCHERICHIA COLI (H): Primary | ICD-10-CM

## 2019-08-04 DIAGNOSIS — N39.0 URINARY TRACT INFECTION WITHOUT HEMATURIA, SITE UNSPECIFIED: ICD-10-CM

## 2019-08-04 DIAGNOSIS — R19.5 FECAL OCCULT BLOOD TEST POSITIVE: ICD-10-CM

## 2019-08-04 DIAGNOSIS — R09.02 HYPOXIA: ICD-10-CM

## 2019-08-04 LAB
ALBUMIN SERPL-MCNC: 2.5 G/DL (ref 3.4–5)
ALBUMIN UR-MCNC: 30 MG/DL
ALP SERPL-CCNC: 89 U/L (ref 40–150)
ALT SERPL W P-5'-P-CCNC: 22 U/L (ref 0–50)
ANION GAP SERPL CALCULATED.3IONS-SCNC: 10 MMOL/L (ref 3–14)
APPEARANCE UR: ABNORMAL
AST SERPL W P-5'-P-CCNC: 21 U/L (ref 0–45)
BACTERIA #/AREA URNS HPF: ABNORMAL /HPF
BASOPHILS # BLD AUTO: 0.1 10E9/L (ref 0–0.2)
BASOPHILS NFR BLD AUTO: 0.3 %
BILIRUB SERPL-MCNC: 0.5 MG/DL (ref 0.2–1.3)
BILIRUB UR QL STRIP: NEGATIVE
BUN SERPL-MCNC: 19 MG/DL (ref 7–30)
CALCIUM SERPL-MCNC: 7.9 MG/DL (ref 8.5–10.1)
CHLORIDE SERPL-SCNC: 107 MMOL/L (ref 94–109)
CO2 BLDCOV-SCNC: 20 MMOL/L (ref 21–28)
CO2 SERPL-SCNC: 21 MMOL/L (ref 20–32)
COLOR UR AUTO: YELLOW
CREAT BLD-MCNC: 0.8 MG/DL (ref 0.52–1.04)
CREAT SERPL-MCNC: 0.75 MG/DL (ref 0.52–1.04)
D DIMER PPP FEU-MCNC: 3.8 UG/ML FEU (ref 0–0.5)
DIFFERENTIAL METHOD BLD: ABNORMAL
EOSINOPHIL # BLD AUTO: 0 10E9/L (ref 0–0.7)
EOSINOPHIL NFR BLD AUTO: 0.2 %
ERYTHROCYTE [DISTWIDTH] IN BLOOD BY AUTOMATED COUNT: 14.9 % (ref 10–15)
GFR SERPL CREATININE-BSD FRML MDRD: 68 ML/MIN/{1.73_M2}
GFR SERPL CREATININE-BSD FRML MDRD: 71 ML/MIN/{1.73_M2}
GLUCOSE SERPL-MCNC: 101 MG/DL (ref 70–99)
GLUCOSE UR STRIP-MCNC: NEGATIVE MG/DL
HCT VFR BLD AUTO: 30 % (ref 35–47)
HEMOCCULT STL QL: POSITIVE
HGB BLD-MCNC: 8.4 G/DL (ref 11.7–15.7)
HGB BLD-MCNC: 9.2 G/DL (ref 11.7–15.7)
HGB BLD-MCNC: 9.4 G/DL (ref 11.7–15.7)
HGB UR QL STRIP: ABNORMAL
IMM GRANULOCYTES # BLD: 0.2 10E9/L (ref 0–0.4)
IMM GRANULOCYTES NFR BLD: 0.8 %
INR PPP: 1.27 (ref 0.86–1.14)
KETONES UR STRIP-MCNC: NEGATIVE MG/DL
LACTATE BLD-SCNC: 0.8 MMOL/L (ref 0.7–2)
LACTATE BLD-SCNC: 1.8 MMOL/L (ref 0.7–2.1)
LACTATE BLD-SCNC: NORMAL MMOL/L (ref 0.7–2)
LEUKOCYTE ESTERASE UR QL STRIP: ABNORMAL
LYMPHOCYTES # BLD AUTO: 1.1 10E9/L (ref 0.8–5.3)
LYMPHOCYTES NFR BLD AUTO: 6 %
MCH RBC QN AUTO: 28.8 PG (ref 26.5–33)
MCHC RBC AUTO-ENTMCNC: 30.7 G/DL (ref 31.5–36.5)
MCV RBC AUTO: 94 FL (ref 78–100)
MONOCYTES # BLD AUTO: 0.5 10E9/L (ref 0–1.3)
MONOCYTES NFR BLD AUTO: 2.9 %
MUCOUS THREADS #/AREA URNS LPF: PRESENT /LPF
NEUTROPHILS # BLD AUTO: 16.5 10E9/L (ref 1.6–8.3)
NEUTROPHILS NFR BLD AUTO: 89.8 %
NITRATE UR QL: POSITIVE
NRBC # BLD AUTO: 0 10*3/UL
NRBC BLD AUTO-RTO: 0 /100
NT-PROBNP SERPL-MCNC: 3037 PG/ML (ref 0–1800)
PCO2 BLDV: 29 MM HG (ref 40–50)
PH BLDV: 7.43 PH (ref 7.32–7.43)
PH UR STRIP: 5.5 PH (ref 5–7)
PLATELET # BLD AUTO: 388 10E9/L (ref 150–450)
PO2 BLDV: 37 MM HG (ref 25–47)
POTASSIUM SERPL-SCNC: 4.3 MMOL/L (ref 3.4–5.3)
PROCALCITONIN SERPL-MCNC: 0.34 NG/ML
PROT SERPL-MCNC: 6.4 G/DL (ref 6.8–8.8)
RBC # BLD AUTO: 3.2 10E12/L (ref 3.8–5.2)
RBC #/AREA URNS AUTO: 9 /HPF (ref 0–2)
SAO2 % BLDV FROM PO2: 74 %
SODIUM SERPL-SCNC: 138 MMOL/L (ref 133–144)
SOURCE: ABNORMAL
SP GR UR STRIP: 1.02 (ref 1–1.03)
SQUAMOUS #/AREA URNS AUTO: <1 /HPF (ref 0–1)
TROPONIN I SERPL-MCNC: 0.03 UG/L (ref 0–0.04)
UROBILINOGEN UR STRIP-MCNC: NORMAL MG/DL (ref 0–2)
WBC # BLD AUTO: 18.3 10E9/L (ref 4–11)
WBC #/AREA URNS AUTO: >182 /HPF (ref 0–5)
WBC CLUMPS #/AREA URNS HPF: PRESENT /HPF

## 2019-08-04 PROCEDURE — 96365 THER/PROPH/DIAG IV INF INIT: CPT | Mod: 59

## 2019-08-04 PROCEDURE — 87086 URINE CULTURE/COLONY COUNT: CPT | Performed by: EMERGENCY MEDICINE

## 2019-08-04 PROCEDURE — 25000128 H RX IP 250 OP 636: Performed by: INTERNAL MEDICINE

## 2019-08-04 PROCEDURE — 96366 THER/PROPH/DIAG IV INF ADDON: CPT

## 2019-08-04 PROCEDURE — 94640 AIRWAY INHALATION TREATMENT: CPT

## 2019-08-04 PROCEDURE — 84145 PROCALCITONIN (PCT): CPT | Performed by: EMERGENCY MEDICINE

## 2019-08-04 PROCEDURE — 83605 ASSAY OF LACTIC ACID: CPT

## 2019-08-04 PROCEDURE — 82803 BLOOD GASES ANY COMBINATION: CPT

## 2019-08-04 PROCEDURE — 99207 ZZC CDG-HISTORY COMP: MEETS EXP. PROB FOCUSED- DOWN CODED LACK OF PFSH: CPT | Performed by: INTERNAL MEDICINE

## 2019-08-04 PROCEDURE — 25000132 ZZH RX MED GY IP 250 OP 250 PS 637: Performed by: INTERNAL MEDICINE

## 2019-08-04 PROCEDURE — 82565 ASSAY OF CREATININE: CPT

## 2019-08-04 PROCEDURE — 99207 ZZC MOONLIGHTING INDICATOR: CPT | Performed by: INTERNAL MEDICINE

## 2019-08-04 PROCEDURE — 84484 ASSAY OF TROPONIN QUANT: CPT | Performed by: EMERGENCY MEDICINE

## 2019-08-04 PROCEDURE — 87077 CULTURE AEROBIC IDENTIFY: CPT | Performed by: EMERGENCY MEDICINE

## 2019-08-04 PROCEDURE — 99221 1ST HOSP IP/OBS SF/LOW 40: CPT | Performed by: INTERNAL MEDICINE

## 2019-08-04 PROCEDURE — 71045 X-RAY EXAM CHEST 1 VIEW: CPT

## 2019-08-04 PROCEDURE — 40000274 ZZH STATISTIC RCP CONSULT EA 30 MIN

## 2019-08-04 PROCEDURE — 87186 SC STD MICRODIL/AGAR DIL: CPT | Performed by: EMERGENCY MEDICINE

## 2019-08-04 PROCEDURE — 36415 COLL VENOUS BLD VENIPUNCTURE: CPT | Performed by: INTERNAL MEDICINE

## 2019-08-04 PROCEDURE — 87088 URINE BACTERIA CULTURE: CPT | Performed by: EMERGENCY MEDICINE

## 2019-08-04 PROCEDURE — 83605 ASSAY OF LACTIC ACID: CPT | Performed by: INTERNAL MEDICINE

## 2019-08-04 PROCEDURE — 36415 COLL VENOUS BLD VENIPUNCTURE: CPT | Performed by: EMERGENCY MEDICINE

## 2019-08-04 PROCEDURE — 85025 COMPLETE CBC W/AUTO DIFF WBC: CPT | Performed by: EMERGENCY MEDICINE

## 2019-08-04 PROCEDURE — 93005 ELECTROCARDIOGRAM TRACING: CPT

## 2019-08-04 PROCEDURE — 87800 DETECT AGNT MULT DNA DIREC: CPT | Performed by: EMERGENCY MEDICINE

## 2019-08-04 PROCEDURE — 87040 BLOOD CULTURE FOR BACTERIA: CPT | Performed by: EMERGENCY MEDICINE

## 2019-08-04 PROCEDURE — 99285 EMERGENCY DEPT VISIT HI MDM: CPT | Mod: 25

## 2019-08-04 PROCEDURE — 25000128 H RX IP 250 OP 636: Performed by: EMERGENCY MEDICINE

## 2019-08-04 PROCEDURE — 71260 CT THORAX DX C+: CPT

## 2019-08-04 PROCEDURE — 82272 OCCULT BLD FECES 1-3 TESTS: CPT | Performed by: EMERGENCY MEDICINE

## 2019-08-04 PROCEDURE — C9113 INJ PANTOPRAZOLE SODIUM, VIA: HCPCS | Performed by: INTERNAL MEDICINE

## 2019-08-04 PROCEDURE — 81001 URINALYSIS AUTO W/SCOPE: CPT | Performed by: EMERGENCY MEDICINE

## 2019-08-04 PROCEDURE — 80053 COMPREHEN METABOLIC PANEL: CPT | Performed by: EMERGENCY MEDICINE

## 2019-08-04 PROCEDURE — 25000125 ZZHC RX 250: Performed by: INTERNAL MEDICINE

## 2019-08-04 PROCEDURE — 25000132 ZZH RX MED GY IP 250 OP 250 PS 637: Performed by: EMERGENCY MEDICINE

## 2019-08-04 PROCEDURE — 40000275 ZZH STATISTIC RCP TIME EA 10 MIN

## 2019-08-04 PROCEDURE — 85379 FIBRIN DEGRADATION QUANT: CPT | Performed by: EMERGENCY MEDICINE

## 2019-08-04 PROCEDURE — 85018 HEMOGLOBIN: CPT | Performed by: INTERNAL MEDICINE

## 2019-08-04 PROCEDURE — 25000132 ZZH RX MED GY IP 250 OP 250 PS 637

## 2019-08-04 PROCEDURE — 83880 ASSAY OF NATRIURETIC PEPTIDE: CPT | Performed by: EMERGENCY MEDICINE

## 2019-08-04 PROCEDURE — 12000000 ZZH R&B MED SURG/OB

## 2019-08-04 PROCEDURE — 85610 PROTHROMBIN TIME: CPT | Performed by: EMERGENCY MEDICINE

## 2019-08-04 RX ORDER — AMLODIPINE BESYLATE 5 MG/1
5 TABLET ORAL DAILY
Status: DISCONTINUED | OUTPATIENT
Start: 2019-08-05 | End: 2019-08-08 | Stop reason: HOSPADM

## 2019-08-04 RX ORDER — AMOXICILLIN 250 MG
2 CAPSULE ORAL
Status: DISCONTINUED | OUTPATIENT
Start: 2019-08-04 | End: 2019-08-08 | Stop reason: HOSPADM

## 2019-08-04 RX ORDER — NALOXONE HYDROCHLORIDE 0.4 MG/ML
.1-.4 INJECTION, SOLUTION INTRAMUSCULAR; INTRAVENOUS; SUBCUTANEOUS
Status: DISCONTINUED | OUTPATIENT
Start: 2019-08-04 | End: 2019-08-08 | Stop reason: HOSPADM

## 2019-08-04 RX ORDER — NITROGLYCERIN 0.4 MG/1
0.4 TABLET SUBLINGUAL EVERY 5 MIN PRN
Status: DISCONTINUED | OUTPATIENT
Start: 2019-08-04 | End: 2019-08-08 | Stop reason: HOSPADM

## 2019-08-04 RX ORDER — ONDANSETRON 2 MG/ML
4 INJECTION INTRAMUSCULAR; INTRAVENOUS EVERY 6 HOURS PRN
Status: DISCONTINUED | OUTPATIENT
Start: 2019-08-04 | End: 2019-08-08 | Stop reason: HOSPADM

## 2019-08-04 RX ORDER — ATORVASTATIN CALCIUM 20 MG/1
20 TABLET, FILM COATED ORAL AT BEDTIME
Status: DISCONTINUED | OUTPATIENT
Start: 2019-08-04 | End: 2019-08-08 | Stop reason: HOSPADM

## 2019-08-04 RX ORDER — FERROUS SULFATE 325(65) MG
325 TABLET ORAL 2 TIMES DAILY
Status: DISCONTINUED | OUTPATIENT
Start: 2019-08-04 | End: 2019-08-08 | Stop reason: HOSPADM

## 2019-08-04 RX ORDER — LIDOCAINE 40 MG/G
CREAM TOPICAL
Status: DISCONTINUED | OUTPATIENT
Start: 2019-08-04 | End: 2019-08-08 | Stop reason: HOSPADM

## 2019-08-04 RX ORDER — IOPAMIDOL 755 MG/ML
500 INJECTION, SOLUTION INTRAVASCULAR ONCE
Status: COMPLETED | OUTPATIENT
Start: 2019-08-04 | End: 2019-08-04

## 2019-08-04 RX ORDER — IPRATROPIUM BROMIDE AND ALBUTEROL SULFATE 2.5; .5 MG/3ML; MG/3ML
3 SOLUTION RESPIRATORY (INHALATION)
Status: DISCONTINUED | OUTPATIENT
Start: 2019-08-04 | End: 2019-08-08 | Stop reason: HOSPADM

## 2019-08-04 RX ORDER — AMOXICILLIN 250 MG
1 CAPSULE ORAL
Status: DISCONTINUED | OUTPATIENT
Start: 2019-08-04 | End: 2019-08-08 | Stop reason: HOSPADM

## 2019-08-04 RX ORDER — TOLTERODINE 4 MG/1
4 CAPSULE, EXTENDED RELEASE ORAL EVERY MORNING
Status: DISCONTINUED | OUTPATIENT
Start: 2019-08-05 | End: 2019-08-08 | Stop reason: HOSPADM

## 2019-08-04 RX ORDER — ACETAMINOPHEN 325 MG/1
650 TABLET ORAL ONCE
Status: COMPLETED | OUTPATIENT
Start: 2019-08-04 | End: 2019-08-04

## 2019-08-04 RX ORDER — VANCOMYCIN HYDROCHLORIDE 1 G/200ML
1000 INJECTION, SOLUTION INTRAVENOUS ONCE
Status: COMPLETED | OUTPATIENT
Start: 2019-08-04 | End: 2019-08-04

## 2019-08-04 RX ORDER — ACETAMINOPHEN 325 MG/1
650 TABLET ORAL EVERY 4 HOURS PRN
Status: DISCONTINUED | OUTPATIENT
Start: 2019-08-04 | End: 2019-08-08 | Stop reason: HOSPADM

## 2019-08-04 RX ORDER — CEFEPIME HYDROCHLORIDE 1 G/1
1 INJECTION, POWDER, FOR SOLUTION INTRAMUSCULAR; INTRAVENOUS ONCE
Status: COMPLETED | OUTPATIENT
Start: 2019-08-04 | End: 2019-08-04

## 2019-08-04 RX ORDER — VANCOMYCIN HYDROCHLORIDE 1 G/200ML
1000 INJECTION, SOLUTION INTRAVENOUS EVERY 24 HOURS
Status: DISCONTINUED | OUTPATIENT
Start: 2019-08-05 | End: 2019-08-05

## 2019-08-04 RX ORDER — IBUPROFEN 200 MG
400 TABLET ORAL ONCE
Status: COMPLETED | OUTPATIENT
Start: 2019-08-04 | End: 2019-08-04

## 2019-08-04 RX ORDER — MULTIPLE VITAMINS W/ MINERALS TAB 9MG-400MCG
1 TAB ORAL DAILY
Status: DISCONTINUED | OUTPATIENT
Start: 2019-08-05 | End: 2019-08-08 | Stop reason: HOSPADM

## 2019-08-04 RX ORDER — ONDANSETRON 4 MG/1
4 TABLET, ORALLY DISINTEGRATING ORAL EVERY 6 HOURS PRN
Status: DISCONTINUED | OUTPATIENT
Start: 2019-08-04 | End: 2019-08-08 | Stop reason: HOSPADM

## 2019-08-04 RX ORDER — ASPIRIN 81 MG/1
81 TABLET, CHEWABLE ORAL ONCE
Status: COMPLETED | OUTPATIENT
Start: 2019-08-04 | End: 2019-08-04

## 2019-08-04 RX ORDER — LISINOPRIL 40 MG/1
40 TABLET ORAL DAILY
Status: DISCONTINUED | OUTPATIENT
Start: 2019-08-05 | End: 2019-08-08 | Stop reason: HOSPADM

## 2019-08-04 RX ORDER — CLOPIDOGREL BISULFATE 75 MG/1
75 TABLET ORAL DAILY
Status: DISCONTINUED | OUTPATIENT
Start: 2019-08-05 | End: 2019-08-08 | Stop reason: HOSPADM

## 2019-08-04 RX ORDER — ASPIRIN 81 MG/1
TABLET, CHEWABLE ORAL
Status: COMPLETED
Start: 2019-08-04 | End: 2019-08-04

## 2019-08-04 RX ORDER — CARVEDILOL 12.5 MG/1
12.5 TABLET ORAL 2 TIMES DAILY WITH MEALS
Status: DISCONTINUED | OUTPATIENT
Start: 2019-08-04 | End: 2019-08-08 | Stop reason: HOSPADM

## 2019-08-04 RX ORDER — IBUPROFEN 600 MG/1
600 TABLET, FILM COATED ORAL ONCE
Status: DISCONTINUED | OUTPATIENT
Start: 2019-08-04 | End: 2019-08-04

## 2019-08-04 RX ORDER — ASPIRIN 81 MG/1
81 TABLET ORAL DAILY
Status: DISCONTINUED | OUTPATIENT
Start: 2019-08-05 | End: 2019-08-08 | Stop reason: HOSPADM

## 2019-08-04 RX ADMIN — ASPIRIN 243 MG: 81 TABLET, CHEWABLE ORAL at 12:43

## 2019-08-04 RX ADMIN — IPRATROPIUM BROMIDE AND ALBUTEROL SULFATE 3 ML: .5; 3 SOLUTION RESPIRATORY (INHALATION) at 20:13

## 2019-08-04 RX ADMIN — ATORVASTATIN CALCIUM 20 MG: 20 TABLET, FILM COATED ORAL at 20:49

## 2019-08-04 RX ADMIN — SODIUM CHLORIDE 500 ML: 9 INJECTION, SOLUTION INTRAVENOUS at 20:48

## 2019-08-04 RX ADMIN — FERROUS SULFATE TAB 325 MG (65 MG ELEMENTAL FE) 325 MG: 325 (65 FE) TAB at 20:48

## 2019-08-04 RX ADMIN — VANCOMYCIN HYDROCHLORIDE 1000 MG: 1 INJECTION, SOLUTION INTRAVENOUS at 14:42

## 2019-08-04 RX ADMIN — SODIUM CHLORIDE 70 ML: 9 INJECTION, SOLUTION INTRAVENOUS at 13:14

## 2019-08-04 RX ADMIN — ACETAMINOPHEN 650 MG: 325 TABLET, FILM COATED ORAL at 12:42

## 2019-08-04 RX ADMIN — ASPIRIN 81 MG 243 MG: 81 TABLET ORAL at 12:43

## 2019-08-04 RX ADMIN — CEFEPIME HYDROCHLORIDE 1 G: 1 INJECTION, POWDER, FOR SOLUTION INTRAMUSCULAR; INTRAVENOUS at 13:59

## 2019-08-04 RX ADMIN — PANTOPRAZOLE SODIUM 40 MG: 40 INJECTION, POWDER, FOR SOLUTION INTRAVENOUS at 20:48

## 2019-08-04 RX ADMIN — IOPAMIDOL 52 ML: 755 INJECTION, SOLUTION INTRAVENOUS at 13:14

## 2019-08-04 RX ADMIN — CEFEPIME HYDROCHLORIDE 1 G: 1 INJECTION, POWDER, FOR SOLUTION INTRAMUSCULAR; INTRAVENOUS at 17:18

## 2019-08-04 RX ADMIN — IBUPROFEN 400 MG: 200 TABLET, FILM COATED ORAL at 14:02

## 2019-08-04 ASSESSMENT — ENCOUNTER SYMPTOMS
VOMITING: 0
SHORTNESS OF BREATH: 1
FEVER: 0
NAUSEA: 1
BLOOD IN STOOL: 0

## 2019-08-04 ASSESSMENT — ACTIVITIES OF DAILY LIVING (ADL)
ADLS_ACUITY_SCORE: 22
ADLS_ACUITY_SCORE: 22

## 2019-08-04 NOTE — ED PROVIDER NOTES
History     Chief Complaint:  Shortness of Breath    The history is provided by the patient and the EMS personnel.      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 History mainly provided by EMS. They state roughly half and hour prior to arrival patient reported sudden onset dyspnea on exertion when she was ambulating across the room. She also reports today feeling warmer and said she was staying by her space heater prior to arrival. She reports accompanying nausea and chest tightness. She received 4 mg Zofran x 2 and nitroglycerin x 2 on route. She also received 700 mL normal saline prior to arrival. On arrival, she is complaining primarily of dyspnea though denies any chest pain, fever, vomiting, black or bloody stools. She reports god compliance with her Plavix.      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   Negative for alcohol use.   Marital Status:   [5]     Review of Systems   Constitutional: Negative for fever.   Respiratory: Positive for shortness of breath.    Cardiovascular: Negative for chest pain.   Gastrointestinal: Positive for nausea. Negative for blood in stool and vomiting.   All other systems reviewed and are negative.      Physical  Exam   First Vitals:  Patient Vitals for the past 24 hrs:   BP Temp Temp src Pulse Heart Rate Resp SpO2 Weight   08/04/19 1348 -- 104  F (40  C) Rectal -- -- -- -- --   08/04/19 1305 -- -- -- -- -- -- 98 % --   08/04/19 1300 121/73 -- -- 120 -- -- 97 % --   08/04/19 1255 -- -- -- -- -- -- 97 % --   08/04/19 1250 -- -- -- -- -- -- 97 % --   08/04/19 1245 101/80 -- -- 120 -- -- 96 % --   08/04/19 1240 -- -- -- -- -- -- 95 % --   08/04/19 1233 105/70 102.3  F (39.1  C) Temporal 120 120 16 (!) 85 % 46.7 kg (103 lb)        Physical Exam  Nursing note and vitals reviewed.  Constitutional: Thin appearing  Eyes: Conjunctiva normal.  Pupils are equal, round, and reactive to light.   ENT: Nose normal. Mucous membranes pink and dry    Neck: Normal range of motion.  CVS: Sinus tachycardia.  Normal heart sounds.  No murmur.  Pulmonary: Lungs with bibasilar rales  GI: Abdomen soft. Nontender, nondistended. No rigidity or guarding.    MSK: No calf tenderness or swelling.  Neuro: Alert. Follows simple commands.  Skin: Skin is warm and dry. No rash noted.   Psychiatric: Normal affect.       Emergency Department Course   ECG:  Indication: shortness of breath   Time: 1237  Vent. Rate 111 bpm. UT interval 320. QRS duration 128. QT/QTc 354/481. P-R-T axis 70 269 78.    Atrial-sensed ventricular-paced rhythm with prolonged Av condition  Abnormal ECG  Read time: 1237    Imaging:  Radiographic findings were communicated with the patient who voiced understanding of the findings.    XR Chest Port 1 views:   Stable cardiac silhouette and left chest wall pacemaker.  Slightly increased prominence of ill-defined focal opacity in the  right lower lung which could represent subsegmental atelectasis versus  worsening airspace disease. Mild subsegmental atelectasis in the left  lower lung. Mild interstitial pulmonary edema has increased.  Calcification seen in the aortic knob, as per radiology.     CT Chest pulmonary embolism  w/ IV contrast:    Impression:    IMPRESSION:   1. No pulmonary embolism demonstrated.  2. No thoracic aortic aneurysm.   3. Moderate emphysema. Moderate bronchial wall thickening and mucous  plugging.            Laboratory:  Procalcitonin in process   ISTAT gases lactate marc POCT: PCO2 29 (L), Bicarbonate 20 (L)  Blood culture in process x2  D-Dimer quantitative: 3.8 (H)  Creatinine: 0.8  INR: 1.27 (H)  CBC: WBC: 18.3 (H), HGB: 9.2 (L), PLT: 388   (1240) Troponin: 0.030  CMP: Glucose 101 (H), Ca 7.9 (L), Albumin 2.5 (L), Protein total 6.4 (L), o/w WNL (Creatinine: 0.75)  BNP: 3037 (H)  UA with Microscopic: , large leukocyte, few bacteria   Urine culture in process   Fecal occult stool: positive    Interventions:  1242 Tylenol 650 mg PO  1243 Aspirin 243 mg PO    Emergency Department Course:  Nursing notes and vitals reviewed. (1231) I performed an exam of the patient as documented above.      Medicine administered as documented above.    EKG obtained in the ED, see results above.      IV inserted. Blood drawn. This was sent to the lab for further testing, results above.     The patient was sent for a chest pulmonary embolism CT and chest XR while in the emergency department, findings above.      (1333) I rechecked the patient and discussed the results of her workup thus far.      (1349) I consulted with Dr. Chappell of the hospitalist services. They are in agreement to accept the patient for admission.    (1355)  I rechecked the patient.  She reports feeling significantly improved. Patient noted to have a slight darker appearing bowel movement.  Patient admits on iron supplementation, fecal occult sent.     Findings and plan explained to the Patient and family who consents to admission. Discussed the patient with Dr. Chappell, who will admit the patient to a telemetry bed for further monitoring, evaluation, and treatment.      I personally reviewed the laboratory results with the Patient and answered all related questions prior to  admission.      Impression & Plan    Medical Decision Making:  Patient is a 87-year-old female presenting with dyspnea and chest pain.  She is hypoxic on arrival though this improved with supplemental oxygen.  She is also noted to be febrile.  Patient underwent an extensive work-up during her time in the ED. EKG without focal ischemia, shows an atrially paced rhythm.  Screening troponin negative.  I doubt ACS.   Labs concerning for sepsis today, suspicion for UTI as source.  CT completed given concern for possible PE in setting of acute hypoxia though this was fortunately negative for PE or obvious pneumonia.  There is no evidence to suggest severe sepsis or shock.  The patient's BNP is quite elevated though I will hold off on aggressive diuresis in setting of sepsis and patient clinically appears to be more euvolemic at this point in time.  She received 700 mL IV prior to her arrival.  Her vital signs improved after antipyretics.  She is still requiring supplemental oxygen though no indication for further advanced airway support at this time. Finally patient noted to have small darker appearing bowel movement per nursing.  This sample was fecal occult positive.  Patient with noted anemia, slightly downtrended from baseline though no indication for emergent transfusion and no profound active bleeding on exam.  She was accepted by hospitalist for admission.      Diagnosis:    ICD-10-CM    1. Sepsis, due to unspecified organism (H) A41.9 UA with Microscopic   2. Hypoxia R09.02    3. Acute on chronic congestive heart failure, unspecified heart failure type (H) I50.9    4. Urinary tract infection without hematuria, site unspecified N39.0    5. Fecal occult blood test positive R19.5          Disposition:  Admitted to hospital    Scribe Disclosure:  Mahdi Justice PADILLA, am serving as a scribe on 8/4/2019 at 1:50 PM to personally document services performed by Macey Hawkins DO based on my observations and the provider's  statements to me.     8/4/2019   [facility] EMERGENCY DEPARTMENT       Macey Hawkins,   08/04/19 9538

## 2019-08-04 NOTE — PHARMACY-ADMISSION MEDICATION HISTORY
Admission medication history interview status for this patient is complete. See [facility] admission navigator for allergy information, prior to admission medications and immunization status.     Medication history interview source(s):Patient  Medication history resources (including written lists, pill bottles, clinic record):None  Primary pharmacy:    Changes made to PTA medication list:  Added:   Deleted:   Changed: iron and vitamin to bid    Actions taken by pharmacist (provider contacted, etc):None     Additional medication history information:None    Medication reconciliation/reorder completed by provider prior to medication history? No    Do you take OTC medications (eg tylenol, ibuprofen, fish oil, eye/ear drops, etc)?  Y(Y/N)    For patients on insulin therapy: N (Y/N)      Prior to Admission medications    Medication Sig Last Dose Taking? Auth Provider   acetaminophen (TYLENOL) 325 MG tablet Take 650 mg by mouth every 6 hours as needed for mild pain   Yes Unknown, Entered By History   amLODIPine (NORVASC) 5 MG tablet Take 5 mg by mouth daily  8/4/2019 at Unknown time Yes Reported, Patient   aspirin (ASA) 81 MG EC tablet Take 1 tablet (81 mg) by mouth daily 8/4/2019 at Unknown time Yes Alex Ramsay MD   atorvastatin (LIPITOR) 20 MG tablet Take 20 mg by mouth At Bedtime 8/3/2019 at Unknown time Yes Unknown, Entered By History   carvedilol (COREG) 12.5 MG tablet Take 1 tablet (12.5 mg) by mouth 2 times daily (with meals) 8/4/2019 at Unknown time Yes Alex Ramsay MD   clopidogrel (PLAVIX) 75 MG tablet Take 1 tablet (75 mg) by mouth daily 8/4/2019 at Unknown time Yes Alex Ramsay MD   ferrous sulfate (IRON) 325 (65 FE) MG tablet Take 325 mg by mouth 2 times daily  8/4/2019 at Unknown time Yes Unknown, Entered By History   fluticasone furoate (ARNUITY ELLIPTA) 200 MCG/ACT inhalation powder Inhale 1 puff into the lungs daily 8/4/2019 at Unknown time Yes Ariel Bahena MD   lisinopril  (PRINIVIL,ZESTRIL) 40 MG tablet Take 40 mg by mouth daily 8/4/2019 at Unknown time Yes Reported, Patient   Multiple Vitamins-Minerals (ICAPS AREDS FORMULA) TABS Take 1 tablet by mouth 2 times daily  8/4/2019 at Unknown time Yes Unknown, Entered By History   nitroGLYcerin (NITROSTAT) 0.4 MG sublingual tablet For chest pain place 1 tablet under the tongue every 5 minutes for 3 doses. If symptoms persist 5 minutes after 1st dose call 911.  Yes Alex Ramsay MD   OMEPRAZOLE PO Take 20 mg by mouth every morning  8/4/2019 at Unknown time Yes Unknown, Entered By History   tolterodine (DETROL LA) 4 MG 24 hr capsule Take 4 mg by mouth every morning 8/4/2019 at Unknown time Yes Unknown, Entered By History

## 2019-08-04 NOTE — ED TRIAGE NOTES
Pt was walking from chair to bed and became suddenly SOB, pt hx of stent placement at [facility] a couple weeks ago. Pt also c/o nausea and chest tightness en route. Given 4mg Zofran x2 and nitroglycerin x2 en route. Hypoxic in room. ABC's intact, alert but drowsy. 700ml NS given PTA.

## 2019-08-04 NOTE — ED NOTES
[facility]  ED Nurse Handoff Report    Yoselyn Cui is a 87 year old female   ED Chief complaint: Shortness of Breath and Chest Pain  . ED Diagnosis:   Final diagnoses:   Sepsis, due to unspecified organism (H)   Hypoxia   Acute on chronic congestive heart failure, unspecified heart failure type (H)   Urinary tract infection without hematuria, site unspecified     Allergies: No Known Allergies    Code Status:   Activity level - Baseline/Home:  Independent. Activity Level - Current:   Stand by Assist. Lift room needed: No. Bariatric: No   Needed: No   Isolation: No. Infection: Not Applicable.     Vital Signs:   Vitals:    08/04/19 1345 08/04/19 1348 08/04/19 1354 08/04/19 1415   BP:       Pulse:       Resp:   (!) 32 23   Temp:  104  F (40  C)     TempSrc:  Rectal     SpO2: 96%  98%    Weight:           Cardiac Rhythm:  ,      Pain level: 0-10 Pain Scale: 8  Patient confused: No. Patient Falls Risk: Yes.   Elimination Status: Cathed for UA   Patient Report - Initial Complaint: CP, SOB. Focused Assessment: Pt presents to ED with c/o sudden onset of chest tightness nausea, fever and sob today while ambulating. Sats 86% on Room air on arrival. O2 NC now in place. Initial HR 120s, now improved to 90s.  Two ntg, zofran and IVF given by EMS which resolved nausea and chest pain. Feels much better. Pt c/o on/off dysuria. Positive UA. Positive guaiac. IVabx started.    Tests Performed: Labs, CXR, chest CT. Abnormal Results:   Labs Ordered and Resulted from Time of ED Arrival Up to the Time of Departure from the ED   CBC WITH PLATELETS DIFFERENTIAL - Abnormal; Notable for the following components:       Result Value    WBC 18.3 (*)     RBC Count 3.20 (*)     Hemoglobin 9.2 (*)     Hematocrit 30.0 (*)     MCHC 30.7 (*)     Absolute Neutrophil 16.5 (*)     All other components within normal limits   COMPREHENSIVE METABOLIC PANEL - Abnormal; Notable for the following components:    Glucose 101 (*)      Calcium 7.9 (*)     Albumin 2.5 (*)     Protein Total 6.4 (*)     All other components within normal limits   NT PROBNP INPATIENT - Abnormal; Notable for the following components:    N-Terminal Pro BNP Inpatient 3,037 (*)     All other components within normal limits   ROUTINE UA WITH MICROSCOPIC - Abnormal; Notable for the following components:    Blood Urine Trace (*)     Protein Albumin Urine 30 (*)     Nitrite Urine Positive (*)     Leukocyte Esterase Urine Large (*)     WBC Urine >182 (*)     RBC Urine 9 (*)     WBC Clumps Present (*)     Bacteria Urine Few (*)     Mucous Urine Present (*)     All other components within normal limits   INR - Abnormal; Notable for the following components:    INR 1.27 (*)     All other components within normal limits   D DIMER QUANTITATIVE - Abnormal; Notable for the following components:    D Dimer 3.8 (*)     All other components within normal limits   OCCULT BLOOD STOOL - Abnormal; Notable for the following components:    Occult Blood Positive (*)     All other components within normal limits   ISTAT  GASES LACTATE MINDY POCT - Abnormal; Notable for the following components:    PCO2 Venous 29 (*)     Bicarbonate Venous 20 (*)     All other components within normal limits   TROPONIN I   CREATININE POCT   PROCALCITONIN   ISTAT CG4 GASES LACTATE MINDY NURSING POCT   CARDIAC CONTINUOUS MONITORING   ISTAT CREATININE NURSING POCT   URINE CULTURE AEROBIC BACTERIAL   BLOOD CULTURE   BLOOD CULTURE     CT Chest Pulmonary Embolism w Contrast   Preliminary Result   IMPRESSION:    1. No pulmonary embolism demonstrated.   2. No thoracic aortic aneurysm.    3. Moderate emphysema. Moderate bronchial wall thickening and mucous   plugging.      XR Chest Port 1 View   Final Result   IMPRESSION: Stable cardiac silhouette and left chest wall pacemaker.   Slightly increased prominence of ill-defined focal opacity in the   right lower lung which could represent subsegmental atelectasis versus    worsening airspace disease. Mild subsegmental atelectasis in the left   lower lung. Mild interstitial pulmonary edema has increased.   Calcification seen in the aortic knob.      VANESSA ROUSE MD        .   Treatments provided: Asa, ibuprofen, tylenol  Family Comments: At bedside  OBS brochure/video discussed/provided to patient:  No  ED Medications:   Medications   vancomycin (VANCOCIN) 1000 mg in dextrose 5% 200 mL PREMIX (1,000 mg Intravenous New Bag 8/4/19 1442)   acetaminophen (TYLENOL) tablet 650 mg (650 mg Oral Given 8/4/19 1242)   aspirin (ASA) chewable tablet 81 mg (243 mg Oral Given 8/4/19 1243)   iopamidol (ISOVUE-370) solution 500 mL (52 mLs Intravenous Given 8/4/19 1314)   0.9% sodium chloride BOLUS (0 mLs Intravenous Stopped 8/4/19 1321)   ceFEPIme (MAXIPIME) 1g vial to attach to  ml bag for ADULTS or NS 50 ml bag for PEDS (0 g Intravenous Stopped 8/4/19 1443)   ibuprofen (ADVIL/MOTRIN) tablet 400 mg (400 mg Oral Given 8/4/19 1402)     Drips infusing:  No  For the majority of the shift, the patient's behavior Green. Interventions performed were NA.     Severe Sepsis OR Septic Shock Diagnosis Present: No      ED Nurse Name/Phone Number: Kayla BRIAN James,   2:55 PM    RECEIVING UNIT ED HANDOFF REVIEW    Above ED Nurse Handoff Report was reviewed:YES:  Reviewed by: Ayala Arredondo on August 4, 2019 at 3:28 PM

## 2019-08-05 ENCOUNTER — APPOINTMENT (OUTPATIENT)
Dept: PHYSICAL THERAPY | Facility: CLINIC | Age: 84
DRG: 871 | End: 2019-08-05
Attending: INTERNAL MEDICINE
Payer: MEDICARE

## 2019-08-05 LAB
ANION GAP SERPL CALCULATED.3IONS-SCNC: 5 MMOL/L (ref 3–14)
BASOPHILS # BLD AUTO: 0.1 10E9/L (ref 0–0.2)
BASOPHILS NFR BLD AUTO: 0.4 %
BUN SERPL-MCNC: 23 MG/DL (ref 7–30)
CALCIUM SERPL-MCNC: 7.6 MG/DL (ref 8.5–10.1)
CHLORIDE SERPL-SCNC: 108 MMOL/L (ref 94–109)
CO2 SERPL-SCNC: 24 MMOL/L (ref 20–32)
CREAT SERPL-MCNC: 0.84 MG/DL (ref 0.52–1.04)
DIFFERENTIAL METHOD BLD: ABNORMAL
EOSINOPHIL # BLD AUTO: 0.1 10E9/L (ref 0–0.7)
EOSINOPHIL NFR BLD AUTO: 0.5 %
ERYTHROCYTE [DISTWIDTH] IN BLOOD BY AUTOMATED COUNT: 15 % (ref 10–15)
FERRITIN SERPL-MCNC: 130 NG/ML (ref 8–252)
GFR SERPL CREATININE-BSD FRML MDRD: 62 ML/MIN/{1.73_M2}
GLUCOSE SERPL-MCNC: 85 MG/DL (ref 70–99)
HCT VFR BLD AUTO: 29 % (ref 35–47)
HGB BLD-MCNC: 9.1 G/DL (ref 11.7–15.7)
IMM GRANULOCYTES # BLD: 0.1 10E9/L (ref 0–0.4)
IMM GRANULOCYTES NFR BLD: 0.6 %
INTERPRETATION ECG - MUSE: NORMAL
LYMPHOCYTES # BLD AUTO: 1.5 10E9/L (ref 0.8–5.3)
LYMPHOCYTES NFR BLD AUTO: 12.2 %
MCH RBC QN AUTO: 28.9 PG (ref 26.5–33)
MCHC RBC AUTO-ENTMCNC: 31.4 G/DL (ref 31.5–36.5)
MCV RBC AUTO: 92 FL (ref 78–100)
MONOCYTES # BLD AUTO: 1.3 10E9/L (ref 0–1.3)
MONOCYTES NFR BLD AUTO: 10.4 %
NEUTROPHILS # BLD AUTO: 9.3 10E9/L (ref 1.6–8.3)
NEUTROPHILS NFR BLD AUTO: 75.9 %
NRBC # BLD AUTO: 0 10*3/UL
NRBC BLD AUTO-RTO: 0 /100
PLATELET # BLD AUTO: 250 10E9/L (ref 150–450)
POTASSIUM SERPL-SCNC: 4.1 MMOL/L (ref 3.4–5.3)
RBC # BLD AUTO: 3.15 10E12/L (ref 3.8–5.2)
SODIUM SERPL-SCNC: 137 MMOL/L (ref 133–144)
WBC # BLD AUTO: 12.3 10E9/L (ref 4–11)

## 2019-08-05 PROCEDURE — 36415 COLL VENOUS BLD VENIPUNCTURE: CPT | Performed by: INTERNAL MEDICINE

## 2019-08-05 PROCEDURE — 25000128 H RX IP 250 OP 636: Performed by: INTERNAL MEDICINE

## 2019-08-05 PROCEDURE — 97116 GAIT TRAINING THERAPY: CPT | Mod: GP

## 2019-08-05 PROCEDURE — 12000000 ZZH R&B MED SURG/OB

## 2019-08-05 PROCEDURE — 25000132 ZZH RX MED GY IP 250 OP 250 PS 637: Performed by: INTERNAL MEDICINE

## 2019-08-05 PROCEDURE — 99233 SBSQ HOSP IP/OBS HIGH 50: CPT | Performed by: INTERNAL MEDICINE

## 2019-08-05 PROCEDURE — C9113 INJ PANTOPRAZOLE SODIUM, VIA: HCPCS | Performed by: INTERNAL MEDICINE

## 2019-08-05 PROCEDURE — 94640 AIRWAY INHALATION TREATMENT: CPT | Mod: 76

## 2019-08-05 PROCEDURE — 25000125 ZZHC RX 250: Performed by: INTERNAL MEDICINE

## 2019-08-05 PROCEDURE — 85025 COMPLETE CBC W/AUTO DIFF WBC: CPT | Performed by: INTERNAL MEDICINE

## 2019-08-05 PROCEDURE — 40000275 ZZH STATISTIC RCP TIME EA 10 MIN

## 2019-08-05 PROCEDURE — 82728 ASSAY OF FERRITIN: CPT | Performed by: INTERNAL MEDICINE

## 2019-08-05 PROCEDURE — 94640 AIRWAY INHALATION TREATMENT: CPT

## 2019-08-05 PROCEDURE — 97530 THERAPEUTIC ACTIVITIES: CPT | Mod: GP

## 2019-08-05 PROCEDURE — 80048 BASIC METABOLIC PNL TOTAL CA: CPT | Performed by: INTERNAL MEDICINE

## 2019-08-05 PROCEDURE — 97161 PT EVAL LOW COMPLEX 20 MIN: CPT | Mod: GP

## 2019-08-05 RX ADMIN — MULTIPLE VITAMINS W/ MINERALS TAB 1 TABLET: TAB at 10:15

## 2019-08-05 RX ADMIN — ATORVASTATIN CALCIUM 20 MG: 20 TABLET, FILM COATED ORAL at 21:22

## 2019-08-05 RX ADMIN — CEFEPIME HYDROCHLORIDE 2 G: 2 INJECTION, POWDER, FOR SOLUTION INTRAVENOUS at 15:53

## 2019-08-05 RX ADMIN — FLUTICASONE FUROATE 1 PUFF: 200 POWDER RESPIRATORY (INHALATION) at 08:16

## 2019-08-05 RX ADMIN — MINERAL OIL AND PETROLATUM: 150; 830 OINTMENT OPHTHALMIC at 18:55

## 2019-08-05 RX ADMIN — FERROUS SULFATE TAB 325 MG (65 MG ELEMENTAL FE) 325 MG: 325 (65 FE) TAB at 21:22

## 2019-08-05 RX ADMIN — FERROUS SULFATE TAB 325 MG (65 MG ELEMENTAL FE) 325 MG: 325 (65 FE) TAB at 10:12

## 2019-08-05 RX ADMIN — IPRATROPIUM BROMIDE AND ALBUTEROL SULFATE 3 ML: .5; 3 SOLUTION RESPIRATORY (INHALATION) at 08:07

## 2019-08-05 RX ADMIN — IPRATROPIUM BROMIDE AND ALBUTEROL SULFATE 3 ML: .5; 3 SOLUTION RESPIRATORY (INHALATION) at 11:43

## 2019-08-05 RX ADMIN — PANTOPRAZOLE SODIUM 40 MG: 40 INJECTION, POWDER, FOR SOLUTION INTRAVENOUS at 10:23

## 2019-08-05 RX ADMIN — CLOPIDOGREL BISULFATE 75 MG: 75 TABLET ORAL at 10:15

## 2019-08-05 RX ADMIN — CEFEPIME HYDROCHLORIDE 2 G: 2 INJECTION, POWDER, FOR SOLUTION INTRAVENOUS at 04:46

## 2019-08-05 RX ADMIN — ASPIRIN 81 MG: 81 TABLET, COATED ORAL at 10:11

## 2019-08-05 RX ADMIN — IPRATROPIUM BROMIDE AND ALBUTEROL SULFATE 3 ML: .5; 3 SOLUTION RESPIRATORY (INHALATION) at 19:48

## 2019-08-05 RX ADMIN — IPRATROPIUM BROMIDE AND ALBUTEROL SULFATE 3 ML: .5; 3 SOLUTION RESPIRATORY (INHALATION) at 15:47

## 2019-08-05 RX ADMIN — TOLTERODINE 4 MG: 4 CAPSULE, EXTENDED RELEASE ORAL at 10:15

## 2019-08-05 RX ADMIN — PANTOPRAZOLE SODIUM 40 MG: 40 INJECTION, POWDER, FOR SOLUTION INTRAVENOUS at 21:23

## 2019-08-05 ASSESSMENT — ACTIVITIES OF DAILY LIVING (ADL)
ADLS_ACUITY_SCORE: 22
ADLS_ACUITY_SCORE: 20
ADLS_ACUITY_SCORE: 22
ADLS_ACUITY_SCORE: 20

## 2019-08-05 ASSESSMENT — MIFFLIN-ST. JEOR: SCORE: 827.59

## 2019-08-05 NOTE — PLAN OF CARE
Discharge Planner OT   Patient plan for discharge: home with resumption of home OT/PT  Current status: Pt admitted with sepsis, recent hospitalization following MI and STENT placement. Pt's daughter home to A throughout day with ADLs. Pt requiring SBA-CGA at this time for transfers. Will defer OT eval, and allow pt to continue to work with home OT. No OT eval completed, orders completed.   Barriers to return to prior living situation: none anticipated  Recommendations for discharge: defer to PT  Rationale for recommendations: Pt near her baseline prior to admission. Would recommend continued work with home health OT.        Entered by: Olesya Albarado 08/05/2019 1:05 PM

## 2019-08-05 NOTE — PHARMACY-VANCOMYCIN DOSING SERVICE
Pharmacy Vancomycin Initial Note  Date of Service 2019  Patient's  1932  87 year old, female    Indication: [facility] Pneumonia    Current estimated CrCl = Estimated Creatinine Clearance: 39 mL/min (based on SCr of 0.75 mg/dL).    Creatinine for last 3 days  2019: 12:40 PM Creatinine 0.75 mg/dL    Recent Vancomycin Level(s) for last 3 days  No results found for requested labs within last 72 hours.      Vancomycin IV Administrations (past 72 hours)                   vancomycin (VANCOCIN) 1000 mg in dextrose 5% 200 mL PREMIX (mg) 1,000 mg New Bag 19 1442                Nephrotoxins and other renal medications (From now, onward)    Start     Dose/Rate Route Frequency Ordered Stop    19 1400  vancomycin (VANCOCIN) 1000 mg in dextrose 5% 200 mL PREMIX      1,000 mg  200 mL/hr over 1 Hours Intravenous EVERY 24 HOURS 19 1651      19 0900  lisinopril (PRINIVIL/ZESTRIL) tablet 40 mg      40 mg Oral DAILY 19 1553            Contrast Orders - past 72 hours (72h ago, onward)    Start     Dose/Rate Route Frequency Ordered Stop    19 1314  iopamidol (ISOVUE-370) solution 500 mL      500 mL Intravenous ONCE 19 1313 19 1314                Plan:  1.  Start vancomycin  1000 mg IV q24h.   2.  Goal Trough Level: 15-20 mg/L   3.  Pharmacy will check trough levels as appropriate in 1-3 Days.    4. Serum creatinine levels will be ordered daily for the first week of therapy and at least twice weekly for subsequent weeks.    5. Winn method utilized to dose vancomycin therapy: Method 1    Dilshad Amaro

## 2019-08-05 NOTE — CONSULTS
Care Transition Initial Assessment - RN        Met with: Patient.  DATA   Active Problems:    Sepsis (H)       Cognitive Status: awake and alert.  [facility]  Primary Care MD Name: Dr. Brennen Collier  Contact information and PCP information verified: Yes  Lives With: child(bill), adult, other (see comments)(Daughter and son in law)   Living Arrangements: house     Description of Support System: Supportive, Involved   Who is your support system?: Children   Support Assessment: Patient communicates needs well met   Insurance concerns: No Insurance issues identified  ASSESSMENT  Patient currently receives the following services:  [facility] RN/PT/OT/HHA/SW        Identified issues/concerns regarding health management: Patient admitted with sepsis d/t UTI and pneumonia. RN CTS following d/t Elevated RACHELLE and HF list with no indication of HF exacerbation at this time. Patient has a history of CAD, NSTEMI, HTN, Anemia, and ulcers. Patient is a readmission with new diagnosis.   Pneumonia action plan given and reviewed with patient. Patient states familiar with action plan as she has one at home. Patient understand monitoring symptoms and following up with provider as indicated.   PLAN  Financial costs for the patient were not discussed .  Patient given options and choices for discharge Yes .  Patient/family is agreeable to the plan?  Yes: pending medical stability.   Patient anticipates discharging to Home .        Patient anticipates needs for home equipment: No  Transportation/person available to transport on day of discharge  is stacey Wesley.   Plan/Disposition: Home with resumption of home care.   Appointments: Scheduled post hospitalization appointment and updated to S.       Care  (CTS) will continue to follow as needed.    Brittany MALDONADO  Care Transition Services  [phone]

## 2019-08-05 NOTE — PROGRESS NOTES
X-cover    Called re: asymptomatic hypotension with SBP 80s.  Chart reviewed.  Will administer  ml bolus

## 2019-08-05 NOTE — PLAN OF CARE
RN-  Pt is alert and oriented. Up in room with assist of 1 and gait belt. Uses a cane at home. Has some L sided deficit from old CVA. BP 80's/40's this shift, improved after 500cc NS bolus. Ate 75% of dinner. Denies pain. Paced. Bed alarm in place. Continue current POC.

## 2019-08-05 NOTE — PROGRESS NOTES
08/05/19 1200   Quick Adds   Type of Visit Initial PT Evaluation   Living Environment   Lives With child(bill), adult;other (see comments)  (Daughter and son in law)   Living Arrangements house   Home Accessibility stairs to enter home;stairs within home   Living Environment Comment Patient reports 8 stairs (one rail) to manage down to the basement where she lives. Typically mod I with a cane. Daughter works from home and is around during the day.    Self-Care   Usual Activity Tolerance moderate   Current Activity Tolerance fair   Activity/Exercise/Self-Care Comment Pt owns a cane and WC at home.   Functional Level Prior   Ambulation 1-->assistive equipment  (Cane)   Transferring 1-->assistive equipment   Fall history within last six months no   Prior Functional Level Comment Was working with HHPT/OT PTA. Mod I with cane at baseline.    General Information   Onset of Illness/Injury or Date of Surgery - Date 08/04/19   Referring Physician Ta ALLEN   Patient/Family Goals Statement Return home   Pertinent History of Current Problem (include personal factors and/or comorbidities that impact the POC) 87 year old female admitted with sepsis due to UTI/PNA.   Precautions/Limitations fall precautions   Cognitive Status Examination   Orientation orientation to person, place and time   Level of Consciousness alert   Personal Safety and Judgment intact   Memory intact   Pain Assessment   Patient Currently in Pain No   Range of Motion (ROM)   ROM Comment Bilat LE WFLs; decreased L UE AROM secondary to hx of CVA   Bed Mobility   Bed Mobility Comments Supine to sit with SBA   Transfer Skills   Transfer Comments Sit to/from stand with CGA   Gait   Gait Comments 5' with cane and CGA   General Therapy Interventions   Planned Therapy Interventions strengthening;balance training;gait training   Clinical Impression   Criteria for Skilled Therapeutic Intervention yes, treatment indicated   PT Diagnosis Generalized weakness  "  Influenced by the following impairments Decreased strength, decreased balance   Functional limitations due to impairments Decreased IND with transfers, gait and stairs   Clinical Presentation Stable/Uncomplicated   Clinical Presentation Rationale Stable.    Clinical Decision Making (Complexity) Low complexity   Therapy Frequency 5x/week   Predicted Duration of Therapy Intervention (days/wks) One week   Anticipated Discharge Disposition Home with Assist;Home with Home Therapy   Risk & Benefits of therapy have been explained Yes   Patient, Family & other staff in agreement with plan of care Yes   [facility] AM-PAC  \"6 Clicks\" V.2 Basic Mobility Inpatient Short Form   1. Turning from your back to your side while in a flat bed without using bedrails? 4 - None   2. Moving from lying on your back to sitting on the side of a flat bed without using bedrails? 4 - None   3. Moving to and from a bed to a chair (including a wheelchair)? 3 - A Little   4. Standing up from a chair using your arms (e.g., wheelchair, or bedside chair)? 3 - A Little   5. To walk in hospital room? 3 - A Little   6. Climbing 3-5 steps with a railing? 3 - A Little   Basic Mobility Raw Score (Score out of 24.Lower scores equate to lower levels of function) 20   Total Evaluation Time   Total Evaluation Time (Minutes) 5     "

## 2019-08-05 NOTE — PROGRESS NOTES
Cross cover   Positive BC and UC with gram negative bacteria. Currently on Cefepime, no change. Rounder to follow.

## 2019-08-05 NOTE — PLAN OF CARE
VSS. BP still soft but trending up. Pt asymptomatic, no dizziness or lightheadedness. A&Ox4. Pt on 1L throughout the night. Denies SOB or Cp. LS clear. Tele v-paced, BBB, and prolonged Qt. IV abx administered this shift. Hemoglobins q6h, 8.4 and 9.1 this shift. Pt A1 with Gb. Weaker on left side. Otherwise slept well throughout the night. Continue to monitor per POC.

## 2019-08-05 NOTE — PROGRESS NOTES
[facility]  Patient is currently open to home care services with [facility].  The patient is currently receiving  RN PT OT HHA services.  [facility]  and team have been notified of patient admission.  [facility] liaison will continue to follow patient during stay.  If appropriate provide orders to resume home care at time of discharge.

## 2019-08-05 NOTE — PLAN OF CARE
Orientation: Alert and oriented x 4  VSS. 93% on RA.   Tele: 100% V-Paced w/ BBB, 1*AVB, and prolonged QT.   HR 80.   LS: Diminished   GI:   Passing gas. No BM. Denies N/V.   : Adequate urine output.   Skin: Clean and dry with some bruising   Activity: Assist x 1. GB. Pt ambulated comfortably throughout shift.   Pain: 0/10. Denies  Plan: Continue with current cares. Hx of A fib, CVA, HTN, Anemia, angio w/ stent placement in July 19. Trops negative, positive for UTI and PNA. On Cefepime IV. BPs have been soft mid to low 90s/40s-50s. Hgb 9.1 (transfuse orders < 8) PT/OT consulted. Plan is for patient to discharge home in 1-2 days with home care.

## 2019-08-05 NOTE — PROGRESS NOTES
"Critical lab value taken. Updated blood culture from left arm on 8/4, E. Coli, primary RN Giorgi notified.     Text page MD: \"FYI: Critical lab value, blood culture from left arm on 8/4, E. Coli.\"  "

## 2019-08-05 NOTE — H&P
Admitted:     08/04/2019      PRIMARY CARE PHYSICIAN:  Dr. Brennen Collier.      CHIEF COMPLAINT:  Chills, substernal chest pain and shortness of breath since this morning.      HISTORY OF PRESENT ILLNESS:  Ms. Yoselyn Cui is an 87-year-old female who was recently hospitalized at [facility] for a non-ST elevated myocardial infarction, underwent a coronary angiography on 07/17/2019, which showed severe 3-vessel coronary artery disease with echo showing a left ejection fraction of 35%-40%.  She was transferred to [facility] and underwent a CV Surgery consultation for consideration of bypass graft.  She underwent a successful PCI to the mid LAD with a drug-eluting stent, but was complicated with LAD dissection with retrograde extension into the left main and left coronary cusp, resulting in aortic hematoma.  She was successfully treated with placement of BK in the left main, proximal to the distal left main bifurcation, and another stent placed in the proximal LAD.  She was discharged from [facility] to home on 07/24/2019 on Plavix and aspirin and was doing quite well until this morning.  The patient also has a past history of paroxysmal atrial fib, taken off apixaban on her last hospitalization, CVA with residual left weakness, history of upper gastrointestinal bleed with gastric ulcer, iron deficiency anemia, hypertension, dyslipidemia, colon polyps and is an ex-smoker who was recovering from a recent hospitalization, but developed a sudden onset of chills this morning.  She then started to develop some shortness of breath, was unable to lie down or sit up and experience substernal chest discomfort, which she described as similar to what she had when she came into the hospital on the last visit.  EMT was called.  They found her oxygen saturation in the 80s.  She did receive Zofran, nitroglycerin x 2 and 700 mL of normal saline on route to the hospital.  Chest pain was  present on arrival to the hospital, but resolved shortly after without any further nitroglycerin.        When I am seeing her in the emergency room she is chest pain-free, denies any shortness of breath, although she has oxygen by nasal cannula and denies any chills either.  Her initial vital signs were a temperature of 102.3, pulse 120, blood pressure 105/70, O2 saturation on room air 95%.  Her troponin was normal.  EKG showed a paced rhythm at a rate of 111 per minute.  Her white count was elevated at 18.3.  Her chest x-ray showed increased prominence of an ill-defined opacity in the right lower lung, possible atelectasis versus worsening airspace disease with mild subsegmental atelectasis in the left lung and mild interstitial pulmonary edema that had increased from an x-ray on 07/22/2019.  CT of the chest for PE was negative.  There was no aortic dissection.  There was moderate emphysema and moderate bronchial wall thickening and mucous plugging.  A UA was significantly abnormal with positive nitrite, large leukocyte esterase, greater than 182 WBCs and a few bacteria.  Blood cultures were drawn and she was started on cefepime and vancomycin, and the hospitalist was called in to admit the patient for sepsis, UTI and possible pneumonia.      PAST MEDICAL HISTORY:   1.  Coronary artery disease, non-ST elevated myocardial infarction in 07/2019, 3-vessel disease, successful PCI to mid-LAD with a drug-eluting stent.   2.  LAD dissection from catheter trauma with retrograde extension to the left main and left coronary cusps, status post placement of a drug-eluting stent to the left main, proximal until the distal left main bifurcation and left stent to proximal LAD on 07/18/2019.   3.  Ischemic cardiomyopathy with an ejection fraction on followup echo on 07/23/2019 of 55%-60%.   4.  Paroxysmal atrial fibrillation while on apixaban that was stopped during her last hospitalization, being placed on aspirin and Plavix.    5.  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   6.  Macular degeneration.   7.  History of upper gastrointestinal bleed/gastric ulcer with a last endoscopy on 11/20/2018 showing patchy mildly erythematous gastric mucosa.   8.  Colon polyps with last colonoscopy in 11/2018 showing one 9 mm polyp in the transverse colon that was removed, and severe diverticulosis.   9.  Diverticulosis.   10.  Iron deficiency anemia.   11.  Hypertension.   12.  Dyslipidemia.   13.  Melanoma.   14.  Ex tobacco user, quit over 30 years ago.    15.  Sick sinus syndrome, second-degree AV block, status post permanent pacemaker placed in 2016.      PAST SURGICAL HISTORY:   1.  Pacemaker placement.   2.  Bilateral cataract extraction.   3.  Coronary angiogram with PCIs as above.   4.  Bilateral tubal ligation.   5.  Appendectomy.      FAMILY HISTORY:  Noncontributory.      SOCIAL HISTORY:  She is , lives with her daughter, Maryjane, at present.  She has 4 daughters.  She smoked for about 30 years, 1 pack per day, and quit many years ago.  Denies alcohol use.  She is a DNR/DNI.      HOME MEDICATIONS:   1.  Amlodipine 5 mg daily.   2.  Aspirin 81 mg daily.   3.  Lipitor 20 mg daily.   4.  Carvedilol 12.5 mg twice a day.   5.  Plavix 75 mg daily.   6.  Iron 325 mg twice a day.   7.  Multivitamin twice a day.   8.  Fluticasone inhaler 200 mcg 1 puff daily.   9.  Lisinopril 40 mg daily.   10.  Nitroglycerin sublingual p.r.n.   11.  Omeprazole 20 mg daily.   12.  Detrol-LA 4 mg daily.      ALLERGIES:  SHE IS NOT ALLERGIC TO ANY KNOWN MEDICATIONS.      REVIEW OF SYSTEMS:  As in history of present illness.  In addition, appetite has been poor, but she has not had any chest pain until this morning.  The rest of the 10-point review of systems is as in history of present illness, otherwise negative.      PHYSICAL EXAMINATION:   GENERAL:  She is a delightful elderly female, awake, alert, oriented x 3.   VITAL SIGNS:  Temperature of  102.3-104, pulse is 111, respirations 32, blood pressure 101/80, O2 saturation 95% and 98% on 5 liters oxygen via nasal cannula.  She does not appear to be in any acute respiratory distress on oxygen by nasal cannula when I am seeing her.   NECK:  Supple, no elevated JVD, thyromegaly, lymphadenopathy.  Carotids are present without any bruits.   EYES:  Anicteric, PERRLA, EOMI.   ENT:  Clear.   LUNGS:  Clear to auscultation with no rhonchi, rales or wheezing.   HEART:  Regular S1, S2 with a systolic murmur heard over the precordium.   ABDOMEN:  Nondistended, soft, nontender, bowel sounds present, no organomegaly or masses felt.   EXTREMITIES:  Without any pitting edema, clubbing or cyanosis.   SKIN:  She has multiple small bandages over her face, hands and left knee from skin biopsies done by her dermatologist from yesterday.        DIAGNOSTICS:  In addition to the above, venous gasses showed a pH of 7.43, pCO2 29, pO2 37, bicarbonate 20.  Electrolytes normal, creatinine 0.75.  Troponin normal at 0.030.  Bedside rectal exam showed positive for occult blood.      IMPRESSION AND PLAN:  Ms. Yoselyn Cui is an 87-year-old female with a history of coronary artery disease, recent non-ST elevated myocardial infarction, 3-vessel complex coronary artery disease with Vascular Surgery recommending no bypass graft due to her comorbidities.  She underwent a complex hospital stay at [facility], initially had a successful PCI to mid-LAD with a drug-eluting stent, followed by LAD dissection that required a drug-eluting stent in her left main, proximal to the distal left main bifurcation, and a drug-eluting stent to the proximal LAD on 07/18/2019.  She now presents with sudden onset of chills, shortness of breath and chest pain.   1.  Sepsis, likely secondary to urinary tract infection and possible hospital-acquired pneumonia.  The patient will be admitted.  Agree with continuing vancomycin and cefepime pending culture  reports.  Follow cultures.  Hold off IV fluids for now.   2.  Chest pain, likely noncardiac with a normal troponin and an EKG showing no acute changes.  Most likely this is secondary to problem #1, so we will continue to monitor her on tele and monitor her symptoms.  We will get Cardiology involved if necessary given her recent extensive cardiac procedures.  CT of the chest was negative for pulmonary embolism.   3. Mild, Acute hypoxic respiratory failure.Likely 2/2 mucus plugging vs Pneumonia.  -transient. CT -ve for PE.  - continue AB as above.  4.  Coronary artery disease, recent MI with recent drug-eluting stents, aortic dissection secondary to catheter trauma, further requiring drug-eluting stents.  CT did not show any dissection.  The patient seems to be stable from the cardiac point.  We will continue all of her home medications with aspirin, Plavix, carvedilol, Lipitor and lisinopril that she took earlier this morning.  If necessary, we will get Cardiology involved.   5.  Hypertension.  Blood pressure is soft after taking all of her blood pressure lowering medications early in the morning and also receiving 2 sublingual nitroglycerin en route to the hospital.  We will continue her home medications, monitor her blood pressure and have hold parameters in place for her medications.   6.  Heme-positive stool in a female who has a known history of gastric ulcers, most recent EGD showed erythematous gastritis on endoscopy in 11/2018, and in the context of being on aspirin and Plavix.  She is hemodynamically stable.  I have continued aspirin and Plavix considering her recent stent placement.  We will monitor her hemoglobin every 6 hours.  We will type and cross and have blood available and transfuse if hemoglobin drops below We will place her on IV Protonix twice a day in place of her p.o. omeprazole at home.   7.  Chronic iron deficiency anemia, plus acute blood loss anemia as mentioned above.  Hemoglobin earlier  this month on 2019 was 13.0, dropped down to 9.7 on 2019 secondary to aortic dissection resulting intramural aortic hematoma.  Hemoglobin today is 9.2.  We will monitor hemoglobins as above and transfuse as mentioned above.  Continue prior to admission iron.  We will check a ferritin in the morning and give her Venofer if necessary.   8.  Hypertension.  Blood pressure is soft secondary to her multiple blood pressure and heart medications that she took earlier this morning.  Continue all of her prior to admission medications with hold parameters.   9. Hx recent CHF, LVEF  55%-60%. Pro BNP > 3000, although she seems somewhat dry.Hold diuresis 2/2 soft BP & monitor.  10.  Previous history of cerebrovascular accident with residual left-sided weakness.  We will have PT follow her.   11.  Dyslipidemia:  Continue prior to admission atorvastatin.  Deep venous thrombosis prophylaxis with SCDs.  No anticoagulation secondary to her heme-positive stool.      CODE STATUS:  DNR/DNI, as discussed with the patient.      DISPOSITION:  The patient will be admitted as an inpatient, as she will require more than 2 midnight stays.         MAYKEL MARTIN MD             D: 2019   T: 2019   MT: KINSEY      Name:     ANNE MARIE AMBROSE   MRN:      [id number]        Account:      [id number]   :      1932        Admitted:     2019                   Document: [id number]       cc: Brennen Collier DO

## 2019-08-05 NOTE — PROGRESS NOTES
"[facility] HEALTH SERVICES  SPIRITUAL ASSESSMENT Progress Note  [facility] 3rd Floor Med/Surg     PRIMARY FOCUS:     Emotional/spiritual/Temple distress    Support for coping    ILLNESS CIRCUMSTANCES:   Reviewed documentation. Reflective conversation shared with Yoselyn which integrated elements of illness and family narratives.     Context of Serious Illness/Symptom(s) - Yoselyn hospitalized for treatment of an UTI and acute bronchitis. She was admitted to the hospital after presenting to the ED with sudden chills and shortness of breath. She was found to have sepsis. Yoselyn reports feeling much better today. She shares her pain is being controlled.    Resources for Support - Yoselyn lives with her daughter Maryjane and Maryjane's . She shares all four of her daughters are \"very attentive\" to her.    DISTRESS:     Emotional/Existential/Relational Distress - Yoselyn's , Gordy, passed away early July of this year. She was unable to attend his [facility] service because she was in the hospital for concerns around her chest pain.    Spiritual/Evangelical Distress - none expressed    Social/Cultural/Economic Distress - none expressed    SPIRITUAL/Mu-ism COPING:     Congregational/Sara - Yoselyn shares that she converted to Catholicism when she was . She enjoys worshiping with [employer] [facility] [employer] [facility] [employer] in [place].    Spiritual Practice(s) - Pt shares she was a daughter who reads the Bible to her and prays with her.    Emotional/Existential/Relational Connections - Yoselyn shares that her daughters are \"very supportive and very attentive\". She names her whole family, including 7 grandchildren, and 14 great-grandchildren as \"the most important thing\". Yoselyn also shares \"she talks\" with Gordy every night. She says, \"I know he's really not too far away\".    GOALS OF CARE:    Goals of Care - Recovery. Yoselyn also shares, \"I know I am not going to live forever\"    Meaning/Sense-Making - Family means everything to Yoselyn. She says, \"I have so many found " "memories to look back on\"    PLAN:  engaged in life review. Yoselyn enjoys sharing memories of her and Gordy raising their daughters.     SH remains available.    Nava Moore   Intern  Pager: [phone]  "

## 2019-08-05 NOTE — DISCHARGE INSTRUCTIONS
Your hospital follow up appointment has been schedule for you with Dr. Collier at [facility] for Monday August 12th at 10:10AM. Please bring your hospital discharge instructions and insurance information with you to your appointment. Please call the clinic at [phone] if you need to reschedule.      Your home care referral was sent to [facility].  If you haven't heard from them within the next 24-48 hours,  Please call them at [phone]

## 2019-08-05 NOTE — PLAN OF CARE
PT: Orders received, evaluation completed and treatment initiated. 87 year old female admitted with sepsis due to UTI/PNA. PMH significant for recent MI and stent placement and limited L UE AROM due to prior CVA. Patient reports 8 stairs (one rail) to manage down to the basement where she lives. Typically mod I with a cane. Daughter works from home and is around during the day.    Discharge Planner PT   Patient plan for discharge: Return home with resumption of HHPT/OT  Current status: Supine to sit with  SBA. Sit to/from stand with CGA. Ambulates 100' x 2 with straight cane and CGA. Pt reports fatigue limiting distance. L LE foot drop - baseline. Encouraged 3 walks per day in all with RN staff outside of PT.   Barriers to return to prior living situation: None anticipated with stair performance  Recommendations for discharge: Return home with resumption of HHPT/OT  Rationale for recommendations: Pt demonstrating fair strength. Anticipate continued improvement for safe discharge to home and resumption of HH services when medically stable.       Entered by: Pedro Ly 08/05/2019 12:22 PM

## 2019-08-05 NOTE — PROGRESS NOTES
[facility]    Hospitalist Progress Note    Assessment & Plan   Yoselyn Cui is a 87 year old female history of CAD, recent non-ST elevation and three-vessel complex CAD presents to the hospital with complaints of sudden onset of chills, shortness of breath and chest pain.  1.  Sepsis: Possibly secondary to UTI and acute bronchitis    Continue IV cefepime for now, will stop vancomycin, her CT shows possible acute bronchitis, no signs of pneumonia noted.  Her lactate levels were within normal limit.    Continue nebs as scheduled and PRN albuterol.    Patient is currently on her baseline O2 need of 2 L.    Monitor urine and blood cultures, urine culture growing non-lactose .    De-escalate antibiotics depending on cultures.  2.  Right-sided chest pain    She has a very complex history of CAD, current chest pain appears atypical and on the right side, CT chest negative for PE.    Troponins negative, BNP slightly elevated.  3.Coronary artery disease, recent MI with recent drug-eluting stents, aortic dissection secondary to catheter trauma, further requiring drug-eluting stents.  CT did not show any dissection.  The patient seems to be stable from the cardiac point.  We will continue all of her home medications with aspirin, Plavix, carvedilol, Lipitor and lisinopril.  4.  Hypertension    Her blood pressures are on the lower side now, possibly secondary to underlying sepsis.  She had already received 2 doses of nitroglycerin on her way to the hospital.  Will monitor her blood pressures closely.  Use BP meds with parameters.  5. Heme-positive stool in a female who has a known history of gastric ulcers, more recently erythematous gastritis on endoscopy in 11/2018, and on aspirin and Plavix.  I would continue aspirin and Plavix considering her recent stent placement.  She is hemodynamically stable.  We will monitor her hemoglobin every 6 hours.  We will type and cross and have blood available and  transfuse if hemoglobin drops below 8.  We will place her on IV Protonix twice a day in place of her p.o. omeprazole at home.   6.Chronic iron deficiency anemia, plus acute blood loss anemia as mentioned above.  Hemoglobin earlier this month on 07/16/2019 was 13.0, dropped down to 9.7 on 07/23/2019 secondary to aortic dissection resulting intramural aortic hematoma.  Hemoglobin stable at 9.1.  PT OT consult requested, discharge disposition depending on their assessment.  She was having  home health care before.  Plan of care discussed with the daughter.  Total time spend 35 min >50% spend on coordination of care including discussion about discharge planning, plan of care.    DVT Prophylaxis: Pneumatic Compression Devices  Code Status: DNR/DNI     Disposition: Expected discharge in 1 to 2 days possibly back to home with home health care    Milena Alanis MD  Text Page   (7am to 6pm)    Interval History   Patient is resting, did not sleep well, denies any pain, continues to be on 2 L of oxygen.  She uses 2 L at home.  She lives at home with her daughter.  Her oldest daughter is near the bedside.  Over the last 1 week patient has noticed increased shortness of breath, feels that she is close to baseline.  Was very weak in the morning getting up and walking to the restroom and family is wondering whether disposition back home would be possible or not, its their choice to have the patient back home.    -Data reviewed today: I reviewed all new labs and imaging results over the last 24 hours.     Physical Exam   Temp: 96.9  F (36.1  C) Temp src: Temporal BP: 94/46 Pulse: 93 Heart Rate: 80 Resp: 16 SpO2: 93 % O2 Device: None (Room air) Oxygen Delivery: 1 LPM  Vitals:    08/04/19 1233 08/05/19 0452   Weight: 46.7 kg (103 lb) 48.7 kg (107 lb 4.8 oz)     Vital Signs with Ranges  Temp:  [96.1  F (35.6  C)-104  F (40  C)] 96.9  F (36.1  C)  Pulse:  [] 93  Heart Rate:  [] 80  Resp:  [16-32] 16  BP: ()/(46-80)  94/46  SpO2:  [85 %-100 %] 93 %  I/O last 3 completed shifts:  In: 180 [P.O.:180]  Out: -     Constitutional: Awake, alert, cooperative, no apparent distress  Respiratory: Bilateral scattered wheezes noted, but sounds reduced at the bases.  Cardiovascular: Regular rate and rhythm, normal S1 and S2, and no murmur noted  GI: Normal bowel sounds, soft, non-distended, non-tender  Skin/Integumen: No rashes, no cyanosis, 1+ edema bilateral lower extremities neuro : moving all 4 extremities, no focal deficit noted     Medications       amLODIPine  5 mg Oral Daily     aspirin  81 mg Oral Daily     atorvastatin  20 mg Oral At Bedtime     carvedilol  12.5 mg Oral BID w/meals     ceFEPIme (MAXIPIME) IV  2 g Intravenous Q12H     clopidogrel  75 mg Oral Daily     ferrous sulfate  325 mg Oral BID     fluticasone  1 puff Inhalation Daily     ipratropium - albuterol 0.5 mg/2.5 mg/3 mL  3 mL Nebulization 4x daily     lisinopril  40 mg Oral Daily     multivitamin w/minerals  1 tablet Oral Daily     pantoprazole  40 mg Intravenous BID     sodium chloride (PF)  3 mL Intracatheter Q8H     tolterodine ER  4 mg Oral QAM     vancomycin in dextrose  1,000 mg Intravenous Q24H       Data   Recent Labs   Lab 08/05/19  0607 08/04/19  2351 08/04/19  1812 08/04/19  1240   WBC 12.3*  --   --  18.3*   HGB 9.1* 8.4* 9.4* 9.2*   MCV 92  --   --  94     --   --  388   INR  --   --   --  1.27*     --   --  138   POTASSIUM 4.1  --   --  4.3   CHLORIDE 108  --   --  107   CO2 24  --   --  21   BUN 23  --   --  19   CR 0.84  --   --  0.75   ANIONGAP 5  --   --  10   GOLDY 7.6*  --   --  7.9*   GLC 85  --   --  101*   ALBUMIN  --   --   --  2.5*   PROTTOTAL  --   --   --  6.4*   BILITOTAL  --   --   --  0.5   ALKPHOS  --   --   --  89   ALT  --   --   --  22   AST  --   --   --  21   TROPI  --   --   --  0.030     Recent Labs   Lab 08/05/19  0607 08/04/19  1240   GLC 85 101*       Imaging:   Recent Results (from the past 24 hour(s))   XR Chest  Port 1 View    Narrative    PORTABLE CHEST ONE VIEW  8/4/2019 12:51 PM     HISTORY: Shortness of breath.    COMPARISON: 7/22/2019.      Impression    IMPRESSION: Stable cardiac silhouette and left chest wall pacemaker.  Slightly increased prominence of ill-defined focal opacity in the  right lower lung which could represent subsegmental atelectasis versus  worsening airspace disease. Mild subsegmental atelectasis in the left  lower lung. Mild interstitial pulmonary edema has increased.  Calcification seen in the aortic knob.    VANESSA ROUSE MD   CT Chest Pulmonary Embolism w Contrast    Narrative    CT CHEST PULMONARY EMBOLISM WITH CONTRAST  8/4/2019 1:39 PM     HISTORY: PE suspected, intermediate problem, positive d-dimer.    COMPARISON: 7/7/2019.    TECHNIQUE: Volumetric helical acquisition of CT images of the chest  from the lung apices to the kidneys were acquired after the  administration of 52 mL Isovue-370  IV contrast. Radiation dose for  this scan was reduced using automated exposure control, adjustment of  the mA and/or kV according to patient size, or iterative  reconstruction technique.    FINDINGS: There is no pulmonary embolism. Moderately extensive  bronchial wall thickening and mucous plugging. Mild bibasilar  atelectasis and/or infiltrate, at least moderate emphysema. Stable 4  mm nodule in the central left upper lobe, image 95. Stable nodular  density in the left upper lobe centrally, image 142. The heart is not  enlarged. There are extensive coronary vascular calcifications and/or  stents consistent with coronary artery disease. Thoracic aorta is  atherosclerotic without evidence of aneurysm. The aorta is  insufficiently opacified to evaluate for dissection. There is no  pleural or pericardial effusion.  There is no pneumothorax. Adrenal  glands are normal. Remainder of the visualized upper abdomen is  unremarkable.      Impression    IMPRESSION:   1. No pulmonary embolism demonstrated.  2. No  thoracic aortic aneurysm.   3. Moderate emphysema. Moderate bronchial wall thickening and mucous  plugging.    STEVAN BURNS MD

## 2019-08-06 ENCOUNTER — APPOINTMENT (OUTPATIENT)
Dept: PHYSICAL THERAPY | Facility: CLINIC | Age: 84
DRG: 871 | End: 2019-08-06
Payer: MEDICARE

## 2019-08-06 LAB
CREAT SERPL-MCNC: 0.68 MG/DL (ref 0.52–1.04)
ERYTHROCYTE [DISTWIDTH] IN BLOOD BY AUTOMATED COUNT: 14.9 % (ref 10–15)
GFR SERPL CREATININE-BSD FRML MDRD: 78 ML/MIN/{1.73_M2}
HCT VFR BLD AUTO: 31 % (ref 35–47)
HGB BLD-MCNC: 9.7 G/DL (ref 11.7–15.7)
MCH RBC QN AUTO: 28.7 PG (ref 26.5–33)
MCHC RBC AUTO-ENTMCNC: 31.3 G/DL (ref 31.5–36.5)
MCV RBC AUTO: 92 FL (ref 78–100)
PLATELET # BLD AUTO: 288 10E9/L (ref 150–450)
RBC # BLD AUTO: 3.38 10E12/L (ref 3.8–5.2)
WBC # BLD AUTO: 8.6 10E9/L (ref 4–11)

## 2019-08-06 PROCEDURE — 12000000 ZZH R&B MED SURG/OB

## 2019-08-06 PROCEDURE — 97530 THERAPEUTIC ACTIVITIES: CPT | Mod: GP

## 2019-08-06 PROCEDURE — 25000128 H RX IP 250 OP 636: Performed by: INTERNAL MEDICINE

## 2019-08-06 PROCEDURE — 82565 ASSAY OF CREATININE: CPT | Performed by: INTERNAL MEDICINE

## 2019-08-06 PROCEDURE — 36415 COLL VENOUS BLD VENIPUNCTURE: CPT | Performed by: INTERNAL MEDICINE

## 2019-08-06 PROCEDURE — 99232 SBSQ HOSP IP/OBS MODERATE 35: CPT | Performed by: INTERNAL MEDICINE

## 2019-08-06 PROCEDURE — 87040 BLOOD CULTURE FOR BACTERIA: CPT | Performed by: INTERNAL MEDICINE

## 2019-08-06 PROCEDURE — 94640 AIRWAY INHALATION TREATMENT: CPT | Mod: 76

## 2019-08-06 PROCEDURE — 97116 GAIT TRAINING THERAPY: CPT | Mod: GP

## 2019-08-06 PROCEDURE — 25000125 ZZHC RX 250: Performed by: INTERNAL MEDICINE

## 2019-08-06 PROCEDURE — 25000132 ZZH RX MED GY IP 250 OP 250 PS 637: Performed by: INTERNAL MEDICINE

## 2019-08-06 PROCEDURE — 40000275 ZZH STATISTIC RCP TIME EA 10 MIN

## 2019-08-06 PROCEDURE — 85027 COMPLETE CBC AUTOMATED: CPT | Performed by: INTERNAL MEDICINE

## 2019-08-06 PROCEDURE — C9113 INJ PANTOPRAZOLE SODIUM, VIA: HCPCS | Performed by: INTERNAL MEDICINE

## 2019-08-06 PROCEDURE — 94640 AIRWAY INHALATION TREATMENT: CPT

## 2019-08-06 PROCEDURE — 99221 1ST HOSP IP/OBS SF/LOW 40: CPT | Performed by: INTERNAL MEDICINE

## 2019-08-06 RX ORDER — PANTOPRAZOLE SODIUM 40 MG/1
40 TABLET, DELAYED RELEASE ORAL
Status: DISCONTINUED | OUTPATIENT
Start: 2019-08-06 | End: 2019-08-08 | Stop reason: HOSPADM

## 2019-08-06 RX ORDER — ALBUTEROL SULFATE 0.83 MG/ML
2.5 SOLUTION RESPIRATORY (INHALATION)
Status: DISCONTINUED | OUTPATIENT
Start: 2019-08-06 | End: 2019-08-08 | Stop reason: HOSPADM

## 2019-08-06 RX ADMIN — AMLODIPINE BESYLATE 5 MG: 5 TABLET ORAL at 08:58

## 2019-08-06 RX ADMIN — MINERAL OIL AND PETROLATUM: 150; 830 OINTMENT OPHTHALMIC at 21:00

## 2019-08-06 RX ADMIN — IPRATROPIUM BROMIDE AND ALBUTEROL SULFATE 3 ML: .5; 3 SOLUTION RESPIRATORY (INHALATION) at 20:24

## 2019-08-06 RX ADMIN — CARVEDILOL 12.5 MG: 12.5 TABLET, FILM COATED ORAL at 08:58

## 2019-08-06 RX ADMIN — IPRATROPIUM BROMIDE AND ALBUTEROL SULFATE 3 ML: .5; 3 SOLUTION RESPIRATORY (INHALATION) at 07:37

## 2019-08-06 RX ADMIN — PANTOPRAZOLE SODIUM 40 MG: 40 TABLET, DELAYED RELEASE ORAL at 16:12

## 2019-08-06 RX ADMIN — CARVEDILOL 12.5 MG: 12.5 TABLET, FILM COATED ORAL at 18:11

## 2019-08-06 RX ADMIN — FLUTICASONE FUROATE 1 PUFF: 200 POWDER RESPIRATORY (INHALATION) at 07:37

## 2019-08-06 RX ADMIN — CEFEPIME HYDROCHLORIDE 2 G: 2 INJECTION, POWDER, FOR SOLUTION INTRAVENOUS at 16:12

## 2019-08-06 RX ADMIN — CLOPIDOGREL BISULFATE 75 MG: 75 TABLET ORAL at 08:58

## 2019-08-06 RX ADMIN — LISINOPRIL 40 MG: 40 TABLET ORAL at 08:59

## 2019-08-06 RX ADMIN — ASPIRIN 81 MG: 81 TABLET, COATED ORAL at 08:58

## 2019-08-06 RX ADMIN — IPRATROPIUM BROMIDE AND ALBUTEROL SULFATE 3 ML: .5; 3 SOLUTION RESPIRATORY (INHALATION) at 15:21

## 2019-08-06 RX ADMIN — CEFEPIME HYDROCHLORIDE 2 G: 2 INJECTION, POWDER, FOR SOLUTION INTRAVENOUS at 05:17

## 2019-08-06 RX ADMIN — MULTIPLE VITAMINS W/ MINERALS TAB 1 TABLET: TAB at 08:58

## 2019-08-06 RX ADMIN — FERROUS SULFATE TAB 325 MG (65 MG ELEMENTAL FE) 325 MG: 325 (65 FE) TAB at 08:58

## 2019-08-06 RX ADMIN — TOLTERODINE 4 MG: 4 CAPSULE, EXTENDED RELEASE ORAL at 08:59

## 2019-08-06 RX ADMIN — FERROUS SULFATE TAB 325 MG (65 MG ELEMENTAL FE) 325 MG: 325 (65 FE) TAB at 20:58

## 2019-08-06 RX ADMIN — IPRATROPIUM BROMIDE AND ALBUTEROL SULFATE 3 ML: .5; 3 SOLUTION RESPIRATORY (INHALATION) at 11:37

## 2019-08-06 RX ADMIN — PANTOPRAZOLE SODIUM 40 MG: 40 INJECTION, POWDER, FOR SOLUTION INTRAVENOUS at 08:58

## 2019-08-06 RX ADMIN — ATORVASTATIN CALCIUM 20 MG: 20 TABLET, FILM COATED ORAL at 20:58

## 2019-08-06 ASSESSMENT — MIFFLIN-ST. JEOR: SCORE: 830.31

## 2019-08-06 ASSESSMENT — ACTIVITIES OF DAILY LIVING (ADL)
ADLS_ACUITY_SCORE: 20
ADLS_ACUITY_SCORE: 19
ADLS_ACUITY_SCORE: 19
ADLS_ACUITY_SCORE: 20

## 2019-08-06 NOTE — CONSULTS
Consult Date:  08/06/2019      PRIMARY CARE DOCTOR:  Brennen Collier DO.      REQUESTING PHYSICIAN:  Dr. Rodriges.      HISTORY OF PRESENT ILLNESS:  Yoselyn Cui, an 87-year-old woman with coronary artery disease, recent guide catheter iatrogenic left main/aortic dissection during the course of complex PCI, paroxysmal atrial fibrillation, second-degree AV block status post pacemaker implantation, hypertension, cerebrovascular disease (old history of stroke), iron deficiency anemia (felt secondary to GI bleeds awaiting GI evaluation), and urosepsis, currently on antibiotic therapy, was evaluated in consultation at the request of Dr. Rodriges for right-sided chest pain.      Mrs. Cui was admitted through the emergency room on 08/04/2019 for the abrupt onset of chills, fever, leukocytosis, and sharp right-sided chest discomfort.  Urinalysis demonstrated large leukocyte esterase with greater than 182 white blood cell count and bacteria.  Her temperature was 102.3.  Her ECG showed an atrial sensed ventricular paced rhythm, unchanged from previous cardiograms.  Troponin levels were low at 0.03.      The patient was started on intravenous antibiotics.  A chest CT showed no evidence of pulmonary embolus or thoracic aneurysm.      Ms. Cui's chest discomfort has entirely resolved at this time and she feels much better after starting antibiotic therapy.      PAST MEDICAL HISTORY:   1.  Second-degree AV block, status post pacemaker implantation.   2.  Coronary artery disease.   A.  Presentation with non-ST segment elevation MI 07/2019.  Coronary angiography demonstrating focal stenosis in the mid LAD, distal right coronary, and proximal circumflex.   B.  Declined for bypass surgery.   C.  Complex percutaneous coronary intervention complicated by dissection of left main and aorta -- successfully treated with percutaneous intervention involving stent implantation in ostial and mid shaft of the left main, proximal LAD,  and mid LAD.  Residual disease in circumflex and right coronary not addressed.   3.  Paroxysmal atrial fibrillation -- had been on apixaban anticoagulation, held due to concerns regarding iron deficiency anemia and aortic dissection.  Currently on aspirin and clopidogrel alone with plans to reassess anticoagulation after GI evaluation and outpatient evaluation with usual cardiologist, Dr. Meza.   4.  Hypertension.   5.  Cerebral vascular disease -- old history of stroke with residual left-sided weakness.   6.  History of previous GI bleed -- awaiting GI evaluation.   7.  Iron deficiency anemia.   8.  Gram negative sander urosepsis -- presumptively E. coli.  On antibiotic therapy with improvement at this time.   9.  Dyslipidemia.   10.  History of tobacco use in the past.   11.  DNR/DNI status.      ALLERGIES:  NONE KNOWN.      HABITS:  The patient no longer smokes cigarettes, but had smoked for about 30 years, 1 pack per day, stopping many years ago.  She does not abuse alcohol.  She carries a DNR/DNI status.  She is  and lives with her daughter.  She has 4 children.      MEDICATIONS:   1.  Albuterol inhaler.   2.  Amlodipine 5 mg daily.   3.  Aspirin 81 daily.   4.  Atorvastatin 20 mg daily.   5.  Carvedilol 12.5 b.i.d.   6.  Cefepime 2 grams every 12 hours.   7.  Clopidogrel 75 mg daily.   8.  Iron sulfate 325 twice a day.   9.  DuoNeb inhaler 4 times daily.   10.  Lisinopril 40 mg daily.   11.  Nitroglycerin sublingual p.r.n.   12.  Pantoprazole 40 mg daily.   13.  Tolterodine (Detrol) 4 mg daily.      REVIEW OF SYSTEMS:  The patient denies typical angina.  The brief right-sided chest pain she had before coming to the ER is now resolved entirely.  She does not describe syncope, palpitations or new focal neurologic deficit.  A 10-point review of systems otherwise is negative.      PHYSICAL EXAMINATION:   GENERAL:  Demonstrates a very pleasant, cooperative and intelligent 87-year-old woman who appears  comfortable at rest.  She has a bandage over the bridge of her nose after a recent biopsy by her dermatologist.   VITAL SIGNS:  Her blood pressure was 126/70, heart rate 84 and regular.  Her temperature is 36.8.  Her heart rate   LUNGS:  Clear to percussion and auscultation at this time.   CARDIOVASCULAR:  Shows an S4 with a normal S1 and normal S2.  There is no S3.  There is no murmur or click.  Her pulses are symmetrical in the carotid, radial, brachial, femoral, popliteal, dorsalis pedis.   ABDOMEN:  Bowel sounds are present.  Liver percusses to about 7 cm and was not palpable.      LABORATORY DATA:  Her ECG shows an atrial sensed ventricular paced rhythm, unchanged from previous cardiograms.  Her chest x-ray shows chronic interstitial changes with pacemaker in good position and normal heart size.  Her cardiac enzymes are 0.03.  Her most recent echo from 06/2019 showed a normal ejection fraction with normal aortic root size.  Her CT scan shows no evidence of pulmonary embolus.  The aorta is of normal dimensions.  The radiologist was unable to comment about aortic dissection because of the volume of contrast was inadequate.      Creatinine is 0.84.  Hemoglobin is 9.7.      ASSESSMENT:  Mrs. Cui presents with gram-negative urosepsis.  Presently, from a cardiovascular standpoint she is asymptomatic on guideline-directed medical therapy.  Her systolic blood pressure is well controlled.  Her pacemaker is functioning normally.  Chest CT and clinical course suggests resolution of the recent iatrogenic catheter dissection. Per the plans of the cardiac team that cared for her at [facility]. We will continue DAPT and hold on resuming apixaban until  GI evaluation is completed, and she has opportunity to discuss the pros and cons of resuming anticoagulation with her usual cardiologist .     Her urosepsis appears  to be responding nicely to antibiotic therapy.  I have nothing to add to  her care at this time and will sign off.  Followup has been already arranged with Dr. Meza in Cardiology Clinic.      We have appreciated the opportunity to see your patient, Yoselyn Ambrose.         YAA WOODARD MD             D: 2019   T: 2019   MT: SOREN      Name:     YOSELYN AMBROSE   MRN:      [id number]        Account:       [id number]   :      1932           Consult Date:  2019      Document: [id number]

## 2019-08-06 NOTE — PLAN OF CARE
Discharge Planner PT   Patient plan for discharge: Return home with resumption of HHPT/OT  Current status: Supine to sit with  SBA. Sit to/from stand with CGA. Pt ambulates 200' with straight cane and CGA. 1 mod LOB reuiring min A and wall to maintain upright position. BP reports fatigue return to room; requests BP to be assessed - See VS FS - WNLs.   Barriers to return to prior living situation: Stairs, balance  Recommendations for discharge: Return home with resumption of HHPT/OT  Rationale for recommendations: Pt demonstrating fair strength. Anticipate continued improvement for safe discharge to home and resumption of HH services when medically stable.       Entered by: Pedro Ly 08/06/2019 4:24 PM       Recommend pt to ambulate at least three times per day in frausto with RN staff outside of PT to maintain current activity tolerance.

## 2019-08-06 NOTE — PLAN OF CARE
VS stable. Diminished LS. COKER. No complaints of pain. Tele is 100% V paced with BBB. Slept well throughout the night.

## 2019-08-06 NOTE — PROGRESS NOTES
[facility]    Medicine Progress Note - Hospitalist Service       Date of Admission:  8/4/2019  Assessment & Plan   Yoselyn Cui is a 87 year old female history of CAD, recent non-ST elevation and three-vessel complex CAD presents to the hospital with complaints of sudden onset of chills, shortness of breath and chest pain.  She was found to have sepsis due to urinary tract infection.  Also noted to have acute bronchitis.  One blood culture from 8/4 has now become positive for E. Coli.    1.  Sepsis.  Due to urinary tract infection.  Urine culture with gram-negative rods.  One blood culture from 8/4 also now with E. coli.    8/6: Recheck 2 blood cultures today.  Sensitivities still pending.  Continue IV cefepime for now.    2.  UTI.  Urine culture from 8/4 with gram-negative rods.  Suspect that the same E. coli as noted on one blood culture from 8/4.  Awaiting sensitivities.  Continue IV cefepime for now.    3.  Bacteremia.  One blood culture from 8/4 with E. Coli.  8/6: Repeat blood cultures today.  Continue IV cefepime.    4.  COPD exacerbation.  Acute bronchitis noted on admission.  8/6: Symptoms improved today.  No wheeze on exam.  Continue inhaled fatigue is seen and duo nebs.  Albuterol more often if needed.  IV cefepime to cover sepsis as noted above.    5.  Coronary artery disease.  With recent complex issues including stent placement with complications.  Right-sided chest pain at time of admission.  Continue aspirin, clopidogrel, carvedilol, atorvastatin, and lisinopril.    8/6:  Cardiology consult.    6.  Hypertension.  Previously with episodes of hypotension.  8/6: Blood pressures improved.  Continue amlodipine, carvedilol, and lisinopril.    7.  Hyperlipidemia.  Continue atorvastatin.    8.  Chronic anemia.  Stool occult blood screening test sent from emergency room is positive.  Hemoglobin has been stable.  Hemoglobin 9.7 on 8/6.  Follow-up with gastroenterology in the outpatient  setting.    9.  Deconditioning.  Physical therapy following.    Diet: Combination Diet Regular Diet Adult; Low Saturated Fat Na <2400mg Diet    DVT Prophylaxis: Pneumatic Compression Devices  Garcia Catheter: not present  Code Status: DNR/DNI      Disposition Plan   Expected discharge: 2 - 3 days  Entered: Eddie Rodriges DO 08/06/2019, 2:39 PM         Eddie Rodriges DO  Hospitalist Service  [facility]    ______________________________________________________________________    Interval History   Feels weak.  Shortness of breath improved today.  Denies fevers, chills, chest pain, nausea, vomiting, or diarrhea.    Data reviewed today: I reviewed all medications, new labs and imaging results over the last 24 hours.    Physical Exam   Vital Signs: Temp: 98.3  F (36.8  C) Temp src: Axillary BP: 126/70 Pulse: 86 Heart Rate: 83 Resp: 18 SpO2: 96 % O2 Device: None (Room air)    Weight: 107 lbs 14.4 oz  Gen:  NAD, A&Ox3.  Eyes:  PERRL, sclera anicteric.  OP:  MMM, no lesions.  Neck:  Supple.  CV:  Regular, +1/6 murmur.  Lung:  CTA b/l, normal effort.  Ab:  +BS, soft.  Skin:  Warm, dry to touch.  No rash.  Ext:  Mild non pitting edema LE b/l.      Data   Recent Labs   Lab 08/06/19  0648 08/05/19  0607 08/04/19  2351  08/04/19  1240   WBC 8.6 12.3*  --   --  18.3*   HGB 9.7* 9.1* 8.4*   < > 9.2*   MCV 92 92  --   --  94    250  --   --  388   INR  --   --   --   --  1.27*   NA  --  137  --   --  138   POTASSIUM  --  4.1  --   --  4.3   CHLORIDE  --  108  --   --  107   CO2  --  24  --   --  21   BUN  --  23  --   --  19   CR 0.68 0.84  --   --  0.75   ANIONGAP  --  5  --   --  10   GOLDY  --  7.6*  --   --  7.9*   GLC  --  85  --   --  101*   ALBUMIN  --   --   --   --  2.5*   PROTTOTAL  --   --   --   --  6.4*   BILITOTAL  --   --   --   --  0.5   ALKPHOS  --   --   --   --  89   ALT  --   --   --   --  22   AST  --   --   --   --  21   TROPI  --   --   --   --  0.030    < > = values in this  interval not displayed.

## 2019-08-06 NOTE — CONSULTS
Assessment     The patient has gram negative ( likely E.Coli) urosepsis and the atypical chest pain she had upon admission has resolved. Cardiac enzymes are negative. Her ECG shows an atrial paced Ventricular paced rhythm, unchanged from last ECG. The CT scan shows no evidence of aortic dissection or hematoma.     I nothing to add to her current guideline directed medical therapy including a statin, clopidogrel, ACE and beta blocker therapy. In view of her history of  GI bleeds, I agree with the current plan to avoid anticoagulation even  In the face of PAF ( a decision already made by her managing cardiologists during her recent [facility] admission)  as the risk of bleed likely exceeds the potential benefit at this time)  Given her DNR/DNI status and recent complications with PCI, I would avoid further cardiac testing in the absence of disabling anginal symptoms. Her pacemaker is already followed in device clinic, She will see her usual cardiologist  in follow up once her Urosepsis GI evaluation has completed and a final decision can be made in regards to the pros and cons of long term anticoagulation.    We have signed off this case.       Dr. Chaidez

## 2019-08-06 NOTE — PLAN OF CARE
Hx: Pt was admitted on 08/04 for sepsis possibly 2/2 UTI and acute bronchitis. Pt is being treated for R.sided chest pain. Pt has recently admitted for a NSTEMI and CAD. PMH includes HTN, iron deficiency, and PUD.  Orientation: A&OX4, Ambulation: 1X c/ GB & walker.  V/S: SBP in 100's, otherwise VSS, Pt is on RA , Pain: Denies  Tele/CV: 100% V-paced, BBB.  LS: Diminished throughout, Pt experiences COKER.  : Voids spontaneously  GI: Active & audible X4.  IV: SL, Flushes well.  Plan: Return to prior living arrangement in 1 - 2 days c/ home care.

## 2019-08-06 NOTE — PLAN OF CARE
Orientation: Alert and oriented x 4  VSS. 94% on RA.   Tele:100% V-Paced w/ BBB and 1*AVB. HR 84.   LS: Diminished and clear  GI:  Passing gas. No BM. Denies N/V.   : Adequate urine output.   Skin: clean and dry w/ bruising   Activity: Assist x 1 w/ cane and GB. . Pt ambulated comfortably throughout shift.   Pain: 0/10. Denies  Plan: Continue with current cares. Hx of A fib, CVA, HTN, Anemia, angio w/ stent placement in July 19. DNR/DNI. Trops negative, positive for UTI and Acute Bronchitis. On Cefepime IV. Hgb 9.7, Blood cultures pending. Plan discharge in 2-3 days.

## 2019-08-07 ENCOUNTER — APPOINTMENT (OUTPATIENT)
Dept: PHYSICAL THERAPY | Facility: CLINIC | Age: 84
DRG: 871 | End: 2019-08-07
Payer: MEDICARE

## 2019-08-07 LAB
CREAT SERPL-MCNC: 0.67 MG/DL (ref 0.52–1.04)
GFR SERPL CREATININE-BSD FRML MDRD: 79 ML/MIN/{1.73_M2}

## 2019-08-07 PROCEDURE — 99232 SBSQ HOSP IP/OBS MODERATE 35: CPT | Performed by: INTERNAL MEDICINE

## 2019-08-07 PROCEDURE — 25000128 H RX IP 250 OP 636: Performed by: INTERNAL MEDICINE

## 2019-08-07 PROCEDURE — 36415 COLL VENOUS BLD VENIPUNCTURE: CPT | Performed by: INTERNAL MEDICINE

## 2019-08-07 PROCEDURE — 25000132 ZZH RX MED GY IP 250 OP 250 PS 637: Performed by: INTERNAL MEDICINE

## 2019-08-07 PROCEDURE — 40000274 ZZH STATISTIC RCP CONSULT EA 30 MIN

## 2019-08-07 PROCEDURE — 25000125 ZZHC RX 250: Performed by: INTERNAL MEDICINE

## 2019-08-07 PROCEDURE — 40000275 ZZH STATISTIC RCP TIME EA 10 MIN

## 2019-08-07 PROCEDURE — 94640 AIRWAY INHALATION TREATMENT: CPT | Mod: 76

## 2019-08-07 PROCEDURE — 94640 AIRWAY INHALATION TREATMENT: CPT

## 2019-08-07 PROCEDURE — 12000000 ZZH R&B MED SURG/OB

## 2019-08-07 PROCEDURE — 97530 THERAPEUTIC ACTIVITIES: CPT | Mod: GP | Performed by: PHYSICAL THERAPY ASSISTANT

## 2019-08-07 PROCEDURE — 97116 GAIT TRAINING THERAPY: CPT | Mod: GP | Performed by: PHYSICAL THERAPY ASSISTANT

## 2019-08-07 PROCEDURE — 82565 ASSAY OF CREATININE: CPT | Performed by: INTERNAL MEDICINE

## 2019-08-07 RX ORDER — BISACODYL 10 MG
10 SUPPOSITORY, RECTAL RECTAL DAILY PRN
Status: DISCONTINUED | OUTPATIENT
Start: 2019-08-07 | End: 2019-08-08 | Stop reason: HOSPADM

## 2019-08-07 RX ORDER — CEFTRIAXONE 1 G/1
1 INJECTION, POWDER, FOR SOLUTION INTRAMUSCULAR; INTRAVENOUS EVERY 24 HOURS
Status: DISCONTINUED | OUTPATIENT
Start: 2019-08-07 | End: 2019-08-08

## 2019-08-07 RX ADMIN — PANTOPRAZOLE SODIUM 40 MG: 40 TABLET, DELAYED RELEASE ORAL at 16:04

## 2019-08-07 RX ADMIN — CARVEDILOL 12.5 MG: 12.5 TABLET, FILM COATED ORAL at 18:16

## 2019-08-07 RX ADMIN — IPRATROPIUM BROMIDE AND ALBUTEROL SULFATE 3 ML: .5; 3 SOLUTION RESPIRATORY (INHALATION) at 08:10

## 2019-08-07 RX ADMIN — PANTOPRAZOLE SODIUM 40 MG: 40 TABLET, DELAYED RELEASE ORAL at 06:40

## 2019-08-07 RX ADMIN — MINERAL OIL AND PETROLATUM: 150; 830 OINTMENT OPHTHALMIC at 20:21

## 2019-08-07 RX ADMIN — CEFEPIME HYDROCHLORIDE 2 G: 2 INJECTION, POWDER, FOR SOLUTION INTRAVENOUS at 06:08

## 2019-08-07 RX ADMIN — LISINOPRIL 40 MG: 40 TABLET ORAL at 09:08

## 2019-08-07 RX ADMIN — FLUTICASONE FUROATE 1 PUFF: 200 POWDER RESPIRATORY (INHALATION) at 08:10

## 2019-08-07 RX ADMIN — TOLTERODINE 4 MG: 4 CAPSULE, EXTENDED RELEASE ORAL at 09:08

## 2019-08-07 RX ADMIN — IPRATROPIUM BROMIDE AND ALBUTEROL SULFATE 3 ML: .5; 3 SOLUTION RESPIRATORY (INHALATION) at 15:50

## 2019-08-07 RX ADMIN — AMLODIPINE BESYLATE 5 MG: 5 TABLET ORAL at 09:08

## 2019-08-07 RX ADMIN — CLOPIDOGREL BISULFATE 75 MG: 75 TABLET ORAL at 09:08

## 2019-08-07 RX ADMIN — MULTIPLE VITAMINS W/ MINERALS TAB 1 TABLET: TAB at 09:08

## 2019-08-07 RX ADMIN — ASPIRIN 81 MG: 81 TABLET, COATED ORAL at 09:08

## 2019-08-07 RX ADMIN — FERROUS SULFATE TAB 325 MG (65 MG ELEMENTAL FE) 325 MG: 325 (65 FE) TAB at 20:21

## 2019-08-07 RX ADMIN — IPRATROPIUM BROMIDE AND ALBUTEROL SULFATE 3 ML: .5; 3 SOLUTION RESPIRATORY (INHALATION) at 11:52

## 2019-08-07 RX ADMIN — CEFTRIAXONE SODIUM 1 G: 1 INJECTION, POWDER, FOR SOLUTION INTRAMUSCULAR; INTRAVENOUS at 16:05

## 2019-08-07 RX ADMIN — IPRATROPIUM BROMIDE AND ALBUTEROL SULFATE 3 ML: .5; 3 SOLUTION RESPIRATORY (INHALATION) at 20:46

## 2019-08-07 RX ADMIN — FERROUS SULFATE TAB 325 MG (65 MG ELEMENTAL FE) 325 MG: 325 (65 FE) TAB at 09:08

## 2019-08-07 RX ADMIN — SENNOSIDES AND DOCUSATE SODIUM 1 TABLET: 8.6; 5 TABLET ORAL at 06:40

## 2019-08-07 RX ADMIN — ATORVASTATIN CALCIUM 20 MG: 20 TABLET, FILM COATED ORAL at 20:21

## 2019-08-07 RX ADMIN — CARVEDILOL 12.5 MG: 12.5 TABLET, FILM COATED ORAL at 09:08

## 2019-08-07 ASSESSMENT — ACTIVITIES OF DAILY LIVING (ADL)
ADLS_ACUITY_SCORE: 19

## 2019-08-07 ASSESSMENT — MIFFLIN-ST. JEOR: SCORE: 819.42

## 2019-08-07 NOTE — PLAN OF CARE
ORIENTATION: A&O  VS: WNL  TELE: 100% V-Paced w/BBB  LUNGS: 94% RA; Diminished  : Incontinent at times  GI: Last BM 8/3/19  IV:  Saline Locked  PAIN: Denies  ACTIVITY: Assist x 1 w/cane and gait belt  DIET: Low fat/sodium  PLAN: Continue IV Abx; Possible discharge 2-3 days

## 2019-08-07 NOTE — PLAN OF CARE
Please see flowsheets for detailed vital signs and assessments.   Neuro: A&O  Vital Signs: stable  Pain: denies  O2: mid 90s RA  Tele: 100% v paced, BBB  Respiratory: LS dim  GI: pt reports no BM for 3 days  : voiding w/o difficulty  Skin: bruised, scabbing  Activity: assist 1 with gait belt, cane  IVF: saline locked  Notable labs: Hgb 9.7, WBC trending down at 8.6  Protocols: NA  Consults: Cardiology, PT  Plan: IV antibiotics, PT  Discharge: 2-3 days home with HHPT

## 2019-08-07 NOTE — PLAN OF CARE
"Discharge Planner PT   Patient plan for discharge: home maybe tomorrow  Current status: gait with SEC limited by herself as \" my legs are weaker today\" able to do 75 feet with CGA and SEC  Barriers to return to prior living situation: limited tolerance to mobility, stairs  Recommendations for discharge:     Return home with resumption of HHPT/OT per plan established by the PT.    Rationale for recommendations: Pt demonstrating fair strength. Anticipate continued improvement for safe discharge to home and resumption of HH services when medically stable. Entered by: Tia Hancock 08/07/2019 11:57 AM       "

## 2019-08-07 NOTE — PROGRESS NOTES
[facility]    Medicine Progress Note - Hospitalist Service       Date of Admission:  8/4/2019  Assessment & Plan   Yoselyn Cui is a 87 year old female history of CAD, recent non-ST elevation and three-vessel complex CAD presents to the hospital with complaints of sudden onset of chills, shortness of breath and chest pain.  She was found to have sepsis due to urinary tract infection.  Also noted to have acute bronchitis.  One blood culture from 8/4 has now become positive for E. Coli.     1.  Sepsis.  Due to urinary tract infection.  Urine culture with gram-negative rods.  One blood culture from 8/4 also now with E. coli.    8/6: Recheck 2 blood cultures today.  Sensitivities still pending.  Continue IV cefepime for now.  8/7.  Sensitivities returned.  E. coli pansensitive.  Change cefepime to IV ceftriaxone.     2.  UTI.  Urine culture from 8/4 with gram-negative rods.  Suspect that the same E. coli as noted on one blood culture from 8/4.  Awaiting sensitivities.   8/7.  Sensitivities now return.  E. coli pansensitive.  Change IV cefepime to IV ceftriaxone.     3.  Bacteremia.  One blood culture from 8/4 with E. Coli.  8/6: Repeat blood cultures today.  Continue IV cefepime.  8/7.  Change cefepime to IV ceftriaxone.     4.  COPD exacerbation.  Acute bronchitis noted on admission.  8/6: Symptoms improved today.  No wheeze on exam.  Continue inhaled fatigue is seen and duo nebs.  Albuterol more often if needed.  IV cefepime to cover sepsis as noted above.  8/7.  Symptoms continue to be improved.  No change.     5.  Coronary artery disease.  With recent complex issues including stent placement with complications.  Right-sided chest pain at time of admission.  Continue aspirin, clopidogrel, carvedilol, atorvastatin, and lisinopril.    8/6:  Cardiology consult.  8/7.  Continue medical management.     6.  Hypertension.  Previously with episodes of hypotension.  8/6: Blood pressures improved.  Continue  amlodipine, carvedilol, and lisinopril.     7.  Hyperlipidemia.  Continue atorvastatin.     8.  Chronic anemia.  Stool occult blood screening test sent from emergency room is positive.  Hemoglobin has been stable.  Hemoglobin 9.7 on 8/6.  Follow-up with gastroenterology in the outpatient setting.     9.  Deconditioning.  Physical therapy following.    10.  Constipation.  Senna as needed.  8/7.  Add Dulcolax as needed.    Diet: Combination Diet Regular Diet Adult; Low Saturated Fat Na <2400mg Diet    DVT Prophylaxis: Pneumatic Compression Devices  Garcia Catheter: not present  Code Status: DNR/DNI      Disposition Plan   Expected discharge: Tomorrow, recommended to prior living arrangement       Eddie Rodriges,   Hospitalist Service  [facility]    ______________________________________________________________________    Interval History   Feeling better.  Weakness improved.  Feels constipated.  Denies chest pain, shortness of breath, fevers, chills, nausea, vomiting, or diarrhea.    Data reviewed today: I reviewed all medications, new labs and imaging results over the last 24 hours.     Physical Exam   Vital Signs: Temp: 97.7  F (36.5  C) Temp src: Tympanic BP: 132/70   Heart Rate: 79 Resp: 18 SpO2: 98 % O2 Device: None (Room air)    Weight: 105 lbs 8 oz  Gen:  NAD, A&Ox3.  Eyes:  PERRL, sclera anicteric.  OP:  MMM, no lesions.  Neck:  Supple.  CV:  Regular, no murmurs.  Lung:  CTA b/l, normal effort.  Ab:  +BS, soft.  Skin:  Warm, dry to touch.  No rash.  Ext:  No pitting edema LE b/l.      Data   Recent Labs   Lab 08/07/19  0754 08/06/19  0648 08/05/19  0607 08/04/19  2351  08/04/19  1240   WBC  --  8.6 12.3*  --   --  18.3*   HGB  --  9.7* 9.1* 8.4*   < > 9.2*   MCV  --  92 92  --   --  94   PLT  --  288 250  --   --  388   INR  --   --   --   --   --  1.27*   NA  --   --  137  --   --  138   POTASSIUM  --   --  4.1  --   --  4.3   CHLORIDE  --   --  108  --   --  107   CO2  --   --  24  --    --  21   BUN  --   --  23  --   --  19   CR 0.67 0.68 0.84  --   --  0.75   ANIONGAP  --   --  5  --   --  10   GOLDY  --   --  7.6*  --   --  7.9*   GLC  --   --  85  --   --  101*   ALBUMIN  --   --   --   --   --  2.5*   PROTTOTAL  --   --   --   --   --  6.4*   BILITOTAL  --   --   --   --   --  0.5   ALKPHOS  --   --   --   --   --  89   ALT  --   --   --   --   --  22   AST  --   --   --   --   --  21   TROPI  --   --   --   --   --  0.030    < > = values in this interval not displayed.

## 2019-08-08 ENCOUNTER — APPOINTMENT (OUTPATIENT)
Dept: PHYSICAL THERAPY | Facility: CLINIC | Age: 84
DRG: 871 | End: 2019-08-08
Payer: MEDICARE

## 2019-08-08 ENCOUNTER — TELEPHONE (OUTPATIENT)
Dept: CARDIOLOGY | Facility: CLINIC | Age: 84
End: 2019-08-08

## 2019-08-08 VITALS
RESPIRATION RATE: 24 BRPM | HEIGHT: 59 IN | DIASTOLIC BLOOD PRESSURE: 68 MMHG | TEMPERATURE: 98 F | BODY MASS INDEX: 21.29 KG/M2 | OXYGEN SATURATION: 96 % | WEIGHT: 105.6 LBS | HEART RATE: 75 BPM | SYSTOLIC BLOOD PRESSURE: 130 MMHG

## 2019-08-08 LAB
BACTERIA SPEC CULT: ABNORMAL
CREAT SERPL-MCNC: 0.69 MG/DL (ref 0.52–1.04)
GFR SERPL CREATININE-BSD FRML MDRD: 78 ML/MIN/{1.73_M2}
Lab: ABNORMAL
Lab: ABNORMAL
SPECIMEN SOURCE: ABNORMAL
SPECIMEN SOURCE: ABNORMAL

## 2019-08-08 PROCEDURE — 36415 COLL VENOUS BLD VENIPUNCTURE: CPT | Performed by: INTERNAL MEDICINE

## 2019-08-08 PROCEDURE — 25000125 ZZHC RX 250: Performed by: INTERNAL MEDICINE

## 2019-08-08 PROCEDURE — 25000132 ZZH RX MED GY IP 250 OP 250 PS 637: Performed by: INTERNAL MEDICINE

## 2019-08-08 PROCEDURE — 97530 THERAPEUTIC ACTIVITIES: CPT | Mod: GP | Performed by: PHYSICAL THERAPIST

## 2019-08-08 PROCEDURE — 94640 AIRWAY INHALATION TREATMENT: CPT | Mod: 76

## 2019-08-08 PROCEDURE — 82565 ASSAY OF CREATININE: CPT | Performed by: INTERNAL MEDICINE

## 2019-08-08 PROCEDURE — 94640 AIRWAY INHALATION TREATMENT: CPT

## 2019-08-08 PROCEDURE — 40000275 ZZH STATISTIC RCP TIME EA 10 MIN

## 2019-08-08 PROCEDURE — 99239 HOSP IP/OBS DSCHRG MGMT >30: CPT | Performed by: INTERNAL MEDICINE

## 2019-08-08 RX ORDER — CEFUROXIME AXETIL 500 MG/1
500 TABLET ORAL 2 TIMES DAILY
Qty: 10 TABLET | Refills: 0 | Status: SHIPPED | OUTPATIENT
Start: 2019-08-08 | End: 2019-08-13

## 2019-08-08 RX ADMIN — PANTOPRAZOLE SODIUM 40 MG: 40 TABLET, DELAYED RELEASE ORAL at 06:54

## 2019-08-08 RX ADMIN — TOLTERODINE 4 MG: 4 CAPSULE, EXTENDED RELEASE ORAL at 10:01

## 2019-08-08 RX ADMIN — CLOPIDOGREL BISULFATE 75 MG: 75 TABLET ORAL at 10:01

## 2019-08-08 RX ADMIN — AMLODIPINE BESYLATE 5 MG: 5 TABLET ORAL at 10:01

## 2019-08-08 RX ADMIN — FLUTICASONE FUROATE 1 PUFF: 200 POWDER RESPIRATORY (INHALATION) at 07:58

## 2019-08-08 RX ADMIN — MULTIPLE VITAMINS W/ MINERALS TAB 1 TABLET: TAB at 10:00

## 2019-08-08 RX ADMIN — IPRATROPIUM BROMIDE AND ALBUTEROL SULFATE 3 ML: .5; 3 SOLUTION RESPIRATORY (INHALATION) at 07:58

## 2019-08-08 RX ADMIN — FERROUS SULFATE TAB 325 MG (65 MG ELEMENTAL FE) 325 MG: 325 (65 FE) TAB at 10:01

## 2019-08-08 RX ADMIN — LISINOPRIL 40 MG: 40 TABLET ORAL at 10:01

## 2019-08-08 RX ADMIN — ASPIRIN 81 MG: 81 TABLET, COATED ORAL at 10:01

## 2019-08-08 RX ADMIN — IPRATROPIUM BROMIDE AND ALBUTEROL SULFATE 3 ML: .5; 3 SOLUTION RESPIRATORY (INHALATION) at 11:27

## 2019-08-08 RX ADMIN — CARVEDILOL 12.5 MG: 12.5 TABLET, FILM COATED ORAL at 10:01

## 2019-08-08 ASSESSMENT — ACTIVITIES OF DAILY LIVING (ADL)
ADLS_ACUITY_SCORE: 19

## 2019-08-08 ASSESSMENT — MIFFLIN-ST. JEOR: SCORE: 819.88

## 2019-08-08 NOTE — DISCHARGE SUMMARY
Patient alert and oriented  No IV access  VSS, on room air  Discharge medications given to patient  Discharge instructions discussed with patient, daughter present, all questions answered  Patient left floor via wheelchair with all belongings  Daughter transporting back to prior living- home with daughter

## 2019-08-08 NOTE — PLAN OF CARE
Patient A&O- VSS- afebrile - Tele 100% V paced rhythm- IV loss, MD ok'd to leave out as pt may discharge tomorrow. Ambulates with assist of 1 and use of her cane.  Voiding in adequate amounts.  No BM today, but did receive a stool softener in early AM. Left sided weakness d/t pervious stroke.  Will continue POC.

## 2019-08-08 NOTE — TELEPHONE ENCOUNTER
Patient was evaluated by cardiology while inpatient for left sided chest pain (resolved after abx started). Called patient and left  to discuss any post hospital d/c questions she may have, review medication changes, and confirm f/u appts. RN advised patient in  that she has an apt scheduled on 8/19/19 with [facility]GR[facility] Britni Joseph (12:45pm lab and 1:50pm with [facility]GR[facility]). Patient advised in  to call clinic with any cardiac related questions or concerns prior to this scheduled ingrid't.                   8/6/19 cardiology progress note  ASSESSMENT:  Mrs. Cui presents with gram-negative urosepsis.  Presently, from a cardiovascular standpoint she is asymptomatic on guideline-directed medical therapy.  Her systolic blood pressure is well controlled.  Her pacemaker is functioning normally.  Chest CT and clinical course suggests resolution of the recent iatrogenic catheter dissection. Per the plans of the cardiac team that cared for her at [facility]. We will continue DAPT and hold on resuming apixaban until  GI evaluation is completed, and she has opportunity to discuss the pros and cons of resuming anticoagulation with her usual cardiologist .     Her urosepsis appears  to be responding nicely to antibiotic therapy.  I have nothing to add to her care at this time and will sign off.  Followup has been already arranged with Dr. Meza in Cardiology Clinic.      We have appreciated the opportunity to see your patient, Yoselyn Cui.         YAA WOODARD MD

## 2019-08-08 NOTE — PLAN OF CARE
Discharge Planner PT   Patient plan for discharge: home with home PT  Current status: Pt was agreeable to final session with dtg present for education and training PRN. Pt continues to need CGA with SEC. Pt has foot drop decreased speed increased gait variability and 2 LOB needing CGA to recover. Pt was able to ambulate 100 feet with SEC. Pt able to perform 6 stairs with rail with CGA ascending, min A for descending of stairs. DTG present for education of CGA at all times with ambulation. Pt was willing to trial a platform walker with education on benefits of improving stability and inde. Pt did improve to SBA with platform walker with straight walking but needed assist with turning due to weakness in L UE. Pt also trialed 4ww with L handle placed at elbow height and discussed with dtg that a platform attachment could be useful as a trial. Pt declined all platform or 4WW.   Barriers to return to prior living situation: will need CGA with ambulation and min A on stairs  Recommendations for discharge:     Home with home PT/OT  Rationale for recommendations: Pt demonstrating fair strength. Anticipate continued improvement for safe discharge to home and resumption of  services when medically stable. Entered by: Gayathri Ronquillo 08/08/2019 2:50 PM     Physical Therapy Discharge Summary    Reason for therapy discharge:    Discharged to home with home therapy.    Progress towards therapy goal(s). See goals on Care Plan in [facility] electronic health record for goal details.  Goals partially met, needing CGA with ambulation, min A with stairs.     Therapy recommendation(s):    Continued therapy is recommended.  Rationale/Recommendations: Pt is below baseline for functional mobility and strength. Pt would benefit from continued skilled therapy to address these deficits.

## 2019-08-08 NOTE — DISCHARGE SUMMARY
[facility]  Hospitalist Discharge Summary       Date of Admission:  8/4/2019  Date of Discharge:  8/8/2019  Discharging Provider: Eddie Rodriges,       Discharge Diagnoses   1.  Sepsis.  Due to urinary tract infection.  Urine culture with gram-negative rods.  One blood culture from 8/4 also now with E. coli.    8/6: Recheck 2 blood cultures today.  Sensitivities still pending.  Continue IV cefepime for now.  8/7.  Sensitivities returned.  E. coli pansensitive.  Change cefepime to IV ceftriaxone.  8/8 (day of discharge).  Transition from IV ceftriaxone to oral Ceftin 500 mg twice a day for the next 5 days.     2.  UTI.  Urine culture from 8/4 with gram-negative rods.  Suspect that the same E. coli as noted on one blood culture from 8/4.  Awaiting sensitivities.   8/7.  Sensitivities now return.  E. coli pansensitive.  Change IV cefepime to IV ceftriaxone.  8/8.  Transition from IV ceftriaxone to oral Ceftin 500 mg twice a day for the next 5 days.     3.  Bacteremia.  One blood culture from 8/4 with E. Coli.  8/6: Repeat blood cultures today.  Continue IV cefepime.  8/7.  Change cefepime to IV ceftriaxone.     4.  COPD exacerbation.  Acute bronchitis noted on admission.  8/6: Symptoms improved today.  No wheeze on exam.  Continue inhaled fatigue is seen and duo nebs.  Albuterol more often if needed.  IV cefepime to cover sepsis as noted above.  8/7.  Symptoms continue to be improved.  No change.     5.  Coronary artery disease.  With recent complex issues including stent placement with complications.  Right-sided chest pain at time of admission.  Continue aspirin, clopidogrel, carvedilol, atorvastatin, and lisinopril.    8/6:  Cardiology consult.  8/7.  Continue medical management.  8/8.  Follow-up with cardiology as previously scheduled.     6.  Hypertension.  Previously with episodes of hypotension.  8/6: Blood pressures improved.  Continue amlodipine, carvedilol, and  lisinopril.     7.  Hyperlipidemia.  Continue atorvastatin.     8.  Chronic anemia.  Stool occult blood screening test sent from emergency room is positive.  Hemoglobin has been stable.  Hemoglobin 9.7 on 8/6.  Follow-up with gastroenterology in the outpatient setting.     9.  Deconditioning.  Physical therapy following.     10.  Constipation.  Senna as needed.  8/7.  Add Dulcolax as needed.  8/8.  Resolved.    Follow-ups Needed After Discharge   Follow-up Appointments     Follow-up and recommended labs and tests       Follow up with primary care provider, Brennen Collier, within 7 days for   hospital follow- up.  The following labs/tests are recommended: BMP in 7   days.             Discharge Disposition   Discharged to home  Condition at discharge: Stable    Hospital Course   Yoselyn Cui is a 87 year old female history of CAD, recent non-ST elevation and three-vessel complex CAD presents to the hospital with complaints of sudden onset of chills, shortness of breath and chest pain.  She was found to have sepsis due to urinary tract infection.  Also noted to have acute bronchitis.  One blood culture from 8/4 has now become positive for E. Coli.  E. coli is noted to be sensitive to cephalosporins.  She had initially been started on IV cefepime.  Change to IV ceftriaxone on 8/7.  Has continued to improve throughout hospital stay.  Will now transition to oral antibiotics with Ceftin 500 mg twice a day for the next 5 days.  She should follow-up with cardiology as previously scheduled for her recent complicated cardiac issues.  She did have a screening stool guaiac sent from the emergency room at time of presentation.  This was positive.  Should be followed up by gastroenterology in the outpatient setting.  Her primary care physician does need to set this up.    Consultations This Hospital Stay   PHYSICAL THERAPY ADULT IP CONSULT  PHARMACY TO DOSE VANCO  CARE COORDINATOR IP CONSULT  PHYSICAL THERAPY ADULT IP  CONSULT  OCCUPATIONAL THERAPY ADULT IP CONSULT  CARDIOLOGY IP CONSULT    Code Status   DNR/DNI    Time Spent on this Encounter   I spent 35 minutes with Ms. Cui, speaking with her daughter, Maryjane, by phone, and working on discharge on 8/8/2019.       Eddie Rodriges, [facility]  ______________________________________________________________________    Physical Exam   Vital Signs: Temp: 98  F (36.7  C) Temp src: Temporal BP: 120/62 Pulse: 87 Heart Rate: 81 Resp: 20 SpO2: 99 % O2 Device: None (Room air)    Weight: 105 lbs 9.6 oz  Gen:  NAD, A&Ox3.  Eyes:  PERRL, sclera anicteric.  OP:  MMM, no lesions.  Neck:  Supple.  CV:  Regular, no murmurs.  Lung:  CTA b/l, normal effort.  Ab:  +BS, soft.  Skin:  Warm, dry to touch.  No rash.  Ext:  No pitting edema LE b/l.         Primary Care Physician   Brennen Collier    Discharge Orders      Home care nursing referral      Home Care PT Referral for Hospital Discharge      Home Care OT Referral for Hospital Discharge      Reason for your hospital stay    UTI     Follow-up and recommended labs and tests     Follow up with primary care provider, Brennen Collier, within 7 days for hospital follow- up.  The following labs/tests are recommended: BMP in 7 days.     Activity    Your activity upon discharge: activity as tolerated     MD face to face encounter    Documentation of Face to Face and Certification for Home Health Services    I certify that patient: Yoselyn Cui is under my care and that I, or a nurse practitioner or physician's assistant working with me, had a face-to-face encounter that meets the physician face-to-face encounter requirements with this patient on: 8/8/2019.    This encounter with the patient was in whole, or in part, for the following medical condition, which is the primary reason for home health care: Sepsis.    I certify that, based on my findings, the following services are medically necessary home health services: Nursing,  Occupational Therapy and Physical Therapy.    My clinical findings support the need for the above services because: Nurse is needed: To assess respiratory and cardiac status after changes in medications or other medical regimen.., Occupational Therapy Services are needed to assess and treat cognitive ability and address ADL safety due to impairment in activities of daily living. and Physical Therapy Services are needed to assess and treat the following functional impairments: weakness.    Further, I certify that my clinical findings support that this patient is homebound (i.e. absences from home require considerable and taxing effort and are for medical reasons or [facility] services or infrequently or of short duration when for other reasons) because: Leaving home is medically contraindicated for the following reason(s): Dyspnea on exertion that makes it so they cannot leave their home for needed services without clinical deterioration...    Based on the above findings. I certify that this patient is confined to the home and needs intermittent skilled nursing care, physical therapy and/or speech therapy.  The patient is under my care, and I have initiated the establishment of the plan of care.  This patient will be followed by a physician who will periodically review the plan of care.  Physician/Provider to provide follow up care: Brennen Collier    Attending hospital physician (the Medicare certified [place] provider): Eddie Rodriges  Physician Signature: See electronic signature associated with these discharge orders.  Date: 8/8/2019     Diet    Follow this diet upon discharge: Cardiac         Discharge Medications   Current Discharge Medication List      START taking these medications    Details   cefuroxime (CEFTIN) 500 MG tablet Take 1 tablet (500 mg) by mouth 2 times daily for 5 days  Qty: 10 tablet, Refills: 0    Associated Diagnoses: Sepsis due to Escherichia coli (H)         CONTINUE these medications  which have NOT CHANGED    Details   acetaminophen (TYLENOL) 325 MG tablet Take 650 mg by mouth every 6 hours as needed for mild pain       amLODIPine (NORVASC) 5 MG tablet Take 5 mg by mouth daily       aspirin (ASA) 81 MG EC tablet Take 1 tablet (81 mg) by mouth daily  Qty: 60 tablet, Refills: 0    Comments: Further refill from her PCP  Associated Diagnoses: Coronary artery disease involving native coronary artery of native heart without angina pectoris      atorvastatin (LIPITOR) 20 MG tablet Take 20 mg by mouth At Bedtime      carvedilol (COREG) 12.5 MG tablet Take 1 tablet (12.5 mg) by mouth 2 times daily (with meals)  Qty: 60 tablet, Refills: 0    Comments: Further refill from her PCP  Associated Diagnoses: Coronary artery disease involving native coronary artery of native heart without angina pectoris      clopidogrel (PLAVIX) 75 MG tablet Take 1 tablet (75 mg) by mouth daily  Qty: 90 tablet, Refills: 0    Comments: Further refill from cardiology or PCP  Associated Diagnoses: Coronary artery disease involving native coronary artery of native heart without angina pectoris      ferrous sulfate (IRON) 325 (65 FE) MG tablet Take 325 mg by mouth 2 times daily       fluticasone furoate (ARNUITY ELLIPTA) 200 MCG/ACT inhalation powder Inhale 1 puff into the lungs daily  Qty: 3 Inhaler, Refills: 0    Associated Diagnoses: Acute bronchospasm; Pneumonia of right lung due to infectious organism, unspecified part of lung      lisinopril (PRINIVIL,ZESTRIL) 40 MG tablet Take 40 mg by mouth daily      Multiple Vitamins-Minerals (ICAPS AREDS FORMULA) TABS Take 1 tablet by mouth 2 times daily       nitroGLYcerin (NITROSTAT) 0.4 MG sublingual tablet For chest pain place 1 tablet under the tongue every 5 minutes for 3 doses. If symptoms persist 5 minutes after 1st dose call 911.  Qty: 30 tablet, Refills: 0    Associated Diagnoses: Coronary artery disease involving native coronary artery of native heart without angina pectoris       OMEPRAZOLE PO Take 20 mg by mouth every morning       tolterodine (DETROL LA) 4 MG 24 hr capsule Take 4 mg by mouth every morning           Allergies   No Known Allergies

## 2019-08-08 NOTE — PROGRESS NOTES
Per pharmacy direction, patient to take first dose of oral ceftin tonight between 4-6 and second dose tomorrow morning

## 2019-08-08 NOTE — PLAN OF CARE
Patient alert and oriented, [place] in left ear, has Macular Degeneration in right eye  No IV access  VSS, on room air  Denies having pain  Up assist x1/SBA with gait belt and cane  Voiding without issue, had smear BM- dark brown/green  Lungs clear  Normoactive BS, passing gas  Pacemaker in place  Tele: 100% V-paced  Continue with plan of care

## 2019-08-08 NOTE — PROVIDER NOTIFICATION
Pt on IV antibiotics but not until 1530 tomorrow with possible DC.  IV access loss, is it okay to leave out? If so may I please have pt care order for that? Thank you.    Addendum: Ok'd by MD to leave out.

## 2019-08-08 NOTE — PROGRESS NOTES
Discharge Planner   Discharge Plans in progress: Pt will discharge home today with resumption of [facility] RN/PT/OT/SW/HHA.  Sw sent the orders to [facility].     08/08/19 1102   Final Resources   Resources List Home Care   Home Care [facility] [phone], Fax: [phone]     Barriers to discharge plan: None  Follow up plan: Sw will continue to be available as needed until discharge.       Entered by: Rima Mahajan 08/08/2019 11:02 AM

## 2019-08-10 LAB
BACTERIA SPEC CULT: NO GROWTH
Lab: NORMAL
SPECIMEN SOURCE: NORMAL

## 2019-08-12 ENCOUNTER — ANCILLARY PROCEDURE (OUTPATIENT)
Dept: CARDIOLOGY | Facility: CLINIC | Age: 84
End: 2019-08-12
Attending: INTERNAL MEDICINE
Payer: COMMERCIAL

## 2019-08-12 DIAGNOSIS — I44.1 AV BLOCK, 2ND DEGREE: ICD-10-CM

## 2019-08-12 PROCEDURE — 93294 REM INTERROG EVL PM/LDLS PM: CPT | Performed by: INTERNAL MEDICINE

## 2019-08-14 LAB
MDC_IDC_EPISODE_DTM: NORMAL
MDC_IDC_EPISODE_DURATION: 14 S
MDC_IDC_EPISODE_DURATION: 14 S
MDC_IDC_EPISODE_DURATION: 16 S
MDC_IDC_EPISODE_DURATION: 18 S
MDC_IDC_EPISODE_DURATION: 196 S
MDC_IDC_EPISODE_DURATION: 20 S
MDC_IDC_EPISODE_DURATION: 34 S
MDC_IDC_EPISODE_DURATION: 48 S
MDC_IDC_EPISODE_DURATION: 58 S
MDC_IDC_EPISODE_DURATION: 6 S
MDC_IDC_EPISODE_DURATION: 74 S
MDC_IDC_EPISODE_DURATION: 8 S
MDC_IDC_EPISODE_DURATION: 8 S
MDC_IDC_EPISODE_DURATION: NORMAL S
MDC_IDC_EPISODE_ID: NORMAL
MDC_IDC_EPISODE_TYPE: NORMAL
MDC_IDC_LEAD_IMPLANT_DT: NORMAL
MDC_IDC_LEAD_IMPLANT_DT: NORMAL
MDC_IDC_LEAD_LOCATION: NORMAL
MDC_IDC_LEAD_LOCATION: NORMAL
MDC_IDC_LEAD_LOCATION_DETAIL_1: NORMAL
MDC_IDC_LEAD_LOCATION_DETAIL_1: NORMAL
MDC_IDC_LEAD_MFG: NORMAL
MDC_IDC_LEAD_MFG: NORMAL
MDC_IDC_LEAD_MODEL: NORMAL
MDC_IDC_LEAD_MODEL: NORMAL
MDC_IDC_LEAD_POLARITY_TYPE: NORMAL
MDC_IDC_LEAD_POLARITY_TYPE: NORMAL
MDC_IDC_LEAD_SERIAL: NORMAL
MDC_IDC_LEAD_SERIAL: NORMAL
MDC_IDC_MSMT_BATTERY_DTM: NORMAL
MDC_IDC_MSMT_BATTERY_REMAINING_LONGEVITY: 117 MO
MDC_IDC_MSMT_BATTERY_REMAINING_PERCENTAGE: 95.5 %
MDC_IDC_MSMT_BATTERY_RRT_TRIGGER: NORMAL
MDC_IDC_MSMT_BATTERY_STATUS: NORMAL
MDC_IDC_MSMT_BATTERY_VOLTAGE: 2.98 V
MDC_IDC_MSMT_LEADCHNL_RA_IMPEDANCE_VALUE: 410 OHM
MDC_IDC_MSMT_LEADCHNL_RA_LEAD_CHANNEL_STATUS: NORMAL
MDC_IDC_MSMT_LEADCHNL_RA_PACING_THRESHOLD_AMPLITUDE: 0.75 V
MDC_IDC_MSMT_LEADCHNL_RA_PACING_THRESHOLD_PULSEWIDTH: 0.5 MS
MDC_IDC_MSMT_LEADCHNL_RA_SENSING_INTR_AMPL: 1.8 MV
MDC_IDC_MSMT_LEADCHNL_RV_IMPEDANCE_VALUE: 430 OHM
MDC_IDC_MSMT_LEADCHNL_RV_LEAD_CHANNEL_STATUS: NORMAL
MDC_IDC_MSMT_LEADCHNL_RV_PACING_THRESHOLD_AMPLITUDE: 0.62 V
MDC_IDC_MSMT_LEADCHNL_RV_PACING_THRESHOLD_PULSEWIDTH: 0.5 MS
MDC_IDC_MSMT_LEADCHNL_RV_SENSING_INTR_AMPL: 12 MV
MDC_IDC_PG_IMPLANT_DTM: NORMAL
MDC_IDC_PG_MFG: NORMAL
MDC_IDC_PG_MODEL: NORMAL
MDC_IDC_PG_SERIAL: NORMAL
MDC_IDC_PG_TYPE: NORMAL
MDC_IDC_SESS_CLINIC_NAME: NORMAL
MDC_IDC_SESS_DTM: NORMAL
MDC_IDC_SESS_REPROGRAMMED: NO
MDC_IDC_SESS_TYPE: NORMAL
MDC_IDC_SET_BRADY_AT_MODE_SWITCH_MODE: NORMAL
MDC_IDC_SET_BRADY_AT_MODE_SWITCH_RATE: 180 {BEATS}/MIN
MDC_IDC_SET_BRADY_LOWRATE: 60 {BEATS}/MIN
MDC_IDC_SET_BRADY_MAX_SENSOR_RATE: 120 {BEATS}/MIN
MDC_IDC_SET_BRADY_MAX_TRACKING_RATE: 120 {BEATS}/MIN
MDC_IDC_SET_BRADY_MODE: NORMAL
MDC_IDC_SET_BRADY_PAV_DELAY_HIGH: 100 MS
MDC_IDC_SET_BRADY_PAV_DELAY_LOW: 200 MS
MDC_IDC_SET_BRADY_SAV_DELAY_HIGH: 100 MS
MDC_IDC_SET_BRADY_SAV_DELAY_LOW: 170 MS
MDC_IDC_SET_LEADCHNL_RA_PACING_AMPLITUDE: 1.75 V
MDC_IDC_SET_LEADCHNL_RA_PACING_ANODE_ELECTRODE_1: NORMAL
MDC_IDC_SET_LEADCHNL_RA_PACING_ANODE_LOCATION_1: NORMAL
MDC_IDC_SET_LEADCHNL_RA_PACING_CAPTURE_MODE: NORMAL
MDC_IDC_SET_LEADCHNL_RA_PACING_CATHODE_ELECTRODE_1: NORMAL
MDC_IDC_SET_LEADCHNL_RA_PACING_CATHODE_LOCATION_1: NORMAL
MDC_IDC_SET_LEADCHNL_RA_PACING_POLARITY: NORMAL
MDC_IDC_SET_LEADCHNL_RA_PACING_PULSEWIDTH: 0.5 MS
MDC_IDC_SET_LEADCHNL_RA_SENSING_ADAPTATION_MODE: NORMAL
MDC_IDC_SET_LEADCHNL_RA_SENSING_ANODE_ELECTRODE_1: NORMAL
MDC_IDC_SET_LEADCHNL_RA_SENSING_ANODE_LOCATION_1: NORMAL
MDC_IDC_SET_LEADCHNL_RA_SENSING_CATHODE_ELECTRODE_1: NORMAL
MDC_IDC_SET_LEADCHNL_RA_SENSING_CATHODE_LOCATION_1: NORMAL
MDC_IDC_SET_LEADCHNL_RA_SENSING_POLARITY: NORMAL
MDC_IDC_SET_LEADCHNL_RA_SENSING_SENSITIVITY: 0.5 MV
MDC_IDC_SET_LEADCHNL_RV_PACING_AMPLITUDE: 0.88
MDC_IDC_SET_LEADCHNL_RV_PACING_ANODE_ELECTRODE_1: NORMAL
MDC_IDC_SET_LEADCHNL_RV_PACING_ANODE_LOCATION_1: NORMAL
MDC_IDC_SET_LEADCHNL_RV_PACING_CAPTURE_MODE: NORMAL
MDC_IDC_SET_LEADCHNL_RV_PACING_CATHODE_ELECTRODE_1: NORMAL
MDC_IDC_SET_LEADCHNL_RV_PACING_CATHODE_LOCATION_1: NORMAL
MDC_IDC_SET_LEADCHNL_RV_PACING_POLARITY: NORMAL
MDC_IDC_SET_LEADCHNL_RV_PACING_PULSEWIDTH: 0.5 MS
MDC_IDC_SET_LEADCHNL_RV_SENSING_ADAPTATION_MODE: NORMAL
MDC_IDC_SET_LEADCHNL_RV_SENSING_ANODE_ELECTRODE_1: NORMAL
MDC_IDC_SET_LEADCHNL_RV_SENSING_ANODE_LOCATION_1: NORMAL
MDC_IDC_SET_LEADCHNL_RV_SENSING_CATHODE_ELECTRODE_1: NORMAL
MDC_IDC_SET_LEADCHNL_RV_SENSING_CATHODE_LOCATION_1: NORMAL
MDC_IDC_SET_LEADCHNL_RV_SENSING_POLARITY: NORMAL
MDC_IDC_SET_LEADCHNL_RV_SENSING_SENSITIVITY: 2 MV
MDC_IDC_STAT_AT_BURDEN_PERCENT: 3 %
MDC_IDC_STAT_AT_DTM_END: NORMAL
MDC_IDC_STAT_AT_DTM_START: NORMAL
MDC_IDC_STAT_AT_MODE_SW_COUNT: 779
MDC_IDC_STAT_AT_MODE_SW_COUNT_PER_DAY: 8
MDC_IDC_STAT_AT_MODE_SW_MAX_DURATION: NORMAL S
MDC_IDC_STAT_AT_MODE_SW_PERCENT_TIME: 3 %
MDC_IDC_STAT_BRADY_AP_VP_PERCENT: 1 %
MDC_IDC_STAT_BRADY_AP_VS_PERCENT: 1 %
MDC_IDC_STAT_BRADY_AS_VP_PERCENT: 99 %
MDC_IDC_STAT_BRADY_AS_VS_PERCENT: 1 %
MDC_IDC_STAT_BRADY_DTM_END: NORMAL
MDC_IDC_STAT_BRADY_DTM_START: NORMAL
MDC_IDC_STAT_BRADY_RA_PERCENT_PACED: 1 %
MDC_IDC_STAT_BRADY_RV_PERCENT_PACED: 99 %
MDC_IDC_STAT_CRT_DTM_END: NORMAL
MDC_IDC_STAT_CRT_DTM_START: NORMAL
MDC_IDC_STAT_HEART_RATE_ATRIAL_MAX: 330 {BEATS}/MIN
MDC_IDC_STAT_HEART_RATE_ATRIAL_MEAN: 101 {BEATS}/MIN
MDC_IDC_STAT_HEART_RATE_ATRIAL_MIN: 30 {BEATS}/MIN
MDC_IDC_STAT_HEART_RATE_DTM_END: NORMAL
MDC_IDC_STAT_HEART_RATE_DTM_START: NORMAL
MDC_IDC_STAT_HEART_RATE_VENTRICULAR_MAX: 220 {BEATS}/MIN
MDC_IDC_STAT_HEART_RATE_VENTRICULAR_MEAN: 81 {BEATS}/MIN
MDC_IDC_STAT_HEART_RATE_VENTRICULAR_MIN: 40 {BEATS}/MIN

## 2019-08-19 ENCOUNTER — OFFICE VISIT (OUTPATIENT)
Dept: CARDIOLOGY | Facility: CLINIC | Age: 84
End: 2019-08-19
Attending: NURSE PRACTITIONER
Payer: COMMERCIAL

## 2019-08-19 VITALS
BODY MASS INDEX: 20.62 KG/M2 | SYSTOLIC BLOOD PRESSURE: 124 MMHG | HEART RATE: 80 BPM | WEIGHT: 105 LBS | DIASTOLIC BLOOD PRESSURE: 60 MMHG | HEIGHT: 60 IN

## 2019-08-19 DIAGNOSIS — I25.10 CORONARY ARTERY DISEASE INVOLVING NATIVE CORONARY ARTERY OF NATIVE HEART WITHOUT ANGINA PECTORIS: Primary | ICD-10-CM

## 2019-08-19 DIAGNOSIS — I48.0 PAROXYSMAL ATRIAL FIBRILLATION (H): ICD-10-CM

## 2019-08-19 DIAGNOSIS — I25.10 CORONARY ARTERY DISEASE INVOLVING NATIVE CORONARY ARTERY OF NATIVE HEART WITHOUT ANGINA PECTORIS: ICD-10-CM

## 2019-08-19 DIAGNOSIS — Z95.0 CARDIAC PACEMAKER IN SITU: ICD-10-CM

## 2019-08-19 LAB
ANION GAP SERPL CALCULATED.3IONS-SCNC: 5 MMOL/L (ref 3–14)
BUN SERPL-MCNC: 13 MG/DL (ref 7–30)
CALCIUM SERPL-MCNC: 8.8 MG/DL (ref 8.5–10.1)
CHLORIDE SERPL-SCNC: 104 MMOL/L (ref 94–109)
CO2 SERPL-SCNC: 28 MMOL/L (ref 20–32)
CREAT SERPL-MCNC: 0.64 MG/DL (ref 0.52–1.04)
ERYTHROCYTE [DISTWIDTH] IN BLOOD BY AUTOMATED COUNT: 17.5 % (ref 10–15)
GFR SERPL CREATININE-BSD FRML MDRD: 80 ML/MIN/{1.73_M2}
GLUCOSE SERPL-MCNC: 109 MG/DL (ref 70–99)
HCT VFR BLD AUTO: 34 % (ref 35–47)
HGB BLD-MCNC: 10.4 G/DL (ref 11.7–15.7)
MCH RBC QN AUTO: 29.2 PG (ref 26.5–33)
MCHC RBC AUTO-ENTMCNC: 30.6 G/DL (ref 31.5–36.5)
MCV RBC AUTO: 96 FL (ref 78–100)
PLATELET # BLD AUTO: 275 10E9/L (ref 150–450)
POTASSIUM SERPL-SCNC: 3.7 MMOL/L (ref 3.4–5.3)
RBC # BLD AUTO: 3.56 10E12/L (ref 3.8–5.2)
SODIUM SERPL-SCNC: 137 MMOL/L (ref 133–144)
WBC # BLD AUTO: 8.4 10E9/L (ref 4–11)

## 2019-08-19 PROCEDURE — 99214 OFFICE O/P EST MOD 30 MIN: CPT | Performed by: PHYSICIAN ASSISTANT

## 2019-08-19 PROCEDURE — 36415 COLL VENOUS BLD VENIPUNCTURE: CPT | Performed by: NURSE PRACTITIONER

## 2019-08-19 PROCEDURE — 85027 COMPLETE CBC AUTOMATED: CPT | Performed by: NURSE PRACTITIONER

## 2019-08-19 PROCEDURE — 80048 BASIC METABOLIC PNL TOTAL CA: CPT | Performed by: NURSE PRACTITIONER

## 2019-08-19 ASSESSMENT — MIFFLIN-ST. JEOR: SCORE: 832.78

## 2019-08-19 NOTE — LETTER
8/19/2019    Brennen Collier, DO  [facility] [address]  [facility] [address]    RE: Yoselyn Cui       Dear Colleague,    I had the pleasure of seeing Yoselyn Cui in the [facility].    Please see separate dictation for HPI, PHYSICAL EXAM AND IMPRESSION/PLAN.    CURRENT MEDICATIONS:  Current Outpatient Medications   Medication Sig Dispense Refill     acetaminophen (TYLENOL) 325 MG tablet Take 650 mg by mouth every 6 hours as needed for mild pain        amLODIPine (NORVASC) 5 MG tablet Take 5 mg by mouth daily        aspirin (ASA) 81 MG EC tablet Take 1 tablet (81 mg) by mouth daily 60 tablet 0     atorvastatin (LIPITOR) 20 MG tablet Take 20 mg by mouth At Bedtime       carvedilol (COREG) 12.5 MG tablet Take 1 tablet (12.5 mg) by mouth 2 times daily (with meals) 60 tablet 0     clopidogrel (PLAVIX) 75 MG tablet Take 1 tablet (75 mg) by mouth daily 90 tablet 0     ferrous sulfate (IRON) 325 (65 FE) MG tablet Take 65 mg by mouth daily (with breakfast)        fluticasone furoate (ARNUITY ELLIPTA) 200 MCG/ACT inhalation powder Inhale 1 puff into the lungs daily 3 Inhaler 0     lisinopril (PRINIVIL,ZESTRIL) 40 MG tablet Take 40 mg by mouth daily       Multiple Vitamins-Minerals (ICAPS AREDS FORMULA) TABS Take 1 tablet by mouth 2 times daily        nitroGLYcerin (NITROSTAT) 0.4 MG sublingual tablet For chest pain place 1 tablet under the tongue every 5 minutes for 3 doses. If symptoms persist 5 minutes after 1st dose call 911. 30 tablet 0     tolterodine (DETROL LA) 4 MG 24 hr capsule Take 4 mg by mouth every morning       OMEPRAZOLE PO Take 20 mg by mouth every morning          ALLERGIES:   No Known Allergies    PAST MEDICAL HISTORY:  Past Medical History:   Diagnosis Date     AV block, 2nd degree 5/16/2016     Cerebrovascular accident (H) 8/22/2016     COKER (dyspnea on exertion) 8/22/2016     Generalized weakness 10/12/2016     GIB (gastrointestinal bleeding)      History of  colonic polyps 8/22/2016     HTN (hypertension) 8/22/2016     Iron deficiency anemia      Melanoma of skin (H)-rt arm 8/22/2016     Mixed hyperlipidemia 8/22/2016     Pacemaker     implanted 5-     PAF (paroxysmal atrial fibrillation) (H)      SSS (sick sinus syndrome) (H) 8/22/2016       PAST SURGICAL HISTORY:  Past Surgical History:   Procedure Laterality Date     APPENDECTOMY  1960s     C LIGATE FALLOPIAN TUBE  1960s     C REMV CATARACT INTRACAP,INSERT LENS Right 09/20/2017     CV CORONARY ANGIOGRAM N/A 7/17/2019    Procedure: Coronary Angiogram;  Surgeon: Irvin Hutson MD;  Location:  HEART CARDIAC CATH LAB     CV HEART CATHETERIZATION WITH POSSIBLE INTERVENTION N/A 7/18/2019    Procedure: Heart Catheterization with Possible Intervention;  Surgeon: Jayleen Torres MD;  Location:  HEART CARDIAC CATH LAB     CV LEFT HEART CATH N/A 7/17/2019    Procedure: Left Heart Cath;  Surgeon: Irvin Hutson MD;  Location:  HEART CARDIAC CATH LAB     CV LEFT VENTRICULOGRAM N/A 7/17/2019    Procedure: Left Ventriculogram;  Surgeon: Irvin Hutson MD;  Location:  HEART CARDIAC CATH LAB     CV PCI STENT DRUG ELUTING N/A 7/18/2019    Procedure: PCI Stent Drug Eluting;  Surgeon: Jayleen Torres MD;  Location:  HEART CARDIAC CATH LAB     ESOPHAGOSCOPY, GASTROSCOPY, DUODENOSCOPY (EGD), COMBINED N/A 11/20/2018    Procedure: ESOPHAGOSCOPY, GASTROSCOPY, DUODENOSCOPY (EGD)  Room 523 with biopsies using cold biopsy forceps;  Surgeon: Ayala Soto MD;  Location:  GI     HC REMV CATARACT EXTRACAP,INSERT LENS Left 10/04/2017     IMPLANT PACEMAKER  05/17/2016       SOCIAL HISTORY:  Social History     Socioeconomic History     Marital status:      Spouse name: None     Number of children: None     Years of education: None     Highest education level: None   Occupational History     None   Social Needs     Financial resource strain: None     Food insecurity:     Worry: None      Inability: None     Transportation needs:     Medical: None     Non-medical: None   Tobacco Use     Smoking status: Never Smoker     Smokeless tobacco: Never Used   Substance and Sexual Activity     Alcohol use: No     Alcohol/week: 0.0 oz     Drug use: No     Sexual activity: None   Lifestyle     Physical activity:     Days per week: None     Minutes per session: None     Stress: None   Relationships     Social connections:     Talks on phone: None     Gets together: None     Attends Jew service: None     Active member of club or organization: None     Attends meetings of clubs or organizations: None     Relationship status: None     Intimate partner violence:     Fear of current or ex partner: None     Emotionally abused: None     Physically abused: None     Forced sexual activity: None   Other Topics Concern     Parent/sibling w/ CABG, MI or angioplasty before 65F 55M? Not Asked      Service Not Asked     Blood Transfusions Not Asked     Caffeine Concern No     Comment: 3+ cups daily     Occupational Exposure Not Asked     Hobby Hazards Not Asked     Sleep Concern Not Asked     Stress Concern Not Asked     Weight Concern Not Asked     Special Diet No     Comment: trying to cut down on sodium, watching cholesterol     Back Care Not Asked     Exercise No     Comment: limited     Bike Helmet Not Asked     Seat Belt Not Asked     Self-Exams Not Asked   Social History Narrative     None       FAMILY HISTORY:  Family History   Problem Relation Age of Onset     Coronary Artery Disease Father      Coronary Artery Disease Brother      Coronary Artery Disease Sister      Colon Cancer No family hx of        Review of Systems:  Skin:  Negative       Eyes:  Positive for glasses    ENT:  Positive for hearing loss;vertigo vertigo on R side  Respiratory:  Negative       Cardiovascular:  Negative      Gastroenterology: Negative      Genitourinary:  Positive for urinary frequency;incontinence    Musculoskeletal:   Negative      Neurologic:  Negative      Psychiatric:  Negative      Heme/Lymph/Imm:  Negative      Endocrine:  Negative         Reviewed. Remainder of the note dictated.    Britni Joseph PA-C    Service Date: 08/19/2019      HISTORY OF PRESENTING COMPLAINT:  Ms. Cui is an 87-year-old female who presents to the office today for followup after several recent hospitalizations.  Today is the first time I am meeting the patient.  She previously has been followed by Dr. Goel of our EP Service and her initial cardiac consultation at the time of her MI was done by Dr. Meza in July.      Her past cardiovascular history includes second-degree AV block, status post permanent pacemaker implantation in 05/2016.  Subsequent device checks have shown paroxysmal atrial fibrillation, although the burden has overall been low (typically around 1-2%).  She was placed on Eliquis for anticoagulation.  She does have a history of CVA x2, first at age 51 seconds at age 75.  She also has hypertension and hyperlipidemia.  She initially was admitted to [facility] on 07/16/2019 for what she describes as chest discomfort that she initially thought was indigestion.  She underwent evaluation and her troponin was positive.  It ended up peaking at 24.  She did have an echocardiogram which showed decreased LV systolic function with an EF of 35%-40% and a mid to distal anterior wall motion abnormality as well as distal inferior wall and apical wall motion abnormality.  She was referred to the Cardiac Catheterization Lab and was found to have multivessel disease with significant lesions in the distal RCA, proximal to mid circumflex (felt to be the culprit), and mid LAD.  She also had significant disease in the second diagonal.  No intervention was performed at that time.  It was recommended that she transfer to [facility] to be evaluated for possible bypass surgery versus high risk PCI.  She was evaluated  by CV Surgery and not felt to be a good surgical candidate.  On 07/18 she went back to the Catheterization Lab.  She underwent successful PCI of the mid LAD with drug-eluting stent placement.  However, unfortunately this was complicated by an LAD dissection from guide catheter trauma with extension into the left main and left coronary cusp.  She did undergo an additional drug-eluting stent placement to the left main and to the proximal LAD.  The procedure was stopped at that point.  She was started on aspirin and Plavix.  She was taken off of Eliquis at that time.  It appears that she was followed with serial limited echocardiograms.  Her EF normalized following her procedure at 55%-60% without any wall motion abnormalities.  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  It was recommended that she follow up as an outpatient.  Unfortunately, at the time she was supposed to follow up in the Cardiology Clinic she was rehospitalized, this time with a UTI and bronchitis.  She did have a sharp right-sided chest discomfort upon presentation as well.  Cardiology was consulted for such.  Her troponin levels were low during that hospitalization.  She did undergo a CT scan of the chest which showed normal aortic dimensions.  Cardiology was consulted and Dr. Chaidez felt that her chest discomfort was not cardiac.  He recommended continued medical management.  He again recommended she continue dual antiplatelet therapy and continue to hold Eliquis.      Since discharge from her most recent hospitalization, the patient states she has overall been feeling well.  She denies any chest discomfort including any indigestion type symptoms (again, this was her symptom at the time of presentation of her heart attack).  No orthopnea, PND or LE edema.  No lightheadedness or palpations.  She has been taking her medical therapy without difficulty.  She follows her weight daily and has  maintained a weight of 105 pounds.  She really does not have any new cardiac questions or concerns today.      CURRENT CARDIAC MEDICATIONS:   1.  Amlodipine 5 mg daily.   2.  Aspirin 81 mg daily.   3.  Atorvastatin 20 mg daily.   4.  Carvedilol 12.5 mg b.i.d.   5.  Clopidogrel 75 mg daily.   6.  Lisinopril 40 mg daily.   7.  Sublingual nitro p.r.n.      The remainder of her medications, allergies and review of systems were reviewed and as are documented separately.      PHYSICAL EXAMINATION:   GENERAL:  The patient is an 87-year-old female who appears her stated age.  She is in no apparent distress.   VITAL SIGNS:  Her blood pressure is 124/60, pulse is 80, weight is 105 pounds.   PULMONARY:  Breathing is nonlabored.  Lungs are clear to auscultation.   CARDIAC:  Reveals a regular rate and rhythm.  I do not appreciate any significant murmur.   EXTREMITIES:  Lower extremities show trace edema just at the ankles.   NEUROLOGIC:  Alert and oriented.      She did have a device check on 08/12.  Her presenting rhythm was A sensed, V paced.  She did have atrial arrhythmia noted with 779 mode switches, comprising of 2.9% of the time.  The longest AFib episode lasted 3 hours and 19 minutes.  The ventricular rate was well controlled.  No ventricular arrhythmias were noted.       Last Metabolic Panel:  Sodium   Date Value Ref Range Status   08/19/2019 137 133 - 144 mmol/L Final     Potassium   Date Value Ref Range Status   08/19/2019 3.7 3.4 - 5.3 mmol/L Final     Chloride   Date Value Ref Range Status   08/19/2019 104 94 - 109 mmol/L Final     Carbon Dioxide   Date Value Ref Range Status   08/19/2019 28 20 - 32 mmol/L Final     Anion Gap   Date Value Ref Range Status   08/19/2019 5 3 - 14 mmol/L Final     Glucose   Date Value Ref Range Status   08/19/2019 109 (H) 70 - 99 mg/dL Final     Urea Nitrogen   Date Value Ref Range Status   08/19/2019 13 7 - 30 mg/dL Final     Creatinine   Date Value Ref Range Status   08/19/2019 0.64  0.52 - 1.04 mg/dL Final     GFR Estimate   Date Value Ref Range Status   08/19/2019 80 >60 mL/min/[1.73_m2] Final     Comment:     Non  GFR Calc  Starting 12/18/2018, serum creatinine based estimated GFR (eGFR) will be   calculated using the Chronic Kidney Disease Epidemiology Collaboration   (CKD-EPI) equation.       Calcium   Date Value Ref Range Status   08/19/2019 8.8 8.5 - 10.1 mg/dL Final     Lab Results   Component Value Date    WBC 8.4 08/19/2019     Lab Results   Component Value Date    RBC 3.56 08/19/2019     Lab Results   Component Value Date    HGB 10.4 08/19/2019     Lab Results   Component Value Date    HCT 34.0 08/19/2019     Lab Results   Component Value Date    MCV 96 08/19/2019     Lab Results   Component Value Date    MCH 29.2 08/19/2019     Lab Results   Component Value Date    MCHC 30.6 08/19/2019     Lab Results   Component Value Date    RDW 17.5 08/19/2019     Lab Results   Component Value Date     08/19/2019       ASSESSMENT AND PLAN:  Ms. Cui is a pleasant 87-year-old female with a distant history of CVA x2, a history of second-degree heart block status post permanent pacemaker implantation with subsequent device checks showing a low burden of paroxysmal atrial fibrillation, hypertension, hyperlipidemia and most recently coronary artery disease with a non-STEMI in 07/2019 at which time she was found to have multivessel disease (see above).  She did undergo stenting to the mid LAD.  Unfortunately, this was complicated by a guidewire dissection requiring additional stenting to the proximal LAD and left main.  PCI on her other significant lesions was not performed.  In reviewing the notes and with talking with the patient, it appears that medical management was recommended unless the patient had recurrent symptoms.  Fortunately, her EF improved to normal after revascularization of the LAD.  Due to the dissection, the patient was taken off of apixaban and told to  continue with dual antiplatelet therapy alone.  Fortunately, she overall seems stable from a cardiac standpoint at this time.  For the time being, I am going to have her continue her current medical therapy as is.  Her recent device check again showed a low burden of atrial fibrillation.  I am not going to restart her apixaban yet, but she does have followup with Dr. Meza in October and this can be readdressed at that time.  The patient and her daughter were understanding and agreeable to this plan.  I reiterated when she needs to seek immediate attention and when she should use sublingual nitro.      Thank you for allowing me to participate in the care of this pleasant patient.      cc:     Philipp Carpenter MD, [facility] [place] [address]      Brennen Collier DO [facility] [place] [facility] [address] [place] [address]         BRITNI MAGALLON PA-C             D: 2019   T: 2019   MT: EILEEN      Name:     ANNE MARIE AMBROSE   MRN:      [id number]        Account:      [id number]   :      1932           Service Date: 2019      Document: [id number]        Thank you for allowing me to participate in the care of your patient.      Sincerely,     Britni Magallon PA-C     [facility]    cc:   Brennen Collier DO  [facility] [address] [place] [address]

## 2019-08-19 NOTE — PROGRESS NOTES
Service Date: 08/19/2019      HISTORY OF PRESENTING COMPLAINT:  Ms. Cui is an 87-year-old female who presents to the office today for followup after several recent hospitalizations.  Today is the first time I am meeting the patient.  She previously has been followed by Dr. Goel of our EP Service and her initial cardiac consultation at the time of her MI was done by Dr. Meza in July.      Her past cardiovascular history includes second-degree AV block, status post permanent pacemaker implantation in 05/2016.  Subsequent device checks have shown paroxysmal atrial fibrillation, although the burden has overall been low (typically around 1-2%).  She was placed on Eliquis for anticoagulation.  She does have a history of CVA x2, first at age 51 seconds at age 75.  She also has hypertension and hyperlipidemia.  She initially was admitted to [facility] on 07/16/2019 for what she describes as chest discomfort that she initially thought was indigestion.  She underwent evaluation and her troponin was positive.  It ended up peaking at 24.  She did have an echocardiogram which showed decreased LV systolic function with an EF of 35%-40% and a mid to distal anterior wall motion abnormality as well as distal inferior wall and apical wall motion abnormality.  She was referred to the Cardiac Catheterization Lab and was found to have multivessel disease with significant lesions in the distal RCA, proximal to mid circumflex (felt to be the culprit), and mid LAD.  She also had significant disease in the second diagonal.  No intervention was performed at that time.  It was recommended that she transfer to [facility] to be evaluated for possible bypass surgery versus high risk PCI.  She was evaluated by CV Surgery and not felt to be a good surgical candidate.  On 07/18 she went back to the Catheterization Lab.  She underwent successful PCI of the mid LAD with drug-eluting stent placement.  However,  unfortunately this was complicated by an LAD dissection from guide catheter trauma with extension into the left main and left coronary cusp.  She did undergo an additional drug-eluting stent placement to the left main and to the proximal LAD.  The procedure was stopped at that point.  She was started on aspirin and Plavix.  She was taken off of Eliquis at that time.  It appears that she was followed with serial limited echocardiograms.  Her EF normalized following her procedure at 55%-60% without any wall motion abnormalities.  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  It was recommended that she follow up as an outpatient.  Unfortunately, at the time she was supposed to follow up in the Cardiology Clinic she was rehospitalized, this time with a UTI and bronchitis.  She did have a sharp right-sided chest discomfort upon presentation as well.  Cardiology was consulted for such.  Her troponin levels were low during that hospitalization.  She did undergo a CT scan of the chest which showed normal aortic dimensions.  Cardiology was consulted and Dr. Chaidez felt that her chest discomfort was not cardiac.  He recommended continued medical management.  He again recommended she continue dual antiplatelet therapy and continue to hold Eliquis.      Since discharge from her most recent hospitalization, the patient states she has overall been feeling well.  She denies any chest discomfort including any indigestion type symptoms (again, this was her symptom at the time of presentation of her heart attack).  No orthopnea, PND or LE edema.  No lightheadedness or palpations.  She has been taking her medical therapy without difficulty.  She follows her weight daily and has maintained a weight of 105 pounds.  She really does not have any new cardiac questions or concerns today.      CURRENT CARDIAC MEDICATIONS:   1.  Amlodipine 5 mg daily.   2.  Aspirin 81 mg daily.   3.   Atorvastatin 20 mg daily.   4.  Carvedilol 12.5 mg b.i.d.   5.  Clopidogrel 75 mg daily.   6.  Lisinopril 40 mg daily.   7.  Sublingual nitro p.r.n.      The remainder of her medications, allergies and review of systems were reviewed and as are documented separately.      PHYSICAL EXAMINATION:   GENERAL:  The patient is an 87-year-old female who appears her stated age.  She is in no apparent distress.   VITAL SIGNS:  Her blood pressure is 124/60, pulse is 80, weight is 105 pounds.   PULMONARY:  Breathing is nonlabored.  Lungs are clear to auscultation.   CARDIAC:  Reveals a regular rate and rhythm.  I do not appreciate any significant murmur.   EXTREMITIES:  Lower extremities show trace edema just at the ankles.   NEUROLOGIC:  Alert and oriented.      She did have a device check on 08/12.  Her presenting rhythm was A sensed, V paced.  She did have atrial arrhythmia noted with 779 mode switches, comprising of 2.9% of the time.  The longest AFib episode lasted 3 hours and 19 minutes.  The ventricular rate was well controlled.  No ventricular arrhythmias were noted.       Last Metabolic Panel:  Sodium   Date Value Ref Range Status   08/19/2019 137 133 - 144 mmol/L Final     Potassium   Date Value Ref Range Status   08/19/2019 3.7 3.4 - 5.3 mmol/L Final     Chloride   Date Value Ref Range Status   08/19/2019 104 94 - 109 mmol/L Final     Carbon Dioxide   Date Value Ref Range Status   08/19/2019 28 20 - 32 mmol/L Final     Anion Gap   Date Value Ref Range Status   08/19/2019 5 3 - 14 mmol/L Final     Glucose   Date Value Ref Range Status   08/19/2019 109 (H) 70 - 99 mg/dL Final     Urea Nitrogen   Date Value Ref Range Status   08/19/2019 13 7 - 30 mg/dL Final     Creatinine   Date Value Ref Range Status   08/19/2019 0.64 0.52 - 1.04 mg/dL Final     GFR Estimate   Date Value Ref Range Status   08/19/2019 80 >60 mL/min/[1.73_m2] Final     Comment:     Non  GFR Calc  Starting 12/18/2018, serum creatinine  based estimated GFR (eGFR) will be   calculated using the Chronic Kidney Disease Epidemiology Collaboration   (CKD-EPI) equation.       Calcium   Date Value Ref Range Status   08/19/2019 8.8 8.5 - 10.1 mg/dL Final     Lab Results   Component Value Date    WBC 8.4 08/19/2019     Lab Results   Component Value Date    RBC 3.56 08/19/2019     Lab Results   Component Value Date    HGB 10.4 08/19/2019     Lab Results   Component Value Date    HCT 34.0 08/19/2019     Lab Results   Component Value Date    MCV 96 08/19/2019     Lab Results   Component Value Date    MCH 29.2 08/19/2019     Lab Results   Component Value Date    MCHC 30.6 08/19/2019     Lab Results   Component Value Date    RDW 17.5 08/19/2019     Lab Results   Component Value Date     08/19/2019       ASSESSMENT AND PLAN:  Ms. Cui is a pleasant 87-year-old female with a distant history of CVA x2, a history of second-degree heart block status post permanent pacemaker implantation with subsequent device checks showing a low burden of paroxysmal atrial fibrillation, hypertension, hyperlipidemia and most recently coronary artery disease with a non-STEMI in 07/2019 at which time she was found to have multivessel disease (see above).  She did undergo stenting to the mid LAD.  Unfortunately, this was complicated by a guidewire dissection requiring additional stenting to the proximal LAD and left main.  PCI on her other significant lesions was not performed.  In reviewing the notes and with talking with the patient, it appears that medical management was recommended unless the patient had recurrent symptoms.  Fortunately, her EF improved to normal after revascularization of the LAD.  Due to the dissection, the patient was taken off of apixaban and told to continue with dual antiplatelet therapy alone.  Fortunately, she overall seems stable from a cardiac standpoint at this time.  For the time being, I am going to have her continue her current medical  therapy as is.  Her recent device check again showed a low burden of atrial fibrillation.  I am not going to restart her apixaban yet, but she does have followup with Dr. Meza in October and this can be readdressed at that time.  The patient and her daughter were understanding and agreeable to this plan.  I reiterated when she needs to seek immediate attention and when she should use sublingual nitro.      Thank you for allowing me to participate in the care of this pleasant patient.      cc:     Philipp Carpenter MD, [facility] [place]    [address] [place] [address] [address] [place], [place] [id number]      Brennen Collier DO   [facility]    [address] [place] [address]         JIHAN MAGALLON PA-C             D: 2019   T: 2019   MT: EILEEN      Name:     ANNE MARIE AMBROSE   MRN:      [id number]        Account:      [id number]   :      1932           Service Date: 2019      Document: [id number]

## 2019-08-19 NOTE — LETTER
8/19/2019      [facility] [address]  [facility] [address]      RE: Yoselyn Cui       Dear Colleague,    I had the pleasure of seeing Yoselyn Cui in the [facility].    Service Date: 08/19/2019      HISTORY OF PRESENTING COMPLAINT:  Ms. Cui is an 87-year-old female who presents to the office today for followup after several recent hospitalizations.  Today is the first time I am meeting the patient.  She previously has been followed by Dr. Goel of our EP Service and her initial cardiac consultation at the time of her MI was done by Dr. Meza in July.      Her past cardiovascular history includes second-degree AV block, status post permanent pacemaker implantation in 05/2016.  Subsequent device checks have shown paroxysmal atrial fibrillation, although the burden has overall been low (typically around 1-2%).  She was placed on Eliquis for anticoagulation.  She does have a history of CVA x2, first at age 51 seconds at age 75.  She also has hypertension and hyperlipidemia.  She initially was admitted to [facility] on 07/16/2019 for what she describes as chest discomfort that she initially thought was indigestion.  She underwent evaluation and her troponin was positive.  It ended up peaking at 24.  She did have an echocardiogram which showed decreased LV systolic function with an EF of 35%-40% and a mid to distal anterior wall motion abnormality as well as distal inferior wall and apical wall motion abnormality.  She was referred to the Cardiac Catheterization Lab and was found to have multivessel disease with significant lesions in the distal RCA, proximal to mid circumflex (felt to be the culprit), and mid LAD.  She also had significant disease in the second diagonal.  No intervention was performed at that time.  It was recommended that she transfer to [facility] to be evaluated for possible bypass surgery versus  high risk PCI.  She was evaluated by CV Surgery and not felt to be a good surgical candidate.  On 07/18 she went back to the Catheterization Lab.  She underwent successful PCI of the mid LAD with drug-eluting stent placement.  However, unfortunately this was complicated by an LAD dissection from guide catheter trauma with extension into the left main and left coronary cusp.  She did undergo an additional drug-eluting stent placement to the left main and to the proximal LAD.  The procedure was stopped at that point.  She was started on aspirin and Plavix.  She was taken off of Eliquis at that time.  It appears that she was followed with serial limited echocardiograms.  Her EF normalized following her procedure at 55%-60% without any wall motion abnormalities.  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  It was recommended that she follow up as an outpatient.  Unfortunately, at the time she was supposed to follow up in the Cardiology Clinic she was rehospitalized, this time with a UTI and bronchitis.  She did have a sharp right-sided chest discomfort upon presentation as well.  Cardiology was consulted for such.  Her troponin levels were low during that hospitalization.  She did undergo a CT scan of the chest which showed normal aortic dimensions.  Cardiology was consulted and Dr. Chaidez felt that her chest discomfort was not cardiac.  He recommended continued medical management.  He again recommended she continue dual antiplatelet therapy and continue to hold Eliquis.      Since discharge from her most recent hospitalization, the patient states she has overall been feeling well.  She denies any chest discomfort including any indigestion type symptoms (again, this was her symptom at the time of presentation of her heart attack).  No orthopnea, PND or LE edema.  No lightheadedness or palpations.  She has been taking her medical therapy without difficulty.  She  follows her weight daily and has maintained a weight of 105 pounds.  She really does not have any new cardiac questions or concerns today.      CURRENT CARDIAC MEDICATIONS:   1.  Amlodipine 5 mg daily.   2.  Aspirin 81 mg daily.   3.  Atorvastatin 20 mg daily.   4.  Carvedilol 12.5 mg b.i.d.   5.  Clopidogrel 75 mg daily.   6.  Lisinopril 40 mg daily.   7.  Sublingual nitro p.r.n.      The remainder of her medications, allergies and review of systems were reviewed and as are documented separately.      PHYSICAL EXAMINATION:   GENERAL:  The patient is an 87-year-old female who appears her stated age.  She is in no apparent distress.   VITAL SIGNS:  Her blood pressure is 124/60, pulse is 80, weight is 105 pounds.   PULMONARY:  Breathing is nonlabored.  Lungs are clear to auscultation.   CARDIAC:  Reveals a regular rate and rhythm.  I do not appreciate any significant murmur.   EXTREMITIES:  Lower extremities show trace edema just at the ankles.   NEUROLOGIC:  Alert and oriented.      She did have a device check on 08/12.  Her presenting rhythm was A sensed, V paced.  She did have atrial arrhythmia noted with 779 mode switches, comprising of 2.9% of the time.  The longest AFib episode lasted 3 hours and 19 minutes.  The ventricular rate was well controlled.  No ventricular arrhythmias were noted.       Last Metabolic Panel:  Sodium   Date Value Ref Range Status   08/19/2019 137 133 - 144 mmol/L Final     Potassium   Date Value Ref Range Status   08/19/2019 3.7 3.4 - 5.3 mmol/L Final     Chloride   Date Value Ref Range Status   08/19/2019 104 94 - 109 mmol/L Final     Carbon Dioxide   Date Value Ref Range Status   08/19/2019 28 20 - 32 mmol/L Final     Anion Gap   Date Value Ref Range Status   08/19/2019 5 3 - 14 mmol/L Final     Glucose   Date Value Ref Range Status   08/19/2019 109 (H) 70 - 99 mg/dL Final     Urea Nitrogen   Date Value Ref Range Status   08/19/2019 13 7 - 30 mg/dL Final     Creatinine   Date Value Ref  Range Status   08/19/2019 0.64 0.52 - 1.04 mg/dL Final     GFR Estimate   Date Value Ref Range Status   08/19/2019 80 >60 mL/min/[1.73_m2] Final     Comment:     Non  GFR Calc  Starting 12/18/2018, serum creatinine based estimated GFR (eGFR) will be   calculated using the Chronic Kidney Disease Epidemiology Collaboration   (CKD-EPI) equation.       Calcium   Date Value Ref Range Status   08/19/2019 8.8 8.5 - 10.1 mg/dL Final     Lab Results   Component Value Date    WBC 8.4 08/19/2019     Lab Results   Component Value Date    RBC 3.56 08/19/2019     Lab Results   Component Value Date    HGB 10.4 08/19/2019     Lab Results   Component Value Date    HCT 34.0 08/19/2019     Lab Results   Component Value Date    MCV 96 08/19/2019     Lab Results   Component Value Date    MCH 29.2 08/19/2019     Lab Results   Component Value Date    MCHC 30.6 08/19/2019     Lab Results   Component Value Date    RDW 17.5 08/19/2019     Lab Results   Component Value Date     08/19/2019       ASSESSMENT AND PLAN:  Ms. Cui is a pleasant 87-year-old female with a distant history of CVA x2, a history of second-degree heart block status post permanent pacemaker implantation with subsequent device checks showing a low burden of paroxysmal atrial fibrillation, hypertension, hyperlipidemia and most recently coronary artery disease with a non-STEMI in 07/2019 at which time she was found to have multivessel disease (see above).  She did undergo stenting to the mid LAD.  Unfortunately, this was complicated by a guidewire dissection requiring additional stenting to the proximal LAD and left main.  PCI on her other significant lesions was not performed.  In reviewing the notes and with talking with the patient, it appears that medical management was recommended unless the patient had recurrent symptoms.  Fortunately, her EF improved to normal after revascularization of the LAD.  Due to the dissection, the patient was taken  off of apixaban and told to continue with dual antiplatelet therapy alone.  Fortunately, she overall seems stable from a cardiac standpoint at this time.  For the time being, I am going to have her continue her current medical therapy as is.  Her recent device check again showed a low burden of atrial fibrillation.  I am not going to restart her apixaban yet, but she does have followup with Dr. Meza in October and this can be readdressed at that time.  The patient and her daughter were understanding and agreeable to this plan.  I reiterated when she needs to seek immediate attention and when she should use sublingual nitro.      Thank you for allowing me to participate in the care of this pleasant patient.      cc:     Philipp Carpenter MD, [facility] [place]    [address] [place], [place] [id number]      Brennen Collier DO   [facility]    [address] [place] [address]         JIHAN MAGALLON PA-C             D: 2019   T: 2019   MT: EILEEN      Name:     ANNE MARIE AMBROSE   MRN:      [id number]        Account:      [id number]   :      1932           Service Date: 2019      Document: [id number]           Outpatient Encounter Medications as of 2019   Medication Sig Dispense Refill     acetaminophen (TYLENOL) 325 MG tablet Take 650 mg by mouth every 6 hours as needed for mild pain        amLODIPine (NORVASC) 5 MG tablet Take 5 mg by mouth daily        aspirin (ASA) 81 MG EC tablet Take 1 tablet (81 mg) by mouth daily 60 tablet 0     atorvastatin (LIPITOR) 20 MG tablet Take 20 mg by mouth At Bedtime       carvedilol (COREG) 12.5 MG tablet Take 1 tablet (12.5 mg) by mouth 2 times daily (with meals) 60 tablet 0     clopidogrel (PLAVIX) 75 MG tablet Take 1 tablet (75 mg) by mouth daily 90 tablet 0     ferrous sulfate (IRON) 325 (65 FE) MG tablet Take 65 mg by mouth daily (with breakfast)         fluticasone furoate (ARNUITY ELLIPTA) 200 MCG/ACT inhalation powder Inhale 1 puff into the lungs daily 3 Inhaler 0     lisinopril (PRINIVIL,ZESTRIL) 40 MG tablet Take 40 mg by mouth daily       Multiple Vitamins-Minerals (ICAPS AREDS FORMULA) TABS Take 1 tablet by mouth 2 times daily        nitroGLYcerin (NITROSTAT) 0.4 MG sublingual tablet For chest pain place 1 tablet under the tongue every 5 minutes for 3 doses. If symptoms persist 5 minutes after 1st dose call 911. 30 tablet 0     tolterodine (DETROL LA) 4 MG 24 hr capsule Take 4 mg by mouth every morning       [] cefuroxime (CEFTIN) 500 MG tablet Take 1 tablet (500 mg) by mouth 2 times daily for 5 days (Patient not taking: Reported on 2019) 10 tablet 0     OMEPRAZOLE PO Take 20 mg by mouth every morning        No facility-administered encounter medications on file as of 2019.        Again, thank you for allowing me to participate in the care of your patient.      Sincerely,    Britni Joseph PA-C     [facility]

## 2019-08-19 NOTE — PATIENT INSTRUCTIONS
Thank you for your [facility] visit today. Your provider has recommended the following:  Medication Changes:  No changes at this time  Recommendations:  Nothing new today  Follow-up:  See Dr Meza for cardiology follow up in Oct as planned.    Reminder:  Please bring in your current medication list or your medication, over the counter supplements and vitamin bottles as we will review these at each office visit.

## 2019-08-19 NOTE — PROGRESS NOTES
Please see separate dictation for HPI, PHYSICAL EXAM AND IMPRESSION/PLAN.    CURRENT MEDICATIONS:  Current Outpatient Medications   Medication Sig Dispense Refill     acetaminophen (TYLENOL) 325 MG tablet Take 650 mg by mouth every 6 hours as needed for mild pain        amLODIPine (NORVASC) 5 MG tablet Take 5 mg by mouth daily        aspirin (ASA) 81 MG EC tablet Take 1 tablet (81 mg) by mouth daily 60 tablet 0     atorvastatin (LIPITOR) 20 MG tablet Take 20 mg by mouth At Bedtime       carvedilol (COREG) 12.5 MG tablet Take 1 tablet (12.5 mg) by mouth 2 times daily (with meals) 60 tablet 0     clopidogrel (PLAVIX) 75 MG tablet Take 1 tablet (75 mg) by mouth daily 90 tablet 0     ferrous sulfate (IRON) 325 (65 FE) MG tablet Take 65 mg by mouth daily (with breakfast)        fluticasone furoate (ARNUITY ELLIPTA) 200 MCG/ACT inhalation powder Inhale 1 puff into the lungs daily 3 Inhaler 0     lisinopril (PRINIVIL,ZESTRIL) 40 MG tablet Take 40 mg by mouth daily       Multiple Vitamins-Minerals (ICAPS AREDS FORMULA) TABS Take 1 tablet by mouth 2 times daily        nitroGLYcerin (NITROSTAT) 0.4 MG sublingual tablet For chest pain place 1 tablet under the tongue every 5 minutes for 3 doses. If symptoms persist 5 minutes after 1st dose call 911. 30 tablet 0     tolterodine (DETROL LA) 4 MG 24 hr capsule Take 4 mg by mouth every morning       OMEPRAZOLE PO Take 20 mg by mouth every morning          ALLERGIES:   No Known Allergies    PAST MEDICAL HISTORY:  Past Medical History:   Diagnosis Date     AV block, 2nd degree 5/16/2016     Cerebrovascular accident (H) 8/22/2016     COKER (dyspnea on exertion) 8/22/2016     Generalized weakness 10/12/2016     GIB (gastrointestinal bleeding)      History of colonic polyps 8/22/2016     HTN (hypertension) 8/22/2016     Iron deficiency anemia      Melanoma of skin (H)-rt arm 8/22/2016     Mixed hyperlipidemia 8/22/2016     Pacemaker     implanted 5-     PAF (paroxysmal atrial  fibrillation) (H)      SSS (sick sinus syndrome) (H) 8/22/2016       PAST SURGICAL HISTORY:  Past Surgical History:   Procedure Laterality Date     APPENDECTOMY  1960s     C LIGATE FALLOPIAN TUBE  1960s     C REMV CATARACT INTRACAP,INSERT LENS Right 09/20/2017     CV CORONARY ANGIOGRAM N/A 7/17/2019    Procedure: Coronary Angiogram;  Surgeon: Irvin Hutson MD;  Location:  HEART CARDIAC CATH LAB     CV HEART CATHETERIZATION WITH POSSIBLE INTERVENTION N/A 7/18/2019    Procedure: Heart Catheterization with Possible Intervention;  Surgeon: Jayleen Torres MD;  Location:  HEART CARDIAC CATH LAB     CV LEFT HEART CATH N/A 7/17/2019    Procedure: Left Heart Cath;  Surgeon: Irvin Hutson MD;  Location:  HEART CARDIAC CATH LAB     CV LEFT VENTRICULOGRAM N/A 7/17/2019    Procedure: Left Ventriculogram;  Surgeon: Irvin Hutson MD;  Location:  HEART CARDIAC CATH LAB     CV PCI STENT DRUG ELUTING N/A 7/18/2019    Procedure: PCI Stent Drug Eluting;  Surgeon: Jayleen Torres MD;  Location:  HEART CARDIAC CATH LAB     ESOPHAGOSCOPY, GASTROSCOPY, DUODENOSCOPY (EGD), COMBINED N/A 11/20/2018    Procedure: ESOPHAGOSCOPY, GASTROSCOPY, DUODENOSCOPY (EGD)  Room 523 with biopsies using cold biopsy forceps;  Surgeon: Ayala Soto MD;  Location:  GI     HC REMV CATARACT EXTRACAP,INSERT LENS Left 10/04/2017     IMPLANT PACEMAKER  05/17/2016       SOCIAL HISTORY:  Social History     Socioeconomic History     Marital status:      Spouse name: None     Number of children: None     Years of education: None     Highest education level: None   Occupational History     None   Social Needs     Financial resource strain: None     Food insecurity:     Worry: None     Inability: None     Transportation needs:     Medical: None     Non-medical: None   Tobacco Use     Smoking status: Never Smoker     Smokeless tobacco: Never Used   Substance and Sexual Activity     Alcohol use: No      Alcohol/week: 0.0 oz     Drug use: No     Sexual activity: None   Lifestyle     Physical activity:     Days per week: None     Minutes per session: None     Stress: None   Relationships     Social connections:     Talks on phone: None     Gets together: None     Attends Sabianist service: None     Active member of club or organization: None     Attends meetings of clubs or organizations: None     Relationship status: None     Intimate partner violence:     Fear of current or ex partner: None     Emotionally abused: None     Physically abused: None     Forced sexual activity: None   Other Topics Concern     Parent/sibling w/ CABG, MI or angioplasty before 65F 55M? Not Asked      Service Not Asked     Blood Transfusions Not Asked     Caffeine Concern No     Comment: 3+ cups daily     Occupational Exposure Not Asked     Hobby Hazards Not Asked     Sleep Concern Not Asked     Stress Concern Not Asked     Weight Concern Not Asked     Special Diet No     Comment: trying to cut down on sodium, watching cholesterol     Back Care Not Asked     Exercise No     Comment: limited     Bike Helmet Not Asked     Seat Belt Not Asked     Self-Exams Not Asked   Social History Narrative     None       FAMILY HISTORY:  Family History   Problem Relation Age of Onset     Coronary Artery Disease Father      Coronary Artery Disease Brother      Coronary Artery Disease Sister      Colon Cancer No family hx of        Review of Systems:  Skin:  Negative       Eyes:  Positive for glasses    ENT:  Positive for hearing loss;vertigo vertigo on R side  Respiratory:  Negative       Cardiovascular:  Negative      Gastroenterology: Negative      Genitourinary:  Positive for urinary frequency;incontinence    Musculoskeletal:  Negative      Neurologic:  Negative      Psychiatric:  Negative      Heme/Lymph/Imm:  Negative      Endocrine:  Negative         Reviewed. Remainder of the note dictated.    Britni Joseph PA-C

## 2019-09-04 ENCOUNTER — HOSPITAL ENCOUNTER (OUTPATIENT)
Dept: CARDIAC REHAB | Facility: CLINIC | Age: 84
End: 2019-09-04
Attending: INTERNAL MEDICINE
Payer: COMMERCIAL

## 2019-09-04 DIAGNOSIS — I25.10 CORONARY ARTERY DISEASE INVOLVING NATIVE CORONARY ARTERY OF NATIVE HEART WITHOUT ANGINA PECTORIS: ICD-10-CM

## 2019-09-04 NOTE — PROGRESS NOTES
"Cardiac Rehab Discharge Summary    Reason for discharge:    Patient has home PT and OT at this time.    Progress towards goals:  Patient will continue with home PT and OT until she and her daughters feel comfortable they can continue exercises on their own. Patient felt very anxious about starting rehab. She has had two strokes with left sided weakness. She uses a cane while doing things around the home, but outside the home she uses a wheelchair. She does not feel comfortable walking. Patient stated, \"I am 87 years old there is not much I can do to get better.\" Therapist thought it would be best if she continued with PT and OT in the comfort of her daughters home.     Recommendation(s):    Continue home exercise program.  Patient will continue to exercises at home given to her via PT, OT and cardiac rehab.     Physician cosignature/electronic signature indicates approval of this ITP document. I have established, reviewed and made necessary changes to the individualized treatment plan and exercise prescription for this patient.          "

## 2019-09-05 ENCOUNTER — TELEPHONE (OUTPATIENT)
Dept: CARDIOLOGY | Facility: CLINIC | Age: 84
End: 2019-09-05

## 2019-09-05 NOTE — TELEPHONE ENCOUNTER
Pt's daughter Maryjane called (C2C on file). Pt is followed by dermatology ([facility] in [place] - Dr. Hernandes) for some skin cancer on her nose. Pt is scheduled for a Mohs procedure to remove the skin cancer on her nose on 9/24/19. They have recommended she stop her ASA for 14 days and plavix for unknown duration prior to her procedure. However, Maryjane is wondering if that is okay. Pt just had stenting in July 2019. Procedure will be using local anesthesia.     Hx: Last saw Britni Joseph PA-C on 8/19/19. Has hx CVA x2, 2nd degree HB s/p PPM, parox AFib, NSTEMI 7/2019 s/p stent to mid LAD, complicated by a guidewire dissection requiring additional stenting to the proximal LAD and left main. D/t dissection pt was taken off Eliquis and told to continue DAPT. Pt was stable at OV w/ Britni, low burden of AFib on device check. Follow-up is scheduled 10/25/19 w/ Dr. Meza to readdress restarting Eliquis.     I advised Maryjane that since the pt just had stenting, unlikely it is ok to stop ASA or plavix but will double check w/ Britni.     Routing to Britni - please advise.   Erin AGUILAR, BSN  09/05/19 2:29 PM    ADDENDUM: No, she CANNOT stop either the aspirin or Plavix at this time. She has unprotected LM/prox LAD stents and she is not even 2 months out. If they can't do the procedure on the antiplatelet medications she should discuss what other options they can offer her. She can certainly talk to Dr Meza about this further when she sees him next month as well. Thanks.  Britni Joseph PA-C

## 2019-09-06 NOTE — TELEPHONE ENCOUNTER
Called pt, reviewed Britni's recommendations. Maryjane understood, no questions. She will contact the skin doctors and let them know.   Erin AGUILAR, BSN  09/06/19 8:54 AM

## 2019-09-26 DIAGNOSIS — I25.10 CORONARY ARTERY DISEASE INVOLVING NATIVE CORONARY ARTERY OF NATIVE HEART WITHOUT ANGINA PECTORIS: ICD-10-CM

## 2019-09-26 NOTE — TELEPHONE ENCOUNTER
Medication Refilled: ASA 81mg  Last office visit: 19  Last Labs/EK19  Next office visit: 2020  Pharmacy sent to: Horace Gramajo RN

## 2019-10-25 ENCOUNTER — OFFICE VISIT (OUTPATIENT)
Dept: CARDIOLOGY | Facility: CLINIC | Age: 84
End: 2019-10-25
Attending: NURSE PRACTITIONER
Payer: COMMERCIAL

## 2019-10-25 VITALS
HEART RATE: 84 BPM | SYSTOLIC BLOOD PRESSURE: 138 MMHG | HEIGHT: 60 IN | WEIGHT: 110.1 LBS | DIASTOLIC BLOOD PRESSURE: 76 MMHG | BODY MASS INDEX: 21.62 KG/M2

## 2019-10-25 DIAGNOSIS — I44.1 AV BLOCK, 2ND DEGREE: ICD-10-CM

## 2019-10-25 DIAGNOSIS — I10 ESSENTIAL HYPERTENSION: ICD-10-CM

## 2019-10-25 DIAGNOSIS — I48.0 PAROXYSMAL ATRIAL FIBRILLATION (H): Primary | ICD-10-CM

## 2019-10-25 DIAGNOSIS — I25.10 CORONARY ARTERY DISEASE INVOLVING NATIVE CORONARY ARTERY OF NATIVE HEART WITHOUT ANGINA PECTORIS: ICD-10-CM

## 2019-10-25 DIAGNOSIS — I71.019 AORTIC DISSECTION, THORACIC (H): ICD-10-CM

## 2019-10-25 DIAGNOSIS — Z95.0 CARDIAC PACEMAKER IN SITU: ICD-10-CM

## 2019-10-25 DIAGNOSIS — R53.81 PHYSICAL DECONDITIONING: ICD-10-CM

## 2019-10-25 LAB
ANION GAP SERPL CALCULATED.3IONS-SCNC: 4 MMOL/L (ref 3–14)
BUN SERPL-MCNC: 21 MG/DL (ref 7–30)
CALCIUM SERPL-MCNC: 8.6 MG/DL (ref 8.5–10.1)
CHLORIDE SERPL-SCNC: 109 MMOL/L (ref 94–109)
CO2 SERPL-SCNC: 28 MMOL/L (ref 20–32)
CREAT SERPL-MCNC: 0.77 MG/DL (ref 0.52–1.04)
GFR SERPL CREATININE-BSD FRML MDRD: 69 ML/MIN/{1.73_M2}
GLUCOSE SERPL-MCNC: 93 MG/DL (ref 70–99)
POTASSIUM SERPL-SCNC: 4 MMOL/L (ref 3.4–5.3)
SODIUM SERPL-SCNC: 141 MMOL/L (ref 133–144)

## 2019-10-25 PROCEDURE — 80048 BASIC METABOLIC PNL TOTAL CA: CPT | Performed by: INTERNAL MEDICINE

## 2019-10-25 PROCEDURE — 36415 COLL VENOUS BLD VENIPUNCTURE: CPT | Performed by: INTERNAL MEDICINE

## 2019-10-25 PROCEDURE — 99215 OFFICE O/P EST HI 40 MIN: CPT | Performed by: INTERNAL MEDICINE

## 2019-10-25 ASSESSMENT — MIFFLIN-ST. JEOR: SCORE: 855.91

## 2019-10-25 NOTE — PROGRESS NOTES
Service Date: 10/25/2019      HISTORY OF PRESENT ILLNESS:  It was my pleasure to see your patient, Yoselyn Cui.  This is a patient who I saw for 1 day in the hospital in mid 07/2019 when she presented with chest discomfort and a non-Q-wave myocardial infarct.        She underwent coronary angiography with Dr. Vern Hutson on 07/17.  This showed severe calcific 3-vessel coronary artery disease.  The mid-LAD was 90% stenosed.  The second diagonal artery was 80% stenosed.  The proximal to mid circumflex with 95% stenosis and this was felt to be the culprit lesion.  Another 50% stenosis was in the mid circumflex and a 50% stenosis in the distal circumflex.  The right coronary artery had a damping of the catheter in the ostium, suggesting an ostial lesion, but she also had a 90% distal right coronary artery lesion as well.  All these vessels that I mentioned are significantly calcified.        The patient was then seen by Dr. Stephy Henderson after she was transferred from [facility] to [facility].  Dr. Henderson considered her a poor surgical candidate, because of her frailty and her age.        Then she underwent an intervention.  Dr. Jayleen Torres decided to intervene upon the left anterior descending artery initially, but she developed a  dissection in the left main coronary artery with a significant dissection present which extended back into the aortic root.  The patient had stenting of the left main with a 4.0 x 12 mm Synergy drug-eluting stent and then she had an additional stent placed in the proximal left anterior descending artery with a 12 mm On-X stent.  The circumflex and the right coronary artery were not intervened upon.      This patient has a history of high-grade conduction system disease for which she had a permanent pacemaker placed in the past.  She was seen by Dr. Goel in the past for this.  It was found that the patient was having paroxysmal atrial fibrillation on the pacemaker and  was  started on apixaban.  Subsequent to all of the events with the coronary arteries, the apixaban was held and she is on dual-antiplatelet therapy with aspirin and clopidogrel.        The patient's ejection fraction initially at the time of her infarct was reduced with an EF in the 35%-40% range with severe hypokinesis to akinesis of the mid to basal inferolateral wall and anterolateral wall.  Subsequent to the intervention, her most recent echocardiogram was on 07/23/2019 and this showed that her ejection fraction had returned to normal with an EF of 55%-60%.  There was no sign of progression of the aortic root hematoma.  The patient did not have an CT or MRI of the aortic root.  She did have a CT in August to rule out pulmonary embolism, but that CT was not timed to look at the aortic root and the pictures are not sufficient to make a diagnosis of residual dissection in the aorta and aortic root.      With that background in mind, the patient is doing very well.  She is entirely asymptomatic.  She has no chest pains, no chest pressure, no unusual shortness of breath.  She used 1 nitro months ago, and has used none since then.        Her blood pressure today is 138/76.  Her last lipid profile was on 07/18 showing an LDL of 60, HDL of 38 and triglycerides of 100.      IMPRESSION:   1.  Severe 3-vessel coronary artery disease with complicated intervention and dissection from the left main back into the aortic root.  Residual disease in the circumflex and right coronary artery.   2.  Ischemic cardiomyopathy.  Initially, the ejection fraction was significantly reduced at 35%-40%, but now the ejection fraction has returned to normal.   3.  Paroxysmal atrial fibrillation with a high CHADS-VASc score.   4.  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   5.  Normotensive.   6.  HDL deficiency, but excellent LDL cholesterol.   7.  Status post prior CVA with dense left hemiparesis.        PLAN: The  circumflex and the right coronary arteries are not attractive for intervention.  These vessels are heavily calcified.  Certainly, the right coronary artery is tortuous.  The ostium of the right coronary artery is also involved, which would give  poor guide support.  The circumflex also is quite calcified and intervention would have to be performed through the left main stent.      Given that the patient is entirely asymptomatic at this stage, on good medical therapy, I would be very wary about trying to intervene upon these vessels at all.  If, however, the patient has become symptomatic, then that is a different issue, and then one would consider high-risk intervention, probably using rotablader or orbital atherectomy because of the heavy calcification, but at present, I would continue with medical therapy alone.  The patient and her daughter are both happy with that decision, and indeed, her daughter fully agrees with that plan.        The next question of course, is whether we should be using anticoagulation.  Firstly, I would make fully certain that there is no evidence of dissection in the aortic root, and I would do a dedicated CT scan of the aorta to look at the aortic root to make sure there was no dissection.  It there was no dissection, then I would restart the Eliquis medication, but I would stop the aspirin.  I will continue her on the clopidogrel and Eliquis indefinitely, especially given that she has an unprotected left main stent.        I will have the patient return to see Britni Joseph after the results of the CT scan to discuss the findings.  I will have the patient see me in 12 weeks' time.      It has been my pleasure to be involved in the care of this highly complicated patient.      This was a 40-minute visit, of which the majority was involved in counseling and education.      cc:      Brennen Collier DO   [facility]   [address] [place] [address]         RENEE WILEY  NADIRA STACY MD, [facility]             D: 10/25/2019   T: 10/25/2019   MT: LINDA      Name:     ANNE MARIE AMBROSE   MRN:      [id number]        Account:      [id number]   :      1932           Service Date: 10/25/2019      Document: [id number]

## 2019-10-25 NOTE — LETTER
10/25/2019    Brennen Collier,   [facility] [address]  [facility] [address]    RE: Yoselyn Cui       Dear Colleague,    I had the pleasure of seeing Yoselyn Cui in the [facility].    HPI and Plan:   See dictation    Orders Placed This Encounter   Procedures     CT Chest Angio w/o & w Contrast     Lipid Profile     ALT     Basic metabolic panel     Basic metabolic panel     Follow-Up with Cardiac Advanced Practice Provider     Follow-Up with Cardiologist       No orders of the defined types were placed in this encounter.      There are no discontinued medications.      Encounter Diagnoses   Name Primary?     Coronary artery disease involving native coronary artery of native heart without angina pectoris      Paroxysmal atrial fibrillation (H) Yes     AV block, 2nd degree      Physical deconditioning      Cardiac pacemaker in situ      Aortic dissection, thoracic (H)      Essential hypertension        CURRENT MEDICATIONS:  Current Outpatient Medications   Medication Sig Dispense Refill     acetaminophen (TYLENOL) 325 MG tablet Take 650 mg by mouth every 6 hours as needed for mild pain        amLODIPine (NORVASC) 5 MG tablet Take 5 mg by mouth daily        aspirin (ASA) 81 MG EC tablet Take 1 tablet (81 mg) by mouth daily 90 tablet 3     atorvastatin (LIPITOR) 20 MG tablet Take 20 mg by mouth At Bedtime       carvedilol (COREG) 12.5 MG tablet Take 1 tablet (12.5 mg) by mouth 2 times daily (with meals) 60 tablet 0     clopidogrel (PLAVIX) 75 MG tablet Take 1 tablet (75 mg) by mouth daily 90 tablet 0     ferrous sulfate (IRON) 325 (65 FE) MG tablet Take 65 mg by mouth daily (with breakfast)        fluticasone furoate (ARNUITY ELLIPTA) 200 MCG/ACT inhalation powder Inhale 1 puff into the lungs daily 3 Inhaler 0     lisinopril (PRINIVIL,ZESTRIL) 40 MG tablet Take 40 mg by mouth daily       Multiple Vitamins-Minerals (ICAPS AREDS FORMULA) TABS Take 1 tablet by mouth 2 times  daily        nitroGLYcerin (NITROSTAT) 0.4 MG sublingual tablet For chest pain place 1 tablet under the tongue every 5 minutes for 3 doses. If symptoms persist 5 minutes after 1st dose call 911. 30 tablet 0     OMEPRAZOLE PO Take 20 mg by mouth every morning        tolterodine (DETROL LA) 4 MG 24 hr capsule Take 4 mg by mouth every morning         ALLERGIES   No Known Allergies    PAST MEDICAL HISTORY:  Past Medical History:   Diagnosis Date     AV block, 2nd degree 5/16/2016     Cerebrovascular accident (H) 8/22/2016     COKER (dyspnea on exertion) 8/22/2016     Generalized weakness 10/12/2016     GIB (gastrointestinal bleeding)      History of colonic polyps 8/22/2016     HTN (hypertension) 8/22/2016     Iron deficiency anemia      Melanoma of skin (H)-rt arm 8/22/2016     Mixed hyperlipidemia 8/22/2016     Pacemaker     implanted 5-     PAF (paroxysmal atrial fibrillation) (H)      SSS (sick sinus syndrome) (H) 8/22/2016       PAST SURGICAL HISTORY:  Past Surgical History:   Procedure Laterality Date     APPENDECTOMY  1960s     C LIGATE FALLOPIAN TUBE  1960s     C REMV CATARACT INTRACAP,INSERT LENS Right 09/20/2017     CV CORONARY ANGIOGRAM N/A 7/17/2019    Procedure: Coronary Angiogram;  Surgeon: Irvin Hutson MD;  Location:  HEART CARDIAC CATH LAB     CV HEART CATHETERIZATION WITH POSSIBLE INTERVENTION N/A 7/18/2019    Procedure: Heart Catheterization with Possible Intervention;  Surgeon: Jayleen Torres MD;  Location: [facility] CARDIAC CATH LAB     CV LEFT HEART CATH N/A 7/17/2019    Procedure: Left Heart Cath;  Surgeon: Irvin Hutson MD;  Location:  HEART CARDIAC CATH LAB     CV LEFT VENTRICULOGRAM N/A 7/17/2019    Procedure: Left Ventriculogram;  Surgeon: Irvin Hutson MD;  Location:  HEART CARDIAC CATH LAB     CV PCI STENT DRUG ELUTING N/A 7/18/2019    Procedure: PCI Stent Drug Eluting;  Surgeon: Jayleen Torres MD;  Location: [facility] CARDIAC CATH LAB      ESOPHAGOSCOPY, GASTROSCOPY, DUODENOSCOPY (EGD), COMBINED N/A 11/20/2018    Procedure: ESOPHAGOSCOPY, GASTROSCOPY, DUODENOSCOPY (EGD)  Room 523 with biopsies using cold biopsy forceps;  Surgeon: Ayala Soto MD;  Location:  GI     HC REMV CATARACT EXTRACAP,INSERT LENS Left 10/04/2017     IMPLANT PACEMAKER  05/17/2016       FAMILY HISTORY:  Family History   Problem Relation Age of Onset     Coronary Artery Disease Father      Coronary Artery Disease Brother      Coronary Artery Disease Sister      Colon Cancer No family hx of        SOCIAL HISTORY:  Social History     Socioeconomic History     Marital status:      Spouse name: None     Number of children: None     Years of education: None     Highest education level: None   Occupational History     None   Social Needs     Financial resource strain: None     Food insecurity:     Worry: None     Inability: None     Transportation needs:     Medical: None     Non-medical: None   Tobacco Use     Smoking status: Never Smoker     Smokeless tobacco: Never Used   Substance and Sexual Activity     Alcohol use: No     Alcohol/week: 0.0 standard drinks     Drug use: No     Sexual activity: None   Lifestyle     Physical activity:     Days per week: None     Minutes per session: None     Stress: None   Relationships     Social connections:     Talks on phone: None     Gets together: None     Attends Scientologist service: None     Active member of club or organization: None     Attends meetings of clubs or organizations: None     Relationship status: None     Intimate partner violence:     Fear of current or ex partner: None     Emotionally abused: None     Physically abused: None     Forced sexual activity: None   Other Topics Concern     Parent/sibling w/ CABG, MI or angioplasty before 65F 55M? Not Asked      Service Not Asked     Blood Transfusions Not Asked     Caffeine Concern No     Comment: 3+ cups daily     Occupational Exposure Not Asked     Hobby  Hazards Not Asked     Sleep Concern Not Asked     Stress Concern Not Asked     Weight Concern Not Asked     Special Diet No     Comment: trying to cut down on sodium, watching cholesterol     Back Care Not Asked     Exercise No     Comment: limited     Bike Helmet Not Asked     Seat Belt Not Asked     Self-Exams Not Asked   Social History Narrative     None       Review of Systems:  Skin:  Positive for   hx skin cancer   Eyes:  Positive for glasses macular degeneration; cataract extraction of both eyes  ENT:  Positive for hearing loss    Respiratory:  Negative       Cardiovascular:  Negative      Gastroenterology: Negative      Genitourinary:  Positive for urinary frequency;incontinence    Musculoskeletal:  Negative      Neurologic:  Negative      Psychiatric:  Negative      Heme/Lymph/Imm:  Negative      Endocrine:  Negative        Physical Exam:  Vitals: /76 (BP Location: Right arm, Patient Position: Chair, Cuff Size: Adult Regular)   Pulse 84   Ht 1.524 m (5')   Wt 49.9 kg (110 lb 1.6 oz)   BMI 21.50 kg/m       Constitutional:  cooperative, alert and oriented, well developed, well nourished, in no acute distress frail;thin      Skin:  warm and dry to the touch   pacemaker incision in the left infraclavicular area was well-healed      Head:  normocephalic        Eyes:  pupils equal and round        Lymph:      ENT:  no pallor or cyanosis        Neck:  carotid pulses are full and equal bilaterally, JVP normal, no carotid bruit        Respiratory:  normal breath sounds, clear to auscultation, normal A-P diameter, normal symmetry, normal respiratory excursion, no use of accessory muscles         Cardiac: regular rhythm;normal S1 and S2;no murmurs, gallops or rubs detected   S4            pulses full and equal, no bruits auscultated                                        GI:  not assessed this visit        Extremities and Muscular Skeletal:  no edema;no deformities, clubbing, cyanosis, erythema observed          Boot R LE    Neurological:  affect appropriate left sided weakness      Psych:  Alert and Oriented x 3        CC  STEFFANY Ledezma CNP  [address]  [place] [id number]                Thank you for allowing me to participate in the care of your patient.      Sincerely,     Philipp Meza MD, MD     [facility]    cc:   STEFFANY Ledezma CNP  [address]  [place] [id number]

## 2019-10-25 NOTE — LETTER
10/25/2019      Brennen Collier DO  [facility] [address]  [facility] [address]      RE: Yoselyn Cui       Dear Colleague,    I had the pleasure of seeing Yoselyn Cui in the [facility].    Service Date: 10/25/2019      HISTORY OF PRESENT ILLNESS:  It was my pleasure to see your patient, Yoselyn Cui.  This is a patient who I saw for 1 day in the hospital in mid 07/2019 when she presented with chest discomfort and a non-Q-wave myocardial infarct.        She underwent coronary angiography with Dr. Vern Hutson on 07/17.  This showed severe calcific 3-vessel coronary artery disease.  The mid-LAD was 90% stenosed.  The second diagonal artery was 80% stenosed.  The proximal to mid circumflex with 95% stenosis and this was felt to be the culprit lesion.  Another 50% stenosis was in the mid circumflex and a 50% stenosis in the distal circumflex.  The right coronary artery had a damping of the catheter in the ostium, suggesting an ostial lesion, but she also had a 90% distal right coronary artery lesion as well.  All these vessels that I mentioned are significantly calcified.        The patient was then seen by Dr. Stephy Henderson after she was transferred from [facility] to [facility].  Dr. Henderson considered her a poor surgical candidate, because of her frailty and her age.        Then she underwent an intervention.  Dr. Jayleen Torres decided to intervene upon the left anterior descending artery initially, but she developed a  dissection in the left main coronary artery with a significant dissection present which extended back into the aortic root.  The patient had stenting of the left main with a 4.0 x 12 mm Synergy drug-eluting stent and then she had an additional stent placed in the proximal left anterior descending artery with a 12 mm On-X stent.  The circumflex and the right coronary artery were not intervened upon.      This patient has a history of  high-grade conduction system disease for which she had a permanent pacemaker placed in the past.  She was seen by Dr. Goel in the past for this.  It was found that the patient was having paroxysmal atrial fibrillation on the pacemaker and was  started on apixaban.  Subsequent to all of the events with the coronary arteries, the apixaban was held and she is on dual-antiplatelet therapy with aspirin and clopidogrel.        The patient's ejection fraction initially at the time of her infarct was reduced with an EF in the 35%-40% range with severe hypokinesis to akinesis of the mid to basal inferolateral wall and anterolateral wall.  Subsequent to the intervention, her most recent echocardiogram was on 07/23/2019 and this showed that her ejection fraction had returned to normal with an EF of 55%-60%.  There was no sign of progression of the aortic root hematoma.  The patient did not have an CT or MRI of the aortic root.  She did have a CT in August to rule out pulmonary embolism, but that CT was not timed to look at the aortic root and the pictures are not sufficient to make a diagnosis of residual dissection in the aorta and aortic root.      With that background in mind, the patient is doing very well.  She is entirely asymptomatic.  She has no chest pains, no chest pressure, no unusual shortness of breath.  She used 1 nitro months ago, and has used none since then.        Her blood pressure today is 138/76.  Her last lipid profile was on 07/18 showing an LDL of 60, HDL of 38 and triglycerides of 100.      IMPRESSION:   1.  Severe 3-vessel coronary artery disease with complicated intervention and dissection from the left main back into the aortic root.  Residual disease in the circumflex and right coronary artery.   2.  Ischemic cardiomyopathy.  Initially, the ejection fraction was significantly reduced at 35%-40%, but now the ejection fraction has returned to normal.   3.  Paroxysmal atrial fibrillation with a high  CHADS-VASc score.   4.  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   5.  Normotensive.   6.  HDL deficiency, but excellent LDL cholesterol.   7.  Status post prior CVA with dense left hemiparesis.        PLAN: The circumflex and the right coronary arteries are not attractive for intervention.  These vessels are heavily calcified.  Certainly, the right coronary artery is tortuous.  The ostium of the right coronary artery is also involved, which would give  poor guide support.  The circumflex also is quite calcified and intervention would have to be performed through the left main stent.      Given that the patient is entirely asymptomatic at this stage, on good medical therapy, I would be very wary about trying to intervene upon these vessels at all.  If, however, the patient has become symptomatic, then that is a different issue, and then one would consider high-risk intervention, probably using rotablader or orbital atherectomy because of the heavy calcification, but at present, I would continue with medical therapy alone.  The patient and her daughter are both happy with that decision, and indeed, her daughter fully agrees with that plan.        The next question of course, is whether we should be using anticoagulation.  Firstly, I would make fully certain that there is no evidence of dissection in the aortic root, and I would do a dedicated CT scan of the aorta to look at the aortic root to make sure there was no dissection.  It there was no dissection, then I would restart the Eliquis medication, but I would stop the aspirin.  I will continue her on the clopidogrel and Eliquis indefinitely, especially given that she has an unprotected left main stent.        I will have the patient return to see Britni Joseph after the results of the CT scan to discuss the findings.  I will have the patient see me in 12 weeks' time.      It has been my pleasure to be involved in the care of this  highly complicated patient.      This was a 40-minute visit, of which the majority was involved in counseling and education.      cc:      Brennen Collier DO   [facility]   [address] [place] [address]         RENEE STACY MD, [facility]             D: 10/25/2019   T: 10/25/2019   MT: LINDA      Name:     ANNE MARIE AMBROSE   MRN:      [id number]        Account:      [id number]   :      1932           Service Date: 10/25/2019      Document: [id number]           Outpatient Encounter Medications as of 10/25/2019   Medication Sig Dispense Refill     acetaminophen (TYLENOL) 325 MG tablet Take 650 mg by mouth every 6 hours as needed for mild pain        amLODIPine (NORVASC) 5 MG tablet Take 5 mg by mouth daily        aspirin (ASA) 81 MG EC tablet Take 1 tablet (81 mg) by mouth daily 90 tablet 3     atorvastatin (LIPITOR) 20 MG tablet Take 20 mg by mouth At Bedtime       carvedilol (COREG) 12.5 MG tablet Take 1 tablet (12.5 mg) by mouth 2 times daily (with meals) 60 tablet 0     ferrous sulfate (IRON) 325 (65 FE) MG tablet Take 65 mg by mouth daily (with breakfast)        fluticasone furoate (ARNUITY ELLIPTA) 200 MCG/ACT inhalation powder Inhale 1 puff into the lungs daily 3 Inhaler 0     lisinopril (PRINIVIL,ZESTRIL) 40 MG tablet Take 40 mg by mouth daily       Multiple Vitamins-Minerals (ICAPS AREDS FORMULA) TABS Take 1 tablet by mouth 2 times daily        nitroGLYcerin (NITROSTAT) 0.4 MG sublingual tablet For chest pain place 1 tablet under the tongue every 5 minutes for 3 doses. If symptoms persist 5 minutes after 1st dose call 911. 30 tablet 0     OMEPRAZOLE PO Take 20 mg by mouth every morning        tolterodine (DETROL LA) 4 MG 24 hr capsule Take 4 mg by mouth every morning       [DISCONTINUED] clopidogrel (PLAVIX) 75 MG tablet Take 1 tablet (75 mg) by mouth daily 90 tablet 0     [] cefuroxime (CEFTIN) 500 MG tablet Take 1 tablet (500 mg) by mouth 2 times daily for 5 days  (Patient not taking: Reported on 8/19/2019) 10 tablet 0     No facility-administered encounter medications on file as of 10/25/2019.        Again, thank you for allowing me to participate in the care of your patient.      Sincerely,    Philipp Meza MD, MD     [facility]

## 2019-10-25 NOTE — PROGRESS NOTES
HPI and Plan:   See dictation    Orders Placed This Encounter   Procedures     CT Chest Angio w/o & w Contrast     Lipid Profile     ALT     Basic metabolic panel     Basic metabolic panel     Follow-Up with Cardiac Advanced Practice Provider     Follow-Up with Cardiologist       No orders of the defined types were placed in this encounter.      There are no discontinued medications.      Encounter Diagnoses   Name Primary?     Coronary artery disease involving native coronary artery of native heart without angina pectoris      Paroxysmal atrial fibrillation (H) Yes     AV block, 2nd degree      Physical deconditioning      Cardiac pacemaker in situ      Aortic dissection, thoracic (H)      Essential hypertension        CURRENT MEDICATIONS:  Current Outpatient Medications   Medication Sig Dispense Refill     acetaminophen (TYLENOL) 325 MG tablet Take 650 mg by mouth every 6 hours as needed for mild pain        amLODIPine (NORVASC) 5 MG tablet Take 5 mg by mouth daily        aspirin (ASA) 81 MG EC tablet Take 1 tablet (81 mg) by mouth daily 90 tablet 3     atorvastatin (LIPITOR) 20 MG tablet Take 20 mg by mouth At Bedtime       carvedilol (COREG) 12.5 MG tablet Take 1 tablet (12.5 mg) by mouth 2 times daily (with meals) 60 tablet 0     clopidogrel (PLAVIX) 75 MG tablet Take 1 tablet (75 mg) by mouth daily 90 tablet 0     ferrous sulfate (IRON) 325 (65 FE) MG tablet Take 65 mg by mouth daily (with breakfast)        fluticasone furoate (ARNUITY ELLIPTA) 200 MCG/ACT inhalation powder Inhale 1 puff into the lungs daily 3 Inhaler 0     lisinopril (PRINIVIL,ZESTRIL) 40 MG tablet Take 40 mg by mouth daily       Multiple Vitamins-Minerals (ICAPS AREDS FORMULA) TABS Take 1 tablet by mouth 2 times daily        nitroGLYcerin (NITROSTAT) 0.4 MG sublingual tablet For chest pain place 1 tablet under the tongue every 5 minutes for 3 doses. If symptoms persist 5 minutes after 1st dose call 911. 30 tablet 0     OMEPRAZOLE PO Take 20  mg by mouth every morning        tolterodine (DETROL LA) 4 MG 24 hr capsule Take 4 mg by mouth every morning         ALLERGIES   No Known Allergies    PAST MEDICAL HISTORY:  Past Medical History:   Diagnosis Date     AV block, 2nd degree 5/16/2016     Cerebrovascular accident (H) 8/22/2016     COKER (dyspnea on exertion) 8/22/2016     Generalized weakness 10/12/2016     GIB (gastrointestinal bleeding)      History of colonic polyps 8/22/2016     HTN (hypertension) 8/22/2016     Iron deficiency anemia      Melanoma of skin (H)-rt arm 8/22/2016     Mixed hyperlipidemia 8/22/2016     Pacemaker     implanted 5-     PAF (paroxysmal atrial fibrillation) (H)      SSS (sick sinus syndrome) (H) 8/22/2016       PAST SURGICAL HISTORY:  Past Surgical History:   Procedure Laterality Date     APPENDECTOMY  1960s     C LIGATE FALLOPIAN TUBE  1960s     C REMV CATARACT INTRACAP,INSERT LENS Right 09/20/2017     CV CORONARY ANGIOGRAM N/A 7/17/2019    Procedure: Coronary Angiogram;  Surgeon: Irvin Hutson MD;  [facility]     CV HEART CATHETERIZATION WITH POSSIBLE INTERVENTION N/A 7/18/2019    Procedure: Heart Catheterization with Possible Intervention;  Surgeon: Jayleen Torres MD;  Location: [facility] CARDIAC CATH LAB     CV LEFT HEART CATH N/A 7/17/2019    Procedure: Left Heart Cath;  Surgeon: Irvin Hutson MD;  [facility]     CV LEFT VENTRICULOGRAM N/A 7/17/2019    Procedure: Left Ventriculogram;  Surgeon: Irvin Hutson MD;  Location:  HEART CARDIAC CATH LAB     CV PCI STENT DRUG ELUTING N/A 7/18/2019    Procedure: PCI Stent Drug Eluting;  Surgeon: Jayleen Torres MD;  Location: [facility] CARDIAC CATH LAB     ESOPHAGOSCOPY, GASTROSCOPY, DUODENOSCOPY (EGD), COMBINED N/A 11/20/2018    Procedure: ESOPHAGOSCOPY, GASTROSCOPY, DUODENOSCOPY (EGD)  Room 523 with biopsies using cold biopsy forceps;  Surgeon: Ayala Soto MD;  Location:   GI     HC REMV CATARACT EXTRACAP,INSERT LENS Left 10/04/2017     IMPLANT PACEMAKER  05/17/2016       FAMILY HISTORY:  Family History   Problem Relation Age of Onset     Coronary Artery Disease Father      Coronary Artery Disease Brother      Coronary Artery Disease Sister      Colon Cancer No family hx of        SOCIAL HISTORY:  Social History     Socioeconomic History     Marital status:      Spouse name: None     Number of children: None     Years of education: None     Highest education level: None   Occupational History     None   Social Needs     Financial resource strain: None     Food insecurity:     Worry: None     Inability: None     Transportation needs:     Medical: None     Non-medical: None   Tobacco Use     Smoking status: Never Smoker     Smokeless tobacco: Never Used   Substance and Sexual Activity     Alcohol use: No     Alcohol/week: 0.0 standard drinks     Drug use: No     Sexual activity: None   Lifestyle     Physical activity:     Days per week: None     Minutes per session: None     Stress: None   Relationships     Social connections:     Talks on phone: None     Gets together: None     Attends Anglican service: None     Active member of club or organization: None     Attends meetings of clubs or organizations: None     Relationship status: None     Intimate partner violence:     Fear of current or ex partner: None     Emotionally abused: None     Physically abused: None     Forced sexual activity: None   Other Topics Concern     Parent/sibling w/ CABG, MI or angioplasty before 65F 55M? Not Asked      Service Not Asked     Blood Transfusions Not Asked     Caffeine Concern No     Comment: 3+ cups daily     Occupational Exposure Not Asked     Hobby Hazards Not Asked     Sleep Concern Not Asked     Stress Concern Not Asked     Weight Concern Not Asked     Special Diet No     Comment: trying to cut down on sodium, watching cholesterol     Back Care Not Asked     Exercise No      Comment: limited     Bike Helmet Not Asked     Seat Belt Not Asked     Self-Exams Not Asked   Social History Narrative     None       Review of Systems:  Skin:  Positive for   hx skin cancer   Eyes:  Positive for glasses macular degeneration; cataract extraction of both eyes  ENT:  Positive for hearing loss    Respiratory:  Negative       Cardiovascular:  Negative      Gastroenterology: Negative      Genitourinary:  Positive for urinary frequency;incontinence    Musculoskeletal:  Negative      Neurologic:  Negative      Psychiatric:  Negative      Heme/Lymph/Imm:  Negative      Endocrine:  Negative        Physical Exam:  Vitals: /76 (BP Location: Right arm, Patient Position: Chair, Cuff Size: Adult Regular)   Pulse 84   Ht 1.524 m (5')   Wt 49.9 kg (110 lb 1.6 oz)   BMI 21.50 kg/m      Constitutional:  cooperative, alert and oriented, well developed, well nourished, in no acute distress frail;thin      Skin:  warm and dry to the touch   pacemaker incision in the left infraclavicular area was well-healed      Head:  normocephalic        Eyes:  pupils equal and round        Lymph:      ENT:  no pallor or cyanosis        Neck:  carotid pulses are full and equal bilaterally, JVP normal, no carotid bruit        Respiratory:  normal breath sounds, clear to auscultation, normal A-P diameter, normal symmetry, normal respiratory excursion, no use of accessory muscles         Cardiac: regular rhythm;normal S1 and S2;no murmurs, gallops or rubs detected   S4            pulses full and equal, no bruits auscultated                                        GI:  not assessed this visit        Extremities and Muscular Skeletal:  no edema;no deformities, clubbing, cyanosis, erythema observed         Boot R LE    Neurological:  affect appropriate left sided weakness      Psych:  Alert and Oriented x 3        CC  Jennyfer Hopkins, APRN CNP  [address]  [place] [address]

## 2019-10-28 DIAGNOSIS — I25.10 CORONARY ARTERY DISEASE INVOLVING NATIVE CORONARY ARTERY OF NATIVE HEART WITHOUT ANGINA PECTORIS: ICD-10-CM

## 2019-10-28 RX ORDER — CLOPIDOGREL BISULFATE 75 MG/1
75 TABLET ORAL DAILY
Qty: 90 TABLET | Refills: 0 | Status: SHIPPED | OUTPATIENT
Start: 2019-10-28 | End: 2019-11-12

## 2019-11-06 ENCOUNTER — HOSPITAL ENCOUNTER (OUTPATIENT)
Dept: CARDIOLOGY | Facility: CLINIC | Age: 84
Discharge: HOME | End: 2019-11-06
Attending: INTERNAL MEDICINE | Admitting: INTERNAL MEDICINE
Payer: COMMERCIAL

## 2019-11-06 DIAGNOSIS — I71.019 AORTIC DISSECTION, THORACIC (H): ICD-10-CM

## 2019-11-06 PROCEDURE — 25000128 H RX IP 250 OP 636: Performed by: INTERNAL MEDICINE

## 2019-11-06 PROCEDURE — 71275 CT ANGIOGRAPHY CHEST: CPT | Mod: 26 | Performed by: INTERNAL MEDICINE

## 2019-11-06 PROCEDURE — 71275 CT ANGIOGRAPHY CHEST: CPT

## 2019-11-06 RX ORDER — DIPHENHYDRAMINE HCL 25 MG
25 CAPSULE ORAL
Status: DISCONTINUED | OUTPATIENT
Start: 2019-11-06 | End: 2019-11-07 | Stop reason: HOSPADM

## 2019-11-06 RX ORDER — IOPAMIDOL 755 MG/ML
200 INJECTION, SOLUTION INTRAVASCULAR ONCE
Status: COMPLETED | OUTPATIENT
Start: 2019-11-06 | End: 2019-11-06

## 2019-11-06 RX ORDER — ONDANSETRON 2 MG/ML
4 INJECTION INTRAMUSCULAR; INTRAVENOUS
Status: DISCONTINUED | OUTPATIENT
Start: 2019-11-06 | End: 2019-11-07 | Stop reason: HOSPADM

## 2019-11-06 RX ORDER — DIPHENHYDRAMINE HYDROCHLORIDE 50 MG/ML
25-50 INJECTION INTRAMUSCULAR; INTRAVENOUS
Status: DISCONTINUED | OUTPATIENT
Start: 2019-11-06 | End: 2019-11-07 | Stop reason: HOSPADM

## 2019-11-06 RX ORDER — ACYCLOVIR 200 MG/1
0-1 CAPSULE ORAL
Status: DISCONTINUED | OUTPATIENT
Start: 2019-11-06 | End: 2019-11-07 | Stop reason: HOSPADM

## 2019-11-06 RX ORDER — METHYLPREDNISOLONE SODIUM SUCCINATE 125 MG/2ML
125 INJECTION, POWDER, LYOPHILIZED, FOR SOLUTION INTRAMUSCULAR; INTRAVENOUS
Status: DISCONTINUED | OUTPATIENT
Start: 2019-11-06 | End: 2019-11-07 | Stop reason: HOSPADM

## 2019-11-06 RX ADMIN — IOPAMIDOL 100 ML: 755 INJECTION, SOLUTION INTRAVENOUS at 16:03

## 2019-11-08 ENCOUNTER — HEALTH MAINTENANCE LETTER (OUTPATIENT)
Age: 84
End: 2019-11-08

## 2019-11-12 ENCOUNTER — OFFICE VISIT (OUTPATIENT)
Dept: CARDIOLOGY | Facility: CLINIC | Age: 84
End: 2019-11-12
Attending: INTERNAL MEDICINE
Payer: COMMERCIAL

## 2019-11-12 VITALS
BODY MASS INDEX: 22.18 KG/M2 | WEIGHT: 110 LBS | SYSTOLIC BLOOD PRESSURE: 130 MMHG | DIASTOLIC BLOOD PRESSURE: 60 MMHG | HEIGHT: 59 IN | HEART RATE: 70 BPM

## 2019-11-12 DIAGNOSIS — I25.10 CORONARY ARTERY DISEASE INVOLVING NATIVE CORONARY ARTERY OF NATIVE HEART WITHOUT ANGINA PECTORIS: Primary | ICD-10-CM

## 2019-11-12 DIAGNOSIS — I48.0 PAROXYSMAL ATRIAL FIBRILLATION (H): ICD-10-CM

## 2019-11-12 DIAGNOSIS — I71.019 AORTIC DISSECTION, THORACIC (H): ICD-10-CM

## 2019-11-12 PROCEDURE — 99213 OFFICE O/P EST LOW 20 MIN: CPT | Performed by: PHYSICIAN ASSISTANT

## 2019-11-12 RX ORDER — CLOPIDOGREL BISULFATE 75 MG/1
75 TABLET ORAL DAILY
Qty: 30 TABLET | Refills: 11 | Status: SHIPPED | OUTPATIENT
Start: 2019-11-12 | End: 2020-12-10

## 2019-11-12 ASSESSMENT — MIFFLIN-ST. JEOR: SCORE: 839.59

## 2019-11-12 NOTE — PATIENT INSTRUCTIONS
Thank you for your [facility] visit today. Your provider has recommended the following:  Medication Changes:  STOP aspirin when you start the Eliquis  START Eliquis 2.5mg TWICE a day  Recommendations:  Nothing new at this time  Follow-up:  See Dr Meza for cardiology follow up in Feb as planned.    Reminder:  Please bring in your current medication list or your medication, over the counter supplements and vitamin bottles as we will review these at each office visit.

## 2019-11-12 NOTE — LETTER
11/12/2019    Brennen Collier, DO  [facility] [address]  [facility] [address]    RE: Yoselyn Cui       Dear Colleague,    I had the pleasure of seeing Yoselyn Cui in the [facility].    Please see separate dictation for HPI, PHYSICAL EXAM AND IMPRESSION/PLAN.    CURRENT MEDICATIONS:  Current Outpatient Medications   Medication Sig Dispense Refill     acetaminophen (TYLENOL) 325 MG tablet Take 650 mg by mouth every 6 hours as needed for mild pain        amLODIPine (NORVASC) 5 MG tablet Take 5 mg by mouth daily        aspirin (ASA) 81 MG EC tablet Take 1 tablet (81 mg) by mouth daily 90 tablet 3     atorvastatin (LIPITOR) 20 MG tablet Take 20 mg by mouth At Bedtime       carvedilol (COREG) 12.5 MG tablet Take 1 tablet (12.5 mg) by mouth 2 times daily (with meals) 60 tablet 0     clopidogrel (PLAVIX) 75 MG tablet Take 1 tablet (75 mg) by mouth daily 90 tablet 0     ferrous sulfate (IRON) 325 (65 FE) MG tablet Take 65 mg by mouth daily (with breakfast)        fluticasone furoate (ARNUITY ELLIPTA) 200 MCG/ACT inhalation powder Inhale 1 puff into the lungs daily 3 Inhaler 0     lisinopril (PRINIVIL,ZESTRIL) 40 MG tablet Take 40 mg by mouth daily       Multiple Vitamins-Minerals (ICAPS AREDS FORMULA) TABS Take 1 tablet by mouth 2 times daily        nitroGLYcerin (NITROSTAT) 0.4 MG sublingual tablet For chest pain place 1 tablet under the tongue every 5 minutes for 3 doses. If symptoms persist 5 minutes after 1st dose call 911. 30 tablet 0     OMEPRAZOLE PO Take 20 mg by mouth every morning        tolterodine (DETROL LA) 4 MG 24 hr capsule Take 4 mg by mouth every morning         ALLERGIES:   No Known Allergies    PAST MEDICAL HISTORY:  Past Medical History:   Diagnosis Date     AV block, 2nd degree 5/16/2016     Cerebrovascular accident (H) 8/22/2016     COKER (dyspnea on exertion) 8/22/2016     Generalized weakness 10/12/2016     GIB (gastrointestinal bleeding)      History of  colonic polyps 8/22/2016     HTN (hypertension) 8/22/2016     Iron deficiency anemia      Melanoma of skin (H)-rt arm 8/22/2016     Mixed hyperlipidemia 8/22/2016     Pacemaker     implanted 5-     PAF (paroxysmal atrial fibrillation) (H)      SSS (sick sinus syndrome) (H) 8/22/2016       PAST SURGICAL HISTORY:  Past Surgical History:   Procedure Laterality Date     APPENDECTOMY  1960s     C LIGATE FALLOPIAN TUBE  1960s     C REMV CATARACT INTRACAP,INSERT LENS Right 09/20/2017     CV CORONARY ANGIOGRAM N/A 7/17/2019    Procedure: Coronary Angiogram;  Surgeon: Irvin Hutson MD;  Location:  HEART CARDIAC CATH LAB     CV HEART CATHETERIZATION WITH POSSIBLE INTERVENTION N/A 7/18/2019    Procedure: Heart Catheterization with Possible Intervention;  Surgeon: Jayleen Torres MD;  Location:  HEART CARDIAC CATH LAB     CV LEFT HEART CATH N/A 7/17/2019    Procedure: Left Heart Cath;  Surgeon: Irvin Hutson MD;  Location:  HEART CARDIAC CATH LAB     CV LEFT VENTRICULOGRAM N/A 7/17/2019    Procedure: Left Ventriculogram;  Surgeon: Irvin Hutson MD;  Location:  HEART CARDIAC CATH LAB     CV PCI STENT DRUG ELUTING N/A 7/18/2019    Procedure: PCI Stent Drug Eluting;  Surgeon: Jayleen Torres MD;  Location:  HEART CARDIAC CATH LAB     ESOPHAGOSCOPY, GASTROSCOPY, DUODENOSCOPY (EGD), COMBINED N/A 11/20/2018    Procedure: ESOPHAGOSCOPY, GASTROSCOPY, DUODENOSCOPY (EGD)  Room 523 with biopsies using cold biopsy forceps;  Surgeon: Ayala Soto MD;  Location:  GI     HC REMV CATARACT EXTRACAP,INSERT LENS Left 10/04/2017     IMPLANT PACEMAKER  05/17/2016       SOCIAL HISTORY:  Social History     Socioeconomic History     Marital status:      Spouse name: None     Number of children: None     Years of education: None     Highest education level: None   Occupational History     None   Social Needs     Financial resource strain: None     Food insecurity:     Worry: None      Inability: None     Transportation needs:     Medical: None     Non-medical: None   Tobacco Use     Smoking status: Never Smoker     Smokeless tobacco: Never Used   Substance and Sexual Activity     Alcohol use: No     Alcohol/week: 0.0 standard drinks     Drug use: No     Sexual activity: None   Lifestyle     Physical activity:     Days per week: None     Minutes per session: None     Stress: None   Relationships     Social connections:     Talks on phone: None     Gets together: None     Attends Jew service: None     Active member of club or organization: None     Attends meetings of clubs or organizations: None     Relationship status: None     Intimate partner violence:     Fear of current or ex partner: None     Emotionally abused: None     Physically abused: None     Forced sexual activity: None   Other Topics Concern     Parent/sibling w/ CABG, MI or angioplasty before 65F 55M? Not Asked      Service Not Asked     Blood Transfusions Not Asked     Caffeine Concern No     Comment: 3+ cups daily     Occupational Exposure Not Asked     Hobby Hazards Not Asked     Sleep Concern Not Asked     Stress Concern Not Asked     Weight Concern Not Asked     Special Diet No     Comment: trying to cut down on sodium, watching cholesterol     Back Care Not Asked     Exercise No     Comment: limited     Bike Helmet Not Asked     Seat Belt Not Asked     Self-Exams Not Asked   Social History Narrative     None       FAMILY HISTORY:  Family History   Problem Relation Age of Onset     Coronary Artery Disease Father      Coronary Artery Disease Brother      Coronary Artery Disease Sister      Colon Cancer No family hx of        Review of Systems:  Skin:  Positive for rash     Eyes:  Positive for   macular degeneration; cataract extraction of both eyes  ENT:  Positive for hearing loss    Respiratory:  Positive for dyspnea on exertion     Cardiovascular:    fatigue;Positive for    Gastroenterology: Negative for       Genitourinary:  not assessed      Musculoskeletal:  Positive for neck pain    Neurologic:  Positive for   stroke x2 1982 and 2007  Psychiatric:  Negative for      Heme/Lymph/Imm:         Endocrine:  Negative         Reviewed. Remainder of the note dictated.    Britni Joseph PA-C    Service Date: 11/12/2019      HISTORY OF PRESENT ILLNESS:  Ms. Cui is a pleasant 87-year-old female who presents to the office today for followup after a recent CTA of her aorta.      Again, she has a somewhat complex cardiovascular history which includes second-degree AV block, status post permanent pacemaker implantation in 05/2016 with subsequent device check showing paroxysmal atrial fibrillation, a history of CVA x2, hypertension, hyperlipidemia and multivessel coronary artery disease.      She was admitted to [facility] in July of this year for chest discomfort that she initially thought was indigestion.  Her troponins were elevated and ended up peaking at 24.  Echocardiography showed reduced LV systolic function with an EF in the 35%-40% range with multiple wall motion abnormalities.  She was brought to the Cardiac Catheterization Lab and was found to have multivessel disease with significant lesions in the distal RCA proximal to mid circumflex, and mid LAD as well as the second diagonal.  She was transferred to [facility] and evaluated for bypass surgery, but was not felt to be a good surgical candidate.  Ultimately, she went back to the Cardiac Catheterization Lab and underwent successful PCI of the mid LAD with drug-eluting stent placement.  However, this procedure was complicated by LAD dissection extending into the left main and left coronary cusp.  She had additional drug-eluting stent placement to the left main and proximal LAD.  No other intervention was performed at that time.  She was taken off of Eliquis and started on aspirin and Plavix.  She was followed with serial echocardiograms which  showed an aortic root hematoma along with normalization of her LV systolic function.      The patient had followup with Dr. Meza about 3 weeks ago.  At that time, he recommended a CTA of the aorta to look for any evidence of residual dissection.  He felt that if there was no evidence of dissection she should be placed back on anticoagulation with Eliquis and that aspirin should be discontinued.  He agreed that since the patient was asymptomatic and her EF had normalized that it would be best to continue with conservative management of her underlying coronary disease and that we only consider intervention if she becomes symptomatic despite optimal medical therapy.      The patient tells me today that she continues to feel quite well.  She denies any chest discomfort/indigestion symptoms.  She denies palpitations, lightheadedness or lower extremity edema.  She states occasionally she feels tired, but otherwise overall feels quite well.      CURRENT CARDIAC MEDICATIONS:   1.  Amlodipine 5 mg daily.   2.  Aspirin 81 mg daily.   3.  Atorvastatin 20 mg nightly.   4.  Carvedilol 12.5 mg b.i.d.   5.  Clopidogrel 75 mg daily.   6.  Lisinopril 40 mg daily.   7.  Sublingual nitro p.r.n.      The remainder of her medications, allergies and review of systems were reviewed and as are documented separately.      PHYSICAL EXAMINATION:   GENERAL:  The patient is a pleasant 87-year-old female who appears her stated age.  She is in no apparent distress.   VITAL SIGNS:  Her blood pressure is 130/60, pulse 70, weight is 110 pounds (50 kg).      I reviewed the results of her recent CT angiogram of the aorta.  She did not have any evidence of an intramural hematoma or dissection.  She was noted to have calcification throughout the aortic system and its branches, but no significant stenoses were noted.  There were normal dimensions throughout the aortic root, ascending aorta, aortic arch and descending thoracic aorta.  The  radiology overread did mention several pulmonary nodules on the left side.  It sounds like these were stable compared to 03/2018.  They did mention a followup CT exam in 1 year could be considered.  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      ASSESSMENT AND PLAN:  Ms. Cui is a pleasant 87-year-old female with a distant history of CVA x2, second-degree heart block, status post permanent pacemaker implantation with subsequent device check showing occasional paroxysmal atrial fibrillation, hypertension, hyperlipidemia and coronary artery disease with a non-STEMI in 07/2019 at which time she was found to have multivessel disease (see above).  Again, she underwent stenting to the mid LAD which was unfortunately complicated by a guidewire dissection requiring additional stenting to the proximal LAD and left main.  Her circumflex and RCA were not intervened upon.      She recently underwent a CTA to look for residual intramural hematoma/dissection.  Fortunately, it appears that this has resolved.  At this point, due to her history of paroxysmal atrial fibrillation, we do recommend that she be placed back on full anticoagulation with Eliquis.  I have instructed her to restart 2.5 mg of Eliquis b.i.d. (she is on the lower dose due to age and body size).  She will discontinue her aspirin when she restarts the Eliquis.  She will continue on clopidogrel lifelong as tolerated due to her unprotected left main stent.      I did ask her to discuss her incidental CT findings (pumonary nodules and dilatation of the pancreatic duct) with her primary care provider.  She and her daughter were in agreement with this plan.      She already has followup scheduled with Dr. Meza in February.  Of course, I encouraged her to contact us sooner with any questions or concerns.      Thank you for allowing me to participate in the care of this pleasant patient.          BRITNI MAGALLON PA-C             D: 2019   T: 2019   MT: EILEEN      Name:     ANNE MARIE AMBROSE   MRN:      [id number]        Account:      [id number]   :      1932           Service Date: 2019      Document: [id number]        Thank you for allowing me to participate in the care of your patient.      Sincerely,     Britni Magallon PA-C     [facility]    cc:   Philipp Meza MD  [address]  [place] [address]

## 2019-11-12 NOTE — LETTER
11/12/2019      Brennen Collier DO  [facility] [address]  [facility] [address]      RE: Yoselyn Cui       Dear Colleague,    I had the pleasure of seeing Yoselyn Cui in the [facility].    Service Date: 11/12/2019      HISTORY OF PRESENT ILLNESS:  Ms. Cui is a pleasant 87-year-old female who presents to the office today for followup after a recent CTA of her aorta.      Again, she has a somewhat complex cardiovascular history which includes second-degree AV block, status post permanent pacemaker implantation in 05/2016 with subsequent device check showing paroxysmal atrial fibrillation, a history of CVA x2, hypertension, hyperlipidemia and multivessel coronary artery disease.      She was admitted to [facility] in July of this year for chest discomfort that she initially thought was indigestion.  Her troponins were elevated and ended up peaking at 24.  Echocardiography showed reduced LV systolic function with an EF in the 35%-40% range with multiple wall motion abnormalities.  She was brought to the Cardiac Catheterization Lab and was found to have multivessel disease with significant lesions in the distal RCA proximal to mid circumflex, and mid LAD as well as the second diagonal.  She was transferred to [facility] and evaluated for bypass surgery, but was not felt to be a good surgical candidate.  Ultimately, she went back to the Cardiac Catheterization Lab and underwent successful PCI of the mid LAD with drug-eluting stent placement.  However, this procedure was complicated by LAD dissection extending into the left main and left coronary cusp.  She had additional drug-eluting stent placement to the left main and proximal LAD.  No other intervention was performed at that time.  She was taken off of Eliquis and started on aspirin and Plavix.  She was followed with serial echocardiograms which showed an aortic root hematoma along with normalization  of her LV systolic function.      The patient had followup with Dr. Meza about 3 weeks ago.  At that time, he recommended a CTA of the aorta to look for any evidence of residual dissection.  He felt that if there was no evidence of dissection she should be placed back on anticoagulation with Eliquis and that aspirin should be discontinued.  He agreed that since the patient was asymptomatic and her EF had normalized that it would be best to continue with conservative management of her underlying coronary disease and that we only consider intervention if she becomes symptomatic despite optimal medical therapy.      The patient tells me today that she continues to feel quite well.  She denies any chest discomfort/indigestion symptoms.  She denies palpitations, lightheadedness or lower extremity edema.  She states occasionally she feels tired, but otherwise overall feels quite well.      CURRENT CARDIAC MEDICATIONS:   1.  Amlodipine 5 mg daily.   2.  Aspirin 81 mg daily.   3.  Atorvastatin 20 mg nightly.   4.  Carvedilol 12.5 mg b.i.d.   5.  Clopidogrel 75 mg daily.   6.  Lisinopril 40 mg daily.   7.  Sublingual nitro p.r.n.      The remainder of her medications, allergies and review of systems were reviewed and as are documented separately.      PHYSICAL EXAMINATION:   GENERAL:  The patient is a pleasant 87-year-old female who appears her stated age.  She is in no apparent distress.   VITAL SIGNS:  Her blood pressure is 130/60, pulse 70, weight is 110 pounds (50 kg).      I reviewed the results of her recent CT angiogram of the aorta.  She did not have any evidence of an intramural hematoma or dissection.  She was noted to have calcification throughout the aortic system and its branches, but no significant stenoses were noted.  There were normal dimensions throughout the aortic root, ascending aorta, aortic arch and descending thoracic aorta.  The radiology overread did mention several pulmonary nodules on the  left side.  It sounds like these were stable compared to 03/2018.  They did mention a followup CT exam in 1 year could be considered.  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      ASSESSMENT AND PLAN:  Ms. Cui is a pleasant 87-year-old female with a distant history of CVA x2, second-degree heart block, status post permanent pacemaker implantation with subsequent device check showing occasional paroxysmal atrial fibrillation, hypertension, hyperlipidemia and coronary artery disease with a non-STEMI in 07/2019 at which time she was found to have multivessel disease (see above).  Again, she underwent stenting to the mid LAD which was unfortunately complicated by a guidewire dissection requiring additional stenting to the proximal LAD and left main.  Her circumflex and RCA were not intervened upon.      She recently underwent a CTA to look for residual intramural hematoma/dissection.  Fortunately, it appears that this has resolved.  At this point, due to her history of paroxysmal atrial fibrillation, we do recommend that she be placed back on full anticoagulation with Eliquis.  I have instructed her to restart 2.5 mg of Eliquis b.i.d. (she is on the lower dose due to age and body size).  She will discontinue her aspirin when she restarts the Eliquis.  She will continue on clopidogrel lifelong as tolerated due to her unprotected left main stent.      I did ask her to discuss her incidental CT findings (pumonary nodules and dilatation of the pancreatic duct) with her primary care provider.  She and her daughter were in agreement with this plan.      She already has followup scheduled with Dr. Meza in February.  Of course, I encouraged her to contact us sooner with any questions or concerns.      Thank you for allowing me to participate in the care of this pleasant patient.         JIHAN MAGALLON PA-C             D: 11/12/2019   T: 11/12/2019    MT: EILEEN      Name:     ANNE MARIE AMBROSE   MRN:      [id number]        Account:      [id number]   :      1932           Service Date: 2019      Document: [id number]           Outpatient Encounter Medications as of 2019   Medication Sig Dispense Refill     acetaminophen (TYLENOL) 325 MG tablet Take 650 mg by mouth every 6 hours as needed for mild pain        amLODIPine (NORVASC) 5 MG tablet Take 5 mg by mouth daily        apixaban ANTICOAGULANT (ELIQUIS ANTICOAGULANT) 2.5 MG tablet Take 1 tablet (2.5 mg) by mouth 2 times daily 60 tablet 11     atorvastatin (LIPITOR) 20 MG tablet Take 20 mg by mouth At Bedtime       carvedilol (COREG) 12.5 MG tablet Take 1 tablet (12.5 mg) by mouth 2 times daily (with meals) 60 tablet 0     clopidogrel (PLAVIX) 75 MG tablet Take 1 tablet (75 mg) by mouth daily 30 tablet 11     ferrous sulfate (IRON) 325 (65 FE) MG tablet Take 65 mg by mouth daily (with breakfast)        fluticasone furoate (ARNUITY ELLIPTA) 200 MCG/ACT inhalation powder Inhale 1 puff into the lungs daily 3 Inhaler 0     lisinopril (PRINIVIL,ZESTRIL) 40 MG tablet Take 40 mg by mouth daily       Multiple Vitamins-Minerals (ICAPS AREDS FORMULA) TABS Take 1 tablet by mouth 2 times daily        nitroGLYcerin (NITROSTAT) 0.4 MG sublingual tablet For chest pain place 1 tablet under the tongue every 5 minutes for 3 doses. If symptoms persist 5 minutes after 1st dose call 911. 30 tablet 0     OMEPRAZOLE PO Take 20 mg by mouth every morning        tolterodine (DETROL LA) 4 MG 24 hr capsule Take 4 mg by mouth every morning       [] cefuroxime (CEFTIN) 500 MG tablet Take 1 tablet (500 mg) by mouth 2 times daily for 5 days (Patient not taking: Reported on 2019) 10 tablet 0     [DISCONTINUED] aspirin (ASA) 81 MG EC tablet Take 1 tablet (81 mg) by mouth daily 90 tablet 3     [DISCONTINUED] clopidogrel (PLAVIX) 75 MG tablet Take 1 tablet (75 mg) by mouth daily 90 tablet 0     No facility-administered  encounter medications on file as of 11/12/2019.        Again, thank you for allowing me to participate in the care of your patient.      Sincerely,    Britni Joseph PA-C     [facility]

## 2019-11-12 NOTE — PROGRESS NOTES
Service Date: 11/12/2019      HISTORY OF PRESENT ILLNESS:  Ms. Cui is a pleasant 87-year-old female who presents to the office today for followup after a recent CTA of her aorta.      Again, she has a somewhat complex cardiovascular history which includes second-degree AV block, status post permanent pacemaker implantation in 05/2016 with subsequent device check showing paroxysmal atrial fibrillation, a history of CVA x2, hypertension, hyperlipidemia and multivessel coronary artery disease.      She was admitted to [facility] in July of this year for chest discomfort that she initially thought was indigestion.  Her troponins were elevated and ended up peaking at 24.  Echocardiography showed reduced LV systolic function with an EF in the 35%-40% range with multiple wall motion abnormalities.  She was brought to the Cardiac Catheterization Lab and was found to have multivessel disease with significant lesions in the distal RCA proximal to mid circumflex, and mid LAD as well as the second diagonal.  She was transferred to [facility] and evaluated for bypass surgery, but was not felt to be a good surgical candidate.  Ultimately, she went back to the Cardiac Catheterization Lab and underwent successful PCI of the mid LAD with drug-eluting stent placement.  However, this procedure was complicated by LAD dissection extending into the left main and left coronary cusp.  She had additional drug-eluting stent placement to the left main and proximal LAD.  No other intervention was performed at that time.  She was taken off of Eliquis and started on aspirin and Plavix.  She was followed with serial echocardiograms which showed an aortic root hematoma along with normalization of her LV systolic function.      The patient had followup with Dr. Meza about 3 weeks ago.  At that time, he recommended a CTA of the aorta to look for any evidence of residual dissection.  He felt that if there was no evidence of  dissection she should be placed back on anticoagulation with Eliquis and that aspirin should be discontinued.  He agreed that since the patient was asymptomatic and her EF had normalized that it would be best to continue with conservative management of her underlying coronary disease and that we only consider intervention if she becomes symptomatic despite optimal medical therapy.      The patient tells me today that she continues to feel quite well.  She denies any chest discomfort/indigestion symptoms.  She denies palpitations, lightheadedness or lower extremity edema.  She states occasionally she feels tired, but otherwise overall feels quite well.      CURRENT CARDIAC MEDICATIONS:   1.  Amlodipine 5 mg daily.   2.  Aspirin 81 mg daily.   3.  Atorvastatin 20 mg nightly.   4.  Carvedilol 12.5 mg b.i.d.   5.  Clopidogrel 75 mg daily.   6.  Lisinopril 40 mg daily.   7.  Sublingual nitro p.r.n.      The remainder of her medications, allergies and review of systems were reviewed and as are documented separately.      PHYSICAL EXAMINATION:   GENERAL:  The patient is a pleasant 87-year-old female who appears her stated age.  She is in no apparent distress.   VITAL SIGNS:  Her blood pressure is 130/60, pulse 70, weight is 110 pounds (50 kg).      I reviewed the results of her recent CT angiogram of the aorta.  She did not have any evidence of an intramural hematoma or dissection.  She was noted to have calcification throughout the aortic system and its branches, but no significant stenoses were noted.  There were normal dimensions throughout the aortic root, ascending aorta, aortic arch and descending thoracic aorta.  The radiology overread did mention several pulmonary nodules on the left side.  It sounds like these were stable compared to 03/2018.  They did mention a followup CT exam in 1 year could be considered.  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       ASSESSMENT AND PLAN:  Ms. Amborse is a pleasant 87-year-old female with a distant history of CVA x2, second-degree heart block, status post permanent pacemaker implantation with subsequent device check showing occasional paroxysmal atrial fibrillation, hypertension, hyperlipidemia and coronary artery disease with a non-STEMI in 2019 at which time she was found to have multivessel disease (see above).  Again, she underwent stenting to the mid LAD which was unfortunately complicated by a guidewire dissection requiring additional stenting to the proximal LAD and left main.  Her circumflex and RCA were not intervened upon.      She recently underwent a CTA to look for residual intramural hematoma/dissection.  Fortunately, it appears that this has resolved.  At this point, due to her history of paroxysmal atrial fibrillation, we do recommend that she be placed back on full anticoagulation with Eliquis.  I have instructed her to restart 2.5 mg of Eliquis b.i.d. (she is on the lower dose due to age and body size).  She will discontinue her aspirin when she restarts the Eliquis.  She will continue on clopidogrel lifelong as tolerated due to her unprotected left main stent.      I did ask her to discuss her incidental CT findings (pumonary nodules and dilatation of the pancreatic duct) with her primary care provider.  She and her daughter were in agreement with this plan.      She already has followup scheduled with Dr. Meza in February.  Of course, I encouraged her to contact us sooner with any questions or concerns.      Thank you for allowing me to participate in the care of this pleasant patient.         JIHAN MAGALLON PA-C             D: 2019   T: 2019   MT: EILEEN      Name:     ANNE MARIE AMBROSE   MRN:      [id number]        Account:      [id number]   :      1932           Service Date: 2019      Document: [id number]

## 2019-11-12 NOTE — PROGRESS NOTES
Please see separate dictation for HPI, PHYSICAL EXAM AND IMPRESSION/PLAN.    CURRENT MEDICATIONS:  Current Outpatient Medications   Medication Sig Dispense Refill     acetaminophen (TYLENOL) 325 MG tablet Take 650 mg by mouth every 6 hours as needed for mild pain        amLODIPine (NORVASC) 5 MG tablet Take 5 mg by mouth daily        aspirin (ASA) 81 MG EC tablet Take 1 tablet (81 mg) by mouth daily 90 tablet 3     atorvastatin (LIPITOR) 20 MG tablet Take 20 mg by mouth At Bedtime       carvedilol (COREG) 12.5 MG tablet Take 1 tablet (12.5 mg) by mouth 2 times daily (with meals) 60 tablet 0     clopidogrel (PLAVIX) 75 MG tablet Take 1 tablet (75 mg) by mouth daily 90 tablet 0     ferrous sulfate (IRON) 325 (65 FE) MG tablet Take 65 mg by mouth daily (with breakfast)        fluticasone furoate (ARNUITY ELLIPTA) 200 MCG/ACT inhalation powder Inhale 1 puff into the lungs daily 3 Inhaler 0     lisinopril (PRINIVIL,ZESTRIL) 40 MG tablet Take 40 mg by mouth daily       Multiple Vitamins-Minerals (ICAPS AREDS FORMULA) TABS Take 1 tablet by mouth 2 times daily        nitroGLYcerin (NITROSTAT) 0.4 MG sublingual tablet For chest pain place 1 tablet under the tongue every 5 minutes for 3 doses. If symptoms persist 5 minutes after 1st dose call 911. 30 tablet 0     OMEPRAZOLE PO Take 20 mg by mouth every morning        tolterodine (DETROL LA) 4 MG 24 hr capsule Take 4 mg by mouth every morning         ALLERGIES:   No Known Allergies    PAST MEDICAL HISTORY:  Past Medical History:   Diagnosis Date     AV block, 2nd degree 5/16/2016     Cerebrovascular accident (H) 8/22/2016     COKER (dyspnea on exertion) 8/22/2016     Generalized weakness 10/12/2016     GIB (gastrointestinal bleeding)      History of colonic polyps 8/22/2016     HTN (hypertension) 8/22/2016     Iron deficiency anemia      Melanoma of skin (H)-rt arm 8/22/2016     Mixed hyperlipidemia 8/22/2016     Pacemaker     implanted 5-     PAF (paroxysmal atrial  fibrillation) (H)      SSS (sick sinus syndrome) (H) 8/22/2016       PAST SURGICAL HISTORY:  Past Surgical History:   Procedure Laterality Date     APPENDECTOMY  1960s     C LIGATE FALLOPIAN TUBE  1960s     C REMV CATARACT INTRACAP,INSERT LENS Right 09/20/2017     CV CORONARY ANGIOGRAM N/A 7/17/2019    Procedure: Coronary Angiogram;  Surgeon: Irvin Hutson MD;  Location:  HEART CARDIAC CATH LAB     CV HEART CATHETERIZATION WITH POSSIBLE INTERVENTION N/A 7/18/2019    Procedure: Heart Catheterization with Possible Intervention;  Surgeon: Jayleen Torres MD;  Location:  HEART CARDIAC CATH LAB     CV LEFT HEART CATH N/A 7/17/2019    Procedure: Left Heart Cath;  Surgeon: Irvin Hutson MD;  Location:  HEART CARDIAC CATH LAB     CV LEFT VENTRICULOGRAM N/A 7/17/2019    Procedure: Left Ventriculogram;  Surgeon: Irvin Hutson MD;  Location:  HEART CARDIAC CATH LAB     CV PCI STENT DRUG ELUTING N/A 7/18/2019    Procedure: PCI Stent Drug Eluting;  Surgeon: Jayleen Torres MD;  Location:  HEART CARDIAC CATH LAB     ESOPHAGOSCOPY, GASTROSCOPY, DUODENOSCOPY (EGD), COMBINED N/A 11/20/2018    Procedure: ESOPHAGOSCOPY, GASTROSCOPY, DUODENOSCOPY (EGD)  Room 523 with biopsies using cold biopsy forceps;  Surgeon: Ayala Soto MD;  Location:  GI     HC REMV CATARACT EXTRACAP,INSERT LENS Left 10/04/2017     IMPLANT PACEMAKER  05/17/2016       SOCIAL HISTORY:  Social History     Socioeconomic History     Marital status:      Spouse name: None     Number of children: None     Years of education: None     Highest education level: None   Occupational History     None   Social Needs     Financial resource strain: None     Food insecurity:     Worry: None     Inability: None     Transportation needs:     Medical: None     Non-medical: None   Tobacco Use     Smoking status: Never Smoker     Smokeless tobacco: Never Used   Substance and Sexual Activity     Alcohol use: No      Alcohol/week: 0.0 standard drinks     Drug use: No     Sexual activity: None   Lifestyle     Physical activity:     Days per week: None     Minutes per session: None     Stress: None   Relationships     Social connections:     Talks on phone: None     Gets together: None     Attends Christianity service: None     Active member of club or organization: None     Attends meetings of clubs or organizations: None     Relationship status: None     Intimate partner violence:     Fear of current or ex partner: None     Emotionally abused: None     Physically abused: None     Forced sexual activity: None   Other Topics Concern     Parent/sibling w/ CABG, MI or angioplasty before 65F 55M? Not Asked      Service Not Asked     Blood Transfusions Not Asked     Caffeine Concern No     Comment: 3+ cups daily     Occupational Exposure Not Asked     Hobby Hazards Not Asked     Sleep Concern Not Asked     Stress Concern Not Asked     Weight Concern Not Asked     Special Diet No     Comment: trying to cut down on sodium, watching cholesterol     Back Care Not Asked     Exercise No     Comment: limited     Bike Helmet Not Asked     Seat Belt Not Asked     Self-Exams Not Asked   Social History Narrative     None       FAMILY HISTORY:  Family History   Problem Relation Age of Onset     Coronary Artery Disease Father      Coronary Artery Disease Brother      Coronary Artery Disease Sister      Colon Cancer No family hx of        Review of Systems:  Skin:  Positive for rash     Eyes:  Positive for   macular degeneration; cataract extraction of both eyes  ENT:  Positive for hearing loss    Respiratory:  Positive for dyspnea on exertion     Cardiovascular:    fatigue;Positive for    Gastroenterology: Negative for      Genitourinary:  not assessed      Musculoskeletal:  Positive for neck pain    Neurologic:  Positive for   stroke x2 1982 and 2007  Psychiatric:  Negative for      Heme/Lymph/Imm:         Endocrine:  Negative          Reviewed. Remainder of the note dictated.    Britni Joseph PA-C

## 2020-02-18 ENCOUNTER — OFFICE VISIT (OUTPATIENT)
Dept: CARDIOLOGY | Facility: CLINIC | Age: 85
End: 2020-02-18
Payer: COMMERCIAL

## 2020-02-18 VITALS
DIASTOLIC BLOOD PRESSURE: 68 MMHG | SYSTOLIC BLOOD PRESSURE: 142 MMHG | WEIGHT: 109.4 LBS | BODY MASS INDEX: 22.05 KG/M2 | HEIGHT: 59 IN | HEART RATE: 76 BPM

## 2020-02-18 DIAGNOSIS — I10 ESSENTIAL HYPERTENSION: ICD-10-CM

## 2020-02-18 DIAGNOSIS — I48.0 PAROXYSMAL ATRIAL FIBRILLATION (H): Primary | ICD-10-CM

## 2020-02-18 DIAGNOSIS — E87.6 HYPOKALEMIA: ICD-10-CM

## 2020-02-18 DIAGNOSIS — I25.10 CORONARY ARTERY DISEASE INVOLVING NATIVE CORONARY ARTERY OF NATIVE HEART WITHOUT ANGINA PECTORIS: ICD-10-CM

## 2020-02-18 DIAGNOSIS — Z95.0 CARDIAC PACEMAKER IN SITU: ICD-10-CM

## 2020-02-18 DIAGNOSIS — I65.23 CAROTID STENOSIS, ASYMPTOMATIC, BILATERAL: ICD-10-CM

## 2020-02-18 LAB
ALT SERPL W P-5'-P-CCNC: 22 U/L (ref 0–50)
ANION GAP SERPL CALCULATED.3IONS-SCNC: 5 MMOL/L (ref 3–14)
BUN SERPL-MCNC: 15 MG/DL (ref 7–30)
CALCIUM SERPL-MCNC: 8.7 MG/DL (ref 8.5–10.1)
CHLORIDE SERPL-SCNC: 107 MMOL/L (ref 94–109)
CHOLEST SERPL-MCNC: 144 MG/DL
CO2 SERPL-SCNC: 27 MMOL/L (ref 20–32)
CREAT SERPL-MCNC: 0.65 MG/DL (ref 0.52–1.04)
GFR SERPL CREATININE-BSD FRML MDRD: 79 ML/MIN/{1.73_M2}
GLUCOSE SERPL-MCNC: 98 MG/DL (ref 70–99)
HDLC SERPL-MCNC: 54 MG/DL
LDLC SERPL CALC-MCNC: 73 MG/DL
NONHDLC SERPL-MCNC: 90 MG/DL
POTASSIUM SERPL-SCNC: 3.3 MMOL/L (ref 3.4–5.3)
SODIUM SERPL-SCNC: 139 MMOL/L (ref 133–144)
TRIGL SERPL-MCNC: 85 MG/DL

## 2020-02-18 PROCEDURE — 99214 OFFICE O/P EST MOD 30 MIN: CPT | Performed by: INTERNAL MEDICINE

## 2020-02-18 PROCEDURE — 36415 COLL VENOUS BLD VENIPUNCTURE: CPT | Performed by: INTERNAL MEDICINE

## 2020-02-18 PROCEDURE — 84460 ALANINE AMINO (ALT) (SGPT): CPT | Performed by: INTERNAL MEDICINE

## 2020-02-18 PROCEDURE — 80048 BASIC METABOLIC PNL TOTAL CA: CPT | Performed by: INTERNAL MEDICINE

## 2020-02-18 PROCEDURE — 80061 LIPID PANEL: CPT | Performed by: INTERNAL MEDICINE

## 2020-02-18 RX ORDER — CARVEDILOL 12.5 MG/1
18.75 TABLET ORAL 2 TIMES DAILY WITH MEALS
Qty: 270 TABLET | Refills: 3 | Status: SHIPPED | OUTPATIENT
Start: 2020-02-18 | End: 2021-01-01

## 2020-02-18 RX ORDER — ATORVASTATIN CALCIUM 40 MG/1
40 TABLET, FILM COATED ORAL AT BEDTIME
Qty: 90 TABLET | Refills: 3 | Status: SHIPPED | OUTPATIENT
Start: 2020-02-18 | End: 2021-03-15

## 2020-02-18 RX ORDER — POTASSIUM CHLORIDE 1500 MG/1
20 TABLET, EXTENDED RELEASE ORAL DAILY
Qty: 30 TABLET | Refills: 11 | Status: SHIPPED | OUTPATIENT
Start: 2020-02-18 | End: 2021-03-15

## 2020-02-18 ASSESSMENT — MIFFLIN-ST. JEOR: SCORE: 831.87

## 2020-02-18 NOTE — LETTER
2/18/2020    Brennen Collier DO  [facility] [address]  [facility] [address]    RE: Yoselyn Cui       Dear Colleague,    I had the pleasure of seeing Yoselyn Cui in the [facility].    HPI and Plan:   See dictation    Orders Placed This Encounter   Procedures     US Carotid Bilateral     Basic metabolic panel     Basic metabolic panel     Lipid Profile     ALT     Basic metabolic panel     Lipid Profile     ALT     Lipid Profile     ALT     Follow-Up with Nurse     Follow-Up with Cardiac Advanced Practice Provider     Follow-Up with Cardiologist       Orders Placed This Encounter   Medications     carvedilol (COREG) 12.5 MG tablet     Sig: Take 1.5 tablets (18.75 mg) by mouth 2 times daily (with meals)     Dispense:  270 tablet     Refill:  3     atorvastatin (LIPITOR) 40 MG tablet     Sig: Take 1 tablet (40 mg) by mouth At Bedtime     Dispense:  90 tablet     Refill:  3     potassium chloride ER (KLOR-CON M) 20 MEQ CR tablet     Sig: Take 1 tablet (20 mEq) by mouth daily     Dispense:  30 tablet     Refill:  11       Medications Discontinued During This Encounter   Medication Reason     carvedilol (COREG) 12.5 MG tablet Reorder     atorvastatin (LIPITOR) 20 MG tablet Reorder         Encounter Diagnoses   Name Primary?     Paroxysmal atrial fibrillation (H) Yes     Coronary artery disease involving native coronary artery of native heart without angina pectoris      Essential hypertension      Cardiac pacemaker in situ      Carotid stenosis, asymptomatic, bilateral      Hypokalemia        CURRENT MEDICATIONS:  Current Outpatient Medications   Medication Sig Dispense Refill     acetaminophen (TYLENOL) 325 MG tablet Take 650 mg by mouth every 6 hours as needed for mild pain        amLODIPine (NORVASC) 5 MG tablet Take 5 mg by mouth daily        apixaban ANTICOAGULANT (ELIQUIS ANTICOAGULANT) 2.5 MG tablet Take 1 tablet (2.5 mg) by mouth 2 times daily 60 tablet 11      atorvastatin (LIPITOR) 40 MG tablet Take 1 tablet (40 mg) by mouth At Bedtime 90 tablet 3     carvedilol (COREG) 12.5 MG tablet Take 1.5 tablets (18.75 mg) by mouth 2 times daily (with meals) 270 tablet 3     clopidogrel (PLAVIX) 75 MG tablet Take 1 tablet (75 mg) by mouth daily 30 tablet 11     ferrous sulfate (IRON) 325 (65 FE) MG tablet Take 65 mg by mouth daily (with breakfast)        lisinopril (PRINIVIL,ZESTRIL) 40 MG tablet Take 40 mg by mouth daily       Multiple Vitamins-Minerals (ICAPS AREDS FORMULA) TABS Take 1 tablet by mouth 2 times daily        nitroGLYcerin (NITROSTAT) 0.4 MG sublingual tablet For chest pain place 1 tablet under the tongue every 5 minutes for 3 doses. If symptoms persist 5 minutes after 1st dose call 911. 30 tablet 0     OMEPRAZOLE PO Take 20 mg by mouth every morning        potassium chloride ER (KLOR-CON M) 20 MEQ CR tablet Take 1 tablet (20 mEq) by mouth daily 30 tablet 11     tolterodine (DETROL LA) 4 MG 24 hr capsule Take 4 mg by mouth every morning       fluticasone furoate (ARNUITY ELLIPTA) 200 MCG/ACT inhalation powder Inhale 1 puff into the lungs daily (Patient not taking: Reported on 2/18/2020) 3 Inhaler 0       ALLERGIES   No Known Allergies    PAST MEDICAL HISTORY:  Past Medical History:   Diagnosis Date     AV block, 2nd degree 5/16/2016     Cerebrovascular accident (H) 8/22/2016     COKER (dyspnea on exertion) 8/22/2016     Generalized weakness 10/12/2016     GIB (gastrointestinal bleeding)      History of colonic polyps 8/22/2016     HTN (hypertension) 8/22/2016     Iron deficiency anemia      Melanoma of skin (H)-rt arm 8/22/2016     Mixed hyperlipidemia 8/22/2016     Pacemaker     implanted 5-     PAF (paroxysmal atrial fibrillation) (H)      SSS (sick sinus syndrome) (H) 8/22/2016       PAST SURGICAL HISTORY:  Past Surgical History:   Procedure Laterality Date     APPENDECTOMY  1960s     C LIGATE FALLOPIAN TUBE  1960s     C REMV CATARACT INTRACAP,INSERT LENS  Right 09/20/2017     CV CORONARY ANGIOGRAM N/A 7/17/2019    Procedure: Coronary Angiogram;  Surgeon: Irvin Hutson MD;  Location:  HEART CARDIAC CATH LAB     CV HEART CATHETERIZATION WITH POSSIBLE INTERVENTION N/A 7/18/2019    Procedure: Heart Catheterization with Possible Intervention;  Surgeon: Jayleen Torres MD;  Location: [facility] CARDIAC CATH LAB     CV LEFT HEART CATH N/A 7/17/2019    Procedure: Left Heart Cath;  Surgeon: Irvin Hutson MD;  Location:  HEART CARDIAC CATH LAB     CV LEFT VENTRICULOGRAM N/A 7/17/2019    Procedure: Left Ventriculogram;  Surgeon: Irvin Hutson MD;  Location:  HEART CARDIAC CATH LAB     CV PCI STENT DRUG ELUTING N/A 7/18/2019    Procedure: PCI Stent Drug Eluting;  Surgeon: Jayleen Torres MD;  Location:  HEART CARDIAC CATH LAB     ESOPHAGOSCOPY, GASTROSCOPY, DUODENOSCOPY (EGD), COMBINED N/A 11/20/2018    Procedure: ESOPHAGOSCOPY, GASTROSCOPY, DUODENOSCOPY (EGD)  Room 523 with biopsies using cold biopsy forceps;  Surgeon: Ayala Soto MD;  Location:  GI     HC REMV CATARACT EXTRACAP,INSERT LENS, W/O ECP Left 10/04/2017     IMPLANT PACEMAKER  05/17/2016       FAMILY HISTORY:  Family History   Problem Relation Age of Onset     Coronary Artery Disease Father      Coronary Artery Disease Brother      Coronary Artery Disease Sister      Colon Cancer No family hx of        SOCIAL HISTORY:  Social History     Socioeconomic History     Marital status:      Spouse name: None     Number of children: None     Years of education: None     Highest education level: None   Occupational History     None   Social Needs     Financial resource strain: None     Food insecurity:     Worry: None     Inability: None     Transportation needs:     Medical: None     Non-medical: None   Tobacco Use     Smoking status: Never Smoker     Smokeless tobacco: Never Used   Substance and Sexual Activity     Alcohol use: No     Alcohol/week: 0.0 standard  drinks     Drug use: No     Sexual activity: None   Lifestyle     Physical activity:     Days per week: None     Minutes per session: None     Stress: None   Relationships     Social connections:     Talks on phone: None     Gets together: None     Attends Buddhist service: None     Active member of club or organization: None     Attends meetings of clubs or organizations: None     Relationship status: None     Intimate partner violence:     Fear of current or ex partner: None     Emotionally abused: None     Physically abused: None     Forced sexual activity: None   Other Topics Concern     Parent/sibling w/ CABG, MI or angioplasty before 65F 55M? Not Asked      Service Not Asked     Blood Transfusions Not Asked     Caffeine Concern No     Comment: 3+ cups daily     Occupational Exposure Not Asked     Hobby Hazards Not Asked     Sleep Concern Not Asked     Stress Concern Not Asked     Weight Concern Not Asked     Special Diet No     Comment: trying to cut down on sodium, watching cholesterol     Back Care Not Asked     Exercise No     Comment: limited     Bike Helmet Not Asked     Seat Belt Not Asked     Self-Exams Not Asked   Social History Narrative     None       Review of Systems:  Skin:  Positive for   hx skin cancer   Eyes:  Positive for glasses macular degeneration; cataract extraction of both eyes  ENT:  Positive for hearing loss currently does not wear hearing aids  Respiratory:  Positive for dyspnea on exertion stairs   Cardiovascular:  Negative      Gastroenterology: Negative      Genitourinary:  Positive for urinary frequency;incontinence;nocturia 1x per night  Musculoskeletal:  Positive for nocturnal cramping    Neurologic:  Positive for stroke stroke x2 1982 and 2007  Psychiatric:  Negative      Heme/Lymph/Imm:  Positive for easy bruising    Endocrine:  Negative        Physical Exam:  Vitals: BP (!) 142/68 (BP Location: Right arm, Patient Position: Sitting, Cuff Size: Adult Regular)   Pulse  "76   Ht 1.499 m (4' 11\")   Wt 49.6 kg (109 lb 6.4 oz)   BMI 22.10 kg/m       Constitutional:  cooperative, alert and oriented, well developed, well nourished, in no acute distress frail;thin      Skin:  warm and dry to the touch   pacemaker incision in the left infraclavicular area was well-healed      Head:  normocephalic        Eyes:  pupils equal and round        Lymph:      ENT:  no pallor or cyanosis        Neck:  JVP normal bilateral carotid bruit      Respiratory:  normal breath sounds, clear to auscultation, normal A-P diameter, normal symmetry, normal respiratory excursion, no use of accessory muscles         Cardiac: regular rhythm;normal S1 and S2;no murmurs, gallops or rubs detected   S4            pulses full and equal, no bruits auscultated                                        GI:  not assessed this visit        Extremities and Muscular Skeletal:  no edema;no deformities, clubbing, cyanosis, erythema observed         Boot R LE    Neurological:  affect appropriate left sided weakness      Psych:  Alert and Oriented x 3        CC  No referring provider defined for this encounter.                Thank you for allowing me to participate in the care of your patient.      Sincerely,     Philipp Meza MD, MD     [facility]    cc:   No referring provider defined for this encounter.        "

## 2020-02-18 NOTE — LETTER
2/18/2020      Brennen Collier DO  [facility] [address]  [facility] [address]      RE: Yoselyn Cui       Dear Colleague,    I had the pleasure of seeing Yoselyn Cui in the [facility].    Service Date: 02/18/2020      HISTORY OF PRESENT ILLNESS:  It was my pleasure to see your patient, Yoselyn Cui.  She is somewhat of a complex patient.  This patient had a non-Q-wave myocardial infarct in 07/2019.  She underwent coronary angiography at that time.  She was found to have severe 3-vessel coronary artery disease with a mid-LAD 90% stenosis, second diagonal artery 80% stenosis, the proximal to mid circumflex 95% stenosed.  The circumflex was felt to be the culprit lesion.  There was another 50% stenosis in the mid circumflex and another 50% stenosis in the distal circumflex.  The right coronary artery had a damping of the catheter in the ostium, suggesting a significant stenosis, but she also had a 90% stenosis in the distal right coronary artery.  All of the vessels were heavily calcified.  The right coronary artery was quite tortuous.        The cardiovascular surgeons saw the patient because the patient was transferred from [facility] to [facility] for possible bypass surgery.  The patient was seen by Dr. Stephy Henderson.  He felt that the patient was a poor candidate for surgery because of her frailty and age.        She then underwent an intervention with Dr. Jayleen Torres.  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  He stented to the left main coronary artery with a 4.0 x 12 mm drug-eluting stent and the proximal LAD was then stented with On-X drug-eluting stent.        The patient has a past history of paroxysmal atrial fibrillation also.  Anticoagulation was stopped because of the dissection in the aorta.        I  saw the patient towards the latter part of October.  At that stage, we repeated the CT scan to make sure that there was no evidence of dissection or progression of the dissection of the ascending aorta.  The CT scan showed no evidence of dissection.  The patient was subsequently seen by Britni Joseph and the Eliquis was restarted and the Plavix medication has been continued.        When I met the patient in October, I discussed plans with the patient and the family.  The circumflex and the right coronary artery are poor and they would be quite risky interventions given the degree of calcification and tortuosity.  The patient was also asymptomatic at that stage, and we felt that medical therapy was the best way to proceed.      With that background in mind, the patient is doing well.  She has no chest pain, no unusual shortness of breath, no ankle edema, no orthopnea and no PND.  However, I did notice today that her blood pressure is mildly raised at 142/68.  I rechecked the blood pressure at the end of our visit and it was again 142/68.        Her blood pressure is variable.  This time last year was 144/80; in October it was 138/76.  We would like the blood pressure well controlled because of the residual stenoses in the circumflex and in the right coronary artery.        Her lipid profile on 02/18 showed an LDL of 73, HDL of 54 and triglycerides of 84 with a total cholesterol of 144.  We would like to see the LDL cholesterol quite aggressively lowered because of the plaque burden.      Her permanent pacemaker is going to be interrogated tomorrow.      IMPRESSION:   1.  Coronary artery disease.  The patient is asymptomatic with respect to coronary artery disease.  She has residual disease in the circumflex and right coronary artery as described above.   2.  Paroxysmal atrial fibrillation.  The patient has been anticoagulated with Eliquis and is on long-term clopidogrel for her stents.  In particular, she is on  long-term clopidogrel because of the unprotected left main stent.   3.  Mild hypokalemia at 3.3, which would increase the incidence of atrial fibrillation and can also make the treatment of blood pressure, resistant.   4.  Essential hypertension.  Her blood pressure is borderline raised.   5.  Bilateral carotid artery bruits.   6.  LDL cholesterol not at goal.      PLAN:   1.  Firstly, I will increase the dose of carvedilol from 12.5 mg twice a day to 18.75 mg twice a day.  In 1 week's time, we will have the blood pressure rechecked with a nurse visit.   2.  I will have the patient take 20 mEq of potassium chloride per day.  In a week's time, we will recheck the basic metabolic profile at the same time as a nurse visit.   3.  I would increase the atorvastatin to 40 mg per day and check lipids in 6 weeks' time with an ALT.   4.  We will obtain bilateral carotid artery ultrasounds in a week's time.  Hopefully, we can get that done at the same time as a nurse visit and BMP check.  In 6 months' time, I will have the patient follow up with Britni Joseph and the patient will see me in 1 year's time.      As always, the patient and her family have been told to contact us if she has any questions or any concerns.      cc:      Brennen Collier DO   [facility]   [address] [place] [address]         RENEE STACY MD, [facility]             D: 2020   T: 2020   MT: LINDA      Name:     ANNE MARIE AMBROSE   MRN:      [id number]        Account:      [id number]   :      1932           Service Date: 2020      Document: [id number]         Outpatient Encounter Medications as of 2020   Medication Sig Dispense Refill     acetaminophen (TYLENOL) 325 MG tablet Take 650 mg by mouth every 6 hours as needed for mild pain        amLODIPine (NORVASC) 5 MG tablet Take 5 mg by mouth daily        apixaban ANTICOAGULANT (ELIQUIS ANTICOAGULANT) 2.5 MG tablet Take 1 tablet (2.5 mg) by mouth  2 times daily 60 tablet 11     atorvastatin (LIPITOR) 40 MG tablet Take 1 tablet (40 mg) by mouth At Bedtime 90 tablet 3     carvedilol (COREG) 12.5 MG tablet Take 1.5 tablets (18.75 mg) by mouth 2 times daily (with meals) 270 tablet 3     clopidogrel (PLAVIX) 75 MG tablet Take 1 tablet (75 mg) by mouth daily 30 tablet 11     ferrous sulfate (IRON) 325 (65 FE) MG tablet Take 65 mg by mouth daily (with breakfast)        lisinopril (PRINIVIL,ZESTRIL) 40 MG tablet Take 40 mg by mouth daily       Multiple Vitamins-Minerals (ICAPS AREDS FORMULA) TABS Take 1 tablet by mouth 2 times daily        nitroGLYcerin (NITROSTAT) 0.4 MG sublingual tablet For chest pain place 1 tablet under the tongue every 5 minutes for 3 doses. If symptoms persist 5 minutes after 1st dose call 911. 30 tablet 0     OMEPRAZOLE PO Take 20 mg by mouth every morning        potassium chloride ER (KLOR-CON M) 20 MEQ CR tablet Take 1 tablet (20 mEq) by mouth daily 30 tablet 11     tolterodine (DETROL LA) 4 MG 24 hr capsule Take 4 mg by mouth every morning       fluticasone furoate (ARNUITY ELLIPTA) 200 MCG/ACT inhalation powder Inhale 1 puff into the lungs daily (Patient not taking: Reported on 2/18/2020) 3 Inhaler 0     [DISCONTINUED] atorvastatin (LIPITOR) 20 MG tablet Take 20 mg by mouth At Bedtime       [DISCONTINUED] carvedilol (COREG) 12.5 MG tablet Take 1 tablet (12.5 mg) by mouth 2 times daily (with meals) 60 tablet 0     No facility-administered encounter medications on file as of 2/18/2020.        Again, thank you for allowing me to participate in the care of your patient.      Sincerely,    Philipp Meza MD, MD     [facility]

## 2020-02-18 NOTE — PROGRESS NOTES
Service Date: 02/18/2020      HISTORY OF PRESENT ILLNESS:  It was my pleasure to see your patient, Yoselyn Cui.  She is somewhat of a complex patient.  This patient had a non-Q-wave myocardial infarct in 07/2019.  She underwent coronary angiography at that time.  She was found to have severe 3-vessel coronary artery disease with a mid-LAD 90% stenosis, second diagonal artery 80% stenosis, the proximal to mid circumflex 95% stenosed.  The circumflex was felt to be the culprit lesion.  There was another 50% stenosis in the mid circumflex and another 50% stenosis in the distal circumflex.  The right coronary artery had a damping of the catheter in the ostium, suggesting a significant stenosis, but she also had a 90% stenosis in the distal right coronary artery.  All of the vessels were heavily calcified.  The right coronary artery was quite tortuous.        The cardiovascular surgeons saw the patient because the patient was transferred from [facility] to [facility] for possible bypass surgery.  The patient was seen by Dr. Stephy Henderson.  He felt that the patient was a poor candidate for surgery because of her frailty and age.        She then underwent an intervention with Dr. Jayleen Torres.  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  He stented to the left main coronary artery with a 4.0 x 12 mm drug-eluting stent and the proximal LAD was then stented with On-X drug-eluting stent.        The patient has a past history of paroxysmal atrial fibrillation also.  Anticoagulation was stopped because of the dissection in the aorta.        I saw the patient towards the latter part of October.  At that stage, we repeated the CT scan to make sure that there was no evidence of dissection or progression of the dissection of the ascending aorta.  The CT scan showed no evidence of  dissection.  The patient was subsequently seen by Britni Joseph and the Eliquis was restarted and the Plavix medication has been continued.        When I met the patient in October, I discussed plans with the patient and the family.  The circumflex and the right coronary artery are poor and they would be quite risky interventions given the degree of calcification and tortuosity.  The patient was also asymptomatic at that stage, and we felt that medical therapy was the best way to proceed.      With that background in mind, the patient is doing well.  She has no chest pain, no unusual shortness of breath, no ankle edema, no orthopnea and no PND.  However, I did notice today that her blood pressure is mildly raised at 142/68.  I rechecked the blood pressure at the end of our visit and it was again 142/68.        Her blood pressure is variable.  This time last year was 144/80; in October it was 138/76.  We would like the blood pressure well controlled because of the residual stenoses in the circumflex and in the right coronary artery.        Her lipid profile on 02/18 showed an LDL of 73, HDL of 54 and triglycerides of 84 with a total cholesterol of 144.  We would like to see the LDL cholesterol quite aggressively lowered because of the plaque burden.      Her permanent pacemaker is going to be interrogated tomorrow.      IMPRESSION:   1.  Coronary artery disease.  The patient is asymptomatic with respect to coronary artery disease.  She has residual disease in the circumflex and right coronary artery as described above.   2.  Paroxysmal atrial fibrillation.  The patient has been anticoagulated with Eliquis and is on long-term clopidogrel for her stents.  In particular, she is on long-term clopidogrel because of the unprotected left main stent.   3.  Mild hypokalemia at 3.3, which would increase the incidence of atrial fibrillation and can also make the treatment of blood pressure, resistant.   4.  Essential  hypertension.  Her blood pressure is borderline raised.   5.  Bilateral carotid artery bruits.   6.  LDL cholesterol not at goal.      PLAN:   1.  Firstly, I will increase the dose of carvedilol from 12.5 mg twice a day to 18.75 mg twice a day.  In 1 week's time, we will have the blood pressure rechecked with a nurse visit.   2.  I will have the patient take 20 mEq of potassium chloride per day.  In a week's time, we will recheck the basic metabolic profile at the same time as a nurse visit.   3.  I would increase the atorvastatin to 40 mg per day and check lipids in 6 weeks' time with an ALT.   4.  We will obtain bilateral carotid artery ultrasounds in a week's time.  Hopefully, we can get that done at the same time as a nurse visit and BMP check.  In 6 months' time, I will have the patient follow up with Britni Joseph and the patient will see me in 1 year's time.      As always, the patient and her family have been told to contact us if she has any questions or any concerns.      cc:      Brennen Collier DO   [facility]   [address] [place] [address]         RENEE STACY MD, [facility]             D: 2020   T: 2020   MT: LINDA      Name:     ANNE MARIE AMBROSE   MRN:      [id number]        Account:      [id number]   :      1932           Service Date: 2020      Document: [id number]

## 2020-02-18 NOTE — PROGRESS NOTES
HPI and Plan:   See dictation    Orders Placed This Encounter   Procedures     US Carotid Bilateral     Basic metabolic panel     Basic metabolic panel     Lipid Profile     ALT     Basic metabolic panel     Lipid Profile     ALT     Lipid Profile     ALT     Follow-Up with Nurse     Follow-Up with Cardiac Advanced Practice Provider     Follow-Up with Cardiologist       Orders Placed This Encounter   Medications     carvedilol (COREG) 12.5 MG tablet     Sig: Take 1.5 tablets (18.75 mg) by mouth 2 times daily (with meals)     Dispense:  270 tablet     Refill:  3     atorvastatin (LIPITOR) 40 MG tablet     Sig: Take 1 tablet (40 mg) by mouth At Bedtime     Dispense:  90 tablet     Refill:  3     potassium chloride ER (KLOR-CON M) 20 MEQ CR tablet     Sig: Take 1 tablet (20 mEq) by mouth daily     Dispense:  30 tablet     Refill:  11       Medications Discontinued During This Encounter   Medication Reason     carvedilol (COREG) 12.5 MG tablet Reorder     atorvastatin (LIPITOR) 20 MG tablet Reorder         Encounter Diagnoses   Name Primary?     Paroxysmal atrial fibrillation (H) Yes     Coronary artery disease involving native coronary artery of native heart without angina pectoris      Essential hypertension      Cardiac pacemaker in situ      Carotid stenosis, asymptomatic, bilateral      Hypokalemia        CURRENT MEDICATIONS:  Current Outpatient Medications   Medication Sig Dispense Refill     acetaminophen (TYLENOL) 325 MG tablet Take 650 mg by mouth every 6 hours as needed for mild pain        amLODIPine (NORVASC) 5 MG tablet Take 5 mg by mouth daily        apixaban ANTICOAGULANT (ELIQUIS ANTICOAGULANT) 2.5 MG tablet Take 1 tablet (2.5 mg) by mouth 2 times daily 60 tablet 11     atorvastatin (LIPITOR) 40 MG tablet Take 1 tablet (40 mg) by mouth At Bedtime 90 tablet 3     carvedilol (COREG) 12.5 MG tablet Take 1.5 tablets (18.75 mg) by mouth 2 times daily (with meals) 270 tablet 3     clopidogrel (PLAVIX) 75 MG  tablet Take 1 tablet (75 mg) by mouth daily 30 tablet 11     ferrous sulfate (IRON) 325 (65 FE) MG tablet Take 65 mg by mouth daily (with breakfast)        lisinopril (PRINIVIL,ZESTRIL) 40 MG tablet Take 40 mg by mouth daily       Multiple Vitamins-Minerals (ICAPS AREDS FORMULA) TABS Take 1 tablet by mouth 2 times daily        nitroGLYcerin (NITROSTAT) 0.4 MG sublingual tablet For chest pain place 1 tablet under the tongue every 5 minutes for 3 doses. If symptoms persist 5 minutes after 1st dose call 911. 30 tablet 0     OMEPRAZOLE PO Take 20 mg by mouth every morning        potassium chloride ER (KLOR-CON M) 20 MEQ CR tablet Take 1 tablet (20 mEq) by mouth daily 30 tablet 11     tolterodine (DETROL LA) 4 MG 24 hr capsule Take 4 mg by mouth every morning       fluticasone furoate (ARNUITY ELLIPTA) 200 MCG/ACT inhalation powder Inhale 1 puff into the lungs daily (Patient not taking: Reported on 2/18/2020) 3 Inhaler 0       ALLERGIES   No Known Allergies    PAST MEDICAL HISTORY:  Past Medical History:   Diagnosis Date     AV block, 2nd degree 5/16/2016     Cerebrovascular accident (H) 8/22/2016     COKER (dyspnea on exertion) 8/22/2016     Generalized weakness 10/12/2016     GIB (gastrointestinal bleeding)      History of colonic polyps 8/22/2016     HTN (hypertension) 8/22/2016     Iron deficiency anemia      Melanoma of skin (H)-rt arm 8/22/2016     Mixed hyperlipidemia 8/22/2016     Pacemaker     implanted 5-     PAF (paroxysmal atrial fibrillation) (H)      SSS (sick sinus syndrome) (H) 8/22/2016       PAST SURGICAL HISTORY:  Past Surgical History:   Procedure Laterality Date     APPENDECTOMY  1960s     C LIGATE FALLOPIAN TUBE  1960s     C REMV CATARACT INTRACAP,INSERT LENS Right 09/20/2017     CV CORONARY ANGIOGRAM N/A 7/17/2019    Procedure: Coronary Angiogram;  Surgeon: Irvin Hutson MD;  Location:  [facility]ATH [facility]     CV HEART CATHETERIZATION WITH POSSIBLE INTERVENTION N/A 7/18/2019     Procedure: Heart Catheterization with Possible Intervention;  Surgeon: Jayleen Torres MD;  Location:  [facility]ATH [facility]     CV LEFT HEART CATH N/A 7/17/2019    Procedure: Left Heart Cath;  Surgeon: Irvin Hutson MD;  Location:  [facility]ATH [facility]     CV LEFT VENTRICULOGRAM N/A 7/17/2019    Procedure: Left Ventriculogram;  Surgeon: Irvin Hutson MD;  Location:  HE[facility] CARD[facility] CATH LAB     CV PCI STENT DRUG ELUTING N/A 7/18/2019    Procedure: PCI Stent Drug Eluting;  Surgeon: Jayleen Torres MD;  Location:  HE[facility] CARD[facility] CATH LAB     ESOPHAGOSCOPY, GASTROSCOPY, DUODENOSCOPY (EGD), COMBINED N/A 11/20/2018    Procedure: ESOPHAGOSCOPY, GASTROSCOPY, DUODENOSCOPY (EGD)  Room 523 with biopsies using cold biopsy forceps;  Surgeon: Ayala Soto MD;  Location:  GI     HC REMV CATARACT EXTRACAP,INSERT LENS, W/O ECP Left 10/04/2017     IMPLANT PACEMAKER  05/17/2016       FAMILY HISTORY:  Family History   Problem Relation Age of Onset     Coronary Artery Disease Father      Coronary Artery Disease Brother      Coronary Artery Disease Sister      Colon Cancer No family hx of        SOCIAL HISTORY:  Social History     Socioeconomic History     Marital status:      Spouse name: None     Number of children: None     Years of education: None     Highest education level: None   Occupational History     None   Social Needs     Financial resource strain: None     Food insecurity:     Worry: None     Inability: None     Transportation needs:     Medical: None     Non-medical: None   Tobacco Use     Smoking status: Never Smoker     Smokeless tobacco: Never Used   Substance and Sexual Activity     Alcohol use: No     Alcohol/week: 0.0 standard drinks     Drug use: No     Sexual activity: None   Lifestyle     Physical activity:     Days per week: None     Minutes per session: None     Stress: None   Relationships     Social connections:     Talks on phone: None     Gets  "together: None     Attends [employer] service: None     Active member of club or organization: None     Attends meetings of clubs or organizations: None     Relationship status: None     Intimate partner violence:     Fear of current or ex partner: None     Emotionally abused: None     Physically abused: None     Forced sexual activity: None   Other Topics Concern     Parent/sibling w/ CABG, MI or angioplasty before 65F 55M? Not Asked      Service Not Asked     Blood Transfusions Not Asked     Caffeine Concern No     Comment: 3+ cups daily     Occupational Exposure Not Asked     Hobby Hazards Not Asked     Sleep Concern Not Asked     Stress Concern Not Asked     Weight Concern Not Asked     Special Diet No     Comment: trying to cut down on sodium, watching cholesterol     Back Care Not Asked     Exercise No     Comment: limited     Bike Helmet Not Asked     Seat Belt Not Asked     Self-Exams Not Asked   Social History Narrative     None       Review of Systems:  Skin:  Positive for   hx skin cancer   Eyes:  Positive for glasses macular degeneration; cataract extraction of both eyes  ENT:  Positive for hearing loss currently does not wear hearing aids  Respiratory:  Positive for dyspnea on exertion stairs   Cardiovascular:  Negative      Gastroenterology: Negative      Genitourinary:  Positive for urinary frequency;incontinence;nocturia 1x per night  Musculoskeletal:  Positive for nocturnal cramping    Neurologic:  Positive for stroke stroke x2 1982 and 2007  Psychiatric:  Negative      Heme/Lymph/Imm:  Positive for easy bruising    Endocrine:  Negative        Physical Exam:  Vitals: BP (!) 142/68 (BP Location: Right arm, Patient Position: Sitting, Cuff Size: Adult Regular)   Pulse 76   Ht 1.499 m (4' 11\")   Wt 49.6 kg (109 lb 6.4 oz)   BMI 22.10 kg/m      Constitutional:  cooperative, alert and oriented, well developed, well nourished, in no acute distress frail;thin      Skin:  warm and dry to the touch "   pacemaker incision in the left infraclavicular area was well-healed      Head:  normocephalic        Eyes:  pupils equal and round        Lymph:      ENT:  no pallor or cyanosis        Neck:  JVP normal bilateral carotid bruit      Respiratory:  normal breath sounds, clear to auscultation, normal A-P diameter, normal symmetry, normal respiratory excursion, no use of accessory muscles         Cardiac: regular rhythm;normal S1 and S2;no murmurs, gallops or rubs detected   S4            pulses full and equal, no bruits auscultated                                        GI:  not assessed this visit        Extremities and Muscular Skeletal:  no edema;no deformities, clubbing, cyanosis, erythema observed         Boot R LE    Neurological:  affect appropriate left sided weakness      Psych:  Alert and Oriented x 3        CC  No referring provider defined for this encounter.

## 2020-02-19 ENCOUNTER — ANCILLARY PROCEDURE (OUTPATIENT)
Dept: CARDIOLOGY | Facility: CLINIC | Age: 85
End: 2020-02-19
Attending: INTERNAL MEDICINE
Payer: COMMERCIAL

## 2020-02-19 DIAGNOSIS — I44.1 AV BLOCK, 2ND DEGREE: ICD-10-CM

## 2020-02-19 PROCEDURE — 93280 PM DEVICE PROGR EVAL DUAL: CPT | Performed by: INTERNAL MEDICINE

## 2020-02-23 ENCOUNTER — HEALTH MAINTENANCE LETTER (OUTPATIENT)
Age: 85
End: 2020-02-23

## 2020-02-27 LAB
MDC_IDC_LEAD_IMPLANT_DT: NORMAL
MDC_IDC_LEAD_IMPLANT_DT: NORMAL
MDC_IDC_LEAD_LOCATION: NORMAL
MDC_IDC_LEAD_LOCATION: NORMAL
MDC_IDC_LEAD_LOCATION_DETAIL_1: NORMAL
MDC_IDC_LEAD_LOCATION_DETAIL_1: NORMAL
MDC_IDC_LEAD_MFG: NORMAL
MDC_IDC_LEAD_MFG: NORMAL
MDC_IDC_LEAD_MODEL: NORMAL
MDC_IDC_LEAD_MODEL: NORMAL
MDC_IDC_LEAD_POLARITY_TYPE: NORMAL
MDC_IDC_LEAD_POLARITY_TYPE: NORMAL
MDC_IDC_LEAD_SERIAL: NORMAL
MDC_IDC_LEAD_SERIAL: NORMAL
MDC_IDC_MSMT_BATTERY_REMAINING_LONGEVITY: 124 MO
MDC_IDC_MSMT_BATTERY_STATUS: NORMAL
MDC_IDC_MSMT_BATTERY_VOLTAGE: 2.99 V
MDC_IDC_MSMT_LEADCHNL_RA_IMPEDANCE_VALUE: 450 OHM
MDC_IDC_MSMT_LEADCHNL_RA_PACING_THRESHOLD_AMPLITUDE: 0.75 V
MDC_IDC_MSMT_LEADCHNL_RA_PACING_THRESHOLD_AMPLITUDE: 0.75 V
MDC_IDC_MSMT_LEADCHNL_RA_PACING_THRESHOLD_PULSEWIDTH: 0.5 MS
MDC_IDC_MSMT_LEADCHNL_RA_PACING_THRESHOLD_PULSEWIDTH: 0.5 MS
MDC_IDC_MSMT_LEADCHNL_RA_SENSING_INTR_AMPL: 2.7 MV
MDC_IDC_MSMT_LEADCHNL_RV_IMPEDANCE_VALUE: 462.5 OHM
MDC_IDC_MSMT_LEADCHNL_RV_PACING_THRESHOLD_AMPLITUDE: 1 V
MDC_IDC_MSMT_LEADCHNL_RV_PACING_THRESHOLD_AMPLITUDE: 1 V
MDC_IDC_MSMT_LEADCHNL_RV_PACING_THRESHOLD_PULSEWIDTH: 0.5 MS
MDC_IDC_MSMT_LEADCHNL_RV_PACING_THRESHOLD_PULSEWIDTH: 0.5 MS
MDC_IDC_MSMT_LEADCHNL_RV_SENSING_INTR_AMPL: 12 MV
MDC_IDC_PG_IMPLANT_DTM: NORMAL
MDC_IDC_PG_MFG: NORMAL
MDC_IDC_PG_MODEL: NORMAL
MDC_IDC_PG_SERIAL: NORMAL
MDC_IDC_PG_TYPE: NORMAL
MDC_IDC_SESS_CLINIC_NAME: NORMAL
MDC_IDC_SESS_DTM: NORMAL
MDC_IDC_SESS_TYPE: NORMAL
MDC_IDC_SET_BRADY_AT_MODE_SWITCH_MODE: NORMAL
MDC_IDC_SET_BRADY_AT_MODE_SWITCH_RATE: 180 {BEATS}/MIN
MDC_IDC_SET_BRADY_HYSTRATE: NORMAL
MDC_IDC_SET_BRADY_LOWRATE: 60 {BEATS}/MIN
MDC_IDC_SET_BRADY_MAX_SENSOR_RATE: 120 {BEATS}/MIN
MDC_IDC_SET_BRADY_MAX_TRACKING_RATE: 120 {BEATS}/MIN
MDC_IDC_SET_BRADY_MODE: NORMAL
MDC_IDC_SET_BRADY_NIGHT_RATE: NORMAL
MDC_IDC_SET_BRADY_PAV_DELAY_HIGH: 100 MS
MDC_IDC_SET_BRADY_PAV_DELAY_LOW: 200 MS
MDC_IDC_SET_BRADY_SAV_DELAY_HIGH: 100 MS
MDC_IDC_SET_BRADY_SAV_DELAY_LOW: 170 MS
MDC_IDC_SET_LEADCHNL_RA_PACING_AMPLITUDE: 1.75 V
MDC_IDC_SET_LEADCHNL_RA_PACING_ANODE_ELECTRODE_1: NORMAL
MDC_IDC_SET_LEADCHNL_RA_PACING_ANODE_LOCATION_1: NORMAL
MDC_IDC_SET_LEADCHNL_RA_PACING_CAPTURE_MODE: NORMAL
MDC_IDC_SET_LEADCHNL_RA_PACING_CATHODE_ELECTRODE_1: NORMAL
MDC_IDC_SET_LEADCHNL_RA_PACING_CATHODE_LOCATION_1: NORMAL
MDC_IDC_SET_LEADCHNL_RA_PACING_POLARITY: NORMAL
MDC_IDC_SET_LEADCHNL_RA_PACING_PULSEWIDTH: 0.5 MS
MDC_IDC_SET_LEADCHNL_RA_SENSING_ADAPTATION_MODE: NORMAL
MDC_IDC_SET_LEADCHNL_RA_SENSING_ANODE_ELECTRODE_1: NORMAL
MDC_IDC_SET_LEADCHNL_RA_SENSING_ANODE_LOCATION_1: NORMAL
MDC_IDC_SET_LEADCHNL_RA_SENSING_CATHODE_ELECTRODE_1: NORMAL
MDC_IDC_SET_LEADCHNL_RA_SENSING_CATHODE_LOCATION_1: NORMAL
MDC_IDC_SET_LEADCHNL_RA_SENSING_POLARITY: NORMAL
MDC_IDC_SET_LEADCHNL_RV_PACING_AMPLITUDE: 1 V
MDC_IDC_SET_LEADCHNL_RV_PACING_ANODE_ELECTRODE_1: NORMAL
MDC_IDC_SET_LEADCHNL_RV_PACING_ANODE_LOCATION_1: NORMAL
MDC_IDC_SET_LEADCHNL_RV_PACING_CAPTURE_MODE: NORMAL
MDC_IDC_SET_LEADCHNL_RV_PACING_CATHODE_ELECTRODE_1: NORMAL
MDC_IDC_SET_LEADCHNL_RV_PACING_CATHODE_LOCATION_1: NORMAL
MDC_IDC_SET_LEADCHNL_RV_PACING_POLARITY: NORMAL
MDC_IDC_SET_LEADCHNL_RV_PACING_PULSEWIDTH: 0.5 MS
MDC_IDC_SET_LEADCHNL_RV_SENSING_ANODE_ELECTRODE_1: NORMAL
MDC_IDC_SET_LEADCHNL_RV_SENSING_ANODE_LOCATION_1: NORMAL
MDC_IDC_SET_LEADCHNL_RV_SENSING_CATHODE_ELECTRODE_1: NORMAL
MDC_IDC_SET_LEADCHNL_RV_SENSING_CATHODE_LOCATION_1: NORMAL
MDC_IDC_SET_LEADCHNL_RV_SENSING_POLARITY: NORMAL
MDC_IDC_SET_LEADCHNL_RV_SENSING_SENSITIVITY: 2 MV
MDC_IDC_STAT_AT_MODE_SW_COUNT: 534
MDC_IDC_STAT_BRADY_RA_PERCENT_PACED: 0.22 %
MDC_IDC_STAT_BRADY_RV_PERCENT_PACED: 99.86 %

## 2020-02-28 ENCOUNTER — HOSPITAL ENCOUNTER (OUTPATIENT)
Dept: CARDIOLOGY | Facility: CLINIC | Age: 85
Discharge: HOME | End: 2020-02-28
Attending: INTERNAL MEDICINE | Admitting: INTERNAL MEDICINE
Payer: COMMERCIAL

## 2020-02-28 ENCOUNTER — CARE COORDINATION (OUTPATIENT)
Dept: CARDIOLOGY | Facility: CLINIC | Age: 85
End: 2020-02-28

## 2020-02-28 ENCOUNTER — ALLIED HEALTH/NURSE VISIT (OUTPATIENT)
Dept: CARDIOLOGY | Facility: CLINIC | Age: 85
End: 2020-02-28
Attending: INTERNAL MEDICINE
Payer: COMMERCIAL

## 2020-02-28 VITALS — SYSTOLIC BLOOD PRESSURE: 122 MMHG | DIASTOLIC BLOOD PRESSURE: 60 MMHG | HEART RATE: 74 BPM

## 2020-02-28 DIAGNOSIS — E87.6 HYPOKALEMIA: ICD-10-CM

## 2020-02-28 DIAGNOSIS — I65.23 CAROTID STENOSIS, ASYMPTOMATIC, BILATERAL: ICD-10-CM

## 2020-02-28 DIAGNOSIS — I10 ESSENTIAL HYPERTENSION: Primary | ICD-10-CM

## 2020-02-28 LAB
ANION GAP SERPL CALCULATED.3IONS-SCNC: 3 MMOL/L (ref 3–14)
BUN SERPL-MCNC: 20 MG/DL (ref 7–30)
CALCIUM SERPL-MCNC: 8.8 MG/DL (ref 8.5–10.1)
CHLORIDE SERPL-SCNC: 107 MMOL/L (ref 94–109)
CO2 SERPL-SCNC: 29 MMOL/L (ref 20–32)
CREAT SERPL-MCNC: 0.72 MG/DL (ref 0.52–1.04)
GFR SERPL CREATININE-BSD FRML MDRD: 75 ML/MIN/{1.73_M2}
GLUCOSE SERPL-MCNC: 159 MG/DL (ref 70–99)
POTASSIUM SERPL-SCNC: 4.4 MMOL/L (ref 3.4–5.3)
SODIUM SERPL-SCNC: 139 MMOL/L (ref 133–144)

## 2020-02-28 PROCEDURE — 93880 EXTRACRANIAL BILAT STUDY: CPT

## 2020-02-28 PROCEDURE — 80048 BASIC METABOLIC PNL TOTAL CA: CPT | Performed by: INTERNAL MEDICINE

## 2020-02-28 PROCEDURE — 93880 EXTRACRANIAL BILAT STUDY: CPT | Mod: 26 | Performed by: INTERNAL MEDICINE

## 2020-02-28 PROCEDURE — 99207 ZZC NO CHARGE NURSE ONLY: CPT

## 2020-02-28 PROCEDURE — 36415 COLL VENOUS BLD VENIPUNCTURE: CPT | Performed by: INTERNAL MEDICINE

## 2020-02-28 NOTE — PROGRESS NOTES
Pt in clinic for nurse visit per Dr. Meza.     Last OV W/ Dr. Meza 2/18/2020 for routinf follow-up in pt with Hx of CAD, PAF, s/p PPM in situ for high-grade AV conduction disease, HTN, HLD, 2nd degree-Heart block, SSS, thoracic aortic dissection, anemia, s/p prior CVA w/ dense left hemiparesis. Pt BP elevated at 142/68, HR 76. Pt to increase her dose of coreg from 12.5mg BID to 18.75 mg BID. Recheck BMP and BP in 1 week.     Today BP @ 1052 AM (right arm adult small cuff) 122/60, HR 74. Pt is asymptomatic. Pt verifies she took coreg 18.75mg, lisinopril 40mg, and norvasc 5mg today at 0900 AM.     Pt advised this information would be routed to hr primary care team and she would be contacted with any recommendations.     Follow up ordered for 8/2020 with SCARLET and 2/2021 with MD    Routing to and Primary MD: Dr. Meza   In Clinic MD: Dr. Luigi Marques RN, BSN.

## 2020-02-28 NOTE — PROGRESS NOTES
Pt in clinic today for follow-up BP check today per Dr. Meza . Please see care coordinator note for further details.   Ordering provider and Primary provider:Dr. Meza   In clinic provider:Dr.Ip Marques RN, BSN

## 2020-03-02 NOTE — PROGRESS NOTES
Reviewed BMP showing   Recent Labs   Lab Test 02/28/20  1116 02/18/20  0909    139   POTASSIUM 4.4 3.3*   CHLORIDE 107 107   CO2 29 27   ANIONGAP 3 5   * 98   BUN 20 15   CR 0.72 0.65   GOLDY 8.8 8.7     Reviewed carotid U/S showing   1. Right side: By velocity criteria and B mode imaging there is less than 50% diameter stenosis, unchanged from prior study.   2. Left side: By velocity criteria and B mode imaging there is less than 50% diameter stenosis, unchanged from prior study  3. Antegrade flow of vertebral arteries bilaterally.    Will message Dr. Meza to review. Tevin AGUILAR

## 2020-03-03 NOTE — PROGRESS NOTES
Attempted to saurabh pt with results, spoke to pt's daughter, Maryjane. Daughter informed of results & recommendations. Tevin AGUILAR

## 2020-03-16 NOTE — PLAN OF CARE
VSS  Angiogram this afternoon, no intervention  TR band on right side is off, site is clean dry and intact  No signs of hematoma or bleeding  Heparin not yet restarted per protocol  Peripheral IV in right forearm is saline locked  Weaned off oxygen, is now on room air at 92%  No pain  Tele 100% V-paced  Plan to transfer to [facility] for cardio thoracic consult  Telephone report given to otf AGUILAR at 2nd floor [facility]    Pt left facility via [facility] transport at 2420   16-Mar-2020 15:07:52

## 2020-05-28 ENCOUNTER — ANCILLARY PROCEDURE (OUTPATIENT)
Dept: CARDIOLOGY | Facility: CLINIC | Age: 85
End: 2020-05-28
Attending: INTERNAL MEDICINE
Payer: COMMERCIAL

## 2020-05-28 DIAGNOSIS — Z95.0 PACEMAKER: ICD-10-CM

## 2020-05-28 PROCEDURE — 93296 REM INTERROG EVL PM/IDS: CPT | Performed by: INTERNAL MEDICINE

## 2020-05-28 PROCEDURE — 93294 REM INTERROG EVL PM/LDLS PM: CPT | Performed by: INTERNAL MEDICINE

## 2020-06-11 LAB
MDC_IDC_EPISODE_DTM: NORMAL
MDC_IDC_EPISODE_DURATION: 10 S
MDC_IDC_EPISODE_DURATION: 112 S
MDC_IDC_EPISODE_DURATION: 12 S
MDC_IDC_EPISODE_DURATION: 190 S
MDC_IDC_EPISODE_DURATION: 20 S
MDC_IDC_EPISODE_DURATION: 20 S
MDC_IDC_EPISODE_DURATION: 2084 S
MDC_IDC_EPISODE_DURATION: 24 S
MDC_IDC_EPISODE_DURATION: 24 S
MDC_IDC_EPISODE_DURATION: 26 S
MDC_IDC_EPISODE_DURATION: 32 S
MDC_IDC_EPISODE_DURATION: 32 S
MDC_IDC_EPISODE_DURATION: 42 S
MDC_IDC_EPISODE_DURATION: 46 S
MDC_IDC_EPISODE_DURATION: 52 S
MDC_IDC_EPISODE_DURATION: 56 S
MDC_IDC_EPISODE_DURATION: 8 S
MDC_IDC_EPISODE_ID: NORMAL
MDC_IDC_EPISODE_TYPE: NORMAL
MDC_IDC_LEAD_IMPLANT_DT: NORMAL
MDC_IDC_LEAD_IMPLANT_DT: NORMAL
MDC_IDC_LEAD_LOCATION: NORMAL
MDC_IDC_LEAD_LOCATION: NORMAL
MDC_IDC_LEAD_LOCATION_DETAIL_1: NORMAL
MDC_IDC_LEAD_LOCATION_DETAIL_1: NORMAL
MDC_IDC_LEAD_MFG: NORMAL
MDC_IDC_LEAD_MFG: NORMAL
MDC_IDC_LEAD_MODEL: NORMAL
MDC_IDC_LEAD_MODEL: NORMAL
MDC_IDC_LEAD_POLARITY_TYPE: NORMAL
MDC_IDC_LEAD_POLARITY_TYPE: NORMAL
MDC_IDC_LEAD_SERIAL: NORMAL
MDC_IDC_LEAD_SERIAL: NORMAL
MDC_IDC_MSMT_BATTERY_DTM: NORMAL
MDC_IDC_MSMT_BATTERY_REMAINING_LONGEVITY: 125 MO
MDC_IDC_MSMT_BATTERY_REMAINING_PERCENTAGE: 95.5 %
MDC_IDC_MSMT_BATTERY_RRT_TRIGGER: NORMAL
MDC_IDC_MSMT_BATTERY_STATUS: NORMAL
MDC_IDC_MSMT_BATTERY_VOLTAGE: 2.98 V
MDC_IDC_MSMT_LEADCHNL_RA_IMPEDANCE_VALUE: 450 OHM
MDC_IDC_MSMT_LEADCHNL_RA_LEAD_CHANNEL_STATUS: NORMAL
MDC_IDC_MSMT_LEADCHNL_RA_PACING_THRESHOLD_AMPLITUDE: 0.75 V
MDC_IDC_MSMT_LEADCHNL_RA_PACING_THRESHOLD_PULSEWIDTH: 0.5 MS
MDC_IDC_MSMT_LEADCHNL_RA_SENSING_INTR_AMPL: 2.7 MV
MDC_IDC_MSMT_LEADCHNL_RV_IMPEDANCE_VALUE: 460 OHM
MDC_IDC_MSMT_LEADCHNL_RV_LEAD_CHANNEL_STATUS: NORMAL
MDC_IDC_MSMT_LEADCHNL_RV_PACING_THRESHOLD_AMPLITUDE: 0.62 V
MDC_IDC_MSMT_LEADCHNL_RV_PACING_THRESHOLD_PULSEWIDTH: 0.5 MS
MDC_IDC_MSMT_LEADCHNL_RV_SENSING_INTR_AMPL: 12 MV
MDC_IDC_PG_IMPLANT_DTM: NORMAL
MDC_IDC_PG_MFG: NORMAL
MDC_IDC_PG_MODEL: NORMAL
MDC_IDC_PG_SERIAL: NORMAL
MDC_IDC_PG_TYPE: NORMAL
MDC_IDC_SESS_CLINIC_NAME: NORMAL
MDC_IDC_SESS_DTM: NORMAL
MDC_IDC_SESS_REPROGRAMMED: NO
MDC_IDC_SESS_TYPE: NORMAL
MDC_IDC_SET_BRADY_AT_MODE_SWITCH_MODE: NORMAL
MDC_IDC_SET_BRADY_AT_MODE_SWITCH_RATE: 180 {BEATS}/MIN
MDC_IDC_SET_BRADY_LOWRATE: 60 {BEATS}/MIN
MDC_IDC_SET_BRADY_MAX_SENSOR_RATE: 120 {BEATS}/MIN
MDC_IDC_SET_BRADY_MAX_TRACKING_RATE: 120 {BEATS}/MIN
MDC_IDC_SET_BRADY_MODE: NORMAL
MDC_IDC_SET_BRADY_PAV_DELAY_HIGH: 100 MS
MDC_IDC_SET_BRADY_PAV_DELAY_LOW: 200 MS
MDC_IDC_SET_BRADY_SAV_DELAY_HIGH: 100 MS
MDC_IDC_SET_BRADY_SAV_DELAY_LOW: 170 MS
MDC_IDC_SET_LEADCHNL_RA_PACING_AMPLITUDE: 1.75 V
MDC_IDC_SET_LEADCHNL_RA_PACING_ANODE_ELECTRODE_1: NORMAL
MDC_IDC_SET_LEADCHNL_RA_PACING_ANODE_LOCATION_1: NORMAL
MDC_IDC_SET_LEADCHNL_RA_PACING_CAPTURE_MODE: NORMAL
MDC_IDC_SET_LEADCHNL_RA_PACING_CATHODE_ELECTRODE_1: NORMAL
MDC_IDC_SET_LEADCHNL_RA_PACING_CATHODE_LOCATION_1: NORMAL
MDC_IDC_SET_LEADCHNL_RA_PACING_POLARITY: NORMAL
MDC_IDC_SET_LEADCHNL_RA_PACING_PULSEWIDTH: 0.5 MS
MDC_IDC_SET_LEADCHNL_RA_SENSING_ADAPTATION_MODE: NORMAL
MDC_IDC_SET_LEADCHNL_RA_SENSING_ANODE_ELECTRODE_1: NORMAL
MDC_IDC_SET_LEADCHNL_RA_SENSING_ANODE_LOCATION_1: NORMAL
MDC_IDC_SET_LEADCHNL_RA_SENSING_CATHODE_ELECTRODE_1: NORMAL
MDC_IDC_SET_LEADCHNL_RA_SENSING_CATHODE_LOCATION_1: NORMAL
MDC_IDC_SET_LEADCHNL_RA_SENSING_POLARITY: NORMAL
MDC_IDC_SET_LEADCHNL_RA_SENSING_SENSITIVITY: 0.5 MV
MDC_IDC_SET_LEADCHNL_RV_PACING_AMPLITUDE: 0.88
MDC_IDC_SET_LEADCHNL_RV_PACING_ANODE_ELECTRODE_1: NORMAL
MDC_IDC_SET_LEADCHNL_RV_PACING_ANODE_LOCATION_1: NORMAL
MDC_IDC_SET_LEADCHNL_RV_PACING_CAPTURE_MODE: NORMAL
MDC_IDC_SET_LEADCHNL_RV_PACING_CATHODE_ELECTRODE_1: NORMAL
MDC_IDC_SET_LEADCHNL_RV_PACING_CATHODE_LOCATION_1: NORMAL
MDC_IDC_SET_LEADCHNL_RV_PACING_POLARITY: NORMAL
MDC_IDC_SET_LEADCHNL_RV_PACING_PULSEWIDTH: 0.5 MS
MDC_IDC_SET_LEADCHNL_RV_SENSING_ADAPTATION_MODE: NORMAL
MDC_IDC_SET_LEADCHNL_RV_SENSING_ANODE_ELECTRODE_1: NORMAL
MDC_IDC_SET_LEADCHNL_RV_SENSING_ANODE_LOCATION_1: NORMAL
MDC_IDC_SET_LEADCHNL_RV_SENSING_CATHODE_ELECTRODE_1: NORMAL
MDC_IDC_SET_LEADCHNL_RV_SENSING_CATHODE_LOCATION_1: NORMAL
MDC_IDC_SET_LEADCHNL_RV_SENSING_POLARITY: NORMAL
MDC_IDC_SET_LEADCHNL_RV_SENSING_SENSITIVITY: 2 MV
MDC_IDC_STAT_AT_BURDEN_PERCENT: 1 %
MDC_IDC_STAT_AT_DTM_END: NORMAL
MDC_IDC_STAT_AT_DTM_START: NORMAL
MDC_IDC_STAT_AT_MODE_SW_COUNT: 341
MDC_IDC_STAT_AT_MODE_SW_COUNT_PER_DAY: 3
MDC_IDC_STAT_AT_MODE_SW_MAX_DURATION: NORMAL S
MDC_IDC_STAT_AT_MODE_SW_PERCENT_TIME: 1 %
MDC_IDC_STAT_BRADY_AP_VP_PERCENT: 1 %
MDC_IDC_STAT_BRADY_AP_VS_PERCENT: 1 %
MDC_IDC_STAT_BRADY_AS_VP_PERCENT: 99 %
MDC_IDC_STAT_BRADY_AS_VS_PERCENT: 1 %
MDC_IDC_STAT_BRADY_DTM_END: NORMAL
MDC_IDC_STAT_BRADY_DTM_START: NORMAL
MDC_IDC_STAT_BRADY_RA_PERCENT_PACED: 1 %
MDC_IDC_STAT_BRADY_RV_PERCENT_PACED: 99 %
MDC_IDC_STAT_CRT_DTM_END: NORMAL
MDC_IDC_STAT_CRT_DTM_START: NORMAL
MDC_IDC_STAT_HEART_RATE_ATRIAL_MAX: 330 {BEATS}/MIN
MDC_IDC_STAT_HEART_RATE_ATRIAL_MEAN: 88 {BEATS}/MIN
MDC_IDC_STAT_HEART_RATE_ATRIAL_MIN: 40 {BEATS}/MIN
MDC_IDC_STAT_HEART_RATE_DTM_END: NORMAL
MDC_IDC_STAT_HEART_RATE_DTM_START: NORMAL
MDC_IDC_STAT_HEART_RATE_VENTRICULAR_MAX: 190 {BEATS}/MIN
MDC_IDC_STAT_HEART_RATE_VENTRICULAR_MEAN: 79 {BEATS}/MIN
MDC_IDC_STAT_HEART_RATE_VENTRICULAR_MIN: 40 {BEATS}/MIN

## 2020-09-02 ENCOUNTER — ANCILLARY PROCEDURE (OUTPATIENT)
Dept: CARDIOLOGY | Facility: CLINIC | Age: 85
End: 2020-09-02
Attending: INTERNAL MEDICINE
Payer: COMMERCIAL

## 2020-09-02 DIAGNOSIS — Z95.0 CARDIAC PACEMAKER IN SITU: ICD-10-CM

## 2020-09-02 LAB
MDC_IDC_EPISODE_DTM: NORMAL
MDC_IDC_EPISODE_DURATION: 114 S
MDC_IDC_EPISODE_DURATION: 128 S
MDC_IDC_EPISODE_DURATION: 14 S
MDC_IDC_EPISODE_DURATION: 14 S
MDC_IDC_EPISODE_DURATION: 16 S
MDC_IDC_EPISODE_DURATION: 16 S
MDC_IDC_EPISODE_DURATION: 20 S
MDC_IDC_EPISODE_DURATION: 22 S
MDC_IDC_EPISODE_DURATION: 24 S
MDC_IDC_EPISODE_DURATION: 24 S
MDC_IDC_EPISODE_DURATION: 30 S
MDC_IDC_EPISODE_DURATION: 34 S
MDC_IDC_EPISODE_DURATION: 40 S
MDC_IDC_EPISODE_DURATION: 50 S
MDC_IDC_EPISODE_DURATION: 52 S
MDC_IDC_EPISODE_DURATION: 8 S
MDC_IDC_EPISODE_ID: NORMAL
MDC_IDC_EPISODE_TYPE: NORMAL
MDC_IDC_LEAD_IMPLANT_DT: NORMAL
MDC_IDC_LEAD_IMPLANT_DT: NORMAL
MDC_IDC_LEAD_LOCATION: NORMAL
MDC_IDC_LEAD_LOCATION: NORMAL
MDC_IDC_LEAD_LOCATION_DETAIL_1: NORMAL
MDC_IDC_LEAD_LOCATION_DETAIL_1: NORMAL
MDC_IDC_LEAD_MFG: NORMAL
MDC_IDC_LEAD_MFG: NORMAL
MDC_IDC_LEAD_MODEL: NORMAL
MDC_IDC_LEAD_MODEL: NORMAL
MDC_IDC_LEAD_POLARITY_TYPE: NORMAL
MDC_IDC_LEAD_POLARITY_TYPE: NORMAL
MDC_IDC_LEAD_SERIAL: NORMAL
MDC_IDC_LEAD_SERIAL: NORMAL
MDC_IDC_MSMT_BATTERY_DTM: NORMAL
MDC_IDC_MSMT_BATTERY_REMAINING_LONGEVITY: 124 MO
MDC_IDC_MSMT_BATTERY_REMAINING_PERCENTAGE: 95.5 %
MDC_IDC_MSMT_BATTERY_RRT_TRIGGER: NORMAL
MDC_IDC_MSMT_BATTERY_STATUS: NORMAL
MDC_IDC_MSMT_BATTERY_VOLTAGE: 2.98 V
MDC_IDC_MSMT_LEADCHNL_RA_IMPEDANCE_VALUE: 450 OHM
MDC_IDC_MSMT_LEADCHNL_RA_LEAD_CHANNEL_STATUS: NORMAL
MDC_IDC_MSMT_LEADCHNL_RA_PACING_THRESHOLD_AMPLITUDE: 0.88 V
MDC_IDC_MSMT_LEADCHNL_RA_PACING_THRESHOLD_PULSEWIDTH: 0.5 MS
MDC_IDC_MSMT_LEADCHNL_RA_SENSING_INTR_AMPL: 3 MV
MDC_IDC_MSMT_LEADCHNL_RV_IMPEDANCE_VALUE: 460 OHM
MDC_IDC_MSMT_LEADCHNL_RV_LEAD_CHANNEL_STATUS: NORMAL
MDC_IDC_MSMT_LEADCHNL_RV_PACING_THRESHOLD_AMPLITUDE: 0.62 V
MDC_IDC_MSMT_LEADCHNL_RV_PACING_THRESHOLD_PULSEWIDTH: 0.5 MS
MDC_IDC_MSMT_LEADCHNL_RV_SENSING_INTR_AMPL: 12 MV
MDC_IDC_PG_IMPLANT_DTM: NORMAL
MDC_IDC_PG_MFG: NORMAL
MDC_IDC_PG_MODEL: NORMAL
MDC_IDC_PG_SERIAL: NORMAL
MDC_IDC_PG_TYPE: NORMAL
MDC_IDC_SESS_CLINIC_NAME: NORMAL
MDC_IDC_SESS_DTM: NORMAL
MDC_IDC_SESS_REPROGRAMMED: NO
MDC_IDC_SESS_TYPE: NORMAL
MDC_IDC_SET_BRADY_AT_MODE_SWITCH_MODE: NORMAL
MDC_IDC_SET_BRADY_AT_MODE_SWITCH_RATE: 180 {BEATS}/MIN
MDC_IDC_SET_BRADY_LOWRATE: 60 {BEATS}/MIN
MDC_IDC_SET_BRADY_MAX_SENSOR_RATE: 120 {BEATS}/MIN
MDC_IDC_SET_BRADY_MAX_TRACKING_RATE: 120 {BEATS}/MIN
MDC_IDC_SET_BRADY_MODE: NORMAL
MDC_IDC_SET_BRADY_PAV_DELAY_HIGH: 100 MS
MDC_IDC_SET_BRADY_PAV_DELAY_LOW: 200 MS
MDC_IDC_SET_BRADY_SAV_DELAY_HIGH: 100 MS
MDC_IDC_SET_BRADY_SAV_DELAY_LOW: 170 MS
MDC_IDC_SET_LEADCHNL_RA_PACING_AMPLITUDE: 1.88
MDC_IDC_SET_LEADCHNL_RA_PACING_ANODE_ELECTRODE_1: NORMAL
MDC_IDC_SET_LEADCHNL_RA_PACING_ANODE_LOCATION_1: NORMAL
MDC_IDC_SET_LEADCHNL_RA_PACING_CAPTURE_MODE: NORMAL
MDC_IDC_SET_LEADCHNL_RA_PACING_CATHODE_ELECTRODE_1: NORMAL
MDC_IDC_SET_LEADCHNL_RA_PACING_CATHODE_LOCATION_1: NORMAL
MDC_IDC_SET_LEADCHNL_RA_PACING_POLARITY: NORMAL
MDC_IDC_SET_LEADCHNL_RA_PACING_PULSEWIDTH: 0.5 MS
MDC_IDC_SET_LEADCHNL_RA_SENSING_ADAPTATION_MODE: NORMAL
MDC_IDC_SET_LEADCHNL_RA_SENSING_ANODE_ELECTRODE_1: NORMAL
MDC_IDC_SET_LEADCHNL_RA_SENSING_ANODE_LOCATION_1: NORMAL
MDC_IDC_SET_LEADCHNL_RA_SENSING_CATHODE_ELECTRODE_1: NORMAL
MDC_IDC_SET_LEADCHNL_RA_SENSING_CATHODE_LOCATION_1: NORMAL
MDC_IDC_SET_LEADCHNL_RA_SENSING_POLARITY: NORMAL
MDC_IDC_SET_LEADCHNL_RA_SENSING_SENSITIVITY: 0.5 MV
MDC_IDC_SET_LEADCHNL_RV_PACING_AMPLITUDE: 0.88
MDC_IDC_SET_LEADCHNL_RV_PACING_ANODE_ELECTRODE_1: NORMAL
MDC_IDC_SET_LEADCHNL_RV_PACING_ANODE_LOCATION_1: NORMAL
MDC_IDC_SET_LEADCHNL_RV_PACING_CAPTURE_MODE: NORMAL
MDC_IDC_SET_LEADCHNL_RV_PACING_CATHODE_ELECTRODE_1: NORMAL
MDC_IDC_SET_LEADCHNL_RV_PACING_CATHODE_LOCATION_1: NORMAL
MDC_IDC_SET_LEADCHNL_RV_PACING_POLARITY: NORMAL
MDC_IDC_SET_LEADCHNL_RV_PACING_PULSEWIDTH: 0.5 MS
MDC_IDC_SET_LEADCHNL_RV_SENSING_ADAPTATION_MODE: NORMAL
MDC_IDC_SET_LEADCHNL_RV_SENSING_ANODE_ELECTRODE_1: NORMAL
MDC_IDC_SET_LEADCHNL_RV_SENSING_ANODE_LOCATION_1: NORMAL
MDC_IDC_SET_LEADCHNL_RV_SENSING_CATHODE_ELECTRODE_1: NORMAL
MDC_IDC_SET_LEADCHNL_RV_SENSING_CATHODE_LOCATION_1: NORMAL
MDC_IDC_SET_LEADCHNL_RV_SENSING_POLARITY: NORMAL
MDC_IDC_SET_LEADCHNL_RV_SENSING_SENSITIVITY: 2 MV
MDC_IDC_STAT_AT_BURDEN_PERCENT: 1 %
MDC_IDC_STAT_AT_DTM_END: NORMAL
MDC_IDC_STAT_AT_DTM_START: NORMAL
MDC_IDC_STAT_AT_MODE_SW_COUNT: 164
MDC_IDC_STAT_AT_MODE_SW_COUNT_PER_DAY: 2
MDC_IDC_STAT_AT_MODE_SW_MAX_DURATION: NORMAL S
MDC_IDC_STAT_AT_MODE_SW_PERCENT_TIME: 1 %
MDC_IDC_STAT_BRADY_AP_VP_PERCENT: 1 %
MDC_IDC_STAT_BRADY_AP_VS_PERCENT: 1 %
MDC_IDC_STAT_BRADY_AS_VP_PERCENT: 99 %
MDC_IDC_STAT_BRADY_AS_VS_PERCENT: 1 %
MDC_IDC_STAT_BRADY_DTM_END: NORMAL
MDC_IDC_STAT_BRADY_DTM_START: NORMAL
MDC_IDC_STAT_BRADY_RA_PERCENT_PACED: 1 %
MDC_IDC_STAT_BRADY_RV_PERCENT_PACED: 99 %
MDC_IDC_STAT_CRT_DTM_END: NORMAL
MDC_IDC_STAT_CRT_DTM_START: NORMAL
MDC_IDC_STAT_HEART_RATE_ATRIAL_MAX: 330 {BEATS}/MIN
MDC_IDC_STAT_HEART_RATE_ATRIAL_MEAN: 81 {BEATS}/MIN
MDC_IDC_STAT_HEART_RATE_ATRIAL_MIN: 40 {BEATS}/MIN
MDC_IDC_STAT_HEART_RATE_DTM_END: NORMAL
MDC_IDC_STAT_HEART_RATE_DTM_START: NORMAL
MDC_IDC_STAT_HEART_RATE_VENTRICULAR_MAX: 180 {BEATS}/MIN
MDC_IDC_STAT_HEART_RATE_VENTRICULAR_MEAN: 77 {BEATS}/MIN
MDC_IDC_STAT_HEART_RATE_VENTRICULAR_MIN: 40 {BEATS}/MIN

## 2020-09-02 PROCEDURE — 93296 REM INTERROG EVL PM/IDS: CPT | Performed by: INTERNAL MEDICINE

## 2020-09-02 PROCEDURE — 93294 REM INTERROG EVL PM/LDLS PM: CPT | Performed by: INTERNAL MEDICINE

## 2020-09-29 DIAGNOSIS — I25.10 CORONARY ARTERY DISEASE INVOLVING NATIVE CORONARY ARTERY OF NATIVE HEART WITHOUT ANGINA PECTORIS: ICD-10-CM

## 2020-09-29 LAB
ALT SERPL W P-5'-P-CCNC: 19 U/L (ref 0–50)
CHOLEST SERPL-MCNC: 118 MG/DL
HDLC SERPL-MCNC: 45 MG/DL
LDLC SERPL CALC-MCNC: 55 MG/DL
NONHDLC SERPL-MCNC: 73 MG/DL
TRIGL SERPL-MCNC: 88 MG/DL

## 2020-09-29 PROCEDURE — 80061 LIPID PANEL: CPT | Performed by: INTERNAL MEDICINE

## 2020-09-29 PROCEDURE — 36415 COLL VENOUS BLD VENIPUNCTURE: CPT | Performed by: INTERNAL MEDICINE

## 2020-09-29 PROCEDURE — 84460 ALANINE AMINO (ALT) (SGPT): CPT | Performed by: INTERNAL MEDICINE

## 2020-10-06 ENCOUNTER — VIRTUAL VISIT (OUTPATIENT)
Dept: CARDIOLOGY | Facility: CLINIC | Age: 85
End: 2020-10-06
Payer: COMMERCIAL

## 2020-10-06 DIAGNOSIS — E78.5 DYSLIPIDEMIA: ICD-10-CM

## 2020-10-06 DIAGNOSIS — I25.10 CORONARY ARTERY DISEASE INVOLVING NATIVE CORONARY ARTERY OF NATIVE HEART WITHOUT ANGINA PECTORIS: ICD-10-CM

## 2020-10-06 DIAGNOSIS — Z95.0 CARDIAC PACEMAKER IN SITU: Primary | ICD-10-CM

## 2020-10-06 DIAGNOSIS — I10 ESSENTIAL HYPERTENSION: ICD-10-CM

## 2020-10-06 DIAGNOSIS — I48.0 PAROXYSMAL ATRIAL FIBRILLATION (H): ICD-10-CM

## 2020-10-06 PROCEDURE — 99214 OFFICE O/P EST MOD 30 MIN: CPT | Mod: GT | Performed by: PHYSICIAN ASSISTANT

## 2020-10-06 NOTE — LETTER
"10/6/2020    [facility] [address]  [facility] [address]    RE: Yoselyn Cui       Dear Colleague,    I had the pleasure of seeing Yoselyn Cui in the [facility].    Service Date: 10/6/2020     Yoselyn Cui is a 88 year old female who is being evaluated via a billable video visit.      The patient has been notified of following:     \"This video visit will be conducted via a call between you and your physician/provider. We have found that certain health care needs can be provided without the need for an in-person physical exam.  This service lets us provide the care you need with a video conversation.  If a prescription is necessary we can send it directly to your pharmacy.  If lab work is needed we can place an order for that and you can then stop by our lab to have the test done at a later time.    Video visits are billed at different rates depending on your insurance coverage.  Please reach out to your insurance provider with any questions.    If during the course of the call the physician/provider feels a video visit is not appropriate, you will not be charged for this service.\"    Patient has given verbal consent for Video visit? Yes  How would you like to obtain your AVS? Mail a copy  If you are dropped from the video visit, the video invite should be resent to: Text to cell phone: [phone]  Will anyone else be joining your video visit?     Review Of Systems  Skin: negative  Eyes: macular degeneration  Ears/Nose/Throat: hearing loss  Respiratory: Dyspnea on exertion-only when going up stairs, stable and no cough  Cardiovascular: negative  Gastrointestinal: negative  Genitourinary: negative  Musculoskeletal: negative  Neurologic: numbness or tingling of feet  Psychiatric: negative  Hematologic/Lymphatic/Immunologic: negative  Endocrine: negative    Patient reported vitals:  BP:169/79 Oct 4th  Heart rate:71  Weight:110    SHowley " "[facility]      Video-Visit Details    Type of service:  Video Visit    Video Start Time: 4:18 PM  Video End Time: 4:31 PM    Originating Location (pt. Location): Home    Distant Location (provider location):  [facility]     Platform [facility] for Video Visit: [facility]       HISTORY OF PRESENTING COMPLAINT:  Yoselyn Cui is a pleasant 88 year old female who is being evaluated via a billable video visit in order to minimize the possibility of exposure due to the current COVID-19 pandemic. This is a 6 month follow up.     Again she has a rather complex cardiac hx which includes a distant history of CVA x2, second-degree heart block, status post permanent pacemaker implantation with subsequent device check showing occasional paroxysmal atrial fibrillation, hypertension, hyperlipidemia and coronary artery disease with a non-STEMI in 07/2019 at which time she was found to have multivessel disease.  She underwent stenting to the mid LAD which was unfortunately complicated by a guidewire dissection requiring additional stenting to the proximal LAD and left main.  Her circumflex and RCA were not intervened upon and ultimately since she was not having angina and her EF normalized medical management was recommended. She had a carotid ultrasound earlier this year showing mild disease bilaterally.    At this time she states she is doing \"great\". No CP, only notes COKER when climbing stairs and this has not been progressive. BP is occasionally watched at home, sounds like it is typically well controlled although occ has spiked with SBP into the 160s.  No med concerns.       CURRENT CARDIAC MEDICATIONS:   Current Outpatient Medications   Medication Sig Dispense Refill     acetaminophen (TYLENOL) 325 MG tablet Take 650 mg by mouth every 6 hours as needed for mild pain        amLODIPine (NORVASC) 5 MG tablet Take 5 mg by mouth daily        apixaban ANTICOAGULANT (ELIQUIS ANTICOAGULANT) 2.5 MG tablet " Take 1 tablet (2.5 mg) by mouth 2 times daily 60 tablet 11     atorvastatin (LIPITOR) 40 MG tablet Take 1 tablet (40 mg) by mouth At Bedtime 90 tablet 3     carvedilol (COREG) 12.5 MG tablet Take 1.5 tablets (18.75 mg) by mouth 2 times daily (with meals) 270 tablet 3     clopidogrel (PLAVIX) 75 MG tablet Take 1 tablet (75 mg) by mouth daily 30 tablet 11     ferrous sulfate (IRON) 325 (65 FE) MG tablet Take 65 mg by mouth daily (with breakfast)        lisinopril (PRINIVIL,ZESTRIL) 40 MG tablet Take 40 mg by mouth daily       Multiple Vitamins-Minerals (ICAPS AREDS FORMULA) TABS Take 1 tablet by mouth 2 times daily        nitroGLYcerin (NITROSTAT) 0.4 MG sublingual tablet For chest pain place 1 tablet under the tongue every 5 minutes for 3 doses. If symptoms persist 5 minutes after 1st dose call 911. 30 tablet 0     OMEPRAZOLE PO Take 20 mg by mouth every morning        potassium chloride ER (KLOR-CON M) 20 MEQ CR tablet Take 1 tablet (20 mEq) by mouth daily 30 tablet 11     tolterodine (DETROL LA) 4 MG 24 hr capsule Take 4 mg by mouth every morning       fluticasone furoate (ARNUITY ELLIPTA) 200 MCG/ACT inhalation powder Inhale 1 puff into the lungs daily (Patient not taking: Reported on 10/6/2020) 3 Inhaler 0     The remainder of the medications, allergies and review of systems were reviewed and as are documented.      PHYSICAL EXAMINATION:   GENERAL: Healthy, alert and no distress  EYES: Eyes grossly normal to inspection.  No discharge or erythema, or obvious scleral/conjunctival abnormalities.  RESP: No audible wheeze, cough, or visible cyanosis.  No visible retractions or increased work of breathing.    SKIN: Visible skin clear. No significant rash, abnormal pigmentation or lesions.  NEURO: Cranial nerves grossly intact.  Mentation and speech appropriate for age.  PSYCH: Mentation appears normal, affect normal/bright, judgement and insight intact, normal speech and appearance well-groomed.     Lab Results    Component Value Date    CHOL 118 09/29/2020     Lab Results   Component Value Date    HDL 45 09/29/2020     Lab Results   Component Value Date    LDL 55 09/29/2020     Lab Results   Component Value Date    TRIG 88 09/29/2020     Lab Results   Component Value Date    CHOLHDLRATIO 4.2 06/11/2007       ASSESSMENT AND PLAN:  Yoselyn Cui is a pleasant 88 year old female with a history of a distant history of CVA x2, second-degree heart block, status post permanent pacemaker implantation with subsequent device check showing occasional paroxysmal atrial fibrillation, hypertension, hyperlipidemia, mild carotid disease and coronary artery disease with a non-STEMI in 07/2019 at which time she was found to have multivessel disease.  Again, she underwent stenting to the mid LAD which was unfortunately complicated by a guidewire dissection requiring additional stenting to the proximal LAD and left main.  Her circumflex and RCA were not intervened upon. LVEF did normalize after this.    No angina or HF symptoms at this time. Doing remarkable well. I recommended her family keep a closer eye on her BP and contact us if they are getting consistent SBP readings >140. No changes to her BP meds today. LDL is at goal, continue atorvastatin.     Since she is tolerating the Eliquis and clopidogrel and had LM/LAD stenting I decided to leave her on this regimen for now, we could possibly consider changing the clopidogrel to ASA in the future.    Continue to follow in the device clinic routinely.     Follow up with Dr Meza in Feb/March 2021, sooner with questions or concerns.     An AVS will be mailed to the patient.      Thank you for allowing me to participate in the care of this pleasant patient.  Please do not hesitate to contact us with further questions or concerns.     Sincerely,     Britni Joseph PA-C     [facility]

## 2020-10-06 NOTE — PROGRESS NOTES
"Service Date: 10/6/2020     Yoselyn Cui is a 88 year old female who is being evaluated via a billable video visit.      The patient has been notified of following:     \"This video visit will be conducted via a call between you and your physician/provider. We have found that certain health care needs can be provided without the need for an in-person physical exam.  This service lets us provide the care you need with a video conversation.  If a prescription is necessary we can send it directly to your pharmacy.  If lab work is needed we can place an order for that and you can then stop by our lab to have the test done at a later time.    Video visits are billed at different rates depending on your insurance coverage.  Please reach out to your insurance provider with any questions.    If during the course of the call the physician/provider feels a video visit is not appropriate, you will not be charged for this service.\"    Patient has given verbal consent for Video visit? Yes  How would you like to obtain your AVS? Mail a copy  If you are dropped from the video visit, the video invite should be resent to: Text to cell phone: [phone]  Will anyone else be joining your video visit?     Review Of Systems  Skin: negative  Eyes: macular degeneration  Ears/Nose/Throat: hearing loss  Respiratory: Dyspnea on exertion-only when going up stairs, stable and no cough  Cardiovascular: negative  Gastrointestinal: negative  Genitourinary: negative  Musculoskeletal: negative  Neurologic: numbness or tingling of feet  Psychiatric: negative  Hematologic/Lymphatic/Immunologic: negative  Endocrine: negative    Patient reported vitals:  BP:169/79 Oct 4th  Heart rate:71  Weight:110    SHowley CMA      Video-Visit Details    Type of service:  Video Visit    Video Start Time: 4:18 PM  Video End Time: 4:31 PM    Originating Location (pt. Location): Home    Distant Location (provider location):  [facility]" "[facility]     Platform used for Video Visit: Doximity       HISTORY OF PRESENTING COMPLAINT:  Yoselyn Cui is a pleasant 88 year old female who is being evaluated via a billable video visit in order to minimize the possibility of exposure due to the current COVID-19 pandemic. This is a 6 month follow up.     Again she has a rather complex cardiac hx which includes a distant history of CVA x2, second-degree heart block, status post permanent pacemaker implantation with subsequent device check showing occasional paroxysmal atrial fibrillation, hypertension, hyperlipidemia and coronary artery disease with a non-STEMI in 07/2019 at which time she was found to have multivessel disease.  She underwent stenting to the mid LAD which was unfortunately complicated by a guidewire dissection requiring additional stenting to the proximal LAD and left main.  Her circumflex and RCA were not intervened upon and ultimately since she was not having angina and her EF normalized medical management was recommended. She had a carotid ultrasound earlier this year showing mild disease bilaterally.    At this time she states she is doing \"great\". No CP, only notes COKER when climbing stairs and this has not been progressive. BP is occasionally watched at home, sounds like it is typically well controlled although occ has spiked with SBP into the 160s.  No med concerns.       CURRENT CARDIAC MEDICATIONS:   Current Outpatient Medications   Medication Sig Dispense Refill     acetaminophen (TYLENOL) 325 MG tablet Take 650 mg by mouth every 6 hours as needed for mild pain        amLODIPine (NORVASC) 5 MG tablet Take 5 mg by mouth daily        apixaban ANTICOAGULANT (ELIQUIS ANTICOAGULANT) 2.5 MG tablet Take 1 tablet (2.5 mg) by mouth 2 times daily 60 tablet 11     atorvastatin (LIPITOR) 40 MG tablet Take 1 tablet (40 mg) by mouth At Bedtime 90 tablet 3     carvedilol (COREG) 12.5 MG tablet Take 1.5 tablets (18.75 mg) by mouth 2 times daily " (with meals) 270 tablet 3     clopidogrel (PLAVIX) 75 MG tablet Take 1 tablet (75 mg) by mouth daily 30 tablet 11     ferrous sulfate (IRON) 325 (65 FE) MG tablet Take 65 mg by mouth daily (with breakfast)        lisinopril (PRINIVIL,ZESTRIL) 40 MG tablet Take 40 mg by mouth daily       Multiple Vitamins-Minerals (ICAPS AREDS FORMULA) TABS Take 1 tablet by mouth 2 times daily        nitroGLYcerin (NITROSTAT) 0.4 MG sublingual tablet For chest pain place 1 tablet under the tongue every 5 minutes for 3 doses. If symptoms persist 5 minutes after 1st dose call 911. 30 tablet 0     OMEPRAZOLE PO Take 20 mg by mouth every morning        potassium chloride ER (KLOR-CON M) 20 MEQ CR tablet Take 1 tablet (20 mEq) by mouth daily 30 tablet 11     tolterodine (DETROL LA) 4 MG 24 hr capsule Take 4 mg by mouth every morning       fluticasone furoate (ARNUITY ELLIPTA) 200 MCG/ACT inhalation powder Inhale 1 puff into the lungs daily (Patient not taking: Reported on 10/6/2020) 3 Inhaler 0     The remainder of the medications, allergies and review of systems were reviewed and as are documented.      PHYSICAL EXAMINATION:   GENERAL: Healthy, alert and no distress  EYES: Eyes grossly normal to inspection.  No discharge or erythema, or obvious scleral/conjunctival abnormalities.  RESP: No audible wheeze, cough, or visible cyanosis.  No visible retractions or increased work of breathing.    SKIN: Visible skin clear. No significant rash, abnormal pigmentation or lesions.  NEURO: Cranial nerves grossly intact.  Mentation and speech appropriate for age.  PSYCH: Mentation appears normal, affect normal/bright, judgement and insight intact, normal speech and appearance well-groomed.     Lab Results   Component Value Date    CHOL 118 09/29/2020     Lab Results   Component Value Date    HDL 45 09/29/2020     Lab Results   Component Value Date    LDL 55 09/29/2020     Lab Results   Component Value Date    TRIG 88 09/29/2020     Lab Results    Component Value Date    CHOLHDLRATIO 4.2 06/11/2007       ASSESSMENT AND PLAN:  Yoselyn Cui is a pleasant 88 year old female with a history of a distant history of CVA x2, second-degree heart block, status post permanent pacemaker implantation with subsequent device check showing occasional paroxysmal atrial fibrillation, hypertension, hyperlipidemia, mild carotid disease and coronary artery disease with a non-STEMI in 07/2019 at which time she was found to have multivessel disease.  Again, she underwent stenting to the mid LAD which was unfortunately complicated by a guidewire dissection requiring additional stenting to the proximal LAD and left main.  Her circumflex and RCA were not intervened upon. LVEF did normalize after this.    No angina or HF symptoms at this time. Doing remarkable well. I recommended her family keep a closer eye on her BP and contact us if they are getting consistent SBP readings >140. No changes to her BP meds today. LDL is at goal, continue atorvastatin.     Since she is tolerating the Eliquis and clopidogrel and had LM/LAD stenting I decided to leave her on this regimen for now, we could possibly consider changing the clopidogrel to ASA in the future.    Continue to follow in the device clinic routinely.     Follow up with Dr Meza in Feb/March 2021, sooner with questions or concerns.     An AVS will be mailed to the patient.      Thank you for allowing me to participate in the care of this pleasant patient.  Please do not hesitate to contact us with further questions or concerns.         JIHAN MAGALLON PA-C

## 2020-10-23 ENCOUNTER — DOCUMENTATION ONLY (OUTPATIENT)
Dept: CARDIOLOGY | Facility: CLINIC | Age: 85
End: 2020-10-23

## 2020-10-23 RX ORDER — OXYBUTYNIN CHLORIDE 10 MG/1
10 TABLET, EXTENDED RELEASE ORAL DAILY
COMMUNITY
End: 2021-01-01

## 2020-10-23 NOTE — PROGRESS NOTES
"Pt's daughter Maryjane called stating there are updates to med list per PMD visit. Added PMD medications under \"pt reported\". List of meds and changes in Care Everywhere. JEFF Mariee    "

## 2020-11-10 DIAGNOSIS — I48.0 PAROXYSMAL ATRIAL FIBRILLATION (H): ICD-10-CM

## 2020-11-10 NOTE — TELEPHONE ENCOUNTER
Medication Refilled: Eliquis  Last office visit: 10/6/20 w/ Britni Joseph, plan to continue eliquis  Last Labs/EKG: N/A  Next office visit: orders placed to follow up in Feb 2021  Pharmacy sent to: [facility] in [place]      Annabelle Tolliver, RN BSN  [facility] Clinic Care Coordinator  [place], [place]  [phone]  11/10/20, 3:53 PM

## 2020-12-06 ENCOUNTER — HEALTH MAINTENANCE LETTER (OUTPATIENT)
Age: 85
End: 2020-12-06

## 2020-12-10 ENCOUNTER — ANCILLARY PROCEDURE (OUTPATIENT)
Dept: CARDIOLOGY | Facility: CLINIC | Age: 85
End: 2020-12-10
Attending: INTERNAL MEDICINE
Payer: COMMERCIAL

## 2020-12-10 DIAGNOSIS — I25.10 CORONARY ARTERY DISEASE INVOLVING NATIVE CORONARY ARTERY OF NATIVE HEART WITHOUT ANGINA PECTORIS: ICD-10-CM

## 2020-12-10 DIAGNOSIS — Z95.0 CARDIAC PACEMAKER IN SITU: ICD-10-CM

## 2020-12-10 PROCEDURE — 93294 REM INTERROG EVL PM/LDLS PM: CPT | Performed by: INTERNAL MEDICINE

## 2020-12-10 PROCEDURE — 93296 REM INTERROG EVL PM/IDS: CPT | Performed by: INTERNAL MEDICINE

## 2020-12-10 RX ORDER — CLOPIDOGREL BISULFATE 75 MG/1
75 TABLET ORAL DAILY
Qty: 90 TABLET | Refills: 0 | Status: SHIPPED | OUTPATIENT
Start: 2020-12-10 | End: 2021-01-01

## 2020-12-10 NOTE — TELEPHONE ENCOUNTER
Received refill request for:  Plavix   Last OV was: 10/6/20  Labs/EKG: na  F/U scheduled: Orders for MD 2/2021   [facility]  [phone]

## 2020-12-15 LAB
MDC_IDC_EPISODE_DTM: NORMAL
MDC_IDC_EPISODE_DURATION: 10 S
MDC_IDC_EPISODE_DURATION: 10 S
MDC_IDC_EPISODE_DURATION: 1012 S
MDC_IDC_EPISODE_DURATION: 12 S
MDC_IDC_EPISODE_DURATION: 16 S
MDC_IDC_EPISODE_DURATION: 16 S
MDC_IDC_EPISODE_DURATION: 18 S
MDC_IDC_EPISODE_DURATION: 1874 S
MDC_IDC_EPISODE_DURATION: 20 S
MDC_IDC_EPISODE_DURATION: 28 S
MDC_IDC_EPISODE_DURATION: 36 S
MDC_IDC_EPISODE_DURATION: 42 S
MDC_IDC_EPISODE_DURATION: 8 S
MDC_IDC_EPISODE_DURATION: NORMAL S
MDC_IDC_EPISODE_DURATION: NORMAL S
MDC_IDC_EPISODE_ID: NORMAL
MDC_IDC_EPISODE_TYPE: NORMAL
MDC_IDC_LEAD_IMPLANT_DT: NORMAL
MDC_IDC_LEAD_IMPLANT_DT: NORMAL
MDC_IDC_LEAD_LOCATION: NORMAL
MDC_IDC_LEAD_LOCATION: NORMAL
MDC_IDC_LEAD_LOCATION_DETAIL_1: NORMAL
MDC_IDC_LEAD_LOCATION_DETAIL_1: NORMAL
MDC_IDC_LEAD_MFG: NORMAL
MDC_IDC_LEAD_MFG: NORMAL
MDC_IDC_LEAD_MODEL: NORMAL
MDC_IDC_LEAD_MODEL: NORMAL
MDC_IDC_LEAD_POLARITY_TYPE: NORMAL
MDC_IDC_LEAD_POLARITY_TYPE: NORMAL
MDC_IDC_LEAD_SERIAL: NORMAL
MDC_IDC_LEAD_SERIAL: NORMAL
MDC_IDC_MSMT_BATTERY_DTM: NORMAL
MDC_IDC_MSMT_BATTERY_REMAINING_LONGEVITY: 123 MO
MDC_IDC_MSMT_BATTERY_REMAINING_PERCENTAGE: 95.5 %
MDC_IDC_MSMT_BATTERY_RRT_TRIGGER: NORMAL
MDC_IDC_MSMT_BATTERY_STATUS: NORMAL
MDC_IDC_MSMT_BATTERY_VOLTAGE: 2.98 V
MDC_IDC_MSMT_LEADCHNL_RA_IMPEDANCE_VALUE: 410 OHM
MDC_IDC_MSMT_LEADCHNL_RA_LEAD_CHANNEL_STATUS: NORMAL
MDC_IDC_MSMT_LEADCHNL_RA_PACING_THRESHOLD_AMPLITUDE: 0.88 V
MDC_IDC_MSMT_LEADCHNL_RA_PACING_THRESHOLD_PULSEWIDTH: 0.5 MS
MDC_IDC_MSMT_LEADCHNL_RA_SENSING_INTR_AMPL: 2.4 MV
MDC_IDC_MSMT_LEADCHNL_RV_IMPEDANCE_VALUE: 460 OHM
MDC_IDC_MSMT_LEADCHNL_RV_LEAD_CHANNEL_STATUS: NORMAL
MDC_IDC_MSMT_LEADCHNL_RV_PACING_THRESHOLD_AMPLITUDE: 0.88 V
MDC_IDC_MSMT_LEADCHNL_RV_PACING_THRESHOLD_PULSEWIDTH: 0.5 MS
MDC_IDC_MSMT_LEADCHNL_RV_SENSING_INTR_AMPL: 12 MV
MDC_IDC_PG_IMPLANT_DTM: NORMAL
MDC_IDC_PG_MFG: NORMAL
MDC_IDC_PG_MODEL: NORMAL
MDC_IDC_PG_SERIAL: NORMAL
MDC_IDC_PG_TYPE: NORMAL
MDC_IDC_SESS_CLINIC_NAME: NORMAL
MDC_IDC_SESS_DTM: NORMAL
MDC_IDC_SESS_REPROGRAMMED: NO
MDC_IDC_SESS_TYPE: NORMAL
MDC_IDC_SET_BRADY_AT_MODE_SWITCH_MODE: NORMAL
MDC_IDC_SET_BRADY_AT_MODE_SWITCH_RATE: 180 {BEATS}/MIN
MDC_IDC_SET_BRADY_LOWRATE: 60 {BEATS}/MIN
MDC_IDC_SET_BRADY_MAX_SENSOR_RATE: 120 {BEATS}/MIN
MDC_IDC_SET_BRADY_MAX_TRACKING_RATE: 120 {BEATS}/MIN
MDC_IDC_SET_BRADY_MODE: NORMAL
MDC_IDC_SET_BRADY_PAV_DELAY_HIGH: 100 MS
MDC_IDC_SET_BRADY_PAV_DELAY_LOW: 200 MS
MDC_IDC_SET_BRADY_SAV_DELAY_HIGH: 100 MS
MDC_IDC_SET_BRADY_SAV_DELAY_LOW: 170 MS
MDC_IDC_SET_LEADCHNL_RA_PACING_AMPLITUDE: 1.88
MDC_IDC_SET_LEADCHNL_RA_PACING_ANODE_ELECTRODE_1: NORMAL
MDC_IDC_SET_LEADCHNL_RA_PACING_ANODE_LOCATION_1: NORMAL
MDC_IDC_SET_LEADCHNL_RA_PACING_CAPTURE_MODE: NORMAL
MDC_IDC_SET_LEADCHNL_RA_PACING_CATHODE_ELECTRODE_1: NORMAL
MDC_IDC_SET_LEADCHNL_RA_PACING_CATHODE_LOCATION_1: NORMAL
MDC_IDC_SET_LEADCHNL_RA_PACING_POLARITY: NORMAL
MDC_IDC_SET_LEADCHNL_RA_PACING_PULSEWIDTH: 0.5 MS
MDC_IDC_SET_LEADCHNL_RA_SENSING_ADAPTATION_MODE: NORMAL
MDC_IDC_SET_LEADCHNL_RA_SENSING_ANODE_ELECTRODE_1: NORMAL
MDC_IDC_SET_LEADCHNL_RA_SENSING_ANODE_LOCATION_1: NORMAL
MDC_IDC_SET_LEADCHNL_RA_SENSING_CATHODE_ELECTRODE_1: NORMAL
MDC_IDC_SET_LEADCHNL_RA_SENSING_CATHODE_LOCATION_1: NORMAL
MDC_IDC_SET_LEADCHNL_RA_SENSING_POLARITY: NORMAL
MDC_IDC_SET_LEADCHNL_RA_SENSING_SENSITIVITY: 0.5 MV
MDC_IDC_SET_LEADCHNL_RV_PACING_AMPLITUDE: 1.12
MDC_IDC_SET_LEADCHNL_RV_PACING_ANODE_ELECTRODE_1: NORMAL
MDC_IDC_SET_LEADCHNL_RV_PACING_ANODE_LOCATION_1: NORMAL
MDC_IDC_SET_LEADCHNL_RV_PACING_CAPTURE_MODE: NORMAL
MDC_IDC_SET_LEADCHNL_RV_PACING_CATHODE_ELECTRODE_1: NORMAL
MDC_IDC_SET_LEADCHNL_RV_PACING_CATHODE_LOCATION_1: NORMAL
MDC_IDC_SET_LEADCHNL_RV_PACING_POLARITY: NORMAL
MDC_IDC_SET_LEADCHNL_RV_PACING_PULSEWIDTH: 0.5 MS
MDC_IDC_SET_LEADCHNL_RV_SENSING_ADAPTATION_MODE: NORMAL
MDC_IDC_SET_LEADCHNL_RV_SENSING_ANODE_ELECTRODE_1: NORMAL
MDC_IDC_SET_LEADCHNL_RV_SENSING_ANODE_LOCATION_1: NORMAL
MDC_IDC_SET_LEADCHNL_RV_SENSING_CATHODE_ELECTRODE_1: NORMAL
MDC_IDC_SET_LEADCHNL_RV_SENSING_CATHODE_LOCATION_1: NORMAL
MDC_IDC_SET_LEADCHNL_RV_SENSING_POLARITY: NORMAL
MDC_IDC_SET_LEADCHNL_RV_SENSING_SENSITIVITY: 2 MV
MDC_IDC_STAT_AT_BURDEN_PERCENT: 1 %
MDC_IDC_STAT_AT_DTM_END: NORMAL
MDC_IDC_STAT_AT_DTM_START: NORMAL
MDC_IDC_STAT_AT_MODE_SW_COUNT: 340
MDC_IDC_STAT_AT_MODE_SW_COUNT_PER_DAY: 3
MDC_IDC_STAT_AT_MODE_SW_MAX_DURATION: NORMAL S
MDC_IDC_STAT_AT_MODE_SW_PERCENT_TIME: 1 %
MDC_IDC_STAT_BRADY_AP_VP_PERCENT: 1 %
MDC_IDC_STAT_BRADY_AP_VS_PERCENT: 1 %
MDC_IDC_STAT_BRADY_AS_VP_PERCENT: 99 %
MDC_IDC_STAT_BRADY_AS_VS_PERCENT: 1 %
MDC_IDC_STAT_BRADY_DTM_END: NORMAL
MDC_IDC_STAT_BRADY_DTM_START: NORMAL
MDC_IDC_STAT_BRADY_RA_PERCENT_PACED: 1 %
MDC_IDC_STAT_BRADY_RV_PERCENT_PACED: 99 %
MDC_IDC_STAT_CRT_DTM_END: NORMAL
MDC_IDC_STAT_CRT_DTM_START: NORMAL
MDC_IDC_STAT_HEART_RATE_ATRIAL_MAX: 330 {BEATS}/MIN
MDC_IDC_STAT_HEART_RATE_ATRIAL_MEAN: 83 {BEATS}/MIN
MDC_IDC_STAT_HEART_RATE_ATRIAL_MIN: 40 {BEATS}/MIN
MDC_IDC_STAT_HEART_RATE_DTM_END: NORMAL
MDC_IDC_STAT_HEART_RATE_DTM_START: NORMAL
MDC_IDC_STAT_HEART_RATE_VENTRICULAR_MAX: 190 {BEATS}/MIN
MDC_IDC_STAT_HEART_RATE_VENTRICULAR_MEAN: 76 {BEATS}/MIN
MDC_IDC_STAT_HEART_RATE_VENTRICULAR_MIN: 40 {BEATS}/MIN

## 2021-01-01 ENCOUNTER — LAB REQUISITION (OUTPATIENT)
Dept: LAB | Facility: CLINIC | Age: 86
End: 2021-01-01

## 2021-01-01 ENCOUNTER — LAB REQUISITION (OUTPATIENT)
Dept: LAB | Facility: CLINIC | Age: 86
End: 2021-01-01
Payer: COMMERCIAL

## 2021-01-01 ENCOUNTER — TRANSITIONAL CARE UNIT VISIT (OUTPATIENT)
Dept: GERIATRICS | Facility: CLINIC | Age: 86
End: 2021-01-01
Payer: COMMERCIAL

## 2021-01-01 ENCOUNTER — DOCUMENTATION ONLY (OUTPATIENT)
Dept: GERIATRICS | Facility: CLINIC | Age: 86
End: 2021-01-01

## 2021-01-01 ENCOUNTER — APPOINTMENT (OUTPATIENT)
Dept: CT IMAGING | Facility: CLINIC | Age: 86
DRG: 193 | End: 2021-01-01
Attending: EMERGENCY MEDICINE
Payer: MEDICARE

## 2021-01-01 ENCOUNTER — DISCHARGE SUMMARY NURSING HOME (OUTPATIENT)
Dept: GERIATRICS | Facility: CLINIC | Age: 86
End: 2021-01-01

## 2021-01-01 ENCOUNTER — TELEPHONE (OUTPATIENT)
Dept: GERIATRICS | Facility: CLINIC | Age: 86
End: 2021-01-01
Payer: COMMERCIAL

## 2021-01-01 ENCOUNTER — DOCUMENTATION ONLY (OUTPATIENT)
Dept: OTHER | Facility: CLINIC | Age: 86
End: 2021-01-01

## 2021-01-01 ENCOUNTER — TELEPHONE (OUTPATIENT)
Dept: GERIATRICS | Facility: CLINIC | Age: 86
End: 2021-01-01

## 2021-01-01 ENCOUNTER — ANCILLARY PROCEDURE (OUTPATIENT)
Dept: CARDIOLOGY | Facility: CLINIC | Age: 86
End: 2021-01-01
Attending: INTERNAL MEDICINE
Payer: COMMERCIAL

## 2021-01-01 ENCOUNTER — HOSPITAL LABORATORY (OUTPATIENT)
Dept: OTHER | Facility: CLINIC | Age: 86
End: 2021-01-01

## 2021-01-01 ENCOUNTER — OFFICE VISIT (OUTPATIENT)
Dept: CARDIOLOGY | Facility: CLINIC | Age: 86
End: 2021-01-01
Payer: COMMERCIAL

## 2021-01-01 ENCOUNTER — ASSISTED LIVING VISIT (OUTPATIENT)
Dept: GERIATRICS | Facility: CLINIC | Age: 86
End: 2021-01-01
Payer: COMMERCIAL

## 2021-01-01 ENCOUNTER — NURSING HOME VISIT (OUTPATIENT)
Dept: GERIATRICS | Facility: CLINIC | Age: 86
End: 2021-01-01
Payer: COMMERCIAL

## 2021-01-01 ENCOUNTER — APPOINTMENT (OUTPATIENT)
Dept: GENERAL RADIOLOGY | Facility: CLINIC | Age: 86
DRG: 193 | End: 2021-01-01
Attending: THORACIC SURGERY (CARDIOTHORACIC VASCULAR SURGERY)
Payer: MEDICARE

## 2021-01-01 ENCOUNTER — TRANSFERRED RECORDS (OUTPATIENT)
Dept: HEALTH INFORMATION MANAGEMENT | Facility: CLINIC | Age: 86
End: 2021-01-01

## 2021-01-01 ENCOUNTER — APPOINTMENT (OUTPATIENT)
Dept: INTERVENTIONAL RADIOLOGY/VASCULAR | Facility: CLINIC | Age: 86
DRG: 193 | End: 2021-01-01
Attending: STUDENT IN AN ORGANIZED HEALTH CARE EDUCATION/TRAINING PROGRAM
Payer: MEDICARE

## 2021-01-01 ENCOUNTER — APPOINTMENT (OUTPATIENT)
Dept: PHYSICAL THERAPY | Facility: CLINIC | Age: 86
DRG: 193 | End: 2021-01-01
Payer: MEDICARE

## 2021-01-01 ENCOUNTER — HOSPITAL ENCOUNTER (INPATIENT)
Facility: CLINIC | Age: 86
LOS: 4 days | Discharge: NURSING, REHAB OR LONG-TERM CARE | DRG: 193 | End: 2021-09-03
Attending: EMERGENCY MEDICINE | Admitting: STUDENT IN AN ORGANIZED HEALTH CARE EDUCATION/TRAINING PROGRAM
Payer: MEDICARE

## 2021-01-01 ENCOUNTER — HEALTH MAINTENANCE LETTER (OUTPATIENT)
Age: 86
End: 2021-01-01

## 2021-01-01 ENCOUNTER — LAB REQUISITION (OUTPATIENT)
Dept: LAB | Facility: CLINIC | Age: 86
DRG: 193 | End: 2021-01-01
Payer: MEDICARE

## 2021-01-01 ENCOUNTER — APPOINTMENT (OUTPATIENT)
Dept: PHYSICAL THERAPY | Facility: CLINIC | Age: 86
DRG: 193 | End: 2021-01-01
Attending: INTERNAL MEDICINE
Payer: MEDICARE

## 2021-01-01 ENCOUNTER — MYC MEDICAL ADVICE (OUTPATIENT)
Dept: GERIATRICS | Facility: CLINIC | Age: 86
End: 2021-01-01

## 2021-01-01 ENCOUNTER — APPOINTMENT (OUTPATIENT)
Dept: GENERAL RADIOLOGY | Facility: CLINIC | Age: 86
DRG: 193 | End: 2021-01-01
Attending: EMERGENCY MEDICINE
Payer: MEDICARE

## 2021-01-01 VITALS
OXYGEN SATURATION: 96 % | HEART RATE: 74 BPM | TEMPERATURE: 97.8 F | SYSTOLIC BLOOD PRESSURE: 142 MMHG | WEIGHT: 113 LBS | RESPIRATION RATE: 16 BRPM | DIASTOLIC BLOOD PRESSURE: 70 MMHG | HEIGHT: 60 IN | BODY MASS INDEX: 22.19 KG/M2

## 2021-01-01 VITALS
HEIGHT: 60 IN | HEART RATE: 70 BPM | RESPIRATION RATE: 18 BRPM | BODY MASS INDEX: 21.2 KG/M2 | WEIGHT: 108 LBS | DIASTOLIC BLOOD PRESSURE: 71 MMHG | TEMPERATURE: 97.2 F | OXYGEN SATURATION: 97 % | SYSTOLIC BLOOD PRESSURE: 121 MMHG

## 2021-01-01 VITALS
HEART RATE: 83 BPM | OXYGEN SATURATION: 93 % | TEMPERATURE: 98 F | WEIGHT: 109.6 LBS | BODY MASS INDEX: 21.52 KG/M2 | SYSTOLIC BLOOD PRESSURE: 121 MMHG | DIASTOLIC BLOOD PRESSURE: 70 MMHG | HEIGHT: 60 IN | RESPIRATION RATE: 24 BRPM

## 2021-01-01 VITALS
SYSTOLIC BLOOD PRESSURE: 135 MMHG | BODY MASS INDEX: 21.48 KG/M2 | RESPIRATION RATE: 20 BRPM | WEIGHT: 110 LBS | TEMPERATURE: 96.2 F | DIASTOLIC BLOOD PRESSURE: 70 MMHG | OXYGEN SATURATION: 94 % | HEART RATE: 75 BPM

## 2021-01-01 VITALS
SYSTOLIC BLOOD PRESSURE: 151 MMHG | HEART RATE: 73 BPM | DIASTOLIC BLOOD PRESSURE: 83 MMHG | WEIGHT: 110 LBS | BODY MASS INDEX: 20.77 KG/M2 | HEIGHT: 61 IN

## 2021-01-01 VITALS
SYSTOLIC BLOOD PRESSURE: 147 MMHG | OXYGEN SATURATION: 95 % | RESPIRATION RATE: 17 BRPM | HEIGHT: 60 IN | BODY MASS INDEX: 22.19 KG/M2 | WEIGHT: 113 LBS | TEMPERATURE: 97.1 F | DIASTOLIC BLOOD PRESSURE: 76 MMHG | HEART RATE: 90 BPM

## 2021-01-01 VITALS
BODY MASS INDEX: 21.95 KG/M2 | DIASTOLIC BLOOD PRESSURE: 66 MMHG | HEART RATE: 74 BPM | TEMPERATURE: 96.4 F | OXYGEN SATURATION: 96 % | HEIGHT: 60 IN | SYSTOLIC BLOOD PRESSURE: 125 MMHG | RESPIRATION RATE: 18 BRPM | WEIGHT: 111.8 LBS

## 2021-01-01 VITALS
RESPIRATION RATE: 18 BRPM | BODY MASS INDEX: 22.19 KG/M2 | TEMPERATURE: 97.4 F | WEIGHT: 113 LBS | SYSTOLIC BLOOD PRESSURE: 152 MMHG | OXYGEN SATURATION: 93 % | HEART RATE: 78 BPM | HEIGHT: 60 IN | DIASTOLIC BLOOD PRESSURE: 83 MMHG

## 2021-01-01 VITALS
DIASTOLIC BLOOD PRESSURE: 79 MMHG | BODY MASS INDEX: 21.48 KG/M2 | WEIGHT: 110.01 LBS | SYSTOLIC BLOOD PRESSURE: 146 MMHG | HEART RATE: 61 BPM | RESPIRATION RATE: 20 BRPM | OXYGEN SATURATION: 91 % | TEMPERATURE: 98.5 F

## 2021-01-01 VITALS
SYSTOLIC BLOOD PRESSURE: 100 MMHG | HEIGHT: 60 IN | WEIGHT: 113 LBS | TEMPERATURE: 97 F | DIASTOLIC BLOOD PRESSURE: 54 MMHG | HEART RATE: 68 BPM | OXYGEN SATURATION: 93 % | BODY MASS INDEX: 22.19 KG/M2 | RESPIRATION RATE: 18 BRPM

## 2021-01-01 VITALS
WEIGHT: 113 LBS | HEIGHT: 60 IN | HEART RATE: 65 BPM | SYSTOLIC BLOOD PRESSURE: 115 MMHG | RESPIRATION RATE: 23 BRPM | DIASTOLIC BLOOD PRESSURE: 89 MMHG | TEMPERATURE: 97.2 F | BODY MASS INDEX: 22.19 KG/M2 | OXYGEN SATURATION: 96 %

## 2021-01-01 VITALS
DIASTOLIC BLOOD PRESSURE: 80 MMHG | SYSTOLIC BLOOD PRESSURE: 120 MMHG | OXYGEN SATURATION: 98 % | WEIGHT: 111.8 LBS | HEART RATE: 65 BPM | BODY MASS INDEX: 21.95 KG/M2 | HEIGHT: 60 IN | TEMPERATURE: 97.5 F | RESPIRATION RATE: 24 BRPM

## 2021-01-01 VITALS
DIASTOLIC BLOOD PRESSURE: 66 MMHG | BODY MASS INDEX: 21.95 KG/M2 | WEIGHT: 111.8 LBS | SYSTOLIC BLOOD PRESSURE: 145 MMHG | HEIGHT: 60 IN | OXYGEN SATURATION: 94 % | HEART RATE: 72 BPM | RESPIRATION RATE: 18 BRPM | TEMPERATURE: 97.2 F

## 2021-01-01 VITALS
BODY MASS INDEX: 21.6 KG/M2 | OXYGEN SATURATION: 95 % | HEIGHT: 60 IN | DIASTOLIC BLOOD PRESSURE: 72 MMHG | HEART RATE: 71 BPM | RESPIRATION RATE: 18 BRPM | WEIGHT: 110 LBS | SYSTOLIC BLOOD PRESSURE: 156 MMHG | TEMPERATURE: 97.8 F

## 2021-01-01 VITALS
RESPIRATION RATE: 18 BRPM | BODY MASS INDEX: 21.34 KG/M2 | HEART RATE: 78 BPM | WEIGHT: 113 LBS | TEMPERATURE: 98 F | HEIGHT: 61 IN | OXYGEN SATURATION: 97 % | DIASTOLIC BLOOD PRESSURE: 74 MMHG | SYSTOLIC BLOOD PRESSURE: 135 MMHG

## 2021-01-01 VITALS
TEMPERATURE: 97.7 F | HEIGHT: 60 IN | SYSTOLIC BLOOD PRESSURE: 115 MMHG | OXYGEN SATURATION: 91 % | DIASTOLIC BLOOD PRESSURE: 56 MMHG | RESPIRATION RATE: 18 BRPM | HEART RATE: 81 BPM | BODY MASS INDEX: 21.95 KG/M2 | WEIGHT: 111.8 LBS

## 2021-01-01 VITALS — HEIGHT: 60 IN | TEMPERATURE: 97 F | WEIGHT: 111.8 LBS | OXYGEN SATURATION: 98 % | BODY MASS INDEX: 21.95 KG/M2

## 2021-01-01 VITALS
DIASTOLIC BLOOD PRESSURE: 73 MMHG | HEART RATE: 68 BPM | RESPIRATION RATE: 18 BRPM | TEMPERATURE: 96 F | OXYGEN SATURATION: 95 % | HEIGHT: 60 IN | SYSTOLIC BLOOD PRESSURE: 142 MMHG | WEIGHT: 114.2 LBS | BODY MASS INDEX: 22.42 KG/M2

## 2021-01-01 VITALS
TEMPERATURE: 96.4 F | WEIGHT: 113 LBS | OXYGEN SATURATION: 95 % | HEIGHT: 60 IN | HEART RATE: 74 BPM | SYSTOLIC BLOOD PRESSURE: 160 MMHG | RESPIRATION RATE: 18 BRPM | BODY MASS INDEX: 22.19 KG/M2 | DIASTOLIC BLOOD PRESSURE: 68 MMHG

## 2021-01-01 VITALS
TEMPERATURE: 98.9 F | HEART RATE: 88 BPM | SYSTOLIC BLOOD PRESSURE: 130 MMHG | DIASTOLIC BLOOD PRESSURE: 66 MMHG | RESPIRATION RATE: 16 BRPM | OXYGEN SATURATION: 96 %

## 2021-01-01 VITALS — HEIGHT: 60 IN | OXYGEN SATURATION: 95 % | TEMPERATURE: 96.8 F | BODY MASS INDEX: 21.6 KG/M2 | WEIGHT: 110 LBS

## 2021-01-01 VITALS
TEMPERATURE: 97 F | RESPIRATION RATE: 18 BRPM | SYSTOLIC BLOOD PRESSURE: 108 MMHG | DIASTOLIC BLOOD PRESSURE: 51 MMHG | OXYGEN SATURATION: 94 % | HEART RATE: 89 BPM | BODY MASS INDEX: 21.95 KG/M2 | HEIGHT: 60 IN | WEIGHT: 111.8 LBS

## 2021-01-01 VITALS
DIASTOLIC BLOOD PRESSURE: 68 MMHG | BODY MASS INDEX: 22.85 KG/M2 | OXYGEN SATURATION: 95 % | HEIGHT: 60 IN | HEART RATE: 70 BPM | WEIGHT: 116.4 LBS | TEMPERATURE: 97.7 F | RESPIRATION RATE: 16 BRPM | SYSTOLIC BLOOD PRESSURE: 130 MMHG

## 2021-01-01 VITALS
TEMPERATURE: 98.2 F | HEIGHT: 60 IN | DIASTOLIC BLOOD PRESSURE: 62 MMHG | RESPIRATION RATE: 18 BRPM | HEART RATE: 68 BPM | SYSTOLIC BLOOD PRESSURE: 145 MMHG | OXYGEN SATURATION: 99 % | WEIGHT: 114.2 LBS | BODY MASS INDEX: 22.42 KG/M2

## 2021-01-01 DIAGNOSIS — N18.9 CHRONIC KIDNEY DISEASE, UNSPECIFIED: ICD-10-CM

## 2021-01-01 DIAGNOSIS — A04.72 CLOSTRIDIUM DIFFICILE DIARRHEA: Primary | ICD-10-CM

## 2021-01-01 DIAGNOSIS — D64.9 NORMOCYTIC ANEMIA: Primary | ICD-10-CM

## 2021-01-01 DIAGNOSIS — Z95.0 CARDIAC PACEMAKER IN SITU: ICD-10-CM

## 2021-01-01 DIAGNOSIS — J43.9 PULMONARY EMPHYSEMA, UNSPECIFIED EMPHYSEMA TYPE (H): ICD-10-CM

## 2021-01-01 DIAGNOSIS — N32.81 OAB (OVERACTIVE BLADDER): ICD-10-CM

## 2021-01-01 DIAGNOSIS — D72.829 LEUKOCYTOSIS, UNSPECIFIED TYPE: ICD-10-CM

## 2021-01-01 DIAGNOSIS — R05.9 COUGH: ICD-10-CM

## 2021-01-01 DIAGNOSIS — I10 ESSENTIAL HYPERTENSION, BENIGN: Primary | ICD-10-CM

## 2021-01-01 DIAGNOSIS — H61.23 BILATERAL IMPACTED CERUMEN: ICD-10-CM

## 2021-01-01 DIAGNOSIS — G81.94 LEFT HEMIPARESIS (H): ICD-10-CM

## 2021-01-01 DIAGNOSIS — I25.10 CORONARY ARTERY DISEASE INVOLVING NATIVE CORONARY ARTERY OF NATIVE HEART WITHOUT ANGINA PECTORIS: ICD-10-CM

## 2021-01-01 DIAGNOSIS — K21.00 GASTROESOPHAGEAL REFLUX DISEASE WITH ESOPHAGITIS, UNSPECIFIED WHETHER HEMORRHAGE: ICD-10-CM

## 2021-01-01 DIAGNOSIS — I48.0 PAROXYSMAL ATRIAL FIBRILLATION (H): ICD-10-CM

## 2021-01-01 DIAGNOSIS — R05.9 COUGH: Primary | ICD-10-CM

## 2021-01-01 DIAGNOSIS — N18.4 CHRONIC KIDNEY DISEASE, STAGE 4 (SEVERE) (H): ICD-10-CM

## 2021-01-01 DIAGNOSIS — D64.9 ANEMIA, UNSPECIFIED: ICD-10-CM

## 2021-01-01 DIAGNOSIS — R26.9 ABNORMAL GAIT: ICD-10-CM

## 2021-01-01 DIAGNOSIS — S41.111A SKIN TEAR OF RIGHT UPPER ARM WITHOUT COMPLICATION, INITIAL ENCOUNTER: ICD-10-CM

## 2021-01-01 DIAGNOSIS — K59.01 SLOW TRANSIT CONSTIPATION: ICD-10-CM

## 2021-01-01 DIAGNOSIS — J18.9 PNEUMONIA DUE TO INFECTIOUS ORGANISM, UNSPECIFIED LATERALITY, UNSPECIFIED PART OF LUNG: Primary | ICD-10-CM

## 2021-01-01 DIAGNOSIS — E78.5 HYPERLIPIDEMIA, UNSPECIFIED HYPERLIPIDEMIA TYPE: ICD-10-CM

## 2021-01-01 DIAGNOSIS — R19.7 DIARRHEA, UNSPECIFIED TYPE: Primary | ICD-10-CM

## 2021-01-01 DIAGNOSIS — J93.9 PNEUMOTHORAX ON LEFT: ICD-10-CM

## 2021-01-01 DIAGNOSIS — I25.10 ASCVD (ARTERIOSCLEROTIC CARDIOVASCULAR DISEASE): ICD-10-CM

## 2021-01-01 DIAGNOSIS — I10 HYPERTENSION, UNSPECIFIED TYPE: ICD-10-CM

## 2021-01-01 DIAGNOSIS — Z71.89 ADVANCE CARE PLANNING: ICD-10-CM

## 2021-01-01 DIAGNOSIS — N17.9 ACUTE RENAL FAILURE, UNSPECIFIED ACUTE RENAL FAILURE TYPE (H): Primary | ICD-10-CM

## 2021-01-01 DIAGNOSIS — E55.9 VITAMIN D DEFICIENCY: Primary | ICD-10-CM

## 2021-01-01 DIAGNOSIS — E87.6 HYPOKALEMIA: ICD-10-CM

## 2021-01-01 DIAGNOSIS — Z11.59 ENCOUNTER FOR SCREENING FOR OTHER VIRAL DISEASES: ICD-10-CM

## 2021-01-01 DIAGNOSIS — Z95.0 PRESENCE OF PERMANENT CARDIAC PACEMAKER: ICD-10-CM

## 2021-01-01 DIAGNOSIS — S81.802A WOUND OF LEFT LOWER EXTREMITY, INITIAL ENCOUNTER: ICD-10-CM

## 2021-01-01 DIAGNOSIS — D64.9 NORMOCYTIC ANEMIA: ICD-10-CM

## 2021-01-01 DIAGNOSIS — I10 ESSENTIAL HYPERTENSION: ICD-10-CM

## 2021-01-01 DIAGNOSIS — I65.23 CAROTID STENOSIS, ASYMPTOMATIC, BILATERAL: ICD-10-CM

## 2021-01-01 DIAGNOSIS — Z11.52 ENCOUNTER FOR SCREENING FOR COVID-19: ICD-10-CM

## 2021-01-01 DIAGNOSIS — D64.9 ANEMIA, UNSPECIFIED TYPE: ICD-10-CM

## 2021-01-01 DIAGNOSIS — A04.72 C. DIFFICILE DIARRHEA: Primary | ICD-10-CM

## 2021-01-01 DIAGNOSIS — R14.1 FLATULENCE, ERUCTATION AND GAS PAIN: ICD-10-CM

## 2021-01-01 DIAGNOSIS — E83.51 HYPOCALCEMIA: ICD-10-CM

## 2021-01-01 DIAGNOSIS — R93.89 ABNORMAL CXR: ICD-10-CM

## 2021-01-01 DIAGNOSIS — S81.802D WOUND OF LEFT LEG, SUBSEQUENT ENCOUNTER: Primary | ICD-10-CM

## 2021-01-01 DIAGNOSIS — I50.22 CHRONIC SYSTOLIC (CONGESTIVE) HEART FAILURE (H): ICD-10-CM

## 2021-01-01 DIAGNOSIS — J18.9 PNEUMONIA OF BOTH LOWER LOBES DUE TO INFECTIOUS ORGANISM: ICD-10-CM

## 2021-01-01 DIAGNOSIS — I71.019 DISSECTION OF AORTA, THORACIC (H): ICD-10-CM

## 2021-01-01 DIAGNOSIS — R05.9 COUGH, UNSPECIFIED: ICD-10-CM

## 2021-01-01 DIAGNOSIS — K86.9 PANCREATIC LESION: ICD-10-CM

## 2021-01-01 DIAGNOSIS — R53.1 LEFT-SIDED WEAKNESS: ICD-10-CM

## 2021-01-01 DIAGNOSIS — E78.5 HYPERLIPIDEMIA LDL GOAL <100: ICD-10-CM

## 2021-01-01 DIAGNOSIS — Z86.73 HISTORY OF CVA (CEREBROVASCULAR ACCIDENT): ICD-10-CM

## 2021-01-01 DIAGNOSIS — H35.30 ARMD (AGE RELATED MACULAR DEGENERATION): ICD-10-CM

## 2021-01-01 DIAGNOSIS — Z71.89 ACP (ADVANCE CARE PLANNING): ICD-10-CM

## 2021-01-01 DIAGNOSIS — K85.90 ACUTE PANCREATITIS WITHOUT NECROSIS OR INFECTION, UNSPECIFIED: ICD-10-CM

## 2021-01-01 DIAGNOSIS — Z91.81 HISTORY OF RECENT FALL: ICD-10-CM

## 2021-01-01 DIAGNOSIS — K86.89 DILATED PANCREATIC DUCT: ICD-10-CM

## 2021-01-01 DIAGNOSIS — M62.81 GENERALIZED MUSCLE WEAKNESS: ICD-10-CM

## 2021-01-01 DIAGNOSIS — I71.019 AORTIC DISSECTION, THORACIC (H): ICD-10-CM

## 2021-01-01 DIAGNOSIS — I10 ESSENTIAL HYPERTENSION, BENIGN: ICD-10-CM

## 2021-01-01 DIAGNOSIS — D50.9 IRON DEFICIENCY ANEMIA, UNSPECIFIED IRON DEFICIENCY ANEMIA TYPE: Primary | ICD-10-CM

## 2021-01-01 DIAGNOSIS — R09.89 ABNORMAL LUNG SOUNDS: ICD-10-CM

## 2021-01-01 DIAGNOSIS — M62.81 GENERALIZED MUSCLE WEAKNESS: Primary | ICD-10-CM

## 2021-01-01 DIAGNOSIS — Z95.0 CARDIAC PACEMAKER IN SITU: Primary | ICD-10-CM

## 2021-01-01 DIAGNOSIS — N17.9 ACUTE RENAL FAILURE, UNSPECIFIED ACUTE RENAL FAILURE TYPE (H): ICD-10-CM

## 2021-01-01 DIAGNOSIS — J44.9 CHRONIC OBSTRUCTIVE PULMONARY DISEASE, UNSPECIFIED COPD TYPE (H): ICD-10-CM

## 2021-01-01 DIAGNOSIS — E61.1 IRON DEFICIENCY: Primary | ICD-10-CM

## 2021-01-01 DIAGNOSIS — R91.8 PULMONARY NODULES: ICD-10-CM

## 2021-01-01 DIAGNOSIS — R03.0 ELEVATED BLOOD PRESSURE READING WITHOUT DIAGNOSIS OF HYPERTENSION: ICD-10-CM

## 2021-01-01 DIAGNOSIS — I69.398 IMPAIRED BALANCE AS LATE EFFECT OF CEREBROVASCULAR ACCIDENT: ICD-10-CM

## 2021-01-01 DIAGNOSIS — K58.2 IRRITABLE BOWEL SYNDROME WITH MIXED BOWEL HABITS: Primary | ICD-10-CM

## 2021-01-01 DIAGNOSIS — D72.829 ELEVATED WHITE BLOOD CELL COUNT, UNSPECIFIED: ICD-10-CM

## 2021-01-01 DIAGNOSIS — T80.1XXA INTRAVENOUS INFILTRATION, INITIAL ENCOUNTER: ICD-10-CM

## 2021-01-01 DIAGNOSIS — I44.1 AV BLOCK, 2ND DEGREE: ICD-10-CM

## 2021-01-01 DIAGNOSIS — Z87.09 H/O PNEUMOTHORAX: ICD-10-CM

## 2021-01-01 DIAGNOSIS — J18.9 PNEUMONIA, UNSPECIFIED ORGANISM: ICD-10-CM

## 2021-01-01 DIAGNOSIS — K21.9 GASTROESOPHAGEAL REFLUX DISEASE WITHOUT ESOPHAGITIS: ICD-10-CM

## 2021-01-01 DIAGNOSIS — Z78.9 TAKES DIETARY SUPPLEMENTS: Primary | ICD-10-CM

## 2021-01-01 DIAGNOSIS — G31.84 MCI (MILD COGNITIVE IMPAIRMENT): ICD-10-CM

## 2021-01-01 DIAGNOSIS — K58.2 IRRITABLE BOWEL SYNDROME WITH BOTH CONSTIPATION AND DIARRHEA: Primary | ICD-10-CM

## 2021-01-01 DIAGNOSIS — I63.9 CEREBROVASCULAR ACCIDENT (CVA), UNSPECIFIED MECHANISM (H): ICD-10-CM

## 2021-01-01 DIAGNOSIS — R13.10 DYSPHAGIA, UNSPECIFIED TYPE: ICD-10-CM

## 2021-01-01 DIAGNOSIS — N32.81 OAB (OVERACTIVE BLADDER): Primary | ICD-10-CM

## 2021-01-01 DIAGNOSIS — E55.9 VITAMIN D DEFICIENCY: ICD-10-CM

## 2021-01-01 DIAGNOSIS — I69.398 IMPAIRED BALANCE AS LATE EFFECT OF CEREBROVASCULAR ACCIDENT (CVA): ICD-10-CM

## 2021-01-01 DIAGNOSIS — I10 ESSENTIAL (PRIMARY) HYPERTENSION: ICD-10-CM

## 2021-01-01 DIAGNOSIS — Z86.73 HISTORY OF CVA (CEREBROVASCULAR ACCIDENT): Primary | ICD-10-CM

## 2021-01-01 DIAGNOSIS — R26.9 GAIT ABNORMALITY: ICD-10-CM

## 2021-01-01 DIAGNOSIS — G31.84 MILD COGNITIVE IMPAIRMENT, SO STATED: ICD-10-CM

## 2021-01-01 DIAGNOSIS — G31.84 MILD COGNITIVE IMPAIRMENT: ICD-10-CM

## 2021-01-01 DIAGNOSIS — A04.72 CLOSTRIDIUM DIFFICILE DIARRHEA: ICD-10-CM

## 2021-01-01 DIAGNOSIS — R26.89 IMPAIRED BALANCE AS LATE EFFECT OF CEREBROVASCULAR ACCIDENT: ICD-10-CM

## 2021-01-01 DIAGNOSIS — D50.9 IRON DEFICIENCY ANEMIA, UNSPECIFIED IRON DEFICIENCY ANEMIA TYPE: ICD-10-CM

## 2021-01-01 DIAGNOSIS — E87.1 HYPO-OSMOLALITY AND HYPONATREMIA: ICD-10-CM

## 2021-01-01 DIAGNOSIS — S81.802D OPEN WOUND OF LEFT LOWER LEG, SUBSEQUENT ENCOUNTER: ICD-10-CM

## 2021-01-01 DIAGNOSIS — A04.72 C. DIFFICILE DIARRHEA: ICD-10-CM

## 2021-01-01 DIAGNOSIS — R14.2 FLATULENCE, ERUCTATION AND GAS PAIN: ICD-10-CM

## 2021-01-01 DIAGNOSIS — B96.7: ICD-10-CM

## 2021-01-01 DIAGNOSIS — J18.8 OTHER PNEUMONIA, UNSPECIFIED ORGANISM: ICD-10-CM

## 2021-01-01 DIAGNOSIS — R26.89 IMPAIRED BALANCE AS LATE EFFECT OF CEREBROVASCULAR ACCIDENT (CVA): ICD-10-CM

## 2021-01-01 DIAGNOSIS — Z11.1 ENCOUNTER FOR SCREENING FOR RESPIRATORY TUBERCULOSIS: ICD-10-CM

## 2021-01-01 DIAGNOSIS — E87.6 HYPOKALEMIA: Primary | ICD-10-CM

## 2021-01-01 DIAGNOSIS — K21.00 GASTROESOPHAGEAL REFLUX DISEASE WITH ESOPHAGITIS WITHOUT HEMORRHAGE: ICD-10-CM

## 2021-01-01 DIAGNOSIS — I10 HYPERTENSION, UNSPECIFIED TYPE: Primary | ICD-10-CM

## 2021-01-01 DIAGNOSIS — R50.9 FEVER, UNSPECIFIED: ICD-10-CM

## 2021-01-01 DIAGNOSIS — E86.0 DEHYDRATION: ICD-10-CM

## 2021-01-01 DIAGNOSIS — R53.1 LEFT-SIDED WEAKNESS: Primary | ICD-10-CM

## 2021-01-01 DIAGNOSIS — R14.3 FLATULENCE, ERUCTATION AND GAS PAIN: ICD-10-CM

## 2021-01-01 DIAGNOSIS — Z95.0 PACEMAKER: ICD-10-CM

## 2021-01-01 DIAGNOSIS — J18.9 PNEUMONIA OF BOTH LUNGS DUE TO INFECTIOUS ORGANISM, UNSPECIFIED PART OF LUNG: Primary | ICD-10-CM

## 2021-01-01 LAB
ALBUMIN SERPL-MCNC: 2.3 G/DL (ref 3.4–5)
ALBUMIN SERPL-MCNC: 2.8 G/DL (ref 3.4–5)
ALBUMIN SERPL-MCNC: 3.5 G/DL (ref 3.4–5)
ALP SERPL-CCNC: 110 U/L (ref 40–150)
ALP SERPL-CCNC: 142 U/L (ref 40–150)
ALP SERPL-CCNC: 90 U/L (ref 40–150)
ALT SERPL W P-5'-P-CCNC: 17 U/L (ref 0–50)
ALT SERPL W P-5'-P-CCNC: 22 U/L (ref 0–50)
ALT SERPL W P-5'-P-CCNC: 23 U/L (ref 0–50)
ALT SERPL W P-5'-P-CCNC: 28 U/L (ref 0–50)
AMYLASE SERPL-CCNC: 31 U/L (ref 30–110)
ANION GAP SERPL CALCULATED.3IONS-SCNC: 10 MMOL/L (ref 3–14)
ANION GAP SERPL CALCULATED.3IONS-SCNC: 12 MMOL/L (ref 3–14)
ANION GAP SERPL CALCULATED.3IONS-SCNC: 4 MMOL/L (ref 3–14)
ANION GAP SERPL CALCULATED.3IONS-SCNC: 5 MMOL/L (ref 3–14)
ANION GAP SERPL CALCULATED.3IONS-SCNC: 6 MMOL/L (ref 3–14)
ANION GAP SERPL CALCULATED.3IONS-SCNC: 7 MMOL/L (ref 3–14)
ANION GAP SERPL CALCULATED.3IONS-SCNC: 8 MMOL/L (ref 3–14)
ANION GAP SERPL CALCULATED.3IONS-SCNC: 8 MMOL/L (ref 3–14)
ANION GAP SERPL CALCULATED.3IONS-SCNC: 9 MMOL/L (ref 3–14)
AST SERPL W P-5'-P-CCNC: 19 U/L (ref 0–45)
AST SERPL W P-5'-P-CCNC: 25 U/L (ref 0–45)
AST SERPL W P-5'-P-CCNC: 26 U/L (ref 0–45)
ATRIAL RATE - MUSE: 114 BPM
BACTERIA BLD CULT: NO GROWTH
BACTERIA BLD CULT: NO GROWTH
BASOPHILS # BLD AUTO: 0 10E3/UL (ref 0–0.2)
BASOPHILS # BLD AUTO: 0 10E3/UL (ref 0–0.2)
BASOPHILS # BLD AUTO: 0.1 10E3/UL (ref 0–0.2)
BASOPHILS # BLD AUTO: 0.1 10E3/UL (ref 0–0.2)
BASOPHILS NFR BLD AUTO: 0 %
BASOPHILS NFR BLD AUTO: 0 %
BASOPHILS NFR BLD AUTO: 1 %
BASOPHILS NFR BLD AUTO: 1 %
BILIRUB DIRECT SERPL-MCNC: 0.2 MG/DL (ref 0–0.2)
BILIRUB SERPL-MCNC: 0.3 MG/DL (ref 0.2–1.3)
BILIRUB SERPL-MCNC: 0.5 MG/DL (ref 0.2–1.3)
BILIRUB SERPL-MCNC: 0.7 MG/DL (ref 0.2–1.3)
BUN SERPL-MCNC: 10 MG/DL (ref 7–30)
BUN SERPL-MCNC: 10 MG/DL (ref 7–30)
BUN SERPL-MCNC: 11 MG/DL (ref 7–30)
BUN SERPL-MCNC: 11 MG/DL (ref 7–30)
BUN SERPL-MCNC: 12 MG/DL (ref 7–30)
BUN SERPL-MCNC: 13 MG/DL (ref 7–30)
BUN SERPL-MCNC: 16 MG/DL (ref 7–30)
BUN SERPL-MCNC: 36 MG/DL (ref 7–30)
BUN SERPL-MCNC: 40 MG/DL (ref 7–30)
BUN SERPL-MCNC: 5 MG/DL (ref 7–30)
BUN SERPL-MCNC: 7 MG/DL (ref 7–30)
BUN SERPL-MCNC: 8 MG/DL (ref 7–30)
BUN SERPL-MCNC: 8 MG/DL (ref 7–30)
C DIFF TOX B STL QL: POSITIVE
CALCIUM SERPL-MCNC: 7.7 MG/DL (ref 8.5–10.1)
CALCIUM SERPL-MCNC: 7.7 MG/DL (ref 8.5–10.1)
CALCIUM SERPL-MCNC: 8 MG/DL (ref 8.5–10.1)
CALCIUM SERPL-MCNC: 8.2 MG/DL (ref 8.5–10.1)
CALCIUM SERPL-MCNC: 8.3 MG/DL (ref 8.5–10.1)
CALCIUM SERPL-MCNC: 8.4 MG/DL (ref 8.5–10.1)
CALCIUM SERPL-MCNC: 8.4 MG/DL (ref 8.5–10.1)
CALCIUM SERPL-MCNC: 8.5 MG/DL (ref 8.5–10.1)
CALCIUM SERPL-MCNC: 8.6 MG/DL (ref 8.5–10.1)
CALCIUM SERPL-MCNC: 8.7 MG/DL (ref 8.5–10.1)
CALCIUM SERPL-MCNC: 8.9 MG/DL (ref 8.5–10.1)
CALCIUM SERPL-MCNC: 9.1 MG/DL (ref 8.5–10.1)
CALCIUM SERPL-MCNC: 9.4 MG/DL (ref 8.5–10.1)
CHLORIDE BLD-SCNC: 100 MMOL/L (ref 94–109)
CHLORIDE BLD-SCNC: 101 MMOL/L (ref 94–109)
CHLORIDE BLD-SCNC: 101 MMOL/L (ref 94–109)
CHLORIDE BLD-SCNC: 103 MMOL/L (ref 94–109)
CHLORIDE BLD-SCNC: 104 MMOL/L (ref 94–109)
CHLORIDE BLD-SCNC: 104 MMOL/L (ref 94–109)
CHLORIDE BLD-SCNC: 106 MMOL/L (ref 94–109)
CHLORIDE BLD-SCNC: 107 MMOL/L (ref 94–109)
CHLORIDE BLD-SCNC: 108 MMOL/L (ref 94–109)
CHLORIDE BLD-SCNC: 108 MMOL/L (ref 94–109)
CHLORIDE BLD-SCNC: 95 MMOL/L (ref 94–109)
CHLORIDE SERPL-SCNC: 101 MMOL/L (ref 94–109)
CHLORIDE SERPL-SCNC: 105 MMOL/L (ref 94–109)
CHOLEST SERPL-MCNC: 118 MG/DL
CO2 SERPL-SCNC: 20 MMOL/L (ref 20–32)
CO2 SERPL-SCNC: 20 MMOL/L (ref 20–32)
CO2 SERPL-SCNC: 21 MMOL/L (ref 20–32)
CO2 SERPL-SCNC: 21 MMOL/L (ref 20–32)
CO2 SERPL-SCNC: 24 MMOL/L (ref 20–32)
CO2 SERPL-SCNC: 25 MMOL/L (ref 20–32)
CO2 SERPL-SCNC: 26 MMOL/L (ref 20–32)
CO2 SERPL-SCNC: 27 MMOL/L (ref 20–32)
CO2 SERPL-SCNC: 28 MMOL/L (ref 20–32)
CREAT SERPL-MCNC: 0.7 MG/DL (ref 0.52–1.04)
CREAT SERPL-MCNC: 0.72 MG/DL (ref 0.52–1.04)
CREAT SERPL-MCNC: 0.75 MG/DL (ref 0.52–1.04)
CREAT SERPL-MCNC: 0.77 MG/DL (ref 0.52–1.04)
CREAT SERPL-MCNC: 0.77 MG/DL (ref 0.52–1.04)
CREAT SERPL-MCNC: 0.78 MG/DL (ref 0.52–1.04)
CREAT SERPL-MCNC: 0.83 MG/DL (ref 0.52–1.04)
CREAT SERPL-MCNC: 0.86 MG/DL (ref 0.52–1.04)
CREAT SERPL-MCNC: 0.87 MG/DL (ref 0.52–1.04)
CREAT SERPL-MCNC: 1.05 MG/DL (ref 0.52–1.04)
CREAT SERPL-MCNC: 1.24 MG/DL (ref 0.52–1.04)
CREAT SERPL-MCNC: 1.53 MG/DL (ref 0.52–1.04)
DEPRECATED CALCIDIOL+CALCIFEROL SERPL-MC: 39 UG/L (ref 20–75)
DEPRECATED CALCIDIOL+CALCIFEROL SERPL-MC: 6 UG/L (ref 20–75)
DIASTOLIC BLOOD PRESSURE - MUSE: NORMAL MMHG
EOSINOPHIL # BLD AUTO: 0 10E3/UL (ref 0–0.7)
EOSINOPHIL # BLD AUTO: 0 10E3/UL (ref 0–0.7)
EOSINOPHIL # BLD AUTO: 0.1 10E3/UL (ref 0–0.7)
EOSINOPHIL # BLD AUTO: 0.2 10E3/UL (ref 0–0.7)
EOSINOPHIL NFR BLD AUTO: 0 %
EOSINOPHIL NFR BLD AUTO: 1 %
EOSINOPHIL NFR BLD AUTO: 1 %
EOSINOPHIL NFR BLD AUTO: 2 %
ERYTHROCYTE [DISTWIDTH] IN BLOOD BY AUTOMATED COUNT: 13.4 % (ref 10–15)
ERYTHROCYTE [DISTWIDTH] IN BLOOD BY AUTOMATED COUNT: 13.8 % (ref 10–15)
ERYTHROCYTE [DISTWIDTH] IN BLOOD BY AUTOMATED COUNT: 13.9 % (ref 10–15)
ERYTHROCYTE [DISTWIDTH] IN BLOOD BY AUTOMATED COUNT: 13.9 % (ref 10–15)
ERYTHROCYTE [DISTWIDTH] IN BLOOD BY AUTOMATED COUNT: 14 % (ref 10–15)
ERYTHROCYTE [DISTWIDTH] IN BLOOD BY AUTOMATED COUNT: 14.5 % (ref 10–15)
ERYTHROCYTE [DISTWIDTH] IN BLOOD BY AUTOMATED COUNT: 14.8 % (ref 10–15)
ERYTHROCYTE [DISTWIDTH] IN BLOOD BY AUTOMATED COUNT: 14.9 % (ref 10–15)
ERYTHROCYTE [DISTWIDTH] IN BLOOD BY AUTOMATED COUNT: 15 % (ref 10–15)
ERYTHROCYTE [DISTWIDTH] IN BLOOD BY AUTOMATED COUNT: 15.1 % (ref 10–15)
ERYTHROCYTE [DISTWIDTH] IN BLOOD BY AUTOMATED COUNT: 15.9 % (ref 10–15)
ERYTHROCYTE [DISTWIDTH] IN BLOOD BY AUTOMATED COUNT: 16.8 % (ref 10–15)
ERYTHROCYTE [DISTWIDTH] IN BLOOD BY AUTOMATED COUNT: 17.3 % (ref 10–15)
ERYTHROCYTE [DISTWIDTH] IN BLOOD BY AUTOMATED COUNT: 17.9 % (ref 10–15)
FERRITIN SERPL-MCNC: 30 NG/ML (ref 8–252)
FERRITIN SERPL-MCNC: 55 NG/ML (ref 10–130)
FERRITIN SERPL-MCNC: 84 NG/ML (ref 8–252)
GAMMA INTERFERON BACKGROUND BLD IA-ACNC: 0.09 IU/ML
GFR SERPL CREATININE-BSD FRML MDRD: 30 ML/MIN/1.73M2
GFR SERPL CREATININE-BSD FRML MDRD: 39 ML/MIN/1.73M2
GFR SERPL CREATININE-BSD FRML MDRD: 47 ML/MIN/1.73M2
GFR SERPL CREATININE-BSD FRML MDRD: 59 ML/MIN/1.73M2
GFR SERPL CREATININE-BSD FRML MDRD: 60 ML/MIN/1.73M2
GFR SERPL CREATININE-BSD FRML MDRD: 63 ML/MIN/1.73M2
GFR SERPL CREATININE-BSD FRML MDRD: 68 ML/MIN/1.73M2
GFR SERPL CREATININE-BSD FRML MDRD: 68 ML/MIN/{1.73_M2}
GFR SERPL CREATININE-BSD FRML MDRD: 69 ML/MIN/1.73M2
GFR SERPL CREATININE-BSD FRML MDRD: 71 ML/MIN/{1.73_M2}
GFR SERPL CREATININE-BSD FRML MDRD: 74 ML/MIN/1.73M2
GFR SERPL CREATININE-BSD FRML MDRD: 77 ML/MIN/1.73M2
GLUCOSE BLD-MCNC: 107 MG/DL (ref 70–99)
GLUCOSE BLD-MCNC: 141 MG/DL (ref 70–99)
GLUCOSE BLD-MCNC: 144 MG/DL (ref 70–99)
GLUCOSE BLD-MCNC: 145 MG/DL (ref 70–99)
GLUCOSE BLD-MCNC: 55 MG/DL (ref 70–99)
GLUCOSE BLD-MCNC: 66 MG/DL (ref 70–99)
GLUCOSE BLD-MCNC: 84 MG/DL (ref 70–99)
GLUCOSE BLD-MCNC: 85 MG/DL (ref 70–99)
GLUCOSE BLD-MCNC: 86 MG/DL (ref 70–99)
GLUCOSE BLD-MCNC: 86 MG/DL (ref 70–99)
GLUCOSE BLD-MCNC: 87 MG/DL (ref 70–99)
GLUCOSE BLD-MCNC: 87 MG/DL (ref 70–99)
GLUCOSE BLD-MCNC: 91 MG/DL (ref 70–99)
GLUCOSE BLD-MCNC: 92 MG/DL (ref 70–99)
GLUCOSE BLD-MCNC: 95 MG/DL (ref 70–99)
GLUCOSE SERPL-MCNC: 82 MG/DL (ref 70–99)
GLUCOSE SERPL-MCNC: 99 MG/DL (ref 70–99)
HCT VFR BLD AUTO: 31.2 % (ref 35–47)
HCT VFR BLD AUTO: 34.9 % (ref 35–47)
HCT VFR BLD AUTO: 34.9 % (ref 35–47)
HCT VFR BLD AUTO: 36.1 % (ref 35–47)
HCT VFR BLD AUTO: 37.2 % (ref 35–47)
HCT VFR BLD AUTO: 37.4 % (ref 35–47)
HCT VFR BLD AUTO: 37.9 % (ref 35–47)
HCT VFR BLD AUTO: 38.5 % (ref 35–47)
HCT VFR BLD AUTO: 38.7 % (ref 35–47)
HCT VFR BLD AUTO: 38.9 % (ref 35–47)
HCT VFR BLD AUTO: 39.3 % (ref 35–47)
HCT VFR BLD AUTO: 40.3 % (ref 35–47)
HCT VFR BLD AUTO: 40.8 % (ref 35–47)
HCT VFR BLD AUTO: 43.5 % (ref 35–47)
HDLC SERPL-MCNC: 53 MG/DL
HGB BLD-MCNC: 10.6 G/DL (ref 11.7–15.7)
HGB BLD-MCNC: 11 G/DL (ref 11.7–15.7)
HGB BLD-MCNC: 11.2 G/DL (ref 11.7–15.7)
HGB BLD-MCNC: 11.3 G/DL (ref 11.7–15.7)
HGB BLD-MCNC: 11.6 G/DL (ref 11.7–15.7)
HGB BLD-MCNC: 11.8 G/DL (ref 11.7–15.7)
HGB BLD-MCNC: 11.9 G/DL (ref 11.7–15.7)
HGB BLD-MCNC: 11.9 G/DL (ref 11.7–15.7)
HGB BLD-MCNC: 12 G/DL (ref 11.7–15.7)
HGB BLD-MCNC: 12.2 G/DL (ref 11.7–15.7)
HGB BLD-MCNC: 12.5 G/DL (ref 11.7–15.7)
HGB BLD-MCNC: 12.8 G/DL (ref 11.7–15.7)
HGB BLD-MCNC: 12.9 G/DL (ref 11.7–15.7)
HGB BLD-MCNC: 14.2 G/DL (ref 11.7–15.7)
HGB BLD-MCNC: 9.9 G/DL (ref 11.7–15.7)
HOLD SPECIMEN: NORMAL
HOLD SPECIMEN: NORMAL
IMM GRANULOCYTES # BLD: 0 10E3/UL
IMM GRANULOCYTES # BLD: 0.1 10E3/UL
IMM GRANULOCYTES NFR BLD: 0 %
IMM GRANULOCYTES NFR BLD: 1 %
INR PPP: 1.31 (ref 0.85–1.15)
INTERPRETATION ECG - MUSE: NORMAL
IRON SATN MFR SERPL: 10 % (ref 15–46)
IRON SATN MFR SERPL: 13 % (ref 20–50)
IRON SATN MFR SERPL: 19 % (ref 15–46)
IRON SERPL-MCNC: 26 UG/DL (ref 35–180)
IRON SERPL-MCNC: 34 UG/DL (ref 42–175)
IRON SERPL-MCNC: 44 UG/DL (ref 35–180)
LABORATORY COMMENT REPORT: NORMAL
LACTATE SERPL-SCNC: 1.5 MMOL/L (ref 0.7–2)
LDLC SERPL CALC-MCNC: 50 MG/DL
LIPASE SERPL-CCNC: 37 U/L (ref 73–393)
LYMPHOCYTES # BLD AUTO: 1.2 10E3/UL (ref 0.8–5.3)
LYMPHOCYTES # BLD AUTO: 1.3 10E3/UL (ref 0.8–5.3)
LYMPHOCYTES # BLD AUTO: 1.6 10E3/UL (ref 0.8–5.3)
LYMPHOCYTES # BLD AUTO: 2.5 10E3/UL (ref 0.8–5.3)
LYMPHOCYTES NFR BLD AUTO: 10 %
LYMPHOCYTES NFR BLD AUTO: 19 %
LYMPHOCYTES NFR BLD AUTO: 24 %
LYMPHOCYTES NFR BLD AUTO: 25 %
M TB IFN-G BLD-IMP: NEGATIVE
M TB IFN-G CD4+ BCKGRND COR BLD-ACNC: 2.23 IU/ML
MCH RBC QN AUTO: 27.4 PG (ref 26.5–33)
MCH RBC QN AUTO: 27.6 PG (ref 26.5–33)
MCH RBC QN AUTO: 27.7 PG (ref 26.5–33)
MCH RBC QN AUTO: 27.8 PG (ref 26.5–33)
MCH RBC QN AUTO: 27.8 PG (ref 26.5–33)
MCH RBC QN AUTO: 27.9 PG (ref 26.5–33)
MCH RBC QN AUTO: 28.3 PG (ref 26.5–33)
MCH RBC QN AUTO: 28.4 PG (ref 26.5–33)
MCH RBC QN AUTO: 28.5 PG (ref 26.5–33)
MCH RBC QN AUTO: 28.7 PG (ref 26.5–33)
MCH RBC QN AUTO: 28.7 PG (ref 26.5–33)
MCH RBC QN AUTO: 29 PG (ref 26.5–33)
MCHC RBC AUTO-ENTMCNC: 30.4 G/DL (ref 31.5–36.5)
MCHC RBC AUTO-ENTMCNC: 30.6 G/DL (ref 31.5–36.5)
MCHC RBC AUTO-ENTMCNC: 31 G/DL (ref 31.5–36.5)
MCHC RBC AUTO-ENTMCNC: 31 G/DL (ref 31.5–36.5)
MCHC RBC AUTO-ENTMCNC: 31.2 G/DL (ref 31.5–36.5)
MCHC RBC AUTO-ENTMCNC: 31.4 G/DL (ref 31.5–36.5)
MCHC RBC AUTO-ENTMCNC: 31.5 G/DL (ref 31.5–36.5)
MCHC RBC AUTO-ENTMCNC: 31.7 G/DL (ref 31.5–36.5)
MCHC RBC AUTO-ENTMCNC: 31.8 G/DL (ref 31.5–36.5)
MCHC RBC AUTO-ENTMCNC: 31.8 G/DL (ref 31.5–36.5)
MCHC RBC AUTO-ENTMCNC: 32 G/DL (ref 31.5–36.5)
MCHC RBC AUTO-ENTMCNC: 32.6 G/DL (ref 31.5–36.5)
MCV RBC AUTO: 87 FL (ref 78–100)
MCV RBC AUTO: 87 FL (ref 78–100)
MCV RBC AUTO: 88 FL (ref 78–100)
MCV RBC AUTO: 89 FL (ref 78–100)
MCV RBC AUTO: 90 FL (ref 78–100)
MCV RBC AUTO: 90 FL (ref 78–100)
MCV RBC AUTO: 91 FL (ref 78–100)
MCV RBC AUTO: 93 FL (ref 78–100)
MDC_IDC_EPISODE_DTM: NORMAL
MDC_IDC_EPISODE_DURATION: 10 S
MDC_IDC_EPISODE_DURATION: 112 S
MDC_IDC_EPISODE_DURATION: 12 S
MDC_IDC_EPISODE_DURATION: 136 S
MDC_IDC_EPISODE_DURATION: 14 S
MDC_IDC_EPISODE_DURATION: 16 S
MDC_IDC_EPISODE_DURATION: 18 S
MDC_IDC_EPISODE_DURATION: 20 S
MDC_IDC_EPISODE_DURATION: 22 S
MDC_IDC_EPISODE_DURATION: 22 S
MDC_IDC_EPISODE_DURATION: 24 S
MDC_IDC_EPISODE_DURATION: 24 S
MDC_IDC_EPISODE_DURATION: 32 S
MDC_IDC_EPISODE_DURATION: 6 S
MDC_IDC_EPISODE_DURATION: 6 S
MDC_IDC_EPISODE_DURATION: 8 S
MDC_IDC_EPISODE_DURATION: 8 S
MDC_IDC_EPISODE_DURATION: 84 S
MDC_IDC_EPISODE_ID: NORMAL
MDC_IDC_EPISODE_TYPE: NORMAL
MDC_IDC_LEAD_IMPLANT_DT: NORMAL
MDC_IDC_LEAD_LOCATION: NORMAL
MDC_IDC_LEAD_LOCATION_DETAIL_1: NORMAL
MDC_IDC_LEAD_MFG: NORMAL
MDC_IDC_LEAD_MODEL: NORMAL
MDC_IDC_LEAD_POLARITY_TYPE: NORMAL
MDC_IDC_LEAD_SERIAL: NORMAL
MDC_IDC_MSMT_BATTERY_DTM: NORMAL
MDC_IDC_MSMT_BATTERY_DTM: NORMAL
MDC_IDC_MSMT_BATTERY_REMAINING_LONGEVITY: 116 MO
MDC_IDC_MSMT_BATTERY_REMAINING_LONGEVITY: 125 MO
MDC_IDC_MSMT_BATTERY_REMAINING_LONGEVITY: 128 MO
MDC_IDC_MSMT_BATTERY_REMAINING_PERCENTAGE: 95.5 %
MDC_IDC_MSMT_BATTERY_REMAINING_PERCENTAGE: 95.5 %
MDC_IDC_MSMT_BATTERY_RRT_TRIGGER: NORMAL
MDC_IDC_MSMT_BATTERY_RRT_TRIGGER: NORMAL
MDC_IDC_MSMT_BATTERY_STATUS: NORMAL
MDC_IDC_MSMT_BATTERY_VOLTAGE: 2.96 V
MDC_IDC_MSMT_BATTERY_VOLTAGE: 2.98 V
MDC_IDC_MSMT_BATTERY_VOLTAGE: 2.98 V
MDC_IDC_MSMT_LEADCHNL_RA_IMPEDANCE_VALUE: 430 OHM
MDC_IDC_MSMT_LEADCHNL_RA_IMPEDANCE_VALUE: 462.5 OHM
MDC_IDC_MSMT_LEADCHNL_RA_IMPEDANCE_VALUE: 490 OHM
MDC_IDC_MSMT_LEADCHNL_RA_LEAD_CHANNEL_STATUS: NORMAL
MDC_IDC_MSMT_LEADCHNL_RA_LEAD_CHANNEL_STATUS: NORMAL
MDC_IDC_MSMT_LEADCHNL_RA_PACING_THRESHOLD_AMPLITUDE: 0.75 V
MDC_IDC_MSMT_LEADCHNL_RA_PACING_THRESHOLD_AMPLITUDE: 0.75 V
MDC_IDC_MSMT_LEADCHNL_RA_PACING_THRESHOLD_AMPLITUDE: 0.88 V
MDC_IDC_MSMT_LEADCHNL_RA_PACING_THRESHOLD_AMPLITUDE: 1 V
MDC_IDC_MSMT_LEADCHNL_RA_PACING_THRESHOLD_PULSEWIDTH: 0.5 MS
MDC_IDC_MSMT_LEADCHNL_RA_SENSING_INTR_AMPL: 2 MV
MDC_IDC_MSMT_LEADCHNL_RA_SENSING_INTR_AMPL: 2.4 MV
MDC_IDC_MSMT_LEADCHNL_RA_SENSING_INTR_AMPL: 3.2 MV
MDC_IDC_MSMT_LEADCHNL_RV_IMPEDANCE_VALUE: 460 OHM
MDC_IDC_MSMT_LEADCHNL_RV_IMPEDANCE_VALUE: 510 OHM
MDC_IDC_MSMT_LEADCHNL_RV_IMPEDANCE_VALUE: 512.5 OHM
MDC_IDC_MSMT_LEADCHNL_RV_LEAD_CHANNEL_STATUS: NORMAL
MDC_IDC_MSMT_LEADCHNL_RV_LEAD_CHANNEL_STATUS: NORMAL
MDC_IDC_MSMT_LEADCHNL_RV_PACING_THRESHOLD_AMPLITUDE: 0.75 V
MDC_IDC_MSMT_LEADCHNL_RV_PACING_THRESHOLD_AMPLITUDE: 0.75 V
MDC_IDC_MSMT_LEADCHNL_RV_PACING_THRESHOLD_AMPLITUDE: 1 V
MDC_IDC_MSMT_LEADCHNL_RV_PACING_THRESHOLD_AMPLITUDE: 1 V
MDC_IDC_MSMT_LEADCHNL_RV_PACING_THRESHOLD_PULSEWIDTH: 0.5 MS
MDC_IDC_MSMT_LEADCHNL_RV_SENSING_INTR_AMPL: 12 MV
MDC_IDC_MSMT_LEADCHNL_RV_SENSING_INTR_AMPL: 12 MV
MDC_IDC_PG_IMPLANT_DTM: NORMAL
MDC_IDC_PG_MFG: NORMAL
MDC_IDC_PG_MODEL: NORMAL
MDC_IDC_PG_SERIAL: NORMAL
MDC_IDC_PG_TYPE: NORMAL
MDC_IDC_SESS_CLINIC_NAME: NORMAL
MDC_IDC_SESS_DTM: NORMAL
MDC_IDC_SESS_REPROGRAMMED: NO
MDC_IDC_SESS_REPROGRAMMED: NO
MDC_IDC_SESS_TYPE: NORMAL
MDC_IDC_SET_BRADY_AT_MODE_SWITCH_MODE: NORMAL
MDC_IDC_SET_BRADY_AT_MODE_SWITCH_RATE: 180 {BEATS}/MIN
MDC_IDC_SET_BRADY_HYSTRATE: NORMAL
MDC_IDC_SET_BRADY_LOWRATE: 60 {BEATS}/MIN
MDC_IDC_SET_BRADY_MAX_SENSOR_RATE: 120 {BEATS}/MIN
MDC_IDC_SET_BRADY_MAX_TRACKING_RATE: 120 {BEATS}/MIN
MDC_IDC_SET_BRADY_MODE: NORMAL
MDC_IDC_SET_BRADY_NIGHT_RATE: NORMAL
MDC_IDC_SET_BRADY_PAV_DELAY_HIGH: 100 MS
MDC_IDC_SET_BRADY_PAV_DELAY_LOW: 200 MS
MDC_IDC_SET_BRADY_SAV_DELAY_HIGH: 100 MS
MDC_IDC_SET_BRADY_SAV_DELAY_LOW: 170 MS
MDC_IDC_SET_LEADCHNL_RA_PACING_AMPLITUDE: 1.75 V
MDC_IDC_SET_LEADCHNL_RA_PACING_AMPLITUDE: 1.88
MDC_IDC_SET_LEADCHNL_RA_PACING_AMPLITUDE: 2 V
MDC_IDC_SET_LEADCHNL_RA_PACING_ANODE_ELECTRODE_1: NORMAL
MDC_IDC_SET_LEADCHNL_RA_PACING_ANODE_ELECTRODE_1: NORMAL
MDC_IDC_SET_LEADCHNL_RA_PACING_ANODE_LOCATION_1: NORMAL
MDC_IDC_SET_LEADCHNL_RA_PACING_ANODE_LOCATION_1: NORMAL
MDC_IDC_SET_LEADCHNL_RA_PACING_CAPTURE_MODE: NORMAL
MDC_IDC_SET_LEADCHNL_RA_PACING_CATHODE_ELECTRODE_1: NORMAL
MDC_IDC_SET_LEADCHNL_RA_PACING_CATHODE_ELECTRODE_1: NORMAL
MDC_IDC_SET_LEADCHNL_RA_PACING_CATHODE_LOCATION_1: NORMAL
MDC_IDC_SET_LEADCHNL_RA_PACING_CATHODE_LOCATION_1: NORMAL
MDC_IDC_SET_LEADCHNL_RA_PACING_POLARITY: NORMAL
MDC_IDC_SET_LEADCHNL_RA_PACING_PULSEWIDTH: 0.5 MS
MDC_IDC_SET_LEADCHNL_RA_SENSING_ADAPTATION_MODE: NORMAL
MDC_IDC_SET_LEADCHNL_RA_SENSING_ANODE_ELECTRODE_1: NORMAL
MDC_IDC_SET_LEADCHNL_RA_SENSING_ANODE_ELECTRODE_1: NORMAL
MDC_IDC_SET_LEADCHNL_RA_SENSING_ANODE_LOCATION_1: NORMAL
MDC_IDC_SET_LEADCHNL_RA_SENSING_ANODE_LOCATION_1: NORMAL
MDC_IDC_SET_LEADCHNL_RA_SENSING_CATHODE_ELECTRODE_1: NORMAL
MDC_IDC_SET_LEADCHNL_RA_SENSING_CATHODE_ELECTRODE_1: NORMAL
MDC_IDC_SET_LEADCHNL_RA_SENSING_CATHODE_LOCATION_1: NORMAL
MDC_IDC_SET_LEADCHNL_RA_SENSING_CATHODE_LOCATION_1: NORMAL
MDC_IDC_SET_LEADCHNL_RA_SENSING_POLARITY: NORMAL
MDC_IDC_SET_LEADCHNL_RA_SENSING_SENSITIVITY: 0.5 MV
MDC_IDC_SET_LEADCHNL_RA_SENSING_SENSITIVITY: 0.5 MV
MDC_IDC_SET_LEADCHNL_RV_PACING_AMPLITUDE: 1 V
MDC_IDC_SET_LEADCHNL_RV_PACING_AMPLITUDE: 1 V
MDC_IDC_SET_LEADCHNL_RV_PACING_AMPLITUDE: 1.12
MDC_IDC_SET_LEADCHNL_RV_PACING_ANODE_ELECTRODE_1: NORMAL
MDC_IDC_SET_LEADCHNL_RV_PACING_ANODE_ELECTRODE_1: NORMAL
MDC_IDC_SET_LEADCHNL_RV_PACING_ANODE_LOCATION_1: NORMAL
MDC_IDC_SET_LEADCHNL_RV_PACING_ANODE_LOCATION_1: NORMAL
MDC_IDC_SET_LEADCHNL_RV_PACING_CAPTURE_MODE: NORMAL
MDC_IDC_SET_LEADCHNL_RV_PACING_CATHODE_ELECTRODE_1: NORMAL
MDC_IDC_SET_LEADCHNL_RV_PACING_CATHODE_ELECTRODE_1: NORMAL
MDC_IDC_SET_LEADCHNL_RV_PACING_CATHODE_LOCATION_1: NORMAL
MDC_IDC_SET_LEADCHNL_RV_PACING_CATHODE_LOCATION_1: NORMAL
MDC_IDC_SET_LEADCHNL_RV_PACING_POLARITY: NORMAL
MDC_IDC_SET_LEADCHNL_RV_PACING_PULSEWIDTH: 0.5 MS
MDC_IDC_SET_LEADCHNL_RV_SENSING_ADAPTATION_MODE: NORMAL
MDC_IDC_SET_LEADCHNL_RV_SENSING_ADAPTATION_MODE: NORMAL
MDC_IDC_SET_LEADCHNL_RV_SENSING_ANODE_ELECTRODE_1: NORMAL
MDC_IDC_SET_LEADCHNL_RV_SENSING_ANODE_ELECTRODE_1: NORMAL
MDC_IDC_SET_LEADCHNL_RV_SENSING_ANODE_LOCATION_1: NORMAL
MDC_IDC_SET_LEADCHNL_RV_SENSING_ANODE_LOCATION_1: NORMAL
MDC_IDC_SET_LEADCHNL_RV_SENSING_CATHODE_ELECTRODE_1: NORMAL
MDC_IDC_SET_LEADCHNL_RV_SENSING_CATHODE_ELECTRODE_1: NORMAL
MDC_IDC_SET_LEADCHNL_RV_SENSING_CATHODE_LOCATION_1: NORMAL
MDC_IDC_SET_LEADCHNL_RV_SENSING_CATHODE_LOCATION_1: NORMAL
MDC_IDC_SET_LEADCHNL_RV_SENSING_POLARITY: NORMAL
MDC_IDC_SET_LEADCHNL_RV_SENSING_SENSITIVITY: 2 MV
MDC_IDC_STAT_AT_BURDEN_PERCENT: 1 %
MDC_IDC_STAT_AT_BURDEN_PERCENT: 3 %
MDC_IDC_STAT_AT_DTM_END: NORMAL
MDC_IDC_STAT_AT_DTM_END: NORMAL
MDC_IDC_STAT_AT_DTM_START: NORMAL
MDC_IDC_STAT_AT_DTM_START: NORMAL
MDC_IDC_STAT_AT_MODE_SW_COUNT: 1595
MDC_IDC_STAT_AT_MODE_SW_COUNT: 623
MDC_IDC_STAT_AT_MODE_SW_COUNT: 682
MDC_IDC_STAT_AT_MODE_SW_COUNT_PER_DAY: 18
MDC_IDC_STAT_AT_MODE_SW_COUNT_PER_DAY: 6
MDC_IDC_STAT_AT_MODE_SW_MAX_DURATION: NORMAL S
MDC_IDC_STAT_AT_MODE_SW_MAX_DURATION: NORMAL S
MDC_IDC_STAT_AT_MODE_SW_PERCENT_TIME: 1 %
MDC_IDC_STAT_AT_MODE_SW_PERCENT_TIME: 3 %
MDC_IDC_STAT_BRADY_AP_VP_PERCENT: 1 %
MDC_IDC_STAT_BRADY_AP_VP_PERCENT: 1.1 %
MDC_IDC_STAT_BRADY_AP_VS_PERCENT: 1 %
MDC_IDC_STAT_BRADY_AP_VS_PERCENT: 1 %
MDC_IDC_STAT_BRADY_AS_VP_PERCENT: 99 %
MDC_IDC_STAT_BRADY_AS_VP_PERCENT: 99 %
MDC_IDC_STAT_BRADY_AS_VS_PERCENT: 1 %
MDC_IDC_STAT_BRADY_AS_VS_PERCENT: 1 %
MDC_IDC_STAT_BRADY_DTM_END: NORMAL
MDC_IDC_STAT_BRADY_DTM_END: NORMAL
MDC_IDC_STAT_BRADY_DTM_START: NORMAL
MDC_IDC_STAT_BRADY_DTM_START: NORMAL
MDC_IDC_STAT_BRADY_RA_PERCENT_PACED: 1 %
MDC_IDC_STAT_BRADY_RA_PERCENT_PACED: 1.2 %
MDC_IDC_STAT_BRADY_RA_PERCENT_PACED: 1.2 %
MDC_IDC_STAT_BRADY_RV_PERCENT_PACED: 99 %
MDC_IDC_STAT_BRADY_RV_PERCENT_PACED: 99 %
MDC_IDC_STAT_BRADY_RV_PERCENT_PACED: 99.96 %
MDC_IDC_STAT_CRT_DTM_END: NORMAL
MDC_IDC_STAT_CRT_DTM_END: NORMAL
MDC_IDC_STAT_CRT_DTM_START: NORMAL
MDC_IDC_STAT_CRT_DTM_START: NORMAL
MDC_IDC_STAT_HEART_RATE_ATRIAL_MAX: 330 {BEATS}/MIN
MDC_IDC_STAT_HEART_RATE_ATRIAL_MAX: 330 {BEATS}/MIN
MDC_IDC_STAT_HEART_RATE_ATRIAL_MEAN: 100 {BEATS}/MIN
MDC_IDC_STAT_HEART_RATE_ATRIAL_MEAN: 82 {BEATS}/MIN
MDC_IDC_STAT_HEART_RATE_ATRIAL_MIN: 30 {BEATS}/MIN
MDC_IDC_STAT_HEART_RATE_ATRIAL_MIN: 40 {BEATS}/MIN
MDC_IDC_STAT_HEART_RATE_DTM_END: NORMAL
MDC_IDC_STAT_HEART_RATE_DTM_END: NORMAL
MDC_IDC_STAT_HEART_RATE_DTM_START: NORMAL
MDC_IDC_STAT_HEART_RATE_DTM_START: NORMAL
MDC_IDC_STAT_HEART_RATE_VENTRICULAR_MAX: 200 {BEATS}/MIN
MDC_IDC_STAT_HEART_RATE_VENTRICULAR_MAX: 210 {BEATS}/MIN
MDC_IDC_STAT_HEART_RATE_VENTRICULAR_MEAN: 76 {BEATS}/MIN
MDC_IDC_STAT_HEART_RATE_VENTRICULAR_MEAN: 78 {BEATS}/MIN
MDC_IDC_STAT_HEART_RATE_VENTRICULAR_MIN: 40 {BEATS}/MIN
MDC_IDC_STAT_HEART_RATE_VENTRICULAR_MIN: 40 {BEATS}/MIN
MITOGEN IGNF BCKGRD COR BLD-ACNC: -0.01 IU/ML
MITOGEN IGNF BCKGRD COR BLD-ACNC: 0.03 IU/ML
MONOCYTES # BLD AUTO: 0.6 10E3/UL (ref 0–1.3)
MONOCYTES # BLD AUTO: 0.9 10E3/UL (ref 0–1.3)
MONOCYTES # BLD AUTO: 1.2 10E3/UL (ref 0–1.3)
MONOCYTES # BLD AUTO: 1.7 10E3/UL (ref 0–1.3)
MONOCYTES NFR BLD AUTO: 10 %
MONOCYTES NFR BLD AUTO: 12 %
MONOCYTES NFR BLD AUTO: 12 %
MONOCYTES NFR BLD AUTO: 14 %
NEUTROPHILS # BLD AUTO: 10.6 10E3/UL (ref 1.6–8.3)
NEUTROPHILS # BLD AUTO: 3.8 10E3/UL (ref 1.6–8.3)
NEUTROPHILS # BLD AUTO: 4.2 10E3/UL (ref 1.6–8.3)
NEUTROPHILS # BLD AUTO: 6.1 10E3/UL (ref 1.6–8.3)
NEUTROPHILS NFR BLD AUTO: 58 %
NEUTROPHILS NFR BLD AUTO: 62 %
NEUTROPHILS NFR BLD AUTO: 68 %
NEUTROPHILS NFR BLD AUTO: 77 %
NONHDLC SERPL-MCNC: 65 MG/DL
NRBC # BLD AUTO: 0 10E3/UL
NRBC BLD AUTO-RTO: 0 /100
NT-PROBNP SERPL-MCNC: 1429 PG/ML (ref 0–1800)
NT-PROBNP SERPL-MCNC: 915 PG/ML (ref 0–450)
P AXIS - MUSE: 3 DEGREES
PLATELET # BLD AUTO: 199 10E3/UL (ref 150–450)
PLATELET # BLD AUTO: 238 10E3/UL (ref 150–450)
PLATELET # BLD AUTO: 249 10E9/L (ref 150–450)
PLATELET # BLD AUTO: 254 10E3/UL (ref 150–450)
PLATELET # BLD AUTO: 255 10E3/UL (ref 150–450)
PLATELET # BLD AUTO: 285 10E3/UL (ref 150–450)
PLATELET # BLD AUTO: 290 10E3/UL (ref 150–450)
PLATELET # BLD AUTO: 294 10E3/UL (ref 150–450)
PLATELET # BLD AUTO: 299 10E3/UL (ref 150–450)
PLATELET # BLD AUTO: 316 10E3/UL (ref 150–450)
PLATELET # BLD AUTO: 324 10E3/UL (ref 150–450)
PLATELET # BLD AUTO: 338 10E3/UL (ref 150–450)
PLATELET # BLD AUTO: 363 10E3/UL (ref 150–450)
PLATELET # BLD AUTO: 418 10E3/UL (ref 150–450)
POTASSIUM BLD-SCNC: 3.2 MMOL/L (ref 3.4–5.3)
POTASSIUM BLD-SCNC: 3.4 MMOL/L (ref 3.4–5.3)
POTASSIUM BLD-SCNC: 3.4 MMOL/L (ref 3.4–5.3)
POTASSIUM BLD-SCNC: 3.5 MMOL/L (ref 3.4–5.3)
POTASSIUM BLD-SCNC: 3.6 MMOL/L (ref 3.4–5.3)
POTASSIUM BLD-SCNC: 3.6 MMOL/L (ref 3.4–5.3)
POTASSIUM BLD-SCNC: 3.7 MMOL/L (ref 3.4–5.3)
POTASSIUM BLD-SCNC: 3.9 MMOL/L (ref 3.4–5.3)
POTASSIUM BLD-SCNC: 4 MMOL/L (ref 3.4–5.3)
POTASSIUM BLD-SCNC: 4.1 MMOL/L (ref 3.4–5.3)
POTASSIUM SERPL-SCNC: 4.2 MMOL/L (ref 3.4–5.3)
POTASSIUM SERPL-SCNC: 4.3 MMOL/L (ref 3.4–5.3)
PR INTERVAL - MUSE: 164 MS
PROCALCITONIN SERPL-MCNC: <0.05 NG/ML
PROT SERPL-MCNC: 6.3 G/DL (ref 6.8–8.8)
PROT SERPL-MCNC: 7 G/DL (ref 6.8–8.8)
PROT SERPL-MCNC: 8.5 G/DL (ref 6.8–8.8)
PTH-INTACT SERPL-MCNC: 22 PG/ML (ref 18–80)
QRS DURATION - MUSE: 128 MS
QT - MUSE: 352 MS
QTC - MUSE: 485 MS
QUANTIFERON MITOGEN: 2.32 IU/ML
QUANTIFERON NIL TUBE: 0.09 IU/ML
QUANTIFERON TB1 TUBE: 0.08 IU/ML
QUANTIFERON TB2 TUBE: 0.12
R AXIS - MUSE: 157 DEGREES
RBC # BLD AUTO: 3.49 10E6/UL (ref 3.8–5.2)
RBC # BLD AUTO: 3.82 10E6/UL (ref 3.8–5.2)
RBC # BLD AUTO: 3.83 10E12/L (ref 3.8–5.2)
RBC # BLD AUTO: 4.06 10E6/UL (ref 3.8–5.2)
RBC # BLD AUTO: 4.16 10E6/UL (ref 3.8–5.2)
RBC # BLD AUTO: 4.21 10E6/UL (ref 3.8–5.2)
RBC # BLD AUTO: 4.23 10E6/UL (ref 3.8–5.2)
RBC # BLD AUTO: 4.31 10E6/UL (ref 3.8–5.2)
RBC # BLD AUTO: 4.35 10E6/UL (ref 3.8–5.2)
RBC # BLD AUTO: 4.37 10E6/UL (ref 3.8–5.2)
RBC # BLD AUTO: 4.41 10E6/UL (ref 3.8–5.2)
RBC # BLD AUTO: 4.49 10E6/UL (ref 3.8–5.2)
RBC # BLD AUTO: 4.5 10E6/UL (ref 3.8–5.2)
RBC # BLD AUTO: 4.98 10E6/UL (ref 3.8–5.2)
SARS-COV-2 RNA RESP QL NAA+PROBE: NEGATIVE
SARS-COV-2 RNA RESP QL NAA+PROBE: NORMAL
SARS-COV-2 RNA RESP QL NAA+PROBE: NOT DETECTED
SODIUM SERPL-SCNC: 128 MMOL/L (ref 133–144)
SODIUM SERPL-SCNC: 131 MMOL/L (ref 133–144)
SODIUM SERPL-SCNC: 132 MMOL/L (ref 133–144)
SODIUM SERPL-SCNC: 133 MMOL/L (ref 133–144)
SODIUM SERPL-SCNC: 134 MMOL/L (ref 133–144)
SODIUM SERPL-SCNC: 135 MMOL/L (ref 133–144)
SODIUM SERPL-SCNC: 135 MMOL/L (ref 133–144)
SODIUM SERPL-SCNC: 136 MMOL/L (ref 133–144)
SODIUM SERPL-SCNC: 138 MMOL/L (ref 133–144)
SODIUM SERPL-SCNC: 140 MMOL/L (ref 133–144)
SODIUM SERPL-SCNC: 140 MMOL/L (ref 133–144)
SPECIMEN SOURCE: NORMAL
SYSTOLIC BLOOD PRESSURE - MUSE: NORMAL MMHG
T AXIS - MUSE: -5 DEGREES
TIBC SERPL-MCNC: 236 UG/DL (ref 240–430)
TIBC SERPL-MCNC: 250 UG/DL (ref 240–430)
TIBC SERPL-MCNC: 263 UG/DL (ref 313–563)
TRANSFERRIN SERPL-MCNC: 210 MG/DL (ref 212–360)
TRIGL SERPL-MCNC: 74 MG/DL
TROPONIN I SERPL-MCNC: <0.015 UG/L (ref 0–0.04)
TSH SERPL DL<=0.005 MIU/L-ACNC: 1.44 MU/L (ref 0.4–4)
VENTRICULAR RATE- MUSE: 114 BPM
VIT B12 SERPL-MCNC: 354 PG/ML (ref 193–986)
WBC # BLD AUTO: 10.5 10E3/UL (ref 4–11)
WBC # BLD AUTO: 11.6 10E3/UL (ref 4–11)
WBC # BLD AUTO: 11.7 10E3/UL (ref 4–11)
WBC # BLD AUTO: 13.8 10E3/UL (ref 4–11)
WBC # BLD AUTO: 18.7 10E3/UL (ref 4–11)
WBC # BLD AUTO: 5.8 10E9/L (ref 4–11)
WBC # BLD AUTO: 6 10E3/UL (ref 4–11)
WBC # BLD AUTO: 6.1 10E3/UL (ref 4–11)
WBC # BLD AUTO: 6.2 10E3/UL (ref 4–11)
WBC # BLD AUTO: 6.5 10E3/UL (ref 4–11)
WBC # BLD AUTO: 8.2 10E3/UL (ref 4–11)
WBC # BLD AUTO: 9 10E3/UL (ref 4–11)
WBC # BLD AUTO: 9.6 10E3/UL (ref 4–11)
WBC # BLD AUTO: 9.8 10E3/UL (ref 4–11)

## 2021-01-01 PROCEDURE — C1729 CATH, DRAINAGE: HCPCS

## 2021-01-01 PROCEDURE — 36415 COLL VENOUS BLD VENIPUNCTURE: CPT | Performed by: NURSE PRACTITIONER

## 2021-01-01 PROCEDURE — 250N000009 HC RX 250: Performed by: NURSE PRACTITIONER

## 2021-01-01 PROCEDURE — 250N000011 HC RX IP 250 OP 636: Performed by: SPECIALIST

## 2021-01-01 PROCEDURE — 83540 ASSAY OF IRON: CPT | Mod: ORL | Performed by: NURSE PRACTITIONER

## 2021-01-01 PROCEDURE — 120N000001 HC R&B MED SURG/OB

## 2021-01-01 PROCEDURE — 80048 BASIC METABOLIC PNL TOTAL CA: CPT | Performed by: EMERGENCY MEDICINE

## 2021-01-01 PROCEDURE — 99233 SBSQ HOSP IP/OBS HIGH 50: CPT | Performed by: INTERNAL MEDICINE

## 2021-01-01 PROCEDURE — P9604 ONE-WAY ALLOW PRORATED TRIP: HCPCS | Mod: ORL | Performed by: NURSE PRACTITIONER

## 2021-01-01 PROCEDURE — 85027 COMPLETE CBC AUTOMATED: CPT | Performed by: STUDENT IN AN ORGANIZED HEALTH CARE EDUCATION/TRAINING PROGRAM

## 2021-01-01 PROCEDURE — C9803 HOPD COVID-19 SPEC COLLECT: HCPCS

## 2021-01-01 PROCEDURE — 84460 ALANINE AMINO (ALT) (SGPT): CPT | Performed by: INTERNAL MEDICINE

## 2021-01-01 PROCEDURE — U0005 INFEC AGEN DETEC AMPLI PROBE: HCPCS | Mod: ORL | Performed by: FAMILY MEDICINE

## 2021-01-01 PROCEDURE — U0003 INFECTIOUS AGENT DETECTION BY NUCLEIC ACID (DNA OR RNA); SEVERE ACUTE RESPIRATORY SYNDROME CORONAVIRUS 2 (SARS-COV-2) (CORONAVIRUS DISEASE [COVID-19]), AMPLIFIED PROBE TECHNIQUE, MAKING USE OF HIGH THROUGHPUT TECHNOLOGIES AS DESCRIBED BY CMS-2020-01-R: HCPCS | Performed by: NURSE PRACTITIONER

## 2021-01-01 PROCEDURE — 99285 EMERGENCY DEPT VISIT HI MDM: CPT | Mod: 25

## 2021-01-01 PROCEDURE — 80048 BASIC METABOLIC PNL TOTAL CA: CPT | Performed by: NURSE PRACTITIONER

## 2021-01-01 PROCEDURE — 83605 ASSAY OF LACTIC ACID: CPT | Performed by: EMERGENCY MEDICINE

## 2021-01-01 PROCEDURE — 272N000196 HC ACCESSORY CR5

## 2021-01-01 PROCEDURE — 97116 GAIT TRAINING THERAPY: CPT | Mod: GP

## 2021-01-01 PROCEDURE — 87493 C DIFF AMPLIFIED PROBE: CPT | Performed by: NURSE PRACTITIONER

## 2021-01-01 PROCEDURE — 96365 THER/PROPH/DIAG IV INF INIT: CPT

## 2021-01-01 PROCEDURE — 97530 THERAPEUTIC ACTIVITIES: CPT | Mod: GP

## 2021-01-01 PROCEDURE — 80048 BASIC METABOLIC PNL TOTAL CA: CPT | Performed by: STUDENT IN AN ORGANIZED HEALTH CARE EDUCATION/TRAINING PROGRAM

## 2021-01-01 PROCEDURE — 99309 SBSQ NF CARE MODERATE MDM 30: CPT | Performed by: NURSE PRACTITIONER

## 2021-01-01 PROCEDURE — U0003 INFECTIOUS AGENT DETECTION BY NUCLEIC ACID (DNA OR RNA); SEVERE ACUTE RESPIRATORY SYNDROME CORONAVIRUS 2 (SARS-COV-2) (CORONAVIRUS DISEASE [COVID-19]), AMPLIFIED PROBE TECHNIQUE, MAKING USE OF HIGH THROUGHPUT TECHNOLOGIES AS DESCRIBED BY CMS-2020-01-R: HCPCS | Mod: ORL | Performed by: FAMILY MEDICINE

## 2021-01-01 PROCEDURE — 85025 COMPLETE CBC W/AUTO DIFF WBC: CPT | Performed by: NURSE PRACTITIONER

## 2021-01-01 PROCEDURE — 85025 COMPLETE CBC W/AUTO DIFF WBC: CPT | Mod: ORL | Performed by: NURSE PRACTITIONER

## 2021-01-01 PROCEDURE — 99232 SBSQ HOSP IP/OBS MODERATE 35: CPT | Performed by: INTERNAL MEDICINE

## 2021-01-01 PROCEDURE — 80053 COMPREHEN METABOLIC PANEL: CPT | Performed by: NURSE PRACTITIONER

## 2021-01-01 PROCEDURE — P9603 ONE-WAY ALLOW PRORATED MILES: HCPCS | Mod: ORL | Performed by: NURSE PRACTITIONER

## 2021-01-01 PROCEDURE — 85027 COMPLETE CBC AUTOMATED: CPT | Performed by: NURSE PRACTITIONER

## 2021-01-01 PROCEDURE — 0W9B30Z DRAINAGE OF LEFT PLEURAL CAVITY WITH DRAINAGE DEVICE, PERCUTANEOUS APPROACH: ICD-10-PCS | Performed by: RADIOLOGY

## 2021-01-01 PROCEDURE — 93005 ELECTROCARDIOGRAM TRACING: CPT

## 2021-01-01 PROCEDURE — U0005 INFEC AGEN DETEC AMPLI PROBE: HCPCS | Performed by: NURSE PRACTITIONER

## 2021-01-01 PROCEDURE — 99308 SBSQ NF CARE LOW MDM 20: CPT | Performed by: NURSE PRACTITIONER

## 2021-01-01 PROCEDURE — 250N000013 HC RX MED GY IP 250 OP 250 PS 637: Performed by: STUDENT IN AN ORGANIZED HEALTH CARE EDUCATION/TRAINING PROGRAM

## 2021-01-01 PROCEDURE — 99310 SBSQ NF CARE HIGH MDM 45: CPT | Performed by: NURSE PRACTITIONER

## 2021-01-01 PROCEDURE — 36415 COLL VENOUS BLD VENIPUNCTURE: CPT | Mod: ORL | Performed by: NURSE PRACTITIONER

## 2021-01-01 PROCEDURE — 80061 LIPID PANEL: CPT | Performed by: INTERNAL MEDICINE

## 2021-01-01 PROCEDURE — 71045 X-RAY EXAM CHEST 1 VIEW: CPT

## 2021-01-01 PROCEDURE — 82728 ASSAY OF FERRITIN: CPT | Mod: ORL | Performed by: NURSE PRACTITIONER

## 2021-01-01 PROCEDURE — 99239 HOSP IP/OBS DSCHRG MGMT >30: CPT | Performed by: INTERNAL MEDICINE

## 2021-01-01 PROCEDURE — 250N000013 HC RX MED GY IP 250 OP 250 PS 637: Performed by: SPECIALIST

## 2021-01-01 PROCEDURE — C1769 GUIDE WIRE: HCPCS

## 2021-01-01 PROCEDURE — 85018 HEMOGLOBIN: CPT | Mod: ORL | Performed by: NURSE PRACTITIONER

## 2021-01-01 PROCEDURE — 80048 BASIC METABOLIC PNL TOTAL CA: CPT | Mod: ORL | Performed by: NURSE PRACTITIONER

## 2021-01-01 PROCEDURE — 83880 ASSAY OF NATRIURETIC PEPTIDE: CPT | Performed by: EMERGENCY MEDICINE

## 2021-01-01 PROCEDURE — P9603 ONE-WAY ALLOW PRORATED MILES: HCPCS | Performed by: NURSE PRACTITIONER

## 2021-01-01 PROCEDURE — 99223 1ST HOSP IP/OBS HIGH 75: CPT | Mod: AI | Performed by: STUDENT IN AN ORGANIZED HEALTH CARE EDUCATION/TRAINING PROGRAM

## 2021-01-01 PROCEDURE — 71260 CT THORAX DX C+: CPT

## 2021-01-01 PROCEDURE — 83690 ASSAY OF LIPASE: CPT | Performed by: NURSE PRACTITIONER

## 2021-01-01 PROCEDURE — 250N000013 HC RX MED GY IP 250 OP 250 PS 637: Performed by: INTERNAL MEDICINE

## 2021-01-01 PROCEDURE — 99214 OFFICE O/P EST MOD 30 MIN: CPT | Performed by: INTERNAL MEDICINE

## 2021-01-01 PROCEDURE — 85610 PROTHROMBIN TIME: CPT | Performed by: STUDENT IN AN ORGANIZED HEALTH CARE EDUCATION/TRAINING PROGRAM

## 2021-01-01 PROCEDURE — 83880 ASSAY OF NATRIURETIC PEPTIDE: CPT | Mod: ORL | Performed by: NURSE PRACTITIONER

## 2021-01-01 PROCEDURE — 250N000011 HC RX IP 250 OP 636: Performed by: EMERGENCY MEDICINE

## 2021-01-01 PROCEDURE — 80048 BASIC METABOLIC PNL TOTAL CA: CPT | Performed by: INTERNAL MEDICINE

## 2021-01-01 PROCEDURE — 93294 REM INTERROG EVL PM/LDLS PM: CPT | Performed by: INTERNAL MEDICINE

## 2021-01-01 PROCEDURE — 36415 COLL VENOUS BLD VENIPUNCTURE: CPT | Performed by: STUDENT IN AN ORGANIZED HEALTH CARE EDUCATION/TRAINING PROGRAM

## 2021-01-01 PROCEDURE — 82306 VITAMIN D 25 HYDROXY: CPT | Mod: ORL | Performed by: NURSE PRACTITIONER

## 2021-01-01 PROCEDURE — 83970 ASSAY OF PARATHORMONE: CPT | Mod: ORL | Performed by: NURSE PRACTITIONER

## 2021-01-01 PROCEDURE — 93280 PM DEVICE PROGR EVAL DUAL: CPT | Performed by: INTERNAL MEDICINE

## 2021-01-01 PROCEDURE — 84145 PROCALCITONIN (PCT): CPT | Mod: ORL | Performed by: NURSE PRACTITIONER

## 2021-01-01 PROCEDURE — 99152 MOD SED SAME PHYS/QHP 5/>YRS: CPT

## 2021-01-01 PROCEDURE — 93296 REM INTERROG EVL PM/IDS: CPT | Performed by: INTERNAL MEDICINE

## 2021-01-01 PROCEDURE — 85025 COMPLETE CBC W/AUTO DIFF WBC: CPT | Performed by: EMERGENCY MEDICINE

## 2021-01-01 PROCEDURE — 80053 COMPREHEN METABOLIC PANEL: CPT | Mod: ORL | Performed by: NURSE PRACTITIONER

## 2021-01-01 PROCEDURE — 85027 COMPLETE CBC AUTOMATED: CPT | Mod: ORL | Performed by: NURSE PRACTITIONER

## 2021-01-01 PROCEDURE — 87040 BLOOD CULTURE FOR BACTERIA: CPT | Performed by: EMERGENCY MEDICINE

## 2021-01-01 PROCEDURE — 82150 ASSAY OF AMYLASE: CPT | Performed by: NURSE PRACTITIONER

## 2021-01-01 PROCEDURE — 87635 SARS-COV-2 COVID-19 AMP PRB: CPT | Performed by: EMERGENCY MEDICINE

## 2021-01-01 PROCEDURE — 999N000128 HC STATISTIC PERIPHERAL IV START W/O US GUIDANCE

## 2021-01-01 PROCEDURE — 84484 ASSAY OF TROPONIN QUANT: CPT | Performed by: EMERGENCY MEDICINE

## 2021-01-01 PROCEDURE — 86481 TB AG RESPONSE T-CELL SUSP: CPT | Performed by: NURSE PRACTITIONER

## 2021-01-01 PROCEDURE — P9604 ONE-WAY ALLOW PRORATED TRIP: HCPCS | Performed by: NURSE PRACTITIONER

## 2021-01-01 PROCEDURE — 250N000009 HC RX 250: Performed by: EMERGENCY MEDICINE

## 2021-01-01 PROCEDURE — 250N000013 HC RX MED GY IP 250 OP 250 PS 637: Performed by: EMERGENCY MEDICINE

## 2021-01-01 PROCEDURE — 272N000500 HC NEEDLE CR2

## 2021-01-01 PROCEDURE — 97161 PT EVAL LOW COMPLEX 20 MIN: CPT | Mod: GP

## 2021-01-01 PROCEDURE — 82248 BILIRUBIN DIRECT: CPT | Performed by: STUDENT IN AN ORGANIZED HEALTH CARE EDUCATION/TRAINING PROGRAM

## 2021-01-01 PROCEDURE — 99316 NF DSCHRG MGMT 30 MIN+: CPT | Performed by: NURSE PRACTITIONER

## 2021-01-01 PROCEDURE — 83550 IRON BINDING TEST: CPT | Mod: ORL | Performed by: NURSE PRACTITIONER

## 2021-01-01 PROCEDURE — 250N000011 HC RX IP 250 OP 636: Performed by: STUDENT IN AN ORGANIZED HEALTH CARE EDUCATION/TRAINING PROGRAM

## 2021-01-01 PROCEDURE — 36415 COLL VENOUS BLD VENIPUNCTURE: CPT | Performed by: INTERNAL MEDICINE

## 2021-01-01 PROCEDURE — U0005 INFEC AGEN DETEC AMPLI PROBE: HCPCS | Mod: ORL | Performed by: NURSE PRACTITIONER

## 2021-01-01 PROCEDURE — 250N000011 HC RX IP 250 OP 636: Performed by: NURSE PRACTITIONER

## 2021-01-01 PROCEDURE — 96366 THER/PROPH/DIAG IV INF ADDON: CPT

## 2021-01-01 PROCEDURE — 97110 THERAPEUTIC EXERCISES: CPT | Mod: GP

## 2021-01-01 PROCEDURE — 85018 HEMOGLOBIN: CPT | Performed by: NURSE PRACTITIONER

## 2021-01-01 PROCEDURE — 36415 COLL VENOUS BLD VENIPUNCTURE: CPT | Performed by: EMERGENCY MEDICINE

## 2021-01-01 RX ORDER — ACETAMINOPHEN 325 MG/1
650 TABLET ORAL EVERY 6 HOURS PRN
Status: DISCONTINUED | OUTPATIENT
Start: 2021-01-01 | End: 2021-01-01 | Stop reason: HOSPADM

## 2021-01-01 RX ORDER — VITAMIN B COMPLEX
1 TABLET ORAL DAILY
Qty: 30 TABLET | Refills: 98 | Status: ON HOLD | OUTPATIENT
Start: 2021-01-01 | End: 2022-01-01

## 2021-01-01 RX ORDER — NALOXONE HYDROCHLORIDE 0.4 MG/ML
0.2 INJECTION, SOLUTION INTRAMUSCULAR; INTRAVENOUS; SUBCUTANEOUS
Status: DISCONTINUED | OUTPATIENT
Start: 2021-01-01 | End: 2021-01-01 | Stop reason: HOSPADM

## 2021-01-01 RX ORDER — ATORVASTATIN CALCIUM 40 MG/1
40 TABLET, FILM COATED ORAL AT BEDTIME
Status: DISCONTINUED | OUTPATIENT
Start: 2021-01-01 | End: 2021-01-01 | Stop reason: HOSPADM

## 2021-01-01 RX ORDER — IOPAMIDOL 755 MG/ML
80 INJECTION, SOLUTION INTRAVASCULAR ONCE
Status: COMPLETED | OUTPATIENT
Start: 2021-01-01 | End: 2021-01-01

## 2021-01-01 RX ORDER — AZITHROMYCIN 250 MG/1
250 TABLET, FILM COATED ORAL DAILY
DISCHARGE
Start: 2021-01-01 | End: 2021-01-01

## 2021-01-01 RX ORDER — OXYBUTYNIN CHLORIDE 5 MG/1
TABLET, EXTENDED RELEASE ORAL
Qty: 28 TABLET | Status: SHIPPED | OUTPATIENT
Start: 2021-01-01 | End: 2021-01-01

## 2021-01-01 RX ORDER — POTASSIUM CHLORIDE 1500 MG/1
TABLET, EXTENDED RELEASE ORAL
Qty: 35 TABLET | Status: SHIPPED | OUTPATIENT
Start: 2021-01-01 | End: 2021-01-01

## 2021-01-01 RX ORDER — OXYBUTYNIN CHLORIDE 10 MG/1
10 TABLET, EXTENDED RELEASE ORAL DAILY
COMMUNITY
End: 2021-01-01

## 2021-01-01 RX ORDER — APIXABAN 2.5 MG/1
TABLET, FILM COATED ORAL
Qty: 70 TABLET | Status: ON HOLD | OUTPATIENT
Start: 2021-01-01 | End: 2022-01-01

## 2021-01-01 RX ORDER — SIMETHICONE 125 MG
125 TABLET,CHEWABLE ORAL AT BEDTIME
COMMUNITY
Start: 2021-01-01 | End: 2021-01-01

## 2021-01-01 RX ORDER — GUAIFENESIN 200 MG/10ML
LIQUID ORAL
Qty: 236 ML | Refills: 98 | Status: SHIPPED | OUTPATIENT
Start: 2021-01-01 | End: 2021-01-01

## 2021-01-01 RX ORDER — GUAIFENESIN 200 MG/10ML
LIQUID ORAL
Qty: 180 ML | Refills: 11 | Status: SHIPPED | OUTPATIENT
Start: 2021-01-01 | End: 2022-01-01

## 2021-01-01 RX ORDER — ATORVASTATIN CALCIUM 40 MG/1
TABLET, FILM COATED ORAL
Qty: 35 TABLET | Status: ON HOLD | OUTPATIENT
Start: 2021-01-01 | End: 2022-01-01

## 2021-01-01 RX ORDER — ONDANSETRON 2 MG/ML
4 INJECTION INTRAMUSCULAR; INTRAVENOUS EVERY 6 HOURS PRN
Status: DISCONTINUED | OUTPATIENT
Start: 2021-01-01 | End: 2021-01-01 | Stop reason: HOSPADM

## 2021-01-01 RX ORDER — OXYBUTYNIN CHLORIDE 10 MG/1
TABLET, EXTENDED RELEASE ORAL
Qty: 28 TABLET | Status: SHIPPED | OUTPATIENT
Start: 2021-01-01 | End: 2021-01-01

## 2021-01-01 RX ORDER — LOSARTAN POTASSIUM 100 MG/1
100 TABLET ORAL DAILY
Status: DISCONTINUED | OUTPATIENT
Start: 2021-01-01 | End: 2021-01-01 | Stop reason: HOSPADM

## 2021-01-01 RX ORDER — FERROUS SULFATE 325(65) MG
325 TABLET, DELAYED RELEASE (ENTERIC COATED) ORAL
Qty: 12 TABLET | Refills: 98 | Status: ON HOLD | OUTPATIENT
Start: 2021-01-01 | End: 2022-01-01

## 2021-01-01 RX ORDER — VITC/E/ZINC/COPPER/LUTEIN/ZEAX 250 MG-200
TABLET,CHEWABLE ORAL
Qty: 56 CAPSULE | Status: SHIPPED | OUTPATIENT
Start: 2021-01-01 | End: 2022-01-01

## 2021-01-01 RX ORDER — AZITHROMYCIN 250 MG/1
500 TABLET, FILM COATED ORAL ONCE
Status: COMPLETED | OUTPATIENT
Start: 2021-01-01 | End: 2021-01-01

## 2021-01-01 RX ORDER — NALOXONE HYDROCHLORIDE 0.4 MG/ML
0.4 INJECTION, SOLUTION INTRAMUSCULAR; INTRAVENOUS; SUBCUTANEOUS
Status: DISCONTINUED | OUTPATIENT
Start: 2021-01-01 | End: 2021-01-01 | Stop reason: HOSPADM

## 2021-01-01 RX ORDER — CARVEDILOL 12.5 MG/1
TABLET ORAL
Qty: 70 TABLET | Status: SHIPPED | OUTPATIENT
Start: 2021-01-01 | End: 2021-01-01

## 2021-01-01 RX ORDER — CARVEDILOL 6.25 MG/1
6.25 TABLET ORAL 2 TIMES DAILY WITH MEALS
Status: DISCONTINUED | OUTPATIENT
Start: 2021-01-01 | End: 2021-01-01

## 2021-01-01 RX ORDER — LIDOCAINE 40 MG/G
CREAM TOPICAL
Status: DISCONTINUED | OUTPATIENT
Start: 2021-01-01 | End: 2021-01-01 | Stop reason: HOSPADM

## 2021-01-01 RX ORDER — LOSARTAN POTASSIUM 25 MG/1
TABLET ORAL
Qty: 28 TABLET | Refills: 11 | Status: ON HOLD | OUTPATIENT
Start: 2021-01-01 | End: 2022-01-01

## 2021-01-01 RX ORDER — PIPERACILLIN SODIUM, TAZOBACTAM SODIUM 2; .25 G/10ML; G/10ML
2.25 INJECTION, POWDER, LYOPHILIZED, FOR SOLUTION INTRAVENOUS EVERY 6 HOURS
Status: DISCONTINUED | OUTPATIENT
Start: 2021-01-01 | End: 2021-01-01

## 2021-01-01 RX ORDER — FERROUS SULFATE 325(65) MG
TABLET ORAL
Qty: 37 TABLET | Status: SHIPPED | OUTPATIENT
Start: 2021-01-01 | End: 2021-01-01

## 2021-01-01 RX ORDER — OXYBUTYNIN CHLORIDE 10 MG/1
TABLET, EXTENDED RELEASE ORAL
Qty: 28 TABLET | Refills: 11 | Status: SHIPPED | OUTPATIENT
Start: 2021-01-01 | End: 2022-01-01

## 2021-01-01 RX ORDER — CLOPIDOGREL BISULFATE 75 MG/1
75 TABLET ORAL DAILY
Qty: 90 TABLET | Refills: 0 | Status: SHIPPED | OUTPATIENT
Start: 2021-01-01 | End: 2021-01-01

## 2021-01-01 RX ORDER — CEFUROXIME AXETIL 500 MG/1
500 TABLET ORAL 2 TIMES DAILY
Refills: 0 | DISCHARGE
Start: 2021-01-01 | End: 2021-01-01

## 2021-01-01 RX ORDER — CLOPIDOGREL BISULFATE 75 MG/1
TABLET ORAL
Qty: 35 TABLET | Status: ON HOLD | OUTPATIENT
Start: 2021-01-01 | End: 2022-01-01

## 2021-01-01 RX ORDER — AZITHROMYCIN 250 MG/1
250 TABLET, FILM COATED ORAL DAILY
Status: DISCONTINUED | OUTPATIENT
Start: 2021-01-01 | End: 2021-01-01 | Stop reason: HOSPADM

## 2021-01-01 RX ORDER — AMLODIPINE BESYLATE 5 MG/1
5 TABLET ORAL DAILY
Status: DISCONTINUED | OUTPATIENT
Start: 2021-01-01 | End: 2021-01-01 | Stop reason: HOSPADM

## 2021-01-01 RX ORDER — LOSARTAN POTASSIUM 25 MG/1
25 TABLET ORAL DAILY
COMMUNITY
End: 2021-01-01

## 2021-01-01 RX ORDER — AMLODIPINE BESYLATE 10 MG/1
10 TABLET ORAL DAILY
COMMUNITY
End: 2021-01-01

## 2021-01-01 RX ORDER — FENTANYL CITRATE 50 UG/ML
25-50 INJECTION, SOLUTION INTRAMUSCULAR; INTRAVENOUS EVERY 5 MIN PRN
Status: DISCONTINUED | OUTPATIENT
Start: 2021-01-01 | End: 2021-01-01 | Stop reason: HOSPADM

## 2021-01-01 RX ORDER — DOXYCYCLINE HYCLATE 100 MG
100 TABLET ORAL 2 TIMES DAILY
Qty: 10 TABLET | Refills: 0 | Status: SHIPPED | OUTPATIENT
Start: 2021-01-01 | End: 2021-01-01

## 2021-01-01 RX ORDER — CARVEDILOL 6.25 MG/1
18.75 TABLET ORAL 2 TIMES DAILY WITH MEALS
Status: DISCONTINUED | OUTPATIENT
Start: 2021-01-01 | End: 2021-01-01 | Stop reason: HOSPADM

## 2021-01-01 RX ORDER — FLUMAZENIL 0.1 MG/ML
0.2 INJECTION, SOLUTION INTRAVENOUS
Status: DISCONTINUED | OUTPATIENT
Start: 2021-01-01 | End: 2021-01-01 | Stop reason: HOSPADM

## 2021-01-01 RX ORDER — PIPERACILLIN SODIUM, TAZOBACTAM SODIUM 4; .5 G/20ML; G/20ML
4.5 INJECTION, POWDER, LYOPHILIZED, FOR SOLUTION INTRAVENOUS ONCE
Status: COMPLETED | OUTPATIENT
Start: 2021-01-01 | End: 2021-01-01

## 2021-01-01 RX ORDER — ACETAMINOPHEN 500 MG
1000 TABLET ORAL ONCE
Status: COMPLETED | OUTPATIENT
Start: 2021-01-01 | End: 2021-01-01

## 2021-01-01 RX ORDER — CARVEDILOL 6.25 MG/1
TABLET ORAL
Qty: 70 TABLET | Status: ON HOLD | OUTPATIENT
Start: 2021-01-01 | End: 2022-01-01

## 2021-01-01 RX ORDER — CARVEDILOL 6.25 MG/1
12.5 TABLET ORAL 2 TIMES DAILY WITH MEALS
Status: DISCONTINUED | OUTPATIENT
Start: 2021-01-01 | End: 2021-01-01

## 2021-01-01 RX ORDER — AMLODIPINE BESYLATE 5 MG/1
TABLET ORAL
Qty: 35 TABLET | Status: SHIPPED | OUTPATIENT
Start: 2021-01-01 | End: 2021-01-01

## 2021-01-01 RX ORDER — METHOCARBAMOL 750 MG/1
TABLET ORAL
Qty: 4 CAPSULE | Status: SHIPPED | OUTPATIENT
Start: 2021-01-01 | End: 2021-01-01

## 2021-01-01 RX ORDER — POTASSIUM CHLORIDE 750 MG/1
10 TABLET, EXTENDED RELEASE ORAL DAILY
Qty: 30 TABLET | Refills: 98 | Status: ON HOLD | OUTPATIENT
Start: 2021-01-01 | End: 2022-01-01

## 2021-01-01 RX ORDER — SENNA AND DOCUSATE SODIUM 50; 8.6 MG/1; MG/1
1 TABLET, FILM COATED ORAL AT BEDTIME
Qty: 30 TABLET | Refills: 98 | Status: SHIPPED | OUTPATIENT
Start: 2021-01-01 | End: 2021-01-01

## 2021-01-01 RX ORDER — LISINOPRIL 40 MG/1
TABLET ORAL
Qty: 28 TABLET | Refills: 97 | Status: SHIPPED | OUTPATIENT
Start: 2021-01-01 | End: 2021-01-01

## 2021-01-01 RX ORDER — ONDANSETRON 4 MG/1
4 TABLET, ORALLY DISINTEGRATING ORAL EVERY 6 HOURS PRN
Status: DISCONTINUED | OUTPATIENT
Start: 2021-01-01 | End: 2021-01-01 | Stop reason: HOSPADM

## 2021-01-01 RX ORDER — CARVEDILOL 12.5 MG/1
12.5 TABLET ORAL 2 TIMES DAILY WITH MEALS
Status: ON HOLD | COMMUNITY
End: 2022-01-01

## 2021-01-01 RX ORDER — VANCOMYCIN HYDROCHLORIDE 125 MG/1
125 CAPSULE ORAL 4 TIMES DAILY
COMMUNITY
Start: 2021-01-01 | End: 2021-01-01

## 2021-01-01 RX ORDER — ACETAMINOPHEN 650 MG/1
650 SUPPOSITORY RECTAL EVERY 6 HOURS PRN
Status: DISCONTINUED | OUTPATIENT
Start: 2021-01-01 | End: 2021-01-01 | Stop reason: HOSPADM

## 2021-01-01 RX ORDER — CEFTRIAXONE 2 G/1
2 INJECTION, POWDER, FOR SOLUTION INTRAMUSCULAR; INTRAVENOUS EVERY 24 HOURS
Status: DISCONTINUED | OUTPATIENT
Start: 2021-01-01 | End: 2021-01-01 | Stop reason: HOSPADM

## 2021-01-01 RX ORDER — ANTIOX #8/OM3/DHA/EPA/LUT/ZEAX 250-2.5 MG
1 CAPSULE ORAL 2 TIMES DAILY
COMMUNITY
Start: 2021-01-01 | End: 2021-01-01

## 2021-01-01 RX ORDER — POTASSIUM CHLORIDE 750 MG/1
TABLET, EXTENDED RELEASE ORAL
Qty: 90 TABLET | Refills: 11 | Status: SHIPPED | OUTPATIENT
Start: 2021-01-01 | End: 2021-01-01

## 2021-01-01 RX ORDER — AMLODIPINE BESYLATE 5 MG/1
5 TABLET ORAL DAILY
COMMUNITY
End: 2021-01-01

## 2021-01-01 RX ORDER — LOSARTAN POTASSIUM 100 MG/1
100 TABLET ORAL DAILY
Qty: 30 TABLET | Refills: 98 | Status: SHIPPED | OUTPATIENT
Start: 2021-01-01 | End: 2021-01-01

## 2021-01-01 RX ORDER — CLOPIDOGREL BISULFATE 75 MG/1
75 TABLET ORAL DAILY
Status: DISCONTINUED | OUTPATIENT
Start: 2021-01-01 | End: 2021-01-01 | Stop reason: HOSPADM

## 2021-01-01 RX ORDER — ACETAMINOPHEN 325 MG/1
650 TABLET ORAL EVERY 6 HOURS PRN
Status: DISCONTINUED | OUTPATIENT
Start: 2021-01-01 | End: 2021-01-01

## 2021-01-01 RX ORDER — ONDANSETRON 4 MG/1
4 TABLET, FILM COATED ORAL EVERY 6 HOURS PRN
COMMUNITY
End: 2022-01-01

## 2021-01-01 RX ORDER — PIPERACILLIN SODIUM, TAZOBACTAM SODIUM 3; .375 G/15ML; G/15ML
3.38 INJECTION, POWDER, LYOPHILIZED, FOR SOLUTION INTRAVENOUS EVERY 8 HOURS
Status: DISCONTINUED | OUTPATIENT
Start: 2021-01-01 | End: 2021-01-01

## 2021-01-01 RX ORDER — AMLODIPINE BESYLATE 10 MG/1
TABLET ORAL
Qty: 28 TABLET | Refills: 11 | Status: ON HOLD | OUTPATIENT
Start: 2021-01-01 | End: 2022-01-01

## 2021-01-01 RX ORDER — SIMETHICONE 125 MG
TABLET,CHEWABLE ORAL
Qty: 38 TABLET | Status: ON HOLD | OUTPATIENT
Start: 2021-01-01 | End: 2022-01-01

## 2021-01-01 RX ORDER — VANCOMYCIN HYDROCHLORIDE 125 MG/1
125 CAPSULE ORAL EVERY 6 HOURS
COMMUNITY
Start: 2021-01-01 | End: 2021-01-01

## 2021-01-01 RX ORDER — AMOXICILLIN 250 MG
1 CAPSULE ORAL DAILY PRN
COMMUNITY
Start: 2021-01-01 | End: 2021-01-01

## 2021-01-01 RX ORDER — POTASSIUM CHLORIDE 1.5 G/1.58G
20 POWDER, FOR SOLUTION ORAL DAILY
Qty: 30 EACH | Refills: 98 | Status: SHIPPED | OUTPATIENT
Start: 2021-01-01 | End: 2021-01-01

## 2021-01-01 RX ADMIN — CARVEDILOL 18.75 MG: 6.25 TABLET, FILM COATED ORAL at 17:40

## 2021-01-01 RX ADMIN — CARVEDILOL 18.75 MG: 6.25 TABLET, FILM COATED ORAL at 17:36

## 2021-01-01 RX ADMIN — CEFTRIAXONE SODIUM 2 G: 2 INJECTION, POWDER, FOR SOLUTION INTRAMUSCULAR; INTRAVENOUS at 09:29

## 2021-01-01 RX ADMIN — UMECLIDINIUM 1 PUFF: 62.5 AEROSOL, POWDER ORAL at 09:44

## 2021-01-01 RX ADMIN — LOSARTAN POTASSIUM 100 MG: 100 TABLET, FILM COATED ORAL at 08:43

## 2021-01-01 RX ADMIN — CARVEDILOL 18.75 MG: 6.25 TABLET, FILM COATED ORAL at 09:23

## 2021-01-01 RX ADMIN — PIPERACILLIN SODIUM AND TAZOBACTAM SODIUM 4.5 G: 4; .5 INJECTION, POWDER, LYOPHILIZED, FOR SOLUTION INTRAVENOUS at 17:16

## 2021-01-01 RX ADMIN — CLOPIDOGREL BISULFATE 75 MG: 75 TABLET ORAL at 09:42

## 2021-01-01 RX ADMIN — LOSARTAN POTASSIUM 100 MG: 100 TABLET, FILM COATED ORAL at 09:42

## 2021-01-01 RX ADMIN — ATORVASTATIN CALCIUM 40 MG: 40 TABLET, FILM COATED ORAL at 22:47

## 2021-01-01 RX ADMIN — AMLODIPINE BESYLATE 5 MG: 5 TABLET ORAL at 09:41

## 2021-01-01 RX ADMIN — CEFTRIAXONE SODIUM 2 G: 2 INJECTION, POWDER, FOR SOLUTION INTRAMUSCULAR; INTRAVENOUS at 08:42

## 2021-01-01 RX ADMIN — LOSARTAN POTASSIUM 100 MG: 100 TABLET, FILM COATED ORAL at 09:23

## 2021-01-01 RX ADMIN — UMECLIDINIUM 1 PUFF: 62.5 AEROSOL, POWDER ORAL at 09:23

## 2021-01-01 RX ADMIN — OXYCODONE HYDROCHLORIDE 2.5 MG: 5 TABLET ORAL at 21:40

## 2021-01-01 RX ADMIN — ATORVASTATIN CALCIUM 40 MG: 40 TABLET, FILM COATED ORAL at 21:25

## 2021-01-01 RX ADMIN — ACETAMINOPHEN 650 MG: 325 TABLET, FILM COATED ORAL at 21:40

## 2021-01-01 RX ADMIN — CARVEDILOL 18.75 MG: 6.25 TABLET, FILM COATED ORAL at 09:41

## 2021-01-01 RX ADMIN — IOPAMIDOL 80 ML: 755 INJECTION, SOLUTION INTRAVENOUS at 16:45

## 2021-01-01 RX ADMIN — ACETAMINOPHEN 650 MG: 325 TABLET, FILM COATED ORAL at 14:38

## 2021-01-01 RX ADMIN — AMLODIPINE BESYLATE 5 MG: 5 TABLET ORAL at 09:23

## 2021-01-01 RX ADMIN — UMECLIDINIUM 1 PUFF: 62.5 AEROSOL, POWDER ORAL at 08:48

## 2021-01-01 RX ADMIN — ATORVASTATIN CALCIUM 40 MG: 40 TABLET, FILM COATED ORAL at 21:19

## 2021-01-01 RX ADMIN — ACETAMINOPHEN 650 MG: 325 TABLET, FILM COATED ORAL at 05:32

## 2021-01-01 RX ADMIN — AMLODIPINE BESYLATE 5 MG: 5 TABLET ORAL at 08:42

## 2021-01-01 RX ADMIN — APIXABAN 2.5 MG: 2.5 TABLET, FILM COATED ORAL at 09:41

## 2021-01-01 RX ADMIN — CARVEDILOL 18.75 MG: 6.25 TABLET, FILM COATED ORAL at 18:53

## 2021-01-01 RX ADMIN — OXYCODONE HYDROCHLORIDE 2.5 MG: 5 TABLET ORAL at 14:38

## 2021-01-01 RX ADMIN — LIDOCAINE HYDROCHLORIDE 9 ML: 10 INJECTION, SOLUTION INFILTRATION; PERINEURAL at 10:55

## 2021-01-01 RX ADMIN — MIDAZOLAM HYDROCHLORIDE 0.5 MG: 1 INJECTION, SOLUTION INTRAMUSCULAR; INTRAVENOUS at 10:56

## 2021-01-01 RX ADMIN — UMECLIDINIUM 1 PUFF: 62.5 AEROSOL, POWDER ORAL at 09:00

## 2021-01-01 RX ADMIN — FENTANYL CITRATE 25 MCG: 50 INJECTION, SOLUTION INTRAMUSCULAR; INTRAVENOUS at 10:55

## 2021-01-01 RX ADMIN — CARVEDILOL 18.75 MG: 6.25 TABLET, FILM COATED ORAL at 09:00

## 2021-01-01 RX ADMIN — CEFTRIAXONE SODIUM 2 G: 2 INJECTION, POWDER, FOR SOLUTION INTRAMUSCULAR; INTRAVENOUS at 09:44

## 2021-01-01 RX ADMIN — AZITHROMYCIN MONOHYDRATE 250 MG: 250 TABLET ORAL at 09:23

## 2021-01-01 RX ADMIN — CEFTRIAXONE SODIUM 2 G: 2 INJECTION, POWDER, FOR SOLUTION INTRAMUSCULAR; INTRAVENOUS at 09:23

## 2021-01-01 RX ADMIN — AZITHROMYCIN MONOHYDRATE 500 MG: 250 TABLET ORAL at 09:29

## 2021-01-01 RX ADMIN — OXYCODONE HYDROCHLORIDE 2.5 MG: 5 TABLET ORAL at 13:09

## 2021-01-01 RX ADMIN — OXYCODONE HYDROCHLORIDE 2.5 MG: 5 TABLET ORAL at 05:32

## 2021-01-01 RX ADMIN — AZITHROMYCIN MONOHYDRATE 250 MG: 250 TABLET ORAL at 09:42

## 2021-01-01 RX ADMIN — PIPERACILLIN SODIUM AND TAZOBACTAM SODIUM 3.38 G: 3; .375 INJECTION, POWDER, LYOPHILIZED, FOR SOLUTION INTRAVENOUS at 23:41

## 2021-01-01 RX ADMIN — ATORVASTATIN CALCIUM 40 MG: 40 TABLET, FILM COATED ORAL at 21:40

## 2021-01-01 RX ADMIN — ACETAMINOPHEN 1000 MG: 500 TABLET, FILM COATED ORAL at 15:50

## 2021-01-01 RX ADMIN — AZITHROMYCIN MONOHYDRATE 250 MG: 250 TABLET ORAL at 08:42

## 2021-01-01 RX ADMIN — OXYCODONE HYDROCHLORIDE 2.5 MG: 5 TABLET ORAL at 21:58

## 2021-01-01 RX ADMIN — OXYCODONE HYDROCHLORIDE 2.5 MG: 5 TABLET ORAL at 17:40

## 2021-01-01 RX ADMIN — CLOPIDOGREL BISULFATE 75 MG: 75 TABLET ORAL at 14:02

## 2021-01-01 RX ADMIN — ACETAMINOPHEN 650 MG: 325 TABLET, FILM COATED ORAL at 13:09

## 2021-01-01 RX ADMIN — LOSARTAN POTASSIUM 100 MG: 100 TABLET, FILM COATED ORAL at 09:00

## 2021-01-01 RX ADMIN — SODIUM CHLORIDE 70 ML: 9 INJECTION, SOLUTION INTRAVENOUS at 16:45

## 2021-01-01 RX ADMIN — AMLODIPINE BESYLATE 5 MG: 5 TABLET ORAL at 09:00

## 2021-01-01 RX ADMIN — CARVEDILOL 18.75 MG: 6.25 TABLET, FILM COATED ORAL at 08:42

## 2021-01-01 RX ADMIN — APIXABAN 2.5 MG: 2.5 TABLET, FILM COATED ORAL at 21:19

## 2021-01-01 ASSESSMENT — MIFFLIN-ST. JEOR
SCORE: 853.62
SCORE: 864.51
SCORE: 853.62
SCORE: 845.46
SCORE: 859.06
SCORE: 874.49
SCORE: 859.06
SCORE: 859.06
SCORE: 853.62
SCORE: 874.94
SCORE: 845.46
SCORE: 839.11
SCORE: 864.51
SCORE: 843.64
SCORE: 836.38
SCORE: 859.06
SCORE: 853.62
SCORE: 861.34

## 2021-01-01 ASSESSMENT — ACTIVITIES OF DAILY LIVING (ADL)
ADLS_ACUITY_SCORE: 19
ADLS_ACUITY_SCORE: 18
ADLS_ACUITY_SCORE: 19
ADLS_ACUITY_SCORE: 19
ADLS_ACUITY_SCORE: 22
ADLS_ACUITY_SCORE: 18
ADLS_ACUITY_SCORE: 19
ADLS_ACUITY_SCORE: 18
ADLS_ACUITY_SCORE: 18
ADLS_ACUITY_SCORE: 20
ADLS_ACUITY_SCORE: 19
ADLS_ACUITY_SCORE: 18
ADLS_ACUITY_SCORE: 19
ADLS_ACUITY_SCORE: 18
ADLS_ACUITY_SCORE: 18
ADLS_ACUITY_SCORE: 19
ADLS_ACUITY_SCORE: 18
ADLS_ACUITY_SCORE: 18
ADLS_ACUITY_SCORE: 19
ADLS_ACUITY_SCORE: 22
ADLS_ACUITY_SCORE: 18

## 2021-01-01 ASSESSMENT — ENCOUNTER SYMPTOMS
UNEXPECTED WEIGHT CHANGE: 1
COUGH: 1
FEVER: 0
SHORTNESS OF BREATH: 1

## 2021-03-15 DIAGNOSIS — E87.6 HYPOKALEMIA: ICD-10-CM

## 2021-03-15 DIAGNOSIS — I25.10 CORONARY ARTERY DISEASE INVOLVING NATIVE CORONARY ARTERY OF NATIVE HEART WITHOUT ANGINA PECTORIS: ICD-10-CM

## 2021-03-15 RX ORDER — ATORVASTATIN CALCIUM 40 MG/1
40 TABLET, FILM COATED ORAL AT BEDTIME
Qty: 90 TABLET | Refills: 0 | Status: SHIPPED | OUTPATIENT
Start: 2021-03-15 | End: 2021-01-01

## 2021-03-15 RX ORDER — POTASSIUM CHLORIDE 1500 MG/1
20 TABLET, EXTENDED RELEASE ORAL DAILY
Qty: 90 TABLET | Refills: 0 | Status: SHIPPED | OUTPATIENT
Start: 2021-03-15 | End: 2021-01-01

## 2021-03-15 NOTE — TELEPHONE ENCOUNTER
Medication Refilled: Atorvastatin   Last office visit: 10/6/2020 with alize Joseph PA-C  Last Labs/EKG: 10/6/2020  Next office visit: 4/15/2021 with labs prior with Dr. Meza scheduled   Pharmacy sent to: [facility] RN

## 2021-03-18 ENCOUNTER — DOCUMENTATION ONLY (OUTPATIENT)
Dept: CARDIOLOGY | Facility: CLINIC | Age: 86
End: 2021-03-18

## 2021-03-18 NOTE — PROGRESS NOTES
Received call from [facility] Director of Health Services at assisted living. They are requesting a recent OV note and medication list. Faxed to [phone].     JEFF Mariee

## 2021-03-19 NOTE — TELEPHONE ENCOUNTER
Received refill request for:  Clopidogrel  Last OV was: 10/6/2020 with RADHA Robins  Labs/EKG: n/a  F/U scheduled: 4/15/2021 with Dr. DACOSTA  New script sent to: Horace    Per OV note from 10/6/2020:  Since she is tolerating the Eliquis and clopidogrel and had LM/LAD stenting I decided to leave her on this regimen for now, we could possibly consider changing the clopidogrel to ASA in the future.    KMortimer RN

## 2021-03-25 NOTE — LETTER
"    3/25/2021        RE: Yoselyn Shah Al  [address]  [address]  [facility] [address]        [facility]  PRIMARY CARE PROVIDER AND CLINIC:  STEFFANY Yusuf CNP, [address] / [place] [address]  Chief Complaint   Patient presents with     [facility]     [facility] Medical Record Number:  [id number]  Place of Service where encounter took place:  [facility] ([facility]) [[id number]]    Yoselyn Cui  is a 89 year old  (1/28/1932) female with PMH significant for HTN, OAB, GERD, hx of CVA x2, second-degree heart block s/p PPM (2016), paroxysmal atrial fibrillation (on apixaban), hyperlipidemia, CAD s/ps non-STEMI in July 20219 with multivessel disease s/p stenting of LAD 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 .      Admitted to this facility for  medical management and nursing care.    HPI:    HPI information obtained from: facility chart records, facility staff, patient report and [facility] Epic chart review.     Resident of [facility] since 3/16/21. Moved from daughter's home due to increased care needs. Services reviewed, has medication administration and escorts to meals. Has left sided weakness and very low vision per nursing staff.    At age 50 had a stroke and recovered, age 75 had a second stroke with residual left sided weakness. Uses wheelchair for longer distances. Uses cane in apartment, also has a walker.     Cardiologist is Dr. Susana MD and Britni GALVEZ of [facility]. Has pacemaker. Has monitor at bedside.     Peptic ulcer in the past, \"that was cured.\"    Low vision due BL macular degeneration. Trouble reading, blurry vision.    Received both doses of her Pfizer COVID-19 vaccine, second dose 3/16.    Updates on Status Since Skilled nursing Admission:   Follow-up today to establish care with [facility] provider for onsite " "primary care.     Sustained fall on 3/19/21, no residual pain. Reports falling from bed.   Per nursing notes, \"Resident was found on the floor next to her bed at 11:40 pm last night.   According to resident, she was reaching from the bed to turn off the lamp on bedside table and slid out of bed.  Resident turned on pendant for assistance. Resident reports she hit her chest on the table. Resident denies pain and no bruising noted.  Bedside table pushed closer to bed. T 97.7 /75 P 72 R 20.\"    See ROS.    CODE STATUS/ADVANCE DIRECTIVES DISCUSSION:   DNR / DNI, selective treatment    Patient's living condition: lives in an assisted living facility     ALLERGIES: Patient has no known allergies.     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     PAST SURGICAL HISTORY:   has a past surgical history that includes LIGATE FALLOPIAN TUBE (1960s); appendectomy (1960s); Implant pacemaker (05/17/2016); REMV CATARACT INTRACAP,INSERT LENS (Right, 09/20/2017); REMV CATARACT EXTRACAP,INSERT LENS, W/O ECP (Left, 10/04/2017); Esophagoscopy, gastroscopy, duodenoscopy (EGD), combined (N/A, 11/20/2018); Coronary Angiogram (N/A, 7/17/2019); Left Ventriculogram (N/A, 7/17/2019); Left Heart Cath (N/A, 7/17/2019); Heart Catheterization with Possible Intervention (N/A, 7/18/2019); and Percutaneous Coronary Intervention Stent Drug Eluting (N/A, 7/18/2019).     FAMILY HISTORY: family history includes Coronary Artery Disease in her brother, father, and sister.     SOCIAL HISTORY:   " reports that she has never smoked. She has never used smokeless tobacco. She reports that she does not drink alcohol or use drugs.    (Gordy) passed in 2019, was   Worked in hospitality industry.   Four daughters: Pily lives in [facility] due to alcoholism, Maryjane Stout - was living with prior to admission with  Britni Mitchell - lives in [place], [place]     Most Recent Immunizations   Administered Date(s) Administered     COVID-19,PF,Pfizer 03/16/2021     Influenza, Quad, High Dose, Pf, 65yr + 10/08/2020     Pneumo Conj 13-V (2010&after) 10/20/2016     TDAP Vaccine (Adacel) 02/22/2018     Zoster vaccine, live 02/13/2012     Post Discharge Medication Reconciliation Status: discharge medications reconciled, continue medications without change   Per director of nursing is using up home supply of some medications prior to ordering from [facility].     Current Outpatient Medications   Medication Sig Dispense Refill     acetaminophen (TYLENOL) 325 MG tablet Take 650 mg by mouth every 6 hours as needed for mild pain        ferrous sulfate (IRON) 325 (65 FE) MG tablet Take 65 mg by mouth daily (with breakfast)        lisinopril (PRINIVIL,ZESTRIL) 40 MG tablet Take 40 mg by mouth daily       Multiple Vitamins-Minerals (ICAPS AREDS FORMULA) TABS Take 1 tablet by mouth 2 times daily        nitroGLYcerin (NITROSTAT) 0.4 MG sublingual tablet For chest pain place 1 tablet under the tongue every 5 minutes for 3 doses. If symptoms persist 5 minutes after 1st dose call 911. 30 tablet 0     OMEPRAZOLE PO Take 20 mg by mouth every morning        simethicone (MYLICON) 125 MG chewable tablet Take 1 tablet (125 mg) by mouth At Bedtime       amLODIPine (NORVASC) 5 MG tablet TAKE 1 TABLET BY MOUTH ONCE DAILY 35 tablet PRN     atorvastatin (LIPITOR) 40 MG tablet TAKE 1 TABLET BY MOUTH AT BEDTIME 35 tablet PRN     carvedilol (COREG) 12.5 MG tablet TAKE 1 TABLET BY MOUTH TWICE DAILY (TAKE WITH 6.25MG FOR A TOTAL  "OF 18.75MG) NOTE DOSAGE/STRENGTH 70 tablet PRN     carvedilol (COREG) 6.25 MG tablet TAKE 1 TABLET BY MOUTH TWICE DAILY WITH MEALS (TAKE WITH 12.5MG FOR A TOTAL OF 18.75MG) NOTE DOSAGE/STRENGTH 70 tablet PRN     clopidogrel (PLAVIX) 75 MG tablet TAKE 1 TABLET BY MOUTH ONCE DAILY 35 tablet PRN     ELIQUIS ANTICOAGULANT 2.5 MG tablet TAKE 1 TABLET BY MOUTH TWICE DAILY 70 tablet PRN     oxybutynin ER (DITROPAN-XL) 10 MG 24 hr tablet TAKE 1 TABLET BY MOUTH ONCE DAILY 28 tablet PRN     potassium chloride ER (KLOR-CON M) 20 MEQ CR tablet TAKE 1 TABLET BY MOUTH ONCE DAILY 35 tablet PRN     ROS: SLUMS 22/30  Constitutional - No fever/chills. Good appetite. Sleeps well at night.   HEENT -  Chronic blurry vision. Wear glasses. No vision change recently. S/p cataract surgery BL. Some hearing defcit, has two hearing aids, only wears left one. No difficulty with or pain on swallowing. Upper and lower dentures since age 33.   Pulmonary - + COKER \"when I go upstairs or something.\" No SOB at rest. Chronic cough. Smoked cigarettes for 30 years < 1 PPD.  CV- No CP, no palpitations.   PV - No leg swelling.   GI - No abd pain. No N/V. Chronic constipation.    - No dysuria. Urinary frequency, getting worse over the years, medication helps to some extent.   Neuro - Left sided weakness. No dizziness or seizure. No tremor.   MS - Ambulatory with cane in apartment, WC for longer distances.  SKIN - Hx of skin cancer with resection R arm, around 2010.   Psychiatric -  No history of anxiety/depression. Having more trouble with memory recently, names. GDS 3.      Vitals:  /66   Pulse 88   Temp 98.9  F (37.2  C)   Resp 16   SpO2 96%   Exam:  GENERAL APPEARANCE:  Alert, in no distress, pleasant, cooperative, well groomed seated in arm chair with rolling table in front of her.  EYES: sclera clear and conjunctiva normal, no discharge   ENT:  Mouth normal, moist mucous membranes  RESP:  Non-labored breathing, palpation of chest normal, no " chest wall tenderness, no respiratory distress, Lung sounds clear, patient is on room air  CV:  Palpation - no murmur/non-displaced PMI, Auscultation - rate and rhythm regular, left CW pacemaker no murmur, no rub or gallop appreciated.  VASCULAR: No edema bilateral lower extremities.    ABDOMEN:  normal bowel sounds, soft, nontender, no grimacing or guarding with palpation.  M/S:  Left sided weakness with UE flexion contracture.  SKIN:  Inspection - no visible rashes/lesions/uclerations. Palpation- no increased warmth, skin is dry and non-tender.  PSYCH: awake and alert, speech fluent,  insight and judgement fair, memory fair, without depressed or anxious affect, calm and cooperative      Lab/Diagnostic data:  Recent labs in [facility] reviewed by me today.    CBC RESULTS:   Recent Labs   Lab Test 08/19/19  1241 08/06/19  0648   WBC 8.4 8.6   RBC 3.56* 3.38*   HGB 10.4* 9.7*   HCT 34.0* 31.0*   MCV 96 92   MCH 29.2 28.7   MCHC 30.6* 31.3*   RDW 17.5* 14.9    288         Last Basic Metabolic Panel:  Recent Labs   Lab Test 02/28/20  1116 02/18/20  0909    139   POTASSIUM 4.4 3.3*   CHLORIDE 107 107   GOLDY 8.8 8.7   CO2 29 27   BUN 20 15   CR 0.72 0.65   * 98     GFR Estimate   Date Value Ref Range Status   02/28/2020 75 >60 mL/min/[1.73_m2] Final     Liver Function Studies -   Recent Labs   Lab Test 09/29/20  1023 02/18/20  0909 08/04/19  1240 07/23/19  0538 11/19/18  1216 11/19/18  1216   PROTTOTAL  --   --  6.4*  --   --  6.4*   ALBUMIN  --   --  2.5* 2.4*   < > 3.5   BILITOTAL  --   --  0.5  --   --  0.3   ALKPHOS  --   --  89  --   --  80   AST  --   --  21  --   --  22   ALT 19 22 22  --   --  19    < > = values in this interval not displayed.       TSH   Date Value Ref Range Status   10/12/2016 1.00 0.40 - 4.00 mU/L Final   05/16/2016 0.96 0.40 - 4.00 mU/L Final         Echocardiogram 7/17/19  Interpretation Summary     The left ventricle is normal in size.  Left ventricular systolic function is  "normal.  The visual ejection fraction is estimated at 55-60%.  No regional wall motion abnormalities noted.  The aortic valve is trileaflet with aortic valve sclerosis.  There is trace aortic regurgitation.  The aortic root is normal size.  Thickening of the left coronary cusp and aortic root identified, but unchanged  compared to the previous study.     Overall, no change. 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    ASSESSMENT/PLAN:  (Z86.73) History of CVA (cerebrovascular accident)  (primary encounter diagnosis)  (R53.1) Left-sided weakness  (Z91.81) History of recent fall  Comment: Stable/chronic. Hx of CVA x2 with residual left sided weakness. Fall [facility] admission to [facility]. Uses cane and wheelchair.   Plan:   -  PT/OT for falls prevention and baseline assessment in new environment  - Secondary prevention with atorvastatin 40 mg daily and Plavix, also on apixaban for afib   - Falls precautions   - PRN Tylenol for joint pain     (I48.0) Paroxysmal atrial fibrillation (H)  (Z95.0) Presence of permanent cardiac pacemaker  Comment: Stable. Second-degree heart block with PPM placement, device show occasional PAF per cardiology notes.  Plan:   - Continue apixaban per cardiology, if recurrent falls may need to discuss risk/benefit   - Continue Coreg 18.75 mg BID for rate control  - Device check 4/1  - Cardiology follow-up 4/15    (I10) Essential hypertension, benign  Comment: Stable.  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 Per cardiology: \"coronary artery disease with a non-STEMI in 07/2019 at which time she was found to have multivessel " "disease.  She underwent stenting to the mid LAD which was unfortunately complicated by a guidewire dissection requiring additional stenting to the proximal LAD and left main.  Her circumflex and RCA were not intervened upon.\" EF normalized after stenting of mid-LAD.  Plan:   - Continue Plavix per cardiology - will need close monitoring for bleeding risk as also on DOAC  - Continue statin (last LDL 60 July 2020)  - Continue PRN SL nitroglycerin  - HR and BP control as above  - Cardiology follow-up 4/15     (G31.84) Mild cognitive impairment  Comment: SLUMS 22/30  Plan:   - Will benefit from supportive care in long-term setting for medications, meals, ADL support, safety, socialization   -  OT to assess baseline cognition and ensure appropriate supports in place    (N32.81) OAB (overactive bladder)  Comment: Chronic.   Plan:   - Continue oxybutynin for now, monitor for anticholingeric side effects    (D64.9) Normocytic anemia  Comment: Chronic, Last Hgb 11.3, MCV 90   High risk for bleeding given DOAC and Plavix.  Iron 21 L July 2020  No s/s of acute bleed reported today.  Plan:   - Check CBC, B12, iron studies with next routine labs  - Continue ferrous sulfate, consider reduction to MWF     (K21.00) Gastroesophageal reflux disease with esophagitis, unspecified whether hemorrhage  Comment: Stable. Possible hx of peptic ulcer disease and GIB.  Plan:  - Continue omeprazole and simethicone     (H35.30) ARMD  Comment: Low vision, was getting injections prior to pandemic   Plan:   - Continue eye vitamin, use home supply and then will order from pharmacy  - Onsite eye follow-up    (E87.6) Hypokalemia  Comment: No offending medications   Potassium   Date Value Ref Range Status   02/28/2020 4.4 3.4 - 5.3 mmol/L Final   Plan:  - Continue KCl supplement for now  - Repeat labs in one month    (Z71.89) Advance care planning  Comment: Reviewed POLST  Plan:   - DNR/DNI, selective treatment  - DaughterMaryjane, is first contact "       Orders sent to facility electronically via [email or web address].    Follow-up in one month to review transition to [facility], cardiology follow-up, and order interval labs.      8:23 AM - 8:41 AM (18 minutes) Chart review   9:45 AM - 9:50 AM (5 minutes) Reviewed clinical status and medication with nursing.   10:03 AM - 10:36 AM (33 minutes) in patient visit and review of POLST.     Electronically signed by:  STEFFANY Yusuf CNP         Documentation of Face-to-Face and Certification for Home Health Services     Patient: Yoselyn Cui   YOB: 1932  MR Number: [id number]  Today's Date: 3/25/2021    I certify that patient: Yoselyn Cui is under my care and that I, or a nurse practitioner or physician's assistant working with me, had a face-to-face encounter that meets the physician face-to-face encounter requirements with this patient on: 3/25/2021.    This encounter with the patient was in whole, or in part, for the following medical condition, which is the primary reason for home health care:    History of CVA (cerebrovascular accident)  Left-sided weakness  History of recent fall    I certify that, based on my findings, the following services are medically necessary home health services: Occupational Therapy and Physical Therapy.    My clinical findings support the need for the above services because: Occupational Therapy Services are needed to assess and treat cognitive ability and address ADL safety due to impairment in conition and functional mobiltiy. and Physical Therapy Services are needed to assess and treat the following functional impairments: gait and balance.    Further, I certify that my clinical findings support that this patient is homebound (i.e. absences from home require considerable and taxing effort and are for medical reasons or [facility] services or infrequently or of short duration when for other reasons) because: Requires assistance of another person or specialized equipment  to access medical services because patient: Is unable to walk greater than ~ 50 feet feet without rest...    Based on the above findings. I certify that this patient is confined to the home and needs intermittent skilled nursing care, physical therapy and/or speech therapy.  The patient is under my care, and I have initiated the establishment of the plan of care.  This patient will be followed by a physician who will periodically review the plan of care.  Physician/Provider to provide follow up care: Brittany Florez    Responsible Medicare certified PEC Physician: Dr. Juli MD   Physician Signature: See electronic signature associated with these discharge orders.  Date: 3/25/2021                        Sincerely,        STEFFANY Yusuf CNP

## 2021-03-25 NOTE — PROGRESS NOTES
[facility]  PRIMARY CARE PROVIDER AND CLINIC:  Brittany Florez, APRN CNP, [address] / [place] [address]  No chief complaint on file.    [facility] Medical Record Number:  [id number]  Place of Service where encounter took place:  No question data found.    Yoselyn Cui  is a 89 year old  (1/28/1932) female with PMH significant for HTN, OAB, GERD, hx of CVA x2, second-degree heart block s/p PPM (2016), paroxysmal atrial fibrillation (on apixaban), hyperlipidemia, CAD s/ps non-STEMI in July 20219 with multivessel disease s/p stenting of LAD complicated by guidewire dissection, anemia with hx of GIB {fgsinitialoption:646726}.      Admitted to this facility for  {FGS ADMISSION REASONS:325327}.    HPI:    HPI information obtained from: {FGS HPI:847093}.     Resident of [facility] since 3/16/21. Moved from *** due to ***.  Services reviewed, has medication administration and escorts to meals.     Dr. Susana MD and Britni Joseph PA-C [facility].    Received both doses of her Pfizer COVID-19 vaccine, second dose 3/16.    Updates on Status Since Skilled nursing Admission: ***    Fall on ***     CODE STATUS/ADVANCE DIRECTIVES DISCUSSION:   {CODE STATUS:103929}    Patient's living condition: {LIVES WITH (NURSING HOME):120798}     ALLERGIES: Patient has no known allergies.  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  PAST SURGICAL HISTORY:   has a past surgical history that includes LIGATE FALLOPIAN TUBE (1960s);  appendectomy (1960s); Implant pacemaker (05/17/2016); REMV CATARACT INTRACAP,INSERT LENS (Right, 09/20/2017); REMV CATARACT EXTRACAP,INSERT LENS, W/O ECP (Left, 10/04/2017); Esophagoscopy, gastroscopy, duodenoscopy (EGD), combined (N/A, 11/20/2018); Coronary Angiogram (N/A, 7/17/2019); Left Ventriculogram (N/A, 7/17/2019); Left Heart Cath (N/A, 7/17/2019); Heart Catheterization with Possible Intervention (N/A, 7/18/2019); and Percutaneous Coronary Intervention Stent Drug Eluting (N/A, 7/18/2019).  FAMILY HISTORY: family history includes Coronary Artery Disease in her brother, father, and sister.  SOCIAL HISTORY:   reports that she has never smoked. She has never used smokeless tobacco. She reports that she does not drink alcohol or use drugs.      Most Recent Immunizations   Administered Date(s) Administered     COVID-19,PF,Pfizer 03/16/2021     Influenza, Quad, High Dose, Pf, 65yr + 10/08/2020     Pneumo Conj 13-V (2010&after) 10/20/2016     TDAP Vaccine (Adacel) 02/22/2018     Zoster vaccine, live 02/13/2012       Post Discharge Medication Reconciliation Status: {[facility] Med Rec (Provider):354788}  ***  Current Outpatient Medications   Medication Sig Dispense Refill     acetaminophen (TYLENOL) 325 MG tablet Take 650 mg by mouth every 6 hours as needed for mild pain        amLODIPine (NORVASC) 5 MG tablet Take 5 mg by mouth daily        apixaban ANTICOAGULANT (ELIQUIS ANTICOAGULANT) 2.5 MG tablet Take 1 tablet (2.5 mg) by mouth 2 times daily 60 tablet 11     atorvastatin (LIPITOR) 40 MG tablet Take 1 tablet (40 mg) by mouth At Bedtime 90 tablet 0     carvedilol (COREG) 12.5 MG tablet Take 1.5 tablets (18.75 mg) by mouth 2 times daily (with meals) 270 tablet 3     clopidogrel (PLAVIX) 75 MG tablet Take 1 tablet (75 mg) by mouth daily 90 tablet 0     ferrous sulfate (IRON) 325 (65 FE) MG tablet Take 65 mg by mouth daily (with breakfast)        lisinopril (PRINIVIL,ZESTRIL) 40 MG tablet Take 40 mg by mouth daily        Multiple Vitamins-Minerals (ICAPS AREDS FORMULA) TABS Take 1 tablet by mouth 2 times daily        nitroGLYcerin (NITROSTAT) 0.4 MG sublingual tablet For chest pain place 1 tablet under the tongue every 5 minutes for 3 doses. If symptoms persist 5 minutes after 1st dose call 911. 30 tablet 0     OMEPRAZOLE PO Take 20 mg by mouth every morning        oxybutynin ER (DITROPAN-XL) 10 MG 24 hr tablet Take 10 mg by mouth daily       potassium chloride ER (KLOR-CON M) 20 MEQ CR tablet Take 1 tablet (20 mEq) by mouth daily 90 tablet 0     {Providers Please double check the med list (in the plan section >> meds & orders tab) and Discontinue any of the meds flagged by the TC to be discontinued}  ***  ROS: SLUMS 22/30  {ROS FGS:325191}  Constitutional - No fever/chills. Good appetite. Sleeps *** at night   HEENT - No HA, No double/blurry vision. No vision change. No itchy or watery eyes. No difficulty hearing, no ear pain. No difficulty with or pain on swallowing. No runny nose or sore throat.   Pulmonary - No SOB, dyspnea, or cough  CV- No CP, no palpitations.   PV - No leg swelling.   GI - No abd pain. No N/V or diarrhea   - No dysuria, incontinent ***   Neuro - No focal deficit. No dizziness or seizure. No tremor  MS - Ambulatory with *** assistive device, no pain in muscles or joints  SKIN - ***   Psychiatric - Does not feel down depressed or hopeless, no thoughts of harm to self. No anxiety. No problems with memory. GDS 3.      Vitals:  There were no vitals taken for this visit.  Exam:  {USP physical exam :356685}   GENERAL APPEARANCE:  Alert, in no distress, pleasant, cooperative  EYES:  EOM intact, lids normal, PERRL BL ***, sclera clear and conjunctiva normal, no discharge   ENT:  Mouth normal, moist mucous membranes, nose without drainage or crusting, external ears without lesions, hearing acuity ***  NECK:  Nontender, supple, symmetrical; no cervical adenopathy, masses or thyromegaly, trachea midline  RESP:   Non-labored breathing, palpation of chest normal, no chest wall tenderness, no respiratory distress, Lung sounds {LUNGS:102715}, patient is on ***  CV:  Palpation - no murmur/non-displaced PMI, Auscultation - rate and rhythm ***, no murmur, no rub or gallop.  VASCULAR: Carotid pulses 2 + BL w/o bruits. No JVD. Edema *** bilateral lower extremities. Pedal pulses.   ABDOMEN:  normal bowel sounds, soft, nontender, no grimacing or guarding with palpation. No hepatosplenomegaly. No appreciable masses.  M/S:   Gait and station {MOBILITY LEVEL:780851}. Digits without clubbing, cyanosis, no swelling or tenderness, good  BL. Upper extremities - 4/5 biceps strength, AROM shoulders ***, no pain w/ PROM, joints w/o tenderness/erythema/edema. Lower extremities - 4/5 strength with seated hip flexion and knee extension, no pain w/ PROM, joints w/o edema or erythema.  SKIN:  Inspection - no visible rashes/lesions/uclerations. Palpation- no increased warmth, skin is dry and non-tender.  NEURO: cranial nerves II-XII grossly intact, no facial asymmetry, follows simple commands, moves all extremities symmetrically, normal tone, no tremor  PSYCH: awake and alert, speech fluent,  insight and judgement ***, oriented to person/place/time, memory ***, without depressed or anxious affect, calm and cooperative      Lab/Diagnostic data:  Recent labs in [facility] reviewed by me today.    CBC RESULTS:   Recent Labs   Lab Test 08/19/19  1241 08/06/19  0648   WBC 8.4 8.6   RBC 3.56* 3.38*   HGB 10.4* 9.7*   HCT 34.0* 31.0*   MCV 96 92   MCH 29.2 28.7   MCHC 30.6* 31.3*   RDW 17.5* 14.9    288         Last Basic Metabolic Panel:  Recent Labs   Lab Test 02/28/20  1116 02/18/20  0909    139   POTASSIUM 4.4 3.3*   CHLORIDE 107 107   GOLDY 8.8 8.7   CO2 29 27   BUN 20 15   CR 0.72 0.65   * 98     GFR Estimate   Date Value Ref Range Status   02/28/2020 75 >60 mL/min/[1.73_m2] Final     Liver Function Studies -   Recent Labs   Lab Test  09/29/20  1023 02/18/20  0909 08/04/19  1240 07/23/19  0538 11/19/18  1216 11/19/18  1216   PROTTOTAL  --   --  6.4*  --   --  6.4*   ALBUMIN  --   --  2.5* 2.4*   < > 3.5   BILITOTAL  --   --  0.5  --   --  0.3   ALKPHOS  --   --  89  --   --  80   AST  --   --  21  --   --  22   ALT 19 22 22  --   --  19    < > = values in this interval not displayed.       TSH   Date Value Ref Range Status   10/12/2016 1.00 0.40 - 4.00 mU/L Final   05/16/2016 0.96 0.40 - 4.00 mU/L Final         Echocardiogram 7/17/19  Interpretation Summary     The left ventricle is normal in size.  Left ventricular systolic function is normal.  The visual ejection fraction is estimated at 55-60%.  No regional wall motion abnormalities noted.  The aortic valve is trileaflet with aortic valve sclerosis.  There is trace aortic regurgitation.  The aortic root is normal size.  Thickening of the left coronary cusp and aortic root identified, but unchanged  compared to the previous study.     Overall, no change. 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    ASSESSMENT/PLAN:  {FGS DX INITIAL:022700}    {fgsorders:759223}  ***  - POLST  - Presevision vs I-german    Total time spent with patient visit at the skilled nursing facility was {1/2/3/4/5:913914} including {1/2/3/4/5:382103}. Greater than 50% of total time spent with counseling and coordinating care due to ***.   8:23 AM - 8:41 AM (***) Chart review         Electronically signed by:  STEFFANY Yusuf CNP ***        Documentation of Face-to-Face and Certification for Home Health Services     Patient: Yoselyn Cui   YOB: 1932  MR Number: [id number]  Today's Date: 3/25/2021    I certify that patient: Yoselyn Cui is under my care and that I, or a nurse practitioner or physician's assistant working with me, had a face-to-face encounter that meets the physician face-to-face encounter requirements with this patient on: 3/25/2021.    This encounter with the patient was in  whole, or in part, for the following medical condition, which is the primary reason for home health care: ***.    I certify that, based on my findings, the following services are medically necessary home health services: {Modalities- choose all that apply:605601}.    My clinical findings support the need for the above services because: {Homecare Options- Choose all that apply:214698}    Further, I certify that my clinical findings support that this patient is homebound (i.e. absences from home require considerable and taxing effort and are for medical reasons or [employer] services or infrequently or of short duration when for other reasons) because: {Homebound - Must include information about the medical condition and why it is a considerable and taxing effort for the patient to leave the home. Per CMS; diagnoses alone do not substantiate homebound.:747091}.    Based on the above findings. I certify that this patient is confined to the home and needs intermittent skilled nursing care, physical therapy and/or speech therapy.  The patient is under my care, and I have initiated the establishment of the plan of care.  This patient will be followed by a physician who will periodically review the plan of care.  Physician/Provider to provide follow up care: Brittany Florez    Responsible Medicare certified MADDY Physician: ***  Physician Signature: See electronic signature associated with these discharge orders.  Date: 3/25/2021

## 2021-03-25 NOTE — PROGRESS NOTES
"Oral GERIATRIC SERVICES  PRIMARY CARE PROVIDER AND CLINIC:  Brittany Florez, STEFFANY CNP, [address] / [place] [address]  Chief Complaint   Patient presents with     Establish Care     [facility] Record Number:  [id number]  Place of Service where encounter took place:  [facility] [[id number]]    Yoselyn Cui  is a 89 year old  (1/28/1932) female with PMH significant for HTN, OAB, GERD, hx of CVA x2, second-degree heart block s/p PPM (2016), paroxysmal atrial fibrillation (on apixaban), hyperlipidemia, CAD s/ps non-STEMI in July 20219 with multivessel disease s/p stenting of LAD 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 .      Admitted to this facility for  medical management and nursing care.    HPI:    HPI information obtained from: facility chart records, facility staff, patient report and [facility] chart review.     Resident of [facility] since 3/16/21. Moved from daughter's home due to increased care needs. Services reviewed, has medication administration and escorts to meals. Has left sided weakness and very low vision per nursing staff.    At age 50 had a stroke and recovered, age 75 had a second stroke with residual left sided weakness. Uses wheelchair for longer distances. Uses cane in apartment, also has a walker.     Cardiologist is Dr. Susana MD and Britni GALVEZ of [facility]. Has pacemaker. Has monitor at bedside.     Peptic ulcer in the past, \"that was cured.\"    Low vision due BL macular degeneration. Trouble reading, blurry vision.    Received both doses of her Pfizer COVID-19 vaccine, second dose 3/16.    Updates on Status Since Skilled nursing Admission:   Follow-up today to establish care with S provider for onsite primary care.     Sustained fall on 3/19/21, no residual pain. Reports falling from bed.   Per nursing notes, " "\"Resident was found on the floor next to her bed at 11:40 pm last night.   According to resident, she was reaching from the bed to turn off the lamp on bedside table and slid out of bed.  Resident turned on pendant for assistance. Resident reports she hit her chest on the table. Resident denies pain and no bruising noted.  Bedside table pushed closer to bed. T 97.7 /75 P 72 R 20.\"    See ROS.    CODE STATUS/ADVANCE DIRECTIVES DISCUSSION:   DNR / DNI, selective treatment    Patient's living condition: lives in an assisted living facility     ALLERGIES: Patient has no known allergies.     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     PAST SURGICAL HISTORY:   has a past surgical history that includes LIGATE FALLOPIAN TUBE (1960s); appendectomy (1960s); Implant pacemaker (05/17/2016); REMV CATARACT INTRACAP,INSERT LENS (Right, 09/20/2017); REMV CATARACT EXTRACAP,INSERT LENS, W/O ECP (Left, 10/04/2017); Esophagoscopy, gastroscopy, duodenoscopy (EGD), combined (N/A, 11/20/2018); Coronary Angiogram (N/A, 7/17/2019); Left Ventriculogram (N/A, 7/17/2019); Left Heart Cath (N/A, 7/17/2019); Heart Catheterization with Possible Intervention (N/A, 7/18/2019); and Percutaneous Coronary Intervention Stent Drug Eluting (N/A, 7/18/2019).     FAMILY HISTORY: family history includes Coronary Artery Disease in her brother, father, and sister.     SOCIAL HISTORY:   reports that she has never smoked. She has never used smokeless tobacco. She reports that she does not drink " alcohol or use drugs.    (Grody) passed in 2019, was   Worked in [employer].   Four daughters: Pily lives in [facility] due to alcoholism, Maryjane Stout - was living with prior to admission with  Britni Mitchell - lives in [place], [place]     Most Recent Immunizations   Administered Date(s) Administered     COVID-19,PF,Pfizer 03/16/2021     Influenza, Quad, High Dose, Pf, 65yr + 10/08/2020     Pneumo Conj 13-V (2010&after) 10/20/2016     TDAP Vaccine (Adacel) 02/22/2018     Zoster vaccine, live 02/13/2012     Post Discharge Medication Reconciliation Status: discharge medications reconciled, continue medications without change   Per director of nursing is using up home supply of some medications prior to ordering from [facility].     Current Outpatient Medications   Medication Sig Dispense Refill     acetaminophen (TYLENOL) 325 MG tablet Take 650 mg by mouth every 6 hours as needed for mild pain        ferrous sulfate (IRON) 325 (65 FE) MG tablet Take 65 mg by mouth daily (with breakfast)        lisinopril (PRINIVIL,ZESTRIL) 40 MG tablet Take 40 mg by mouth daily       Multiple Vitamins-Minerals (ICAPS AREDS FORMULA) TABS Take 1 tablet by mouth 2 times daily        nitroGLYcerin (NITROSTAT) 0.4 MG sublingual tablet For chest pain place 1 tablet under the tongue every 5 minutes for 3 doses. If symptoms persist 5 minutes after 1st dose call 911. 30 tablet 0     OMEPRAZOLE PO Take 20 mg by mouth every morning        simethicone (MYLICON) 125 MG chewable tablet Take 1 tablet (125 mg) by mouth At Bedtime       amLODIPine (NORVASC) 5 MG tablet TAKE 1 TABLET BY MOUTH ONCE DAILY 35 tablet PRN     atorvastatin (LIPITOR) 40 MG tablet TAKE 1 TABLET BY MOUTH AT BEDTIME 35 tablet PRN     carvedilol (COREG) 12.5 MG tablet TAKE 1 TABLET BY MOUTH TWICE DAILY (TAKE WITH 6.25MG FOR A TOTAL OF 18.75MG) NOTE DOSAGE/STRENGTH 70 tablet PRN     carvedilol (COREG) 6.25 MG tablet TAKE 1 TABLET BY MOUTH  "TWICE DAILY WITH MEALS (TAKE WITH 12.5MG FOR A TOTAL OF 18.75MG) NOTE DOSAGE/STRENGTH 70 tablet PRN     clopidogrel (PLAVIX) 75 MG tablet TAKE 1 TABLET BY MOUTH ONCE DAILY 35 tablet PRN     ELIQUIS ANTICOAGULANT 2.5 MG tablet TAKE 1 TABLET BY MOUTH TWICE DAILY 70 tablet PRN     oxybutynin ER (DITROPAN-XL) 10 MG 24 hr tablet TAKE 1 TABLET BY MOUTH ONCE DAILY 28 tablet PRN     potassium chloride ER (KLOR-CON M) 20 MEQ CR tablet TAKE 1 TABLET BY MOUTH ONCE DAILY 35 tablet PRN     ROS: SLUMS 22/30  Constitutional - No fever/chills. Good appetite. Sleeps well at night.   HEENT -  Chronic blurry vision. Wear glasses. No vision change recently. S/p cataract surgery BL. Some hearing defcit, has two hearing aids, only wears left one. No difficulty with or pain on swallowing. Upper and lower dentures since age 33.   Pulmonary - + COKER \"when I go upstairs or something.\" No SOB at rest. Chronic cough. Smoked cigarettes for 30 years < 1 PPD.  CV- No CP, no palpitations.   PV - No leg swelling.   GI - No abd pain. No N/V. Chronic constipation.    - No dysuria. Urinary frequency, getting worse over the years, medication helps to some extent.   Neuro - Left sided weakness. No dizziness or seizure. No tremor.   MS - Ambulatory with cane in apartment, WC for longer distances.  SKIN - Hx of skin cancer with resection R arm, around 2010.   Psychiatric -  No history of anxiety/depression. Having more trouble with memory recently, names. GDS 3.      Vitals:  /66   Pulse 88   Temp 98.9  F (37.2  C)   Resp 16   SpO2 96%   Exam:  GENERAL APPEARANCE:  Alert, in no distress, pleasant, cooperative, well groomed seated in arm chair with rolling table in front of her.  EYES: sclera clear and conjunctiva normal, no discharge   ENT:  Mouth normal, moist mucous membranes  RESP:  Non-labored breathing, palpation of chest normal, no chest wall tenderness, no respiratory distress, Lung sounds clear, patient is on room air  CV:  Palpation - " no murmur/non-displaced PMI, Auscultation - rate and rhythm regular, left CW pacemaker no murmur, no rub or gallop appreciated.  VASCULAR: No edema bilateral lower extremities.    ABDOMEN:  normal bowel sounds, soft, nontender, no grimacing or guarding with palpation.  M/S:  Left sided weakness with UE flexion contracture.  SKIN:  Inspection - no visible rashes/lesions/uclerations. Palpation- no increased warmth, skin is dry and non-tender.  PSYCH: awake and alert, speech fluent,  insight and judgement fair, memory fair, without depressed or anxious affect, calm and cooperative      Lab/Diagnostic data:  Recent labs in [facility] reviewed by me today.    CBC RESULTS:   Recent Labs   Lab Test 08/19/19  1241 08/06/19  0648   WBC 8.4 8.6   RBC 3.56* 3.38*   HGB 10.4* 9.7*   HCT 34.0* 31.0*   MCV 96 92   MCH 29.2 28.7   MCHC 30.6* 31.3*   RDW 17.5* 14.9    288         Last Basic Metabolic Panel:  Recent Labs   Lab Test 02/28/20  1116 02/18/20  0909    139   POTASSIUM 4.4 3.3*   CHLORIDE 107 107   GOLDY 8.8 8.7   CO2 29 27   BUN 20 15   CR 0.72 0.65   * 98     GFR Estimate   Date Value Ref Range Status   02/28/2020 75 >60 mL/min/[1.73_m2] Final     Liver Function Studies -   Recent Labs   Lab Test 09/29/20  1023 02/18/20  0909 08/04/19  1240 07/23/19  0538 11/19/18  1216 11/19/18  1216   PROTTOTAL  --   --  6.4*  --   --  6.4*   ALBUMIN  --   --  2.5* 2.4*   < > 3.5   BILITOTAL  --   --  0.5  --   --  0.3   ALKPHOS  --   --  89  --   --  80   AST  --   --  21  --   --  22   ALT 19 22 22  --   --  19    < > = values in this interval not displayed.       TSH   Date Value Ref Range Status   10/12/2016 1.00 0.40 - 4.00 mU/L Final   05/16/2016 0.96 0.40 - 4.00 mU/L Final         Echocardiogram 7/17/19  Interpretation Summary     The left ventricle is normal in size.  Left ventricular systolic function is normal.  The visual ejection fraction is estimated at 55-60%.  No regional wall motion abnormalities  "noted.  The aortic valve is trileaflet with aortic valve sclerosis.  There is trace aortic regurgitation.  The aortic root is normal size.  Thickening of the left coronary cusp and aortic root identified, but unchanged  compared to the previous study.     Overall, no change. 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    ASSESSMENT/PLAN:  (Z86.73) History of CVA (cerebrovascular accident)  (primary encounter diagnosis)  (R53.1) Left-sided weakness  (Z91.81) History of recent fall  Comment: Stable/chronic. Hx of CVA x2 with residual left sided weakness. Fall from bed on admission to [facility]. Uses cane and wheelchair.   Plan:   -  PT/OT for falls prevention and baseline assessment in new environment  - Secondary prevention with atorvastatin 40 mg daily and Plavix, also on apixaban for afib   - Falls precautions   - PRN Tylenol for joint pain     (I48.0) Paroxysmal atrial fibrillation (H)  (Z95.0) Presence of permanent cardiac pacemaker  Comment: Stable. Second-degree heart block with PPM placement, device show occasional PAF per cardiology notes.  Plan:   - Continue apixaban per cardiology, if recurrent falls may need to discuss risk/benefit   - Continue Coreg 18.75 mg BID for rate control  - Device check 4/1  - Cardiology follow-up 4/15    (I10) Essential hypertension, benign  Comment: Stable.  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 Per cardiology: \"coronary artery disease with a non-STEMI in 07/2019 at which time she was found to have multivessel disease.  She underwent stenting to the mid LAD which was unfortunately complicated by a guidewire " "dissection requiring additional stenting to the proximal LAD and left main.  Her circumflex and RCA were not intervened upon.\" EF normalized after stenting of mid-LAD.  Plan:   - Continue Plavix per cardiology - will need close monitoring for bleeding risk as also on DOAC  - Continue statin (last LDL 60 July 2020)  - Continue PRN SL nitroglycerin  - HR and BP control as above  - Cardiology follow-up 4/15     (G31.84) Mild cognitive impairment  Comment: SLUMS 22/30  Plan:   - Will benefit from supportive care in [place] setting for medications, meals, ADL support, safety, socialization   -  OT to assess baseline cognition and ensure appropriate supports in place    (N32.81) OAB (overactive bladder)  Comment: Chronic.   Plan:   - Continue oxybutynin for now, monitor for anticholingeric side effects    (D64.9) Normocytic anemia  Comment: Chronic, Last Hgb 11.3, MCV 90   High risk for bleeding given DOAC and Plavix.  Iron 21 L July 2020  No s/s of acute bleed reported today.  Plan:   - Check CBC, B12, iron studies with next routine labs  - Continue ferrous sulfate, consider reduction to MWF     (K21.00) Gastroesophageal reflux disease with esophagitis, unspecified whether hemorrhage  Comment: Stable. Possible hx of peptic ulcer disease and GIB.  Plan:  - Continue omeprazole and simethicone     (H35.30) ARMD  Comment: Low vision, was getting injections prior to pandemic   Plan:   - Continue eye vitamin, use home supply and then will order from pharmacy  - Onsite eye follow-up    (E87.6) Hypokalemia  Comment: No offending medications   Potassium   Date Value Ref Range Status   02/28/2020 4.4 3.4 - 5.3 mmol/L Final   Plan:  - Continue KCl supplement for now  - Repeat labs in one month    (Z71.89) Advance care planning  Comment: Reviewed POLST  Plan:   - DNR/DNI, selective treatment  - Daughter, Maryjane, is first contact       Orders sent to facility electronically via [facility].    Follow-up in one month to review " transition to retirement, cardiology follow-up, and order interval labs.      8:23 AM - 8:41 AM (18 minutes) Chart review   9:45 AM - 9:50 AM (5 minutes) Reviewed clinical status and medication with nursing.   10:03 AM - 10:36 AM (33 minutes) in patient visit and review of POLST.     Electronically signed by:  STEFFANY Yusuf CNP         Documentation of Face-to-Face and Certification for Home Health Services     Patient: Yoselyn Cui   YOB: 1932  MR Number: [id number]  Today's Date: 3/25/2021    I certify that patient: Yoselyn Cui is under my care and that I, or a nurse practitioner or physician's assistant working with me, had a face-to-face encounter that meets the physician face-to-face encounter requirements with this patient on: 3/25/2021.    This encounter with the patient was in whole, or in part, for the following medical condition, which is the primary reason for home health care:    History of CVA (cerebrovascular accident)  Left-sided weakness  History of recent fall    I certify that, based on my findings, the following services are medically necessary home health services: Occupational Therapy and Physical Therapy.    My clinical findings support the need for the above services because: Occupational Therapy Services are needed to assess and treat cognitive ability and address ADL safety due to impairment in conition and functional mobiltiy. and Physical Therapy Services are needed to assess and treat the following functional impairments: gait and balance.    Further, I certify that my clinical findings support that this patient is homebound (i.e. absences from home require considerable and taxing effort and are for medical reasons or [facility] services or infrequently or of short duration when for other reasons) because: Requires assistance of another person or specialized equipment to access medical services because patient: Is unable to walk greater than ~ 50 feet feet without  rest...    Based on the above findings. I certify that this patient is confined to the home and needs intermittent skilled nursing care, physical therapy and/or speech therapy.  The patient is under my care, and I have initiated the establishment of the plan of care.  This patient will be followed by a physician who will periodically review the plan of care.  Physician/Provider to provide follow up care: Brittany Florez    Responsible Medicare certified PECOS Physician: Dr. Juli MD   Physician Signature: See electronic signature associated with these discharge orders.  Date: 3/25/2021

## 2021-03-31 PROBLEM — I71.019 AORTIC DISSECTION, THORACIC (H): Status: ACTIVE | Noted: 2021-01-01

## 2021-04-09 NOTE — TELEPHONE ENCOUNTER
[facility] Nurse Triage Telephone Note    Provider: Brittany Florez NP  Facility: [facility]  Facility Type:  [place]    Caller: Edith  Call Back Number: [phone]    Allergies:  No Known Allergies     Reason for call: Homecare nurse called to report 2 medication interactions.  Nurse stated that it is protocol to update primary.  Plavix interacts with Eliquis and Potassium interacts with Oxybutynin.      Verbal Order/Direction given by Provider: Noted    Provider giving Order:  Brittany Florez NP  Verbal Order given to: Edith Zelaya RN

## 2021-04-13 NOTE — PROGRESS NOTES
"[facility]  [place] Medical Record Number:  [id number]  Place of Service where encounter took place:  [facility] ([facility]) [[id number]]  Chief Complaint   Patient presents with     RECHECK     hx of CVA, need for form completion        HPI:    Yoselyn Cui  is a 89 year old (1/28/1932) female with PMH significant for HTN, OAB, GERD, hx of CVA x2, second-degree heart block s/p PPM (2016), paroxysmal atrial fibrillation (on apixaban), hyperlipidemia, CAD s/ps non-STEMI in July 20219 with multi-vessel disease s/p stenting of LAD complicated by guidewire dissection (on Plavix), anemia with hx of GIB, who is being seen today for an episodic care visit.      HPI information obtained from: facility chart records, facility staff, patient report and [facility] chart review.     Resident of [facility] since 3/16/21. Moved from daughter's home due to increased care needs.    At age 50 had a stroke and recovered, age 75 had a second stroke with residual left sided weakness. Uses wheelchair for longer distances. Uses cane in apartment, also has a walker.   Cardiologist is Dr. Susana MD and Britni GALVEZ of [facility]. Has pacemaker. Has monitor at bedside.   Peptic ulcer in the past, \"that was cured.\" Low vision due BL macular degeneration. Trouble reading, blurry vision. Received both doses of her Pfizer COVID-19 vaccine, second dose 3/16.    Today's concern is:  Follow-up today at request of family to assess homebound status.     Resident seen in apartment after lunch, feeling well. Shows new clock that speaks the time. Prior to visit OT stopped provider in hallway to report resident declined further PT/OT services, but they were able to get some adaptive equipment in to apartment prior to discharge.     Reviewed current health status and functional mobility. Uses cane in apartment and gets escorted by staff in wheelchair to meals in dinning room. Left " sided weakness post CVA with L UE contracture and some leg weakness. Able to shower herself. Needs meals prepared and medication mgmt. Vision is very low, likely legally blind, but no formal diagnosis.  No change in bowel or bladder habits, does have urinary incontinence and wears a pad. No SOB or cough. Cardiology follow-up 4/28.     Past Medical and Surgical History reviewed in Epic today.    MEDICATIONS:  Current Outpatient Medications   Medication Sig Dispense Refill     acetaminophen (TYLENOL) 325 MG tablet Take 650 mg by mouth every 6 hours as needed for mild pain        amLODIPine (NORVASC) 5 MG tablet TAKE 1 TABLET BY MOUTH ONCE DAILY 35 tablet PRN     atorvastatin (LIPITOR) 40 MG tablet TAKE 1 TABLET BY MOUTH AT BEDTIME 35 tablet PRN     carvedilol (COREG) 12.5 MG tablet TAKE 1 TABLET BY MOUTH TWICE DAILY (TAKE WITH 6.25MG FOR A TOTAL OF 18.75MG) NOTE DOSAGE/STRENGTH 70 tablet PRN     carvedilol (COREG) 6.25 MG tablet TAKE 1 TABLET BY MOUTH TWICE DAILY WITH MEALS (TAKE WITH 12.5MG FOR A TOTAL OF 18.75MG) NOTE DOSAGE/STRENGTH 70 tablet PRN     clopidogrel (PLAVIX) 75 MG tablet TAKE 1 TABLET BY MOUTH ONCE DAILY 35 tablet PRN     ELIQUIS ANTICOAGULANT 2.5 MG tablet TAKE 1 TABLET BY MOUTH TWICE DAILY 70 tablet PRN     ferrous sulfate (IRON) 325 (65 FE) MG tablet Take 65 mg by mouth daily (with breakfast)        lisinopril (PRINIVIL,ZESTRIL) 40 MG tablet Take 40 mg by mouth daily       Multiple Vitamins-Minerals (ICAPS AREDS FORMULA) TABS Take 1 tablet by mouth 2 times daily        nitroGLYcerin (NITROSTAT) 0.4 MG sublingual tablet For chest pain place 1 tablet under the tongue every 5 minutes for 3 doses. If symptoms persist 5 minutes after 1st dose call 911. 30 tablet 0     OMEPRAZOLE PO Take 20 mg by mouth every morning        oxybutynin ER (DITROPAN-XL) 10 MG 24 hr tablet TAKE 1 TABLET BY MOUTH ONCE DAILY 28 tablet PRN     potassium chloride ER (KLOR-CON M) 20 MEQ CR tablet TAKE 1 TABLET BY MOUTH ONCE DAILY  35 tablet PRN     simethicone (MYLICON) 125 MG chewable tablet Take 1 tablet (125 mg) by mouth At Bedtime       REVIEW OF SYSTEMS:  4 point ROS including Respiratory, CV, GI and , other than that noted in the HPI,  is negative. SLUMS 22/30.    Objective:  Temp 96.8  F (36  C)   Ht 1.524 m (5')   Wt 49.9 kg (110 lb)   SpO2 95%   BMI 21.48 kg/m    Exam:  GENERAL APPEARANCE:  Alert, in no distress, pleasant, cooperative, well groomed seated in arm chair with rolling table in front of her.  EYES: sclera clear and conjunctiva normal, no discharge, wearing glasses.  ENT:  Mouth normal, moist mucous membranes  RESP:  Non-labored breathing, palpation of chest normal, no chest wall tenderness, no respiratory distress, Lung sounds clear, patient is on room air  CV:  Palpation - no murmur/non-displaced PMI, Auscultation - rate and rhythm regular, left CW pacemaker no murmur, no rub or gallop appreciated.  VASCULAR: No edema bilateral lower extremities.    ABDOMEN:  normal bowel sounds, soft, nontender, no grimacing or guarding with palpation.  M/S: Good posture in chair. No tenderness over midline spine. Left sided weakness with UE flexion contracture. 4/5 strength L LE with seated hip flexion, 5/5 strength R LE with seated hip flexion.  SKIN:  Inspection - no visible rashes/lesions/uclerations. Palpation- no increased warmth, skin is dry and non-tender.  PSYCH: awake and alert, speech fluent,  insight and judgement fair, memory fair, without depressed or anxious affect, calm and cooperative, affable.    Labs:   Recent labs in [facility] reviewed by me today.    CBC RESULTS:   Recent Labs   Lab Test 08/19/19  1241 08/06/19  0648   WBC 8.4 8.6   RBC 3.56* 3.38*   HGB 10.4* 9.7*   HCT 34.0* 31.0*   MCV 96 92   MCH 29.2 28.7   MCHC 30.6* 31.3*   RDW 17.5* 14.9    288     Ferritin   Date Value Ref Range Status   08/05/2019 130 8 - 252 ng/mL Final     Iron   Date Value Ref Range Status   11/19/2018 24 (L) 35 - 180 ug/dL  Final     Iron Binding Cap   Date Value Ref Range Status   11/19/2018 372 240 - 430 ug/dL Final       Last Basic Metabolic Panel:  Recent Labs   Lab Test 02/28/20  1116 02/18/20  0909    139   POTASSIUM 4.4 3.3*   CHLORIDE 107 107   GOLDY 8.8 8.7   CO2 29 27   BUN 20 15   CR 0.72 0.65   * 98       Liver Function Studies -   Recent Labs   Lab Test 09/29/20  1023 02/18/20  0909 08/04/19  1240 07/23/19  0538 11/19/18  1216 11/19/18  1216   PROTTOTAL  --   --  6.4*  --   --  6.4*   ALBUMIN  --   --  2.5* 2.4*   < > 3.5   BILITOTAL  --   --  0.5  --   --  0.3   ALKPHOS  --   --  89  --   --  80   AST  --   --  21  --   --  22   ALT 19 22 22  --   --  19    < > = values in this interval not displayed.       TSH   Date Value Ref Range Status   10/12/2016 1.00 0.40 - 4.00 mU/L Final   05/16/2016 0.96 0.40 - 4.00 mU/L Final       ASSESSMENT/PLAN:  (Z86.73) History of CVA (cerebrovascular accident)  (primary encounter diagnosis)  (R53.1) Left-sided weakness  (E55.9) Gait abnormality   Comment: Stable/chronic. Hx of CVA x2 with residual left sided weakness. Fall [facility] bed on [facility] to [facility]. Uses cane and wheelchair, worked with  PT/OT briefly and then declined further sessions.  Plan:   - Secondary prevention with atorvastatin 40 mg daily and Plavix, also on apixaban for afib   - Falls precautions   - PRN Tylenol for joint pain  - Completed [facility] form and returned to nursing office     (I48.0) Paroxysmal atrial fibrillation (H)  (Z95.0) Presence of permanent cardiac pacemaker  Comment: Stable. Second-degree heart block with PPM placement, device shows occasional PAF per cardiology notes.  Plan:   - Continue apixaban per cardiology, if recurrent falls may need to discuss risk/benefit   - Continue Coreg 18.75 mg BID for rate control  - Device check quarterly, next July 2021  - Cardiology follow-up 4/28    (I10) Essential hypertension, benign  Comment: Stable.  BP Readings from Last 3 Encounters:   03/25/21 130/66  "  02/28/20 122/60   02/18/20 (!) 142/68   Plan:   - Continue current regimen: lisinopril 40 mg daily, Coreg 18.75 mg BID, amlodipine 5 mg daily   - Monitor routine VS and labs    (I25.10) ASCVD (arteriosclerotic cardiovascular disease)  (I71.01) Aortic dissection, thoracic (H)  (E78.5) Hyperlipidemia LDL goal <100  Comment: Stable, no angina, chronic COKER. Per cardiology: \"coronary artery disease with a non-STEMI in 07/2019 at which time she was found to have multivessel disease.  She underwent stenting to the mid LAD which was unfortunately complicated by a guidewire dissection requiring additional stenting to the proximal LAD and left main.  Her circumflex and RCA were not intervened upon.\" EF normalized after stenting of mid-LAD.  Plan:   - Continue Plavix per cardiology - will need close monitoring for bleeding risk as also on DOAC  - Continue statin (last LDL 60 July 2020)  - Continue PRN SL nitroglycerin  - HR and BP control as above  - Check BMP and CBC 4/15  - Cardiology follow-up 4/28    (G31.84) Mild cognitive impairment  Comment: SLUMS 22/30  Plan:   - Continues to benefit from supportive care in [place] setting for medications, meals, ADL support, safety, socialization     (N32.81) OAB (overactive bladder)  Comment: Chronic. + urinary incontinence.    Plan:   - Continue oxybutynin for now, monitor for anticholingeric side effects    (D64.9) Normocytic anemia  Comment: Chronic, Last Hgb 11.3, MCV 90   High risk for bleeding given DOAC and Plavix.  Iron 21 L July 2020  No s/s of acute bleed reported today.  Plan:   - Check CBC, B12, iron studies 4/15  - Continue ferrous sulfate, consider reduction to MWF     (K21.00) GERD  Comment: Stable. Possible hx of peptic ulcer disease and GIB.  Plan:  - Continue omeprazole and simethicone   - Check CBC    (H35.30) ARMD  Comment: Low vision, was getting injections prior to pandemic   Plan:   - Continue eye vitamin, use home supply and then will order from pharmacy  - " Onsite eye follow-up    (E87.6) Hypokalemia  Comment: No offending medications   Potassium   Date Value Ref Range Status   02/28/2020 4.4 3.4 - 5.3 mmol/L Final   Plan:  - Continue KCl supplement for now  - Check BMP 4/15    Orders sent to facility electronically via [employer].   - Check follow-up labs on 4/15/21: BMP, CBC, iron studies, B12, TSH, vitamin D level  - VA form completed at family request    Electronically signed by:  STEFFANY Yusuf CNP

## 2021-04-13 NOTE — PROGRESS NOTES
"[place] [facility]  [place] Medical Record Number:  [id number]  Place of Service where encounter took place:  No question data found.  Chief Complaint   Patient presents with     RECHECK     hx of CVA, need for form completion        HPI:    Yoselyn Cui  is a 89 year old (1/28/1932) female with PMH significant for HTN, OAB, GERD, hx of CVA x2, second-degree heart block s/p PPM (2016), paroxysmal atrial fibrillation (on apixaban), hyperlipidemia, CAD s/ps non-STEMI in July 20219 with multi-vessel disease s/p stenting of LAD complicated by guidewire dissection (on Plavix), anemia with hx of GIB, who is being seen today for an episodic care visit.      HPI information obtained from: {[facility] HPI:525181}.     Resident of [facility] since 3/16/21. Moved from daughter's home due to increased care needs. Services reviewed, has medication administration and escorts to meals. Has left sided weakness and very low vision per nursing staff.    At age 50 had a stroke and recovered, age 75 had a second stroke with residual left sided weakness. Uses wheelchair for longer distances. Uses cane in apartment, also has a walker.     Cardiologist is Dr. Susana MD and Britni GALVEZ of [facility]. Has pacemaker. Has monitor at bedside.     Peptic ulcer in the past, \"that was cured.\"    Low vision due BL macular degeneration. Trouble reading, blurry vision.    Received both doses of her Pfizer COVID-19 vaccine, second dose 3/16.      Today's concern is:  {[facility] DX:249568}     Room 128    VS ***  Form questions ***     Past Medical and Surgical History reviewed in Epic today.    MEDICATIONS:  {Providers Please double check the med list (in the plan section >> meds & orders tab) and Discontinue any of the meds flagged by the TC to be discontinued}  Current Outpatient Medications   Medication Sig Dispense Refill     acetaminophen (TYLENOL) 325 MG tablet Take 650 mg by mouth every 6 hours as needed for " mild pain        amLODIPine (NORVASC) 5 MG tablet TAKE 1 TABLET BY MOUTH ONCE DAILY 35 tablet PRN     atorvastatin (LIPITOR) 40 MG tablet TAKE 1 TABLET BY MOUTH AT BEDTIME 35 tablet PRN     carvedilol (COREG) 12.5 MG tablet TAKE 1 TABLET BY MOUTH TWICE DAILY (TAKE WITH 6.25MG FOR A TOTAL OF 18.75MG) ***NOTE DOSAGE/STRENGTH*** 70 tablet PRN     carvedilol (COREG) 6.25 MG tablet TAKE 1 TABLET BY MOUTH TWICE DAILY WITH MEALS (TAKE WITH 12.5MG FOR A TOTAL OF 18.75MG) ***NOTE DOSAGE/STRENGTH*** 70 tablet PRN     clopidogrel (PLAVIX) 75 MG tablet TAKE 1 TABLET BY MOUTH ONCE DAILY 35 tablet PRN     ELIQUIS ANTICOAGULANT 2.5 MG tablet TAKE 1 TABLET BY MOUTH TWICE DAILY 70 tablet PRN     ferrous sulfate (IRON) 325 (65 FE) MG tablet Take 65 mg by mouth daily (with breakfast)        lisinopril (PRINIVIL,ZESTRIL) 40 MG tablet Take 40 mg by mouth daily       Multiple Vitamins-Minerals (ICAPS AREDS FORMULA) TABS Take 1 tablet by mouth 2 times daily        nitroGLYcerin (NITROSTAT) 0.4 MG sublingual tablet For chest pain place 1 tablet under the tongue every 5 minutes for 3 doses. If symptoms persist 5 minutes after 1st dose call 911. 30 tablet 0     OMEPRAZOLE PO Take 20 mg by mouth every morning        oxybutynin ER (DITROPAN-XL) 10 MG 24 hr tablet TAKE 1 TABLET BY MOUTH ONCE DAILY 28 tablet PRN     potassium chloride ER (KLOR-CON M) 20 MEQ CR tablet TAKE 1 TABLET BY MOUTH ONCE DAILY 35 tablet PRN     simethicone (MYLICON) 125 MG chewable tablet Take 1 tablet (125 mg) by mouth At Bedtime       REVIEW OF SYSTEMS:  {ROS [facility]GS:068502}    Objective:  There were no vitals taken for this visit.  Exam:  {skilled nursing physical exam :114214}    Labs:   Recent labs in [facility] reviewed by me today.    CBC RESULTS:   Recent Labs   Lab Test 08/19/19  1241 08/06/19  0648   WBC 8.4 8.6   RBC 3.56* 3.38*   HGB 10.4* 9.7*   HCT 34.0* 31.0*   MCV 96 92   MCH 29.2 28.7   MCHC 30.6* 31.3*   RDW 17.5* 14.9    288     Ferritin   Date Value Ref  Range Status   08/05/2019 130 8 - 252 ng/mL Final     Iron   Date Value Ref Range Status   11/19/2018 24 (L) 35 - 180 ug/dL Final     Iron Binding Cap   Date Value Ref Range Status   11/19/2018 372 240 - 430 ug/dL Final       Last Basic Metabolic Panel:  Recent Labs   Lab Test 02/28/20  1116 02/18/20  0909    139   POTASSIUM 4.4 3.3*   CHLORIDE 107 107   GOLDY 8.8 8.7   CO2 29 27   BUN 20 15   CR 0.72 0.65   * 98       Liver Function Studies -   Recent Labs   Lab Test 09/29/20  1023 02/18/20  0909 08/04/19  1240 07/23/19  0538 11/19/18  1216 11/19/18  1216   PROTTOTAL  --   --  6.4*  --   --  6.4*   ALBUMIN  --   --  2.5* 2.4*   < > 3.5   BILITOTAL  --   --  0.5  --   --  0.3   ALKPHOS  --   --  89  --   --  80   AST  --   --  21  --   --  22   ALT 19 22 22  --   --  19    < > = values in this interval not displayed.       TSH   Date Value Ref Range Status   10/12/2016 1.00 0.40 - 4.00 mU/L Final   05/16/2016 0.96 0.40 - 4.00 mU/L Final       ASSESSMENT/PLAN:  {[facility]GS DX:126694}     (Z86.73) History of CVA (cerebrovascular accident)  (primary encounter diagnosis)  (R53.1) Left-sided weakness  (Z91.81) History of recent fall  Comment: Stable/chronic. Hx of CVA x2 with residual left sided weakness. Fall from bed on admission to [facility]. Uses cane and wheelchair.   Plan:   -  PT/OT for falls prevention and baseline assessment in new environment  - Secondary prevention with atorvastatin 40 mg daily and Plavix, also on apixaban for afib   - Falls precautions   - PRN Tylenol for joint pain     (I48.0) Paroxysmal atrial fibrillation (H)  (Z95.0) Presence of permanent cardiac pacemaker  Comment: Stable. Second-degree heart block with PPM placement, device show occasional PAF per cardiology notes.  Plan:   - Continue apixaban per cardiology, if recurrent falls may need to discuss risk/benefit   - Continue Coreg 18.75 mg BID for rate control  - Device check 4/1  - Cardiology follow-up 4/15    (I10) Essential  "hypertension, benign  Comment: Stable.  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 Per cardiology: \"coronary artery disease with a non-STEMI in 07/2019 at which time she was found to have multivessel disease.  She underwent stenting to the mid LAD which was unfortunately complicated by a guidewire dissection requiring additional stenting to the proximal LAD and left main.  Her circumflex and RCA were not intervened upon.\" EF normalized after stenting of mid-LAD.  Plan:   - Continue Plavix per cardiology - will need close monitoring for bleeding risk as also on DOAC  - Continue statin (last LDL 60 July 2020)  - Continue PRN SL nitroglycerin  - HR and BP control as above  - Cardiology follow-up 4/15     (G31.84) Mild cognitive impairment  Comment: SLUMS 22/30  Plan:   - Will benefit [facility] for medications, meals, ADL support, safety, socialization   -  OT to assess baseline cognition and ensure appropriate supports in place    (N32.81) OAB (overactive bladder)  Comment: Chronic.   Plan:   - Continue oxybutynin for now, monitor for anticholingeric side effects    (D64.9) Normocytic anemia  Comment: Chronic, Last Hgb 11.3, MCV 90   High risk for bleeding given DOAC and Plavix.  Iron 21 L July 2020  No s/s of acute bleed reported today.  Plan:   - Check CBC, B12, iron studies with next routine labs  - Continue ferrous sulfate, consider reduction to MWF     (K21.00) Gastroesophageal reflux disease with esophagitis, unspecified whether hemorrhage  Comment: Stable. Possible hx of peptic ulcer disease and GIB.  Plan:  - Continue omeprazole " and simethicone     (H35.30) ARMD  Comment: Low vision, was getting injections prior to pandemic   Plan:   - Continue eye vitamin, use home supply and then will order from pharmacy  - Onsite eye follow-up    (E87.6) Hypokalemia  Comment: No offending medications   Potassium   Date Value Ref Range Status   02/28/2020 4.4 3.4 - 5.3 mmol/L Final   Plan:  - Continue KCl supplement for now  - Repeat labs in one month    {fgsorders:496766}  ***    {fgstime1:525783}  Electronically signed by:  STEFFANY Yusuf CNP ***  {Providers Please double check the med list (in the plan section >> meds & orders tab) and Discontinue any of the meds flagged by the TC to be discontinued}

## 2021-04-13 NOTE — LETTER
"    4/13/2021        RE: Yoselyn Shah Al  [address]  [address]  [facility] [address]        [facility]  [facility] Record Number:  [id number]  Place of Service where encounter took place:  [facility] [[id number]]  Chief Complaint   Patient presents with     RECHECK     hx of CVA, need for form completion        HPI:    Yoselyn Cui  is a 89 year old (1/28/1932) female with PMH significant for HTN, OAB, GERD, hx of CVA x2, second-degree heart block s/p PPM (2016), paroxysmal atrial fibrillation (on apixaban), hyperlipidemia, CAD s/ps non-STEMI in July 20219 with multi-vessel disease s/p stenting of LAD complicated by guidewire dissection (on Plavix), anemia with hx of GIB, who is being seen today for an episodic care visit.      HPI information obtained from: facility chart records, facility staff, patient report and [facility] chart review.     Resident of [facility] since 3/16/21. Moved from daughter's home due to increased care needs.    At age 50 had a stroke and recovered, age 75 had a second stroke with residual left sided weakness. Uses wheelchair for longer distances. Uses cane in apartment, also has a walker.   Cardiologist is Dr. Susana MD and Britni GALVEZ of [facility]. Has pacemaker. Has monitor at bedside.   Peptic ulcer in the past, \"that was cured.\" Low vision due BL macular degeneration. Trouble reading, blurry vision. Received both doses of her Pfizer COVID-19 vaccine, second dose 3/16.    Today's concern is:  Follow-up today at request of family to assess homebound status.     Resident seen in apartment after lunch, feeling well. Shows new clock that speaks the time. Prior to visit OT stopped provider in hallway to report resident declined further PT/OT services, but they were able to get some adaptive equipment in to apartment prior to discharge.     Reviewed current health status and " functional mobility. Uses cane in apartment and gets escorted by staff in wheelchair to meals in dinning room. Left sided weakness post CVA with L UE contracture and some leg weakness. Able to shower herself. Needs meals prepared and medication mgmt. Vision is very low, likely legally blind, but no formal diagnosis.  No change in bowel or bladder habits, does have urinary incontinence and wears a pad. No SOB or cough. Cardiology follow-up 4/28.     Past Medical and Surgical History reviewed in Epic today.    MEDICATIONS:  Current Outpatient Medications   Medication Sig Dispense Refill     acetaminophen (TYLENOL) 325 MG tablet Take 650 mg by mouth every 6 hours as needed for mild pain        amLODIPine (NORVASC) 5 MG tablet TAKE 1 TABLET BY MOUTH ONCE DAILY 35 tablet PRN     atorvastatin (LIPITOR) 40 MG tablet TAKE 1 TABLET BY MOUTH AT BEDTIME 35 tablet PRN     carvedilol (COREG) 12.5 MG tablet TAKE 1 TABLET BY MOUTH TWICE DAILY (TAKE WITH 6.25MG FOR A TOTAL OF 18.75MG) NOTE DOSAGE/STRENGTH 70 tablet PRN     carvedilol (COREG) 6.25 MG tablet TAKE 1 TABLET BY MOUTH TWICE DAILY WITH MEALS (TAKE WITH 12.5MG FOR A TOTAL OF 18.75MG) NOTE DOSAGE/STRENGTH 70 tablet PRN     clopidogrel (PLAVIX) 75 MG tablet TAKE 1 TABLET BY MOUTH ONCE DAILY 35 tablet PRN     ELIQUIS ANTICOAGULANT 2.5 MG tablet TAKE 1 TABLET BY MOUTH TWICE DAILY 70 tablet PRN     ferrous sulfate (IRON) 325 (65 FE) MG tablet Take 65 mg by mouth daily (with breakfast)        lisinopril (PRINIVIL,ZESTRIL) 40 MG tablet Take 40 mg by mouth daily       Multiple Vitamins-Minerals (ICAPS AREDS FORMULA) TABS Take 1 tablet by mouth 2 times daily        nitroGLYcerin (NITROSTAT) 0.4 MG sublingual tablet For chest pain place 1 tablet under the tongue every 5 minutes for 3 doses. If symptoms persist 5 minutes after 1st dose call 911. 30 tablet 0     OMEPRAZOLE PO Take 20 mg by mouth every morning        oxybutynin ER (DITROPAN-XL) 10 MG 24 hr tablet TAKE 1 TABLET BY MOUTH  ONCE DAILY 28 tablet PRN     potassium chloride ER (KLOR-CON M) 20 MEQ CR tablet TAKE 1 TABLET BY MOUTH ONCE DAILY 35 tablet PRN     simethicone (MYLICON) 125 MG chewable tablet Take 1 tablet (125 mg) by mouth At Bedtime       REVIEW OF SYSTEMS:  4 point ROS including Respiratory, CV, GI and , other than that noted in the HPI,  is negative. SLUMS 22/30.    Objective:  Temp 96.8  F (36  C)   Ht 1.524 m (5')   Wt 49.9 kg (110 lb)   SpO2 95%   BMI 21.48 kg/m    Exam:  GENERAL APPEARANCE:  Alert, in no distress, pleasant, cooperative, well groomed seated in arm chair with rolling table in front of her.  EYES: sclera clear and conjunctiva normal, no discharge, wearing glasses.  ENT:  Mouth normal, moist mucous membranes  RESP:  Non-labored breathing, palpation of chest normal, no chest wall tenderness, no respiratory distress, Lung sounds clear, patient is on room air  CV:  Palpation - no murmur/non-displaced PMI, Auscultation - rate and rhythm regular, left CW pacemaker no murmur, no rub or gallop appreciated.  VASCULAR: No edema bilateral lower extremities.    ABDOMEN:  normal bowel sounds, soft, nontender, no grimacing or guarding with palpation.  M/S: Good posture in chair. No tenderness over midline spine. Left sided weakness with UE flexion contracture. 4/5 strength L LE with seated hip flexion, 5/5 strength R LE with seated hip flexion.  SKIN:  Inspection - no visible rashes/lesions/uclerations. Palpation- no increased warmth, skin is dry and non-tender.  PSYCH: awake and alert, speech fluent,  insight and judgement fair, memory fair, without depressed or anxious affect, calm and cooperative, affable.    Labs:   Recent labs in [facility] reviewed by me today.    CBC RESULTS:   Recent Labs   Lab Test 08/19/19  1241 08/06/19  0648   WBC 8.4 8.6   RBC 3.56* 3.38*   HGB 10.4* 9.7*   HCT 34.0* 31.0*   MCV 96 92   MCH 29.2 28.7   MCHC 30.6* 31.3*   RDW 17.5* 14.9    288     Ferritin   Date Value Ref Range Status    08/05/2019 130 8 - 252 ng/mL Final     Iron   Date Value Ref Range Status   11/19/2018 24 (L) 35 - 180 ug/dL Final     Iron Binding Cap   Date Value Ref Range Status   11/19/2018 372 240 - 430 ug/dL Final       Last Basic Metabolic Panel:  Recent Labs   Lab Test 02/28/20  1116 02/18/20  0909    139   POTASSIUM 4.4 3.3*   CHLORIDE 107 107   GOLDY 8.8 8.7   CO2 29 27   BUN 20 15   CR 0.72 0.65   * 98       Liver Function Studies -   Recent Labs   Lab Test 09/29/20  1023 02/18/20  0909 08/04/19  1240 07/23/19  0538 11/19/18  1216 11/19/18  1216   PROTTOTAL  --   --  6.4*  --   --  6.4*   ALBUMIN  --   --  2.5* 2.4*   < > 3.5   BILITOTAL  --   --  0.5  --   --  0.3   ALKPHOS  --   --  89  --   --  80   AST  --   --  21  --   --  22   ALT 19 22 22  --   --  19    < > = values in this interval not displayed.       TSH   Date Value Ref Range Status   10/12/2016 1.00 0.40 - 4.00 mU/L Final   05/16/2016 0.96 0.40 - 4.00 mU/L Final       ASSESSMENT/PLAN:  (Z86.73) History of CVA (cerebrovascular accident)  (primary encounter diagnosis)  (R53.1) Left-sided weakness  (E55.9) Gait abnormality   Comment: Stable/chronic. Hx of CVA x2 with residual left sided weakness. Fall from bed on admission to [facility]. Uses cane and wheelchair, worked with HH PT/OT briefly and then declined further sessions.  Plan:   - Secondary prevention with atorvastatin 40 mg daily and Plavix, also on apixaban for afib   - Falls precautions   - PRN Tylenol for joint pain  - Completed VA form and returned to nursing office     (I48.0) Paroxysmal atrial fibrillation (H)  (Z95.0) Presence of permanent cardiac pacemaker  Comment: Stable. Second-degree heart block with PPM placement, device shows occasional PAF per cardiology notes.  Plan:   - Continue apixaban per cardiology, if recurrent falls may need to discuss risk/benefit   - Continue Coreg 18.75 mg BID for rate control  - Device check quarterly, next July 2021  - Cardiology follow-up  "4/28    (I10) Essential hypertension, benign  Comment: Stable.  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 Per cardiology: \"coronary artery disease with a non-STEMI in 07/2019 at which time she was found to have multivessel disease.  She underwent stenting to the mid LAD which was unfortunately complicated by a guidewire dissection requiring additional stenting to the proximal LAD and left main.  Her circumflex and RCA were not intervened upon.\" EF normalized after stenting of mid-LAD.  Plan:   - Continue Plavix per cardiology - will need close monitoring for bleeding risk as also on DOAC  - Continue statin (last LDL 60 July 2020)  - Continue PRN SL nitroglycerin  - HR and BP control as above  - Check BMP and CBC 4/15  - Cardiology follow-up 4/28    (G31.84) Mild cognitive impairment  Comment: SLUMS 22/30  Plan:   - Continues to benefit from supportive care in assisted setting for medications, meals, ADL support, safety, socialization     (N32.81) OAB (overactive bladder)  Comment: Chronic. + urinary incontinence.    Plan:   - Continue oxybutynin for now, monitor for anticholingeric side effects    (D64.9) Normocytic anemia  Comment: Chronic, Last Hgb 11.3, MCV 90   High risk for bleeding given DOAC and Plavix.  Iron 21 L July 2020  No s/s of acute bleed reported today.  Plan:   - Check CBC, B12, iron studies 4/15  - Continue ferrous sulfate, consider reduction to MWF     (K21.00) GERD  Comment: Stable. Possible hx of peptic ulcer disease and GIB.  Plan:  - Continue omeprazole and simethicone   - Check CBC    (H35.30) ARMD  Comment: Low vision, was getting " injections prior to pandemic   Plan:   - Continue eye vitamin, use home supply and then will order from pharmacy  - Onsite eye follow-up    (E87.6) Hypokalemia  Comment: No offending medications   Potassium   Date Value Ref Range Status   02/28/2020 4.4 3.4 - 5.3 mmol/L Final   Plan:  - Continue KCl supplement for now  - Check BMP 4/15    Orders sent to facility electronically via [employer].   - Check follow-up labs on 4/15/21: BMP, CBC, iron studies, B12, TSH, vitamin D level  - VA form completed at family request    Electronically signed by:  STEFFANY Yusuf CNP               Sincerely,        STEFFANY Yusuf CNP

## 2021-04-15 NOTE — LETTER
"    4/15/2021        RE: Yoselyn Cui  [facility]  [address]  [address]  [facility] [address]        Yoselyn Cui is a 89 year old female seen April 15, 2021 at [facility] where she has resided for one month (admit 3/2021) seen for initial visit.   Pt is seen in her apartment up to .   States \"I'm just fine right now.   I get tired all the time though.\"    She denies any current pain, feels like she is adjusting well to her new living situation.   Gets about her apartment with cane or walker, uses WC for longer distances.     Pt had been living with her daughter but cares needs such that she has transitioned to AL.   She had an early stroke at age 50, then a second stroke age 75.  Residual left hemiparesis.    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       Past Medical History:   Diagnosis Date     AV block, 2nd degree 5/16/2016     Cerebrovascular accident (H) 8/22/2016     COKER (dyspnea on exertion) 8/22/2016     Generalized weakness 10/12/2016     GIB (gastrointestinal bleeding)      History of colonic polyps 8/22/2016     HTN (hypertension) 8/22/2016     Iron deficiency anemia      Melanoma of skin (H)-rt arm 8/22/2016     Mixed hyperlipidemia 8/22/2016     Pacemaker     implanted 5-     PAF (paroxysmal atrial fibrillation) (H)      SSS (sick sinus syndrome) (H) 8/22/2016       Past Surgical History:   Procedure Laterality Date     APPENDECTOMY  1960s     C LIGATE FALLOPIAN TUBE  1960s     C REMV CATARACT INTRACAP,INSERT LENS Right 09/20/2017     CV CORONARY ANGIOGRAM N/A 7/17/2019    Procedure: Coronary Angiogram;  Surgeon: Irvin Hutson MD;  Location:  HEART CARDIAC CATH LAB     CV HEART CATHETERIZATION WITH POSSIBLE INTERVENTION N/A 7/18/2019    Procedure: Heart Catheterization with Possible Intervention;  Surgeon: Jayleen Torres MD;  Location:  HEART CARDIAC CATH LAB     CV LEFT HEART CATH N/A 7/17/2019    Procedure: Left Heart Cath;  " Surgeon: Irvin Hutson MD;  Location:  HEART CARDIAC CATH LAB     CV LEFT VENTRICULOGRAM N/A 7/17/2019    Procedure: Left Ventriculogram;  Surgeon: Irvin Hutson MD;  Location:  HEART CARDIAC CATH LAB     CV PCI STENT DRUG ELUTING N/A 7/18/2019    Procedure: PCI Stent Drug Eluting;  Surgeon: Jayleen Torres MD;  Location:  HEART CARDIAC CATH LAB     ESOPHAGOSCOPY, GASTROSCOPY, DUODENOSCOPY (EGD), COMBINED N/A 11/20/2018    Procedure: ESOPHAGOSCOPY, GASTROSCOPY, DUODENOSCOPY (EGD)  Room 523 with biopsies using cold biopsy forceps;  Surgeon: Ayala Soto MD;  Location:  GI     HC REMV CATARACT EXTRACAP,INSERT LENS, W/O ECP Left 10/04/2017     IMPLANT PACEMAKER  05/17/2016     SH:   2019, she has 4 daughters   Pt previously living with her daughter Maryjane.    Thirty pack year smoking history       ROS: SLUMS 22/30    Decreased vision   Weight stable, eating well.     C/o urinary frequency, when she stands up she is incontinent of urine.       EXAM: NAD  /70   Pulse 75   Temp 96.2  F (35.7  C)   Resp 20   Wt 49.9 kg (110 lb)   SpO2 94%   BMI 21.48 kg/m     Neck supple without adenopathy  Lungs with decreased BS, LLL crackles  Heart RRR s1 prominent S1 @75   Abd soft, NT, no distention or guarding, +BS  EXT:  Trace ankle edema  Neuro: left hemiparesis, LUE flexion contracture, LLE weakness   Psych: affect okay, pleasant      Last Comprehensive Metabolic Panel:  Sodium   Date Value Ref Range Status   04/15/2021 136 133 - 144 mmol/L Final     Potassium   Date Value Ref Range Status   04/15/2021 4.2 3.4 - 5.3 mmol/L Final     Chloride   Date Value Ref Range Status   04/15/2021 105 94 - 109 mmol/L Final     Carbon Dioxide   Date Value Ref Range Status   04/15/2021 27 20 - 32 mmol/L Final     Anion Gap   Date Value Ref Range Status   04/15/2021 4 3 - 14 mmol/L Final     Glucose   Date Value Ref Range Status   04/15/2021 82 70 - 99 mg/dL Final     Urea Nitrogen   Date  Value Ref Range Status   04/15/2021 13 7 - 30 mg/dL Final     Creatinine   Date Value Ref Range Status   04/15/2021 0.77 0.52 - 1.04 mg/dL Final     GFR Estimate   Date Value Ref Range Status   04/15/2021 68 >60 mL/min/[1.73_m2] Final     Comment:     Non  GFR Calc  Starting 12/18/2018, serum creatinine based estimated GFR (eGFR) will be   calculated using the Chronic Kidney Disease Epidemiology Collaboration   (CKD-EPI) equation.       Calcium   Date Value Ref Range Status   04/15/2021 8.4 (L) 8.5 - 10.1 mg/dL Final     Lab Results   Component Value Date    WBC 5.8 04/15/2021      HGB 11.0 04/15/2021      MCV 91 04/15/2021       04/15/2021      Fe 26      10% saturation    Ferritin 30   TSH   Date Value Ref Range Status   04/15/2021 1.44 0.40 - 4.00 mU/L Final      ECHO 2019  Interpretation Summary  The left ventricle is normal in size.   Left ventricular systolic function is normal.  The visual ejection fraction is estimated at 55-60%.   No regional wall motion abnormalities noted.  The aortic valve is trileaflet with aortic valve sclerosis.    There is trace aortic regurgitation.  The aortic root is normal size.  Thickening of the left coronary cusp and aortic root identified, but unchanged compared to the previous study.   Overall, no change. 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      IMP/PLAN:   (R53.1) Left-sided weakness   (Z86.73) History of CVA (cerebrovascular accident)  Comment: left hemiparesis with contractures   Plan: assist with mobility     Atorvastatin 40 mg/day and bp meds for secondary prevention       (I48.0) Paroxysmal atrial fibrillation (H)  (Z95.0) Cardiac pacemaker in situ  Comment:   Pulse Readings from Last 4 Encounters:   04/15/21 75   03/25/21 88   02/28/20 74   02/18/20 76      Plan: carvedilol 18.75 mg bid for VR control  Apixaban 2.5 mg bid for stroke prophylaxis    Pt has Cardiology clinic appointment 4/29: asked her to address ongoing  Plavix tx, as below.       (I10) Essential hypertension, benign  Comment:   BP Readings from Last 3 Encounters:   04/15/21 135/70   03/25/21 130/66   02/28/20 122/60      Plan: amlodipine 5 mg/day, carvedilol 18.75 mg bid, lisinopril 40 mg/day  She is on KCl but not on diuretic.  Discontinue and recheck K+ level.       (G31.84) MCI (mild cognitive impairment)  Comment: scores as above   Plan: AL support for med admin, meals, activity       (N32.81) OAB (overactive bladder)  Comment: symptoms as above, pt feels she needs oxybutynin  Plan: continue oxybutynin ER 10 mg/day      (D50.9) Iron deficiency anemia, unspecified iron deficiency anemia type  Comment: low Fe as above     Plan: pt is on both Plavix and apixaban   Would look to discontinuation of the former.    Continue Fe sulfate 325 mg/day    She does have a history of PUD and GI bleed; consider referral for GI workup if pt is interested.       (H35.30) ARMD (age related macular degeneration)  Comment: low vision   Plan: supportive care  AREDS bid      Juli Loya MD         Sincerely,        Juli Loya MD

## 2021-04-16 NOTE — TELEPHONE ENCOUNTER
"Prior Authorization Retail Medication Request    Medication/Dose:vitamin D3 (CHOLECALCIFEROL) 1.25 MG (80048 UT) capsule - Take 1 capsule (50,000 Units) by mouth once a week   ICD code (if different than what is on RX):  Rationale:  Vitamin D level is 6 as of 4/15/21 which is well below desired level.   Following protcol as outlined by UpToDate: \"For patients with serum 25(OH)D <12 ng/mL (30 nmol/L), not infrequently associated with hypocalcemia and osteomalacia, one common approach is to treat with 50,000 international units (1250 micrograms) of vitamin D2 or D3 orally once per week for six to eight weeks, and then 800 international units (20 micrograms) of vitamin D3 daily thereafter.\"   If alternative formulation preferred please make provider aware.         Insurance Name:  [id number]        Pharmacy Information (if different than what is on RX)  Name:  [facility]  Phone:  [phone]  "

## 2021-04-16 NOTE — PROGRESS NOTES
- vitamin D3 (CHOLECALCIFEROL) 1.25 MG (54729 UT) capsule; Take 1 capsule (50,000 Units) by mouth once a week  Dispense: 8 capsule; Refill: 0 dx Vitamin D deficiency

## 2021-04-19 NOTE — TELEPHONE ENCOUNTER
PA Initiation    Medication: vitamin D3 (CHOLECALCIFEROL) 1.25 MG (81781 UT) capsule- INITIATED  Insurance Company: [employer] - Phone [phone] Fax [phone]  Pharmacy Filling the Rx: [facility] - [place], [place] - [address] [place] [address]  Filling Pharmacy Phone: [phone]  Filling Pharmacy Fax: [phone]  Start Date: 4/19/2021

## 2021-04-20 NOTE — TELEPHONE ENCOUNTER
PRIOR AUTHORIZATION DENIED    Medication: vitamin D3 (CHOLECALCIFEROL) 1.25 MG (34975 UT) capsule- DENIED    Denial Date: 4/20/2021    Denial Rational: DRUG NOT COVERED UNDER PLAN.              Appeal Information:               Central Prior Authorization Team  Phone: [phone]

## 2021-04-25 NOTE — PROGRESS NOTES
"Yoselyn Cui is a 89 year old female seen April 15, 2021 at [facility] where she has resided for one month (admit 3/2021) seen for initial visit.   Pt is seen in her apartment up to .   States \"I'm just fine right now.   I get tired all the time though.\"    She denies any current pain, feels like she is adjusting well to her new living situation.   Gets about her apartment with cane or walker, uses WC for longer distances.     Pt had been living with her daughter but cares needs such that she has transitioned to AL.   She had an early stroke at age 50, then a second stroke age 75.  Residual left hemiparesis.    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       Past Medical History:   Diagnosis Date     AV block, 2nd degree 5/16/2016     Cerebrovascular accident (H) 8/22/2016     COKER (dyspnea on exertion) 8/22/2016     Generalized weakness 10/12/2016     GIB (gastrointestinal bleeding)      History of colonic polyps 8/22/2016     HTN (hypertension) 8/22/2016     Iron deficiency anemia      Melanoma of skin (H)-rt arm 8/22/2016     Mixed hyperlipidemia 8/22/2016     Pacemaker     implanted 5-     PAF (paroxysmal atrial fibrillation) (H)      SSS (sick sinus syndrome) (H) 8/22/2016       Past Surgical History:   Procedure Laterality Date     APPENDECTOMY  1960s     C LIGATE FALLOPIAN TUBE  1960s     C REMV CATARACT INTRACAP,INSERT LENS Right 09/20/2017     CV CORONARY ANGIOGRAM N/A 7/17/2019    Procedure: Coronary Angiogram;  Surgeon: Irvin Hutson MD;  Location:  HEART CARDIAC CATH LAB     CV HEART CATHETERIZATION WITH POSSIBLE INTERVENTION N/A 7/18/2019    Procedure: Heart Catheterization with Possible Intervention;  Surgeon: Jayleen Torres MD;  Location:  HEART CARDIAC CATH LAB     CV LEFT HEART CATH N/A 7/17/2019    Procedure: Left Heart Cath;  Surgeon: Irvin Hutson MD;  Location:  HEART CARDIAC CATH LAB     CV LEFT VENTRICULOGRAM N/A 7/17/2019    " Procedure: Left Ventriculogram;  Surgeon: Irvin Hutson MD;  Location:  HEART CARDIAC CATH LAB     CV PCI STENT DRUG ELUTING N/A 7/18/2019    Procedure: PCI Stent Drug Eluting;  Surgeon: Jayleen Torres MD;  Location:  HEART CARDIAC CATH LAB     ESOPHAGOSCOPY, GASTROSCOPY, DUODENOSCOPY (EGD), COMBINED N/A 11/20/2018    Procedure: ESOPHAGOSCOPY, GASTROSCOPY, DUODENOSCOPY (EGD)  Room 523 with biopsies using cold biopsy forceps;  Surgeon: Ayala Soto MD;  Location:  GI     HC REMV CATARACT EXTRACAP,INSERT LENS, W/O ECP Left 10/04/2017     IMPLANT PACEMAKER  05/17/2016     SH:   2019, she has 4 daughters   Pt previously living with her daughter Maryjane.    Thirty pack year smoking history       ROS: SLUMS 22/30    Decreased vision   Weight stable, eating well.     C/o urinary frequency, when she stands up she is incontinent of urine.       EXAM: NAD  /70   Pulse 75   Temp 96.2  F (35.7  C)   Resp 20   Wt 49.9 kg (110 lb)   SpO2 94%   BMI 21.48 kg/m     Neck supple without adenopathy  Lungs with decreased BS, LLL crackles  Heart RRR s1 prominent S1 @75   Abd soft, NT, no distention or guarding, +BS  EXT:  Trace ankle edema  Neuro: left hemiparesis, LUE flexion contracture, LLE weakness   Psych: affect okay, pleasant      Last Comprehensive Metabolic Panel:  Sodium   Date Value Ref Range Status   04/15/2021 136 133 - 144 mmol/L Final     Potassium   Date Value Ref Range Status   04/15/2021 4.2 3.4 - 5.3 mmol/L Final     Chloride   Date Value Ref Range Status   04/15/2021 105 94 - 109 mmol/L Final     Carbon Dioxide   Date Value Ref Range Status   04/15/2021 27 20 - 32 mmol/L Final     Anion Gap   Date Value Ref Range Status   04/15/2021 4 3 - 14 mmol/L Final     Glucose   Date Value Ref Range Status   04/15/2021 82 70 - 99 mg/dL Final     Urea Nitrogen   Date Value Ref Range Status   04/15/2021 13 7 - 30 mg/dL Final     Creatinine   Date Value Ref Range Status   04/15/2021  0.77 0.52 - 1.04 mg/dL Final     GFR Estimate   Date Value Ref Range Status   04/15/2021 68 >60 mL/min/[1.73_m2] Final     Comment:     Non  GFR Calc  Starting 12/18/2018, serum creatinine based estimated GFR (eGFR) will be   calculated using the Chronic Kidney Disease Epidemiology Collaboration   (CKD-EPI) equation.       Calcium   Date Value Ref Range Status   04/15/2021 8.4 (L) 8.5 - 10.1 mg/dL Final     Lab Results   Component Value Date    WBC 5.8 04/15/2021      HGB 11.0 04/15/2021      MCV 91 04/15/2021       04/15/2021      Fe 26      10% saturation    Ferritin 30   TSH   Date Value Ref Range Status   04/15/2021 1.44 0.40 - 4.00 mU/L Final      ECHO 2019  Interpretation Summary  The left ventricle is normal in size.   Left ventricular systolic function is normal.  The visual ejection fraction is estimated at 55-60%.   No regional wall motion abnormalities noted.  The aortic valve is trileaflet with aortic valve sclerosis.    There is trace aortic regurgitation.  The aortic root is normal size.  Thickening of the left coronary cusp and aortic root identified, but unchanged compared to the previous study.   Overall, no change. 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      IMP/PLAN:   (R53.1) Left-sided weakness   (Z86.73) History of CVA (cerebrovascular accident)  Comment: left hemiparesis with contractures   Plan: assist with mobility     Atorvastatin 40 mg/day and bp meds for secondary prevention       (I48.0) Paroxysmal atrial fibrillation (H)  (Z95.0) Cardiac pacemaker in situ  Comment:   Pulse Readings from Last 4 Encounters:   04/15/21 75   03/25/21 88   02/28/20 74   02/18/20 76      Plan: carvedilol 18.75 mg bid for VR control  Apixaban 2.5 mg bid for stroke prophylaxis    Pt has Cardiology clinic appointment 4/29: asked her to address ongoing Plavix tx, as below.       (I10) Essential hypertension, benign  Comment:   BP Readings from Last 3 Encounters:    04/15/21 135/70   03/25/21 130/66   02/28/20 122/60      Plan: amlodipine 5 mg/day, carvedilol 18.75 mg bid, lisinopril 40 mg/day  She is on KCl but not on diuretic.  Discontinue and recheck K+ level.       (G31.84) MCI (mild cognitive impairment)  Comment: scores as above   Plan: AL support for med admin, meals, activity       (N32.81) OAB (overactive bladder)  Comment: symptoms as above, pt feels she needs oxybutynin  Plan: continue oxybutynin ER 10 mg/day      (D50.9) Iron deficiency anemia, unspecified iron deficiency anemia type  Comment: low Fe as above     Plan: pt is on both Plavix and apixaban   Would look to discontinuation of the former.    Continue Fe sulfate 325 mg/day    She does have a history of PUD and GI bleed; consider referral for GI workup if pt is interested.       (H35.30) ARMD (age related macular degeneration)  Comment: low vision   Plan: supportive care  AREDS bid      Juli Loya MD

## 2021-04-28 NOTE — LETTER
4/28/2021    Brittany Florez, APRN CNP  [address]  [facility] [address]    RE: Yoselyn Cui       Dear Colleague,    I had the pleasure of seeing Yoselyn Cui in the [facility].    HPI and Plan:   See dictation    Orders Placed This Encounter   Procedures     Basic metabolic panel     Lipid Profile     ALT     Follow-Up with Cardiac Advanced Practice Provider     Follow-Up with Cardiologist     24 Hour Blood Pressure Monitor - Adult       No orders of the defined types were placed in this encounter.      Medications Discontinued During This Encounter   Medication Reason     carvedilol (COREG) 12.5 MG tablet          Encounter Diagnoses   Name Primary?     Cardiac pacemaker in situ Yes     Paroxysmal atrial fibrillation (H)      Coronary artery disease involving native coronary artery of native heart without angina pectoris      Essential hypertension      AV block, 2nd degree      Carotid stenosis, asymptomatic, bilateral      Aortic dissection, thoracic (H)      Pacemaker      Elevated blood pressure reading without diagnosis of hypertension        CURRENT MEDICATIONS:      ALLERGIES   No Known Allergies    PAST MEDICAL HISTORY:  Past Medical History:   Diagnosis Date     AV block, 2nd degree 5/16/2016     Cerebrovascular accident (H) 8/22/2016     COKER (dyspnea on exertion) 8/22/2016     Generalized weakness 10/12/2016     GIB (gastrointestinal bleeding)      History of colonic polyps 8/22/2016     HTN (hypertension) 8/22/2016     Iron deficiency anemia      Melanoma of skin (H)-rt arm 8/22/2016     Mixed hyperlipidemia 8/22/2016     Pacemaker     implanted 5-     PAF (paroxysmal atrial fibrillation) (H)      SSS (sick sinus syndrome) (H) 8/22/2016       PAST SURGICAL HISTORY:  Past Surgical History:   Procedure Laterality Date     APPENDECTOMY  1960s     C LIGATE FALLOPIAN TUBE  1960s     C REMV CATARACT INTRACAP,INSERT LENS Right 09/20/2017      CV CORONARY ANGIOGRAM N/A 7/17/2019    Procedure: Coronary Angiogram;  Surgeon: Irvin Hutson MD;  Location:  HEART CARDIAC CATH LAB     CV HEART CATHETERIZATION WITH POSSIBLE INTERVENTION N/A 7/18/2019    Procedure: Heart Catheterization with Possible Intervention;  Surgeon: Jayleen Torres MD;  Location: [facility] CARDIAC CATH LAB     CV LEFT HEART CATH N/A 7/17/2019    Procedure: Left Heart Cath;  Surgeon: Irvin Hutson MD;  Location:  HEART CARDIAC CATH LAB     CV LEFT VENTRICULOGRAM N/A 7/17/2019    Procedure: Left Ventriculogram;  Surgeon: Irvin Hutson MD;  Location:  HEART CARDIAC CATH LAB     CV PCI STENT DRUG ELUTING N/A 7/18/2019    Procedure: PCI Stent Drug Eluting;  Surgeon: Jayleen Torres MD;  Location:  HEART CARDIAC CATH LAB     ESOPHAGOSCOPY, GASTROSCOPY, DUODENOSCOPY (EGD), COMBINED N/A 11/20/2018    Procedure: ESOPHAGOSCOPY, GASTROSCOPY, DUODENOSCOPY (EGD)  Room 523 with biopsies using cold biopsy forceps;  Surgeon: Ayala Soto MD;  Location:  GI     HC REMV CATARACT EXTRACAP,INSERT LENS, W/O ECP Left 10/04/2017     IMPLANT PACEMAKER  05/17/2016       FAMILY HISTORY:  Family History   Problem Relation Age of Onset     Coronary Artery Disease Father      Coronary Artery Disease Brother      Coronary Artery Disease Sister      Colon Cancer No family hx of        SOCIAL HISTORY:  Social History     Socioeconomic History     Marital status:      Spouse name: None     Number of children: None     Years of education: None     Highest education level: None   Occupational History     None   Social Needs     Financial resource strain: None     Food insecurity     Worry: None     Inability: None     Transportation needs     Medical: None     Non-medical: None   Tobacco Use     Smoking status: Never Smoker     Smokeless tobacco: Never Used   Substance and Sexual Activity     Alcohol use: No     Alcohol/week: 0.0 standard drinks     Drug  "use: No     Sexual activity: None   Lifestyle     Physical activity     Days per week: None     Minutes per session: None     Stress: None   Relationships     Social connections     Talks on phone: None     Gets together: None     Attends Yarsanism service: None     Active member of club or organization: None     Attends meetings of clubs or organizations: None     Relationship status: None     Intimate partner violence     Fear of current or ex partner: None     Emotionally abused: None     Physically abused: None     Forced sexual activity: None   Other Topics Concern     Parent/sibling w/ CABG, MI or angioplasty before 65F 55M? Not Asked      Service Not Asked     Blood Transfusions Not Asked     Caffeine Concern No     Comment: 3+ cups daily     Occupational Exposure Not Asked     Hobby Hazards Not Asked     Sleep Concern Not Asked     Stress Concern Not Asked     Weight Concern Not Asked     Special Diet No     Comment: trying to cut down on sodium, watching cholesterol     Back Care Not Asked     Exercise No     Comment: limited     Bike Helmet Not Asked     Seat Belt Not Asked     Self-Exams Not Asked   Social History Narrative     None       Review of Systems:  Skin:  Negative       Eyes:  Positive for glasses;visual blurring macular degeneration  ENT:  Positive for hearing loss    Respiratory:  Negative       Cardiovascular:    edema;Positive for;fatigue    Gastroenterology: Positive for constipation    Genitourinary:  Positive for urinary frequency    Musculoskeletal:  Negative      Neurologic:  Positive for numbness or tingling of hands    Psychiatric:  Negative      Heme/Lymph/Imm:  Negative      Endocrine:  Negative        Physical Exam:  Vitals: BP (!) 151/83   Pulse 73   Ht 1.549 m (5' 1\")   Wt 49.9 kg (110 lb)   BMI 20.78 kg/m      Constitutional:  cooperative, alert and oriented, well developed, well nourished, in no acute distress frail;thin      Skin:  warm and dry to the touch   " pacemaker incision in the left infraclavicular area was well-healed      Head:  normocephalic        Eyes:  pupils equal and round        Lymph:      ENT:  no pallor or cyanosis        Neck:  JVP normal;carotid pulses are full and equal bilaterally bilateral carotid bruit      Respiratory:  normal breath sounds, clear to auscultation, normal A-P diameter, normal symmetry, normal respiratory excursion, no use of accessory muscles         Cardiac: regular rhythm;normal S1 and S2;no murmurs, gallops or rubs detected   S4            pulses full and equal, no bruits auscultated                                        GI:  not assessed this visit        Extremities and Muscular Skeletal:  no deformities, clubbing, cyanosis, erythema observed       LLE edema;1+ Boot R LE    Neurological:  affect appropriate left sided weakness      Psych:  Alert and Oriented x 3      Thank you for allowing me to participate in the care of your patient.      Sincerely,     Philipp Meza MD, MD     [facility]    cc:   Brennen Collier, [facility]  [address] [place] [address]

## 2021-04-28 NOTE — PROGRESS NOTES
HPI and Plan:   See dictation    Orders Placed This Encounter   Procedures     Basic metabolic panel     Lipid Profile     ALT     Follow-Up with Cardiac Advanced Practice Provider     Follow-Up with Cardiologist     24 Hour Blood Pressure Monitor - Adult       No orders of the defined types were placed in this encounter.      Medications Discontinued During This Encounter   Medication Reason     carvedilol (COREG) 12.5 MG tablet          Encounter Diagnoses   Name Primary?     Cardiac pacemaker in situ Yes     Paroxysmal atrial fibrillation (H)      Coronary artery disease involving native coronary artery of native heart without angina pectoris      Essential hypertension      AV block, 2nd degree      Carotid stenosis, asymptomatic, bilateral      Aortic dissection, thoracic (H)      Pacemaker      Elevated blood pressure reading without diagnosis of hypertension        CURRENT MEDICATIONS:      ALLERGIES   No Known Allergies    PAST MEDICAL HISTORY:  Past Medical History:   Diagnosis Date     AV block, 2nd degree 5/16/2016     Cerebrovascular accident (H) 8/22/2016     COKER (dyspnea on exertion) 8/22/2016     Generalized weakness 10/12/2016     GIB (gastrointestinal bleeding)      History of colonic polyps 8/22/2016     HTN (hypertension) 8/22/2016     Iron deficiency anemia      Melanoma of skin (H)-rt arm 8/22/2016     Mixed hyperlipidemia 8/22/2016     Pacemaker     implanted 5-     PAF (paroxysmal atrial fibrillation) (H)      SSS (sick sinus syndrome) (H) 8/22/2016       PAST SURGICAL HISTORY:  Past Surgical History:   Procedure Laterality Date     APPENDECTOMY  1960s     C LIGATE FALLOPIAN TUBE  1960s     C REMV CATARACT INTRACAP,INSERT LENS Right 09/20/2017     CV CORONARY ANGIOGRAM N/A 7/17/2019    Procedure: Coronary Angiogram;  Surgeon: Irvin Hutson MD;  Location:  HEART CARDIAC CATH LAB     CV HEART CATHETERIZATION WITH POSSIBLE INTERVENTION N/A 7/18/2019    Procedure: Heart  Catheterization with Possible Intervention;  Surgeon: Jayleen Torres MD;  Location:  HEART CARDIAC CATH LAB     CV LEFT HEART CATH N/A 7/17/2019    Procedure: Left Heart Cath;  Surgeon: Irvin Hutson MD;  Location:  HEART CARDIAC CATH LAB     CV LEFT VENTRICULOGRAM N/A 7/17/2019    Procedure: Left Ventriculogram;  Surgeon: Irvin Hutson MD;  Location:  HEART CARDIAC CATH LAB     CV PCI STENT DRUG ELUTING N/A 7/18/2019    Procedure: PCI Stent Drug Eluting;  Surgeon: Jayleen Torres MD;  Location:  HEART CARDIAC CATH LAB     ESOPHAGOSCOPY, GASTROSCOPY, DUODENOSCOPY (EGD), COMBINED N/A 11/20/2018    Procedure: ESOPHAGOSCOPY, GASTROSCOPY, DUODENOSCOPY (EGD)  Room 523 with biopsies using cold biopsy forceps;  Surgeon: Ayala Soto MD;  Location:  GI     HC REMV CATARACT EXTRACAP,INSERT LENS, W/O ECP Left 10/04/2017     IMPLANT PACEMAKER  05/17/2016       FAMILY HISTORY:  Family History   Problem Relation Age of Onset     Coronary Artery Disease Father      Coronary Artery Disease Brother      Coronary Artery Disease Sister      Colon Cancer No family hx of        SOCIAL HISTORY:  Social History     Socioeconomic History     Marital status:      Spouse name: None     Number of children: None     Years of education: None     Highest education level: None   Occupational History     None   Social Needs     Financial resource strain: None     Food insecurity     Worry: None     Inability: None     Transportation needs     Medical: None     Non-medical: None   Tobacco Use     Smoking status: Never Smoker     Smokeless tobacco: Never Used   Substance and Sexual Activity     Alcohol use: No     Alcohol/week: 0.0 standard drinks     Drug use: No     Sexual activity: None   Lifestyle     Physical activity     Days per week: None     Minutes per session: None     Stress: None   Relationships     Social connections     Talks on phone: None     Gets together: None     Attends  "Anabaptist service: None     Active member of club or organization: None     Attends meetings of clubs or organizations: None     Relationship status: None     Intimate partner violence     Fear of current or ex partner: None     Emotionally abused: None     Physically abused: None     Forced sexual activity: None   Other Topics Concern     Parent/sibling w/ CABG, MI or angioplasty before 65F 55M? Not Asked      Service Not Asked     Blood Transfusions Not Asked     Caffeine Concern No     Comment: 3+ cups daily     Occupational Exposure Not Asked     Hobby Hazards Not Asked     Sleep Concern Not Asked     Stress Concern Not Asked     Weight Concern Not Asked     Special Diet No     Comment: trying to cut down on sodium, watching cholesterol     Back Care Not Asked     Exercise No     Comment: limited     Bike Helmet Not Asked     Seat Belt Not Asked     Self-Exams Not Asked   Social History Narrative     None       Review of Systems:  Skin:  Negative       Eyes:  Positive for glasses;visual blurring macular degeneration  ENT:  Positive for hearing loss    Respiratory:  Negative       Cardiovascular:    edema;Positive for;fatigue    Gastroenterology: Positive for constipation    Genitourinary:  Positive for urinary frequency    Musculoskeletal:  Negative      Neurologic:  Positive for numbness or tingling of hands    Psychiatric:  Negative      Heme/Lymph/Imm:  Negative      Endocrine:  Negative        Physical Exam:  Vitals: BP (!) 151/83   Pulse 73   Ht 1.549 m (5' 1\")   Wt 49.9 kg (110 lb)   BMI 20.78 kg/m      Constitutional:  cooperative, alert and oriented, well developed, well nourished, in no acute distress frail;thin      Skin:  warm and dry to the touch   pacemaker incision in the left infraclavicular area was well-healed      Head:  normocephalic        Eyes:  pupils equal and round        Lymph:      ENT:  no pallor or cyanosis        Neck:  JVP normal;carotid pulses are full and equal " bilaterally bilateral carotid bruit      Respiratory:  normal breath sounds, clear to auscultation, normal A-P diameter, normal symmetry, normal respiratory excursion, no use of accessory muscles         Cardiac: regular rhythm;normal S1 and S2;no murmurs, gallops or rubs detected   S4            pulses full and equal, no bruits auscultated                                        GI:  not assessed this visit        Extremities and Muscular Skeletal:  no deformities, clubbing, cyanosis, erythema observed       LLE edema;1+ Boot R LE    Neurological:  affect appropriate left sided weakness      Psych:  Alert and Oriented x 3        CC  DO AKILA [facility]  [address] [place] [address]

## 2021-04-28 NOTE — PROGRESS NOTES
Service Date: 04/28/2021    CARDIOLOGY FOLLOWUP VISIT    REFERRING PHYSICIAN:  Dr. Brennen Collier    HISTORY OF PRESENT ILLNESS:  It is my pleasure to see your patient, Yoselyn Cui.  This is somewhat of a complicated patient.  This patient has a history of paroxysmal atrial fibrillation for which she is chronically anticoagulated.  She also has high-grade AV conduction system disease for which she had a permanent pacemaker implanted.  She has a history of essential hypertension and a past history of stroke with left hemiparesis.  This patient was also found in 07/2019 to have severe 3-vessel coronary artery disease but was turned down for surgery, as she was felt to be a poor candidate.  Ultimately, she underwent intervention to the LAD but had dissection back into the left main coronary artery which then went back into the aortic root and ascending aorta.  The dissection was treated conservatively.  There was stenting of the left main and the proximal left anterior descending artery with drug-eluting stents.  The circumflex and the right coronary arteries, both which have high-grade stenosis, were then treated medically.  With the medical treatment, she responded very well.  She has no symptoms of angina pectoris.  Her ejection fraction is normal.    Since I last saw her, she still continues to do well with no chest pains, no chest pressure and no unusual shortness of breath.  She does feel tired but she does far less exercise now since she used to go up stairs in her own home and now she is in a care facility on the same floor.  Her blood pressure was mildly raised here today at 151/83 and repeat blood pressure again was 158/84.  Normally, her blood pressure is quite well controlled.    Her lipid profile was good with an LDL of 50, HDL of 53 and triglycerides of 74 with total cholesterol of 118.    The patient is taking clopidogrel for her stents and her left main stents and is taking Eliquis for stroke at the  lower dose.  She has no bleeding issues and given that she has had extensive left main and proximal LAD stenting, we preferred to keep her on the clopidogrel medication.  As I mentioned, she has no bleeding issues at all.    Her basic metabolic profile was relatively normal.  The sodium is borderline at 133, potassium is normal at 4.3, BUN is 13 and creatinine is 0.75 with GFR of 71.    Interrogation of her pacemaker was performed 4 weeks ago and this showed that she had 682 mode switches which comprised less than 1% of the time.  The EGMs show atrial tachycardia and atrial fibrillation with the longest episode lasting 2 minutes.  The patient is taking Eliquis as I mentioned above.    IMPRESSION:    1.  Coronary artery disease.  The patient is asymptomatic with respect to coronary artery disease with no symptoms suggestive of angina pectoris.    2.  Excellent lipid profile well within secondary prevention guidelines.    3.  High-grade conduction system disease for which she has a permanent pacemaker implanted.  The permanent pacemaker is functioning normally.  4.  Paroxysmal atrial fibrillation for which she is taking carvedilol 18.75 mg twice a day as well as Eliquis 2.5 mg twice a day.  5.  Hypertension.  Her blood pressure is raised but typically is well controlled.  She had not taken her blood pressure medications this morning.  6.  Past history of CVA with residual left hemiparesis.    PLAN:  We will continue the patient on her present medications.  In particular, I would be in favor of continuing the clopidogrel medication because she does have a history of stenting of the left main and proximal left anterior descending artery.  We will have the patient wear a 24-hour blood pressure monitor to determine whether the patient truly has hypertension.  I will have the patient return in 6 months' time to see Britni Joseph PA-C, and she will follow with me in 1 year's time.  At that stage, I will repeat her  echocardiogram.      Many thanks again for allowing me to be involved the care of this very nice patient.    Philipp Carpenter MD, FACC     cc:  Dr. Brennen Florez, KELI Carpenter MD, FACC        D: 2021   T: 2021   MT: kenzie    Name:     ANNE MARIE AMBROSE  MRN:      [id number]        Account:      [id number]   :      1932           Service Date: 2021       Document: [id number]

## 2021-06-29 NOTE — TELEPHONE ENCOUNTER
Yoselyn Cui  1/28/1932  ORDERS:  - Completed 8 weeks of vitamin D 50,000 units - discontinue   - Vitamin D3 (CHOLECALCIFEROL) 25 mcg (1000 units) tablet; Take 1 tablet (25 mcg) by mouth daily  Dispense: 30 tablet; Refill: 98 dx Vitamin D deficiency  Electronically signed by:   STEFFANY Yusuf CNP  06/29/21 2:30 PM

## 2021-07-12 NOTE — PROGRESS NOTES
[facility]  Chief Complaint   Patient presents with     Annual Visit     [place] [facility] Record Number:  [id number]  Place of Service where encounter took place:  [facility] [[id number]]    HPI:    Yoselyn Cui  is a 89 year old  (1/28/1932) female with PMH significant for HTN, OAB, GERD, hx of CVA x2, second-degree heart block s/p PPM (2016), paroxysmal atrial fibrillation (on apixaban), hyperlipidemia, CAD s/ps non-STEMI in July 20219 with multi-vessel disease s/p stenting of LAD 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     HPI information obtained from: facility chart records, facility staff, patient report and [facility] chart review.      [facility]. Moved from daughter's home due to increased care needs.    At age 50 had a stroke and recovered, than at age 75 had a second stroke with residual left sided weakness of upper and lower extremity. Uses wheelchair for longer distances. Uses cane in apartment, also has a walker.  Cardiologist is Dr. Susana MD and Britni GALVEZ of [facility]. Has pacemaker due to high-grade AV conduction system diease. Has monitor at bedside. Last cardiology visit 4/28/21. Continues on DOAC for PAF, along with Coreg for rate control. There is also a history of CAD, severe 3-vessel disease, felt to be poor candidate for CABG and ultimately underwent intervention to the LAD (July 2019) complicated by dissection back in to the the left main coronary artery which then went back into the aortic root and ascending aorta; this was managed conservatively. The Left main and proximal left anterior descending arteries were stented with BK. The Circumflex and RCA have high-grade stenosis and are managed medically. She continues on Plavix per cardiology, they are aware she is also on DOAC for stroke prevention. Recommended  "24-hour blood pressure monitor (which is ordered but may not have been completed) and follow-up in six months with PA.    Peptic ulcer in the past, \"that was cured.\" Low vision due BL macular degeneration, gets injections. Trouble reading, blurry vision. Received both doses of her Pfizer COVID-19 vaccine, second dose 3/16.       Today's concerns are:  Follow-up today for federally mandated regulatory visit and assessment of multiple chronic health issues as outlined above.     \"I doing okay, but sometimes I don't get along with my iPhone.\"  Had 5th great grandchild yesterday, shows photos.  \"I didn't think I would live long enough to see this.\"  Denies depression. \"I'm like my mother I always look at the bright side.\"  Memory is getting a little worse, hard time with names.   Feels she has adjusted to [place].    No specific concerns.  Left arm weakness continues.  Pacemaker checked last week.    No SOB. Laying flat without issue.  Takes own showers without concern.  Walks for exercise using case in right hand.   Chronic swelling in legs - L > R chronically in setting of hemiparesis.   \"I have always had trouble with my intestines.\"  Chronic constipation. Interested in increased medication.   Takes \"gas pill\" - simethicone at HS.  When doesn't take gas pill wakes up with burning sensation to abd and chest and take simethicone and it goes way.   Takes oxybutynin, uses incontinence brief 3 times per day, of medication states, \"it's not doing anything any more!\"   Continues with low vision, getting \"shots\" for ARMD, can't recall name of ophthalmologist in [place].     Recent weights and vitals reviewed in Epic:  Wt Readings from Last 5 Encounters:   07/12/21 51.3 kg (113 lb)   04/28/21 49.9 kg (110 lb)   04/15/21 49.9 kg (110 lb)   04/13/21 49.9 kg (110 lb)   02/18/20 49.6 kg (109 lb 6.4 oz)     BP Readings from Last 3 Encounters:   07/12/21 (!) 142/70   04/28/21 (!) 151/83   04/15/21 135/70     Pulse Readings from Last 4 " Encounters:   07/12/21 74   04/28/21 73   04/15/21 75   03/25/21 88     ALLERGIES: Patient has no known allergies.  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  PAST SURGICAL HISTORY:  has a past surgical history that includes LIGATE FALLOPIAN TUBE (1960s); appendectomy (1960s); Implant pacemaker (05/17/2016); REMV CATARACT INTRACAP,INSERT LENS (Right, 09/20/2017); REMV CATARACT EXTRACAP,INSERT LENS, W/O ECP (Left, 10/04/2017); Esophagoscopy, gastroscopy, duodenoscopy (EGD), combined (N/A, 11/20/2018); Coronary Angiogram (N/A, 7/17/2019); Left Ventriculogram (N/A, 7/17/2019); Left Heart Cath (N/A, 7/17/2019); Heart Catheterization with Possible Intervention (N/A, 7/18/2019); and Percutaneous Coronary Intervention Stent Drug Eluting (N/A, 7/18/2019).  IMMUNIZATIONS:  Immunization History   Administered Date(s) Administered     COVID-19,PF,Pfizer 02/23/2021, 03/16/2021     Influenza, Quad, High Dose, Pf, 65yr + 10/08/2020     Pneumo Conj 13-V (2010&after) 10/20/2016     TDAP Vaccine (Adacel) 01/18/2008, 02/22/2018     Zoster vaccine, live 02/13/2012     [place] immunization recorded not fully updated, [facility] reviewed:      Above immunizations pulled from [place] and [facility]. [facility] and facility records also reconciled. Outstanding information sent to  to update [facility].  Future immunizations needed:  yearly influenza per facility protocol.    Current Outpatient Medications   Medication Sig Dispense Refill     acetaminophen  (TYLENOL) 325 MG tablet Take 650 mg by mouth every 6 hours as needed for mild pain        amLODIPine (NORVASC) 5 MG tablet TAKE 1 TABLET BY MOUTH ONCE DAILY 35 tablet PRN     atorvastatin (LIPITOR) 40 MG tablet TAKE 1 TABLET BY MOUTH AT BEDTIME 35 tablet PRN     carvedilol (COREG) 6.25 MG tablet TAKE 1 TABLET BY MOUTH TWICE DAILY WITH MEALS (TAKE WITH 12.5MG FOR A TOTAL OF 18.75MG) NOTE DOSAGE/STRENGTH 70 tablet PRN     clopidogrel (PLAVIX) 75 MG tablet TAKE 1 TABLET BY MOUTH ONCE DAILY 35 tablet PRN     ELIQUIS ANTICOAGULANT 2.5 MG tablet TAKE 1 TABLET BY MOUTH TWICE DAILY 70 tablet PRN     FEROSUL 325 (65 Fe) MG tablet TAKE 1 TABLET BY MOUTH ONCE DAILY WITH BREAKFAST 37 tablet PRN     lisinopril (ZESTRIL) 40 MG tablet TAKE 1 TABLET BY MOUTH ONCE DAILY 28 tablet 97     Multiple Vitamins-Minerals (SYSTANE ICAPS AREDS2) CAPS TAKE 1 CAPSULE BY MOUTH TWICE DAILY 56 capsule PRN     nitroGLYcerin (NITROSTAT) 0.4 MG sublingual tablet For chest pain place 1 tablet under the tongue every 5 minutes for 3 doses. If symptoms persist 5 minutes after 1st dose call 911. 30 tablet 0     omeprazole (PRILOSEC) 20 MG DR capsule TAKE 1 CAPSULE BY MOUTH EVERY MORNING 37 capsule PRN     OMEPRAZOLE PO Take 20 mg by mouth every morning        oxybutynin ER (DITROPAN-XL) 10 MG 24 hr tablet TAKE 1 TABLET BY MOUTH ONCE DAILY 28 tablet PRN     potassium chloride ER (KLOR-CON M) 20 MEQ CR tablet TAKE 1 TABLET BY MOUTH ONCE DAILY 35 tablet PRN     simethicone (MYLICON) 125 MG chewable tablet CHEW AND SWALLOW 1 TABLET BY MOUTH EVERY NIGHT AT BEDTIME 38 tablet PRN     Vitamin D3 (CHOLECALCIFEROL) 25 mcg (1000 units) tablet Take 1 tablet (25 mcg) by mouth daily 30 tablet 98     Multiple Vitamins-Minerals (ICAPS AREDS FORMULA) TABS Take 1 tablet by mouth 2 times daily        Case Management:  I have reviewed the Assisted Living care plan, current immunizations and preventive care/cancer screening. .Future cancer screening is not clinically  indicated secondary to age/goals of care Patient's desire to return to the community is present, but is not able due to care needs . Current Level of Care is appropriate.    Advance Directive Discussion:    I reviewed the current advanced directives as reflected in EPIC, the POLST and the facility chart, and verified the congruency of orders. I contacted the first party daughter Maryjane and left a message regarding the plan of Care.  I did review the advance directives with the resident.     Team Discussion:  I communicated with the appropriate disciplines involved with the Plan of Care:   Nursing    Patient's goal is focus on QoL.  Information reviewed:  Medications, vital signs, orders, and nursing notes.    ROS:  10 point ROS of systems including Constitutional, Eyes, Respiratory, Cardiovascular, Gastroenterology, Genitourinary, Integumentary, Musculoskeletal, Psychiatric were all negative except for pertinent positives noted in my HPI.    Vitals:  BP (!) 142/70   Pulse 74   Temp 97.8  F (36.6  C)   Resp 16   Ht 1.524 m (5')   Wt 51.3 kg (113 lb)   SpO2 96%   BMI 22.07 kg/m   Body mass index is 22.07 kg/m .  Exam:  GENERAL APPEARANCE:  Alert, in no distress, pleasant, cooperative, well groomed seated in arm chair with rolling table in front of her.  EYES: Sclera clear and conjunctiva normal, no discharge, wearing glasses.    ENT:  Mouth normal, moist mucous membranes, BL cerumen impaction, hearing decreased to normal voice.   RESP:  Non-labored breathing, palpation of chest normal, no chest wall tenderness, no respiratory distress, Lung sounds clear, patient is on room air.  CV:  Palpation - no murmur/non-displaced PMI, Auscultation - rate and rhythm regular, left CW pacemaker no murmur, no rub or gallop appreciated.  VASCULAR: Trace edema left lower leg, no edema right lower leg. Calves are supple and non-tender.  ABDOMEN:  Normal bowel sounds, soft, nontender, no grimacing or guarding with palpation.  M/S:   Left sided weakness with UE flexion contracture.  SKIN:  Inspection - no visible rashes/lesions/uclerations. Ecchymosis to left posterior hand. Palpation- no increased warmth, skin is thin, dry and non-tender.  PSYCH: awake and alert, speech fluent,  insight and judgement fair, memory fair, without depressed or anxious affect, calm and cooperative.    Lab/Diagnostic data:   Recent labs in [facility] reviewed by me today.    CBC RESULTS: Recent Labs   Lab Test 04/15/21  0620 08/19/19  1241   WBC 5.8 8.4   RBC 3.83 3.56*   HGB 11.0* 10.4*   HCT 34.9* 34.0*   MCV 91 96   MCH 28.7 29.2   MCHC 31.5 30.6*   RDW 14.5 17.5*    275     Ferritin   Date Value Ref Range Status   04/15/2021 30 8 - 252 ng/mL Final     Iron   Date Value Ref Range Status   04/15/2021 26 (L) 35 - 180 ug/dL Final     Iron Binding Cap   Date Value Ref Range Status   04/15/2021 250 240 - 430 ug/dL Final     Last Basic Metabolic Panel:  Recent Labs   Lab Test 04/28/21  1002 04/15/21  0620    136   POTASSIUM 4.3 4.2   CHLORIDE 101 105   GOLDY 8.7 8.4*   CO2 27 27   BUN 13 13   CR 0.75 0.77   GLC 99 82     GFR Estimate   Date Value Ref Range Status   04/28/2021 71 >60 mL/min/[1.73_m2] Final     Liver Function Studies -   Recent Labs   Lab Test 04/28/21  1002 09/29/20  1023 08/04/19  1240 07/23/19  0538 11/19/18  1216   PROTTOTAL  --   --  6.4*  --  6.4*   ALBUMIN  --   --  2.5* 2.4* 3.5   BILITOTAL  --   --  0.5  --  0.3   ALKPHOS  --   --  89  --  80   AST  --   --  21  --  22   ALT 22 19 22  --  19       TSH   Date Value Ref Range Status   04/15/2021 1.44 0.40 - 4.00 mU/L Final   10/12/2016 1.00 0.40 - 4.00 mU/L Final     Recent Labs   Lab Test 04/28/21  1002 09/29/20  1023   CHOL 118 118   HDL 53 45*   LDL 50 55   TRIG 74 88       ASSESSMENT/PLAN  (Z86.73) History of CVA (cerebrovascular accident)  (primary encounter diagnosis)  (R53.1) Left-sided weakness  (E55.9) Gait abnormality   Comment: Stable/chronic. Hx of CVA x2 with residual left sided  "weakness. Uses cane and wheelchair, worked with HH PT/OT briefly and then declined further sessions. No falls or new mobility concerns.  Last lipids 4/28/21: LDL 50.   Plan:   - Secondary prevention with atorvastatin 40 mg daily and Plavix, also on apixaban for afib   - Falls precautions   - PRN Tylenol for joint pain  - Lipid panel annually April 2022    (I48.0) Paroxysmal atrial fibrillation (H)  (Z95.0) Presence of permanent cardiac pacemaker  Comment: Stable. Second-degree heart block with PPM placement, device shows occasional PAF per cardiology notes.  Plan:   - Continue apixaban per cardiology, if recurrent falls may need to discuss risk/benefit   - Continue Coreg 18.75 mg BID for rate control  - Device check quarterly - last 7/13/21, next 10/12/21, battery 10 years 8 months  - Cardiology follow-up 10/2021 with PA     (I10) Essential hypertension, benign  Comment: Stable. Elevated reading in office, order for 24-hour BP monitor does not seem to have been completed.  BP goals are <150/90 mm Hg.This is higher than ACC and AHA recommendations due to goals of care and risk of dizziness and falls. Patient is stable with current plan of care and routine assessment.  Plan:   - Continue current regimen: lisinopril 40 mg daily, Coreg 18.75 mg BID, amlodipine 5 mg daily   - Monitor routine VS and labs  - Message sent to cardiology - Britni Joseph PA-C to see if this still needs to be completed and how we would arrange.    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 Per cardiology: \"coronary artery disease with a non-STEMI in 07/2019 at which time she was found to have multivessel disease.  She underwent stenting to the mid LAD which was unfortunately complicated by a guidewire dissection requiring additional stenting to the proximal LAD and left main.  Her circumflex and RCA were " "not intervened upon.\" EF normalized after stenting of mid-LAD.  Plan:   - Continue Plavix per cardiology - will need close monitoring for bleeding risk as also on DOAC  - Continue statin  - Continue PRN SL nitroglycerin  - HR and BP control as above  - Labs q 6 months   - Cardiology follow-up Oct 2021     (G31.84) Mild cognitive impairment  Comment: Mild memory concerns, SLUMS 22/30 on 3/18/21  Plan:   - Continues to benefit from supportive care in [place] setting for medications, meals, ADL support, safety, socialization     (N32.81) OAB (overactive bladder)  Comment: Chronic. + urinary incontinence. Feels medication is no longer very helpful. Having constipation, discussed anticholingeric side effects, okay with GDR and monitoring of symptoms  Plan:   - When current supply complete decrease oxybutynin ER to 5 mg daily and monitor   - Instructed to notify nursing of new or worsening symptoms     (D64.9) Normocytic anemia  Comment: Chronic, Last Hgb 11.0 on 4/15/121, MCV 91  High risk for bleeding given DOAC and Plavix.  4/15/21: iron 26 L, Ferritin 30 N, TIBC 250 WNL, B12 354 WNL  No s/s of acute bleed reported today.  Plan:   - Check CBC and iron studies 7/22  - Continue ferrous sulfate, consider reduction to MWF or stopping based on labs this week     (K21.00) GERD  Comment: Stable. Possible hx of peptic ulcer disease and GIB.  Plan:  - Continue omeprazole and simethicone   - Check CBC 7/22 as above    (H35.30) ARMD  Comment: Low vision, was getting injections again   Plan:   - Continue eye vitamin, use home supply   - Follow with ophthalmology in community, family assists with appointments    (E87.6) Hypokalemia  Comment: No offending medications    Potassium   Date Value Ref Range Status   04/28/2021 4.3 3.4 - 5.3 mmol/L Final   Plan:  - Continue KCl supplement for now  - Check BMP 10/2022     (H61.23) BL Cerumen Impaction  Comment: Both ears obstructed with wax, wears hearing aids  Plan:  - Debrox 6.5% otic " solution 5 drops to both ears q HS x4 days then nursing to flush and update NP with result     (K59.01) Constipation  Comment: Having more difficulty moving her bowels, would like laxative    Plan:   - SENNA-docusate sodium (SENNA S) 8.6-50 MG tablet; Take 1 tablet by mouth At Bedtime HOLD FOR LOOSE STOOL  Dispense: 30 tablet; Refill: 98 dx Slow transit constipation     (Z71.89) Advance Care Planning  Comment: DNR/DNI selective treatment, no tube feeding, IV abx/fluids okay  Plan:  - No change to POLST    - Message left for nichole Wesley [phone]     Electronically signed by:  STEFFANY Yusuf CNP     .

## 2021-07-15 NOTE — TELEPHONE ENCOUNTER
Called daughter Maryjane, apologized for confusion regarding appointment, will see Yoselyn Cui 7/20 at 10:30 AM for annual visit and offer standing every other month appointment to avoid future confusion.

## 2021-07-20 NOTE — LETTER
7/13/2021        RE: Yoselyn Shah Al  [address]  [address]  [facility] [address]        [place] [facility]  Chief Complaint   Patient presents with     Annual Visit     [facility] Medical Record Number:  [id number]  Place of Service where encounter took place:  [facility] [[id number]]    HPI:    Yoselyn Cui  is a 89 year old  (1/28/1932) female with PMH significant for HTN, OAB, GERD, hx of CVA x2, second-degree heart block s/p PPM (2016), paroxysmal atrial fibrillation (on apixaban), hyperlipidemia, CAD s/ps non-STEMI in July 20219 with multi-vessel disease s/p stenting of LAD 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     HPI information obtained from: facility chart records, facility staff, patient report and [facility] Epic chart review.      Resident of [facility] since 3/16/21. Moved from daughter's home due to increased care needs.    At age 50 had a stroke and recovered, than at age 75 had a second stroke with residual left sided weakness of upper and lower extremity. Uses wheelchair for longer distances. Uses cane in apartment, also has a walker.  Cardiologist is Dr. Susana MD and Britni GALVEZ of [facility]. Has pacemaker due to high-grade AV conduction system diease. Has monitor at bedside. Last cardiology visit 4/28/21. Continues on DOAC for PAF, along with Coreg for rate control. There is also a history of CAD, severe 3-vessel disease, felt to be poor candidate for CABG and ultimately underwent intervention to the LAD (July 2019) complicated by dissection back in to the the left main coronary artery which then went back into the aortic root and ascending aorta; this was managed conservatively. The Left main and proximal left anterior descending arteries were stented with BK. The Circumflex and RCA have high-grade stenosis and are managed medically.  "She continues on Plavix per cardiology, they are aware she is also on DOAC for stroke prevention. Recommended 24-hour blood pressure monitor (which is ordered but may not have been completed) and follow-up in six months with PA.    Peptic ulcer in the past, \"that was cured.\" Low vision due BL macular degeneration, gets injections. Trouble reading, blurry vision. Received both doses of her Pfizer COVID-19 vaccine, second dose 3/16.       Today's concerns are:  Follow-up today for federally mandated regulatory visit and assessment of multiple chronic health issues as outlined above.     \"I doing okay, but sometimes I don't get along with my iPhone.\"  Had 5th great grandchild yesterday, shows photos.  \"I didn't think I would live long enough to see this.\"  Denies depression. \"I'm like my mother I always look at the bright side.\"  Memory is getting a little worse, hard time with names.   Feels she has adjusted to [place].    No specific concerns.  Left arm weakness continues.  Pacemaker checked last week.    No SOB. Laying flat without issue.  Takes own showers without concern.  Walks for exercise using case in right hand.   Chronic swelling in legs - L > R chronically in setting of hemiparesis.   \"I have always had trouble with my intestines.\"  Chronic constipation. Interested in increased medication.   Takes \"gas pill\" - simethicone at HS.  When doesn't take gas pill wakes up with burning sensation to abd and chest and take simethicone and it goes way.   Takes oxybutynin, uses incontinence brief 3 times per day, of medication states, \"it's not doing anything any more!\"   Continues with low vision, getting \"shots\" for ARMD, can't recall name of ophthalmologist in [place].     Recent weights and vitals reviewed in Epic:  Wt Readings from Last 5 Encounters:   07/12/21 51.3 kg (113 lb)   04/28/21 49.9 kg (110 lb)   04/15/21 49.9 kg (110 lb)   04/13/21 49.9 kg (110 lb)   02/18/20 49.6 kg (109 lb 6.4 oz)     BP Readings from Last " 3 Encounters:   07/12/21 (!) 142/70   04/28/21 (!) 151/83   04/15/21 135/70     Pulse Readings from Last 4 Encounters:   07/12/21 74   04/28/21 73   04/15/21 75   03/25/21 88     ALLERGIES: Patient has no known allergies.  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  PAST SURGICAL HISTORY:  has a past surgical history that includes LIGATE FALLOPIAN TUBE (1960s); appendectomy (1960s); Implant pacemaker (05/17/2016); REMV CATARACT INTRACAP,INSERT LENS (Right, 09/20/2017); REMV CATARACT EXTRACAP,INSERT LENS, W/O ECP (Left, 10/04/2017); Esophagoscopy, gastroscopy, duodenoscopy (EGD), combined (N/A, 11/20/2018); Coronary Angiogram (N/A, 7/17/2019); Left Ventriculogram (N/A, 7/17/2019); Left Heart Cath (N/A, 7/17/2019); Heart Catheterization with Possible Intervention (N/A, 7/18/2019); and Percutaneous Coronary Intervention Stent Drug Eluting (N/A, 7/18/2019).  IMMUNIZATIONS:  Immunization History   Administered Date(s) Administered     COVID-19,PF,Pfizer 02/23/2021, 03/16/2021     Influenza, Quad, High Dose, Pf, 65yr + 10/08/2020     Pneumo Conj 13-V (2010&after) 10/20/2016     TDAP Vaccine (Adacel) 01/18/2008, 02/22/2018     Zoster vaccine, live 02/13/2012     [place] immunization recorded not fully updated, [facility] reviewed:      Above immunizations pulled from [place] and [facility] EPIC. [facility] and facility records also reconciled. Outstanding information sent to  to update [facility].  Future immunizations needed:  yearly influenza  per facility protocol.    Current Outpatient Medications   Medication Sig Dispense Refill     acetaminophen (TYLENOL) 325 MG tablet Take 650 mg by mouth every 6 hours as needed for mild pain        amLODIPine (NORVASC) 5 MG tablet TAKE 1 TABLET BY MOUTH ONCE DAILY 35 tablet PRN     atorvastatin (LIPITOR) 40 MG tablet TAKE 1 TABLET BY MOUTH AT BEDTIME 35 tablet PRN     carvedilol (COREG) 6.25 MG tablet TAKE 1 TABLET BY MOUTH TWICE DAILY WITH MEALS (TAKE WITH 12.5MG FOR A TOTAL OF 18.75MG) NOTE DOSAGE/STRENGTH 70 tablet PRN     clopidogrel (PLAVIX) 75 MG tablet TAKE 1 TABLET BY MOUTH ONCE DAILY 35 tablet PRN     ELIQUIS ANTICOAGULANT 2.5 MG tablet TAKE 1 TABLET BY MOUTH TWICE DAILY 70 tablet PRN     FEROSUL 325 (65 Fe) MG tablet TAKE 1 TABLET BY MOUTH ONCE DAILY WITH BREAKFAST 37 tablet PRN     lisinopril (ZESTRIL) 40 MG tablet TAKE 1 TABLET BY MOUTH ONCE DAILY 28 tablet 97     Multiple Vitamins-Minerals (SYSTANE ICAPS AREDS2) CAPS TAKE 1 CAPSULE BY MOUTH TWICE DAILY 56 capsule PRN     nitroGLYcerin (NITROSTAT) 0.4 MG sublingual tablet For chest pain place 1 tablet under the tongue every 5 minutes for 3 doses. If symptoms persist 5 minutes after 1st dose call 911. 30 tablet 0     omeprazole (PRILOSEC) 20 MG DR capsule TAKE 1 CAPSULE BY MOUTH EVERY MORNING 37 capsule PRN     OMEPRAZOLE PO Take 20 mg by mouth every morning        oxybutynin ER (DITROPAN-XL) 10 MG 24 hr tablet TAKE 1 TABLET BY MOUTH ONCE DAILY 28 tablet PRN     potassium chloride ER (KLOR-CON M) 20 MEQ CR tablet TAKE 1 TABLET BY MOUTH ONCE DAILY 35 tablet PRN     simethicone (MYLICON) 125 MG chewable tablet CHEW AND SWALLOW 1 TABLET BY MOUTH EVERY NIGHT AT BEDTIME 38 tablet PRN     Vitamin D3 (CHOLECALCIFEROL) 25 mcg (1000 units) tablet Take 1 tablet (25 mcg) by mouth daily 30 tablet 98     Multiple Vitamins-Minerals (ICAPS AREDS FORMULA) TABS Take 1 tablet by mouth 2 times daily        Case Management:  I have reviewed the Assisted Living care plan,  current immunizations and preventive care/cancer screening. .Future cancer screening is not clinically indicated secondary to age/goals of care Patient's desire to return to the community is present, but is not able due to care needs . Current Level of Care is appropriate.    Advance Directive Discussion:    I reviewed the current advanced directives as reflected in EPIC, the POLST and the facility chart, and verified the congruency of orders. I contacted the first party daughter Maryjane and left a message regarding the plan of Care.  I did review the advance directives with the resident.     Team Discussion:  I communicated with the appropriate disciplines involved with the Plan of Care:   Nursing    Patient's goal is focus on QoL.  Information reviewed:  Medications, vital signs, orders, and nursing notes.    ROS:  10 point ROS of systems including Constitutional, Eyes, Respiratory, Cardiovascular, Gastroenterology, Genitourinary, Integumentary, Musculoskeletal, Psychiatric were all negative except for pertinent positives noted in my HPI.    Vitals:  BP (!) 142/70   Pulse 74   Temp 97.8  F (36.6  C)   Resp 16   Ht 1.524 m (5')   Wt 51.3 kg (113 lb)   SpO2 96%   BMI 22.07 kg/m   Body mass index is 22.07 kg/m .  Exam:  GENERAL APPEARANCE:  Alert, in no distress, pleasant, cooperative, well groomed seated in arm chair with rolling table in front of her.  EYES: Sclera clear and conjunctiva normal, no discharge, wearing glasses.    ENT:  Mouth normal, moist mucous membranes, BL cerumen impaction, hearing decreased to normal voice.   RESP:  Non-labored breathing, palpation of chest normal, no chest wall tenderness, no respiratory distress, Lung sounds clear, patient is on room air.  CV:  Palpation - no murmur/non-displaced PMI, Auscultation - rate and rhythm regular, left CW pacemaker no murmur, no rub or gallop appreciated.  VASCULAR: Trace edema left lower leg, no edema right lower leg. Calves are supple and  non-tender.  ABDOMEN:  Normal bowel sounds, soft, nontender, no grimacing or guarding with palpation.  M/S:  Left sided weakness with UE flexion contracture.  SKIN:  Inspection - no visible rashes/lesions/uclerations. Ecchymosis to left posterior hand. Palpation- no increased warmth, skin is thin, dry and non-tender.  PSYCH: awake and alert, speech fluent,  insight and judgement fair, memory fair, without depressed or anxious affect, calm and cooperative.    Lab/Diagnostic data:   [facility] reviewed by me today.    CBC RESULTS: Recent Labs   Lab Test 04/15/21  0620 08/19/19  1241   WBC 5.8 8.4   RBC 3.83 3.56*   HGB 11.0* 10.4*   HCT 34.9* 34.0*   MCV 91 96   MCH 28.7 29.2   MCHC 31.5 30.6*   RDW 14.5 17.5*    275     Ferritin   Date Value Ref Range Status   04/15/2021 30 8 - 252 ng/mL Final     Iron   Date Value Ref Range Status   04/15/2021 26 (L) 35 - 180 ug/dL Final     Iron Binding Cap   Date Value Ref Range Status   04/15/2021 250 240 - 430 ug/dL Final     Last Basic Metabolic Panel:  Recent Labs   Lab Test 04/28/21  1002 04/15/21  0620    136   POTASSIUM 4.3 4.2   CHLORIDE 101 105   GOLDY 8.7 8.4*   CO2 27 27   BUN 13 13   CR 0.75 0.77   GLC 99 82     GFR Estimate   Date Value Ref Range Status   04/28/2021 71 >60 mL/min/[1.73_m2] Final     Liver Function Studies -   Recent Labs   Lab Test 04/28/21  1002 09/29/20  1023 08/04/19  1240 07/23/19  0538 11/19/18  1216   PROTTOTAL  --   --  6.4*  --  6.4*   ALBUMIN  --   --  2.5* 2.4* 3.5   BILITOTAL  --   --  0.5  --  0.3   ALKPHOS  --   --  89  --  80   AST  --   --  21  --  22   ALT 22 19 22  --  19       TSH   Date Value Ref Range Status   04/15/2021 1.44 0.40 - 4.00 mU/L Final   10/12/2016 1.00 0.40 - 4.00 mU/L Final     Recent Labs   Lab Test 04/28/21  1002 09/29/20  1023   CHOL 118 118   HDL 53 45*   LDL 50 55   TRIG 74 88       ASSESSMENT/PLAN  (Z86.73) History of CVA (cerebrovascular accident)  (primary encounter diagnosis)  (R53.1)  "Left-sided weakness  (E55.9) Gait abnormality   Comment: Stable/chronic. Hx of CVA x2 with residual left sided weakness. Uses cane and wheelchair, worked with HH PT/OT briefly and then declined further sessions. No falls or new mobility concerns.  Last lipids 4/28/21: LDL 50.   Plan:   - Secondary prevention with atorvastatin 40 mg daily and Plavix, also on apixaban for afib   - Falls precautions   - PRN Tylenol for joint pain  - Lipid panel annually April 2022    (I48.0) Paroxysmal atrial fibrillation (H)  (Z95.0) Presence of permanent cardiac pacemaker  Comment: Stable. Second-degree heart block with PPM placement, device shows occasional PAF per cardiology notes.  Plan:   - Continue apixaban per cardiology, if recurrent falls may need to discuss risk/benefit   - Continue Coreg 18.75 mg BID for rate control  - Device check quarterly - last 7/13/21, next 10/12/21, battery 10 years 8 months  - Cardiology follow-up 10/2021 with PA     (I10) Essential hypertension, benign  Comment: Stable. Elevated reading in office, order for 24-hour BP monitor does not seem to have been completed.  BP goals are <150/90 mm Hg.This is higher than ACC and AHA recommendations due to goals of care and risk of dizziness and falls. Patient is stable with current plan of care and routine assessment.  Plan:   - Continue current regimen: lisinopril 40 mg daily, Coreg 18.75 mg BID, amlodipine 5 mg daily   - Monitor routine VS and labs  - Message sent to cardiology - Britni Joseph PA-C to see if this still needs to be completed and how we would arrange.    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 Per cardiology: \"coronary artery disease with a non-STEMI in 07/2019 at which time she was found to have multivessel disease.  She underwent stenting to the mid LAD which was unfortunately complicated by a " "guidewire dissection requiring additional stenting to the proximal LAD and left main.  Her circumflex and RCA were not intervened upon.\" EF normalized after stenting of mid-LAD.  Plan:   - Continue Plavix per cardiology - will need close monitoring for bleeding risk as also on DOAC  - Continue statin  - Continue PRN SL nitroglycerin  - HR and BP control as above  - Labs q 6 months   - Cardiology follow-up Oct 2021     (G31.84) Mild cognitive impairment  Comment: Mild memory concerns, SLUMS 22/30 on 3/18/21  Plan:   - Continues to benefit from supportive care in [place] setting for medications, meals, ADL support, safety, socialization     (N32.81) OAB (overactive bladder)  Comment: Chronic. + urinary incontinence. Feels medication is no longer very helpful. Having constipation, discussed anticholingeric side effects, okay with GDR and monitoring of symptoms  Plan:   - When current supply complete decrease oxybutynin ER to 5 mg daily and monitor   - Instructed to notify nursing of new or worsening symptoms     (D64.9) Normocytic anemia  Comment: Chronic, Last Hgb 11.0 on 4/15/121, MCV 91  High risk for bleeding given DOAC and Plavix.  4/15/21: iron 26 L, Ferritin 30 N, TIBC 250 WNL, B12 354 WNL  No s/s of acute bleed reported today.  Plan:   - Check CBC and iron studies 7/22  - Continue ferrous sulfate, consider reduction to MWF or stopping based on labs this week     (K21.00) GERD  Comment: Stable. Possible hx of peptic ulcer disease and GIB.  Plan:  - Continue omeprazole and simethicone   - Check CBC 7/22 as above    (H35.30) ARMD  Comment: Low vision, was getting injections again   Plan:   - Continue eye vitamin, use home supply   - Follow with ophthalmology in community, family assists with appointments    (E87.6) Hypokalemia  Comment: No offending medications    Potassium   Date Value Ref Range Status   04/28/2021 4.3 3.4 - 5.3 mmol/L Final   Plan:  - Continue KCl supplement for now  - Check BMP 10/2022     (H61.23) " BL Cerumen Impaction  Comment: Both ears obstructed with wax, wears hearing aids  Plan:  - Debrox 6.5% otic solution 5 drops to both ears q HS x4 days then nursing to flush and update NP with result     (K59.01) Constipation  Comment: Having more difficulty moving her bowels, would like laxative    Plan:   - SENNA-docusate sodium (SENNA S) 8.6-50 MG tablet; Take 1 tablet by mouth At Bedtime HOLD FOR LOOSE STOOL  Dispense: 30 tablet; Refill: 98 dx Slow transit constipation     (Z71.89) Advance Care Planning  Comment: DNR/DNI selective treatment, no tube feeding, IV abx/fluids okay  Plan:  - No change to POLST    - Message left for nichole Wesley [phone]     Electronically signed by:  STEFFANY Yusuf CNP     .            Sincerely,        STEFFANY Yusuf CNP

## 2021-07-20 NOTE — PATIENT INSTRUCTIONS
Yoselyn Cui  1/28/1932  ORDERS:  - Debrox 6.5% otic solution 5 drops to both ears q HS x 4 days then nursing to flush and update NP with result dx cerumen impaction (e-prescribed)  - When current supply of oxybutynin ER 10 mg complete decrease oxybutynin ER to 5 mg PO once daily dx OAB  - Check CBC and iron studies 7/22 dx D64.9   - SENNA-docusate sodium (SENNA S) 8.6-50 MG tablet; Take 1 tablet by mouth At Bedtime HOLD FOR LOOSE STOOL  Dispense: 30 tablet; Refill: 98 dx Slow transit constipation (e-prescribed)  Electronically signed by:   STEFFANY Yusuf CNP  07/20/21 1:27 PM

## 2021-07-23 NOTE — TELEPHONE ENCOUNTER
[facility] TELEPHONE NOTE    CC: Follow-up labs for anemia    Hgb normalized, unable to see other notes or labs at present due to [facility] EHR merger. Iron is low.     CBC RESULTS: Recent Labs   Lab Test 07/22/21  0840   WBC 8.2   RBC 4.41   HGB 12.8   HCT 40.8   MCV 93   MCH 29.0   MCHC 31.4*   RDW 14.0        Ferritin   Date Value Ref Range Status   07/22/2021 55 10 - 130 ng/mL Final     Iron   Date Value Ref Range Status   07/22/2021 34 (L) 42 - 175 ug/dL Final     ASSESSMENT/PLAN:  Low iron and history of normocytic anemia.  - Fax order below to [facility]  - Start iron supplement and repeat labs in several months if levels improved will plan to discontinue     Yoselyn Beaudette  1/28/1932  ORDERS:  - ferrous sulfate (FE TABS) 325 (65 Fe) MG EC tablet; Take 1 tablet (325 mg) by mouth Every Mon, Wed, Fri Morning  Dispense: 12 tablet; Refill: 98 dx Iron deficiency  Electronically signed by:   STEFFANY Yusuf CNP  07/23/21 8:38 AM

## 2021-07-29 NOTE — TELEPHONE ENCOUNTER
"[facility] Nurse Triage Telephone Note    Provider: STEFFANY Yusuf CNP  Facility: [facility] Type:  AL    Caller: patient's daughter  Call Back Number: [phone](nurse line)    Allergies:  Not on File     Reason for call: Patient's daughter is reporting that patient had an order for Senna S, but was \"advised to stop taking after having a soft stool\".  Patient is now uncomfortable with constipation.  States she goes a little, but unable to evacuate completely.  Writer spoke with the nurse who states that staff administers her meds and she continues taking Senna S 1 tab Q HS as ordered.  Other notable meds:  Ferrous sulfate 325mg Q M-W-F.      Verbal Order/Direction given by Provider: Continue Senna S 1 tab Q HS(hold for loose stools).  May also take Senna S 1 tab daily PRN for constipation.      Provider giving Order:  Juli Loya MD    Verbal Order given to: Soraya Ogden RN        "

## 2021-08-02 NOTE — PROGRESS NOTES
[facility]  [facility] Record Number:  [id number]  Place of Service where encounter took place:  [facility] [[id number]]  Chief Complaint   Patient presents with     RECHECK     constipation       HPI:    Yoselyn Cui  is a 89 year old (1/28/1932)  female with PMH significant for HTN, OAB, GERD, hx of CVA x2, second-degree heart block s/p PPM (2016), paroxysmal atrial fibrillation (on apixaban), hyperlipidemia, CAD s/ps non-STEMI in July 20219 with multi-vessel disease s/p stenting of LAD 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      HPI information obtained from: facility chart records, facility staff, patient report and [facility] chart review.     Resident of [facility] since 3/16/21. Moved from daughter's home due to increased care needs.    Chronic health issues include two strokes, the first of which was at age 50 with limited residual. Second stroke was at age 75 with residual left sided weakness of upper and lower extremity. Uses wheelchair for longer distances. Uses cane in apartment, also has a walker.  Cardiologist is Dr. Susana MD and Britni GALVEZ of [facility]. Has pacemaker due to high-grade AV conduction system diease; monitor at bedside. Last cardiology visit 4/28/21. Continues on DOAC for PAF, along with Coreg for rate control. There is also a history of CAD, severe 3-vessel disease, felt to be poor candidate for CABG and ultimately underwent intervention to the LAD (July 2019) complicated by dissection back in to the the left main coronary artery which then went back into the aortic root and ascending aorta; this was managed conservatively. The left main and proximal left anterior descending arteries were stented with BK. The Circumflex and RCA have high-grade stenosis and are managed medically. She continues on Plavix per cardiology, they are aware  "she is also on DOAC for stroke prevention. Recommended 24-hour blood pressure monitor (which is ordered but has not been completed) and follow-up in six months with PA around Oct 2021. Peptic ulcer in the past, \"that was cured.\" Low vision due BL macular degeneration, gets injections. Trouble reading, blurry vision. Received both doses of her Pfizer COVID-19 vaccine, second dose 3/16/21.    Today's concern is:  Follow-up at request of daughter due to trouble moving bowels last week, reports resident had loose stools due to senna-s and then stopped taking medication and became constipated. Resident also taking iron three time per week for anemia (noted with low iron at 34 on 7/22 and started on x3/week supplement).    Initial complaint is skin tear to right UE. Cannot see well enough to place BandAid. Assisted resident to hold pressure, clean, and apply BanAid. Prone to bleeding with Plavix and Eliquis.    Aside from constipation issue, which was family concern, nursing reports incident several days ago when nursing was called by staff to assess patient due to low grade fever and mental status change. Resident was very fatigued with chills, had space heater on in room. Temp 99 F. Reported cough. COVID-19 was negative. Daughter reported to nursing she is unaware of any cough. Today resident reports \"I have a very bad cough.\" Feels cough is getting worse each week and is worse with eating. Denies dysphagia or choking. No fever/chills today. No SOB. No chest pain. There was also some concern her vision was slightly worse during this episode.    Regarding constipation, told nurse last week, \"I'm full of BM because my weight is up two pounds.\"  Difficulty with constipation last week, \"I didn't go for 2-3 days.\"  Had normal BM today. \"In fact it is almost diarrhea.\"  No incontinence. x3 small BMs today.   \"I get the urge, but I can't go.\" Then states \"it is loosening up a little bit.\"  Never \"doesn't feel constipated.\"  No " "nausea or vomiting.   Recalls big GI work-up in 2016.   \"Not exactly normal, but a lot better than it was.\"   Has never taken fiber supplement, but tries to eat high fiber diet.  Has large water bottle.   Works on walking for exercise.   No abd pain.   Feel suppositories don't help.   Miralax caused nausea in the past.  Has not missed any doses of laxative per nursing.  Now taking prune juice in the dinning room, which has helped.    Recent weights and vitals reviewed in Epic:  Wt Readings from Last 5 Encounters:   08/02/21 52.8 kg (116 lb 6.4 oz)   07/12/21 51.3 kg (113 lb)   04/28/21 49.9 kg (110 lb)   04/15/21 49.9 kg (110 lb)   04/13/21 49.9 kg (110 lb)     BP Readings from Last 3 Encounters:   08/02/21 130/68   07/12/21 (!) 142/70   04/28/21 (!) 151/83     Pulse Readings from Last 4 Encounters:   08/02/21 70   07/12/21 74   04/28/21 73   04/15/21 75     Past Medical and Surgical History reviewed in Epic today.    MEDICATIONS:  Current Outpatient Medications   Medication Sig Dispense Refill     acetaminophen (TYLENOL) 325 MG tablet Take 650 mg by mouth every 6 hours as needed for mild pain        amLODIPine (NORVASC) 5 MG tablet TAKE 1 TABLET BY MOUTH ONCE DAILY 35 tablet PRN     atorvastatin (LIPITOR) 40 MG tablet TAKE 1 TABLET BY MOUTH AT BEDTIME 35 tablet PRN     carvedilol (COREG) 6.25 MG tablet TAKE 1 TABLET BY MOUTH TWICE DAILY WITH MEALS (TAKE WITH 12.5MG FOR A TOTAL OF 18.75MG) NOTE DOSAGE/STRENGT 70 tablet PRN     clopidogrel (PLAVIX) 75 MG tablet TAKE 1 TABLET BY MOUTH ONCE DAILY 35 tablet PRN     ELIQUIS ANTICOAGULANT 2.5 MG tablet TAKE 1 TABLET BY MOUTH TWICE DAILY 70 tablet PRN     ferrous sulfate (FE TABS) 325 (65 Fe) MG EC tablet Take 1 tablet (325 mg) by mouth Every Mon, Wed, Fri Morning 12 tablet 98     lisinopril (ZESTRIL) 40 MG tablet TAKE 1 TABLET BY MOUTH ONCE DAILY 28 tablet 97     Multiple Vitamins-Minerals (SYSTANE ICAPS AREDS2) CAPS TAKE 1 CAPSULE BY MOUTH TWICE DAILY 56 capsule PRN     " nitroGLYcerin (NITROSTAT) 0.4 MG sublingual tablet For chest pain place 1 tablet under the tongue every 5 minutes for 3 doses. If symptoms persist 5 minutes after 1st dose call 911. 30 tablet 0     omeprazole (PRILOSEC) 20 MG DR capsule TAKE 1 CAPSULE BY MOUTH EVERY MORNING 37 capsule PRN     oxybutynin ER (DITROPAN-XL) 10 MG 24 hr tablet TAKE 1 TABLET BY MOUTH ONCE DAILY 28 tablet PRN     potassium chloride ER (KLOR-CON M) 20 MEQ CR tablet TAKE 1 TABLET BY MOUTH ONCE DAILY 35 tablet PRN     senna-docusate (SENOKOT-S/PERICOLACE) 8.6-50 MG tablet Take 1 tablet by mouth daily as needed       SENNA-docusate sodium (SENNA S) 8.6-50 MG tablet Take 1 tablet by mouth At Bedtime HOLD FOR LOOSE STOOL 30 tablet 98     simethicone (MYLICON) 125 MG chewable tablet CHEW AND SWALLOW 1 TABLET BY MOUTH EVERY NIGHT AT BEDTIME 38 tablet PRN     Vitamin D3 (CHOLECALCIFEROL) 25 mcg (1000 units) tablet Take 1 tablet (25 mcg) by mouth daily 30 tablet 98     REVIEW OF SYSTEMS:  Limited secondary to cognitive impairment but today pt reports but 4 point ROS including Respiratory, CV, GI and , other than that noted in the HPI,  is negative    Objective:  /68   Pulse 70   Temp 97.7  F (36.5  C)   Resp 16   Ht 1.524 m (5')   Wt 52.8 kg (116 lb 6.4 oz)   SpO2 95%   BMI 22.73 kg/m    Exam:  GENERAL APPEARANCE:  Alert, in no distress, pleasant, cooperative, well groomed seated in arm chair with rolling table in front of her.  EYES: Sclera clear and conjunctiva normal, no discharge, wearing glasses.    ENT:  Mouth normal, moist mucous membranes.  RESP:  Non-labored breathing, no respiratory distress, rales left base, patient is on room air. SpO2 95% RA.  CV:  Palpation - no murmur/non-displaced PMI, Auscultation - rate and rhythm regular, left CW pacemaker no murmur, no rub or gallop appreciated. HR 70  VASCULAR: Trace edema left lower leg, no edema right lower leg. Calves are supple and non-tender.  ABDOMEN:  Normal bowel sounds,  soft, nontender, no grimacing or guarding with palpation.  M/S:  Left sided weakness with UE flexion contracture.  SKIN:  Inspection - 1 cm skin tear to right FA, bleeding stopped with pressure and BandAid applied. Palpation- no increased warmth, skin is thin, dry and non-tender.  PSYCH: Awake and alert, speech fluent,  insight and judgement fair, memory fair, without depressed or anxious affect, calm and cooperative.    Labs:   Recent labs in [facility] reviewed by me today.    CBC RESULTS: Recent Labs   Lab Test 07/22/21  0840 04/15/21  0620   WBC 8.2 5.8   RBC 4.41 3.83   HGB 12.8 11.0*   HCT 40.8 34.9*   MCV 93 91   MCH 29.0 28.7   MCHC 31.4* 31.5   RDW 14.0 14.5    249     Ferritin   Date Value Ref Range Status   07/22/2021 55 10 - 130 ng/mL Final   04/15/2021 30 8 - 252 ng/mL Final     Iron   Date Value Ref Range Status   07/22/2021 34 (L) 42 - 175 ug/dL Final   04/15/2021 26 (L) 35 - 180 ug/dL Final     Iron Binding Cap   Date Value Ref Range Status   04/15/2021 250 240 - 430 ug/dL Final     Last Basic Metabolic Panel:  Recent Labs   Lab Test 04/28/21  1002 04/15/21  0620    136   POTASSIUM 4.3 4.2   CHLORIDE 101 105   GOLDY 8.7 8.4*   CO2 27 27   BUN 13 13   CR 0.75 0.77   GLC 99 82     GFR Estimate   Date Value Ref Range Status   04/28/2021 71 >60 mL/min/[1.73_m2] Final     Colonoscopy 11/19/2018  - Preparation of the colon was fair.                             - One 9 mm polyp in the transverse colon, removed                             with a cold snare. Resected and retrieved. No                             specimens collected. Clip placed.                             - Severe diverticulosis in the sigmoid colon and in                             the descending colon.                             - The examination was otherwise normal on direct                             and retroflexion views. Normal terminal ileum.                             Small polyps could have easily been missed given                              prep, but no lesions seen to explain anemia.     CT abd/pelvis 12/5/2018  IMPRESSION:   1. 2.2 cm partially exophytic cystic mass at the pancreas uncinate   associated with diffuse prominent pancreatic duct dilatation. This   may be a cystic neoplasm. Recommend further characterization with   pancreas MRI.   2. No other significant abnormality is seen.   3. Prominent stool within the proximal colon and rectosigmoid colon.   4. Colonic diverticulosis distally.   5. Diffuse vascular calcifications.      ASSESSMENT/PLAN:  (R05) Cough  (primary encounter diagnosis)  Comment: Cough, progressively worse over last several weeks, COVID-19 swab negative  Plan:   - Check CXR   - CBC, BMP, procalcitonin, BNP next lab day   -  SLP eval with [facility]     (K59.01) Slow transit constipation  Comment: Chronic intermittent issue, seems senna-s has helped overall, iron supplement may contribute   Plan:   - SENNA-docusate sodium (SENNA S) 8.6-50 MG tablet; Take 1 tablet by mouth At Bedtime HOLD FOR LOOSE STOOL    - Discussed PRN dose of senna-s and hold parameter, will likely need to titrate medication based on stool consistency and frequency, resident expressed understanding    (D64.9) Normocytic anemia  Comment: Stable, Hgb normalized, no symptoms. Low Hgb in past, last 12.8 WNL on 7/22, MCV 90, iron low 34, ferritin normal 55  Plan:  - ferrous sulfate (FE TABS) 325 (65 Fe) MG EC tablet; Take 1 tablet (325 mg) by mouth Every Mon, Wed, Fri Morning  dx Iron deficiency  - Follow CBC and iron studies     (G31.84) Mild cognitive impairment  Comment: 22/30 on SLUMS, in consistent history at times  Plan:   - Continues to benefit from supportive care in [facility] setting, may need memory in future given likely slow progressive decline     (S41.111A) Skin tear of right upper arm without complication, initial encounter  Comment: Small skin tear noted right forearm, no infection, well approximated  Plan:   - BandAid  daily    Electronically signed by:  STEFFANY Yusuf CNP         Documentation of Face-to-Face and Certification for Home Health Services     Patient: Yoselyn Cui   YOB: 1932  MR Number: [id number]  Today's Date: 8/4/2021    I certify that patient: Yoselyn Cui is under my care and that I, or a nurse practitioner or physician's assistant working with me, had a face-to-face encounter that meets the physician face-to-face encounter requirements with this patient on: 08/05/21.    This encounter with the patient was in whole, or in part, for the following medical condition, which is the primary reason for home health care: Cough.    I certify that, based on my findings, the following services are medically necessary home health services: Speech Language Therapy.    My clinical findings support the need for the above services because: Speech Therapy Services are needed to assess and treat impairments in language and/or swallow functions due to cough with eating.    Further, I certify that my clinical findings support that this patient is homebound (i.e. absences from home require considerable and taxing effort and are for medical reasons or Mu-ism services or infrequently or of short duration when for other reasons) because: Requires assistance of another person or specialized equipment to access medical services because patient: Is prone to wander/get lost without assistance. and Is unable to walk greater than ~ 100 due to left hemiparesis feet without rest...    Based on the above findings. I certify that this patient is confined to the home and needs intermittent skilled nursing care, physical therapy and/or speech therapy.  The patient is under my care, and I have initiated the establishment of the plan of care.  This patient will be followed by a physician who will periodically review the plan of care.  Physician/Provider to provide follow up care: Brittany Florez  Medicare certified [employer] Physician: Dr. Loya   Physician Signature: See electronic signature associated with these discharge orders.  Date: 8/4/2021

## 2021-08-03 NOTE — LETTER
8/3/2021        RE: Yoselyn Shah Al  [address]  [address]  [facility] [address]        [facility]  [facility] Record Number:  [id number]  Place of Service where encounter took place:  [facility] [[id number]]  Chief Complaint   Patient presents with     RECHECK     constipation       HPI:    Yoselyn Cui  is a 89 year old (1/28/1932)  female with PMH significant for HTN, OAB, GERD, hx of CVA x2, second-degree heart block s/p PPM (2016), paroxysmal atrial fibrillation (on apixaban), hyperlipidemia, CAD s/ps non-STEMI in July 20219 with multi-vessel disease s/p stenting of LAD 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      HPI information obtained from: facility chart records, facility staff, patient report and [facility] chart review.     Resident of [facility] since 3/16/21. Moved from daughter's home due to increased care needs.    Chronic health issues include two strokes, the first of which was at age 50 with limited residual. Second stroke was at age 75 with residual left sided weakness of upper and lower extremity. Uses wheelchair for longer distances. Uses cane in apartment, also has a walker.  Cardiologist is Dr. Susana MD and Britni GALVEZ of [facility]. Has pacemaker due to high-grade AV conduction system diease; monitor at bedside. Last cardiology visit 4/28/21. Continues on DOAC for PAF, along with Coreg for rate control. There is also a history of CAD, severe 3-vessel disease, felt to be poor candidate for CABG and ultimately underwent intervention to the LAD (July 2019) complicated by dissection back in to the the left main coronary artery which then went back into the aortic root and ascending aorta; this was managed conservatively. The left main and proximal left anterior descending arteries were stented with BK. The Circumflex and  "RCA have high-grade stenosis and are managed medically. She continues on Plavix per cardiology, they are aware she is also on DOAC for stroke prevention. Recommended 24-hour blood pressure monitor (which is ordered but has not been completed) and follow-up in six months with PA around Oct 2021. Peptic ulcer in the past, \"that was cured.\" Low vision due BL macular degeneration, gets injections. Trouble reading, blurry vision. Received both doses of her Pfizer COVID-19 vaccine, second dose 3/16/21.    Today's concern is:  Follow-up at request of daughter due to trouble moving bowels last week, reports resident had loose stools due to senna-s and then stopped taking medication and became constipated. Resident also taking iron three time per week for anemia (noted with low iron at 34 on 7/22 and started on x3/week supplement).    Initial complaint is skin tear to right UE. Cannot see well enough to place BandAid. Assisted resident to hold pressure, clean, and apply BanAid. Prone to bleeding with Plavix and Eliquis.    Aside from constipation issue, which was family concern, nursing reports incident several days ago when nursing was called by staff to assess patient due to low grade fever and mental status change. Resident was very fatigued with chills, had space heater on in room. Temp 99 F. Reported cough. COVID-19 was negative. Daughter reported to nursing she is unaware of any cough. Today resident reports \"I have a very bad cough.\" Feels cough is getting worse each week and is worse with eating. Denies dysphagia or choking. No fever/chills today. No SOB. No chest pain. There was also some concern her vision was slightly worse during this episode.    Regarding constipation, told nurse last week, \"I'm full of BM because my weight is up two pounds.\"  Difficulty with constipation last week, \"I didn't go for 2-3 days.\"  Had normal BM today. \"In fact it is almost diarrhea.\"  No incontinence. x3 small BMs today.   \"I get " "the urge, but I can't go.\" Then states \"it is loosening up a little bit.\"  Never \"doesn't feel constipated.\"  No nausea or vomiting.   Recalls big GI work-up in 2016.   \"Not exactly normal, but a lot better than it was.\"   Has never taken fiber supplement, but tries to eat high fiber diet.  Has large water bottle.   Works on walking for exercise.   No abd pain.   Feel suppositories don't help.   Miralax caused nausea in the past.  Has not missed any doses of laxative per nursing.  Now taking prune juice in the dinning room, which has helped.    Recent weights and vitals reviewed in Epic:  Wt Readings from Last 5 Encounters:   08/02/21 52.8 kg (116 lb 6.4 oz)   07/12/21 51.3 kg (113 lb)   04/28/21 49.9 kg (110 lb)   04/15/21 49.9 kg (110 lb)   04/13/21 49.9 kg (110 lb)     BP Readings from Last 3 Encounters:   08/02/21 130/68   07/12/21 (!) 142/70   04/28/21 (!) 151/83     Pulse Readings from Last 4 Encounters:   08/02/21 70   07/12/21 74   04/28/21 73   04/15/21 75     Past Medical and Surgical History reviewed in Epic today.    MEDICATIONS:  Current Outpatient Medications   Medication Sig Dispense Refill     acetaminophen (TYLENOL) 325 MG tablet Take 650 mg by mouth every 6 hours as needed for mild pain        amLODIPine (NORVASC) 5 MG tablet TAKE 1 TABLET BY MOUTH ONCE DAILY 35 tablet PRN     atorvastatin (LIPITOR) 40 MG tablet TAKE 1 TABLET BY MOUTH AT BEDTIME 35 tablet PRN     carvedilol (COREG) 6.25 MG tablet TAKE 1 TABLET BY MOUTH TWICE DAILY WITH MEALS (TAKE WITH 12.5MG FOR A TOTAL OF 18.75MG) NOTE DOSAGE/STRENGT 70 tablet PRN     clopidogrel (PLAVIX) 75 MG tablet TAKE 1 TABLET BY MOUTH ONCE DAILY 35 tablet PRN     ELIQUIS ANTICOAGULANT 2.5 MG tablet TAKE 1 TABLET BY MOUTH TWICE DAILY 70 tablet PRN     ferrous sulfate (FE TABS) 325 (65 Fe) MG EC tablet Take 1 tablet (325 mg) by mouth Every Mon, Wed, Fri Morning 12 tablet 98     lisinopril (ZESTRIL) 40 MG tablet TAKE 1 TABLET BY MOUTH ONCE DAILY 28 tablet 97 "     Multiple Vitamins-Minerals (SYSTANE ICAPS AREDS2) CAPS TAKE 1 CAPSULE BY MOUTH TWICE DAILY 56 capsule PRN     nitroGLYcerin (NITROSTAT) 0.4 MG sublingual tablet For chest pain place 1 tablet under the tongue every 5 minutes for 3 doses. If symptoms persist 5 minutes after 1st dose call 911. 30 tablet 0     omeprazole (PRILOSEC) 20 MG DR capsule TAKE 1 CAPSULE BY MOUTH EVERY MORNING 37 capsule PRN     oxybutynin ER (DITROPAN-XL) 10 MG 24 hr tablet TAKE 1 TABLET BY MOUTH ONCE DAILY 28 tablet PRN     potassium chloride ER (KLOR-CON M) 20 MEQ CR tablet TAKE 1 TABLET BY MOUTH ONCE DAILY 35 tablet PRN     senna-docusate (SENOKOT-S/PERICOLACE) 8.6-50 MG tablet Take 1 tablet by mouth daily as needed       SENNA-docusate sodium (SENNA S) 8.6-50 MG tablet Take 1 tablet by mouth At Bedtime HOLD FOR LOOSE STOOL 30 tablet 98     simethicone (MYLICON) 125 MG chewable tablet CHEW AND SWALLOW 1 TABLET BY MOUTH EVERY NIGHT AT BEDTIME 38 tablet PRN     Vitamin D3 (CHOLECALCIFEROL) 25 mcg (1000 units) tablet Take 1 tablet (25 mcg) by mouth daily 30 tablet 98     REVIEW OF SYSTEMS:  Limited secondary to cognitive impairment but today pt reports but 4 point ROS including Respiratory, CV, GI and , other than that noted in the HPI,  is negative    Objective:  /68   Pulse 70   Temp 97.7  F (36.5  C)   Resp 16   Ht 1.524 m (5')   Wt 52.8 kg (116 lb 6.4 oz)   SpO2 95%   BMI 22.73 kg/m    Exam:  GENERAL APPEARANCE:  Alert, in no distress, pleasant, cooperative, well groomed seated in arm chair with rolling table in front of her.  EYES: Sclera clear and conjunctiva normal, no discharge, wearing glasses.    ENT:  Mouth normal, moist mucous membranes.  RESP:  Non-labored breathing, no respiratory distress, rales left base, patient is on room air. SpO2 95% RA.  CV:  Palpation - no murmur/non-displaced PMI, Auscultation - rate and rhythm regular, left CW pacemaker no murmur, no rub or gallop appreciated. HR 70  VASCULAR: Trace  edema left lower leg, no edema right lower leg. Calves are supple and non-tender.  ABDOMEN:  Normal bowel sounds, soft, nontender, no grimacing or guarding with palpation.  M/S:  Left sided weakness with UE flexion contracture.  SKIN:  Inspection - 1 cm skin tear to right FA, bleeding stopped with pressure and BandAid applied. Palpation- no increased warmth, skin is thin, dry and non-tender.  PSYCH: Awake and alert, speech fluent,  insight and judgement fair, memory fair, without depressed or anxious affect, calm and cooperative.    Labs:   Recent labs in [facility] reviewed by me today.    CBC RESULTS: Recent Labs   Lab Test 07/22/21  0840 04/15/21  0620   WBC 8.2 5.8   RBC 4.41 3.83   HGB 12.8 11.0*   HCT 40.8 34.9*   MCV 93 91   MCH 29.0 28.7   MCHC 31.4* 31.5   RDW 14.0 14.5    249     Ferritin   Date Value Ref Range Status   07/22/2021 55 10 - 130 ng/mL Final   04/15/2021 30 8 - 252 ng/mL Final     Iron   Date Value Ref Range Status   07/22/2021 34 (L) 42 - 175 ug/dL Final   04/15/2021 26 (L) 35 - 180 ug/dL Final     Iron Binding Cap   Date Value Ref Range Status   04/15/2021 250 240 - 430 ug/dL Final     Last Basic Metabolic Panel:  Recent Labs   Lab Test 04/28/21  1002 04/15/21  0620    136   POTASSIUM 4.3 4.2   CHLORIDE 101 105   GOLDY 8.7 8.4*   CO2 27 27   BUN 13 13   CR 0.75 0.77   GLC 99 82     GFR Estimate   Date Value Ref Range Status   04/28/2021 71 >60 mL/min/[1.73_m2] Final     Colonoscopy 11/19/2018  - Preparation of the colon was fair.                             - One 9 mm polyp in the transverse colon, removed                             with a cold snare. Resected and retrieved. No                             specimens collected. Clip placed.                             - Severe diverticulosis in the sigmoid colon and in                             the descending colon.                             - The examination was otherwise normal on direct                             and retroflexion  views. Normal terminal ileum.                             Small polyps could have easily been missed given                             prep, but no lesions seen to explain anemia.     CT abd/pelvis 12/5/2018  IMPRESSION:   1. 2.2 cm partially exophytic cystic mass at the pancreas uncinate   associated with diffuse prominent pancreatic duct dilatation. This   may be a cystic neoplasm. Recommend further characterization with   pancreas MRI.   2. No other significant abnormality is seen.   3. Prominent stool within the proximal colon and rectosigmoid colon.   4. Colonic diverticulosis distally.   5. Diffuse vascular calcifications.      ASSESSMENT/PLAN:  (R05) Cough  (primary encounter diagnosis)  Comment: Cough, progressively worse over last several weeks, COVID-19 swab negative  Plan:   - Check CXR   - CBC, BMP, procalcitonin, BNP next lab day   -  SLP eval with [facility]     (K59.01) Slow transit constipation  Comment: Chronic intermittent issue, seems senna-s has helped overall, iron supplement may contribute   Plan:   - SENNA-docusate sodium (SENNA S) 8.6-50 MG tablet; Take 1 tablet by mouth At Bedtime HOLD FOR LOOSE STOOL    - Discussed PRN dose of senna-s and hold parameter, will likely need to titrate medication based on stool consistency and frequency, resident expressed understanding    (D64.9) Normocytic anemia  Comment: Stable, Hgb normalized, no symptoms. Low Hgb in past, last 12.8 WNL on 7/22, MCV 90, iron low 34, ferritin normal 55  Plan:  - ferrous sulfate (FE TABS) 325 (65 Fe) MG EC tablet; Take 1 tablet (325 mg) by mouth Every Mon, Wed, Fri Morning  dx Iron deficiency  - Follow CBC and iron studies     (G31.84) Mild cognitive impairment  Comment: 22/30 on SLUMS, in consistent history at times  Plan:   - Continues to benefit from supportive care in residential setting, may need memory in future given likely slow progressive decline     (S41.111A) Skin tear of right upper arm without complication, initial  encounter  Comment: Small skin tear noted right forearm, no infection, well approximated  Plan:   - BandAid daily    Electronically signed by:  STEFFANY Yusuf CNP         Documentation of Face-to-Face and Certification for Home Health Services     Patient: Yoselyn Cui   YOB: 1932  MR Number: [id number]  Today's Date: 8/4/2021    I certify that patient: Yoselyn Cui is under my care and that I, or a nurse practitioner or physician's assistant working with me, had a face-to-face encounter that meets the physician face-to-face encounter requirements with this patient on: 08/05/21.    This encounter with the patient was in whole, or in part, for the following medical condition, which is the primary reason for home health care: Cough.    I certify that, based on my findings, the following services are medically necessary home health services: Speech Language Therapy.    My clinical findings support the need for the above services because: Speech Therapy Services are needed to assess and treat impairments in language and/or swallow functions due to cough with eating.    Further, I certify that my clinical findings support that this patient is homebound (i.e. absences from home require considerable and taxing effort and are for medical reasons or [employer] services or infrequently or of short duration when for other reasons) because: Requires assistance of another person or specialized equipment to access medical services because patient: Is prone to wander/get lost without assistance. and Is unable to walk greater than ~ 100 due to left hemiparesis feet without rest...    Based on the above findings. I certify that this patient is confined to the home and needs intermittent skilled nursing care, physical therapy and/or speech therapy.  The patient is under my care, and I have initiated the establishment of the plan of care.  This patient will be followed by a physician who will periodically  review the plan of care.  Physician/Provider to provide follow up care: Brittany Florez    Responsible Medicare certified PECOS Physician: Dr. Loya   Physician Signature: See electronic signature associated with these discharge orders.  Date: 8/4/2021            Sincerely,        STEFFANY Yusuf CNP

## 2021-08-03 NOTE — PATIENT INSTRUCTIONS
Yoselyn Cui  1/28/1932  ORDERS:  - CXR AP and lateral, dx cough  - Check CBC with diff and BMP on next lab day dx cough  - HH SLP with [facility] (I will fax note and F2F)   Electronically signed by:   STEFFANY Yusuf CNP  08/03/21 4:05 PM

## 2021-08-04 NOTE — TELEPHONE ENCOUNTER
[facility] TELEPHONE ENCOUNTER    Chief Complaint   Patient presents with     Abnormal Cxr/ct     Yoselyn Cui is a 89 year old  (1/28/1932)  female with PMH significant for HTN, OAB, GERD, hx of CVA x2, second-degree heart block s/p PPM (2016), paroxysmal atrial fibrillation (on apixaban), hyperlipidemia, CAD s/ps non-STEMI in July 20219 with multi-vessel disease s/p stenting of LAD complicated by guidewire dissection (on Plavix), emphysematous.    CXR result available this AM as below with new infiltrate.  Cough, low grade fever, mild confusion over weekend.    On chart review treated for COPD exacerbation in August 2019 during admission for urosepsis, treated with nebs and IV cefepime.   Hospitalized in March 2018 for pneumonia after bout of influenza, treated with ceftriaxone and azithromycin, steroids, nebs.    Spoke to daughter, Maryjane.  Saw pulmonology in 2019 after bout of pneumonia, infection cleared and symptoms improved. Using inhaled corticosteroid, Arnuity Ellipta, but stopped by pulmonology at this time. On albuterol in past but had difficult using inhaler.     CXR 8/3/21:      Reviewed CT Chest PE from 8/4/19:                                                           IMPRESSION:   1. No pulmonary embolism demonstrated.  2. No thoracic aortic aneurysm.   3. Moderate emphysema. Moderate bronchial wall thickening and mucous  plugging.     STEVAN BURNS MD    CT CHEST WITH CONTRAST3/2/2018 3:09 PM  IMPRESSION:   1. Bilateral infiltrates consistent with pneumonia superimposed on  diffuse emphysematous changes. Recommend follow-up to ensure complete  resolution.  2. There is mild posterior subpleural left lower lobe opacity  consistent with atelectasis with underlying bronchiectasis. No pleural  effusion.  3. Extensive atherosclerotic vascular calcification.     SOHAN CASTELAN MD    Lab Results   Component Value Date    CR 0.75 04/28/2021     CrCL:     No Known Allergies      ASSESSMENT/PLAN  Possible pneumonia in setting of chronic lung disease. Orders as below faxed to facility, reviewed with daughter (Maryjane) via telephone, in agreement with plan, will follow-up 8/11 to assess for clinical improvement. Repeat CXR in six weeks.    Yoselyn Cui  1/28/1932  ORDERS:  - Add NT pro-BNP dx CHF and Procalcitonin dx pneumonia on to labs for 8/5/21  - amoxicillin-clavulanate (AUGMENTIN) 875-125 MG tablet; Take 1 tablet by mouth 2 times daily for 5 days  Dispense: 10 tablet; Refill: 0 dx Pneumonia  - doxycycline hyclate (VIBRA-TABS) 100 MG tablet; Take 1 tablet (100 mg) by mouth 2 times daily for 5 days  Dispense: 10 tablet; Refill: 0 dx Pneumonia  - ipratropium-albuterol (COMBIVENT RESPIMAT)  MCG/ACT inhaler; Inhale 1 puff into the lungs 4 times daily as needed for shortness of breath / dyspnea or wheezing/cough  Dispense: 4 g; Refill: 11 dx Pneumonia  Electronically signed by:   STEFFANY Yusuf CNP  08/04/21 10:06 AM

## 2021-08-06 NOTE — TELEPHONE ENCOUNTER
[facility] TELEPHONE NOTE    CC: Abnormal Labs    Hyponatremia: sodium 128 L,  H    ASSESSMENT/PLAN:    Yoselyn Cui  1/28/1932  ORDERS:  - BMP dx hyponatremia next lab day on 8/12/21   Electronically signed by:   STEFFANY Yusuf CNP  08/06/21 9:55 AM

## 2021-08-09 NOTE — PROGRESS NOTES
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      HPI information obtained from: facility chart records, facility staff, patient report and [facility] chart review.     Today's concern is:  Follow-up today due to small retrocardiac infiltrate on CXR, treated for possible PNA with Augmentin and Doxycycline x5 days.      Cough is getting \"steadily worse\" - but staff do not note any coughing concerns.   Procalcitonin <0.05, no leukocytosis on labs.   + hyponatremia. Renal fxn stable Cr 0.72.   Occurs only with eating per patient. No choking or swallowing issues reported. No SOB.  Used inhalter in the past, recalls this, states it was getting harder to use. On chart review treated for COPD exacerbation in August 2019 during admission for urosepsis, treated with nebs and IV cefepime. Hospitalized in March 2018 for pneumonia after bout of influenza, treated with ceftriaxone and azithromycin, steroids, nebs. Saw pulmonology in 2019 after bout of pneumonia, infection cleared and symptoms improved. Using inhaled corticosteroid, Arnuity Ellipta, but stopped by pulmonology at this time. On albuterol in past but had difficult using inhaler.     No fever/chills.  Chronic left leg swelling, no new or different swelling.   No orthopnea. No PND.     Bowels are \"not very well.\"  Chronic issues reported today, constipation, not severe.   No dysuria. " "Chronic urinary incontinence, uses pads.     Skin tear to arm is closed and dry without pain or redness.    \"I don't have a lot of problems, but I do need a lot of rest.\"     Past Medical and Surgical History reviewed in Epic today.    MEDICATIONS:  Current Outpatient Medications   Medication Sig Dispense Refill     acetaminophen (TYLENOL) 325 MG tablet Take 650 mg by mouth every 6 hours as needed for mild pain        amLODIPine (NORVASC) 5 MG tablet TAKE 1 TABLET BY MOUTH ONCE DAILY 35 tablet PRN     atorvastatin (LIPITOR) 40 MG tablet TAKE 1 TABLET BY MOUTH AT BEDTIME 35 tablet PRN     carvedilol (COREG) 6.25 MG tablet TAKE 1 TABLET BY MOUTH TWICE DAILY WITH MEALS (TAKE WITH 12.5MG FOR A TOTAL OF 18.75MG) NOTE DOSAGE/STRENGTH 70 tablet PRN     clopidogrel (PLAVIX) 75 MG tablet TAKE 1 TABLET BY MOUTH ONCE DAILY 35 tablet PRN     ELIQUIS ANTICOAGULANT 2.5 MG tablet TAKE 1 TABLET BY MOUTH TWICE DAILY 70 tablet PRN     ferrous sulfate (FE TABS) 325 (65 Fe) MG EC tablet Take 1 tablet (325 mg) by mouth Every Mon, Wed, Fri Morning 12 tablet 98     ipratropium-albuterol (COMBIVENT RESPIMAT)  MCG/ACT inhaler Inhale 1 puff into the lungs 4 times daily as needed for shortness of breath / dyspnea or wheezing 4 g 11     lisinopril (ZESTRIL) 40 MG tablet TAKE 1 TABLET BY MOUTH ONCE DAILY 28 tablet 97     Multiple Vitamins-Minerals (SYSTANE ICAPS AREDS2) CAPS TAKE 1 CAPSULE BY MOUTH TWICE DAILY 56 capsule PRN     nitroGLYcerin (NITROSTAT) 0.4 MG sublingual tablet For chest pain place 1 tablet under the tongue every 5 minutes for 3 doses. If symptoms persist 5 minutes after 1st dose call 911. 30 tablet 0     omeprazole (PRILOSEC) 20 MG DR capsule TAKE 1 CAPSULE BY MOUTH EVERY MORNING 37 capsule PRN     oxybutynin ER (DITROPAN-XL) 10 MG 24 hr tablet TAKE 1 TABLET BY MOUTH ONCE DAILY 28 tablet PRN     potassium chloride ER (KLOR-CON M) 20 MEQ CR tablet TAKE 1 TABLET BY MOUTH ONCE DAILY 35 tablet PRN     senna-docusate " (SENOKOT-S/PERICOLACE) 8.6-50 MG tablet Take 1 tablet by mouth daily as needed       SENNA-docusate sodium (SENNA S) 8.6-50 MG tablet Take 1 tablet by mouth At Bedtime HOLD FOR LOOSE STOOL 30 tablet 98     simethicone (MYLICON) 125 MG chewable tablet CHEW AND SWALLOW 1 TABLET BY MOUTH EVERY NIGHT AT BEDTIME 38 tablet PRN     Vitamin D3 (CHOLECALCIFEROL) 25 mcg (1000 units) tablet Take 1 tablet (25 mcg) by mouth daily 30 tablet 98     REVIEW OF SYSTEMS:  Limited secondary to cognitive impairment but today pt reports history as per HPI.     Objective:  BP (!) 145/62   Pulse 68   Temp 98.2  F (36.8  C)   Resp 18   Ht 1.524 m (5')   Wt 51.8 kg (114 lb 3.2 oz)   SpO2 99%   BMI 22.30 kg/m    Exam:  GENERAL APPEARANCE:  Alert, in no distress, pleasant, cooperative, well groomed seated in arm chair with rolling table in front of her watching television.  EYES: Sclera clear and conjunctiva normal, no discharge, wearing glasses, pupils 2+ equal round BL.   ENT:  Mouth normal, moist mucous membranes.  RESP:  Non-labored breathing, no respiratory distress, rales left base improved faint, patient is on room air.   CV:  Palpation - no murmur/non-displaced PMI, L CW pacer. Auscultation - rate and rhythm regular, no murmur, no rub or gallop appreciated.   VASCULAR: Trace edema left ankle only, no edema right lower leg. Calves are supple and non-tender.  ABDOMEN:  Normal bowel sounds, soft, nontender, no grimacing or guarding with palpation.  M/S:  Left sided weakness with UE flexion contracture.  SKIN:  Inspection - CDI BandAids to right posterior forearm and left hand without erythema. Palpation- no increased warmth, skin is thin, dry and non-tender.  PSYCH: Awake and alert, speech fluent,  insight and judgement fair, memory fair, without depressed or anxious affect, calm and cooperative.    Labs:   Labs done in SNF are in [place] EPIC. Please refer to them using EPIC/Care Everywhere.         CT CHEST PULMONARY EMBOLISM  WITH CONTRAST  8/4/2019 1:39 PM     IMPRESSION:   1. No pulmonary embolism demonstrated.  2. No thoracic aortic aneurysm.   3. Moderate emphysema. Moderate bronchial wall thickening and mucous  plugging.     STEVAN BURNS MD    ASSESSMENT/PLAN:  (R05) Cough  (primary encounter diagnosis)  (J44.9) Possible Chronic obstructive pulmonary disease (H)  Comment: Stable. Completed abx without improvement in cough, less likely infectious. Hx of recurrent pneumonia and possible emphysema on imaging in past, on inhaled daily corticosteroid in past after episode of pneumonia, stopped in 2019. Now with persistent cough. Possibly linked to eating but staff unable to substantiate.   Plan:   - start umeclidinium (INCRUSE ELLIPTA) 62.5 MCG/INH inhaler; Inhale 1 puff into the lungs daily  Dispense: 7 each; Refill: 98  - Leave Combivent in place PRN  - Continue PPI, cough could be related to GERD  - Call [facility] to ensure referral received for SLP evaluation   - Consider repeat CXR in six weeks    (K59.01) Slow transit constipation  Comment: Chronic intermittent issue, stable today   Plan:   - SENNA-docusate sodium (SENNA S) 8.6-50 MG tablet; Take 1 tablet by mouth At Bedtime HOLD FOR LOOSE STOOL      Called [facility] to ensure SLP en route - did not received referral faxed last week, sent again to [phone].    Electronically signed by:  STEFFANY Yusuf CNP     Append 08/13/21 4:39 PM - update on plan of care given to daughter Maryjane, in agreement with plan.

## 2021-08-10 NOTE — LETTER
"    8/10/2021        RE: Yoselyn Shah Al  [address]  [address]  [facility] [address]        [facility]  [place] [facility] Record Number:  [id number]  Place of Service where encounter took place:  [facility] [[id number]]  Chief Complaint   Patient presents with     RECHECK     PNA       HPI:    Yoselyn Cui  is a 89 year old (1/28/1932) female with PMH significant for HTN, OAB, GERD, hx of CVA x2, second-degree heart block s/p PPM (2016), paroxysmal atrial fibrillation (on apixaban), hyperlipidemia, CAD s/ps non-STEMI in July 20219 with multi-vessel disease s/p stenting of LAD 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      HPI information obtained from: facility chart records, facility staff, patient report and [facility] chart review.     Today's concern is:  Follow-up today due to small retrocardiac infiltrate on CXR, treated for possible PNA with Augmentin and Doxycycline x5 days.      Cough is getting \"steadily worse\" - but staff do not note any coughing concerns.   Procalcitonin <0.05, no leukocytosis on labs.   + hyponatremia. Renal fxn stable Cr 0.72.   Occurs only with eating per patient. No choking or swallowing issues reported. No SOB.  Used inhalter in the past, recalls this, states it was getting harder to use. On chart review treated for COPD exacerbation in August 2019 during admission for urosepsis, treated with nebs and IV cefepime. Hospitalized in March 2018 for pneumonia after bout of influenza, treated with ceftriaxone and azithromycin, steroids, nebs. Saw pulmonology in 2019 after bout of pneumonia, infection cleared and symptoms improved. Using inhaled corticosteroid, Arnuity Ellipta, but stopped by pulmonology at this time. On albuterol in past but had difficult using inhaler.     No fever/chills.  Chronic left leg swelling, no new or different swelling.   No " "orthopnea. No PND.     Bowels are \"not very well.\"  Chronic issues reported today, constipation, not severe.   No dysuria. Chronic urinary incontinence, uses pads.     Skin tear to arm is closed and dry without pain or redness.    \"I don't have a lot of problems, but I do need a lot of rest.\"     Past Medical and Surgical History reviewed in Epic today.    MEDICATIONS:  Current Outpatient Medications   Medication Sig Dispense Refill     acetaminophen (TYLENOL) 325 MG tablet Take 650 mg by mouth every 6 hours as needed for mild pain        amLODIPine (NORVASC) 5 MG tablet TAKE 1 TABLET BY MOUTH ONCE DAILY 35 tablet PRN     atorvastatin (LIPITOR) 40 MG tablet TAKE 1 TABLET BY MOUTH AT BEDTIME 35 tablet PRN     carvedilol (COREG) 6.25 MG tablet TAKE 1 TABLET BY MOUTH TWICE DAILY WITH MEALS (TAKE WITH 12.5MG FOR A TOTAL OF 18.75MG) NOTE DOSAGE/STRENGTH 70 tablet PRN     clopidogrel (PLAVIX) 75 MG tablet TAKE 1 TABLET BY MOUTH ONCE DAILY 35 tablet PRN     ELIQUIS ANTICOAGULANT 2.5 MG tablet TAKE 1 TABLET BY MOUTH TWICE DAILY 70 tablet PRN     ferrous sulfate (FE TABS) 325 (65 Fe) MG EC tablet Take 1 tablet (325 mg) by mouth Every Mon, Wed, Fri Morning 12 tablet 98     ipratropium-albuterol (COMBIVENT RESPIMAT)  MCG/ACT inhaler Inhale 1 puff into the lungs 4 times daily as needed for shortness of breath / dyspnea or wheezing 4 g 11     lisinopril (ZESTRIL) 40 MG tablet TAKE 1 TABLET BY MOUTH ONCE DAILY 28 tablet 97     Multiple Vitamins-Minerals (SYSTANE ICAPS AREDS2) CAPS TAKE 1 CAPSULE BY MOUTH TWICE DAILY 56 capsule PRN     nitroGLYcerin (NITROSTAT) 0.4 MG sublingual tablet For chest pain place 1 tablet under the tongue every 5 minutes for 3 doses. If symptoms persist 5 minutes after 1st dose call 911. 30 tablet 0     omeprazole (PRILOSEC) 20 MG DR capsule TAKE 1 CAPSULE BY MOUTH EVERY MORNING 37 capsule PRN     oxybutynin ER (DITROPAN-XL) 10 MG 24 hr tablet TAKE 1 TABLET BY MOUTH ONCE DAILY 28 tablet PRN     " potassium chloride ER (KLOR-CON M) 20 MEQ CR tablet TAKE 1 TABLET BY MOUTH ONCE DAILY 35 tablet PRN     senna-docusate (SENOKOT-S/PERICOLACE) 8.6-50 MG tablet Take 1 tablet by mouth daily as needed       SENNA-docusate sodium (SENNA S) 8.6-50 MG tablet Take 1 tablet by mouth At Bedtime HOLD FOR LOOSE STOOL 30 tablet 98     simethicone (MYLICON) 125 MG chewable tablet CHEW AND SWALLOW 1 TABLET BY MOUTH EVERY NIGHT AT BEDTIME 38 tablet PRN     Vitamin D3 (CHOLECALCIFEROL) 25 mcg (1000 units) tablet Take 1 tablet (25 mcg) by mouth daily 30 tablet 98     REVIEW OF SYSTEMS:  Limited secondary to cognitive impairment but today pt reports history as per HPI.     Objective:  BP (!) 145/62   Pulse 68   Temp 98.2  F (36.8  C)   Resp 18   Ht 1.524 m (5')   Wt 51.8 kg (114 lb 3.2 oz)   SpO2 99%   BMI 22.30 kg/m    Exam:  GENERAL APPEARANCE:  Alert, in no distress, pleasant, cooperative, well groomed seated in arm chair with rolling table in front of her watching television.  EYES: Sclera clear and conjunctiva normal, no discharge, wearing glasses, pupils 2+ equal round BL.   ENT:  Mouth normal, moist mucous membranes.  RESP:  Non-labored breathing, no respiratory distress, rales left base improved faint, patient is on room air.   CV:  Palpation - no murmur/non-displaced PMI, L CW pacer. Auscultation - rate and rhythm regular, no murmur, no rub or gallop appreciated.   VASCULAR: Trace edema left ankle only, no edema right lower leg. Calves are supple and non-tender.  ABDOMEN:  Normal bowel sounds, soft, nontender, no grimacing or guarding with palpation.  M/S:  Left sided weakness with UE flexion contracture.  SKIN:  Inspection - CDI BandAids to right posterior forearm and left hand without erythema. Palpation- no increased warmth, skin is thin, dry and non-tender.  PSYCH: Awake and alert, speech fluent,  insight and judgement fair, memory fair, without depressed or anxious affect, calm and cooperative.    Labs:   Labs  done in SNF are in [facility]. Please refer to them using EPIC/Care Everywhere.         CT CHEST PULMONARY EMBOLISM WITH CONTRAST  8/4/2019 1:39 PM     IMPRESSION:   1. No pulmonary embolism demonstrated.  2. No thoracic aortic aneurysm.   3. Moderate emphysema. Moderate bronchial wall thickening and mucous  plugging.     STEVAN BURNS MD    ASSESSMENT/PLAN:  (R05) Cough  (primary encounter diagnosis)  (J44.9) Possible Chronic obstructive pulmonary disease (H)  Comment: Stable. Completed abx without improvement in cough, less likely infectious. Hx of recurrent pneumonia and possible emphysema on imaging in past, on inhaled daily corticosteroid in past after episode of pneumonia, stopped in 2019. Now with persistent cough. Possibly linked to eating but staff unable to substantiate.   Plan:   - start umeclidinium (INCRUSE ELLIPTA) 62.5 MCG/INH inhaler; Inhale 1 puff into the lungs daily  Dispense: 7 each; Refill: 98  - Leave Combivent in place PRN  - Continue PPI, cough could be related to GERD  - Call [facility] to ensure referral received for SLP evaluation   - Consider repeat CXR in six weeks    (K59.01) Slow transit constipation  Comment: Chronic intermittent issue, stable today   Plan:   - SENNA-docusate sodium (SENNA S) 8.6-50 MG tablet; Take 1 tablet by mouth At Bedtime HOLD FOR LOOSE STOOL      Called [facility] to ensure SLP en route - did not received referral faxed last week, sent again to [phone].    Electronically signed by:  STEFFANY Yusuf CNP             Sincerely,        STEFFANY Yusuf CNP

## 2021-08-11 NOTE — PATIENT INSTRUCTIONS
Yoselyn Cui  1/28/1932  ORDERS:  - umeclidinium (INCRUSE ELLIPTA) 62.5 MCG/INH inhaler; Inhale 1 puff into the lungs daily  Dispense: 7 each; Refill: 98 dx Cough, STAFF TO ASSIST TO USE  - SLP evaluation with [facility] due to coughing, please fax Face Sheet and MAR to [phone]     Electronically signed by:   STEFFANY Yusuf CNP  08/13/21 4:29 PM

## 2021-08-16 NOTE — TELEPHONE ENCOUNTER
[facility] TELEPHONE NOTE    Daughter (Maryjane) with questions about inhaler, mother not getting medication over weekend, continued cough.  Message sent to facility and re-faxed order - await confirmation of receipt. Called [facility] - waiting for face sheet from facility, requested facility send to [facility].    Left message requesting Maryjane call back when she is able.    Brittany Florez, STEFFANY FERNANDEZ  08/16/21 8:44 AM

## 2021-08-18 NOTE — TELEPHONE ENCOUNTER
Yoselyn Cui  1/28/1932  ORDERS:  - guaiFENesin (ROBITUSSIN) 100 MG/5ML liquid; Take 10 mLs (200 mg) by mouth 2 times daily. May also take 10 mLs (200 mg) 2 times daily as needed for cough.  Dispense: 236 mL; Refill: 98 dx cough  - Benzocaine-Menthol (CEPACOL SORE THROAT) 15-2.6 MG LOZG; Take 1 lozenge by mouth every 2 hours as needed (Sore Throat) May keep at bedside.  Dispense: 18 lozenge; Refill: 3 dx cough  Electronically signed by:   STEFFANY Yusuf CNP  08/18/21 2:12 PM

## 2021-08-24 PROBLEM — R05.9 COUGH: Status: ACTIVE | Noted: 2021-01-01

## 2021-08-24 PROBLEM — J43.9 PULMONARY EMPHYSEMA, UNSPECIFIED EMPHYSEMA TYPE (H): Status: ACTIVE | Noted: 2021-01-01

## 2021-08-24 PROBLEM — R13.10 DYSPHAGIA, UNSPECIFIED TYPE: Status: ACTIVE | Noted: 2021-01-01

## 2021-08-24 PROBLEM — R91.8 PULMONARY NODULES: Status: ACTIVE | Noted: 2021-01-01

## 2021-08-24 NOTE — LETTER
"    8/24/2021        RE: Yoselyn Shah Al  [address]  [address]  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      HPI information obtained from: facility chart records, facility staff, patient report and [facility] chart review.     Today's concern is:  Follow-up today due to cough present over last three weeks.  Now with hoarse voice.     Onset of cough around 8/3, presented with confusion, low grade fever, cough. CXR showed new infiltrate. Completed course of Augmentin and Doxycycline for possible pneumonia. Procalcitonin was not elevated. No leukocytosis. COVID-19 negative. Started on PRN Combivent inhaler.  Hx of pneumonias in the past with smoking history, known lung nodules, and emphysema.  Symptoms did not improve. Not using PRN inhaler. Started on LAMA daily inhaler. Follow-up today to assess symptoms.    Resident wrote list of concerns:  - Bad voice  - Bad cough  - Infers with eating  - Throat tightens up  - Hard to talk on phone    Alleviating: inhalers  Aggravating: \"I really don't know!\"   Pattern:  \"periodically have this heavy cough\" - wakes patient up from sleep  \"Decreased energy\" - doesn't feel like walking as much.     One day last week couldn't catch breath \"unless I yawned\" - used her daily Incruse inhaler and rescue inhaler and symptoms abated.   No " "rhinitis.  Cough is dry and non-productive.   No fever/chills.  No muscle aches.  Feeling well otherwise.   No heartburn. Takes daily omeprazole.     Hx of left lung nodules present since at least 2018, see chest CT below.     Had SLP evaluation last week, initially states, \"they didn't come!\"  Then recalls maybe they did come last week and made a plan, but \"they haven't done a thing!\"    Spoke to [facility] SLP, who reports that dysphagia was noted on initial assessment and they will follow her twice weekly.     Takes lisinopril - does not recall issues with cough.  BP Readings from Last 3 Encounters:   08/24/21 (!) 145/62   08/09/21 (!) 145/62   08/02/21 130/68     No hx of seasonal allergies.    Nursing assistant came in at end of visit, has never heard resident cough. Nursing staff they have also not heard resident coughing during their routine visits.     Past Medical and Surgical History reviewed in Epic today.    Social history:  Former Smoker Cigarettes 0.7 50 Quit: 01/01/1965         MEDICATIONS:  Current Outpatient Medications   Medication Sig Dispense Refill     acetaminophen (TYLENOL) 325 MG tablet Take 650 mg by mouth every 6 hours as needed for mild pain        amLODIPine (NORVASC) 5 MG tablet TAKE 1 TABLET BY MOUTH ONCE DAILY 35 tablet PRN     atorvastatin (LIPITOR) 40 MG tablet TAKE 1 TABLET BY MOUTH AT BEDTIME 35 tablet PRN     Benzocaine-Menthol (CEPACOL SORE THROAT) 15-2.6 MG LOZG Take 1 lozenge by mouth every 2 hours as needed (Sore Throat) May keep at bedside. 18 lozenge 3     carvedilol (COREG) 6.25 MG tablet TAKE 1 TABLET BY MOUTH TWICE DAILY WITH MEALS (TAKE WITH 12.5MG FOR A TOTAL OF 18.75MG) NOTE DOSAGE/STRENGTH 70 tablet PRN     clopidogrel (PLAVIX) 75 MG tablet TAKE 1 TABLET BY MOUTH ONCE DAILY 35 tablet PRN     ELIQUIS ANTICOAGULANT 2.5 MG tablet TAKE 1 TABLET BY MOUTH TWICE DAILY 70 tablet PRN     ferrous sulfate (FE TABS) 325 (65 Fe) MG EC tablet Take 1 tablet (325 mg) by mouth Every " Mon, Wed, Fri Morning 12 tablet 98     guaiFENesin (ROBITUSSIN) 100 MG/5ML liquid Take 10 mLs (200 mg) by mouth 2 times daily. May also take 10 mLs (200 mg) 2 times daily as needed for cough. 236 mL 98     ipratropium-albuterol (COMBIVENT RESPIMAT)  MCG/ACT inhaler Inhale 1 puff into the lungs 4 times daily as needed for shortness of breath / dyspnea or wheezing 4 g 11     lisinopril (ZESTRIL) 40 MG tablet TAKE 1 TABLET BY MOUTH ONCE DAILY 28 tablet 97     Multiple Vitamins-Minerals (SYSTANE ICAPS AREDS2) CAPS TAKE 1 CAPSULE BY MOUTH TWICE DAILY 56 capsule PRN     nitroGLYcerin (NITROSTAT) 0.4 MG sublingual tablet For chest pain place 1 tablet under the tongue every 5 minutes for 3 doses. If symptoms persist 5 minutes after 1st dose call 911. 30 tablet 0     omeprazole (PRILOSEC) 20 MG DR capsule TAKE 1 CAPSULE BY MOUTH EVERY MORNING 37 capsule PRN     oxybutynin ER (DITROPAN-XL) 10 MG 24 hr tablet TAKE 1 TABLET BY MOUTH ONCE DAILY 28 tablet PRN     oxybutynin ER (DITROPAN-XL) 5 MG 24 hr tablet TAKE 1 TABLET BY MOUTH ONCE DAILY 28 tablet PRN     potassium chloride ER (KLOR-CON M) 20 MEQ CR tablet TAKE 1 TABLET BY MOUTH ONCE DAILY 35 tablet PRN     senna-docusate (SENOKOT-S/PERICOLACE) 8.6-50 MG tablet Take 1 tablet by mouth daily as needed       SENNA-docusate sodium (SENNA S) 8.6-50 MG tablet Take 1 tablet by mouth At Bedtime HOLD FOR LOOSE STOOL 30 tablet 98     simethicone (MYLICON) 125 MG chewable tablet CHEW AND SWALLOW 1 TABLET BY MOUTH EVERY NIGHT AT BEDTIME 38 tablet PRN     umeclidinium (INCRUSE ELLIPTA) 62.5 MCG/INH inhaler Inhale 1 puff into the lungs daily 7 each 98     Vitamin D3 (CHOLECALCIFEROL) 25 mcg (1000 units) tablet Take 1 tablet (25 mcg) by mouth daily 30 tablet 98     REVIEW OF SYSTEMS:  Limited secondary to cognitive impairment but today pt reports history as per HPI.     Objective:  BP (!) 145/62   Pulse 68   Temp (!) 96  F (35.6  C)   Resp 18   Ht 1.524 m (5')   Wt 51.8 kg (114  lb 3.2 oz)   BMI 22.30 kg/m    Exam:  GENERAL APPEARANCE:  Alert, in no distress, pleasant, cooperative, well groomed seated in arm chair with rolling table in front of her.  EYES: Sclera clear and conjunctiva normal, no discharge, wearing glasses, pupils 2+ equal round BL.   ENT:  Mouth normal, moist mucous membranes, no pharyngeal erythema or exudates. Nares are moist without drainage or polyps. Dentures in place. Ears with moderate cerumen and tortuous ear canal, cannot see TMs.   RESP:  Non-labored breathing, no respiratory distress, rales left base, patient is on room air. SpO2 94% RA,   CV:  Palpation - no murmur/non-displaced PMI, L CW pacer. Auscultation - rate and rhythm regular, no murmur, no rub or gallop appreciated. HR 77   VASCULAR: Trace edema left ankle only, no edema right lower leg. Calves are supple and non-tender.  ABDOMEN:  Normal bowel sounds, soft, nontender, no grimacing or guarding with palpation.  M/S:  Left sided weakness with UE flexion contracture.  PSYCH: Awake and alert, speech fluent,  insight and judgement fair, memory fair, without depressed or anxious affect, calm and cooperative.    Labs:   Recent labs in [facility] reviewed by me today.    CBC RESULTS: Recent Labs   Lab Test 08/05/21  0700 07/22/21  0840   WBC 6.0 8.2   RBC 4.50 4.41   HGB 12.9 12.8   HCT 40.3 40.8   MCV 90 93   MCH 28.7 29.0   MCHC 32.0 31.4*   RDW 13.4 14.0    238       Last Basic Metabolic Panel:  Recent Labs   Lab Test 08/12/21  0440 08/05/21  0700   * 128*   POTASSIUM 4.0 3.9   CHLORIDE 100 95   GOLDY 8.6 8.9   CO2 27 28   BUN 13 11   CR 0.78 0.72   GLC 92 141*       Liver Function Studies -   Recent Labs   Lab Test 04/28/21  1002 09/29/20  1023 08/04/19  1240 07/23/19  0538 11/19/18  1216   PROTTOTAL  --   --  6.4*  --  6.4*   ALBUMIN  --   --  2.5* 2.4* 3.5   BILITOTAL  --   --  0.5  --  0.3   ALKPHOS  --   --  89  --  80   AST  --   --  21  --  22   ALT 22 19 22  --  19     Recent Labs   Lab Test  04/28/21  1002 09/29/20  1023   CHOL 118 118   HDL 53 45*   LDL 50 55   TRIG 74 88               RADIOLOGIST CONSULT FOR CARDIOLOGY 11/6/2019 4:46 PM     HISTORY: 87-year-old woman with history of thoracic aortic dissection.     COMPARISON: August 4, 2019.     TECHNIQUE: Multiplanar and multiformatted CTA images obtained from the  lung apices through the lung bases after the uneventful administration  of Isovue 370 intravenous contrast given for a total of 100 mL.                                                                      IMPRESSION: Please note this report will focus only on soft tissue  findings. Please see separate cardiology report for all vascular and  cardiac findings.     Visible thyroid gland is unremarkable. Scattered mediastinal lymph  nodes, none of which appear abnormally enlarged. No pleural effusion.  Limited visualization of solid organs in the upper abdomen  demonstrates considerable dilatation of the pancreatic duct measuring  up to 7 mm. This is only partially visualized and etiology uncertain.  No obvious intrahepatic biliary dilatation. May consider MRCP for most  definitive evaluation. Degenerative changes throughout the thoracic  spine. No obvious acute osseous abnormality. 7 mm nodule in the left  lung along the major fissure on image 78 series 8 is unchanged. Nodule  in the left upper lobe is 4 mm on image 72 series 8, unchanged.  Emphysematous changes throughout both lungs. These nodules were also identified on March 2, 2018. Follow-up CT examination in one year.     MALIHA MEDEROS MD    ASSESSMENT/PLAN:  (R05) Cough  (primary encounter diagnosis)  (R13.10) Dysphagia, unspecified type  (R93.89) Abnormal CXR  (J43.9) Pulmonary emphysema (H)  (R91.8) Pulmonary nodules  Comment: Treated for possible pneumonia 8/3 due to cough with infiltrate on CXR, no improvement in cough. Emphysema on imaging and left lung nodules, 35 pack-year smoking history. Started on Incruse Elllipta and  Combivent PRN with some improvement. SLP assessed, found dysphagia, which edilia contribute.    Plan:   - Repeat CXR to ensure no interval aspiration event   - SLP to follow twice weekly and update NP    - Lisinopril -- change to losartan as below   - Continue guaifenesin for symptom mgmt  - Continue PPI  - Continue daily Incruse Ellipta  - Continue PRN Combivent recuse inhaler   - CBC with diff, CMP, BNP, procalcitonin on 9/2 (one week s/p switch from ACE-I to ARB  Next steps --    - Consider adding inhaled corticosteroid as long as no new aspiration pneumonia on imaging   - Post-nasal drip? add Flonase  - Pulmonary referral with interval chest CT to follow lung nodules   - ENT for hoarse voice if laryngitis last > 3 weeks     (I10) Essential hypertension, benign  Comment: Stable.   BP Readings from Last 3 Encounters:   08/24/21 (!) 142/73   08/09/21 (!) 145/62   08/02/21 130/68   Elevated BP reading at last cardiology visit, order for 24-hour BP monitor does not completed.   Plan:   - Stop lisinopril 40 mg daily and switch to losartan 100 mg daily  - BMP in one week   - Continue Coreg 18.75 mg BID, amlodipine 5 mg daily   - Monitor routine VS and labs  - Patient declined ambulatory BP monitor     12:43 PM - 12:46 PM (3 minutes) While onsite, called [facility] to collaborate with SLP (Margarita) to review assessment and care plan. Planning to see Thursday. Planning to two times weekly visits.   12:47 PM - 12:52 (5 minutes) Reviewed case with Dr. Loya  1:36 PM - 1:47 PM (11 minutes) Reviewed plan of care with daughter, Maryjane.     Electronically signed by:  STEFFANY Yusuf CNP           Sincerely,        STEFFANY Yusuf CNP

## 2021-08-24 NOTE — PROGRESS NOTES
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      HPI information obtained from: facility chart records, facility staff, patient report and [facility] chart review.     Today's concern is:  Follow-up today due to cough present over last three weeks.  Now with hoarse voice.     Onset of cough around 8/3, presented with confusion, low grade fever, cough. CXR showed new infiltrate. Completed course of Augmentin and Doxycycline for possible pneumonia. Procalcitonin was not elevated. No leukocytosis. COVID-19 negative. Started on PRN Combivent inhaler.  Hx of pneumonias in the past with smoking history, known lung nodules, and emphysema.  Symptoms did not improve. Not using PRN inhaler. Started on LAMA daily inhaler. Follow-up today to assess symptoms.    Resident wrote list of concerns:  - Bad voice  - Bad cough  - Infers with eating  - Throat tightens up  - Hard to talk on phone    Alleviating: inhalers  Aggravating: \"I really don't know!\"   Pattern:  \"periodically have this heavy cough\" - wakes patient up from sleep  \"Decreased energy\" - doesn't feel like walking as much.     One day last week couldn't catch breath \"unless I yawned\" - used her daily Incruse inhaler and rescue inhaler and symptoms abated.   No rhinitis.  Cough is dry and non-productive.   No fever/chills.  No muscle aches.  Feeling well otherwise.   No heartburn. " "Takes daily omeprazole.     Hx of left lung nodules present since at least 2018, see chest CT below.     Had SLP evaluation last week, initially states, \"they didn't come!\"  Then recalls maybe they did come last week and made a plan, but \"they haven't done a thing!\"    Spoke to [facility] SLP, who reports that dysphagia was noted on initial assessment and they will follow her twice weekly.     Takes lisinopril - does not recall issues with cough.  BP Readings from Last 3 Encounters:   08/24/21 (!) 145/62   08/09/21 (!) 145/62   08/02/21 130/68     No hx of seasonal allergies.    Nursing assistant came in at end of visit, has never heard resident cough. Nursing staff they have also not heard resident coughing during their routine visits.     Past Medical and Surgical History reviewed in Epic today.    Social history:  Former Smoker Cigarettes 0.7 50 Quit: 01/01/1965         MEDICATIONS:  Current Outpatient Medications   Medication Sig Dispense Refill     acetaminophen (TYLENOL) 325 MG tablet Take 650 mg by mouth every 6 hours as needed for mild pain        amLODIPine (NORVASC) 5 MG tablet TAKE 1 TABLET BY MOUTH ONCE DAILY 35 tablet PRN     atorvastatin (LIPITOR) 40 MG tablet TAKE 1 TABLET BY MOUTH AT BEDTIME 35 tablet PRN     Benzocaine-Menthol (CEPACOL SORE THROAT) 15-2.6 MG LOZG Take 1 lozenge by mouth every 2 hours as needed (Sore Throat) May keep at bedside. 18 lozenge 3     carvedilol (COREG) 6.25 MG tablet TAKE 1 TABLET BY MOUTH TWICE DAILY WITH MEALS (TAKE WITH 12.5MG FOR A TOTAL OF 18.75MG) NOTE DOSAGE/STRENGTH 70 tablet PRN     clopidogrel (PLAVIX) 75 MG tablet TAKE 1 TABLET BY MOUTH ONCE DAILY 35 tablet PRN     ELIQUIS ANTICOAGULANT 2.5 MG tablet TAKE 1 TABLET BY MOUTH TWICE DAILY 70 tablet PRN     ferrous sulfate (FE TABS) 325 (65 Fe) MG EC tablet Take 1 tablet (325 mg) by mouth Every Mon, Wed, Fri Morning 12 tablet 98     guaiFENesin (ROBITUSSIN) 100 MG/5ML liquid Take 10 mLs (200 mg) by mouth 2 times " daily. May also take 10 mLs (200 mg) 2 times daily as needed for cough. 236 mL 98     ipratropium-albuterol (COMBIVENT RESPIMAT)  MCG/ACT inhaler Inhale 1 puff into the lungs 4 times daily as needed for shortness of breath / dyspnea or wheezing 4 g 11     lisinopril (ZESTRIL) 40 MG tablet TAKE 1 TABLET BY MOUTH ONCE DAILY 28 tablet 97     Multiple Vitamins-Minerals (SYSTANE ICAPS AREDS2) CAPS TAKE 1 CAPSULE BY MOUTH TWICE DAILY 56 capsule PRN     nitroGLYcerin (NITROSTAT) 0.4 MG sublingual tablet For chest pain place 1 tablet under the tongue every 5 minutes for 3 doses. If symptoms persist 5 minutes after 1st dose call 911. 30 tablet 0     omeprazole (PRILOSEC) 20 MG DR capsule TAKE 1 CAPSULE BY MOUTH EVERY MORNING 37 capsule PRN     oxybutynin ER (DITROPAN-XL) 10 MG 24 hr tablet TAKE 1 TABLET BY MOUTH ONCE DAILY 28 tablet PRN     oxybutynin ER (DITROPAN-XL) 5 MG 24 hr tablet TAKE 1 TABLET BY MOUTH ONCE DAILY 28 tablet PRN     potassium chloride ER (KLOR-CON M) 20 MEQ CR tablet TAKE 1 TABLET BY MOUTH ONCE DAILY 35 tablet PRN     senna-docusate (SENOKOT-S/PERICOLACE) 8.6-50 MG tablet Take 1 tablet by mouth daily as needed       SENNA-docusate sodium (SENNA S) 8.6-50 MG tablet Take 1 tablet by mouth At Bedtime HOLD FOR LOOSE STOOL 30 tablet 98     simethicone (MYLICON) 125 MG chewable tablet CHEW AND SWALLOW 1 TABLET BY MOUTH EVERY NIGHT AT BEDTIME 38 tablet PRN     umeclidinium (INCRUSE ELLIPTA) 62.5 MCG/INH inhaler Inhale 1 puff into the lungs daily 7 each 98     Vitamin D3 (CHOLECALCIFEROL) 25 mcg (1000 units) tablet Take 1 tablet (25 mcg) by mouth daily 30 tablet 98     REVIEW OF SYSTEMS:  Limited secondary to cognitive impairment but today pt reports history as per HPI.     Objective:  BP (!) 145/62   Pulse 68   Temp (!) 96  F (35.6  C)   Resp 18   Ht 1.524 m (5')   Wt 51.8 kg (114 lb 3.2 oz)   BMI 22.30 kg/m    Exam:  GENERAL APPEARANCE:  Alert, in no distress, pleasant, cooperative, well groomed  seated in arm chair with rolling table in front of her.  EYES: Sclera clear and conjunctiva normal, no discharge, wearing glasses, pupils 2+ equal round BL.   ENT:  Mouth normal, moist mucous membranes, no pharyngeal erythema or exudates. Nares are moist without drainage or polyps. Dentures in place. Ears with moderate cerumen and tortuous ear canal, cannot see TMs.   RESP:  Non-labored breathing, no respiratory distress, rales left base, patient is on room air. SpO2 94% RA,   CV:  Palpation - no murmur/non-displaced PMI, L CW pacer. Auscultation - rate and rhythm regular, no murmur, no rub or gallop appreciated. HR 77   VASCULAR: Trace edema left ankle only, no edema right lower leg. Calves are supple and non-tender.  ABDOMEN:  Normal bowel sounds, soft, nontender, no grimacing or guarding with palpation.  M/S:  Left sided weakness with UE flexion contracture.  PSYCH: Awake and alert, speech fluent,  insight and judgement fair, memory fair, without depressed or anxious affect, calm and cooperative.    Labs:   Recent labs in [facility] reviewed by me today.    CBC RESULTS: Recent Labs   Lab Test 08/05/21  0700 07/22/21  0840   WBC 6.0 8.2   RBC 4.50 4.41   HGB 12.9 12.8   HCT 40.3 40.8   MCV 90 93   MCH 28.7 29.0   MCHC 32.0 31.4*   RDW 13.4 14.0    238       Last Basic Metabolic Panel:  Recent Labs   Lab Test 08/12/21  0440 08/05/21  0700   * 128*   POTASSIUM 4.0 3.9   CHLORIDE 100 95   GOLDY 8.6 8.9   CO2 27 28   BUN 13 11   CR 0.78 0.72   GLC 92 141*       Liver Function Studies -   Recent Labs   Lab Test 04/28/21  1002 09/29/20  1023 08/04/19  1240 07/23/19  0538 11/19/18  1216   PROTTOTAL  --   --  6.4*  --  6.4*   ALBUMIN  --   --  2.5* 2.4* 3.5   BILITOTAL  --   --  0.5  --  0.3   ALKPHOS  --   --  89  --  80   AST  --   --  21  --  22   ALT 22 19 22  --  19     Recent Labs   Lab Test 04/28/21  1002 09/29/20  1023   CHOL 118 118   HDL 53 45*   LDL 50 55   TRIG 74 88               RADIOLOGIST CONSULT FOR  CARDIOLOGY 11/6/2019 4:46 PM     HISTORY: 87-year-old woman with history of thoracic aortic dissection.     COMPARISON: August 4, 2019.     TECHNIQUE: Multiplanar and multiformatted CTA images obtained from the  lung apices through the lung bases after the uneventful administration  of Isovue 370 intravenous contrast given for a total of 100 mL.                                                                      IMPRESSION: Please note this report will focus only on soft tissue  findings. Please see separate cardiology report for all vascular and  cardiac findings.     Visible thyroid gland is unremarkable. Scattered mediastinal lymph  nodes, none of which appear abnormally enlarged. No pleural effusion.  Limited visualization of solid organs in the upper abdomen  demonstrates considerable dilatation of the pancreatic duct measuring  up to 7 mm. This is only partially visualized and etiology uncertain.  No obvious intrahepatic biliary dilatation. May consider MRCP for most  definitive evaluation. Degenerative changes throughout the thoracic  spine. No obvious acute osseous abnormality. 7 mm nodule in the left  lung along the major fissure on image 78 series 8 is unchanged. Nodule  in the left upper lobe is 4 mm on image 72 series 8, unchanged.  Emphysematous changes throughout both lungs. These nodules were also identified on March 2, 2018. Follow-up CT examination in one year.     MALIHA MEEDROS MD    ASSESSMENT/PLAN:  (R05) Cough  (primary encounter diagnosis)  (R13.10) Dysphagia, unspecified type  (R93.89) Abnormal CXR  (J43.9) Pulmonary emphysema (H)  (R91.8) Pulmonary nodules  Comment: Treated for possible pneumonia 8/3 due to cough with infiltrate on CXR, no improvement in cough. Emphysema on imaging and left lung nodules, 35 pack-year smoking history. Started on Incruse Elllipta and Combivent PRN with some improvement. SLP assessed, found dysphagia, which edilia contribute.    Plan:   - Repeat CXR to ensure no  interval aspiration event   - SLP to follow twice weekly and update NP    - Lisinopril -- change to losartan as below   - Continue guaifenesin for symptom mgmt  - Continue PPI  - Continue daily Incruse Ellipta  - Continue PRN Combivent recuse inhaler   - CBC with diff, CMP, BNP, procalcitonin on 9/2 (one week s/p switch from ACE-I to ARB  Next steps --    - Consider adding inhaled corticosteroid as long as no new aspiration pneumonia on imaging   - Post-nasal drip? add Flonase  - Pulmonary referral with interval chest CT to follow lung nodules   - ENT for hoarse voice if laryngitis last > 3 weeks     (I10) Essential hypertension, benign  Comment: Stable.   BP Readings from Last 3 Encounters:   08/24/21 (!) 142/73   08/09/21 (!) 145/62   08/02/21 130/68   Elevated BP reading at last cardiology visit, order for 24-hour BP monitor does not completed.   Plan:   - Stop lisinopril 40 mg daily and switch to losartan 100 mg daily  - BMP in one week   - Continue Coreg 18.75 mg BID, amlodipine 5 mg daily   - Monitor routine VS and labs  - Patient declined ambulatory BP monitor     12:43 PM - 12:46 PM (3 minutes) While onsite, called [facility] to collaborate with SLP (Margarita) to review assessment and care plan. Planning to see Thursday. Planning to two times weekly visits.   12:47 PM - 12:52 (5 minutes) Reviewed case with Dr. Loya  1:36 PM - 1:47 PM (11 minutes) Reviewed plan of care with daughter, Maryjane.     Electronically signed by:  STEFFANY Yusuf CNP

## 2021-08-24 NOTE — PATIENT INSTRUCTIONS
Yoselyn Cui  1/28/1932  ORDERS:  - Discontinue Cepacol when medication expires, patient does not like cough drops  - CBC with diff dx R05, CMP dx I10, BNP dx I10, procalcitonin dx R05 on 9/2  - CXR AP and lateral dx cough >> can get this 8/25 during the day  - Discontinue lisinopril   - Losartan 100 mg PO daily dx HTN   Electronically signed by:   STEFFANY Yusuf CNP  08/24/21 1:26 PM

## 2021-08-30 PROBLEM — A41.9 SEPSIS (H): Status: RESOLVED | Noted: 2019-08-04 | Resolved: 2021-01-01

## 2021-08-30 PROBLEM — H54.7 REDUCED VISION: Status: ACTIVE | Noted: 2019-05-30

## 2021-08-30 PROBLEM — H52.223 REGULAR ASTIGMATISM, BILATERAL: Status: ACTIVE | Noted: 2019-05-30

## 2021-08-30 PROBLEM — H35.30 AMD (AGE-RELATED MACULAR DEGENERATION), BILATERAL: Status: ACTIVE | Noted: 2019-05-30

## 2021-08-30 PROBLEM — H52.4 PRESBYOPIA: Status: ACTIVE | Noted: 2019-05-30

## 2021-08-30 PROBLEM — Z95.0 PACEMAKER: Status: ACTIVE | Noted: 2017-04-25

## 2021-08-30 PROBLEM — J93.9 PNEUMOTHORAX ON LEFT: Status: ACTIVE | Noted: 2021-01-01

## 2021-08-30 PROBLEM — Z96.1 PSEUDOPHAKIA: Status: ACTIVE | Noted: 2019-05-30

## 2021-08-30 PROBLEM — J18.9 COMMUNITY ACQUIRED PNEUMONIA: Status: RESOLVED | Noted: 2018-03-02 | Resolved: 2021-01-01

## 2021-08-30 PROBLEM — I24.9 ACS (ACUTE CORONARY SYNDROME) (H): Status: RESOLVED | Noted: 2019-07-16 | Resolved: 2021-01-01

## 2021-08-30 PROBLEM — J18.9 PNEUMONIA OF BOTH LOWER LOBES DUE TO INFECTIOUS ORGANISM: Status: ACTIVE | Noted: 2021-01-01

## 2021-08-30 NOTE — TELEPHONE ENCOUNTER
[facility] TELEPHONE NOTE    CC: SOB and cough, progressively worse over last month, hx of smoking, and emphysema and lung nodules on imaging, as well as CVA with possible dysphagia being followed by home health SLP.     COVID-19 + positive residents at facility, but Yoselyn has had negative PCR swab x2 in the last week per facility nursing.    Resident with cognitive impairment in [facility] setting with new intermittent cough over last month, no improvement with conservative mgmt. Treated with oral abx in early August due to infiltrate on CXR, which resolved on interval imaging. Started on Incruse Ellipta and rescue inhaler due to hx of chronic lung disease. SLP following. Increased intestinal markings on CXR last week. Resident is anticoagulated on Eliquis. Hx of lung nodules.      Message from daughter that resident is getting weaker due to increased cough over weekend. Just prior to my call resident put on her call light to alert staff to SOB, found to be hypoxic at 87% on RA, which is new for resident.     Due for follow-up visit tomorrow, where plan was for possible step up in therapy (inhaled corticosteroid) for chronic lung disease and/or referral for CT Chest, PFTs, pulmonology referral if no improvement in symptoms. Also consideration of diuretic.    Spoke to director of nurse, resident seemed to be at baseline this AM, except did have new black eye, could not tell staff how she sustained this. No SOB at visit with nursing this AM. Nurse prior to ER also noted new ecchymosis to left arm. Concern for unwitnessed fall.    ASSESSMENT/PLAN:  Recommendation for ER transfer due hypoxia, SOB, weakness. Needs chest imaging and labs. May benefit from placement in TCU due to physical deconditioning. Called daughter Maryjane [phone] to update, in agreement with plan.      Brittany Florez, STEFFANY CNP  08/30/21 2:19 PM    Append, mobile CXR 8/25/21

## 2021-08-30 NOTE — ED NOTES
[facility]  ED Nurse Handoff Report    ED Chief complaint: Shortness of Breath      ED Diagnosis:   Final diagnoses:   Pneumonia of both lower lobes due to infectious organism   Pneumothorax on left       Code Status: DNR / DNI    Allergies: No Known Allergies    Patient Story: SOB  Focused Assessment:  Pt reports SOB that got worse today. Pt appears to have a left pneumothorax. Pt to have chest tube placed.     Treatments and/or interventions provided: Abx  Patient's response to treatments and/or interventions: good    To be done/followed up on inpatient unit:  chest tube placement    Does this patient have any cognitive concerns?: non    Activity level - Baseline/Home:  Independent  Activity Level - Current:   Stand with Assist    Patient's Preferred language: English   Needed?: No    Isolation: None  Infection: Not Applicable  COVID r/o and special precautions  Patient tested for COVID 19 prior to admission: YES  Bariatric?: No    Vital Signs:   Vitals:    08/30/21 1529   BP: (!) 180/106   Pulse: 114   Resp: 30   Temp: 100  F (37.8  C)   TempSrc: Oral   SpO2: 90%       Cardiac Rhythm:     Was the PSS-3 completed:   Yes  What interventions are required if any?               Family Comments: none here  OBS brochure/video discussed/provided to patient/family: N/A              Name of person given brochure if not patient: NA              Relationship to patient: NA    For the majority of the shift this patient's behavior was Green.   Behavioral interventions performed were none.    ED NURSE PHONE NUMBER: *[phone]

## 2021-08-30 NOTE — ED TRIAGE NOTES
Pt comes from skilled nursing where pt reports more SOB, Hx of COPD. Pt has had a cough but pt states that's chronic. Pt has an unknown black left eye. Pt reports that she is COVID vaccinated

## 2021-08-30 NOTE — ED NOTES
Bed: ED27  Expected date:   Expected time:   Means of arrival:   Comments:  Rajesh - 512 - 89 F SOB eta 1529

## 2021-08-30 NOTE — PHARMACY-ADMISSION MEDICATION HISTORY
Pharmacy Medication History  Admission medication history interview status for the 8/30/2021  admission is complete. See EPIC admission navigator for prior to admission medications     Location of Interview: Patient room  Medication history sources: [facility] ([facility]) and Assisted Living Visit encounter 8/24/21    Significant changes made to the medication list:  Adjusted oxybutynin from 10 mg daily --> 5 mg daily (confirmed with RN at facility, listed as 5 mg dose on facility med list)   Added carvedilol 12.5 mg tablet to reflect facility med list directions to take 6.25 + 12.5 mg tablets BID    In the past week, patient estimated taking medication this percent of the time: greater than 90%    Additional medication history information:   Confirmed recent change from lisinopril 40 mg daily to losartan 100 mg daily with facility staff.     Medication reconciliation completed by provider prior to medication history? No    Time spent in this activity: 25 minutes    Prior to Admission medications    Medication Sig Last Dose Taking? Auth Provider   amLODIPine (NORVASC) 5 MG tablet TAKE 1 TABLET BY MOUTH ONCE DAILY 8/30/2021 at 0900 Yes Brittany Florez APRN CNP   atorvastatin (LIPITOR) 40 MG tablet TAKE 1 TABLET BY MOUTH AT BEDTIME 8/29/2021 at 2100 Yes Brittany Florez APRN CNP   carvedilol (COREG) 12.5 MG tablet Take 12.5 mg by mouth 2 times daily (with meals) (TAKE WITH 6.25MG FOR A TOTAL OF 18.75MG) **NOTE DOSAGE/STRENGTH** 8/30/2021 at 0900 Yes Unknown, Entered By History   carvedilol (COREG) 6.25 MG tablet TAKE 1 TABLET BY MOUTH TWICE DAILY WITH MEALS (TAKE WITH 12.5MG FOR A TOTAL OF 18.75MG) **NOTE DOSAGE/STRENGTH** 8/30/2021 at 0900 Yes Brittany Florez APRN CNP   clopidogrel (PLAVIX) 75 MG tablet TAKE 1 TABLET BY MOUTH ONCE DAILY 8/30/2021 at 0900 Yes Brittany Florez APRN CNP   ELIQUIS ANTICOAGULANT 2.5 MG tablet TAKE 1 TABLET BY MOUTH TWICE DAILY 8/30/2021 at 0900 Yes Brittany Florez  APRN CNP   ferrous sulfate (FE TABS) 325 (65 Fe) MG EC tablet Take 1 tablet (325 mg) by mouth Every Mon, Wed, Fri Morning 8/30/2021 at 0900 Yes Brittany Florez APRN CNP   guaiFENesin (ROBITUSSIN) 100 MG/5ML liquid Take 10 mLs (200 mg) by mouth 2 times daily. May also take 10 mLs (200 mg) 2 times daily as needed for cough. 8/30/2021 at 0900 Yes Brittany Florez APRN CNP   ipratropium-albuterol (COMBIVENT RESPIMAT)  MCG/ACT inhaler Inhale 1 puff into the lungs 4 times daily as needed for shortness of breath / dyspnea or wheezing 8/30/2021 at 1400 Yes Brittany Florez APRN CNP   losartan (COZAAR) 100 MG tablet Take 1 tablet (100 mg) by mouth daily 8/30/2021 at 0900 Yes Brittany Florez APRN CNP   Multiple Vitamins-Minerals (SYSTANE ICAPS AREDS2) CAPS TAKE 1 CAPSULE BY MOUTH TWICE DAILY 8/30/2021 at 0900 Yes Brittany Florez APRN CNP   omeprazole (PRILOSEC) 20 MG DR capsule TAKE 1 CAPSULE BY MOUTH EVERY MORNING 8/30/2021 at 0900 Yes Brittany Florez APRN CNP   oxybutynin ER (DITROPAN-XL) 10 MG 24 hr tablet TAKE 1 TABLET BY MOUTH ONCE DAILY  Patient taking differently: Take 5 mg by mouth daily  8/30/2021 at 0900 Yes Brittany Florez APRN CNP   potassium chloride ER (KLOR-CON M) 20 MEQ CR tablet TAKE 1 TABLET BY MOUTH ONCE DAILY 8/30/2021 at 0900 Yes Brittany Florez APRN CNP   SENNA-docusate sodium (SENNA S) 8.6-50 MG tablet Take 1 tablet by mouth At Bedtime HOLD FOR LOOSE STOOL 8/29/2021 at 2100 Yes Brittany Florez APRN CNP   simethicone (MYLICON) 125 MG chewable tablet CHEW AND SWALLOW 1 TABLET BY MOUTH EVERY NIGHT AT BEDTIME 8/29/2021 at 2100 Yes Brittany Florez APRN CNP   umeclidinium (INCRUSE ELLIPTA) 62.5 MCG/INH inhaler Inhale 1 puff into the lungs daily 8/30/2021 at 0900 Yes Brittany Florez APRN CNP   Vitamin D3 (CHOLECALCIFEROL) 25 mcg (1000 units) tablet Take 1 tablet (25 mcg) by mouth daily 8/30/2021 at 0900 Yes Brittany Florez APRN CNP   acetaminophen (TYLENOL)  325 MG tablet Take 650 mg by mouth every 6 hours as needed for mild pain  Unknown at Unknown time  Unknown, Entered By History   Benzocaine-Menthol (CEPACOL SORE THROAT) 15-2.6 MG LOZG Take 1 lozenge by mouth every 2 hours as needed (Sore Throat) May keep at bedside. Unknown at Unknown time  Brittany Florez APRN CNP   nitroGLYcerin (NITROSTAT) 0.4 MG sublingual tablet For chest pain place 1 tablet under the tongue every 5 minutes for 3 doses. If symptoms persist 5 minutes after 1st dose call 911. Unknown at Unknown time  Alex Ramsay MD   senna-docusate (SENOKOT-S/PERICOLACE) 8.6-50 MG tablet Take 1 tablet by mouth daily as needed Unknown at Unknown time  Reported, Patient       The information provided in this note is only as accurate as the sources available at the time of update(s)   Anahi Trinh, PharmD

## 2021-08-30 NOTE — ED PROVIDER NOTES
History   Chief Complaint:  Shortness of Breath       HPI   Yoselyn Cui is a 89 year old female on Plavix, Eliquis with history of AV block, pacemaker who presents with shortness of breath. The patient has been experiencing worsening shortness of breath and cough for the last week. She is also experiencing chest pain while in the emergency department. The patient also is experiencing eye pain, but states that she has not fallen or hit her face recently. She also reports weight loss. The patent has not had heart problems since her pacemaker placement, and reports no other medical problems. She is not on at-home oxygen, but reports use of Combivent. There is nobody sick around her with pneumonia or Covid-19. Her cough is nonproductive. The patient denies loss of taste or smell. She also denies fever.    Review of Systems   Constitutional: Positive for unexpected weight change (loss). Negative for fever.   Respiratory: Positive for cough and shortness of breath.    All other systems reviewed and are negative.        Allergies:  The patient has no known allergies.     Medications:  Amlodipine  Atorvastatin  Benzocaine-Menthol  Carvedilol  Plavix  Eliquis  Ferrous sulfate  Robitussin  Iosartan  Nitroglycerin  Prilosec  Ditropan  Senna-docusate sodium  Simethicone  Incruse ellipta    Past Medical History:    AV block  Cerebrovascular accident  Cerebral artherosclerosis  Dyspnea on exertion  Colonic polyps  Hypertension  Iron deficiency anemia  Melanoma  Mixed hyperlipidemia  Paroxysmal atrial fibrillation  Sick sinus syndrome  Pulmonary nodules  Pulmonary emphysema  Sepsis  Coronary artery disease  Sepsis  Acute coronary syndrome   Pseudophakia  Presbyopia  Psoriasis  Hypercholesterolemia    Past Surgical History:    Appendectomy  Fallopian tube ligation  Right cataract surgery  Coronary angiogram  Heart catheterization  Left ventriculogram  Stent drug eluting  Combined esophagoscopy, gastroscopy,  duodenoscopy  Pacemaker implant     Family History:    Coronary artery disease  Heart failure    Social History:  Lives in assisted living home  History of smoking    Physical Exam     Patient Vitals for the past 24 hrs:   BP Temp Temp src Pulse Resp SpO2   08/30/21 1529 (!) 180/106 100  F (37.8  C) Oral 114 30 90 %       Physical Exam  GENERAL: well developed, pleasant  HEAD: atraumatic  EYES: pupils reactive, extraocular muscles intact, conjunctivae normal  ENT:  mucus membranes moist. Some ecchymosis around the left eyelid  NECK:  trachea midline, normal range of motion  RESPIRATORY: Tachypnea, occasional hoarse cough with phlegm apparent in the throat, some coarse breath sounds in the bases, no epi wheezes  CVS: normal S1/S2, no murmurs, intact distal pulses. Mild pitting edema  ABDOMEN: soft, nontender, nondistention  MUSCULOSKELETAL: no deformities  SKIN: warm and dry, no acute rashes or ulceration  NEURO: GCS 15, cranial nerves intact, alert and oriented x3. Some paralysis and muscle wasting on the left side from prior stroke  PSYCH:  Mood/affect normal      Emergency Department Course   ECG  ECG taken at 1548, ECG read at 1555  Atrial-sensed ventricular-paced rhythm   Rate 114 bpm. CO interval 164 ms. QRS duration 128 ms. QT/QTc 352/485 ms. P-R-T axes 3 157 -5.     Imaging:  CT Chest w Contrast  IMPRESSION:  1. Again noted moderate size left pneumothorax. No right pneumothorax.   Small left pleural effusion. Bibasilar confluent pulmonary opacities,   could be infectious.   2. Multiple prominent mediastinal and hilar lymph nodes, could be   reactive.   3. Diffuse dilatation of the main pancreatic duct with approximately   2.5 cm hypoattenuating focus in the pancreatic head/uncinate process   of the pancreas, indeterminate, could represent side branch   intraductal papillary mucinous neoplasm versus other pancreatic   lesions. Recommend further evaluation with contrast-enhanced MRI/MRCP   on nonemergent  basis.   Reading per radiology.    XR Chest Port 1 View  IMPRESSION:  Single portable AP view of the chest was obtained. Left   chest wall dual lead automatic implantable cardiac defibrillator and   leads are in stable position. Stable cardiomediastinal silhouette.   Small left pleural effusion and associated basilar   atelectasis/consolidation. No significant right pleural effusion. Mild   right basilar pulmonary opacities, likely atelectasis. There is   moderate-sized left pneumothorax. No right pneumothorax.   Reading per radiology.    CT Chest Tube with Cath Placement  IMPRESSION:   Pending  Reading per radiology.    Laboratory:  CBC: WBC 13.8 (H), HGB 14.2,    BMP: Sodium 132 (L) Glucose 107 (H) o/w WNL (Creatinine 0.70)     Lactic acid (result time 1559) 1.5     Troponin (Collected 1538): <0.015    Nt probnp inpatient (BNP): 1429    Blood cultures pending x2    Symptomatic Influenza A/B & SARS-CoV2 (COVID19) Virus PCR Multiplex: Negative     Emergency Department Course:    Reviewed:  I reviewed nursing notes, vitals, past medical history and care everywhere    Assessments:  1526 I obtained history and examined the patient as noted above.   1709 I rechecked the patient  1714 I rechecked the patient and explained findings.    Consults:   1637 I spoke with Dr. Jean, thoracic surgery, regarding the patient  1656 I spoke with Dr. Angel, interventional radiology, regarding the patient  1701 I spoke with Dr. Angel regarding the patient again.  1707 I spoke with Dr. Brizuela, hospitalist, who agreed to admit the patient.    Interventions:  1550 Tylenol 1000mg Oral  1716 Zosyn 4.5g IV    Disposition:  The patient was admitted to the hospital under the care of Dr. Brizuela.       Impression & Plan     CMS Diagnoses: None    Medical Decision Making:    Patient presents with cough for the last 1 to 2 weeks.  Family notes that she had a CT during that time.  That was unremarkable.  Here she has some mild hypoxia  and hovering around 90 to 89% on room air.  Chest x-ray shows infiltrates and pneumothorax on the left.  She is anticoagulated.  Spoke with Dr. Montes who requested CT and IR placement.  I spoke with Dr. Hedrick from interventional radiology.  Also spoke with the family over the phone.  Patient's CODE STATUS is DNR/DNI with selective treatment checked but they are comfortable with chest tube placement.  Patient had blood cultures and started on Zosyn.  Spoke with the hospitalist regarding admission.    Covid-19  Yoselyn Cui was evaluated during a global COVID-19 pandemic, which necessitated consideration that the patient might be at risk for infection with the SARS-CoV-2 virus that causes COVID-19.   Applicable protocols for evaluation were followed during the patient's care.   COVID-19 was considered as part of the patient's evaluation. The plan for testing is:  a test was obtained during this visit.  Diagnosis:    ICD-10-CM    1. Pneumonia of both lower lobes due to infectious organism  J18.9    2. Pneumothorax on left  J93.9        Discharge Medications:  New Prescriptions    No medications on file       Scribe Disclosure:  I, John Kuo, am serving as a scribe at 3:26 PM on 8/30/2021 to document services personally performed by Salinas Montgomery MD based on my observations and the provider's statements to me.            Salinas Montgomery MD  08/30/21 [id number]

## 2021-08-31 NOTE — PRE-PROCEDURE
GENERAL PRE-PROCEDURE:   Procedure:  Left chest tube placement with intravenous moderate sedation   Date/Time:  8/31/2021 8:15 AM    Written consent obtained?: Yes    Risks and benefits: Risks, benefits and alternatives were discussed    Consent given by:  Patient  Patient states understanding of procedure being performed: Yes    Patient's understanding of procedure matches consent: Yes    Procedure consent matches procedure scheduled: Yes    Expected level of sedation:  Moderate  Appropriately NPO:  Yes  ASA Class:  3  Mallampati  :  Grade 3- soft palate visible, posterior pharyngeal wall not visible  Lungs:  Other (comment)  Lung exam comment:  Lungs decreased bilaterally, coarse in right base, decreased in left base. Frequent congested cough  Heart:  Normal heart sounds and rate  History & Physical reviewed:  History and physical reviewed and no updates needed  Statement of review:  I have reviewed the lab findings, diagnostic data, medications, and the plan for sedation    Phone consent also obtained from daughter Maryjane Beltran.     [facility] Interventional [facility]iology CNP ([phone]) (phone [phone])

## 2021-08-31 NOTE — PROGRESS NOTES
..RECEIVING UNIT ED HANDOFF REVIEW    ED Nurse Handoff Report was reviewed by: Britni Bojorquez RN on August 30, 2021 at 9:43 PM

## 2021-08-31 NOTE — PROGRESS NOTES
[facility]    Medicine Progress Note - Hospitalist Service       Date of Admission:  8/30/2021    Assessment & Plan         Yoselyn Cui is a 89 year old female admitted on 8/30/2021. She presents with SOB.      Pneumonia of both lower lobes due to infectious organism  Presented with increasing dyspnea/cough/subjective fevers. CT chest showed moderate size left pneumothorax. No right pneumothorax. Small left pleural effusion. Bibasilar confluent pulmonary opacities, could be infectious. Multiple prominent mediastinal and hilar lymph nodes, could be reactive.  - Follow cultures for now   - Titrate O2 as tolerated   - Initially treated with IV Zosyn and switched to Ceftriaxone/Azithromycin   - ID consulted and appreciate their recommendations     Left sided pneumothorax in setting of pulmonary emphysema s/p chest tube placement by IR (8/31)  CT chest showed moderate size left pneumothorax  - Thoracic surgery consulted and appreciate their assistance as well.  Will defer chest tube management to them      SSS (sick sinus syndrome)  Stable     H/o CAD   HTN (hypertension)  Mixed hyperlipidemia  Paroxysmal atrial fibrillation   No issues currently Blood pressures controlled at this time   - PTA Coreg, losartan, norvasc and lipitor  - Holding Eliquis and Plavix for now      H/o Cerebrovascular accident (H)  - Holding Eliquis for now until okay with surgery      Dilation of pancreatic duct  Noted on CT chest Diffuse dilatation of the main pancreatic duct with approximately 2.5 cm hypoattenuating focus in the pancreatic head/uncinate process of the pancreas, indeterminate, could represent side branch  intraductal papillary mucinous neoplasm versus other pancreatic lesions. No GI symptoms  - Outpatient follow up     Diet: NPO for Medical/Clinical Reasons Except for: Meds, Ice Chips    DVT Prophylaxis: Pneumatic Compression Devices  Garcia Catheter: Not present  Central Lines: PRESENT     Code  Status: No CPR- Do NOT Intubate      Disposition Plan   Expected discharge: TBD, once hypoxia resolved and chest tube removed.  Will likely have therapy evaluate prior to help with disposition   The patient's care was discussed with the Bedside Nurse, Patient and Patient's Family.    Gary Cui DO  Hospitalist Service  [facility]  Securely message with the Vocera Web Console (learn more here)  Text page via [employer] Paging/Directory      Clinically Significant Risk Factors Present on Admission             # Coagulation Defect: home medication list includes an anticoagulant medication  # Platelet Defect: home medication list includes an antiplatelet medication      ______________________________________________________________________    Interval History   Patient seen and examined.  No acute events over night.  Had chest tube placement this AM without issue.  Unclear if her breathing is improved.  No fevers or chills per patient.  Having some chest pain at her chest tube site.  No nausea or vomiting currently.    Data reviewed today: I reviewed all medications, new labs and imaging results over the last 24 hours. I personally reviewed no images or EKG's today.    Physical Exam   Vital Signs: Temp: 98.2  F (36.8  C) Temp src: Oral BP: 135/64 Pulse: 70   Resp: 14 SpO2: 99 % O2 Device: Nasal cannula Oxygen Delivery: 3 LPM  Weight: 112 lbs 3.43 oz  General Appearance: Resting comfortably in bed.  NAD   Respiratory: Clear to auscultation.  No respiratory distress  Cardiovascular: RRR.  No obvious murmurs  GI: Soft.  Non-distended  Skin: No obvious rashes or cyanosis   Other: Alert.  Left sided chest tube in place     Data   Recent Labs   Lab 08/31/21  0634 08/30/21  1538   WBC 11.6* 13.8*   HGB 12.5 14.2   MCV 88 87    338   INR 1.31*  --     132*   POTASSIUM 4.0 4.1   CHLORIDE 103 101   CO2 25 24   BUN 10 7   CR 0.87 0.70   ANIONGAP 5 7   GOLDY 8.7 9.4   GLC 95 107*   ALBUMIN   --  3.5   PROTTOTAL  --  8.5   BILITOTAL  --  0.5   ALKPHOS  --  142   ALT  --  23   AST  --  19   TROPONIN  --  <0.015     Recent Results (from the past 24 hour(s))   XR Chest Port 1 View    Narrative    XR PORTABLE CHEST ONE VIEW   8/30/2021 4:06 PM     HISTORY: Cough, fever.    COMPARISON: Cardiac CT on 11/6/2019      Impression    IMPRESSION: Single portable AP view of the chest was obtained. Left  chest wall dual lead automatic implantable cardiac defibrillator and  leads are in stable position. Stable cardiomediastinal silhouette.  Small left pleural effusion and associated basilar  atelectasis/consolidation. No significant right pleural effusion. Mild  right basilar pulmonary opacities, likely atelectasis. There is  moderate-sized left pneumothorax. No right pneumothorax.    Findings were discussed with the ordering provider. RAFAEL FELIZ at  4:15 PM on 8/30/2021.    CHEL CASTILLO MD         SYSTEM ID:  [id number]   CT Chest w Contrast    Narrative    CT CHEST WITH CONTRAST  8/30/2021 4:58 PM    HISTORY:  Chronic obstructive pulmonary disease (COPD) exacerbation.  Pneumothorax, low-grade temperature.    TECHNIQUE: Scans obtained from the apices through the diaphragm with  IV contrast. 80 mL of Isovue-370 IV injected. Radiation dose for this  scan was reduced using automated exposure control, adjustment of the  mA and/or kV according to patient size, or iterative reconstruction  technique.    COMPARISON:  Chest x-ray on same day earlier    FINDINGS:  Chest/mediastinum: No cardiomegaly or significant pericardial  effusion. Extensive atherosclerotic vascular calcification of the  coronary arteries. Multiple prominent mediastinal and hilar lymph  nodes, indeterminate, could be reactive. Left chest wall dual lead  implantable cardiac defibrillator and leads are in stable position.    Lungs and pleura: Moderate size left pneumothorax. Small left pleural  effusion. Bibasilar pulmonary opacities, could be  atelectatic or  infectious. Upper lobes predominant emphysematous changes.    Upper abdomen: Diffuse dilatation of the main pancreatic duct. There  is approximately 2.5 cm hypoattenuating focus centered in pancreatic  head/uncinate process of the pancreas, is indeterminate. Renal  cortical scarring.    Bones and soft tissue: Multilevel degenerative changes of the spine.  No suspicious osseous lesion.      Impression    IMPRESSION:   1. Again noted moderate size left pneumothorax. No right pneumothorax.  Small left pleural effusion. Bibasilar confluent pulmonary opacities,  could be infectious.  2. Multiple prominent mediastinal and hilar lymph nodes, could be  reactive.  3. Diffuse dilatation of the main pancreatic duct with approximately  2.5 cm hypoattenuating focus in the pancreatic head/uncinate process  of the pancreas, indeterminate, could represent side branch  intraductal papillary mucinous neoplasm versus other pancreatic  lesions. Recommend further evaluation with contrast-enhanced MRI/MRCP  on nonemergent basis.    CHEL CASTILLO MD         SYSTEM ID:  [id number]   IR Chest Tube Place Non Tunneled Left    Narrative    [place] [facility]  DATE: 8/31/2021    PROCEDURE:  1. IMAGE GUIDED LEFT PLEURAL CHEST TUBE PLACEMENT  2. MODERATE SEDATION    INTERVENTIONAL RADIOLOGIST: Maximiliano Meyer MD    INDICATION: Patient is an 89-year-old female with a history of left  pneumothorax continued image guided chest tube placement.    CONSENT: The risks, benefits and alternatives of left-sided chest tube  placement were discussed with the patient  in detail. All questions  were answered. Informed consent was given to proceed with the  procedure.    MODERATE SEDATION: Versed 0.5 mg IV; Fentanyl 25 mcg IV. During the  time out, immediately prior to the administration of medications, the  patient was reassessed for adequacy to receive conscious sedation.   Under physician supervision, Versed and fentanyl were administered  for  moderate sedation. Pulse oximetry, heart rate and blood pressure were  continuously monitored by an independent trained observer. The  physician spent 17 minutes of face-to-face sedation time with the  patient.    CONTRAST: None.  ANTIBIOTICS: None.  ADDITIONAL MEDICATIONS: None.    FLUOROSCOPIC TIME: 3.3 minutes.  RADIATION DOSE: Air Kerma: 14 mGy.    COMPLICATIONS: No immediate complications.    STERILE BARRIER TECHNIQUE: Maximum sterile barrier technique was used.  Cutaneous antisepsis was performed at the operative site with  application of 2% chlorhexidine and large sterile drape. Prior to the  procedure, the  and assistant performed hand hygiene and wore  hat, mask, sterile gown, and sterile gloves during the entire  procedure.    PROCEDURE:  Utilizing fluoroscopic imaging, a  image was obtained. The skin  was anesthetized using 1% lidocaine. A 19-gauge/4 Liberian Eyenalyze  needle/catheter combination was advanced under image guidance into the  left pleural space. The needle was removed. Through the needle, a  0.035 inch Amplatz wire was coiled within the pleural space. The  catheter was removed. Over the wire, a 10 Fr pigtail drainage catheter  was placed. The pigtail locking mechanism was engaged. The catheter  was then attached to a Pleur-Evac device. The catheter was secured to  the skin utilizing a 2-0 nylon suture. A clean and sterile dressing  was applied. The patient tolerated the procedure well.     FINDINGS:   imaging demonstrated small left pneumothorax. Postplacement  fluoroscopic imaging demonstrates good positioning of the pigtail  drainage catheter.       Impression    IMPRESSION:    1.  Uncomplicated image guided placement of left Liberian pleural  drainage catheter. Drainage catheter attached to Pleur-Evac.     ROSA STOUT MD         SYSTEM ID:  [id number]

## 2021-08-31 NOTE — PROGRESS NOTES
THORACIC SURGERY     Reviewed symptomatic left pneumothorax    Agree with IR CT placement    will follow    MARIA C TAYLOR MD [facility] ONCOLOGY THORACIC SURGERY  CELL:  [phone]  OFFICE: [phone]

## 2021-08-31 NOTE — PLAN OF CARE
A&O x4, VSS on 4L O2. Pain denied. NPO at midnight for IR placement of chest tube in morning. LS: diminished/coarse. BS: audible. +void, +flatus, -BM. IV zosyn infused as ordered. Baseline L-sided weakness from prior stroke. UP A-1 gb to bedside commode.

## 2021-08-31 NOTE — CONSULTS
[facility]    Infectious Disease Consultation     Date of Admission:  8/30/2021  Date of Consult : 08/31/21    Assessment:  1. Community acquired pneumonia with bilateral pulmonary infiltrates  2. COPD with new pneumothorax likely related to ruptured bulla given underlying emphysema  3. Reactive hilar lymphadenopathy  4. Chronic medical conditions -  atrial fibrillation on anticoagulation-s/p pacemaker, HTN, hyperlipidemia, hx CVA    Recommendations:  1. Discontinue Zosyn  2. Ceftriaxone / Azithromycin  3. Chest tube planned for penumothorax    Rosy Garrido MD    Reason for Consult    I was asked to evaluate this patient for antimicrobial recommendations for pneumonia    Primary Care Physician   Brittany Florez    Chief Complaint   Shortness of breath    History is obtained from the patient and medical records    History of Present Illness   Yoselyn Cui is a 89 year old elderly female with advanced COPD, atrial fibrillation on anticoagulation-s/p pacemaker, HTN, hyperlipidemia, hx CVA who is hospitalized with progressive dyspnea over the past 3  weeks and found to have a moderate sized left pneumothorax with bilateral pulmonary infiltrates consistent with pneumonia and reactive hilar adenopathy. She denies fever, has a non productive cough. Patient has been initiated on Zosyn and ID has been asked to assist with antibiotic management.    Antimicrobial therapy  8/30 Zosyn    Past Medical History   I have reviewed this patient's medical history and updated it with pertinent information if needed.   Past Medical History:   Diagnosis Date     AV block, 2nd degree 5/16/2016     Cerebrovascular accident (H) 8/22/2016     COKER (dyspnea on exertion) 8/22/2016     Generalized weakness 10/12/2016     GIB (gastrointestinal bleeding)      History of colonic polyps 8/22/2016     HTN (hypertension) 8/22/2016     Iron deficiency anemia      Melanoma of skin (H)-rt arm 8/22/2016     Mixed hyperlipidemia  8/22/2016     Pacemaker     implanted 5-     PAF (paroxysmal atrial fibrillation) (H)      SSS (sick sinus syndrome) (H) 8/22/2016       Past Surgical History   I have reviewed this patient's surgical history and updated it with pertinent information if needed.  Past Surgical History:   Procedure Laterality Date     APPENDECTOMY  1960s     C LIGATE FALLOPIAN TUBE  1960s     C REMV CATARACT INTRACAP,INSERT LENS Right 09/20/2017     CV CORONARY ANGIOGRAM N/A 7/17/2019    Procedure: Coronary Angiogram;  Surgeon: Irvin Hutson MD;  Location:  HEART CARDIAC CATH LAB     CV HEART CATHETERIZATION WITH POSSIBLE INTERVENTION N/A 7/18/2019    Procedure: Heart Catheterization with Possible Intervention;  Surgeon: Jayleen Torres MD;  Location:  HEART CARDIAC CATH LAB     CV LEFT HEART CATH N/A 7/17/2019    Procedure: Left Heart Cath;  Surgeon: Irvin Hutson MD;  Location:  HEART CARDIAC CATH LAB     CV LEFT VENTRICULOGRAM N/A 7/17/2019    Procedure: Left Ventriculogram;  Surgeon: Irvin Hutson MD;  Location:  HEART CARDIAC CATH LAB     CV PCI STENT DRUG ELUTING N/A 7/18/2019    Procedure: PCI Stent Drug Eluting;  Surgeon: Jayleen Torres MD;  Location:  HEART CARDIAC CATH LAB     ESOPHAGOSCOPY, GASTROSCOPY, DUODENOSCOPY (EGD), COMBINED N/A 11/20/2018    Procedure: ESOPHAGOSCOPY, GASTROSCOPY, DUODENOSCOPY (EGD)  Room 523 with biopsies using cold biopsy forceps;  Surgeon: Ayala Soto MD;  Location:  GI     HC REMV CATARACT EXTRACAP,INSERT LENS, W/O ECP Left 10/04/2017     IMPLANT PACEMAKER  05/17/2016     Prior to Admission Medications   Current Facility-Administered Medications   Medication     acetaminophen (TYLENOL) tablet 650 mg    Or     acetaminophen (TYLENOL) Suppository 650 mg     amLODIPine (NORVASC) tablet 5 mg     atorvastatin (LIPITOR) tablet 40 mg     [START ON 9/1/2021] azithromycin (ZITHROMAX) tablet 250 mg     carvedilol (COREG) tablet 18.75 mg      cefTRIAXone (ROCEPHIN) 2 g vial to attach to  ml bag for ADULTS or NS 50 ml bag for PEDS     lidocaine (LMX4) cream     lidocaine 1 % 0.1-1 mL     losartan (COZAAR) tablet 100 mg     melatonin tablet 1 mg     naloxone (NARCAN) injection 0.2 mg    Or     naloxone (NARCAN) injection 0.4 mg    Or     naloxone (NARCAN) injection 0.2 mg    Or     naloxone (NARCAN) injection 0.4 mg     ondansetron (ZOFRAN-ODT) ODT tab 4 mg    Or     ondansetron (ZOFRAN) injection 4 mg     oxyCODONE IR (ROXICODONE) half-tab 2.5 mg     sodium chloride (PF) 0.9% PF flush 3 mL     sodium chloride (PF) 0.9% PF flush 3 mL     umeclidinium (INCRUSE ELLIPTA) 62.5 MCG/INH inhaler 1 puff     Allergies   No Known Allergies    Immunization History   Immunization History   Administered Date(s) Administered     COVID-19,PF,Pfizer 02/23/2021, 03/16/2021     Influenza, Quad, High Dose, Pf, 65yr + 10/08/2020     Pneumo Conj 13-V (2010&after) 10/20/2016     TDAP Vaccine (Adacel) 01/18/2008, 02/22/2018     Zoster vaccine recombinant adjuvanted (SHINGRIX) 05/15/2021     Zoster vaccine, live 02/13/2012       Social History   I have reviewed this patient's social history and updated it with pertinent information if needed. Yoselyn Cui  reports that she has never smoked. She has never used smokeless tobacco. She reports that she does not drink alcohol and does not use drugs.    Family History   I have reviewed this patient's family history and updated it with pertinent information if needed.   Family History   Problem Relation Age of Onset     Coronary Artery Disease Father      Coronary Artery Disease Brother      Coronary Artery Disease Sister      Colon Cancer No family hx of        Review of Systems   The 10 point Review of Systems is negative other than noted in the HPI or here.     Physical Exam   Temp: 98.3  F (36.8  C) Temp src: Oral BP: 129/65 Pulse: 73   Resp: 20 SpO2: 97 % O2 Device: Nasal cannula Oxygen Delivery: 4 LPM  Vital Signs  with Ranges  Temp:  [98.3  F (36.8  C)-100  F (37.8  C)] 98.3  F (36.8  C)  Pulse:  [] 73  Resp:  [20-30] 20  BP: (125-180)/() 129/65  SpO2:  [90 %-97 %] 97 %  112 lbs 3.43 oz  Body mass index is 21.92 kg/m .    GENERAL APPEARANCE:  Awake, pleasant  EYES: Eyes grossly normal to inspection, PERRL and conjunctivae and sclerae normal  RESP: tee oxygen, anterior auscultation clear  CV: S1S2  LYMPHATICS: normal ant/post cervical and supraclavicular nodes  ABDOMEN: soft, nontender, without hepatosplenomegaly or masses and bowel sounds normal  MS: extremities normal- no gross deformities noted  SKIN: no suspicious lesions or rashes      Data   All laboratory data reviewed  Component      Latest Ref Rng & Units 8/31/2021   WBC      4.0 - 11.0 10e3/uL 11.6 (H)   RBC Count      3.80 - 5.20 10e6/uL 4.49   Hemoglobin      11.7 - 15.7 g/dL 12.5   Hematocrit      35.0 - 47.0 % 39.3   MCV      78 - 100 fL 88   MCH      26.5 - 33.0 pg 27.8   MCHC      31.5 - 36.5 g/dL 31.8   RDW      10.0 - 15.0 % 13.8   Platelet Count      150 - 450 10e3/uL 299     Component      Latest Ref Rng & Units 8/31/2021   Sodium      133 - 144 mmol/L 133   Potassium      3.4 - 5.3 mmol/L 4.0   Chloride      94 - 109 mmol/L 103   Carbon Dioxide      20 - 32 mmol/L 25   Anion Gap      3 - 14 mmol/L 5   Urea Nitrogen      7 - 30 mg/dL 10   Creatinine      0.52 - 1.04 mg/dL 0.87   Calcium      8.5 - 10.1 mg/dL 8.7   Glucose      70 - 99 mg/dL 95   GFR Estimate      >60 mL/min/1.73m2 59 (L)     Micro  8/30 blood cx pending  Recent Labs   Lab Test 08/06/19  1005 08/06/19  0955 08/04/19  1346 08/04/19  1305 08/04/19  1241 03/02/18  1140 03/02/18  0948 03/02/18  0940 10/12/16  1425   CULT No growth No growth >100,000 colonies/mL  Escherichia coli  *  >100,000 colonies/mL  Strain 2  Escherichia coli  * No growth Cultured on the 1st day of incubation:  Escherichia coli  *  Critical Value/Significant Value, preliminary result only, called to and read  back by  Will Mango AGUILAR [email or web address] @ 1520 8/5/19. NAP    (Note)  POSITIVE for E.COLI by Verigene multiplex nucleic acid test. Final  identification and antimicrobial susceptibility testing will be  verified by standard methods. Verigene test will not distinguish  E.coli from Shigella species including S.dysenteriae, S.flexneri,  S.boydii, and S.sonnei. Specimens containing Shigella species or  E.coli will be reported as Positive for E.coli.    Specimen tested with Verigene multiplex, gram-negative blood culture  nucleic acid test for the following targets: Acinetobacter sp.,  Citrobacter sp., Enterobacter sp., Proteus sp., E. coli, K.  pneumoniae/oxytoca, P. aeruginosa, and the following resistance  markers: CTXM, KPC, NDM, VIM, IMP and OXA.    Critical Value/Significant Value called to and read back by Madelin Franklin RN at 1751 8.5.19 [id number]   No growth No growth No growth No growth     8/30 CT chest  CT CHEST WITH CONTRAST  8/30/2021 4:58 PM     HISTORY:  Chronic obstructive pulmonary disease (COPD) exacerbation.  Pneumothorax, low-grade temperature.     TECHNIQUE: Scans obtained from the apices through the diaphragm with  IV contrast. 80 mL of Isovue-370 IV injected. Radiation dose for this  scan was reduced using automated exposure control, adjustment of the  mA and/or kV according to patient size, or iterative reconstruction  technique.     COMPARISON:  Chest x-ray on same day earlier     FINDINGS:  Chest/mediastinum: No cardiomegaly or significant pericardial  effusion. Extensive atherosclerotic vascular calcification of the  coronary arteries. Multiple prominent mediastinal and hilar lymph  nodes, indeterminate, could be reactive. Left chest wall dual lead  implantable cardiac defibrillator and leads are in stable position.     Lungs and pleura: Moderate size left pneumothorax. Small left pleural  effusion. Bibasilar pulmonary opacities, could be atelectatic or  infectious. Upper lobes predominant emphysematous  changes.     Upper abdomen: Diffuse dilatation of the main pancreatic duct. There  is approximately 2.5 cm hypoattenuating focus centered in pancreatic  head/uncinate process of the pancreas, is indeterminate. Renal  cortical scarring.     Bones and soft tissue: Multilevel degenerative changes of the spine.  No suspicious osseous lesion.                                                                      IMPRESSION:   1. Again noted moderate size left pneumothorax. No right pneumothorax.  Small left pleural effusion. Bibasilar confluent pulmonary opacities,  could be infectious.  2. Multiple prominent mediastinal and hilar lymph nodes, could be  reactive.  3. Diffuse dilatation of the main pancreatic duct with approximately  2.5 cm hypoattenuating focus in the pancreatic head/uncinate process  of the pancreas, indeterminate, could represent side branch  intraductal papillary mucinous neoplasm versus other pancreatic  lesions. Recommend further evaluation with contrast-enhanced MRI/MRCP  on nonemergent basis.

## 2021-08-31 NOTE — PROCEDURES
Interventional Radiology Post-Procedure Note   ?   Brief Procedure Note:   Patient name: Yoselyn Cui  Pt MRN:[id number]   Date of procedure: 8/31/2021     Procedure(s): Image guided left chest tube placement  Sedation method: Moderate sedation was employed. The patient was monitored by an interventional radiology nurse at all times throughout the procedure under my direct guidance.  Pre Procedure Diagnosis: Left pneumothorax  Post Procedure Diagnosis: Same  Indications: Need for long term drainage of pleural fluid   ?   Attending: Maximiliano Meyer M.D.  Specimen(s) removed: None   Additional studies ordered: None  Drains: Left 10 Fr pleural drain  Estimated Blood Loss: Minimal  Complications: None  Vascular closure method: N/A    Findings/Notes/Comments:   Successful image guided placement of left chest tube. Tube placed to water seal while in IR, may be placed to -20cm suction when reach the floor.    ?   Please see dictation in PACS or under the Imaging tab in [facility] for detailed procedure note.     Maximiliano Meyer M.D.   Vascular and Interventional Radiology   Pager: [phone]   After Hours / Scheduling: [phone]     8/31/2021  11:17 AM

## 2021-08-31 NOTE — H&P
[facility]    History and Physical - Hospitalist Service       Date of Admission:  8/30/2021    Assessment & Plan      Yoselyn Cui is a 89 year old female admitted on 8/30/2021. She presents with SOB.       Pneumonia of both lower lobes due to infectious organism    Assessment: Presents with increasing dyspnea/cough/subjective fevers. CT chest shows moderate size left pneumothorax. No right pneumothorax. Small left pleural effusion. Bibasilar confluent pulmonary opacities, could be infectious. Multiple prominent mediastinal and hilar lymph nodes, could be reactive.    Plan:   - admit to inpatient  - Continue IV Zosyn  - ID consulted  - oxygen PRN  - Oximetry, continuous      Pneumothorax on left      Pulmonary emphysema (H)    Assessment:  CT chest shows moderate size left pneumothorax   Plan:   - IR consulted for chest tube, they did not feel needs emergent placement  - Thoracic surgery eval       SSS (sick sinus syndrome) (H)    Assessment/Plan: stable      HTN (hypertension)    Mixed hyperlipidemia    Assessment/Plan: resume PTA Coreg losartan / norvasc / lipitor once verified      Cerebrovascular accident (H)    Assessment/Plan: continue PTA Plavix / Eliquis post chest tube placement when okay with   IR      CAD (coronary artery disease)    Assessment/Plan: resume PTA coreg / losartan / lipitor once verified     Dilation of pancreatic duct   Assessment: CT chest Diffuse dilatation of the main pancreatic duct with approximately   2.5 cm hypoattenuating focus in the pancreatic head/uncinate process   of the pancreas, indeterminate, could represent side branch  intraductal papillary mucinous neoplasm versus other pancreatic lesions. No GI symptoms  Plan:  - Check LFTs  - likely can be worked up as outpatient       Diet:   NPO @ midnight  DVT Prophylaxis: Pneumatic Compression Devices  Garcia Catheter: Not present  Central Lines: PRESENT       Code Status:   DNR/DNI    Clinically  Significant Risk Factors Present on Admission         # Hyponatremia: Na = 132 mmol/L (Ref range: 133 - 144 mmol/L) on admission, will monitor as appropriate     # Coagulation Defect: home medication list includes an anticoagulant medication  # Platelet Defect: home medication list includes an antiplatelet medication      Disposition Plan   Expected discharge:  2-3 days recommended to prior living arrangement once treatment of ptx.     The patient's care was discussed with the Patient and ED Provider.    Misael Brizuela MD  [facility]  Securely message with the Vocera Web Console (learn more here)  Text page via [employer] Paging/Directory      ______________________________________________________________________    Chief Complaint     SOB    History is obtained from the patient    History of Present Illness     Yoselyn Cui is a 89 year old female with extensive past medical history including COPD on chronic oxygen, CVA, paroxysmal A. fib on Eliquis/Plavix, hyperlipidemia, hypertension who presents for evaluation of shortness of breath.    Patient ports that for the past week, she has had increasing dyspnea.  For the past several days, she also reports worsening cough and what she feels is subjective fevers.  She denies any COVID-19 contacts.  She also has had intermittent chest discomfort with her excessive coughing.  She denies any loss of taste or smell.  She denies any nausea/vomiting or abdominal pain.  She also denies any aspiration events.  She denies any calf pain or leg swelling, she denies any PND/orthopnea.  She denies any slurred speech, headaches, vision changes, localized weakness or numbness of her extremities.  At this time she has no other complaints.    Review of Systems      The 10 point Review of Systems is negative other than noted in the HPI or here.     Past Medical History    I have reviewed this patient's medical history and updated it with pertinent information if  needed.   Past Medical History:   Diagnosis Date     AV block, 2nd degree 5/16/2016     Cerebrovascular accident (H) 8/22/2016     COKER (dyspnea on exertion) 8/22/2016     Generalized weakness 10/12/2016     GIB (gastrointestinal bleeding)      History of colonic polyps 8/22/2016     HTN (hypertension) 8/22/2016     Iron deficiency anemia      Melanoma of skin (H)-rt arm 8/22/2016     Mixed hyperlipidemia 8/22/2016     Pacemaker     implanted 5-     PAF (paroxysmal atrial fibrillation) (H)      SSS (sick sinus syndrome) (H) 8/22/2016       Past Surgical History   I have reviewed this patient's surgical history and updated it with pertinent information if needed.  Past Surgical History:   Procedure Laterality Date     APPENDECTOMY  1960s     C LIGATE FALLOPIAN TUBE  1960s     C REMV CATARACT INTRACAP,INSERT LENS Right 09/20/2017     CV CORONARY ANGIOGRAM N/A 7/17/2019    Procedure: Coronary Angiogram;  Surgeon: Irvin Hutson MD;  Location:  HEART CARDIAC CATH LAB     CV HEART CATHETERIZATION WITH POSSIBLE INTERVENTION N/A 7/18/2019    Procedure: Heart Catheterization with Possible Intervention;  Surgeon: Jayleen Torres MD;  Location: [facility] CARDIAC CATH LAB     CV LEFT HEART CATH N/A 7/17/2019    Procedure: Left Heart Cath;  Surgeon: Irvin Hutson MD;  Location:  HEART CARDIAC CATH LAB     CV LEFT VENTRICULOGRAM N/A 7/17/2019    Procedure: Left Ventriculogram;  Surgeon: Irvin Hutson MD;  Location:  HEART CARDIAC CATH LAB     CV PCI STENT DRUG ELUTING N/A 7/18/2019    Procedure: PCI Stent Drug Eluting;  Surgeon: Jayleen Torres MD;  Location: [facility] CARDIAC CATH LAB     ESOPHAGOSCOPY, GASTROSCOPY, DUODENOSCOPY (EGD), COMBINED N/A 11/20/2018    Procedure: ESOPHAGOSCOPY, GASTROSCOPY, DUODENOSCOPY (EGD)  Room 523 with biopsies using cold biopsy forceps;  Surgeon: Ayala Soto MD;  Location:  GI     HC REMV CATARACT EXTRACAP,INSERT LENS, W/O ECP Left  10/04/2017     IMPLANT PACEMAKER  05/17/2016       Social History   I have reviewed this patient's social history and updated it with pertinent information if needed.  Social History     Tobacco Use     Smoking status: Never Smoker     Smokeless tobacco: Never Used   Substance Use Topics     Alcohol use: No     Alcohol/week: 0.0 standard drinks     Drug use: No       Family History   I have reviewed this patient's family history and updated it with pertinent information if needed.  Family History   Problem Relation Age of Onset     Coronary Artery Disease Father      Coronary Artery Disease Brother      Coronary Artery Disease Sister      Colon Cancer No family hx of        Prior to Admission Medications   Prior to Admission Medications   Prescriptions Last Dose Informant Patient Reported? Taking?   Benzocaine-Menthol (CEPACOL SORE THROAT) 15-2.6 MG LOZG Unknown at Unknown time Pharmacy No No   Sig: Take 1 lozenge by mouth every 2 hours as needed (Sore Throat) May keep at bedside.   ELIQUIS ANTICOAGULANT 2.5 MG tablet 8/30/2021 at 0900 [facility] No Yes   Sig: TAKE 1 TABLET BY MOUTH TWICE DAILY   Multiple Vitamins-Minerals (SYSTANE ICAPS AREDS2) CAPS 8/30/2021 at 0900 [facility] No Yes   Sig: TAKE 1 CAPSULE BY MOUTH TWICE DAILY   SENNA-docusate sodium (SENNA S) 8.6-50 MG tablet 8/29/2021 at 2100 Nursing [place] No Yes   Sig: Take 1 tablet by mouth At Bedtime HOLD FOR LOOSE STOOL   Vitamin D3 (CHOLECALCIFEROL) 25 mcg (1000 units) tablet 8/30/2021 at 0900 [facility] No Yes   Sig: Take 1 tablet (25 mcg) by mouth daily   acetaminophen (TYLENOL) 325 MG tablet Unknown at Unknown time [facility] Yes No   Sig: Take 650 mg by mouth every 6 hours as needed for mild pain    amLODIPine (NORVASC) 5 MG tablet 8/30/2021 at 0900 [facility] No Yes   Sig: TAKE 1 TABLET BY MOUTH ONCE DAILY   atorvastatin (LIPITOR) 40 MG tablet 8/29/2021 at 2100 [facility] [place] No Yes   Sig: TAKE 1 TABLET BY MOUTH AT BEDTIME   carvedilol (COREG)  12.5 MG tablet 8/30/2021 at 0900 halfway Yes Yes   Sig: Take 12.5 mg by mouth 2 times daily (with meals) (TAKE WITH 6.25MG FOR A TOTAL OF 18.75MG) **NOTE DOSAGE/STRENGTH**   carvedilol (COREG) 6.25 MG tablet 8/30/2021 at 0900 halfway No Yes   Sig: TAKE 1 TABLET BY MOUTH TWICE DAILY WITH MEALS (TAKE WITH 12.5MG FOR A TOTAL OF 18.75MG) NOTE DOSAGE/STRENGTH   clopidogrel (PLAVIX) 75 MG tablet 8/30/2021 at 0900 halfway No Yes   Sig: TAKE 1 TABLET BY MOUTH ONCE DAILY   ferrous sulfate (FE TABS) 325 (65 Fe) MG EC tablet 8/30/2021 at 0900 halfway No Yes   Sig: Take 1 tablet (325 mg) by mouth Every Mon, Wed, Fri Morning   guaiFENesin (ROBITUSSIN) 100 MG/5ML liquid 8/30/2021 at 0900 halfway No Yes   Sig: Take 10 mLs (200 mg) by mouth 2 times daily. May also take 10 mLs (200 mg) 2 times daily as needed for cough.   ipratropium-albuterol (COMBIVENT RESPIMAT)  MCG/ACT inhaler 8/30/2021 at 1400 halfway No Yes   Sig: Inhale 1 puff into the lungs 4 times daily as needed for shortness of breath / dyspnea or wheezing   losartan (COZAAR) 100 MG tablet 8/30/2021 at 0900 halfway No Yes   Sig: Take 1 tablet (100 mg) by mouth daily   nitroGLYcerin (NITROSTAT) 0.4 MG sublingual tablet Unknown at Unknown time halfway No No   Sig: For chest pain place 1 tablet under the tongue every 5 minutes for 3 doses. If symptoms persist 5 minutes after 1st dose call 911.   omeprazole (PRILOSEC) 20 MG DR capsule 8/30/2021 at 0900 halfway No Yes   Sig: TAKE 1 CAPSULE BY MOUTH EVERY MORNING   oxybutynin ER (DITROPAN-XL) 10 MG 24 hr tablet 8/30/2021 at 0900 halfway No Yes   Sig: TAKE 1 TABLET BY MOUTH ONCE DAILY   Patient taking differently: Take 5 mg by mouth daily    potassium chloride ER (KLOR-CON M) 20 MEQ CR tablet 8/30/2021 at 0900 halfway No Yes   Sig: TAKE 1 TABLET BY MOUTH ONCE DAILY   senna-docusate (SENOKOT-S/PERICOLACE) 8.6-50 MG tablet Unknown at Unknown time Nursing Home Yes No    Sig: Take 1 tablet by mouth daily as needed   simethicone (MYLICON) 125 MG chewable tablet 8/29/2021 at 2100 Nursing Home No Yes   Sig: CHEW AND SWALLOW 1 TABLET BY MOUTH EVERY NIGHT AT BEDTIME   umeclidinium (INCRUSE ELLIPTA) 62.5 MCG/INH inhaler 8/30/2021 at 0900 long term No Yes   Sig: Inhale 1 puff into the lungs daily      Facility-Administered Medications: None     Allergies   No Known Allergies    Physical Exam   Vital Signs: Temp: 100  F (37.8  C) Temp src: Oral BP: 139/80 Pulse: 71   Resp: 30 SpO2: 96 % O2 Device: None (Room air)    Weight: 0 lbs 0 oz    Constitutional: awake, alert, cooperative, no apparent distress.   Eyes: Lids and lashes normal, pupils equal, round and reactive to light   ENT: Normocephalic, without obvious abnormality, atraumatic, sinuses nontender on palpation   Hematologic / Lymphatic: no cervical lymphadenopathy   Respiratory: Mild tachypnea, coarse breath sounds at her bases, no significant wheezing appreciated.  Cardiovascular: RRR with no m/r/g   GI: Normal bowel sounds, soft, non-distended, non-tender.   Skin: normal skin color, texture, turgor   Musculoskeletal: There is no redness, warmth, or swelling of the joints. Full range of motion noted.   Neurologic: Awake, alert, oriented to name, place and time. Cranial nerves II-XII are grossly intact. Motor is diminished on left upper and lower extremities compared to right. Sensory is intact.   Neuropsychiatric: normal mood and affect    Data   Data reviewed today: I reviewed all medications, new labs and imaging results over the last 24 hours. I personally reviewed the EKG tracing showing atrial sensed v-paced rhythm, the chest x-ray image(s) showing see below and the chest CT image(s) showing see below.    CT Chest w Contrast  IMPRESSION:  1. Again noted moderate size left pneumothorax. No right pneumothorax.   Small left pleural effusion. Bibasilar confluent pulmonary opacities,   could be infectious.   2. Multiple  prominent mediastinal and hilar lymph nodes, could be   reactive.   3. Diffuse dilatation of the main pancreatic duct with approximately   2.5 cm hypoattenuating focus in the pancreatic head/uncinate process   of the pancreas, indeterminate, could represent side branch   intraductal papillary mucinous neoplasm versus other pancreatic   lesions. Recommend further evaluation with contrast-enhanced MRI/MRCP   on nonemergent basis.   Reading per radiology.     XR Chest Port 1 View  IMPRESSION:  Single portable AP view of the chest was obtained. Left   chest wall dual lead automatic implantable cardiac defibrillator and   leads are in stable position. Stable cardiomediastinal silhouette.   Small left pleural effusion and associated basilar   atelectasis/consolidation. No significant right pleural effusion. Mild   right basilar pulmonary opacities, likely atelectasis. There is   moderate-sized left pneumothorax. No right pneumothorax.   Reading per radiology.    Most Recent 3 CBC's:  Recent Labs   Lab Test 08/30/21  1538 08/05/21  0700 07/22/21  0840   WBC 13.8* 6.0 8.2   HGB 14.2 12.9 12.8   MCV 87 90 93    290 238     Most Recent 3 BMP's:  Recent Labs   Lab Test 08/30/21  1538 08/12/21  0440 08/05/21  0700   * 131* 128*   POTASSIUM 4.1 4.0 3.9   CHLORIDE 101 100 95   CO2 24 27 28   BUN 7 13 11   CR 0.70 0.78 0.72   ANIONGAP 7 4 5   GOLDY 9.4 8.6 8.9   * 92 141*     Most Recent 2 LFT's:  Recent Labs   Lab Test 04/28/21  1002 09/29/20  1023 08/04/19  1240 11/19/18  1216   AST  --   --  21 22   ALT 22 19 22 19   ALKPHOS  --   --  89 80   BILITOTAL  --   --  0.5 0.3     Most Recent 3 INR's:  Recent Labs   Lab Test 08/04/19  1240 07/18/19  1615 11/19/18  1216   INR 1.27* Canceled, Test credited, specimen discarded 1.31*     Recent Results (from the past 24 hour(s))   XR Chest Port 1 View    Narrative    XR PORTABLE CHEST ONE VIEW   8/30/2021 4:06 PM     HISTORY: Cough, fever.    COMPARISON: Cardiac CT on  11/6/2019      Impression    IMPRESSION: Single portable AP view of the chest was obtained. Left  chest wall dual lead automatic implantable cardiac defibrillator and  leads are in stable position. Stable cardiomediastinal silhouette.  Small left pleural effusion and associated basilar  atelectasis/consolidation. No significant right pleural effusion. Mild  right basilar pulmonary opacities, likely atelectasis. There is  moderate-sized left pneumothorax. No right pneumothorax.    Findings were discussed with the ordering provider. RAFAEL FELIZ at  4:15 PM on 8/30/2021.    CHEL CASTILLO MD         SYSTEM ID:  [id number]   CT Chest w Contrast    Narrative    CT CHEST WITH CONTRAST  8/30/2021 4:58 PM    HISTORY:  Chronic obstructive pulmonary disease (COPD) exacerbation.  Pneumothorax, low-grade temperature.    TECHNIQUE: Scans obtained from the apices through the diaphragm with  IV contrast. 80 mL of Isovue-370 IV injected. Radiation dose for this  scan was reduced using automated exposure control, adjustment of the  mA and/or kV according to patient size, or iterative reconstruction  technique.    COMPARISON:  Chest x-ray on same day earlier    FINDINGS:  Chest/mediastinum: No cardiomegaly or significant pericardial  effusion. Extensive atherosclerotic vascular calcification of the  coronary arteries. Multiple prominent mediastinal and hilar lymph  nodes, indeterminate, could be reactive. Left chest wall dual lead  implantable cardiac defibrillator and leads are in stable position.    Lungs and pleura: Moderate size left pneumothorax. Small left pleural  effusion. Bibasilar pulmonary opacities, could be atelectatic or  infectious. Upper lobes predominant emphysematous changes.    Upper abdomen: Diffuse dilatation of the main pancreatic duct. There  is approximately 2.5 cm hypoattenuating focus centered in pancreatic  head/uncinate process of the pancreas, is indeterminate. Renal  cortical scarring.    Bones and  soft tissue: Multilevel degenerative changes of the spine.  No suspicious osseous lesion.      Impression    IMPRESSION:   1. Again noted moderate size left pneumothorax. No right pneumothorax.  Small left pleural effusion. Bibasilar confluent pulmonary opacities,  could be infectious.  2. Multiple prominent mediastinal and hilar lymph nodes, could be  reactive.  3. Diffuse dilatation of the main pancreatic duct with approximately  2.5 cm hypoattenuating focus in the pancreatic head/uncinate process  of the pancreas, indeterminate, could represent side branch  intraductal papillary mucinous neoplasm versus other pancreatic  lesions. Recommend further evaluation with contrast-enhanced MRI/MRCP  on nonemergent basis.    CHEL CASTILLO MD         SYSTEM ID:  [id number]

## 2021-08-31 NOTE — IR NOTE
Interventional Radiology Intra-procedural Nursing Note    Patient Name: Yoselyn Cui  Medical Record Number: [id number]  Today's Date: August 31, 2021    Start Time: 1054  End of procedure time: 1111  Procedure: Left chest tube placement with intravenous moderate sedation   Report given to: Kendall North RN  Time pt departs:  1123    Other Notes: Pt into IR suite 1 via cart. Pt awake and alert. To table in supine position. Monitoring equipment applied. VSS. Tele SR w BBB. Dr. Meyer in room. Time out and procedure started. Pt tolerated procedure well. Debrief with Dr. Meyer. CT placed and pressure held until hemostasis achieved. Dressing CDI. No complications. Pt transferred back to [facility] [address].    Medications:    Versed 0.5 mg  Fentanyl 25 mcg  Lidocaine 1% 9 ml    Latrell Darling RN

## 2021-09-01 NOTE — PLAN OF CARE
Pt. Alert and oriented x4. Vital signs stable on 1.5L NC, tried weaning O2 down was able to spend a couple hours on RA, but desats while sleeping and up with activity so still requiring oxygen. Assist of 1 with gb and cane. Tolerating reg diet. Lung sounds dim and coarse. Bowel sounds active, + flatus. Adequate urine output. L CT site CDI. CT to wall suction, no crepitus or air leak. Pain managed with Oxy and Tylenol. Denies nausea. Pt up in chair for all meals and ambulated with PT.

## 2021-09-01 NOTE — PLAN OF CARE
Alert and oriented x4. Vital signs stable on 1L O2 via NC. Patient desats to 88-89% on RA while sleeping. Patient dyspneic on exertion. Up with assist of 1, pivot transfer. Tolerating diet. Lungs sounds clear. BS+. BM-. Voiding. Chest tube x1 to -20 suction, site CDI. No air leak, no crepitus. Pain managed with PRN Tylenol and Oxycodone 2.5mg. Denies nausea.

## 2021-09-01 NOTE — PLAN OF CARE
Pt. Alert and oriented x4. Vital signs stable on 3L NC. Assist of 1 with gb/walker. Tolerating reg diet. Lung sounds coarse. Bowel sounds active, + flatus. BM yesterday, adequate urine output. L eye bruise. L CT to wall suction, no output. No air leak or crepitus. Pain managed with Oxy and Tylenol. Denies nausea.

## 2021-09-01 NOTE — PROGRESS NOTES
[facility]    Infectious Disease Progress Note    Date of Service : 09/01/2021     Assessment:  1. Community acquired pneumonia with bilateral pulmonary infiltrates  2. COPD with new pneumothorax likely related to ruptured bulla given underlying emphysema. S/p chest tube   3. Reactive hilar lymphadenopathy  4. Chronic medical conditions -  atrial fibrillation on anticoagulation-s/p pacemaker, HTN, hyperlipidemia, hx CVA     Recommendations:  Continue Ceftriaxone / Azithromycin  Supportive care    Rosy Garrido MD    Interval History   Overall better following chest tube placement. No pain, breathing improved. Tolerating antibiotics without side effects.    Antimicrobial therapy  8/31 Ceftriaxone / Azithromycin  prior  8/30-8/31 Zosyn    Physical Exam   Temp: 98.6  F (37  C) Temp src: Oral BP: 138/65 Pulse: 69   Resp: 16 SpO2: 92 % O2 Device: Nasal cannula Oxygen Delivery: 1 LPM  Vitals:    08/31/21 0605 09/01/21 0657   Weight: 50.9 kg (112 lb 3.4 oz) 50.3 kg (110 lb 14.4 oz)     Vital Signs with Ranges  Temp:  [97.5  F (36.4  C)-98.6  F (37  C)] 98.6  F (37  C)  Pulse:  [66-82] 69  Resp:  [14-28] 16  BP: (122-152)/(46-75) 138/65  SpO2:  [92 %-99 %] 92 %    GENERAL APPEARANCE:  Awake, pleasant  EYES: Eyes grossly normal to inspection, PERRL and conjunctivae and sclerae normal  RESP: on oxygen, anterior auscultation clear. Left chest tube in place  CV: S1S2  LYMPHATICS: normal ant/post cervical and supraclavicular nodes  ABDOMEN: soft, nontender, without hepatosplenomegaly or masses and bowel sounds normal  MS: extremities normal- no gross deformities noted  SKIN: no suspicious lesions or rashes    Other:    Medications       amLODIPine  5 mg Oral Daily     atorvastatin  40 mg Oral At Bedtime     azithromycin  250 mg Oral Daily     carvedilol  18.75 mg Oral BID w/meals     cefTRIAXone  2 g Intravenous Q24H     losartan  100 mg Oral Daily     sodium chloride (PF)  3 mL Intracatheter Q8H      umeclidinium  1 puff Inhalation Daily       Data   All microbiology laboratory data reviewed.  Recent Labs   Lab Test 08/31/21  0634 08/30/21  1538 08/05/21  0700   WBC 11.6* 13.8* 6.0   HGB 12.5 14.2 12.9   HCT 39.3 43.5 40.3   MCV 88 87 90    338 290     Recent Labs   Lab Test 08/31/21  0634 08/30/21  1538 08/12/21  0440   CR 0.87 0.70 0.78     No lab results found.  Recent Labs   Lab Test 08/06/19  1005 08/06/19  0955 08/04/19  1346 08/04/19  1305 08/04/19  1241 03/02/18  1140 03/02/18  0948 03/02/18  0940 10/12/16  1425   CULT No growth No growth >100,000 colonies/mL  Escherichia coli  *  >100,000 colonies/mL  Strain 2  Escherichia coli  * No growth Cultured on the 1st day of incubation:  Escherichia coli  *  Critical Value/Significant Value, preliminary result only, called to and read back by  Olivier Cobian RN [email or web address] @ 1520 8/5/19. NAP    (Note)  POSITIVE for E.COLI by Verigene multiplex nucleic acid test. Final  identification and antimicrobial susceptibility testing will be  verified by standard methods. Verigene test will not distinguish  E.coli from Shigella species including S.dysenteriae, S.flexneri,  S.boydii, and S.sonnei. Specimens containing Shigella species or  E.coli will be reported as Positive for E.coli.    Specimen tested with Verigene multiplex, gram-negative blood culture  nucleic acid test for the following targets: Acinetobacter sp.,  Citrobacter sp., Enterobacter sp., Proteus sp., E. coli, K.  pneumoniae/oxytoca, P. aeruginosa, and the following resistance  markers: CTXM, KPC, NDM, VIM, IMP and OXA.    Critical Value/Significant Value called to and read back by Madelin Franklin RN at 4582 8.5.19 [id number]   No growth No growth No growth No growth     8/30 CT chest  CT CHEST WITH CONTRAST  8/30/2021 4:58 PM     HISTORY:  Chronic obstructive pulmonary disease (COPD) exacerbation.  Pneumothorax, low-grade temperature.     TECHNIQUE: Scans obtained from the apices through the diaphragm  with  IV contrast. 80 mL of Isovue-370 IV injected. Radiation dose for this  scan was reduced using automated exposure control, adjustment of the  mA and/or kV according to patient size, or iterative reconstruction  technique.     COMPARISON:  Chest x-ray on same day earlier     FINDINGS:  Chest/mediastinum: No cardiomegaly or significant pericardial  effusion. Extensive atherosclerotic vascular calcification of the  coronary arteries. Multiple prominent mediastinal and hilar lymph  nodes, indeterminate, could be reactive. Left chest wall dual lead  implantable cardiac defibrillator and leads are in stable position.     Lungs and pleura: Moderate size left pneumothorax. Small left pleural  effusion. Bibasilar pulmonary opacities, could be atelectatic or  infectious. Upper lobes predominant emphysematous changes.     Upper abdomen: Diffuse dilatation of the main pancreatic duct. There  is approximately 2.5 cm hypoattenuating focus centered in pancreatic  head/uncinate process of the pancreas, is indeterminate. Renal  cortical scarring.     Bones and soft tissue: Multilevel degenerative changes of the spine.  No suspicious osseous lesion.                                                                      IMPRESSION:   1. Again noted moderate size left pneumothorax. No right pneumothorax.  Small left pleural effusion. Bibasilar confluent pulmonary opacities,  could be infectious.  2. Multiple prominent mediastinal and hilar lymph nodes, could be  reactive.  3. Diffuse dilatation of the main pancreatic duct with approximately  2.5 cm hypoattenuating focus in the pancreatic head/uncinate process  of the pancreas, indeterminate, could represent side branch  intraductal papillary mucinous neoplasm versus other pancreatic  lesions. Recommend further evaluation with contrast-enhanced MRI/MRCP  on nonemergent basis.

## 2021-09-01 NOTE — PROGRESS NOTES
THORACIC SURGERY PROGRESS NOTE    S: Patient resting in bed, awakens easily. Weaned to 1L 02 nasal cannula. Reports breathing feels improved since chest tube placement. Having some pain near chest tube site.     O:  /64 (BP Location: Right arm)   Pulse 75   Temp 98.7  F (37.1  C) (Oral)   Resp 18   Wt 50.3 kg (110 lb 14.4 oz)   SpO2 92%   BMI 21.66 kg/m      AVSS, on 1L 02 nasal cannula, baseline left sided weakness given CVA  Respirations non-labored  Chest tube: in place on left lower chest, secure with dressing, 60cc serosan output, no airleak with cough    CXR 9/1/2021: Resolution of left pneumothorax following chest tube placement     A/P: Left pneumothorax in setting of likely pneumonia   --Chest tube in place to suction. CXR showing resolution of left pneumothorax. No airleak noted on exam   --Plan to clamp chest tube tonight at midnight. AM CXR ordered.   --Pending clamp trial, anticipate chest tube removal tomorrow   --Encourage time OOB and pulmonary toiletry  --Continue to hold Eliquis until chest tube removal       Debi Sesay PA-C with Dr. Darell Jean  [facility]  Cell [phone]

## 2021-09-01 NOTE — PROGRESS NOTES
[facility]    Medicine Progress Note - Hospitalist Service       Date of Admission:  8/30/2021    Assessment & Plan             Yoselyn Cui is a 89 year old female admitted on 8/30/2021. She presents with SOB.      Pneumonia of both lower lobes due to infectious organism  Presented with increasing dyspnea/cough/subjective fevers. CT chest showed moderate size left pneumothorax. No right pneumothorax. Small left pleural effusion. Bibasilar confluent pulmonary opacities, could be infectious. Multiple prominent mediastinal and hilar lymph nodes, could be reactive.  - Follow cultures for now. NGTD  - Titrate O2 as tolerated.  Down to 1 L, hopefully can be titrated off today   - Initially treated with IV Zosyn and switched to Ceftriaxone/Azithromycin   - ID consulted and appreciate their recommendations     Left sided pneumothorax in setting of pulmonary emphysema s/p chest tube placement by IR (8/31)  CT chest showed moderate size left pneumothorax  - Thoracic surgery consulted and appreciate their assistance as well.  Will defer chest tube management to them      SSS (sick sinus syndrome)  Stable     H/o CAD   HTN (hypertension)  Mixed hyperlipidemia  Paroxysmal atrial fibrillation   No issues currently Blood pressures controlled at this time   - PTA Coreg, losartan, norvasc and lipitor  - Holding Eliquis and Plavix for now. Will resume once okay with surgery      H/o Cerebrovascular accident (H)  - Holding Eliquis for now until okay with surgery      Dilation of pancreatic duct  Noted on CT chest Diffuse dilatation of the main pancreatic duct with approximately 2.5 cm hypoattenuating focus in the pancreatic head/uncinate process of the pancreas, indeterminate, could represent side branch  intraductal papillary mucinous neoplasm versus other pancreatic lesions. No GI symptoms  - Outpatient follow up     Diet: NPO for Medical/Clinical Reasons Except for: Meds, Ice Chips    DVT Prophylaxis:  Pneumatic Compression Devices  Garcia Catheter: Not present  Central Lines: PRESENT     Code Status: No CPR- Do NOT Intubate           Diet: Regular Diet Adult    DVT Prophylaxis: Pneumatic Compression Devices  Garcia Catheter: Not present  Code Status: No CPR- Do NOT Intubate      Disposition Plan   Expected discharge: 09/03/2021.  Needs to have removal of the CT and back on RA.  Will have therapy evaluate     The patient's care was discussed with the Bedside Nurse, Care Coordinator/ and Patient.    Gary Cui DO  Hospitalist Service  [facility]  Securely message with the Vocera Web Console (learn more here)  Text page via [employer] Paging/Directory      Clinically Significant Risk Factors Present on Admission               ______________________________________________________________________    Interval History   Patient seen and examined.  No acute events over night.  Breathing is improving.  Pain is well controlled.  Patient overall feeling better.  No fevers noted.  No nausea or vomiting.  Tolerating diet    Data reviewed today: I reviewed all medications, new labs and imaging results over the last 24 hours. I personally reviewed no images or EKG's today.    Physical Exam   Vital Signs: Temp: 98.7  F (37.1  C) Temp src: Oral BP: 120/64 Pulse: 75   Resp: 18 SpO2: 92 % O2 Device: Nasal cannula Oxygen Delivery: 1 LPM  Weight: 110 lbs 14.4 oz  General Appearance: Resting comfortably. NAD   Respiratory: Clear to auscultation.  No respiratory distress  Cardiovascular: RRR.  No obvious murmurs  GI: Soft. Non-distended  Skin: No obvious rashes or cyanosis  Other: No edema. No calf tenderness      Data   Recent Labs   Lab 08/31/21  0634 08/30/21  1538   WBC 11.6* 13.8*   HGB 12.5 14.2   MCV 88 87    338   INR 1.31*  --     132*   POTASSIUM 4.0 4.1   CHLORIDE 103 101   CO2 25 24   BUN 10 7   CR 0.87 0.70   ANIONGAP 5 7   GOLDY 8.7 9.4   GLC 95 107*   ALBUMIN  --  3.5    PROTTOTAL  --  8.5   BILITOTAL  --  0.5   ALKPHOS  --  142   ALT  --  23   AST  --  19   TROPONIN  --  <0.015     Recent Results (from the past 24 hour(s))   XR Chest Port 1 View    Narrative    EXAM: XR CHEST PORT 1 VIEW  LOCATION: [facility]  DATE/TIME: 9/1/2021 5:15 AM    INDICATION: pneumothorax  COMPARISON: 08/30/2021      Impression    IMPRESSION: Left subclavian approach pacing device. Interval placement of a left pigtail thoracic catheter. Reexpansion of the left lung. No residual pneumothorax appreciated. Cardiac silhouette within normal limits. Tortuous atherosclerotic aorta. No   acute osseous abnormality.

## 2021-09-01 NOTE — PROGRESS NOTES
09/01/21 1435   Quick Adds   Type of Visit Initial PT Evaluation   Living Environment   People in home facility resident  ([facility])   Living Environment Comments has been living at [facility] since March 2021, reports mod IND in her room with a single point cane, sometimes will try to walk a longer distance, but mostly gets wheeled in her WC to meals in the dining room, pt reports is able to INDly shower.    Self-Care   Usual Activity Tolerance moderate   Current Activity Tolerance fair   Equipment Currently Used at Home cane, straight;wheelchair, manual   Disability/Function   Hearing Difficulty or Deaf yes   Describe hearing loss hearing loss on left side   Wear Glasses or Blind yes   Vision Management glasses, pt reports she is legally blind   Fall history within last six months no   Change in Functional Status Since Onset of Current Illness/Injury yes   General Information   Onset of Illness/Injury or Date of Surgery 09/01/21   Referring Physician Gary Cui, DO   Patient/Family Therapy Goals Statement (PT) none stated   Pertinent History of Current Problem (include personal factors and/or comorbidities that impact the POC) 88 y/o F admitted SOB, found to have pneumonia of both lower lobs due to infectious organism, CT chest showed moderate size left pneumothorax, s/p chest tube placement by IR on 8/31/21. Hx of CVA-pt reports 2007, paroxysmal atrial fibrillation, h/o CAD, HTN.     Existing Precautions/Restrictions fall  (chest tube-okay water seal per RN, 2 L NC.)   General Observations resting in bed, NAD, SPO2 at rest on 2 L NC=95, HR=77bpm, right chest tube   Cognition   Orientation Status (Cognition) oriented to;person;place;situation  (does not know exact day)   Affect/Mental Status (Cognition) WFL   Cognitive Status Comments pt appropriate with conversation and able to follow commands   Pain Assessment   Patient Currently in Pain No   Integumentary/Edema    Integumentary/Edema Comments left chest tube   Range of Motion (ROM)   ROM Comment flexion contracture left UE from hx of stroke in elbow and wrist, able to passively open her left fingers and extend her left elbow to approx. 20-30deg    Strength   Strength Comments generalized deconditioning, left UE 2 to 3-/5 grossly, unable to INDly extend her left fingers from flexor grasp, poor functional use on pt's left UE per pt since her stroke, left LE: 3/5 hip flexion, 4+/5 left quad, 3-/5 left ankle DF, right UE/LE at least 4+/5    Bed Mobility   Comment (Bed Mobility) SBA with elevated HOB and railing from left side of bed   Transfers   Transfer Safety Comments min A sit to stand with right SPC   Gait/Stairs (Locomotion)   Hilton Level (Gait) minimum assist (75% patient effort)   Assistive Device (Gait) cane, straight  (on right )   Distance in Feet (Required for LE Total Joints) 5   Deviations/Abnormal Patterns (Gait) marysol decreased;gait speed decreased   Comment (Gait/Stairs) decreased left foot clearance, mild circumduction on left and right trunk lean with left swing through, partial step through, unsteady   Balance   Balance Comments min A with static and dynamic standing balance with right UE support of cane   Sensory Examination   Sensory Perception Comments pt reports intermittent hand tingling, grossly able to identify light touch bilateral UE/LEs   Coordination   Coordination Comments impaired left UE/LE coordination from hx of stroke-pt reports in 2007   Muscle Tone   Muscle Tone other (see comments)   Muscle Tone Comments left UE flexor tone from prior stroke   Clinical Impression   Criteria for Skilled Therapeutic Intervention yes, treatment indicated   PT Diagnosis (PT) impaired gait   Influenced by the following impairments impaired activity tolerance, generalized weakness, impaired balance   Functional limitations due to impairments pneumonia, s/p chest tube placement, resp status, hx of  stroke   Clinical Presentation Stable/Uncomplicated   Clinical Presentation Rationale clinical judgement   Clinical Decision Making (Complexity) low complexity   Therapy Frequency (PT) 5x/week   Predicted Duration of Therapy Intervention (days/wks) 1 week   Planned Therapy Interventions (PT) balance training;bed mobility training;gait training;home exercise program;neuromuscular re-education;motor coordination training;patient/family education;strengthening;transfer training;progressive activity/exercise;risk factor education;home program guidelines   Risk & Benefits of therapy have been explained evaluation/treatment results reviewed;care plan/treatment goals reviewed;participants included;participants voiced agreement with care plan;patient   PT Discharge Planning    PT Discharge Recommendation (DC Rec) Transitional Care Facility   PT Rationale for DC Rec Pt currently resides at [facility], reports typically mod IND with mobility in her room with use of a cane, currently demos below baseline-requires min A for stability for balance and is at increased risk of falls. Pt will benefit from TCU to maximize her return baseline status.    Total Evaluation Time   Total Evaluation Time (Minutes) 10

## 2021-09-02 NOTE — PROGRESS NOTES
[facility]    Medicine Progress Note - Hospitalist Service       Date of Admission:  8/30/2021    Assessment & Plan            Yoselyn Cui is a 89 year old female admitted on 8/30/2021. She presents with SOB.      Pneumonia of both lower lobes due to infectious organism  Presented with increasing dyspnea/cough/subjective fevers. CT chest showed moderate size left pneumothorax. No right pneumothorax. Small left pleural effusion. Bibasilar confluent pulmonary opacities, could be infectious. Multiple prominent mediastinal and hilar lymph nodes, could be reactive.  - Follow cultures for now. NGTD  - O2 as needed.  Back on room air   - Initially treated with IV Zosyn and switched to Ceftriaxone/Azithromycin   - ID following and appreciate their recommendations.  Can switch to Cefuroxime at discharge     Left sided pneumothorax in setting of pulmonary emphysema s/p chest tube placement by IR (8/31) & removed (9/2)  CT chest showed moderate size left pneumothorax  - Thoracic surgery following and appreciate their recommendations.  Removed chest tube this AM      SSS (sick sinus syndrome)  Stable     H/o CAD   HTN (hypertension)  Mixed hyperlipidemia  Paroxysmal atrial fibrillation   No issues currently Blood pressures controlled at this time   - PTA Coreg, losartan, norvasc and lipitor  - Restarting Plavix and Eliquis as okay with thoracic surgery      H/o Cerebrovascular accident (H)  - As above      Dilation of pancreatic duct  Noted on CT chest Diffuse dilatation of the main pancreatic duct with approximately 2.5 cm hypoattenuating focus in the pancreatic head/uncinate process of the pancreas, indeterminate, could represent side branch  intraductal papillary mucinous neoplasm versus other pancreatic lesions. No GI symptoms  - Outpatient follow up        Diet: Regular Diet Adult    DVT Prophylaxis: Pneumatic Compression Devices  Garcia Catheter: Not present  Central Lines: PRESENT     Code  Status: No CPR- Do NOT Intubate      Disposition Plan   Expected discharge: 09/03/2021   recommended to prior living arrangement once monitored over night off of oxygen.     The patient's care was discussed with the Bedside Nurse, Care Coordinator/ and Patient.    Gary Cui DO  Hospitalist Service  [facility]  Securely message with the Vocera Web Console (learn more here)  Text page via [employer] Paging/Directory      Clinically Significant Risk Factors Present on Admission               ______________________________________________________________________    Interval History   Patient seen and examined.  No acute events over night.  Chest tube removed this morning and believes her breathing improved with that.  No chest pain currently.  No fevers or chills.  Tolerating diet.  Discussed discharge and wanting to monitor over night as still hypoxic this AM and patient was in agreement    Data reviewed today: I reviewed all medications, new labs and imaging results over the last 24 hours. I personally reviewed no images or EKG's today.    Physical Exam   Vital Signs: Temp: 97.5  F (36.4  C) Temp src: Oral BP: (!) 140/79 Pulse: 68   Resp: 18 SpO2: 94 % O2 Device: None (Room air) Oxygen Delivery: 1 LPM  Weight: 110 lbs 3.68 oz  General Appearance: Resting comfortably in chair.  NAD   Respiratory: Clear to auscultation.  No respiratory distress  Cardiovascular: RRR.  No obvious murmurs  GI: Soft. Non-distended  Skin: No obvious rashes or cyanosis to exposed skin  Other: Alert.  Moving all extremities grossly.  Chest tube has been removed      Data   Recent Labs   Lab 08/31/21  0634 08/30/21  1538   WBC 11.6* 13.8*   HGB 12.5 14.2   MCV 88 87    338   INR 1.31*  --     132*   POTASSIUM 4.0 4.1   CHLORIDE 103 101   CO2 25 24   BUN 10 7   CR 0.87 0.70   ANIONGAP 5 7   GOLDY 8.7 9.4   GLC 95 107*   ALBUMIN  --  3.5   PROTTOTAL  --  8.5   BILITOTAL  --  0.5   ALKPHOS  --   142   ALT  --  23   AST  --  19   TROPONIN  --  <0.015     Recent Results (from the past 24 hour(s))   XR Chest Port 1 View    Narrative    EXAM: XR CHEST PORT 1 VIEW  LOCATION: [facility]  DATE/TIME: 9/2/2021 5:10 AM    INDICATION: Pneumothorax.  COMPARISON: 09/01/2021.      Impression    IMPRESSION: Left-sided pigtail thoracic catheter in situ. No residual pneumothorax appreciated. No focal consolidation. Cardiac silhouette within normal limits. Tortuous atherosclerotic aorta. Left subclavian approach pacing leads.

## 2021-09-02 NOTE — PROGRESS NOTES
"Radiology Note:    Received message from thoracic surgery team that patient successfully underwent clamping trial of left chest tube with no recurrent pneumothorax. They have requested chest tube removal.    Patient states her cough is a little better but she is still mildly SOB.     Left chest tube was removed at patient's bedside without difficulty. Site covered with gauze and tegaderm. Keep clean and dry, with dressing in place for 48 hours, then may remove dressing and recover with bandaid as needed. Patient states \"I feel better already!\"    Kayla Waller PA-C  Radiology  Pager #: [phone]    "

## 2021-09-02 NOTE — PROGRESS NOTES
[facility]    Infectious Disease Progress Note    Date of Service : 09/02/2021     Assessment:  1. Community acquired pneumonia with bilateral pulmonary infiltrates  2. COPD with new pneumothorax likely related to ruptured bulla given underlying emphysema. S/p chest tube   3. Reactive hilar lymphadenopathy  4. Chronic medical conditions -  atrial fibrillation on anticoagulation-s/p pacemaker, HTN, hyperlipidemia, hx CVA     Recommendations:  Continue Ceftriaxone for 7 days until 9/6, oral transition to Cefuroxime at discharge   Azithromycin for 5 days until 9/4  Supportive care    Rosy Garrido MD    Interval History   Out in a chair, feels short of breath, chest pain at tube site this morning. Overall a little better,    Antimicrobial therapy  8/31 Ceftriaxone / Azithromycin  prior  8/30-8/31 Zosyn     Physical Exam   Temp: 97.5  F (36.4  C) Temp src: Oral BP: (!) 140/79 Pulse: 68   Resp: 18 SpO2: 90 % O2 Device: Nasal cannula Oxygen Delivery: 1 LPM  Vitals:    08/31/21 0605 09/01/21 0657 09/02/21 0530   Weight: 50.9 kg (112 lb 3.4 oz) 50.3 kg (110 lb 14.4 oz) 50 kg (110 lb 3.7 oz)     Vital Signs with Ranges  Temp:  [97.5  F (36.4  C)-99.3  F (37.4  C)] 97.5  F (36.4  C)  Pulse:  [61-77] 68  Resp:  [16-18] 18  BP: (120-140)/(63-79) 140/79  SpO2:  [85 %-97 %] 90 %    GENERAL APPEARANCE:  Awake, pleasant  EYES: Eyes grossly normal to inspection, PERRL and conjunctivae and sclerae normal  RESP: on oxygen, anterior auscultation clear. Left chest tube in place  CV: S1S2  LYMPHATICS: normal ant/post cervical and supraclavicular nodes  ABDOMEN: soft, nontender, without hepatosplenomegaly or masses and bowel sounds normal  MS: extremities normal- no gross deformities noted  SKIN: no suspicious lesions or rashes     Other:    Medications       amLODIPine  5 mg Oral Daily     atorvastatin  40 mg Oral At Bedtime     azithromycin  250 mg Oral Daily     carvedilol  18.75 mg Oral BID w/meals      cefTRIAXone  2 g Intravenous Q24H     losartan  100 mg Oral Daily     sodium chloride (PF)  3 mL Intracatheter Q8H     umeclidinium  1 puff Inhalation Daily       Data   All microbiology laboratory data reviewed.  Recent Labs   Lab Test 08/31/21  0634 08/30/21  1538 08/05/21  0700   WBC 11.6* 13.8* 6.0   HGB 12.5 14.2 12.9   HCT 39.3 43.5 40.3   MCV 88 87 90    338 290     Recent Labs   Lab Test 08/31/21  0634 08/30/21  1538 08/12/21  0440   CR 0.87 0.70 0.78     No lab results found.  Recent Labs   Lab Test 08/06/19  1005 08/06/19  0955 08/04/19  1346 08/04/19  1305 08/04/19  1241 03/02/18  1140 03/02/18  0948 03/02/18  0940 10/12/16  1425   CULT No growth No growth >100,000 colonies/mL  Escherichia coli  *  >100,000 colonies/mL  Strain 2  Escherichia coli  * No growth Cultured on the 1st day of incubation:  Escherichia coli  *  Critical Value/Significant Value, preliminary result only, called to and read back by  Olivier Cobian RN [email or web address] @ 0755 8/5/19. NAP    (Note)  POSITIVE for E.COLI by Verigene multiplex nucleic acid test. Final  identification and antimicrobial susceptibility testing will be  verified by standard methods. Verigene test will not distinguish  E.coli from Shigella species including S.dysenteriae, S.flexneri,  S.boydii, and S.sonnei. Specimens containing Shigella species or  E.coli will be reported as Positive for E.coli.    Specimen tested with Verigene multiplex, gram-negative blood culture  nucleic acid test for the following targets: Acinetobacter sp.,  Citrobacter sp., Enterobacter sp., Proteus sp., E. coli, K.  pneumoniae/oxytoca, P. aeruginosa, and the following resistance  markers: CTXM, KPC, NDM, VIM, IMP and OXA.    Critical Value/Significant Value called to and read back by Madelin Franklin RN at 9691 8.5.19 [id number]   No growth No growth No growth No growth     Imaging  9/2 CXR  EXAM: XR CHEST PORT 1 VIEW  LOCATION: [facility]  DATE/TIME: 9/2/2021 5:10  AM     INDICATION: Pneumothorax.  COMPARISON: 09/01/2021.                                                                      IMPRESSION: Left-sided pigtail thoracic catheter in situ. No residual pneumothorax appreciated. No focal consolidation. Cardiac silhouette within normal limits. Tortuous atherosclerotic aorta. Left subclavian approach pacing leads.       8/30 CT chest  CT CHEST WITH CONTRAST  8/30/2021 4:58 PM     HISTORY:  Chronic obstructive pulmonary disease (COPD) exacerbation.  Pneumothorax, low-grade temperature.     TECHNIQUE: Scans obtained from the apices through the diaphragm with  IV contrast. 80 mL of Isovue-370 IV injected. Radiation dose for this  scan was reduced using automated exposure control, adjustment of the  mA and/or kV according to patient size, or iterative reconstruction  technique.     COMPARISON:  Chest x-ray on same day earlier     FINDINGS:  Chest/mediastinum: No cardiomegaly or significant pericardial  effusion. Extensive atherosclerotic vascular calcification of the  coronary arteries. Multiple prominent mediastinal and hilar lymph  nodes, indeterminate, could be reactive. Left chest wall dual lead  implantable cardiac defibrillator and leads are in stable position.     Lungs and pleura: Moderate size left pneumothorax. Small left pleural  effusion. Bibasilar pulmonary opacities, could be atelectatic or  infectious. Upper lobes predominant emphysematous changes.     Upper abdomen: Diffuse dilatation of the main pancreatic duct. There  is approximately 2.5 cm hypoattenuating focus centered in pancreatic  head/uncinate process of the pancreas, is indeterminate. Renal  cortical scarring.     Bones and soft tissue: Multilevel degenerative changes of the spine.  No suspicious osseous lesion.                                                                      IMPRESSION:   1. Again noted moderate size left pneumothorax. No right pneumothorax.  Small left pleural effusion. Bibasilar  confluent pulmonary opacities,  could be infectious.  2. Multiple prominent mediastinal and hilar lymph nodes, could be  reactive.  3. Diffuse dilatation of the main pancreatic duct with approximately  2.5 cm hypoattenuating focus in the pancreatic head/uncinate process  of the pancreas, indeterminate, could represent side branch  intraductal papillary mucinous neoplasm versus other pancreatic  lesions. Recommend further evaluation with contrast-enhanced MRI/MRCP  on nonemergent basis.

## 2021-09-02 NOTE — CONSULTS
Care Management Initial Consult    General Information  Assessment completed with: Patient, Care Team Member, Children, -chart review,    Type of CM/SW Visit: Initial Assessment    Primary Care Provider verified and updated as needed:     Readmission within the last 30 days:        Reason for Consult: discharge planning  Advance Care Planning: Advance Care Planning Reviewed: present on chart          Communication Assessment  Patient's communication style: spoken language (English or Bilingual)    Hearing Difficulty or Deaf: yes   Wear Glasses or Blind: yes    Cognitive  Cognitive/Neuro/Behavioral: WDL                      Living Environment:   People in home: facility resident     Current living Arrangements: assisted living  Name of Facility: [facility]   Able to return to prior arrangements: other (see comments) (patient wants [facility], recommendations for TCU, [facility] NP has talke)       Family/Social Support:  Care provided by: self, homecare agency, other (see comments) (staff)  Provides care for: no one, unable/limited ability to care for self     Children, Facility resident(s)/Staff          Description of Support System: Supportive, Involved         Current Resources:   Patient receiving home care services: Yes  Skilled Home Care Services: Speech Therapy (Judith Marshall RN, phone [phone])  Community Resources: None  Equipment currently used at home: cane, straight, wheelchair, manual  Supplies currently used at home:      Employment/Financial:  Employment Status: retired        Financial Concerns: No concerns identified           Lifestyle & Psychosocial Needs:  Social Determinants of Health     Tobacco Use: Low Risk      Smoking Tobacco Use: Never Smoker     Smokeless Tobacco Use: Never Used   Alcohol Use:      Frequency of Alcohol Consumption:      Average Number of Drinks:      Frequency of Binge Drinking:    Financial Resource Strain:      Difficulty of Paying Living Expenses:    Food  Insecurity:      Worried About Running Out of Food in the Last Year:      Ran Out of Food in the Last Year:    Transportation Needs:      Lack of Transportation (Medical):      Lack of Transportation (Non-Medical):    Physical Activity:      Days of Exercise per Week:      Minutes of Exercise per Session:    Stress:      Feeling of Stress :    Social Connections:      Frequency of Communication with Friends and Family:      Frequency of Social Gatherings with Friends and Family:      Attends Restoration Services:      Active Member of Clubs or Organizations:      Attends Club or Organization Meetings:      Marital Status:    Intimate Partner Violence:      Fear of Current or Ex-Partner:      Emotionally Abused:      Physically Abused:      Sexually Abused:    Depression:      PHQ-2 Score:    Housing Stability:      Unable to Pay for Housing in the Last Year:      Number of Places Lived in the Last Year:      Unstable Housing in the Last Year:        Functional Status:  Prior to admission patient needed assistance: used a cane, 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 Patient has her medications administered to her but was independent in her apartment prior to admit, see assessment note.              Mental Health Status:          Chemical Dependency Status:                Values/Beliefs:  Spiritual, Cultural Beliefs, Restoration Practices, Values that affect care: no               Additional Information:  Met with patient to review discharge planning. Patient states she lives at assisted living at [facility]. Patient desires to return there, ok to speak to LORRAINE and daughter Argelia.I spoke to Argelia and she states her mom normally is independent in her apartment with a cane. Staff assist with medications, escort to meals. I called and spoke to Mary Ann at [facility]. Mary Ann reports same normally independent in apartment with cane, escorts to meals and independent with toileting, staff do all her  medications. Mary Ann relays that the NP at [facility] has called and spoke to daughter about going to TCU after hospital stay. Argelia reported to this writer and agrees that will see how does overnight and with therapies tomorrow since her chest tube is out now. Argelia also stated she would call and discuss with her sister Maryjane about the therapy recommendations. Case managment will follow up with patient, family and facility tomorrow.      Donna Medrano RN   [facility]   Phone [phone]

## 2021-09-02 NOTE — PLAN OF CARE
Pt. A/O x4. VSS. On RA.  Assist of 1 with gb and cane. Tolerating regular diet. Lung sounds dim and coarse. Denies pain this shift.  Pt up in chair for all meals and ambulated to bathroom. Possible discharge on Friday. Pt upset with PT comments. Pt making attempts to get out of chair and bed by herself. Bed and chair alarm on, friendly reminder to pt to use call light.

## 2021-09-02 NOTE — PROGRESS NOTES
Daughter Argelia Carroll is the person to contact in case of discharge back to facility. Cell# [phone]

## 2021-09-02 NOTE — PLAN OF CARE
Alert and oriented x4. Vital signs stable on 1L O2, tried to wean off oxygen but desat to 85% at rest. Encouraged IS, @ 1000. Patient dyspneic on exertion, increase O2 need with activity. Up with assist of 1 and cane. Tolerating regular diet. Lungs sounds coarse. BS+. BM-. Voiding.  Chest tube in place, no crepitus no airleak. Clamped at midnight then was unclamped after AM chest x-ray. Pain managed with PRN Tylenol and Oxycodone 2.5mg. Denies nausea.

## 2021-09-03 NOTE — DISCHARGE SUMMARY
[facility]  Hospitalist Discharge Summary      Date of Admission:  8/30/2021  Date of Discharge:  9/3/2021  Discharging Provider: Gary Cui,       Discharge Diagnoses   Pneumonia of both lower lobes due to infectious organism   Left sided pneumothorax in setting of pulmonary emphysema s/p chest tube placement by IR (8/31) & removed (9/2)   Acute hypoxic respiratory distress due to above   SSS (sick sinus syndrome)  H/o CAD   HTN (hypertension)  Mixed hyperlipidemia  Paroxysmal atrial fibrillation  H/o Cerebrovascular accident (H)  Dilation of pancreatic duct    Follow-ups Needed After Discharge   Follow-up Appointments     Follow Up and recommended labs and tests      Follow up with Nursing home physician.  No follow up labs or test are   needed.  Follow up with PCP 1-2 weeks after TCU discharge          PCP should consider further evaluation of the pancreatic duct dilatation     Unresulted Labs Ordered in the Past 30 Days of this Admission     Date and Time Order Name Status Description    8/30/2021  3:33 PM Blood Culture Peripheral Blood Preliminary     8/30/2021  3:33 PM Blood Culture Line, venous Preliminary       These results will be followed up by PCP     Discharge Disposition   Discharged to short-term care facility  Condition at discharge: Stable    Hospital Course           Yoselyn Cui is a 89 year old female admitted on 8/30/2021. She presents with SOB.      Pneumonia of both lower lobes due to infectious organism  Presented with increasing dyspnea/cough/subjective fevers. CT chest showed moderate size left pneumothorax. No right pneumothorax. Small left pleural effusion. Bibasilar confluent pulmonary opacities, could be infectious. Multiple prominent mediastinal and hilar lymph nodes, could be reactive.  - Follow cultures for now. NGTD  - O2 as needed.  Back on room air   - Initially treated with IV Zosyn and switched to Ceftriaxone/Azithromycin.  Will finish  Azithromycin and Cefdinir at TCU   - ID following and appreciate their recommendations.  Can switch to Cefuroxime at discharge     Left sided pneumothorax in setting of pulmonary emphysema s/p chest tube placement by IR (8/31) & removed (9/2)  CT chest showed moderate size left pneumothorax  - Thoracic surgery following and appreciate their recommendations.  Removed chest tube this AM      SSS (sick sinus syndrome)  Stable     H/o CAD   HTN (hypertension)  Mixed hyperlipidemia  Paroxysmal atrial fibrillation   No issues currently Blood pressures controlled at this time   - PTA Coreg, losartan, norvasc and lipitor  - Restarting Plavix and Eliquis as okay with thoracic surgery      H/o Cerebrovascular accident (H)  - As above      Dilation of pancreatic duct  Noted on CT chest Diffuse dilatation of the main pancreatic duct with approximately 2.5 cm hypoattenuating focus in the pancreatic head/uncinate process of the pancreas, indeterminate, could represent side branch  intraductal papillary mucinous neoplasm versus other pancreatic lesions. No GI symptoms  - Outpatient follow up       Consultations This Hospital Stay   THORACIC SURGERY IP CONSULT  INFECTIOUS DISEASES IP CONSULT  PHYSICAL THERAPY ADULT IP CONSULT  CARE MANAGEMENT / SOCIAL WORK IP CONSULT  PHYSICAL THERAPY ADULT IP CONSULT  OCCUPATIONAL THERAPY ADULT IP CONSULT    Code Status   No CPR- Do NOT Intubate    Time Spent on this Encounter   I, Gary Cui DO, personally saw the patient today and spent greater than 30 minutes discharging this patient.       Gary Cui DO  [place] [address] [facility]  [address] [address]  Phone: [phone]  ______________________________________________________________________    Physical Exam   Vital Signs: Temp: 99.1  F (37.3  C) Temp src: Oral BP: 126/67 Pulse: 82   Resp: 20 SpO2: 92 % O2 Device: None (Room air)    Weight: 110 lbs .15 oz  General Appearance: Resting  comfortably.  NAD   Respiratory: Clear to auscultation.  No respiratory distress  Cardiovascular: RRR.  No obvious murmurs  GI: Soft.  Non-distended  Skin: No obvious rashes or cyanosis  Other: No edema. No calf tenderness         Primary Care Physician   Brittany Florez    Discharge Orders      General info for SNF    Length of Stay Estimate: Short Term Care: Estimated # of Days <30  Condition at Discharge: Stable  Level of care:skilled   Rehabilitation Potential: Fair  Admission H&P remains valid and up-to-date: Yes  Recent Chemotherapy: N/A  Use Nursing Home Standing Orders: Yes     Mantoux instructions    Give two-step Mantoux (PPD) Per Facility Policy Yes     Follow Up and recommended labs and tests    Follow up with Nursing home physician.  No follow up labs or test are needed.  Follow up with PCP 1-2 weeks after TCU discharge     Reason for your hospital stay    Pneumonia with collapsed lung     Activity - Up ad ayse     Physical Therapy Adult Consult    Evaluate and treat as clinically indicated.    Reason:  Deconditioning     Occupational Therapy Adult Consult    Evaluate and treat as clinically indicated.    Reason:  Deconditioning     Advance Diet as Tolerated    Follow this diet upon discharge: Orders Placed This Encounter      Regular Diet Adult       Significant Results and Procedures   Most Recent 3 CBC's:Recent Labs   Lab Test 08/31/21  0634 08/30/21  1538 08/05/21  0700   WBC 11.6* 13.8* 6.0   HGB 12.5 14.2 12.9   MCV 88 87 90    338 290     Most Recent 3 BMP's:Recent Labs   Lab Test 08/31/21  0634 08/30/21  1538 08/12/21  0440    132* 131*   POTASSIUM 4.0 4.1 4.0   CHLORIDE 103 101 100   CO2 25 24 27   BUN 10 7 13   CR 0.87 0.70 0.78   ANIONGAP 5 7 4   GOLDY 8.7 9.4 8.6   GLC 95 107* 92     Most Recent 6 Bacteria Isolates From Any Culture (See EPIC Reports for Culture Details):Recent Labs   Lab Test 08/06/19  1005 08/06/19  0955 08/04/19  1346 08/04/19  1305 08/04/19  1241 03/02/18  1140    CULT No growth No growth >100,000 colonies/mL  Escherichia coli  *  >100,000 colonies/mL  Strain 2  Escherichia coli  * No growth Cultured on the 1st day of incubation:  Escherichia coli  *  Critical Value/Significant Value, preliminary result only, called to and read back by  Olivier Cobian RN [email or web address] @ 1520 8/5/19. NAP    (Note)  POSITIVE for E.COLI by Verigene multiplex nucleic acid test. Final  identification and antimicrobial susceptibility testing will be  verified by standard methods. Verigene test will not distinguish  E.coli from Shigella species including S.dysenteriae, S.flexneri,  S.boydii, and S.sonnei. Specimens containing Shigella species or  E.coli will be reported as Positive for E.coli.    Specimen tested with Verigene multiplex, gram-negative blood culture  nucleic acid test for the following targets: Acinetobacter sp.,  Citrobacter sp., Enterobacter sp., Proteus sp., E. coli, K.  pneumoniae/oxytoca, P. aeruginosa, and the following resistance  markers: CTXM, KPC, NDM, VIM, IMP and OXA.    Critical Value/Significant Value called to and read back by Madelin Franklin RN at 1751 8.5.19 [id number]   No growth   ,   Results for orders placed or performed during the hospital encounter of 08/30/21   XR Chest Port 1 View    Narrative    XR PORTABLE CHEST ONE VIEW   8/30/2021 4:06 PM     HISTORY: Cough, fever.    COMPARISON: Cardiac CT on 11/6/2019      Impression    IMPRESSION: Single portable AP view of the chest was obtained. Left  chest wall dual lead automatic implantable cardiac defibrillator and  leads are in stable position. Stable cardiomediastinal silhouette.  Small left pleural effusion and associated basilar  atelectasis/consolidation. No significant right pleural effusion. Mild  right basilar pulmonary opacities, likely atelectasis. There is  moderate-sized left pneumothorax. No right pneumothorax.    Findings were discussed with the ordering provider. RAFAEL FELIZ at  4:15 PM on  8/30/2021.    CHEL CASTILLO MD         SYSTEM ID:  [id number]   CT Chest w Contrast    Narrative    CT CHEST WITH CONTRAST  8/30/2021 4:58 PM    HISTORY:  Chronic obstructive pulmonary disease (COPD) exacerbation.  Pneumothorax, low-grade temperature.    TECHNIQUE: Scans obtained from the apices through the diaphragm with  IV contrast. 80 mL of Isovue-370 IV injected. Radiation dose for this  scan was reduced using automated exposure control, adjustment of the  mA and/or kV according to patient size, or iterative reconstruction  technique.    COMPARISON:  Chest x-ray on same day earlier    FINDINGS:  Chest/mediastinum: No cardiomegaly or significant pericardial  effusion. Extensive atherosclerotic vascular calcification of the  coronary arteries. Multiple prominent mediastinal and hilar lymph  nodes, indeterminate, could be reactive. Left chest wall dual lead  implantable cardiac defibrillator and leads are in stable position.    Lungs and pleura: Moderate size left pneumothorax. Small left pleural  effusion. Bibasilar pulmonary opacities, could be atelectatic or  infectious. Upper lobes predominant emphysematous changes.    Upper abdomen: Diffuse dilatation of the main pancreatic duct. There  is approximately 2.5 cm hypoattenuating focus centered in pancreatic  head/uncinate process of the pancreas, is indeterminate. Renal  cortical scarring.    Bones and soft tissue: Multilevel degenerative changes of the spine.  No suspicious osseous lesion.      Impression    IMPRESSION:   1. Again noted moderate size left pneumothorax. No right pneumothorax.  Small left pleural effusion. Bibasilar confluent pulmonary opacities,  could be infectious.  2. Multiple prominent mediastinal and hilar lymph nodes, could be  reactive.  3. Diffuse dilatation of the main pancreatic duct with approximately  2.5 cm hypoattenuating focus in the pancreatic head/uncinate process  of the pancreas, indeterminate, could represent side  branch  intraductal papillary mucinous neoplasm versus other pancreatic  lesions. Recommend further evaluation with contrast-enhanced MRI/MRCP  on nonemergent basis.    CHEL CASTILLO MD         SYSTEM ID:  [id number]   IR Chest Tube Place Non Tunneled Left    Narrative    [facility]  DATE: 8/31/2021    PROCEDURE:  1. IMAGE GUIDED LEFT PLEURAL CHEST TUBE PLACEMENT  2. MODERATE SEDATION    INTERVENTIONAL RADIOLOGIST: Maximiliano Meyer MD    INDICATION: Patient is an 89-year-old female with a history of left  pneumothorax continued image guided chest tube placement.    CONSENT: The risks, benefits and alternatives of left-sided chest tube  placement were discussed with the patient  in detail. All questions  were answered. Informed consent was given to proceed with the  procedure.    MODERATE SEDATION: Versed 0.5 mg IV; Fentanyl 25 mcg IV. During the  time out, immediately prior to the administration of medications, the  patient was reassessed for adequacy to receive conscious sedation.   Under physician supervision, Versed and fentanyl were administered for  moderate sedation. Pulse oximetry, heart rate and blood pressure were  continuously monitored by an independent trained observer. The  physician spent 17 minutes of face-to-face sedation time with the  patient.    CONTRAST: None.  ANTIBIOTICS: None.  ADDITIONAL MEDICATIONS: None.    FLUOROSCOPIC TIME: 3.3 minutes.  RADIATION DOSE: Air Kerma: 14 mGy.    COMPLICATIONS: No immediate complications.    STERILE BARRIER TECHNIQUE: Maximum sterile barrier technique was used.  Cutaneous antisepsis was performed at the operative site with  application of 2% chlorhexidine and large sterile drape. Prior to the  procedure, the  and assistant performed hand hygiene and wore  hat, mask, sterile gown, and sterile gloves during the entire  procedure.    PROCEDURE:  Utilizing fluoroscopic imaging, a  image was obtained. The skin  was anesthetized using 1%  lidocaine. A 19-gauge/4 [place]  needle/catheter combination was advanced under image guidance into the  left pleural space. The needle was removed. Through the needle, a  0.035 inch Amplatz wire was coiled within the pleural space. The  catheter was removed. Over the wire, a 10 Fr pigtail drainage catheter  was placed. The pigtail locking mechanism was engaged. The catheter  was then attached to a Pleur-Evac device. The catheter was secured to  the skin utilizing a 2-0 nylon suture. A clean and sterile dressing  was applied. The patient tolerated the procedure well.     FINDINGS:   imaging demonstrated small left pneumothorax. Postplacement  fluoroscopic imaging demonstrates good positioning of the pigtail  drainage catheter.       Impression    IMPRESSION:    1.  Uncomplicated image guided placement of left [place] pleural  drainage catheter. Drainage catheter attached to Pleur-Evac.     ROSA STOUT MD         SYSTEM ID:  [id number]   XR Chest Port 1 View    Narrative    EXAM: XR CHEST PORT 1 VIEW  LOCATION: [facility]  DATE/TIME: 9/1/2021 5:15 AM    INDICATION: pneumothorax  COMPARISON: 08/30/2021      Impression    IMPRESSION: Left subclavian approach pacing device. Interval placement of a left pigtail thoracic catheter. Reexpansion of the left lung. No residual pneumothorax appreciated. Cardiac silhouette within normal limits. Tortuous atherosclerotic aorta. No   acute osseous abnormality.   XR Chest Port 1 View    Narrative    EXAM: XR CHEST PORT 1 VIEW  LOCATION: [facility]  DATE/TIME: 9/2/2021 5:10 AM    INDICATION: Pneumothorax.  COMPARISON: 09/01/2021.      Impression    IMPRESSION: Left-sided pigtail thoracic catheter in situ. No residual pneumothorax appreciated. No focal consolidation. Cardiac silhouette within normal limits. Tortuous atherosclerotic aorta. Left subclavian approach pacing leads.              XR Chest Port 1 View    Narrative     EXAM: XR CHEST PORT 1 VIEW  LOCATION: [facility]  DATE/TIME: 9/3/2021 5:38 AM    INDICATION: pneumothorax  COMPARISON: 09/02/2021      Impression    IMPRESSION: Heart size within normal limits. Densely calcified thoracic aorta and mitral annulus. Left-sided dual-chamber cardiac pacer. Interval removal of left chest tube without visible pneumothorax. Increased streaky opacities at the right lung base   could reflect atelectasis or infiltrate. Arthritic change of the shoulders, incompletely imaged.       Discharge Medications   Current Discharge Medication List      START taking these medications    Details   azithromycin (ZITHROMAX) 250 MG tablet Take 1 tablet (250 mg) by mouth daily for 1 day    Associated Diagnoses: Pneumonia of both lower lobes due to infectious organism      cefuroxime (CEFTIN) 500 MG tablet Take 1 tablet (500 mg) by mouth 2 times daily for 3 days  Refills: 0    Associated Diagnoses: Pneumonia of both lower lobes due to infectious organism         CONTINUE these medications which have NOT CHANGED    Details   amLODIPine (NORVASC) 5 MG tablet TAKE 1 TABLET BY MOUTH ONCE DAILY  Qty: 35 tablet, Refills: PRN    Associated Diagnoses: Essential hypertension, benign      atorvastatin (LIPITOR) 40 MG tablet TAKE 1 TABLET BY MOUTH AT BEDTIME  Qty: 35 tablet, Refills: PRN    Associated Diagnoses: Coronary artery disease involving native coronary artery of native heart without angina pectoris      !! carvedilol (COREG) 12.5 MG tablet Take 12.5 mg by mouth 2 times daily (with meals) (TAKE WITH 6.25MG FOR A TOTAL OF 18.75MG) **NOTE DOSAGE/STRENGTH**      !! carvedilol (COREG) 6.25 MG tablet TAKE 1 TABLET BY MOUTH TWICE DAILY WITH MEALS (TAKE WITH 12.5MG FOR A TOTAL OF 18.75MG) NOTE DOSAGE/STRENGTH  Qty: 70 tablet, Refills: PRN    Associated Diagnoses: Essential hypertension, benign      clopidogrel (PLAVIX) 75 MG tablet TAKE 1 TABLET BY MOUTH ONCE DAILY  Qty: 35 tablet, Refills:  PRN    Associated Diagnoses: Coronary artery disease involving native coronary artery of native heart without angina pectoris      ELIQUIS ANTICOAGULANT 2.5 MG tablet TAKE 1 TABLET BY MOUTH TWICE DAILY  Qty: 70 tablet, Refills: PRN    Associated Diagnoses: Paroxysmal atrial fibrillation (H)      ferrous sulfate (FE TABS) 325 (65 Fe) MG EC tablet Take 1 tablet (325 mg) by mouth Every Mon, Wed, Fri Morning  Qty: 12 tablet, Refills: 98    Associated Diagnoses: Iron deficiency      guaiFENesin (ROBITUSSIN) 100 MG/5ML liquid Take 10 mLs (200 mg) by mouth 2 times daily. May also take 10 mLs (200 mg) 2 times daily as needed for cough.  Qty: 236 mL, Refills: 98    Associated Diagnoses: Cough      ipratropium-albuterol (COMBIVENT RESPIMAT)  MCG/ACT inhaler Inhale 1 puff into the lungs 4 times daily as needed for shortness of breath / dyspnea or wheezing  Qty: 4 g, Refills: 11    Associated Diagnoses: Pneumonia due to infectious organism, unspecified laterality, unspecified part of lung      losartan (COZAAR) 100 MG tablet Take 1 tablet (100 mg) by mouth daily  Qty: 30 tablet, Refills: 98    Associated Diagnoses: Essential hypertension, benign      Multiple Vitamins-Minerals (SYSTANE ICAPS AREDS2) CAPS TAKE 1 CAPSULE BY MOUTH TWICE DAILY  Qty: 56 capsule, Refills: PRN    Associated Diagnoses: Takes dietary supplements      omeprazole (PRILOSEC) 20 MG DR capsule TAKE 1 CAPSULE BY MOUTH EVERY MORNING  Qty: 37 capsule, Refills: PRN    Associated Diagnoses: Gastroesophageal reflux disease with esophagitis without hemorrhage      oxybutynin ER (DITROPAN-XL) 10 MG 24 hr tablet TAKE 1 TABLET BY MOUTH ONCE DAILY  Qty: 28 tablet, Refills: PRN    Associated Diagnoses: OAB (overactive bladder)      potassium chloride ER (KLOR-CON M) 20 MEQ CR tablet TAKE 1 TABLET BY MOUTH ONCE DAILY  Qty: 35 tablet, Refills: PRN    Associated Diagnoses: Hypokalemia      !! SENNA-docusate sodium (SENNA S) 8.6-50 MG tablet Take 1 tablet by mouth At  Bedtime HOLD FOR LOOSE STOOL  Qty: 30 tablet, Refills: 98    Associated Diagnoses: Slow transit constipation      simethicone (MYLICON) 125 MG chewable tablet CHEW AND SWALLOW 1 TABLET BY MOUTH EVERY NIGHT AT BEDTIME  Qty: 38 tablet, Refills: PRN    Associated Diagnoses: Flatulence, eructation and gas pain      umeclidinium (INCRUSE ELLIPTA) 62.5 MCG/INH inhaler Inhale 1 puff into the lungs daily  Qty: 7 each, Refills: 98    Associated Diagnoses: Cough; Chronic obstructive pulmonary disease, unspecified COPD type (H)      Vitamin D3 (CHOLECALCIFEROL) 25 mcg (1000 units) tablet Take 1 tablet (25 mcg) by mouth daily  Qty: 30 tablet, Refills: 98    Associated Diagnoses: Vitamin D deficiency      acetaminophen (TYLENOL) 325 MG tablet Take 650 mg by mouth every 6 hours as needed for mild pain       Benzocaine-Menthol (CEPACOL SORE THROAT) 15-2.6 MG LOZG Take 1 lozenge by mouth every 2 hours as needed (Sore Throat) May keep at bedside.  Qty: 18 lozenge, Refills: 3    Associated Diagnoses: Cough      nitroGLYcerin (NITROSTAT) 0.4 MG sublingual tablet For chest pain place 1 tablet under the tongue every 5 minutes for 3 doses. If symptoms persist 5 minutes after 1st dose call 911.  Qty: 30 tablet, Refills: 0    Associated Diagnoses: Coronary artery disease involving native coronary artery of native heart without angina pectoris      !! senna-docusate (SENOKOT-S/PERICOLACE) 8.6-50 MG tablet Take 1 tablet by mouth daily as needed       !! - Potential duplicate medications found. Please discuss with provider.        Allergies   No Known Allergies

## 2021-09-03 NOTE — PROGRESS NOTES
No new recommendations. See ID note from yesterday,   Will sign off.   Please call with ques.     Bertha Brooks MD  Infectious Disease

## 2021-09-03 NOTE — PROGRESS NOTES
Care Management Discharge Note    Discharge Date: 09/03/2021  Expected Time of Departure: 5:30    Discharge Disposition: Transitional Care    Discharge Services: None    Discharge DME: None    Discharge Transportation: other (see comments) ([facility] w\c transport)    Private pay costs discussed: transportation costs    PAS Confirmation Code:  [id number]  Patient/family educated on Medicare website which has current facility and service quality ratings: no    Education Provided on the Discharge Plan:    Persons Notified of Discharge Plans: pt, pt's daughter, [facility] admissions.   Patient/Family in Agreement with the Plan: yes    Handoff Referral Completed: Yes    Additional Information:  Pt to discharge to [facility] at 5:30 tonight via [facility] wheelchair transport. SW updated family on placement. SW informed by [facility] admission that they have a positive covid case and are not allowing visitors. Admissions is unsure to when they would open their visitation again. SW did discuss the cost  of transport with pt's daughter, Argelia. Argelia has also updated pt on discharge planning. SW completed a PAS. Provider has been paged for orders.     PAS-RR    D: Per DHS regulation, SW completed and submitted PAS-RR to [place] [employer]ging Direct Connect via the Senior LinkAge Line.  PAS-RR confirmation # is : [id number]    I: SW spoke with patient and family and they are aware a PAS-RR has been submitted.  SW reviewed with patient and family  that they may be contacted for a follow up appointment within 10 days of hospital discharge if their SNF stay is < 30 days.  Contact information for [facility] LinkAge Line was also provided.    A: patient and family  verbalized understanding.    P: Further questions may be directed to [facility] Line at #[phone], option #4 for PAS-RR staff.             LUISA Heard

## 2021-09-03 NOTE — PLAN OF CARE
A&O x 4, but forgetful. Hypertensive at times, otherwise VSS on room air. SBA with cane. regular diet, tolerating well. PIV removed for discharge. Former CT site WDL. Denies pain, nausea. Some COKER but recovers well. Voiding in bathroom, incontinent at times. BS active, +BM this shift x3. Adequate for discharge.     Discharge instructions reviewed with patient and printed for TCU. All questions answered. Belongings returned at discharge. Prescription medications reviewed with patient. Patient discharged to TCU via wheelchair with EMS.

## 2021-09-03 NOTE — PROGRESS NOTES
Care Management Follow Up    Length of Stay (days): 4    Expected Discharge Date: 09/03/2021     Concerns to be Addressed: discharge planning     Patient plan of care discussed at interdisciplinary rounds: Yes    Anticipated Discharge Disposition: Transitional Care  Disposition Comments: Updated therapy today, patient is improving in mobility but recommended still to go to TCU. Reviewed recommendations with patient and she is willing to go to rehab, explained that it is on the campus where she lives, [facility]. She asked that I call Argelia. I called and spoke to Argelia and she has spoken to Mary Ann at [facility] and understands the TCU is on the campus. Argelia is in agreement with TCU at [facility] and would like us to send referral. She will call and discuss with her mother also. Referral sent via standard process on  [facility]. Updated unit SW now TCU placement.  Anticipated Discharge Services: None  Anticipated Discharge DME: None    Patient/family educated on Medicare website which has current facility and service quality ratings: no  Education Provided on the Discharge Plan:  Yes  Patient/Family in Agreement with the Plan: yes, patient in agreement, daughter,Argelia,  in agreement  Referrals Placed by CM/SW:  TCU  Private pay costs discussed: Not applicable    Additional Information:  Case management following for TCU placement.     1123 followed up with patient, she spoke to Argelia and all in agreement for TCU.     Donna Medrano RN   [facility]   Phone [phone]

## 2021-09-03 NOTE — PROGRESS NOTES
THORACIC SURGERY   No ptx on this am CXR after CT removal yesterday    Will see feroz TAYLOR MD [facility] ONCOLOGY THORACIC SURGERY  CELL:  [phone]  OFFICE: [phone]

## 2021-09-03 NOTE — PLAN OF CARE
Alert and oriented x4. VSS. Diminished lung sounds. Denies SOB.Tolerating regular diet. UP with one assist; has to be reminded to use call light. Bed alarm is on when she in bed. Denies pain. To be discharged on 9/3/2021.

## 2021-09-07 NOTE — PROGRESS NOTES
Care Transitions   Received a call from [facility]. They requested to know where patient d/c'd to as she was open to their services.  Updated Rowan at [facility] that patient discharged to [facility]

## 2021-09-07 NOTE — PLAN OF CARE
Physical Therapy Discharge Summary    Reason for therapy discharge:    Discharged to transitional care facility.    Progress towards therapy goal(s). See goals on Care Plan in [facility] electronic health record for goal details.  Goals partially met.  Barriers to achieving goals:   discharge from facility.    Therapy recommendation(s):    Continued therapy is recommended.  Rationale/Recommendations:  To further increase independence with mobility.

## 2021-09-08 NOTE — LETTER
9/8/2021        RE: Yoselyn Cui  [place]  [address]  [address]  [facility] [address]        [place] [facility]  PRIMARY CARE PROVIDER AND CLINIC:  STEFFANY Yusuf CNP, [address] / [place] [address]  Chief Complaint   Patient presents with     [facility] Medical Record Number:  [id number]  Place of Service where encounter took place:  [facility] [[id number]]    Yoselyn Cui  is a 89 year old  (1/28/1932), admitted to the above facility from  [facility]. Hospital stay 8/30/21 through 9/3/21..  Admitted to this facility for  rehab, medical management and nursing care.    HPI:    HPI information obtained from: facility chart records, facility staff and patient report.   Brief Summary of Hospital Course:   Updates on Status Since Skilled nursing Admission:     Patient Yoselyn Cui is a 89 yr old female admitted to [facility] for rehabilitation s/p hospitalization [facility] 8/30-9/3/21 for pneumonia bilateral and left sided pneumothorax and s/p chest tube placement 8/31 and removal 9/2  PMHx emphysema, sick sinus syndrome and s/p pacemaker, CAD, hypertension, HLD, Afib, CVA with upper left sided weakness, low vision and dilation of pancreatic duct    TODAY  Patient states she feels much stronger and denies shortness of breath or chest pain  Reports mild cough at times  Eating and reports regular elimination  Participating in therapies  She is eager to return to [facility]      CODE STATUS/ADVANCE DIRECTIVES DISCUSSION:   DNR / DNI  Patient's living condition: lives in an assisted living facility  ALLERGIES: Patient has no known allergies.  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  PAST SURGICAL HISTORY:   has a past surgical history that includes LIGATE FALLOPIAN TUBE (1960s); appendectomy (1960s); Implant pacemaker (05/17/2016); REMV CATARACT INTRACAP,INSERT LENS (Right, 09/20/2017); REMV CATARACT EXTRACAP,INSERT LENS, W/O ECP (Left, 10/04/2017); Esophagoscopy, gastroscopy, duodenoscopy (EGD), combined (N/A, 11/20/2018); Coronary Angiogram (N/A, 7/17/2019); Left Ventriculogram (N/A, 7/17/2019); Left Heart Cath (N/A, 7/17/2019); Heart Catheterization with Possible Intervention (N/A, 7/18/2019); Percutaneous Coronary Intervention Stent Drug Eluting (N/A, 7/18/2019); and IR Chest Tube Place Non Tunneled Left (8/31/2021).  FAMILY HISTORY: family history includes Coronary Artery Disease in her brother, father, and sister.  SOCIAL HISTORY:   reports that she has never smoked. She has never used smokeless tobacco. She reports that she does not drink alcohol and does not use drugs.    Post Discharge Medication Reconciliation Status: discharge medications reconciled and changed, per note/orders    Current Outpatient Medications   Medication Sig Dispense Refill     acetaminophen (TYLENOL) 325 MG tablet Take 650 mg by mouth every 6 hours as needed for mild pain        amLODIPine (NORVASC) 5 MG tablet TAKE 1 TABLET BY MOUTH ONCE DAILY 35 tablet PRN     atorvastatin (LIPITOR) 40 MG tablet TAKE 1 TABLET BY MOUTH AT BEDTIME 35 tablet PRN     Benzocaine-Menthol (CEPACOL SORE THROAT) 15-2.6 MG LOZG Take 1 lozenge by mouth every 2 hours as needed (Sore Throat) May keep at bedside. 18 lozenge 3     carvedilol (COREG) 12.5 MG tablet Take 12.5 mg by mouth 2 times daily (with meals) (TAKE WITH 6.25MG FOR A TOTAL OF 18.75MG) **NOTE DOSAGE/STRENGTH**       carvedilol (COREG) 6.25 MG tablet  TAKE 1 TABLET BY MOUTH TWICE DAILY WITH MEALS (TAKE WITH 12.5MG FOR A TOTAL OF 18.75MG) NOTE DOSAGE/STRENGTH 70 tablet PRN     clopidogrel (PLAVIX) 75 MG tablet TAKE 1 TABLET BY MOUTH ONCE DAILY 35 tablet PRN     ELIQUIS ANTICOAGULANT 2.5 MG tablet TAKE 1 TABLET BY MOUTH TWICE DAILY 70 tablet PRN     ferrous sulfate (FE TABS) 325 (65 Fe) MG EC tablet Take 1 tablet (325 mg) by mouth Every Mon, Wed, Fri Morning 12 tablet 98     guaiFENesin (ROBITUSSIN) 100 MG/5ML liquid Take 10 mLs (200 mg) by mouth 2 times daily. May also take 10 mLs (200 mg) 2 times daily as needed for cough. 236 mL 98     ipratropium-albuterol (COMBIVENT RESPIMAT)  MCG/ACT inhaler Inhale 1 puff into the lungs 4 times daily as needed for shortness of breath / dyspnea or wheezing 4 g 11     losartan (COZAAR) 100 MG tablet Take 1 tablet (100 mg) by mouth daily 30 tablet 98     Multiple Vitamins-Minerals (SYSTANE ICAPS AREDS2) CAPS TAKE 1 CAPSULE BY MOUTH TWICE DAILY 56 capsule PRN     nitroGLYcerin (NITROSTAT) 0.4 MG sublingual tablet For chest pain place 1 tablet under the tongue every 5 minutes for 3 doses. If symptoms persist 5 minutes after 1st dose call 911. 30 tablet 0     omeprazole (PRILOSEC) 20 MG DR capsule TAKE 1 CAPSULE BY MOUTH EVERY MORNING 37 capsule PRN     oxybutynin ER (DITROPAN-XL) 10 MG 24 hr tablet Take 10 mg by mouth daily       potassium chloride ER (KLOR-CON M) 20 MEQ CR tablet TAKE 1 TABLET BY MOUTH ONCE DAILY 35 tablet PRN     senna-docusate (SENOKOT-S/PERICOLACE) 8.6-50 MG tablet Take 1 tablet by mouth daily as needed       SENNA-docusate sodium (SENNA S) 8.6-50 MG tablet Take 1 tablet by mouth At Bedtime HOLD FOR LOOSE STOOL 30 tablet 98     simethicone (MYLICON) 125 MG chewable tablet CHEW AND SWALLOW 1 TABLET BY MOUTH EVERY NIGHT AT BEDTIME 38 tablet PRN     umeclidinium (INCRUSE ELLIPTA) 62.5 MCG/INH inhaler Inhale 1 puff into the lungs daily 7 each 98     Vitamin D3 (CHOLECALCIFEROL) 25 mcg (1000 units) tablet  "Take 1 tablet (25 mcg) by mouth daily 30 tablet 98     oxybutynin ER (DITROPAN-XL) 10 MG 24 hr tablet TAKE 1 TABLET BY MOUTH ONCE DAILY (Patient taking differently: Take 5 mg by mouth daily ) 28 tablet PRN       ROS:  10 point ROS of systems including Constitutional, Eyes, Respiratory, Cardiovascular, Gastroenterology, Genitourinary, Integumentary, Musculoskeletal, Psychiatric were all negative except for pertinent positives noted in my HPI.    Vitals:  /74   Pulse 78   Temp 98  F (36.7  C)   Resp 18   Ht 1.549 m (5' 1\")   Wt 51.3 kg (113 lb)   SpO2 97%   BMI 21.35 kg/m    Exam:  GENERAL APPEARANCE:  Alert, in no distress  ENT:  Mouth and posterior oropharynx normal, moist mucous membranes  EYES:  EOM, conjunctivae, lids, pupils and irises normal  NECK:  No adenopathy,masses or thyromegaly  RESP:  respiratory effort and palpation of chest normal, lungs clear to auscultation , no respiratory distress  CV:  Palpation and auscultation of heart done , regular rate and rhythm, no murmur, rub, or gallop  ABDOMEN:  normal bowel sounds, soft, nontender, no hepatosplenomegaly or other masses  M/S:   sitting in WC  SKIN:  Inspection of skin and subcutaneous tissue trace edema bilateral LE  NEURO:   Cranial nerves 2-12 are normal tested and grossly at patient's baseline  PSYCH:  oriented X 3    Lab/Diagnostic data:  Labs done in SNF are in [place] EPIC. Please refer to them using [facility]/Care Everywhere.    ASSESSMENT/PLAN:  Pneumonia of both lower lobes due to infectious organism  Left sided pneumothorax in setting of pulmonary emphysema s/p chest tube placement by IR (8/31) & removed (9/2)  Completed course of Azithromycin and Cefdinir  Vitals stable room air  Patient states she still has wet cough at times, however, states this is much better  Continue dressing to left chest as ordered until healed  Patient denies chest pain or shortness of breath     SSS (sick sinus syndrome), s/p pacemaker  CAD   HTN " (hypertension)  Mixed hyperlipidemia  Paroxysmal atrial fibrillation   Hr controlled ~60-80  blood pressure managed  Continue current dose of Coreg, losartan, norvasc  Continue lipitor  Continue Plavix and Eliquis as okay with thoracic surgery   H/o Cerebrovascular accident   With residual left sided weakness  continue statin, asa and Plavix    Dilation of pancreatic duct  Per hospital note  Noted on CT chest Diffuse dilatation of the main pancreatic duct with approximately 2.5 cm hypoattenuating focus in the pancreatic head/uncinate process of the pancreas, indeterminate, could represent side branch  intraductal papillary mucinous neoplasm versus other pancreatic lesions. No GI symptoms  Outpatient follow up     Deconditioned  Comment: d/t recent hospitalization & comorbidity, expect delay rehabilitation d/t age & comorbidity  Walking 100ft in therapies at this time  Plan: PT/OT eval & treat, monitor     Advanced care planning  Wishes to be DNR/DNI  Reviewed with patient and daughter Argelia    Daughter Argelia updated on patient status and plan of care        Total time spent with patient visit at the skilled nursing facility was 37 min including patient visit, review of past records and phone call to patient contact. Greater than 50% of total time spent with counseling and coordinating care due to discussion on functional goals, goals of care, follow up labs, discharge planning and follow up appointments.     Electronically signed by:  STEFFANY Fritz CNP                           Sincerely,        STEFFANY Fritz CNP

## 2021-09-08 NOTE — PROGRESS NOTES
[place] [facility]  PRIMARY CARE PROVIDER AND CLINIC:  Brittany Florez, APRN CNP, [address] / [place] [address]  Chief Complaint   Patient presents with     [facility]     [place] Medical Record Number:  [id number]  Place of Service where encounter took place:  [facility] ([facility]) [[id number]]    Yoselyn Cui  is a 89 year old  (1/28/1932), admitted to the above facility from  [facility]. Hospital stay 8/30/21 through 9/3/21..  Admitted to this facility for  rehab, medical management and nursing care.    HPI:    HPI information obtained from: facility chart records, facility staff and patient report.   Brief Summary of Hospital Course:   Updates on Status Since Skilled nursing Admission:     Patient Yoselyn Cui is a 89 yr old female admitted to [facility] for rehabilitation s/p hospitalization [facility] 8/30-9/3/21 for pneumonia bilateral and left sided pneumothorax and s/p chest tube placement 8/31 and removal 9/2  PMHx emphysema, sick sinus syndrome and s/p pacemaker, CAD, hypertension, HLD, Afib, CVA with upper left sided weakness, low vision and dilation of pancreatic duct    TODAY  Patient states she feels much stronger and denies shortness of breath or chest pain  Reports mild cough at times  Eating and reports regular elimination  Participating in therapies  She is eager to return to [facility]      CODE STATUS/ADVANCE DIRECTIVES DISCUSSION:   DNR / DNI  Patient's living condition: lives in an assisted living facility  ALLERGIES: Patient has no known allergies.  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  PAST SURGICAL HISTORY:   has a past surgical history that includes LIGATE FALLOPIAN TUBE (1960s); appendectomy (1960s); Implant pacemaker (05/17/2016); REMV CATARACT INTRACAP,INSERT LENS (Right, 09/20/2017); REMV CATARACT EXTRACAP,INSERT LENS, W/O ECP (Left, 10/04/2017); Esophagoscopy, gastroscopy, duodenoscopy (EGD), combined (N/A, 11/20/2018); Coronary Angiogram (N/A, 7/17/2019); Left Ventriculogram (N/A, 7/17/2019); Left Heart Cath (N/A, 7/17/2019); Heart Catheterization with Possible Intervention (N/A, 7/18/2019); Percutaneous Coronary Intervention Stent Drug Eluting (N/A, 7/18/2019); and IR Chest Tube Place Non Tunneled Left (8/31/2021).  FAMILY HISTORY: family history includes Coronary Artery Disease in her brother, father, and sister.  SOCIAL HISTORY:   reports that she has never smoked. She has never used smokeless tobacco. She reports that she does not drink alcohol and does not use drugs.    Post Discharge Medication Reconciliation Status: discharge medications reconciled and changed, per note/orders    Current Outpatient Medications   Medication Sig Dispense Refill     acetaminophen (TYLENOL) 325 MG tablet Take 650 mg by mouth every 6 hours as needed for mild pain        amLODIPine (NORVASC) 5 MG tablet TAKE 1 TABLET BY MOUTH ONCE DAILY 35 tablet PRN     atorvastatin (LIPITOR) 40 MG tablet TAKE 1 TABLET BY MOUTH AT BEDTIME 35 tablet PRN     Benzocaine-Menthol (CEPACOL SORE THROAT) 15-2.6 MG LOZG Take 1 lozenge by mouth every 2 hours as needed (Sore Throat) May keep at bedside. 18 lozenge 3     carvedilol (COREG) 12.5 MG tablet Take 12.5 mg by mouth 2 times daily (with meals) (TAKE WITH 6.25MG FOR A TOTAL OF 18.75MG) **NOTE DOSAGE/STRENGTH**       carvedilol (COREG) 6.25 MG tablet TAKE 1 TABLET BY MOUTH TWICE DAILY WITH MEALS (TAKE WITH 12.5MG FOR A TOTAL OF 18.75MG) NOTE  DOSAGE/STRENGTH 70 tablet PRN     clopidogrel (PLAVIX) 75 MG tablet TAKE 1 TABLET BY MOUTH ONCE DAILY 35 tablet PRN     ELIQUIS ANTICOAGULANT 2.5 MG tablet TAKE 1 TABLET BY MOUTH TWICE DAILY 70 tablet PRN     ferrous sulfate (FE TABS) 325 (65 Fe) MG EC tablet Take 1 tablet (325 mg) by mouth Every Mon, Wed, Fri Morning 12 tablet 98     guaiFENesin (ROBITUSSIN) 100 MG/5ML liquid Take 10 mLs (200 mg) by mouth 2 times daily. May also take 10 mLs (200 mg) 2 times daily as needed for cough. 236 mL 98     ipratropium-albuterol (COMBIVENT RESPIMAT)  MCG/ACT inhaler Inhale 1 puff into the lungs 4 times daily as needed for shortness of breath / dyspnea or wheezing 4 g 11     losartan (COZAAR) 100 MG tablet Take 1 tablet (100 mg) by mouth daily 30 tablet 98     Multiple Vitamins-Minerals (SYSTANE ICAPS AREDS2) CAPS TAKE 1 CAPSULE BY MOUTH TWICE DAILY 56 capsule PRN     nitroGLYcerin (NITROSTAT) 0.4 MG sublingual tablet For chest pain place 1 tablet under the tongue every 5 minutes for 3 doses. If symptoms persist 5 minutes after 1st dose call 911. 30 tablet 0     omeprazole (PRILOSEC) 20 MG DR capsule TAKE 1 CAPSULE BY MOUTH EVERY MORNING 37 capsule PRN     oxybutynin ER (DITROPAN-XL) 10 MG 24 hr tablet Take 10 mg by mouth daily       potassium chloride ER (KLOR-CON M) 20 MEQ CR tablet TAKE 1 TABLET BY MOUTH ONCE DAILY 35 tablet PRN     senna-docusate (SENOKOT-S/PERICOLACE) 8.6-50 MG tablet Take 1 tablet by mouth daily as needed       SENNA-docusate sodium (SENNA S) 8.6-50 MG tablet Take 1 tablet by mouth At Bedtime HOLD FOR LOOSE STOOL 30 tablet 98     simethicone (MYLICON) 125 MG chewable tablet CHEW AND SWALLOW 1 TABLET BY MOUTH EVERY NIGHT AT BEDTIME 38 tablet PRN     umeclidinium (INCRUSE ELLIPTA) 62.5 MCG/INH inhaler Inhale 1 puff into the lungs daily 7 each 98     Vitamin D3 (CHOLECALCIFEROL) 25 mcg (1000 units) tablet Take 1 tablet (25 mcg) by mouth daily 30 tablet 98     oxybutynin ER (DITROPAN-XL) 10 MG 24 hr  "tablet TAKE 1 TABLET BY MOUTH ONCE DAILY (Patient taking differently: Take 5 mg by mouth daily ) 28 tablet PRN       ROS:  10 point ROS of systems including Constitutional, Eyes, Respiratory, Cardiovascular, Gastroenterology, Genitourinary, Integumentary, Musculoskeletal, Psychiatric were all negative except for pertinent positives noted in my HPI.    Vitals:  /74   Pulse 78   Temp 98  F (36.7  C)   Resp 18   Ht 1.549 m (5' 1\")   Wt 51.3 kg (113 lb)   SpO2 97%   BMI 21.35 kg/m    Exam:  GENERAL APPEARANCE:  Alert, in no distress  ENT:  Mouth and posterior oropharynx normal, moist mucous membranes  EYES:  EOM, conjunctivae, lids, pupils and irises normal  NECK:  No adenopathy,masses or thyromegaly  RESP:  respiratory effort and palpation of chest normal, lungs clear to auscultation , no respiratory distress  CV:  Palpation and auscultation of heart done , regular rate and rhythm, no murmur, rub, or gallop  ABDOMEN:  normal bowel sounds, soft, nontender, no hepatosplenomegaly or other masses  M/S:   sitting in WC  SKIN:  Inspection of skin and subcutaneous tissue trace edema bilateral LE  NEURO:   Cranial nerves 2-12 are normal tested and grossly at patient's baseline  PSYCH:  oriented X 3    Lab/Diagnostic data:  Labs done in SNF are in [facility] EPIC. Please refer to them using [facility]/Care Everywhere.    ASSESSMENT/PLAN:  Pneumonia of both lower lobes due to infectious organism  Left sided pneumothorax in setting of pulmonary emphysema s/p chest tube placement by IR (8/31) & removed (9/2)  Completed course of Azithromycin and Cefdinir  Vitals stable room air  Patient states she still has wet cough at times, however, states this is much better  Continue dressing to left chest as ordered until healed  Patient denies chest pain or shortness of breath     SSS (sick sinus syndrome), s/p pacemaker  CAD   HTN (hypertension)  Mixed hyperlipidemia  Paroxysmal atrial fibrillation   Hr controlled ~60-80  blood " pressure managed  Continue current dose of Coreg, losartan, norvasc  Continue lipitor  Continue Plavix and Eliquis as okay with thoracic surgery   H/o Cerebrovascular accident   With residual left sided weakness  continue statin, asa and Plavix    Dilation of pancreatic duct  Per hospital note  Noted on CT chest Diffuse dilatation of the main pancreatic duct with approximately 2.5 cm hypoattenuating focus in the pancreatic head/uncinate process of the pancreas, indeterminate, could represent side branch  intraductal papillary mucinous neoplasm versus other pancreatic lesions. No GI symptoms  Outpatient follow up     Deconditioned  Comment: d/t recent hospitalization & comorbidity, expect delay rehabilitation d/t age & comorbidity  Walking 100ft in therapies at this time  Plan: PT/OT eval & treat, monitor     Advanced care planning  Wishes to be DNR/DNI  Reviewed with patient and daughter Argelia    Daughter Argelia updated on patient status and plan of care        Total time spent with patient visit at the skilled nursing facility was 37 min including patient visit, review of past records and phone call to patient contact. Greater than 50% of total time spent with counseling and coordinating care due to discussion on functional goals, goals of care, discharge planning and treatment for pneumonia     Electronically signed by:  STEFFANY Fritz CNP

## 2021-09-13 NOTE — PROGRESS NOTES
[facility]  [place] Medical Record Number:  [id number]  Place of Service where encounter took place:  [facility] (TCU) [[id number]]  Chief Complaint   Patient presents with     Nursing Home Acute       HPI:    Yoselyn Cui  is a 89 year old (1/28/1932), who is being seen today for an episodic care visit.  HPI information obtained from: facility chart records, facility staff and patient report. Today's concern is:    Patient Yoselyn Cui is a 89 yr old female admitted to [facility] for rehabilitation s/p hospitalization [facility] 8/30-9/3/21 for pneumonia bilateral and left sided pneumothorax and s/p chest tube placement 8/31 and removal 9/2  PMHx emphysema, sick sinus syndrome and s/p pacemaker, CAD, hypertension, HLD, Afib, CVA with upper left sided weakness, low vision and dilation of pancreatic duct       Pneumonia due to infectious organism, unspecified laterality, unspecified part of lung  Pulmonary emphysema, unspecified emphysema type (H)  Dilated pancreatic duct     Denies shortness of breath or cough  States she is ready to go back to her apartment at [facility]  Progressing in therapies, walking 125ft, SLUM 21 /30  Ambulation: 125 ft cane, SBA   Transfers: SBA   SLUMS: 21/30   UE Dressing: Min A    Discussed finding of dilated pancreatic duct and if patient would like follow up with gastrointestinal specialist regarding.   She states she was not aware of this or follow up recommendations   Denies gastrointestinal upset and states she is eating       Past Medical and Surgical History reviewed in Epic today.    MEDICATIONS:    Current Outpatient Medications   Medication Sig Dispense Refill     acetaminophen (TYLENOL) 325 MG tablet Take 650 mg by mouth every 6 hours as needed for mild pain        amLODIPine (NORVASC) 5 MG tablet TAKE 1 TABLET BY MOUTH ONCE DAILY 35 tablet PRN     atorvastatin (LIPITOR) 40 MG tablet TAKE 1 TABLET BY MOUTH  AT BEDTIME 35 tablet PRN     Benzocaine-Menthol (CEPACOL SORE THROAT) 15-2.6 MG LOZG Take 1 lozenge by mouth every 2 hours as needed (Sore Throat) May keep at bedside. 18 lozenge 3     carvedilol (COREG) 12.5 MG tablet Take 12.5 mg by mouth 2 times daily (with meals) (TAKE WITH 6.25MG FOR A TOTAL OF 18.75MG) **NOTE DOSAGE/STRENGTH**       carvedilol (COREG) 6.25 MG tablet TAKE 1 TABLET BY MOUTH TWICE DAILY WITH MEALS (TAKE WITH 12.5MG FOR A TOTAL OF 18.75MG) NOTE DOSAGE/STRENGTH 70 tablet PRN     clopidogrel (PLAVIX) 75 MG tablet TAKE 1 TABLET BY MOUTH ONCE DAILY 35 tablet PRN     ELIQUIS ANTICOAGULANT 2.5 MG tablet TAKE 1 TABLET BY MOUTH TWICE DAILY 70 tablet PRN     ferrous sulfate (FE TABS) 325 (65 Fe) MG EC tablet Take 1 tablet (325 mg) by mouth Every Mon, Wed, Fri Morning 12 tablet 98     guaiFENesin (ROBITUSSIN) 100 MG/5ML liquid Take 10 mLs (200 mg) by mouth 2 times daily. May also take 10 mLs (200 mg) 2 times daily as needed for cough. 236 mL 98     ipratropium-albuterol (COMBIVENT RESPIMAT)  MCG/ACT inhaler Inhale 1 puff into the lungs 4 times daily as needed for shortness of breath / dyspnea or wheezing 4 g 11     losartan (COZAAR) 100 MG tablet Take 1 tablet (100 mg) by mouth daily 30 tablet 98     Multiple Vitamins-Minerals (SYSTANE ICAPS AREDS2) CAPS TAKE 1 CAPSULE BY MOUTH TWICE DAILY 56 capsule PRN     nitroGLYcerin (NITROSTAT) 0.4 MG sublingual tablet For chest pain place 1 tablet under the tongue every 5 minutes for 3 doses. If symptoms persist 5 minutes after 1st dose call 911. 30 tablet 0     omeprazole (PRILOSEC) 20 MG DR capsule TAKE 1 CAPSULE BY MOUTH EVERY MORNING 37 capsule PRN     oxybutynin ER (DITROPAN-XL) 10 MG 24 hr tablet Take 10 mg by mouth daily       potassium chloride ER (KLOR-CON M) 20 MEQ CR tablet TAKE 1 TABLET BY MOUTH ONCE DAILY 35 tablet PRN     senna-docusate (SENOKOT-S/PERICOLACE) 8.6-50 MG tablet Take 1 tablet by mouth daily as needed       SENNA-docusate sodium  (SENNA S) 8.6-50 MG tablet Take 1 tablet by mouth At Bedtime HOLD FOR LOOSE STOOL 30 tablet 98     simethicone (MYLICON) 125 MG chewable tablet CHEW AND SWALLOW 1 TABLET BY MOUTH EVERY NIGHT AT BEDTIME 38 tablet PRN     umeclidinium (INCRUSE ELLIPTA) 62.5 MCG/INH inhaler Inhale 1 puff into the lungs daily 7 each 98     Vitamin D3 (CHOLECALCIFEROL) 25 mcg (1000 units) tablet Take 1 tablet (25 mcg) by mouth daily 30 tablet 98         REVIEW OF SYSTEMS:  4 point ROS including Respiratory, CV, GI and , other than that noted in the HPI,  is negative    Objective:  BP (!) 152/83   Pulse 78   Temp 97.4  F (36.3  C)   Resp 18   Ht 1.524 m (5')   Wt 51.3 kg (113 lb)   SpO2 93%   BMI 22.07 kg/m    Exam:  GENERAL APPEARANCE:  Alert, in no distress  RESP:  respiratory effort and palpation of chest normal, lungs clear to auscultation , no respiratory distress  CV:  Palpation and auscultation of heart done , regular rate and rhythm, no murmur, rub, or gallop  ABDOMEN:  normal bowel sounds, soft, nontender, no hepatosplenomegaly or other masses  M/S:   Gait and station normal  PSYCH:  oriented X 3    Labs:   Labs done in SNF are in [place] EPIC. Please refer to them using [employer]/Care Everywhere.    ASSESSMENT/PLAN:     Pneumonia due to infectious organism, unspecified laterality, unspecified part of lung  Pulmonary emphysema, unspecified emphysema type (H)  Dilated pancreatic duct     Denies shortness of breath or cough  States she is ready to go back to her apartment at [facility]  Progressing in therapies, walking 125ft, SLUM 21 /30  Ambulation: 125 ft cane, SBA   Transfers: SBA   SLUMS: 21/30   UE Dressing: Min A  Has care conference on Wednesday patient reports to discuss care planning for home  Therapies thinking 1 more week to optimize physical function    Discussed finding of dilated pancreatic duct and if patient would like follow up with gastrointestinal specialist regarding.   She states she  was not aware of this or follow up recommendations   Denies gastrointestinal upset and states she is eating   She is okay with discussing this further with her daughter Maryjane sometime this week        Electronically signed by:  STEFFANY Fritz CNP

## 2021-09-17 NOTE — PROGRESS NOTES
[facility]  [place] Medical Record Number:  [id number]  Place of Service where encounter took place:  [facility] (TCU) [[id number]]  Chief Complaint   Patient presents with     Nursing Home Acute       HPI:    Yoselyn Cui  is a 89 year old (1/28/1932), who is being seen today for an episodic care visit.  HPI information obtained from: facility chart records, facility staff and patient report. Today's concern is:    Patient Yoselyn Cui is a 89 yr old female admitted to [facility] for rehabilitation s/p hospitalization [facility] 8/30-9/3/21 for pneumonia bilateral and left sided pneumothorax and s/p chest tube placement 8/31 and removal 9/2  PMHx emphysema, sick sinus syndrome and s/p pacemaker, CAD, hypertension, HLD, Afib, CVA with upper left sided weakness, low vision and dilation of pancreatic duct       Diarrhea, unspecified type  Acute renal failure, unspecified acute renal failure type (H)  Leukocytosis, unspecified type     Patient developed nausea and vomiting and diarrhea this with decrease oral intake  Staff report syncope episode while have bowel movement yesterday   Denies gastrointestinal pain  Cr elevated 1.53, leukocytosis with WBC 18.7  Denies shortness of breath or cough or chest pain  Participating in therapies per usual and wishes to return to [facility]      Past Medical and Surgical History reviewed in Epic today.    MEDICATIONS:    Current Outpatient Medications   Medication Sig Dispense Refill     acetaminophen (TYLENOL) 325 MG tablet Take 650 mg by mouth every 6 hours as needed for mild pain        atorvastatin (LIPITOR) 40 MG tablet TAKE 1 TABLET BY MOUTH AT BEDTIME 35 tablet PRN     carvedilol (COREG) 12.5 MG tablet Take 12.5 mg by mouth 2 times daily (with meals) (TAKE WITH 6.25MG FOR A TOTAL OF 18.75MG) **NOTE DOSAGE/STRENGTH**       carvedilol (COREG) 6.25 MG tablet TAKE 1 TABLET BY MOUTH TWICE DAILY WITH MEALS (TAKE WITH 12.5MG FOR A TOTAL  OF 18.75MG) **NOTE DOSAGE/STRENGTH** 70 tablet PRN     clopidogrel (PLAVIX) 75 MG tablet TAKE 1 TABLET BY MOUTH ONCE DAILY 35 tablet PRN     ELIQUIS ANTICOAGULANT 2.5 MG tablet TAKE 1 TABLET BY MOUTH TWICE DAILY 70 tablet PRN     ferrous sulfate (FE TABS) 325 (65 Fe) MG EC tablet Take 1 tablet (325 mg) by mouth Every Mon, Wed, Fri Morning 12 tablet 98     ipratropium-albuterol (COMBIVENT RESPIMAT)  MCG/ACT inhaler Inhale 1 puff into the lungs 4 times daily as needed for shortness of breath / dyspnea or wheezing 4 g 11     Multiple Vitamins-Minerals (SYSTANE ICAPS AREDS2) CAPS TAKE 1 CAPSULE BY MOUTH TWICE DAILY 56 capsule PRN     nitroGLYcerin (NITROSTAT) 0.4 MG sublingual tablet For chest pain place 1 tablet under the tongue every 5 minutes for 3 doses. If symptoms persist 5 minutes after 1st dose call 911. 30 tablet 0     omeprazole (PRILOSEC) 20 MG DR capsule TAKE 1 CAPSULE BY MOUTH EVERY MORNING 37 capsule PRN     ondansetron (ZOFRAN) 4 MG tablet Take 4 mg by mouth every 6 hours as needed for nausea       oxybutynin ER (DITROPAN-XL) 10 MG 24 hr tablet Take 10 mg by mouth daily       potassium chloride ER (KLOR-CON M) 20 MEQ CR tablet TAKE 1 TABLET BY MOUTH ONCE DAILY 35 tablet PRN     simethicone (MYLICON) 125 MG chewable tablet CHEW AND SWALLOW 1 TABLET BY MOUTH EVERY NIGHT AT BEDTIME 38 tablet PRN     umeclidinium (INCRUSE ELLIPTA) 62.5 MCG/INH inhaler Inhale 1 puff into the lungs daily 7 each 98     Vitamin D3 (CHOLECALCIFEROL) 25 mcg (1000 units) tablet Take 1 tablet (25 mcg) by mouth daily 30 tablet 98     losartan (COZAAR) 100 MG tablet Take 1 tablet (100 mg) by mouth daily (Patient not taking: Reported on 9/17/2021) 30 tablet 98         REVIEW OF SYSTEMS:  4 point ROS including Respiratory, CV, GI and , other than that noted in the HPI,  is negative    Objective:  /54   Pulse 68   Temp 97  F (36.1  C)   Resp 18   Ht 1.524 m (5')   Wt 51.3 kg (113 lb)   SpO2 93%   BMI 22.07 kg/m     Exam:  GENERAL APPEARANCE:  Alert, in no distress  RESP:  respiratory effort and palpation of chest normal, lungs clear to auscultation , no respiratory distress  CV:  Palpation and auscultation of heart done , regular rate and rhythm, no murmur, rub, or gallop  ABDOMEN:  normal bowel sounds, soft, nontender, no hepatosplenomegaly or other masses  M/S:   sitting in WC  SKIN:  Inspection of skin and subcutaneous tissue trace edema bilateral LE   NEURO:   Cranial nerves 2-12 are normal tested and grossly at patient's baseline  PSYCH:  oriented X 3    Labs:   Labs done in SNF are in [place] EPIC. Please refer to them using [employer]/Care Everywhere.    ASSESSMENT/PLAN:     Diarrhea, unspecified type  Acute renal failure, unspecified acute renal failure type (H)  Leukocytosis, unspecified type     Patient developed nausea and vomiting and diarrhea this with decrease oral intake  Denies gastrointestinal pain  Cr elevated 1.53, leukocytosis with WBC 18.7  CMP,lipase and amylase noted with no acute concerns other then Cr and WBC  Reviewed with   Plan to HOLD Losartan  HOLD Coreg for SBP <110  Will stop Norvasc due to low blood pressure  Check ortho blood pressure 2 x daily x3days, and updated therapies on patient status and to monitor blood pressure   Start NS 50cc/hr x 1 liter, follow up BMP tomorrow  BMP,CBC 9/18 and 9/20/21  Discontinue Senna S  Pending stool sample for cdiff  Encourage adequate fluid intake, monitor   Dietary referral due to decrease oral intake    Follow up gastrointestinal regarding dilated pancreatic duct, this not thought to be the cause of gastrointestinal upset at this time. Greater concern for Cdiff. Pending results for stool sample    Denies shortness of breath or cough or chest pain  Participating in therapies per usual and wishes to return to [facility]    Med review: discontinue lozenges and cough syrup due to no longer using    Call out to daughter Maryjane for update      Electronically signed  by:  STEFFANY Fritz CNP

## 2021-09-17 NOTE — PROGRESS NOTES
[facility]  PRIMARY CARE PROVIDER AND CLINIC:  Brittany Florez, APRN CNP, [address] / [place] [address]      Pt was seen by Dr Menjivar at the [facility] on 9/14/21 for an initial TCU visit.      Pt  is a 89 year old  (1/28/1932), admitted to the above facility from  [facility]. Hospital stay 8/30/21 through 9/3/21..  Admitted to this facility for  rehab, medical management and nursing care.    Hospital course was reviewed by me, is as per the hospital discharge summary.    Pt is a 89 yr old female admitted to [facility] for rehabilitation s/p hospitalization [facility] 8/30-9/3/21 for bilateral pneumonia. Pt underwent L sided chest tube placement for pneumothorax. Pt was found to have a dilated pancreatic duct on CT Abd; etiology unclear        PMHx emphysema, sick sinus syndrome and s/p pacemaker, CAD, hypertension, HLD, Afib, CVA with L UE weakness.    Pt reports feeling well, states breathing is much improved. She denies cough, chest pain, SOB,  Appetite is good.  She is progressing in therapies, is anxious to discharge to [place]          CODE STATUS/ADVANCE DIRECTIVES DISCUSSION:   DNR / DNI  Patient's living condition: lives in an assisted living facility  ALLERGIES: Patient has no known allergies.  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  PAST SURGICAL HISTORY:   has a past surgical history that includes LIGATE FALLOPIAN TUBE (1960s); appendectomy (1960s); Implant pacemaker (05/17/2016); REMV  CATARACT INTRACAP,INSERT LENS (Right, 09/20/2017); REMV CATARACT EXTRACAP,INSERT LENS, W/O ECP (Left, 10/04/2017); Esophagoscopy, gastroscopy, duodenoscopy (EGD), combined (N/A, 11/20/2018); Coronary Angiogram (N/A, 7/17/2019); Left Ventriculogram (N/A, 7/17/2019); Left Heart Cath (N/A, 7/17/2019); Heart Catheterization with Possible Intervention (N/A, 7/18/2019); Percutaneous Coronary Intervention Stent Drug Eluting (N/A, 7/18/2019); and IR Chest Tube Place Non Tunneled Left (8/31/2021).  FAMILY HISTORY: family history includes Coronary Artery Disease in her brother, father, and sister.  SOCIAL HISTORY:   reports that she has never smoked. She has never used smokeless tobacco. She reports that she does not drink alcohol and does not use drugs.      Current Outpatient Medications   Medication Sig Dispense Refill     acetaminophen (TYLENOL) 325 MG tablet Take 650 mg by mouth every 6 hours as needed for mild pain        amLODIPine (NORVASC) 5 MG tablet TAKE 1 TABLET BY MOUTH ONCE DAILY 35 tablet PRN     atorvastatin (LIPITOR) 40 MG tablet TAKE 1 TABLET BY MOUTH AT BEDTIME 35 tablet PRN     Benzocaine-Menthol (CEPACOL SORE THROAT) 15-2.6 MG LOZG Take 1 lozenge by mouth every 2 hours as needed (Sore Throat) May keep at bedside. 18 lozenge 3     carvedilol (COREG) 12.5 MG tablet Take 12.5 mg by mouth 2 times daily (with meals) (TAKE WITH 6.25MG FOR A TOTAL OF 18.75MG) **NOTE DOSAGE/STRENGTH**       carvedilol (COREG) 6.25 MG tablet TAKE 1 TABLET BY MOUTH TWICE DAILY WITH MEALS (TAKE WITH 12.5MG FOR A TOTAL OF 18.75MG) NOTE DOSAGE/STRENGTH 70 tablet PRN     clopidogrel (PLAVIX) 75 MG tablet TAKE 1 TABLET BY MOUTH ONCE DAILY 35 tablet PRN     ELIQUIS ANTICOAGULANT 2.5 MG tablet TAKE 1 TABLET BY MOUTH TWICE DAILY 70 tablet PRN     ferrous sulfate (FE TABS) 325 (65 Fe) MG EC tablet Take 1 tablet (325 mg) by mouth Every Mon, Wed, Fri Morning 12 tablet 98     guaiFENesin (ROBITUSSIN) 100 MG/5ML liquid Take 10 mLs (200 mg) by  mouth 2 times daily. May also take 10 mLs (200 mg) 2 times daily as needed for cough. 236 mL 98     ipratropium-albuterol (COMBIVENT RESPIMAT)  MCG/ACT inhaler Inhale 1 puff into the lungs 4 times daily as needed for shortness of breath / dyspnea or wheezing 4 g 11     losartan (COZAAR) 100 MG tablet Take 1 tablet (100 mg) by mouth daily 30 tablet 98     Multiple Vitamins-Minerals (SYSTANE ICAPS AREDS2) CAPS TAKE 1 CAPSULE BY MOUTH TWICE DAILY 56 capsule PRN     nitroGLYcerin (NITROSTAT) 0.4 MG sublingual tablet For chest pain place 1 tablet under the tongue every 5 minutes for 3 doses. If symptoms persist 5 minutes after 1st dose call 911. 30 tablet 0     omeprazole (PRILOSEC) 20 MG DR capsule TAKE 1 CAPSULE BY MOUTH EVERY MORNING 37 capsule PRN     oxybutynin ER (DITROPAN-XL) 10 MG 24 hr tablet Take 10 mg by mouth daily       potassium chloride ER (KLOR-CON M) 20 MEQ CR tablet TAKE 1 TABLET BY MOUTH ONCE DAILY 35 tablet PRN     senna-docusate (SENOKOT-S/PERICOLACE) 8.6-50 MG tablet Take 1 tablet by mouth daily as needed       SENNA-docusate sodium (SENNA S) 8.6-50 MG tablet Take 1 tablet by mouth At Bedtime HOLD FOR LOOSE STOOL 30 tablet 98     simethicone (MYLICON) 125 MG chewable tablet CHEW AND SWALLOW 1 TABLET BY MOUTH EVERY NIGHT AT BEDTIME 38 tablet PRN     umeclidinium (INCRUSE ELLIPTA) 62.5 MCG/INH inhaler Inhale 1 puff into the lungs daily 7 each 98     Vitamin D3 (CHOLECALCIFEROL) 25 mcg (1000 units) tablet Take 1 tablet (25 mcg) by mouth daily 30 tablet 98     oxybutynin ER (DITROPAN-XL) 10 MG 24 hr tablet TAKE 1 TABLET BY MOUTH ONCE DAILY (Patient taking differently: Take 5 mg by mouth daily ) 28 tablet PRN       ROS:  10 point ROS neg except as noted above        Exam:  GENERAL APPEARANCE:  Alert, in no distress, thin appearing, very pleasant  ENT:  Oral mucosa moist  EYES:  No eye redness or drainage  NECK:  supple  RESP:  RR 12, lungs clear, BS sl diminished B  CV:  RRR  ABDOMEN:  Soft,  non-tender, non-distended.  M/S:  No LE edema  SKIN:  Bruising both arms  NEURO:  Alert, fully oriented, CN intact  No focal weakness      ASSESSMENT/PLAN:    Pneumonia of both lower lobes due to infectious organism  Left sided pneumothorax in setting of pulmonary emphysema s/p chest tube placement by IR (8/31) & removed (9/2)  S/p antibiotic course  Respiratory status continues to improve  Plan monitor resp status, continue therapies    SSS (sick sinus syndrome), s/p pacemaker  CAD   HTN (hypertension)  Paroxysmal atrial fibrillation   CV status stable  Plan continue current tx  Monitor vitals;    H/o Cerebrovascular accident   Continue statin, plavix      Dilation of pancreatic duct.  Asymptomatic  Etiology unclear  GI f/u      Wally Menjivar MD

## 2021-09-18 NOTE — TELEPHONE ENCOUNTER
[facility] TRIAGE ENCOUNTER    Chief Complaint   Patient presents with     IV infiltration       Yoselyn Cui is a 89 year old  (1/28/1932), Nurse called today to report: IV filtrated this AM. Eating and drinking as normal. Positive for c diff    ASSESSMENT/PLAN    Encourage fluids    IV filtration, elevate arm, warm compress BID and monitor closely    Vancomycin oral 125 mg every 6 hours for 10 days.     Electronically signed by:   Brittany Kilpatrick NP

## 2021-09-20 NOTE — PROGRESS NOTES
[facility] GERIATRIC SERVICES  [place] Medical Record Number: [id number]  Place of Service where encounter took place: [facility] (TCU) [[id number]]  Chief Complaint   Patient presents with     RECHECK     HPI:    Yoselyn Cui is a 89 year old (1/28/1932), who is being seen today for an episodic care visit. HPI information obtained from: facility chart records, facility staff and patient report. Today's concern is:      Patient Yoselyn Cui is a 89 yr old female admitted to [facility] (formerly [facility]) for rehabilitation s/p hospitalization [facility] 8/30-9/3/21 for pneumonia bilateral and left sided pneumothorax and s/p chest tube placement 8/31 and removal 9/2  PMHx emphysema, sick sinus syndrome and s/p pacemaker, CAD, hypertension, HLD, Afib, CVA with upper left sided weakness, low vision and dilation of pancreatic duct       Clostridium difficile diarrhea  Acute renal failure, unspecified acute renal failure type (H)  Leukocytosis, unspecified type  Intravenous infiltration, initial encounter     Started on vancomycin for Cdiff gastrointestinal infection  Recent treatment with antibiotics for pneumonia  Less gastrointestinal signs and symptoms today and eating breakfast  Received IV fluid this weekend with improved status of KADI and WBC trend down  Holding Losartan due to continue to treat with Cdiff with recent KADI, blood pressure fairly stable  Participating in therapies and patient states she feels good today     Past Medical and Surgical History reviewed in Epic today.    MEDICATIONS:  Current Outpatient Medications   Medication Sig Dispense Refill     acetaminophen (TYLENOL) 325 MG tablet Take 650 mg by mouth every 6 hours as needed for mild pain        atorvastatin (LIPITOR) 40 MG tablet TAKE 1 TABLET BY MOUTH AT BEDTIME 35 tablet PRN     carvedilol (COREG) 12.5 MG tablet Take 12.5 mg by mouth 2 times daily (with meals) (TAKE WITH 6.25MG FOR A TOTAL OF 18.75MG) **NOTE  DOSAGE/STRENGTH**       carvedilol (COREG) 6.25 MG tablet TAKE 1 TABLET BY MOUTH TWICE DAILY WITH MEALS (TAKE WITH 12.5MG FOR A TOTAL OF 18.75MG) *NOTE DOSAGE/STRENGTH 70 tablet PRN     clopidogrel (PLAVIX) 75 MG tablet TAKE 1 TABLET BY MOUTH ONCE DAILY 35 tablet PRN     ELIQUIS ANTICOAGULANT 2.5 MG tablet TAKE 1 TABLET BY MOUTH TWICE DAILY 70 tablet PRN     ferrous sulfate (FE TABS) 325 (65 Fe) MG EC tablet Take 1 tablet (325 mg) by mouth Every Mon, Wed, Fri Morning 12 tablet 98     ipratropium-albuterol (COMBIVENT RESPIMAT)  MCG/ACT inhaler Inhale 1 puff into the lungs 4 times daily as needed for shortness of breath / dyspnea or wheezing 4 g 11     losartan (COZAAR) 100 MG tablet Take 1 tablet (100 mg) by mouth daily (Patient not taking: Reported on 9/17/2021) 30 tablet 98     Multiple Vitamins-Minerals (SYSTANE ICAPS AREDS2) CAPS TAKE 1 CAPSULE BY MOUTH TWICE DAILY 56 capsule PRN     nitroGLYcerin (NITROSTAT) 0.4 MG sublingual tablet For chest pain place 1 tablet under the tongue every 5 minutes for 3 doses. If symptoms persist 5 minutes after 1st dose call 911. 30 tablet 0     omeprazole (PRILOSEC) 20 MG DR capsule TAKE 1 CAPSULE BY MOUTH EVERY MORNING 37 capsule PRN     ondansetron (ZOFRAN) 4 MG tablet Take 4 mg by mouth every 6 hours as needed for nausea       oxybutynin ER (DITROPAN-XL) 10 MG 24 hr tablet Take 10 mg by mouth daily       potassium chloride ER (KLOR-CON M) 20 MEQ CR tablet TAKE 1 TABLET BY MOUTH ONCE DAILY 35 tablet PRN     simethicone (MYLICON) 125 MG chewable tablet CHEW AND SWALLOW 1 TABLET BY MOUTH EVERY NIGHT AT BEDTIME 38 tablet PRN     umeclidinium (INCRUSE ELLIPTA) 62.5 MCG/INH inhaler Inhale 1 puff into the lungs daily 7 each 98     Vitamin D3 (CHOLECALCIFEROL) 25 mcg (1000 units) tablet Take 1 tablet (25 mcg) by mouth daily 30 tablet 98     REVIEW OF SYSTEMS:  4 point ROS including Respiratory, CV, GI and , other than that noted in the HPI,  is negative    Objective:  BP (!)  147/76   Pulse 90   Temp 97.1  F (36.2  C)   Resp 17   Ht 1.524 m (5')   Wt 51.3 kg (113 lb)   SpO2 95%   BMI 22.07 kg/m    Exam:  GENERAL APPEARANCE:  Alert, in no distress  RESP:  respiratory effort and palpation of chest normal, lungs clear to auscultation , no respiratory distress  CV:  Palpation and auscultation of heart done , regular rate and rhythm, no murmur, rub, or gallop  ABDOMEN:  normal bowel sounds, soft, nontender, no hepatosplenomegaly or other masses  M/S:   sitting in WC  SKIN:  Inspection of skin and subcutaneous tissue IV in right arm, no edema noted today or erythema  PSYCH:  oriented X 3    Labs:   Labs done in SNF are in [place] EPIC. Please refer to them using [employer] Everywhere.    ASSESSMENT/PLAN:     Clostridium difficile diarrhea  Acute renal failure, unspecified acute renal failure type (H)  Leukocytosis, unspecified type  Intravenous infiltration, initial encounter     Started on vancomycin for Cdiff gastrointestinal infection  Plan to continue course of vancomycin  Recent treatment with antibiotics for pneumonia  Vitals stable room air   Less gastrointestinal signs and symptoms today and eating breakfast  monitor   Received IV fluid this weekend with improved status of KADI and WBC trend down  Holding Losartan due to continue to treat with Cdiff with recent KADI, blood pressure fairly stable  Will continue to HOLD Losartan x5 more days  Pending BMP today   Participating in therapies and patient states she feels good today   Continue therapies   involved in safe plan home   Updated daughter Maryjane on patient status     Update:  Potassium 3.2 today  Order increase KCL 30meq daily  Repeat BMP,CBC 9/24/21      Electronically signed by:  STEFFANY Fritz CNP

## 2021-09-24 NOTE — PROGRESS NOTES
[place] GERIATRIC SERVICES  [place] Medical Record Number:  [id number]  Place of Service where encounter took place:  [facility] (TCU) [[id number]]  Chief Complaint   Patient presents with     Nursing Home Acute       HPI:    Yoselyn Cui  is a 89 year old (1/28/1932), who is being seen today for an episodic care visit.  HPI information obtained from: facility chart records, facility staff and patient report. Today's concern is:    Patient Yoselyn Cui is a 89 yr old female admitted to [facility] for rehabilitation s/p hospitalization [facility] 8/30-9/3/21 for pneumonia bilateral and left sided pneumothorax and s/p chest tube placement 8/31 and removal 9/2  PMHx emphysema, sick sinus syndrome and s/p pacemaker, CAD, hypertension, HLD, Afib, CVA with upper left sided weakness, low vision and dilation of pancreatic duct       Clostridium difficile diarrhea  Acute renal failure, unspecified acute renal failure type (H)  Leukocytosis, unspecified type     Patient report diarrhea resolving. Remains on Vancomycin  Eating and feeling better  Labs stable, BMP improve  blood pressure trend back up and Losartan to be restarted this weekend      Past Medical and Surgical History reviewed in Epic today.    MEDICATIONS:    Current Outpatient Medications   Medication Sig Dispense Refill     acetaminophen (TYLENOL) 325 MG tablet Take 650 mg by mouth every 6 hours as needed for mild pain        atorvastatin (LIPITOR) 40 MG tablet TAKE 1 TABLET BY MOUTH AT BEDTIME 35 tablet PRN     carvedilol (COREG) 12.5 MG tablet Take 12.5 mg by mouth 2 times daily (with meals) (TAKE WITH 6.25MG FOR A TOTAL OF 18.75MG) **NOTE DOSAGE/STRENGTH**       carvedilol (COREG) 6.25 MG tablet TAKE 1 TABLET BY MOUTH TWICE DAILY WITH MEALS (TAKE WITH 12.5MG FOR A TOTAL OF 18.75MG) **NOTE DOSAGE/STRENGTH** 70 tablet PRN     clopidogrel (PLAVIX) 75 MG tablet TAKE 1 TABLET BY MOUTH ONCE DAILY 35 tablet PRN     ELIQUIS  ANTICOAGULANT 2.5 MG tablet TAKE 1 TABLET BY MOUTH TWICE DAILY 70 tablet PRN     ferrous sulfate (FE TABS) 325 (65 Fe) MG EC tablet Take 1 tablet (325 mg) by mouth Every Mon, Wed, Fri Morning 12 tablet 98     ipratropium-albuterol (COMBIVENT RESPIMAT)  MCG/ACT inhaler Inhale 1 puff into the lungs 4 times daily as needed for shortness of breath / dyspnea or wheezing 4 g 11     losartan (COZAAR) 100 MG tablet Take 1 tablet (100 mg) by mouth daily (Patient not taking: Reported on 9/20/2021) 30 tablet 98     Multiple Vitamins-Minerals (SYSTANE ICAPS AREDS2) CAPS TAKE 1 CAPSULE BY MOUTH TWICE DAILY 56 capsule PRN     nitroGLYcerin (NITROSTAT) 0.4 MG sublingual tablet For chest pain place 1 tablet under the tongue every 5 minutes for 3 doses. If symptoms persist 5 minutes after 1st dose call 911. 30 tablet 0     omeprazole (PRILOSEC) 20 MG DR capsule TAKE 1 CAPSULE BY MOUTH EVERY MORNING 37 capsule PRN     ondansetron (ZOFRAN) 4 MG tablet Take 4 mg by mouth every 6 hours as needed for nausea       oxybutynin ER (DITROPAN-XL) 10 MG 24 hr tablet Take 10 mg by mouth daily       POTASSIUM CHLORIDE PO Take 30 mEq by mouth daily       simethicone (MYLICON) 125 MG chewable tablet CHEW AND SWALLOW 1 TABLET BY MOUTH EVERY NIGHT AT BEDTIME 38 tablet PRN     umeclidinium (INCRUSE ELLIPTA) 62.5 MCG/INH inhaler Inhale 1 puff into the lungs daily 7 each 98     vancomycin (VANCOCIN) 125 MG capsule Take 125 mg by mouth 4 times daily       Vitamin D3 (CHOLECALCIFEROL) 25 mcg (1000 units) tablet Take 1 tablet (25 mcg) by mouth daily 30 tablet 98         REVIEW OF SYSTEMS:  4 point ROS including Respiratory, CV, GI and , other than that noted in the HPI,  is negative    Objective:  BP (!) 160/68   Pulse 74   Temp (!) 96.4  F (35.8  C)   Resp 18   Ht 1.524 m (5')   Wt 51.3 kg (113 lb)   SpO2 95%   BMI 22.07 kg/m    Exam:  GENERAL APPEARANCE:  Alert, in no distress  RESP:  respiratory effort and palpation of chest normal, lungs  clear to auscultation , no respiratory distress  CV:  Palpation and auscultation of heart done , regular rate and rhythm, no murmur, rub, or gallop  ABDOMEN:  normal bowel sounds, soft, nontender, no hepatosplenomegaly or other masses  M/S:   sitting in WC  PSYCH:  oriented X 3    Labs:   Labs done in SNF are in [place] EPIC. Please refer to them using [employer] Everywhere.     Lab Results   Component Value Date    WBC 9.0 09/24/2021    WBC 5.8 04/15/2021     Lab Results   Component Value Date    RBC 4.35 09/24/2021    RBC 3.83 04/15/2021     Lab Results   Component Value Date    HGB 11.9 09/24/2021    HGB 11.0 04/15/2021     Lab Results   Component Value Date    HCT 37.9 09/24/2021    HCT 34.9 04/15/2021     No components found for: MCT  Lab Results   Component Value Date    MCV 87 09/24/2021    MCV 91 04/15/2021     Lab Results   Component Value Date    MCH 27.4 09/24/2021    MCH 28.7 04/15/2021     Lab Results   Component Value Date    MCHC 31.4 09/24/2021    MCHC 31.5 04/15/2021     Lab Results   Component Value Date    RDW 14.8 09/24/2021    RDW 14.5 04/15/2021     Lab Results   Component Value Date     09/24/2021     04/15/2021       Last Comprehensive Metabolic Panel:  Sodium   Date Value Ref Range Status   09/23/2021 140 133 - 144 mmol/L Final   04/28/2021 133 133 - 144 mmol/L Final     Potassium   Date Value Ref Range Status   09/23/2021 3.6 3.4 - 5.3 mmol/L Final   04/28/2021 4.3 3.4 - 5.3 mmol/L Final     Chloride   Date Value Ref Range Status   09/23/2021 106 94 - 109 mmol/L Final   04/28/2021 101 94 - 109 mmol/L Final     Carbon Dioxide   Date Value Ref Range Status   04/28/2021 27 20 - 32 mmol/L Final     Carbon Dioxide (CO2)   Date Value Ref Range Status   09/23/2021 26 20 - 32 mmol/L Final     Anion Gap   Date Value Ref Range Status   09/23/2021 8 3 - 14 mmol/L Final   04/28/2021 5 3 - 14 mmol/L Final     Glucose   Date Value Ref Range Status   09/23/2021 86 70 - 99 mg/dL Final    04/28/2021 99 70 - 99 mg/dL Final     Comment:     Fasting specimen     Urea Nitrogen   Date Value Ref Range Status   09/23/2021 5 (L) 7 - 30 mg/dL Final   04/28/2021 13 7 - 30 mg/dL Final     Creatinine   Date Value Ref Range Status   09/23/2021 0.72 0.52 - 1.04 mg/dL Final   04/28/2021 0.75 0.52 - 1.04 mg/dL Final     GFR Estimate   Date Value Ref Range Status   09/23/2021 74 >60 mL/min/1.73m2 Final     Comment:     As of July 11, 2021, eGFR is calculated by the CKD-EPI creatinine equation, without race adjustment. eGFR can be influenced by muscle mass, exercise, and diet. The reported eGFR is an estimation only and is only applicable if the renal function is stable.   04/28/2021 71 >60 mL/min/[1.73_m2] Final     Comment:     Non  GFR Calc  Starting 12/18/2018, serum creatinine based estimated GFR (eGFR) will be   calculated using the Chronic Kidney Disease Epidemiology Collaboration   (CKD-EPI) equation.       Calcium   Date Value Ref Range Status   09/23/2021 8.5 8.5 - 10.1 mg/dL Final   04/28/2021 8.7 8.5 - 10.1 mg/dL Final         ASSESSMENT/PLAN:     Clostridium difficile diarrhea  Acute renal failure, unspecified acute renal failure type (H)  Leukocytosis, unspecified type     Patient report diarrhea resolving. Remains on Vancomycin, will complete course  Eating and feeling better  Labs stable, BMP improve, monitor. WBC within normal limits   blood pressure trend back up and Losartan to be restarted this weekend          Electronically signed by:  STEFFANY Fritz CNP

## 2021-09-27 NOTE — PROGRESS NOTES
[place] GERIATRIC SERVICES  [place] Medical Record Number:  [id number]  Place of Service where encounter took place:  [facility] (TCU) [[id number]]  Chief Complaint   Patient presents with     Nursing Home Acute       HPI:    Yoselyn Cui  is a 89 year old (1/28/1932), who is being seen today for an episodic care visit.  HPI information obtained from: facility chart records, facility staff and patient report. Today's concern is:    Patient Yoselyn Cui is a 89 yr old female admitted to [facility] for rehabilitation s/p hospitalization [facility] 8/30-9/3/21 for pneumonia bilateral and left sided pneumothorax and s/p chest tube placement 8/31 and removal 9/2  PMHx emphysema, sick sinus syndrome and s/p pacemaker, CAD, hypertension, HLD, Afib, CVA with upper left sided weakness, low vision and dilation of pancreatic duct       Clostridium difficile diarrhea  Generalized muscle weakness  Pulmonary emphysema, unspecified emphysema type (H)     Reports no loose stools past week. Completes course of vancomycin tomorrow  Progressing in therapies, Walking 52 ft with cane. Unstable at times with cane. Patient and  report patient gets escorts to meals at [facility] 21/30.   Denies shortness of breath or chest pain   Reports she is eating and staff lyndon eating ~50-75% meals      Past Medical and Surgical History reviewed in Epic today.    MEDICATIONS:    Current Outpatient Medications   Medication Sig Dispense Refill     acetaminophen (TYLENOL) 325 MG tablet Take 650 mg by mouth every 6 hours as needed for mild pain        atorvastatin (LIPITOR) 40 MG tablet TAKE 1 TABLET BY MOUTH AT BEDTIME 35 tablet PRN     carvedilol (COREG) 12.5 MG tablet Take 12.5 mg by mouth 2 times daily (with meals) (TAKE WITH 6.25MG FOR A TOTAL OF 18.75MG) **NOTE DOSAGE/STRENGTH**       carvedilol (COREG) 6.25 MG tablet TAKE 1 TABLET BY MOUTH TWICE DAILY WITH MEALS (TAKE WITH 12.5MG FOR A TOTAL OF  18.75MG) **NOTE DOSAGE/STRENGTH 70 tablet PRN     clopidogrel (PLAVIX) 75 MG tablet TAKE 1 TABLET BY MOUTH ONCE DAILY 35 tablet PRN     ELIQUIS ANTICOAGULANT 2.5 MG tablet TAKE 1 TABLET BY MOUTH TWICE DAILY 70 tablet PRN     ferrous sulfate (FE TABS) 325 (65 Fe) MG EC tablet Take 1 tablet (325 mg) by mouth Every Mon, Wed, Fri Morning 12 tablet 98     ipratropium-albuterol (COMBIVENT RESPIMAT)  MCG/ACT inhaler Inhale 1 puff into the lungs 4 times daily as needed for shortness of breath / dyspnea or wheezing 4 g 11     losartan (COZAAR) 100 MG tablet Take 1 tablet (100 mg) by mouth daily 30 tablet 98     Multiple Vitamins-Minerals (SYSTANE ICAPS AREDS2) CAPS TAKE 1 CAPSULE BY MOUTH TWICE DAILY 56 capsule PRN     nitroGLYcerin (NITROSTAT) 0.4 MG sublingual tablet For chest pain place 1 tablet under the tongue every 5 minutes for 3 doses. If symptoms persist 5 minutes after 1st dose call 911. 30 tablet 0     omeprazole (PRILOSEC) 20 MG DR capsule TAKE 1 CAPSULE BY MOUTH EVERY MORNING 37 capsule PRN     ondansetron (ZOFRAN) 4 MG tablet Take 4 mg by mouth every 6 hours as needed for nausea       oxybutynin ER (DITROPAN-XL) 10 MG 24 hr tablet Take 10 mg by mouth daily       POTASSIUM CHLORIDE PO Take 30 mEq by mouth daily       simethicone (MYLICON) 125 MG chewable tablet CHEW AND SWALLOW 1 TABLET BY MOUTH EVERY NIGHT AT BEDTIME 38 tablet PRN     umeclidinium (INCRUSE ELLIPTA) 62.5 MCG/INH inhaler Inhale 1 puff into the lungs daily 7 each 98     vancomycin (VANCOCIN) 125 MG capsule Take 125 mg by mouth 4 times daily       Vitamin D3 (CHOLECALCIFEROL) 25 mcg (1000 units) tablet Take 1 tablet (25 mcg) by mouth daily 30 tablet 98         REVIEW OF SYSTEMS:  4 point ROS including Respiratory, CV, GI and , other than that noted in the HPI,  is negative    Objective:  /51   Pulse 89   Temp 97  F (36.1  C)   Resp 18   Ht 1.524 m (5')   Wt 50.7 kg (111 lb 12.8 oz)   SpO2 94%   BMI 21.83 kg/m    Exam:  GENERAL  APPEARANCE:  Alert, in no distress  RESP:  respiratory effort and palpation of chest normal, lungs clear to auscultation , no respiratory distress  CV:  Palpation and auscultation of heart done , regular rate and rhythm, no murmur, rub, or gallop  ABDOMEN:  normal bowel sounds, soft, nontender, no hepatosplenomegaly or other masses  M/S:   sitting in WC  NEURO:   Cranial nerves 2-12 are normal tested and grossly at patient's baseline  PSYCH:  oriented X 3    Labs:   Labs done in SNF are in [place] EPIC. Please refer to them using EPIC/Care Everywhere.    ASSESSMENT/PLAN:     Clostridium difficile diarrhea  Generalized muscle weakness  Pulmonary emphysema, unspecified emphysema type (H)     Reports no loose stools past week. Completes course of vancomycin tomorrow  Reports she is eating and staff lyndon eating ~50-75% meals  Progressing in therapies, Walking 52 ft with cane. Unstable at times with cane. Patient and  report patient gets escorts to meals at [facility] 21/30.   Denies shortness of breath or chest pain   Therapies plan to work with patient x1 more week.  [facility]           Electronically signed by:  STEFFANY Fritz CNP

## 2021-10-01 NOTE — PROGRESS NOTES
[place] GERIATRIC SERVICES  [place] Medical Record Number:  [id number]  Place of Service where encounter took place:  [facility] (TCU) [[id number]]  Chief Complaint   Patient presents with     Nursing Home Acute       HPI:    Yoselyn Cui  is a 89 year old (1/28/1932), who is being seen today for an episodic care visit.  HPI information obtained from: facility chart records, facility staff and patient report. Today's concern is:    Patient Yoselyn Cui is a 89 yr old female admitted to [facility] for rehabilitation s/p hospitalization [facility] 8/30-9/3/21 for pneumonia bilateral and left sided pneumothorax and s/p chest tube placement 8/31 and removal 9/2  PMHx emphysema, sick sinus syndrome and s/p pacemaker, CAD, hypertension, HLD, Afib, CVA with upper left sided weakness, low vision and dilation of pancreatic duct       Abnormal lung sounds  Generalized muscle weakness  Clostridium difficile diarrhea  Acute renal failure, unspecified acute renal failure type (H)  Hypertension, unspecified type     Staff report crackles in lungs  Vitals stable and denies shortness of breath   No diarrhea  Patient states she feels good  Taking a nap  blood pressure trend up Losartan restarted, however, Cr trend up       Past Medical and Surgical History reviewed in Epic today.    MEDICATIONS:    Current Outpatient Medications   Medication Sig Dispense Refill     acetaminophen (TYLENOL) 325 MG tablet Take 650 mg by mouth every 6 hours as needed for mild pain        atorvastatin (LIPITOR) 40 MG tablet TAKE 1 TABLET BY MOUTH AT BEDTIME 35 tablet PRN     carvedilol (COREG) 12.5 MG tablet Take 12.5 mg by mouth 2 times daily (with meals) (TAKE WITH 6.25MG FOR A TOTAL OF 18.75MG) **NOTE DOSAGE/STRENGTH**       carvedilol (COREG) 6.25 MG tablet TAKE 1 TABLET BY MOUTH TWICE DAILY WITH MEALS (TAKE WITH 12.5MG FOR A TOTAL OF 18.75MG) **NOTE DOSAGE/STRENGTH** 70 tablet PRN     clopidogrel (PLAVIX) 75 MG  tablet TAKE 1 TABLET BY MOUTH ONCE DAILY 35 tablet PRN     ELIQUIS ANTICOAGULANT 2.5 MG tablet TAKE 1 TABLET BY MOUTH TWICE DAILY 70 tablet PRN     ferrous sulfate (FE TABS) 325 (65 Fe) MG EC tablet Take 1 tablet (325 mg) by mouth Every Mon, Wed, Fri Morning 12 tablet 98     ipratropium-albuterol (COMBIVENT RESPIMAT)  MCG/ACT inhaler Inhale 1 puff into the lungs 4 times daily as needed for shortness of breath / dyspnea or wheezing 4 g 11     losartan (COZAAR) 100 MG tablet Take 1 tablet (100 mg) by mouth daily 30 tablet 98     Multiple Vitamins-Minerals (SYSTANE ICAPS AREDS2) CAPS TAKE 1 CAPSULE BY MOUTH TWICE DAILY 56 capsule PRN     nitroGLYcerin (NITROSTAT) 0.4 MG sublingual tablet For chest pain place 1 tablet under the tongue every 5 minutes for 3 doses. If symptoms persist 5 minutes after 1st dose call 911. 30 tablet 0     omeprazole (PRILOSEC) 20 MG DR capsule TAKE 1 CAPSULE BY MOUTH EVERY MORNING 37 capsule PRN     ondansetron (ZOFRAN) 4 MG tablet Take 4 mg by mouth every 6 hours as needed for nausea       oxybutynin ER (DITROPAN-XL) 10 MG 24 hr tablet Take 10 mg by mouth daily       POTASSIUM CHLORIDE PO Take 30 mEq by mouth daily       simethicone (MYLICON) 125 MG chewable tablet CHEW AND SWALLOW 1 TABLET BY MOUTH EVERY NIGHT AT BEDTIME 38 tablet PRN     umeclidinium (INCRUSE ELLIPTA) 62.5 MCG/INH inhaler Inhale 1 puff into the lungs daily 7 each 98     Vitamin D3 (CHOLECALCIFEROL) 25 mcg (1000 units) tablet Take 1 tablet (25 mcg) by mouth daily 30 tablet 98         REVIEW OF SYSTEMS:  4 point ROS including Respiratory, CV, GI and , other than that noted in the HPI,  is negative    Objective:  BP (!) 156/72   Pulse 71   Temp 97.8  F (36.6  C)   Resp 18   Ht 1.524 m (5')   Wt 49.9 kg (110 lb)   SpO2 95%   BMI 21.48 kg/m    Exam:  GENERAL APPEARANCE:  Alert, in no distress  RESP:  respiratory effort and palpation of chest normal, lungs clear to auscultation , no respiratory distress  CV:   Palpation and auscultation of heart done , regular rate and rhythm, no murmur, rub, or gallop  ABDOMEN:  normal bowel sounds, soft, nontender, no hepatosplenomegaly or other masses  M/S:   lying in bed  SKIN:  Inspection of skin and subcutaneous tissue trace edema bilatearl   PSYCH:  oriented X 3    Labs:   Labs done in SNF are in [place] EPIC. Please refer to them using EPIC/Care Everywhere.    ASSESSMENT/PLAN:     Abnormal lung sounds  Generalized muscle weakness  Clostridium difficile diarrhea  Acute renal failure, unspecified acute renal failure type (H)  Hypertension, unspecified type     Staff report crackles in lungs  Vitals stable and denies shortness of breath   Lung sounds clear on exam  Participating in therapies and no acute concerns  No diarrhea  Patient states she feels good  Taking a nap  Monitor     blood pressure trend up Losartan restarted, however, Cr trend up   Order Norvasc 5 mg daily  Lower Losartan 25 mg daily  Follow up BMP 10/4/21  monitor       Electronically signed by:  STEFFANY Fritz CNP

## 2021-10-04 NOTE — LETTER
10/4/2021        RE: Yoselyn Cui  [place]  [address]  [address]  [facility] [address]        [place] [facility] DISCHARGE SUMMARY  PATIENT'S NAME: Yoselyn Cui  YOB: 1932  MEDICAL RECORD NUMBER:  [id number]  Place of Service where encounter took place:  [facility] ([facility]) [[id number]]    PRIMARY CARE PROVIDER AND CLINIC RESPONSIBLE AFTER TRANSFER:   STEFFANY Yusuf CNP, [address] / [place] [address]    [facility] Provider     Transferring providers: STEFFANY Fritz CNP, MD [place]  Recent Hospitalization/ED:  [facility] Hospital stay 8/30/21 to 9/3/21.  Date of SNF Admission: September 03, 2021  Date of SNF (anticipated) Discharge: 10/7/21  Discharged to: previous assisted living  Cognitive Scores/Physical Function/ DME:    Ambulation: 52 ft cane, CGA   Standing: CGA   Transfers: CGA   SLUMS: 21/30   UE Dressing: S/U   LE Dressing: CGA   Toileting: CGA   [place]/Hygiene: S/U    CODE STATUS/ADVANCE DIRECTIVES DISCUSSION:  DNR / DNI   ALLERGIES: Patient has no known allergies.    DISCHARGE DIAGNOSIS/NURSING FACILITY COURSE:     Patient Yoselyn Cui is a 89 yr old female admitted to [facility] for rehabilitation s/p hospitalization [facility] 8/30-9/3/21 for pneumonia bilateral and left sided pneumothorax and s/p chest tube placement 8/31 and removal 9/2  PMHx emphysema, sick sinus syndrome and s/p pacemaker, CAD, hypertension, HLD, Afib, CVA with upper left sided weakness, low vision and dilation of pancreatic duct  Treated with course of vancomycin 9/28/21 for cdiff at TCU      Pneumonia of both lower lobes due to infectious organism  Left sided pneumothorax in setting of pulmonary emphysema s/p chest tube placement by IR (8/31) & removed (9/2)  Completed course of Azithromycin and Cefdinir  Vitals stable room air    SSS (sick sinus syndrome), s/p pacemaker  CAD   HTN (hypertension)  Mixed  hyperlipidemia  Paroxysmal atrial fibrillation   Hr controlled   [place] [facility]  [facility] Record Number:  [id number]  Place of Service where encounter took place:  [facility] (TCU) [[id number]]  Chief Complaint   Patient presents with     Discharge Summary       HPI:    Yoselyn Cui  is a 89 year old (1/28/1932), who is being seen today for an episodic care visit.  HPI information obtained from: facility chart records, facility staff and patient report. Today's concern is:    Patient Yoselyn Cui is a 89 yr old female admitted to [facility] for rehabilitation s/p hospitalization [facility] 8/30-9/3/21 for pneumonia bilateral and left sided pneumothorax and s/p chest tube placement 8/31 and removal 9/2  PMHx emphysema, sick sinus syndrome and s/p pacemaker, CAD, hypertension, HLD, Afib, CVA with upper left sided weakness, low vision and dilation of pancreatic duct       Generalized muscle weakness  Clostridium difficile diarrhea  Abnormal lung sounds  Hypertension, unspecified type  Paroxysmal atrial fibrillation (H)     Patient developed nausea and vomiting and diarrhea this with decrease oral intake  Staff report syncope episode while have bowel movement yesterday   Denies gastrointestinal pain  Cr elevated 1.53, leukocytosis with WBC 18.7  Denies shortness of breath or cough or chest pain  Participating in therapies per usual and wishes to return to [facility]      Past Medical and Surgical History reviewed in Epic today.    MEDICATIONS:    Current Outpatient Medications   Medication Sig Dispense Refill     acetaminophen (TYLENOL) 325 MG tablet Take 650 mg by mouth every 6 hours as needed for mild pain        atorvastatin (LIPITOR) 40 MG tablet TAKE 1 TABLET BY MOUTH AT BEDTIME 35 tablet PRN     carvedilol (COREG) 12.5 MG tablet Take 12.5 mg by mouth 2 times daily (with meals) (TAKE WITH 6.25MG FOR A TOTAL OF 18.75MG) **NOTE DOSAGE/STRENGTH**       carvedilol  (COREG) 6.25 MG tablet TAKE 1 TABLET BY MOUTH TWICE DAILY WITH MEALS (TAKE WITH 12.5MG FOR A TOTAL OF 18.75MG) **NOTE DOSAGE/STRENGTH** 70 tablet PRN     clopidogrel (PLAVIX) 75 MG tablet TAKE 1 TABLET BY MOUTH ONCE DAILY 35 tablet PRN     ELIQUIS ANTICOAGULANT 2.5 MG tablet TAKE 1 TABLET BY MOUTH TWICE DAILY 70 tablet PRN     ferrous sulfate (FE TABS) 325 (65 Fe) MG EC tablet Take 1 tablet (325 mg) by mouth Every Mon, Wed, Fri Morning 12 tablet 98     ipratropium-albuterol (COMBIVENT RESPIMAT)  MCG/ACT inhaler Inhale 1 puff into the lungs 4 times daily as needed for shortness of breath / dyspnea or wheezing 4 g 11     Multiple Vitamins-Minerals (SYSTANE ICAPS AREDS2) CAPS TAKE 1 CAPSULE BY MOUTH TWICE DAILY 56 capsule PRN     nitroGLYcerin (NITROSTAT) 0.4 MG sublingual tablet For chest pain place 1 tablet under the tongue every 5 minutes for 3 doses. If symptoms persist 5 minutes after 1st dose call 911. 30 tablet 0     omeprazole (PRILOSEC) 20 MG DR capsule TAKE 1 CAPSULE BY MOUTH EVERY MORNING 37 capsule PRN     ondansetron (ZOFRAN) 4 MG tablet Take 4 mg by mouth every 6 hours as needed for nausea       oxybutynin ER (DITROPAN-XL) 10 MG 24 hr tablet Take 10 mg by mouth daily       potassium chloride ER (KLOR-CON M) 20 MEQ CR tablet TAKE 1 TABLET BY MOUTH ONCE DAILY 35 tablet PRN     simethicone (MYLICON) 125 MG chewable tablet CHEW AND SWALLOW 1 TABLET BY MOUTH EVERY NIGHT AT BEDTIME 38 tablet PRN     umeclidinium (INCRUSE ELLIPTA) 62.5 MCG/INH inhaler Inhale 1 puff into the lungs daily 7 each 98     Vitamin D3 (CHOLECALCIFEROL) 25 mcg (1000 units) tablet Take 1 tablet (25 mcg) by mouth daily 30 tablet 98     losartan (COZAAR) 100 MG tablet Take 1 tablet (100 mg) by mouth daily (Patient not taking: Reported on 9/17/2021) 30 tablet 98         REVIEW OF SYSTEMS:  4 point ROS including Respiratory, CV, GI and , other than that noted in the HPI,  is negative    Objective:  /71   Pulse 70   Temp 97.2   F (36.2  C)   Resp 18   Ht 1.524 m (5')   Wt 49 kg (108 lb)   SpO2 97%   BMI 21.09 kg/m    Exam:  GENERAL APPEARANCE:  Alert, in no distress  RESP:  respiratory effort and palpation of chest normal, lungs clear to auscultation , no respiratory distress  CV:  Palpation and auscultation of heart done , regular rate and rhythm, no murmur, rub, or gallop  ABDOMEN:  normal bowel sounds, soft, nontender, no hepatosplenomegaly or other masses  M/S:   sitting in WC  SKIN:  Inspection of skin and subcutaneous tissue trace edema bilateral LE   NEURO:   Cranial nerves 2-12 are normal tested and grossly at patient's baseline  PSYCH:  oriented X 3    Labs:   Labs done in SNF are in [place] EPIC. Please refer to them using [employer] Everywhere.    ASSESSMENT/PLAN:     Generalized muscle weakness  Clostridium difficile diarrhea  Abnormal lung sounds  Hypertension, unspecified type  Paroxysmal atrial fibrillation (H)     Patient developed nausea and vomiting and diarrhea this with decrease oral intake  Denies gastrointestinal pain  Cr elevated 1.53, leukocytosis with WBC 18.7  CMP,lipase and amylase noted with no acute concerns other then Cr and WBC  Reviewed with   Plan to HOLD Losartan  HOLD Coreg for SBP <110  Will stop Norvasc due to low blood pressure  Check ortho blood pressure 2 x daily x3days, and updated therapies on patient status and to monitor blood pressure   Start NS 50cc/hr x 1 liter, follow up BMP tomorrow  BMP,CBC 9/18 and 9/20/21  Discontinue Senna S  Pending stool sample for cdiff  Encourage adequate fluid intake, monitor   Dietary referral due to decrease oral intake    Follow up gastrointestinal regarding dilated pancreatic duct, this not thought to be the cause of gastrointestinal upset at this time. Greater concern for Cdiff. Pending results for stool sample    Denies shortness of breath or cough or chest pain  Participating in therapies per usual and wishes to return to [facility]    Med review:  discontinue lozenges and cough syrup due to no longer using    Call out to daughter Maryjane for update      Electronically signed by:  STEFFANY Fritz CNP         blood pressure elevated, will increase Norvasc to 10mg daily, monitor   Losartan decreased from 100mg daily to 25mg daily due to elevated Cr.  BMP today unremarkable  Continue on current dose of Coreg 18.75 mg 2 x daily   Continue lipitor  Continue Plavix and Eliquis, no signs and symptoms bleeding  H/o Cerebrovascular accident   With residual left sided weakness  continue statin, asa and Plavix    Dilation of pancreatic duct  Per hospital note  Noted on CT chest Diffuse dilatation of the main pancreatic duct with approximately 2.5 cm hypoattenuating focus in the pancreatic head/uncinate process of the pancreas, indeterminate, could represent side branch intraductal papillary mucinous neoplasm versus other pancreatic lesions. 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    Discharge Medications:    Current Outpatient Medications   Medication Sig Dispense Refill     acetaminophen (TYLENOL) 325 MG tablet Take 650 mg by mouth every 6 hours as needed for  mild pain        amLODIPine (NORVASC) 10 MG tablet Take 10 mg by mouth daily       atorvastatin (LIPITOR) 40 MG tablet TAKE 1 TABLET BY MOUTH AT BEDTIME 35 tablet PRN     carvedilol (COREG) 12.5 MG tablet Take 12.5 mg by mouth 2 times daily (with meals) (TAKE WITH 6.25MG FOR A TOTAL OF 18.75MG) **NOTE DOSAGE/STRENGTH**       carvedilol (COREG) 6.25 MG tablet TAKE 1 TABLET BY MOUTH TWICE DAILY WITH MEALS (TAKE WITH 12.5MG FOR A TOTAL OF 18.75MG) NOTE DOSAGE/STRENGTH 70 tablet PRN     clopidogrel (PLAVIX) 75 MG tablet TAKE 1 TABLET BY MOUTH ONCE DAILY 35 tablet PRN     ELIQUIS ANTICOAGULANT 2.5 MG tablet TAKE 1 TABLET BY MOUTH TWICE DAILY 70 tablet PRN     ferrous sulfate (FE TABS) 325 (65 Fe) MG EC tablet Take 1 tablet (325 mg) by mouth Every Mon, Wed, Fri Morning 12 tablet 98     ipratropium-albuterol (COMBIVENT RESPIMAT)  MCG/ACT inhaler Inhale 1 puff into the lungs 4 times daily as needed for shortness of breath / dyspnea or wheezing 4 g 11     losartan (COZAAR) 25 MG tablet Take 25 mg by mouth daily       nitroGLYcerin (NITROSTAT) 0.4 MG sublingual tablet For chest pain place 1 tablet under the tongue every 5 minutes for 3 doses. If symptoms persist 5 minutes after 1st dose call 911. 30 tablet 0     omeprazole (PRILOSEC) 20 MG DR capsule TAKE 1 CAPSULE BY MOUTH EVERY MORNING 37 capsule PRN     ondansetron (ZOFRAN) 4 MG tablet Take 4 mg by mouth every 6 hours as needed for nausea       oxybutynin ER (DITROPAN-XL) 10 MG 24 hr tablet Take 10 mg by mouth daily       POTASSIUM CHLORIDE PO Take 30 mEq by mouth daily       simethicone (MYLICON) 125 MG chewable tablet CHEW AND SWALLOW 1 TABLET BY MOUTH EVERY NIGHT AT BEDTIME 38 tablet PRN     umeclidinium (INCRUSE ELLIPTA) 62.5 MCG/INH inhaler Inhale 1 puff into the lungs daily 7 each 98     Vitamin D3 (CHOLECALCIFEROL) 25 mcg (1000 units) tablet Take 1 tablet (25 mcg) by mouth daily 30 tablet 98     Multiple Vitamins-Minerals (SYSTANE ICAPS AREDS2) CAPS TAKE 1  CAPSULE BY MOUTH TWICE DAILY 56 capsule PRN       Medication Changes/Rationale:     See HPI    Controlled medications sent with patient:   not applicable/none       REVIEW OF SYSTEMS:  10 point ROS of systems including Constitutional, Eyes, Respiratory, Cardiovascular, Gastroenterology, Genitourinary, Integumentary, Musculoskeletal, Psychiatric were all negative except for pertinent positives noted in my HPI.    Objective:  /71   Pulse 70   Temp 97.2  F (36.2  C)   Resp 18   Ht 1.524 m (5')   Wt 49.3 kg (108 lb 9.6 oz)   SpO2 97%   BMI 21.21 kg/m      10/04/2021 15:57 143/81 mmHg (Sitting r/arm) Systolic High of 139 exceeded  10/04/2021 10:40 147/79 mmHg (Sitting l/arm) Systolic High of 139 exceeded  10/04/2021 00:45 121/71 mmHg (Lying r/arm)  10/03/2021 15:38 182/81 mmHg (Lying l/arm) Systolic High of 139 exceeded  10/03/2021 09:46 137/55 mmHg (Lying l/arm) Diastolic Low of 60 exceeded  10/03/2021 00:45 129/60 mmHg (Lying l/arm)  10/02/2021 16:04 132/61 mmHg (Sitting r/arm)  10/02/2021 10:27 173/83 mmHg (Sitting r/arm) Systolic High of 139 exceeded  10/01/2021 15:18 133/61 mmHg (Sitting l/arm)  10/01/2021 09:24 156/72 mmHg (Sitting r/arm) Systolic High of 139 exceeded  09/30/2021 16:09 154/68 mmHg (Sitting l/arm) Systolic High of 139 exceeded  09/30/2021 06:54 151/69 mmHg (Lying r/arm) Systolic High of 139 exceeded  09/29/2021 16:48 158/80 mmHg (Sitting r/arm) Systolic High of 139 exceeded  09/29/2021 15:15 173/83 mmHg (Sitting l/arm) Systolic High of 139 exceeded  09/29/2021 06:58 154/74 mmHg (Lying r/arm) Systolic High of 139 exceeded    10/04/2021 15:57 89 bpm (Regular)  10/04/2021 10:40 85 bpm (Regular)  10/04/2021 00:45 70 bpm (Not Applicable)  10/03/2021 15:38 79 bpm (Regular)  10/03/2021 09:46 75 bpm (Regular)  10/03/2021 00:45 65 bpm (Not Applicable)  10/02/2021 16:04 60 bpm (Regular)  10/02/2021 10:27 73 bpm (Regular)  10/01/2021 15:18 69 bpm (Regular)  10/01/2021 09:24 71 bpm (Not  Applicable)  09/30/2021 16:09 76 bpm (Regular)  09/30/2021 06:54 74 bpm (Not Applicable)  09/29/2021 15:15 74 bpm (Regular)  09/29/2021 06:58 73 bpm (Not Applicable)  09/28/2021 16:26 66 bpm (Not Applicable)      Exam:  GENERAL APPEARANCE:  Alert, in no distress  ENT:  Mouth and posterior oropharynx normal, moist mucous membranes  EYES:  EOM, conjunctivae, lids, pupils and irises normal  NECK:  No adenopathy,masses or thyromegaly  RESP:  respiratory effort and palpation of chest normal, lungs clear to auscultation , no respiratory distress  CV:  Palpation and auscultation of heart done , regular rate and rhythm, no murmur, rub, or gallop  ABDOMEN:  normal bowel sounds, soft, nontender, no hepatosplenomegaly or other masses  M/S:   sitting in WC  SKIN:  Inspection of skin and subcutaneous tissue baseline  NEURO:   Cranial nerves 2-12 are normal tested and grossly at patient's baseline  PSYCH:  oriented X 3    SNF labs: Labs done in SNF are in [place] EPIC. Please refer to them using [employer] Everywhere.     Last Comprehensive Metabolic Panel:  Sodium   Date Value Ref Range Status   10/04/2021 140 133 - 144 mmol/L Final   04/28/2021 133 133 - 144 mmol/L Final     Potassium   Date Value Ref Range Status   10/04/2021 4.0 3.4 - 5.3 mmol/L Final   04/28/2021 4.3 3.4 - 5.3 mmol/L Final     Chloride   Date Value Ref Range Status   10/04/2021 108 94 - 109 mmol/L Final   04/28/2021 101 94 - 109 mmol/L Final     Carbon Dioxide   Date Value Ref Range Status   04/28/2021 27 20 - 32 mmol/L Final     Carbon Dioxide (CO2)   Date Value Ref Range Status   10/04/2021 24 20 - 32 mmol/L Final     Anion Gap   Date Value Ref Range Status   10/04/2021 8 3 - 14 mmol/L Final   04/28/2021 5 3 - 14 mmol/L Final     Glucose   Date Value Ref Range Status   10/04/2021 84 70 - 99 mg/dL Final   04/28/2021 99 70 - 99 mg/dL Final     Comment:     Fasting specimen     Urea Nitrogen   Date Value Ref Range Status   10/04/2021 8 7 - 30 mg/dL Final    04/28/2021 13 7 - 30 mg/dL Final     Creatinine   Date Value Ref Range Status   10/04/2021 0.83 0.52 - 1.04 mg/dL Final   04/28/2021 0.75 0.52 - 1.04 mg/dL Final     GFR Estimate   Date Value Ref Range Status   10/04/2021 63 >60 mL/min/1.73m2 Final     Comment:     As of July 11, 2021, eGFR is calculated by the CKD-EPI creatinine equation, without race adjustment. eGFR can be influenced by muscle mass, exercise, and diet. The reported eGFR is an estimation only and is only applicable if the renal function is stable.   04/28/2021 71 >60 mL/min/[1.73_m2] Final     Comment:     Non  GFR Calc  Starting 12/18/2018, serum creatinine based estimated GFR (eGFR) will be   calculated using the Chronic Kidney Disease Epidemiology Collaboration   (CKD-EPI) equation.       Calcium   Date Value Ref Range Status   10/04/2021 8.6 8.5 - 10.1 mg/dL Final   04/28/2021 8.7 8.5 - 10.1 mg/dL Final     Lab Results   Component Value Date    WBC 6.2 09/29/2021    WBC 5.8 04/15/2021     Lab Results   Component Value Date    RBC 4.23 09/29/2021    RBC 3.83 04/15/2021     Lab Results   Component Value Date    HGB 11.8 09/29/2021    HGB 11.0 04/15/2021     Lab Results   Component Value Date    HCT 38.5 09/29/2021    HCT 34.9 04/15/2021     No components found for: MCT  Lab Results   Component Value Date    MCV 91 09/29/2021    MCV 91 04/15/2021     Lab Results   Component Value Date    MCH 27.9 09/29/2021    MCH 28.7 04/15/2021     Lab Results   Component Value Date    MCHC 30.6 09/29/2021    MCHC 31.5 04/15/2021     Lab Results   Component Value Date    RDW 15.9 09/29/2021    RDW 14.5 04/15/2021     Lab Results   Component Value Date     09/29/2021     04/15/2021           DISCHARGE PLAN:    Follow up labs: No labs orders/due    Medical Follow Up:      Follow up with primary care provider in 1-2 weeks    MTM referral needed and placed by this provider: No    Current [place] scheduled appointments:        Discharge Services: Home Care:  Occupational Therapy, Physical Therapy, Registered Nurse and Home Health Aide [place]        TOTAL DISCHARGE TIME:   Greater than 30 minutes  Electronically signed by:  STEFFANY Fritz CNP     Home care Face to Face documentation done in EPIC attached to Home care orders for [facility] homecare.                       Sincerely,        STEFFANY Fritz CNP

## 2021-10-04 NOTE — PROGRESS NOTES
[facility] SERVICES DISCHARGE SUMMARY  PATIENT'S NAME: Yoselyn Cui  YOB: 1932  MEDICAL RECORD NUMBER:  [id number]  Place of Service where encounter took place:  [facility] ([facility]) [[id number]]    PRIMARY CARE PROVIDER AND CLINIC RESPONSIBLE AFTER TRANSFER:   STEFFANY Yusuf CNP, [address] / [place] [address]    [facility] Provider     Transferring providers: STEFFNAY Fritz CNP, MD [place]  Recent Hospitalization/ED:  [facility] Hospital stay 8/30/21 to 9/3/21.  Date of SNF Admission: September 03, 2021  Date of SNF (anticipated) Discharge: 10/7/21  Discharged to: previous assisted living  Cognitive Scores/Physical Function/ DME:    Ambulation: 52 ft cane, CGA   Standing: CGA   Transfers: CGA   SLUMS: 21/30   UE Dressing: S/U   LE Dressing: CGA   Toileting: CGA   [place]/Hygiene: S/U    CODE STATUS/ADVANCE DIRECTIVES DISCUSSION:  DNR / DNI   ALLERGIES: Patient has no known allergies.    DISCHARGE DIAGNOSIS/NURSING FACILITY COURSE:     Patient Yoselyn Cui is a 89 yr old female admitted to [facility] for rehabilitation s/p hospitalization [facility] 8/30-9/3/21 for pneumonia bilateral and left sided pneumothorax and s/p chest tube placement 8/31 and removal 9/2  PMHx emphysema, sick sinus syndrome and s/p pacemaker, CAD, hypertension, HLD, Afib, CVA with upper left sided weakness, low vision and dilation of pancreatic duct  Treated with course of vancomycin 9/28/21 for cdiff at TCU      Pneumonia of both lower lobes due to infectious organism  Left sided pneumothorax in setting of pulmonary emphysema s/p chest tube placement by IR (8/31) & removed (9/2)  Completed course of Azithromycin and Cefdinir  Vitals stable room air    SSS (sick sinus syndrome), s/p pacemaker  CAD   HTN (hypertension)  Mixed hyperlipidemia  Paroxysmal atrial fibrillation   Hr controlled   [facility] [place]:   [id number]  Place of Service where encounter took place:  [facility]) [[id number]]  Chief Complaint   Patient presents with     Discharge Summary       HPI:    Yoselyn Cui  is a 89 year old (1/28/1932), who is being seen today for an episodic care visit.  HPI information obtained from: facility chart records, facility staff and patient report. Today's concern is:    Patient Yoselyn Cui is a 89 yr old female admitted to [facility] for rehabilitation s/p hospitalization [facility] 8/30-9/3/21 for pneumonia bilateral and left sided pneumothorax and s/p chest tube placement 8/31 and removal 9/2  PMHx emphysema, sick sinus syndrome and s/p pacemaker, CAD, hypertension, HLD, Afib, CVA with upper left sided weakness, low vision and dilation of pancreatic duct       Generalized muscle weakness  Clostridium difficile diarrhea  Abnormal lung sounds  Hypertension, unspecified type  Paroxysmal atrial fibrillation (H)     Patient developed nausea and vomiting and diarrhea this with decrease oral intake  Staff report syncope episode while have bowel movement yesterday   Denies gastrointestinal pain  Cr elevated 1.53, leukocytosis with WBC 18.7  Denies shortness of breath or cough or chest pain  Participating in therapies per usual and wishes to return to [facility]      Past Medical and Surgical History reviewed in Epic today.    MEDICATIONS:    Current Outpatient Medications   Medication Sig Dispense Refill     acetaminophen (TYLENOL) 325 MG tablet Take 650 mg by mouth every 6 hours as needed for mild pain        atorvastatin (LIPITOR) 40 MG tablet TAKE 1 TABLET BY MOUTH AT BEDTIME 35 tablet PRN     carvedilol (COREG) 12.5 MG tablet Take 12.5 mg by mouth 2 times daily (with meals) (TAKE WITH 6.25MG FOR A TOTAL OF 18.75MG) **NOTE DOSAGE/STRENGTH**       carvedilol (COREG) 6.25 MG tablet TAKE 1 TABLET BY MOUTH TWICE DAILY WITH MEALS (TAKE WITH 12.5MG FOR A TOTAL OF 18.75MG) **NOTE  DOSAGE/STRENGTH** 70 tablet PRN     clopidogrel (PLAVIX) 75 MG tablet TAKE 1 TABLET BY MOUTH ONCE DAILY 35 tablet PRN     ELIQUIS ANTICOAGULANT 2.5 MG tablet TAKE 1 TABLET BY MOUTH TWICE DAILY 70 tablet PRN     ferrous sulfate (FE TABS) 325 (65 Fe) MG EC tablet Take 1 tablet (325 mg) by mouth Every Mon, Wed, Fri Morning 12 tablet 98     ipratropium-albuterol (COMBIVENT RESPIMAT)  MCG/ACT inhaler Inhale 1 puff into the lungs 4 times daily as needed for shortness of breath / dyspnea or wheezing 4 g 11     Multiple Vitamins-Minerals (SYSTANE ICAPS AREDS2) CAPS TAKE 1 CAPSULE BY MOUTH TWICE DAILY 56 capsule PRN     nitroGLYcerin (NITROSTAT) 0.4 MG sublingual tablet For chest pain place 1 tablet under the tongue every 5 minutes for 3 doses. If symptoms persist 5 minutes after 1st dose call 911. 30 tablet 0     omeprazole (PRILOSEC) 20 MG DR capsule TAKE 1 CAPSULE BY MOUTH EVERY MORNING 37 capsule PRN     ondansetron (ZOFRAN) 4 MG tablet Take 4 mg by mouth every 6 hours as needed for nausea       oxybutynin ER (DITROPAN-XL) 10 MG 24 hr tablet Take 10 mg by mouth daily       potassium chloride ER (KLOR-CON M) 20 MEQ CR tablet TAKE 1 TABLET BY MOUTH ONCE DAILY 35 tablet PRN     simethicone (MYLICON) 125 MG chewable tablet CHEW AND SWALLOW 1 TABLET BY MOUTH EVERY NIGHT AT BEDTIME 38 tablet PRN     umeclidinium (INCRUSE ELLIPTA) 62.5 MCG/INH inhaler Inhale 1 puff into the lungs daily 7 each 98     Vitamin D3 (CHOLECALCIFEROL) 25 mcg (1000 units) tablet Take 1 tablet (25 mcg) by mouth daily 30 tablet 98     losartan (COZAAR) 100 MG tablet Take 1 tablet (100 mg) by mouth daily (Patient not taking: Reported on 9/17/2021) 30 tablet 98         REVIEW OF SYSTEMS:  4 point ROS including Respiratory, CV, GI and , other than that noted in the HPI,  is negative    Objective:  /71   Pulse 70   Temp 97.2  F (36.2  C)   Resp 18   Ht 1.524 m (5')   Wt 49 kg (108 lb)   SpO2 97%   BMI 21.09 kg/m    Exam:  GENERAL  APPEARANCE:  Alert, in no distress  RESP:  respiratory effort and palpation of chest normal, lungs clear to auscultation , no respiratory distress  CV:  Palpation and auscultation of heart done , regular rate and rhythm, no murmur, rub, or gallop  ABDOMEN:  normal bowel sounds, soft, nontender, no hepatosplenomegaly or other masses  M/S:   sitting in WC  SKIN:  Inspection of skin and subcutaneous tissue trace edema bilateral LE   NEURO:   Cranial nerves 2-12 are normal tested and grossly at patient's baseline  PSYCH:  oriented X 3    Labs:   Labs done in SNF are in [facility] [place] EPIC. Please refer to them using EPIC/Care Everywhere.    ASSESSMENT/PLAN:     Generalized muscle weakness  Clostridium difficile diarrhea  Abnormal lung sounds  Hypertension, unspecified type  Paroxysmal atrial fibrillation (H)     Patient developed nausea and vomiting and diarrhea this with decrease oral intake  Denies gastrointestinal pain  Cr elevated 1.53, leukocytosis with WBC 18.7  CMP,lipase and amylase noted with no acute concerns other then Cr and WBC  Reviewed with   Plan to HOLD Losartan  HOLD Coreg for SBP <110  Will stop Norvasc due to low blood pressure  Check ortho blood pressure 2 x daily x3days, and updated therapies on patient status and to monitor blood pressure   Start NS 50cc/hr x 1 liter, follow up BMP tomorrow  BMP,CBC 9/18 and 9/20/21  Discontinue Senna S  Pending stool sample for cdiff  Encourage adequate fluid intake, monitor   Dietary referral due to decrease oral intake    Follow up gastrointestinal regarding dilated pancreatic duct, this not thought to be the cause of gastrointestinal upset at this time. Greater concern for Cdiff. Pending results for stool sample    Denies shortness of breath or cough or chest pain  Participating in therapies per usual and wishes to return to [facility]    Med review: discontinue lozenges and cough syrup due to no longer using    Call out to daughter Maryjane for  update      Electronically signed by:  STEFFANY Fritz CNP         blood pressure elevated, will increase Norvasc to 10mg daily, monitor   Losartan decreased from 100mg daily to 25mg daily due to elevated Cr.  BMP today unremarkable  Continue on current dose of Coreg 18.75 mg 2 x daily   Continue lipitor  Continue Plavix and Eliquis, no signs and symptoms bleeding  H/o Cerebrovascular accident   With residual left sided weakness  continue statin, asa and Plavix    Dilation of pancreatic duct  Per hospital note  Noted on CT chest Diffuse dilatation of the main pancreatic duct with approximately 2.5 cm hypoattenuating focus in the pancreatic head/uncinate process of the pancreas, indeterminate, could represent side branch intraductal papillary mucinous neoplasm versus other pancreatic lesions. 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    Discharge Medications:    Current Outpatient Medications   Medication Sig Dispense Refill     acetaminophen (TYLENOL) 325 MG tablet Take 650 mg by mouth every 6 hours as needed for mild pain        amLODIPine (NORVASC) 10 MG tablet Take 10 mg by mouth daily        atorvastatin (LIPITOR) 40 MG tablet TAKE 1 TABLET BY MOUTH AT BEDTIME 35 tablet PRN     carvedilol (COREG) 12.5 MG tablet Take 12.5 mg by mouth 2 times daily (with meals) (TAKE WITH 6.25MG FOR A TOTAL OF 18.75MG) **NOTE DOSAGE/STRENGTH**       carvedilol (COREG) 6.25 MG tablet TAKE 1 TABLET BY MOUTH TWICE DAILY WITH MEALS (TAKE WITH 12.5MG FOR A TOTAL OF 18.75MG) NOTE DOSAGE/STRENGTH 70 tablet PRN     clopidogrel (PLAVIX) 75 MG tablet TAKE 1 TABLET BY MOUTH ONCE DAILY 35 tablet PRN     ELIQUIS ANTICOAGULANT 2.5 MG tablet TAKE 1 TABLET BY MOUTH TWICE DAILY 70 tablet PRN     ferrous sulfate (FE TABS) 325 (65 Fe) MG EC tablet Take 1 tablet (325 mg) by mouth Every Mon, Wed, Fri Morning 12 tablet 98     ipratropium-albuterol (COMBIVENT RESPIMAT)  MCG/ACT inhaler Inhale 1 puff into the lungs 4 times daily as needed for shortness of breath / dyspnea or wheezing 4 g 11     losartan (COZAAR) 25 MG tablet Take 25 mg by mouth daily       nitroGLYcerin (NITROSTAT) 0.4 MG sublingual tablet For chest pain place 1 tablet under the tongue every 5 minutes for 3 doses. If symptoms persist 5 minutes after 1st dose call 911. 30 tablet 0     omeprazole (PRILOSEC) 20 MG DR capsule TAKE 1 CAPSULE BY MOUTH EVERY MORNING 37 capsule PRN     ondansetron (ZOFRAN) 4 MG tablet Take 4 mg by mouth every 6 hours as needed for nausea       oxybutynin ER (DITROPAN-XL) 10 MG 24 hr tablet Take 10 mg by mouth daily       POTASSIUM CHLORIDE PO Take 30 mEq by mouth daily       simethicone (MYLICON) 125 MG chewable tablet CHEW AND SWALLOW 1 TABLET BY MOUTH EVERY NIGHT AT BEDTIME 38 tablet PRN     umeclidinium (INCRUSE ELLIPTA) 62.5 MCG/INH inhaler Inhale 1 puff into the lungs daily 7 each 98     Vitamin D3 (CHOLECALCIFEROL) 25 mcg (1000 units) tablet Take 1 tablet (25 mcg) by mouth daily 30 tablet 98     Multiple Vitamins-Minerals (SYSTANE ICAPS AREDS2) CAPS TAKE 1 CAPSULE BY MOUTH TWICE DAILY 56 capsule PRN       Medication Changes/Rationale:      See HPI    Controlled medications sent with patient:   not applicable/none       REVIEW OF SYSTEMS:  10 point ROS of systems including Constitutional, Eyes, Respiratory, Cardiovascular, Gastroenterology, Genitourinary, Integumentary, Musculoskeletal, Psychiatric were all negative except for pertinent positives noted in my HPI.    Objective:  /71   Pulse 70   Temp 97.2  F (36.2  C)   Resp 18   Ht 1.524 m (5')   Wt 49.3 kg (108 lb 9.6 oz)   SpO2 97%   BMI 21.21 kg/m      10/04/2021 15:57 143/81 mmHg (Sitting r/arm) Systolic High of 139 exceeded  10/04/2021 10:40 147/79 mmHg (Sitting l/arm) Systolic High of 139 exceeded  10/04/2021 00:45 121/71 mmHg (Lying r/arm)  10/03/2021 15:38 182/81 mmHg (Lying l/arm) Systolic High of 139 exceeded  10/03/2021 09:46 137/55 mmHg (Lying l/arm) Diastolic Low of 60 exceeded  10/03/2021 00:45 129/60 mmHg (Lying l/arm)  10/02/2021 16:04 132/61 mmHg (Sitting r/arm)  10/02/2021 10:27 173/83 mmHg (Sitting r/arm) Systolic High of 139 exceeded  10/01/2021 15:18 133/61 mmHg (Sitting l/arm)  10/01/2021 09:24 156/72 mmHg (Sitting r/arm) Systolic High of 139 exceeded  09/30/2021 16:09 154/68 mmHg (Sitting l/arm) Systolic High of 139 exceeded  09/30/2021 06:54 151/69 mmHg (Lying r/arm) Systolic High of 139 exceeded  09/29/2021 16:48 158/80 mmHg (Sitting r/arm) Systolic High of 139 exceeded  09/29/2021 15:15 173/83 mmHg (Sitting l/arm) Systolic High of 139 exceeded  09/29/2021 06:58 154/74 mmHg (Lying r/arm) Systolic High of 139 exceeded    10/04/2021 15:57 89 bpm (Regular)  10/04/2021 10:40 85 bpm (Regular)  10/04/2021 00:45 70 bpm (Not Applicable)  10/03/2021 15:38 79 bpm (Regular)  10/03/2021 09:46 75 bpm (Regular)  10/03/2021 00:45 65 bpm (Not Applicable)  10/02/2021 16:04 60 bpm (Regular)  10/02/2021 10:27 73 bpm (Regular)  10/01/2021 15:18 69 bpm (Regular)  10/01/2021 09:24 71 bpm (Not Applicable)  09/30/2021 16:09 76 bpm (Regular)  09/30/2021 06:54 74 bpm (Not  Applicable)  09/29/2021 15:15 74 bpm (Regular)  09/29/2021 06:58 73 bpm (Not Applicable)  09/28/2021 16:26 66 bpm (Not Applicable)      Exam:  GENERAL APPEARANCE:  Alert, in no distress  ENT:  Mouth and posterior oropharynx normal, moist mucous membranes  EYES:  EOM, conjunctivae, lids, pupils and irises normal  NECK:  No adenopathy,masses or thyromegaly  RESP:  respiratory effort and palpation of chest normal, lungs clear to auscultation , no respiratory distress  CV:  Palpation and auscultation of heart done , regular rate and rhythm, no murmur, rub, or gallop  ABDOMEN:  normal bowel sounds, soft, nontender, no hepatosplenomegaly or other masses  M/S:   sitting in WC  SKIN:  Inspection of skin and subcutaneous tissue baseline  NEURO:   Cranial nerves 2-12 are normal tested and grossly at patient's baseline  PSYCH:  oriented X 3    SNF labs: Labs done in SNF are in [place] EPIC. Please refer to them using [facility]/Care Everywhere.     Last Comprehensive Metabolic Panel:  Sodium   Date Value Ref Range Status   10/04/2021 140 133 - 144 mmol/L Final   04/28/2021 133 133 - 144 mmol/L Final     Potassium   Date Value Ref Range Status   10/04/2021 4.0 3.4 - 5.3 mmol/L Final   04/28/2021 4.3 3.4 - 5.3 mmol/L Final     Chloride   Date Value Ref Range Status   10/04/2021 108 94 - 109 mmol/L Final   04/28/2021 101 94 - 109 mmol/L Final     Carbon Dioxide   Date Value Ref Range Status   04/28/2021 27 20 - 32 mmol/L Final     Carbon Dioxide (CO2)   Date Value Ref Range Status   10/04/2021 24 20 - 32 mmol/L Final     Anion Gap   Date Value Ref Range Status   10/04/2021 8 3 - 14 mmol/L Final   04/28/2021 5 3 - 14 mmol/L Final     Glucose   Date Value Ref Range Status   10/04/2021 84 70 - 99 mg/dL Final   04/28/2021 99 70 - 99 mg/dL Final     Comment:     Fasting specimen     Urea Nitrogen   Date Value Ref Range Status   10/04/2021 8 7 - 30 mg/dL Final   04/28/2021 13 7 - 30 mg/dL Final     Creatinine   Date Value Ref Range Status    10/04/2021 0.83 0.52 - 1.04 mg/dL Final   04/28/2021 0.75 0.52 - 1.04 mg/dL Final     GFR Estimate   Date Value Ref Range Status   10/04/2021 63 >60 mL/min/1.73m2 Final     Comment:     As of July 11, 2021, eGFR is calculated by the CKD-EPI creatinine equation, without race adjustment. eGFR can be influenced by muscle mass, exercise, and diet. The reported eGFR is an estimation only and is only applicable if the renal function is stable.   04/28/2021 71 >60 mL/min/[1.73_m2] Final     Comment:     Non  GFR Calc  Starting 12/18/2018, serum creatinine based estimated GFR (eGFR) will be   calculated using the Chronic Kidney Disease Epidemiology Collaboration   (CKD-EPI) equation.       Calcium   Date Value Ref Range Status   10/04/2021 8.6 8.5 - 10.1 mg/dL Final   04/28/2021 8.7 8.5 - 10.1 mg/dL Final     Lab Results   Component Value Date    WBC 6.2 09/29/2021    WBC 5.8 04/15/2021     Lab Results   Component Value Date    RBC 4.23 09/29/2021    RBC 3.83 04/15/2021     Lab Results   Component Value Date    HGB 11.8 09/29/2021    HGB 11.0 04/15/2021     Lab Results   Component Value Date    HCT 38.5 09/29/2021    HCT 34.9 04/15/2021     No components found for: MCT  Lab Results   Component Value Date    MCV 91 09/29/2021    MCV 91 04/15/2021     Lab Results   Component Value Date    MCH 27.9 09/29/2021    MCH 28.7 04/15/2021     Lab Results   Component Value Date    MCHC 30.6 09/29/2021    MCHC 31.5 04/15/2021     Lab Results   Component Value Date    RDW 15.9 09/29/2021    RDW 14.5 04/15/2021     Lab Results   Component Value Date     09/29/2021     04/15/2021           DISCHARGE PLAN:    Follow up labs: No labs orders/due    Medical Follow Up:      Follow up with primary care provider in 1-2 weeks    MTM referral needed and placed by this provider: No    Current Holiday scheduled appointments:       Discharge Services: Home Care:  Occupational Therapy, Physical Therapy, Registered  Nurse and Home Health Aide Sushila        TOTAL DISCHARGE TIME:   Greater than 30 minutes  Electronically signed by:  STEFFANY Fritz CNP     Home care Face to Face documentation done in EPIC attached to Home care orders for [facility] homecare.

## 2021-10-06 NOTE — PROGRESS NOTES
[facility] Discharge Orders    Name: Yoselyn Cui  : 1932  Planned Discharge Date: 10/7/21  Discharged to: previous assisted living [facility]     MEDICAL FOLLOW UP  Follow up with PCP in 1-2 weeks       FUTURE LABS: BMP 10/7/21      DISCHARGE MEDICATIONS:  The patient s pharmacy is authorized to dispense a 30-day supply of medications. Refill requests should be directed to the primary provider, Brittany Florez.     Current Outpatient Medications   Medication Sig Dispense Refill     vancomycin (VANCOCIN) 125 MG capsule Take 125 mg by mouth every 6 hours  STOP DATE 10/16/21       acetaminophen (TYLENOL) 325 MG tablet Take 650 mg by mouth every 6 hours as needed for mild pain        amLODIPine (NORVASC) 10 MG tablet Take 10 mg by mouth daily       atorvastatin (LIPITOR) 40 MG tablet TAKE 1 TABLET BY MOUTH AT BEDTIME 35 tablet PRN     carvedilol (COREG) 12.5 MG tablet Take 12.5 mg by mouth 2 times daily (with meals) (TAKE WITH 6.25MG FOR A TOTAL OF 18.75MG) **NOTE DOSAGE/STRENGTH**       carvedilol (COREG) 6.25 MG tablet TAKE 1 TABLET BY MOUTH TWICE DAILY WITH MEALS (TAKE WITH 12.5MG FOR A TOTAL OF 18.75MG) NOTE DOSAGE/STRENGTH** 70 tablet PRN     clopidogrel (PLAVIX) 75 MG tablet TAKE 1 TABLET BY MOUTH ONCE DAILY 35 tablet PRN     ELIQUIS ANTICOAGULANT 2.5 MG tablet TAKE 1 TABLET BY MOUTH TWICE DAILY 70 tablet PRN     ferrous sulfate (FE TABS) 325 (65 Fe) MG EC tablet Take 1 tablet (325 mg) by mouth Every Mon, Wed, Fri Morning 12 tablet 98     ipratropium-albuterol (COMBIVENT RESPIMAT)  MCG/ACT inhaler Inhale 1 puff into the lungs 4 times daily as needed for shortness of breath / dyspnea or wheezing 4 g 11     losartan (COZAAR) 25 MG tablet Take 25 mg by mouth daily       Multiple Vitamins-Minerals (SYSTANE ICAPS AREDS2) CAPS TAKE 1 CAPSULE BY MOUTH TWICE DAILY 56 capsule PRN     nitroGLYcerin (NITROSTAT) 0.4 MG sublingual tablet For chest pain place 1 tablet  under the tongue every 5 minutes for 3 doses. If symptoms persist 5 minutes after 1st dose call 911. 30 tablet 0     omeprazole (PRILOSEC) 20 MG DR capsule TAKE 1 CAPSULE BY MOUTH EVERY MORNING 37 capsule PRN     ondansetron (ZOFRAN) 4 MG tablet Take 4 mg by mouth every 6 hours as needed for nausea       oxybutynin ER (DITROPAN-XL) 10 MG 24 hr tablet Take 10 mg by mouth daily       POTASSIUM CHLORIDE PO Take 30 mEq by mouth daily       simethicone (MYLICON) 125 MG chewable tablet CHEW AND SWALLOW 1 TABLET BY MOUTH EVERY NIGHT AT BEDTIME 38 tablet PRN     umeclidinium (INCRUSE ELLIPTA) 62.5 MCG/INH inhaler Inhale 1 puff into the lungs daily 7 each 98     Vitamin D3 (CHOLECALCIFEROL) 25 mcg (1000 units) tablet Take 1 tablet (25 mcg) by mouth daily 30 tablet 98       SERVICES:  Home Care:  Occupational Therapy, Physical Therapy, Registered Nurse and Home Health Aide      STEFFANY Fritz CNP  This document was electronically signed on October 6, 2021

## 2021-10-06 NOTE — LETTER
10/6/2021        RE: Yoselyn Cui  [place]  [address]  [address]  [facility] [place] [facility] [address]        [facility] Discharge Orders    Name: Yoselyn Cui  : 1932  Planned Discharge Date: 10/7/21  Discharged to: previous assisted living [facility]     MEDICAL FOLLOW UP  Follow up with PCP in 1-2 weeks       FUTURE LABS: BMP 10/7/21      DISCHARGE MEDICATIONS:  The patient s pharmacy is authorized to dispense a 30-day supply of medications. Refill requests should be directed to the primary provider, Brittany Florez.     Current Outpatient Medications   Medication Sig Dispense Refill     vancomycin (VANCOCIN) 125 MG capsule Take 125 mg by mouth every 6 hours  STOP DATE 10/16/21       acetaminophen (TYLENOL) 325 MG tablet Take 650 mg by mouth every 6 hours as needed for mild pain        amLODIPine (NORVASC) 10 MG tablet Take 10 mg by mouth daily       atorvastatin (LIPITOR) 40 MG tablet TAKE 1 TABLET BY MOUTH AT BEDTIME 35 tablet PRN     carvedilol (COREG) 12.5 MG tablet Take 12.5 mg by mouth 2 times daily (with meals) (TAKE WITH 6.25MG FOR A TOTAL OF 18.75MG) **NOTE DOSAGE/STRENGTH**       carvedilol (COREG) 6.25 MG tablet TAKE 1 TABLET BY MOUTH TWICE DAILY WITH MEALS (TAKE WITH 12.5MG FOR A TOTAL OF 18.75MG) NOTE DOSAGE/STRENGTH** 70 tablet PRN     clopidogrel (PLAVIX) 75 MG tablet TAKE 1 TABLET BY MOUTH ONCE DAILY 35 tablet PRN     ELIQUIS ANTICOAGULANT 2.5 MG tablet TAKE 1 TABLET BY MOUTH TWICE DAILY 70 tablet PRN     ferrous sulfate (FE TABS) 325 (65 Fe) MG EC tablet Take 1 tablet (325 mg) by mouth Every Mon, Wed, Fri Morning 12 tablet 98     ipratropium-albuterol (COMBIVENT RESPIMAT)  MCG/ACT inhaler Inhale 1 puff into the lungs 4 times daily as needed for shortness of breath / dyspnea or wheezing 4 g 11     losartan (COZAAR) 25 MG tablet Take 25 mg by mouth daily       Multiple Vitamins-Minerals (SYSTANE ICAPS AREDS2) CAPS TAKE 1 CAPSULE BY MOUTH  TWICE DAILY 56 capsule PRN     nitroGLYcerin (NITROSTAT) 0.4 MG sublingual tablet For chest pain place 1 tablet under the tongue every 5 minutes for 3 doses. If symptoms persist 5 minutes after 1st dose call 911. 30 tablet 0     omeprazole (PRILOSEC) 20 MG DR capsule TAKE 1 CAPSULE BY MOUTH EVERY MORNING 37 capsule PRN     ondansetron (ZOFRAN) 4 MG tablet Take 4 mg by mouth every 6 hours as needed for nausea       oxybutynin ER (DITROPAN-XL) 10 MG 24 hr tablet Take 10 mg by mouth daily       POTASSIUM CHLORIDE PO Take 30 mEq by mouth daily       simethicone (MYLICON) 125 MG chewable tablet CHEW AND SWALLOW 1 TABLET BY MOUTH EVERY NIGHT AT BEDTIME 38 tablet PRN     umeclidinium (INCRUSE ELLIPTA) 62.5 MCG/INH inhaler Inhale 1 puff into the lungs daily 7 each 98     Vitamin D3 (CHOLECALCIFEROL) 25 mcg (1000 units) tablet Take 1 tablet (25 mcg) by mouth daily 30 tablet 98       SERVICES:  Home Care:  Occupational Therapy, Physical Therapy, Registered Nurse and Home Health Aide      STEFFANY Fritz CNP  This document was electronically signed on October 6, 2021            Sincerely,        STEFFANY Fritz CNP

## 2021-10-06 NOTE — PROGRESS NOTES
[place] [facility]  [place] Medical Record Number:  [id number]  Place of Service where encounter took place:  [facility] ([facility]) [[id number]]  Chief Complaint   Patient presents with     Nursing Home Acute       HPI:    Yoselyn Cui  is a 89 year old (1/28/1932), who is being seen today for an episodic care visit.  HPI information obtained from: facility chart records, facility staff and patient report. Today's concern is:      Patient Yoselyn Cui is a 89 yr old female admitted to [facility] s/p hospitalization [facility] 8/30-9/3/21 for pneumonia bilateral and left sided pneumothorax and s/p chest tube placement 8/31 and removal 9/2  PMHx emphysema, sick sinus syndrome and s/p pacemaker, CAD, hypertension, HLD, Afib, CVA with upper left sided weakness, low vision and dilation of pancreatic duct  Treated with course of vancomycin 9/28/21 for cdiff at [facility]    1. Clostridium difficile diarrhea    2. Generalized muscle weakness      Having recurrent loose stools x 2 today  Patient denies gastrointestinal upset and states she feels fine  Vitals stable    Past Medical and Surgical History reviewed in Epic today.    MEDICATIONS:    Current Outpatient Medications   Medication Sig Dispense Refill     acetaminophen (TYLENOL) 325 MG tablet Take 650 mg by mouth every 6 hours as needed for mild pain        amLODIPine (NORVASC) 10 MG tablet Take 10 mg by mouth daily       atorvastatin (LIPITOR) 40 MG tablet TAKE 1 TABLET BY MOUTH AT BEDTIME 35 tablet PRN     carvedilol (COREG) 12.5 MG tablet Take 12.5 mg by mouth 2 times daily (with meals) (TAKE WITH 6.25MG FOR A TOTAL OF 18.75MG) **NOTE DOSAGE/STRENGTH**       carvedilol (COREG) 6.25 MG tablet TAKE 1 TABLET BY MOUTH TWICE DAILY WITH MEALS (TAKE WITH 12.5MG FOR A TOTAL OF 18.75MG) NOTE DOSAGE/STRENGTH 70 tablet PRN     clopidogrel (PLAVIX) 75 MG tablet TAKE 1 TABLET BY MOUTH ONCE DAILY 35 tablet PRN     ELIQUIS  ANTICOAGULANT 2.5 MG tablet TAKE 1 TABLET BY MOUTH TWICE DAILY 70 tablet PRN     ferrous sulfate (FE TABS) 325 (65 Fe) MG EC tablet Take 1 tablet (325 mg) by mouth Every Mon, Wed, Fri Morning 12 tablet 98     ipratropium-albuterol (COMBIVENT RESPIMAT)  MCG/ACT inhaler Inhale 1 puff into the lungs 4 times daily as needed for shortness of breath / dyspnea or wheezing 4 g 11     losartan (COZAAR) 25 MG tablet Take 25 mg by mouth daily       Multiple Vitamins-Minerals (SYSTANE ICAPS AREDS2) CAPS TAKE 1 CAPSULE BY MOUTH TWICE DAILY 56 capsule PRN     nitroGLYcerin (NITROSTAT) 0.4 MG sublingual tablet For chest pain place 1 tablet under the tongue every 5 minutes for 3 doses. If symptoms persist 5 minutes after 1st dose call 911. 30 tablet 0     omeprazole (PRILOSEC) 20 MG DR capsule TAKE 1 CAPSULE BY MOUTH EVERY MORNING 37 capsule PRN     ondansetron (ZOFRAN) 4 MG tablet Take 4 mg by mouth every 6 hours as needed for nausea       oxybutynin ER (DITROPAN-XL) 10 MG 24 hr tablet Take 10 mg by mouth daily       POTASSIUM CHLORIDE PO Take 30 mEq by mouth daily       simethicone (MYLICON) 125 MG chewable tablet CHEW AND SWALLOW 1 TABLET BY MOUTH EVERY NIGHT AT BEDTIME 38 tablet PRN     umeclidinium (INCRUSE ELLIPTA) 62.5 MCG/INH inhaler Inhale 1 puff into the lungs daily 7 each 98     vancomycin (VANCOCIN) 125 MG capsule Take 125 mg by mouth every 6 hours       Vitamin D3 (CHOLECALCIFEROL) 25 mcg (1000 units) tablet Take 1 tablet (25 mcg) by mouth daily 30 tablet 98         REVIEW OF SYSTEMS:  4 point ROS including Respiratory, CV, GI and , other than that noted in the HPI,  is negative    Objective:  /70   Pulse 83   Temp 98  F (36.7  C)   Resp 24   Ht 1.524 m (5')   Wt 49.7 kg (109 lb 9.6 oz)   SpO2 93%   BMI 21.40 kg/m    Exam:  GENERAL APPEARANCE:  Alert, in no distress    Labs:   Labs done in SNF are in [place] EPIC. Please refer to them using EPIC/Care Everywhere.    ASSESSMENT/PLAN:     Clostridium  difficile diarrhea  Generalized muscle weakness     Likely recurrent cdiff  Order Vancomycin 125mg every 6hrs x 10 days  Repeat Cdiff   BMP tomorrow  Monitor vitals and intake, staff to update if signs and symptoms worsen or diarrhea not resolve  Updated  on patient likely have recurrent cdiff.  plans to follow up with [facility] to see if they can manage as she planned to go home to her assistive living tomorrow  primary care provider updated        Electronically signed by:  STEFFANY Fritz CNP

## 2021-10-07 NOTE — PROGRESS NOTES
Yoselyn Cui  1/28/1932  ORDERS:  - Check CBC, iron, TIBC, ferritin dx D64.9 CMP, vitamin D level, and PTH dx E83.51 on next lab day 10/14/21   Electronically signed by:   STEFFANY Yusuf CNP  10/07/21 12:13 PM

## 2021-10-07 NOTE — TELEPHONE ENCOUNTER
[facility] TELEPHONE NOTE    CC: Abnormal Labs    CBC RESULTS: Recent Labs   Lab Test 10/07/21  0615 09/29/21  0810   WBC 10.5 6.2   RBC 3.49* 4.23   HGB 9.9* 11.8   HCT 31.2* 38.5   MCV 89 91   MCH 28.4 27.9   MCHC 31.7 30.6*   RDW 16.8* 15.9*    255     Ferritin   Date Value Ref Range Status   07/22/2021 55 10 - 130 ng/mL Final   04/15/2021 30 8 - 252 ng/mL Final     Iron   Date Value Ref Range Status   07/22/2021 34 (L) 42 - 175 ug/dL Final   04/15/2021 26 (L) 35 - 180 ug/dL Final     Iron Binding Cap   Date Value Ref Range Status   04/15/2021 250 240 - 430 ug/dL Final     Last Basic Metabolic Panel:  Recent Labs   Lab Test 10/07/21  0615 10/04/21  0621    140   POTASSIUM 3.4 4.0   CHLORIDE 103 108   GOLDY 7.7* 8.6   CO2 21 24   BUN 13 8   CR 0.86 0.83   GLC 66* 84     ASSESSMENT/PLAN:  Normocytic anemia and hypocalcemia noted on post-TCU labs.  - Check CBC, iron, TIBC, ferritin, CMP, vitamin D level, and PTH on next lab day 10/14/21   - Clinical follow-up scheduled for 10/12    STEFFANY Yusuf CNP  10/07/21 12:11 PM

## 2021-10-12 NOTE — PROGRESS NOTES
[place] [facility]  [place] Medical Record Number:  [id number]  Place of Service where encounter took place:  [facility]) [[id number]]  Chief Complaint   Patient presents with     RECHECK     TCU follow up       HPI:    Yoselyn Cui  is a 89 year old (1/28/1932) female with PMH significant for HTN, OAB, GERD, hx of CVA x2, second-degree heart block s/p PPM (2016), paroxysmal atrial fibrillation (on apixaban), hyperlipidemia, CAD s/ps non-STEMI in July 20219 with multi-vessel disease s/p stenting of LAD 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      HPI information obtained from: facility chart records, facility staff, patient report, [facility] chart review, family/first contact daughter Maryjane rubio and JESUS Andrews.    Hospital Summary:  Hospitalized at [facility] from 8/30 to 9/3/21 with increasing dyspnea, hypoxia, cough, fever. Diagnosed with BL pneumonia and left sided pneumothorax on chest CT. Thoracic surgery consulted and on 8/31 chest tube was placed and subsequently removed and 9/2 and weaned to room air at time of discharge. Blood cultures negative. Initially treated with IV Zosyn, then Ceftriaxone and Azithromycin. Of note, incidental finding of dilatation of the main pancreatic duct with approximately 2.5 cm of hypo-attenuating focus in the pancreatic head/uncinate process.      TCU Summary:   Transferred from hospital to [facility] where resident continued to recover from 9/3 to 10/7/21.   Completed course of Azithromycin and cefdinir on TCU transfer and remained stable on room air.   During TCU stay developed C.diff with elevated WBC to 18.7 and N/V/D.  Mild KADI and hypotension with GI symptoms, blood pressure medication held for period.  Completed course of oral Vancomycin and symptoms initially improved then started having loose stools again prior to AFL transfer.  Discharged to [facility] on oral Vancomycin to  complete 10 day course. WBC 10.5 on day of discharge.    Therapy progress: SLUMS 21/30, walking 52 feet with cane CGA, needs assist for ADLs.     Today's concern is:  Follow-up today for [facility] admission.  Feeling well.  Still having some loose stools.  No SOB or CP.  No abd pain.   No N/V.  Eating well.  Biggest concern is open wound to anterior tibial area of left leg. Reports wound occurred with staff moving leg or may have occurred from crossing legs and sheared off top layer of skin.  Chronic L > R leg swelling.    Past Medical and Surgical History reviewed in Epic today.    MEDICATIONS:  Current Outpatient Medications   Medication Sig Dispense Refill     acetaminophen (TYLENOL) 325 MG tablet Take 650 mg by mouth every 6 hours as needed for mild pain        amLODIPine (NORVASC) 10 MG tablet Take 10 mg by mouth daily       atorvastatin (LIPITOR) 40 MG tablet TAKE 1 TABLET BY MOUTH AT BEDTIME 35 tablet PRN     carvedilol (COREG) 12.5 MG tablet Take 12.5 mg by mouth 2 times daily (with meals) (TAKE WITH 6.25MG FOR A TOTAL OF 18.75MG) **NOTE DOSAGE/STRENGTH**       carvedilol (COREG) 6.25 MG tablet TAKE 1 TABLET BY MOUTH TWICE DAILY WITH MEALS (TAKE WITH 12.5MG FOR A TOTAL OF 18.75MG) NOTE DOSAGE/STRENGTH 70 tablet PRN     clopidogrel (PLAVIX) 75 MG tablet TAKE 1 TABLET BY MOUTH ONCE DAILY 35 tablet PRN     ELIQUIS ANTICOAGULANT 2.5 MG tablet TAKE 1 TABLET BY MOUTH TWICE DAILY 70 tablet PRN     ferrous sulfate (FE TABS) 325 (65 Fe) MG EC tablet Take 1 tablet (325 mg) by mouth Every Mon, Wed, Fri Morning 12 tablet 98     ipratropium-albuterol (COMBIVENT RESPIMAT)  MCG/ACT inhaler Inhale 1 puff into the lungs 4 times daily as needed for shortness of breath / dyspnea or wheezing 4 g 11     losartan (COZAAR) 25 MG tablet Take 25 mg by mouth daily       Multiple Vitamins-Minerals (SYSTANE ICAPS AREDS2) CAPS TAKE 1 CAPSULE BY MOUTH TWICE DAILY 56 capsule PRN     nitroGLYcerin (NITROSTAT) 0.4 MG sublingual tablet For  chest pain place 1 tablet under the tongue every 5 minutes for 3 doses. If symptoms persist 5 minutes after 1st dose call 911. 30 tablet 0     omeprazole (PRILOSEC) 20 MG DR capsule TAKE 1 CAPSULE BY MOUTH EVERY MORNING 37 capsule PRN     ondansetron (ZOFRAN) 4 MG tablet Take 4 mg by mouth every 6 hours as needed for nausea       oxybutynin ER (DITROPAN-XL) 10 MG 24 hr tablet Take 10 mg by mouth daily       POTASSIUM CHLORIDE PO Take 30 mEq by mouth daily       simethicone (MYLICON) 125 MG chewable tablet CHEW AND SWALLOW 1 TABLET BY MOUTH EVERY NIGHT AT BEDTIME 38 tablet PRN     umeclidinium (INCRUSE ELLIPTA) 62.5 MCG/INH inhaler Inhale 1 puff into the lungs daily 7 each 98     vancomycin (VANCOCIN) 125 MG capsule Take 125 mg by mouth every 6 hours       Vitamin D3 (CHOLECALCIFEROL) 25 mcg (1000 units) tablet Take 1 tablet (25 mcg) by mouth daily 30 tablet 98     REVIEW OF SYSTEMS:  4 point ROS including Respiratory, CV, GI and , other than that noted in the HPI,  is negative    Objective:  /89   Pulse 65   Temp 97.2  F (36.2  C)   Resp 23   Ht 1.524 m (5')   Wt 51.3 kg (113 lb)   SpO2 96%   BMI 22.07 kg/m    Exam:  GENERAL APPEARANCE:  Alert, in no distress, pleasant, cooperative, well groomed seated in arm chair with rolling table in front of her.  EYES: sclera clear and conjunctiva normal, no discharge   ENT:  Mouth normal, moist mucous membranes  RESP:  Non-labored breathing, palpation of chest normal, no chest wall tenderness, no respiratory distress, Lung sounds clear, patient is on room air  CV:  Palpation - no murmur/non-displaced PMI, Auscultation - rate and rhythm regular, left CW pacemaker no murmur, no rub or gallop appreciated.  VASCULAR: 1+ edema bilateral lower extremities L > R.    ABDOMEN:  Normal bowel sounds, soft, nontender, no grimacing or guarding with palpation.  M/S:  Left sided weakness with UE flexion contracture.  SKIN:  Inspection - about 2 cm in diameter circumscribed  open area to anterior tibial area - see photo. Palpation- no increased warmth, skin is dry and non-tender.  PSYCH: awake and alert, speech fluent,  insight and judgement fair, memory fair, without depressed or anxious affect, calm and cooperative    Left Lower Leg:      Labs:   Recent labs in [facility] reviewed by me today.    CBC RESULTS: Recent Labs   Lab Test 10/07/21  0615 09/29/21  0810   WBC 10.5 6.2   RBC 3.49* 4.23   HGB 9.9* 11.8   HCT 31.2* 38.5   MCV 89 91   MCH 28.4 27.9   MCHC 31.7 30.6*   RDW 16.8* 15.9*    255     Last Basic Metabolic Panel:  Recent Labs   Lab Test 10/07/21  0615 10/04/21  0621    140   POTASSIUM 3.4 4.0   CHLORIDE 103 108   GOLDY 7.7* 8.6   CO2 21 24   BUN 13 8   CR 0.86 0.83   GLC 66* 84     GFR Estimate   Date Value Ref Range Status   10/07/2021 60 (L) >60 mL/min/1.73m2 Final     Comment:     As of July 11, 2021, eGFR is calculated by the CKD-EPI creatinine equation, without race adjustment. eGFR can be influenced by muscle mass, exercise, and diet. The reported eGFR is an estimation only and is only applicable if the renal function is stable.   04/28/2021 71 >60 mL/min/[1.73_m2] Final     Comment:     Non  GFR Calc  Starting 12/18/2018, serum creatinine based estimated GFR (eGFR) will be   calculated using the Chronic Kidney Disease Epidemiology Collaboration   (CKD-EPI) equation.       Liver Function Studies -   Recent Labs   Lab Test 09/17/21  0603 08/30/21  1538   PROTTOTAL 6.3* 8.5   ALBUMIN 2.3* 3.5   BILITOTAL 0.7 0.5   ALKPHOS 90 142   AST 26 19   ALT 17 23       TSH   Date Value Ref Range Status   04/15/2021 1.44 0.40 - 4.00 mU/L Final   10/12/2016 1.00 0.40 - 4.00 mU/L Final       CT CHEST WITH CONTRAST  8/30/2021 4:58 PM                                                           IMPRESSION:   1. Again noted moderate size left pneumothorax. No right pneumothorax.  Small left pleural effusion. Bibasilar confluent pulmonary opacities,  could be  infectious.  2. Multiple prominent mediastinal and hilar lymph nodes, could be  reactive.  3. Diffuse dilatation of the main pancreatic duct with approximately  2.5 cm hypoattenuating focus in the pancreatic head/uncinate process  of the pancreas, indeterminate, could represent side branch  intraductal papillary mucinous neoplasm versus other pancreatic  lesions. Recommend further evaluation with contrast-enhanced MRI/MRCP  on nonemergent basis.     CHEL CASTILLO MD     ASSESSMENT/PLAN:  (J18.9) Pneumonia of both lungs due to infectious organism  (primary encounter diagnosis)  (Z87.09) H/O pneumothorax  (J43.9) Pulmonary emphysema  Treated with oral abx in early August due to infiltrate on CXR, which resolved on interval imaging. Started on Incruse Ellipta and rescue inhaler due to hx of chronic lung disease. Emphysema on imaging and left lung nodules, 35 pack-year smoking history. Failed outpatient mgmt and developed BL pneumonia and left pneumothorax which was successfully managed with chest tube and IV and oral abx. Good progression with therapy in TCU and able to return back to [facility] apartment.  Plan:  - HH PT/OT/NSG  - Continue daily Incruse Ellipta  - Continue PRN Combivent recuse inhaler     (A04.72) C.diff  Comment: Stable - still with some loose stools  Plan:  - Complete 2nd 10 day course of oral vancomycin  - maintain contact precautions    (G81.94) Left hemiparesis (H)  (Z86.73) History of CVA (cerebrovascular accident)  (R26.9) Abnormal gait  Comment: Stable/chronic. Hx of CVA x2 with residual left sided weakness. Uses cane and wheelchair. Only has transport chair which she cannot lock independently.   Plan:   - HH PT/OT  - Request eval for WC  - Secondary prevention with atorvastatin 40 mg daily and Plavix, also on apixaban for afib   - Falls precautions   - PRN Tylenol for joint pain  - Lipid panel annually April 2022    (K86.9) Pancreatic lesion  Comment: Diffuse dilatation of the main pancreatic  "duct with approximately  2.5 cm hypoattenuating focus in the pancreatic head/uncinate process  of the pancreas, indeterminate, could represent side branch  intraductal papillary mucinous neoplasm versus other pancreatic  lesions. Recommend further evaluation with contrast-enhanced MRI/MRCP  on nonemergent basis.  Plan:   - Once stable in custodial setting consider further imaging, may not be consistent with care goals     (I48.0) Paroxysmal atrial fibrillation (H)  (Z95.0) Presence of permanent cardiac pacemaker  Comment: Stable. Second-degree heart block with PPM placement, device shows occasional PAF per cardiology notes.  Plan:   - Continue apixaban per cardiology, if recurrent falls may need to discuss risk/benefit   - Continue Coreg 18.75 mg BID for rate control  - Device check quarterly   - Cardiology follow-up 10/2021 with PA     (I10) Essential hypertension, benign  Comment: Stable. Elevated reading in office, order for 24-hour BP monitor does not seem to have been completed.  BP goals are <150/90 mm Hg.This is higher than ACC and AHA recommendations due to goals of care and risk of dizziness and falls. Patient is stable with current plan of care and routine assessment.  BP Readings from Last 3 Encounters:   10/19/21 125/66   10/12/21 115/89   10/06/21 121/70   Plan:   - Continue current regimen: losartan 25 mg daily (switch from ACE-I due to cough), Coreg 18.75 mg BID, amlodipine 10 mg daily   - Monitor routine VS and labs    (I25.10) ASCVD (arteriosclerotic cardiovascular disease)  (E78.5) Hyperlipidemia LDL goal <100  Comment: Stable, no angina, chronic COKER. Per cardiology: \"coronary artery disease with a non-STEMI in 07/2019 at which time she was found to have multivessel disease.  She underwent stenting to the mid LAD which was unfortunately complicated by a guidewire dissection requiring additional stenting to the proximal LAD and left main.  Her circumflex and RCA were not intervened upon.\" EF normalized " after stenting of mid-LAD.  Plan:   - Continue Plavix per cardiology - will need close monitoring for bleeding risk as also on DOAC  - Continue statin  - Continue PRN SL nitroglycerin  - HR and BP control as above  - Labs q 6 months  ~ April 2022  - Cardiology follow-up Oct 2021     (G31.84) Mild cognitive impairment  Comment: Mild memory concerns, SLUMS 22/30 on 3/18/21 and 21/20 on TCU discharge  Plan:   - Continues to benefit from supportive care in [facility] setting for medications, meals, ADL support, safety, socialization   - Daughter supportive     (N32.81) OAB (overactive bladder)  Comment: Chronic. + urinary incontinence.   Plan:   - Look to wean oxybutynin ER to 5 mg daily when stable    (D64.9) Normocytic anemia  Comment: Chronic, Last Hgb 9.9 on 10/7, MCV 89  High risk for bleeding given DOAC and Plavix.  7/22: iron 34 L  No s/s of acute bleed reported today.  Plan:   - Check CBC with iron studies 10/14    (K21.00) GERD  Comment: Stable. Possible hx of peptic ulcer disease and GIB.  Plan:  - Continue omeprazole and simethicone   - Zofran in place from TCU, if not using discontinue    (H35.30) ARMD  Comment: Low vision, was getting injections again   Plan:   - Continue eye vitamin, use home supply   - Follow with ophthalmology in community, family assists with appointments    (E87.6) Hypokalemia  Comment: No offending medications    Plan:  - Continue KCl supplement for now  - Check BMP 10/14/21    (K59.01) Constipation  Comment: stable  Plan:   - Monitor off bowel regimen, stopped due to recent C.diff    (S81.822A) Wound left leg  Comment: Traumatic wound to left lower leg  Plan:   -  nursing to follow and order dressing supplies  - Foam dressing daily for now    (Z71.89) Advance Care Planning  Comment: DNR/DNI selective treatment, no tube feeding, IV abx/fluids okay  Plan:  - No change to POLST    - Reviewed plan of care with daughterMaryjane.      Electronically signed by:  STEFFANY Yusuf CNP

## 2021-10-12 NOTE — LETTER
10/12/2021        RE: Yoselyn Shah Al  [address]  [address]  [facility] [address]        [place] [facility]  [place] [facility] Record Number:  [id number]  Place of Service where encounter took place:  [facility] [[id number]]  Chief Complaint   Patient presents with     RECHECK     TCU follow up       HPI:    Yoselyn Cui  is a 89 year old (1/28/1932) female with PMH significant for HTN, OAB, GERD, hx of CVA x2, second-degree heart block s/p PPM (2016), paroxysmal atrial fibrillation (on apixaban), hyperlipidemia, CAD s/ps non-STEMI in July 20219 with multi-vessel disease s/p stenting of LAD 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      HPI information obtained from: facility chart records, facility staff, patient report, [facility] chart review, family/first contact daughter Maryjane rubio and JESUS Andrews.    Hospital Summary:  Hospitalized at [facility] from 8/30 to 9/3/21 with increasing dyspnea, hypoxia, cough, fever. Diagnosed with BL pneumonia and left sided pneumothorax on chest CT. Thoracic surgery consulted and on 8/31 chest tube was placed and subsequently removed and 9/2 and weaned to room air at time of discharge. Blood cultures negative. Initially treated with IV Zosyn, then Ceftriaxone and Azithromycin. Of note, incidental finding of dilatation of the main pancreatic duct with approximately 2.5 cm of hypo-attenuating focus in the pancreatic head/uncinate process.      TCU Summary:   Transferred from hospital to [facility] where resident continued to recover from 9/3 to 10/7/21.   Completed course of Azithromycin and cefdinir on TCU transfer and remained stable on room air.   During TCU stay developed C.diff with elevated WBC to 18.7 and N/V/D.  Mild KADI and hypotension with GI symptoms, blood pressure medication held for period.  Completed course of oral Vancomycin and symptoms initially  improved then started having loose stools again prior to AFL transfer.  Discharged to [facility] on oral Vancomycin to complete 10 day course. WBC 10.5 on day of discharge.    Therapy progress: SLUMS 21/30, walking 52 feet with cane CGA, needs assist for ADLs.     Today's concern is:  Follow-up today for [facility] admission.  Feeling well.  Still having some loose stools.  No SOB or CP.  No abd pain.   No N/V.  Eating well.  Biggest concern is open wound to anterior tibial area of left leg. Reports wound occurred with staff moving leg or may have occurred from crossing legs and sheared off top layer of skin.  Chronic L > R leg swelling.    Past Medical and Surgical History reviewed in Epic today.    MEDICATIONS:  Current Outpatient Medications   Medication Sig Dispense Refill     acetaminophen (TYLENOL) 325 MG tablet Take 650 mg by mouth every 6 hours as needed for mild pain        amLODIPine (NORVASC) 10 MG tablet Take 10 mg by mouth daily       atorvastatin (LIPITOR) 40 MG tablet TAKE 1 TABLET BY MOUTH AT BEDTIME 35 tablet PRN     carvedilol (COREG) 12.5 MG tablet Take 12.5 mg by mouth 2 times daily (with meals) (TAKE WITH 6.25MG FOR A TOTAL OF 18.75MG) **NOTE DOSAGE/STRENGTH**       carvedilol (COREG) 6.25 MG tablet TAKE 1 TABLET BY MOUTH TWICE DAILY WITH MEALS (TAKE WITH 12.5MG FOR A TOTAL OF 18.75MG) NOTE DOSAGE/STRENGTH 70 tablet PRN     clopidogrel (PLAVIX) 75 MG tablet TAKE 1 TABLET BY MOUTH ONCE DAILY 35 tablet PRN     ELIQUIS ANTICOAGULANT 2.5 MG tablet TAKE 1 TABLET BY MOUTH TWICE DAILY 70 tablet PRN     ferrous sulfate (FE TABS) 325 (65 Fe) MG EC tablet Take 1 tablet (325 mg) by mouth Every Mon, Wed, Fri Morning 12 tablet 98     ipratropium-albuterol (COMBIVENT RESPIMAT)  MCG/ACT inhaler Inhale 1 puff into the lungs 4 times daily as needed for shortness of breath / dyspnea or wheezing 4 g 11     losartan (COZAAR) 25 MG tablet Take 25 mg by mouth daily       Multiple Vitamins-Minerals (SYSTANE ICAPS AREDS2) CAPS  TAKE 1 CAPSULE BY MOUTH TWICE DAILY 56 capsule PRN     nitroGLYcerin (NITROSTAT) 0.4 MG sublingual tablet For chest pain place 1 tablet under the tongue every 5 minutes for 3 doses. If symptoms persist 5 minutes after 1st dose call 911. 30 tablet 0     omeprazole (PRILOSEC) 20 MG DR capsule TAKE 1 CAPSULE BY MOUTH EVERY MORNING 37 capsule PRN     ondansetron (ZOFRAN) 4 MG tablet Take 4 mg by mouth every 6 hours as needed for nausea       oxybutynin ER (DITROPAN-XL) 10 MG 24 hr tablet Take 10 mg by mouth daily       POTASSIUM CHLORIDE PO Take 30 mEq by mouth daily       simethicone (MYLICON) 125 MG chewable tablet CHEW AND SWALLOW 1 TABLET BY MOUTH EVERY NIGHT AT BEDTIME 38 tablet PRN     umeclidinium (INCRUSE ELLIPTA) 62.5 MCG/INH inhaler Inhale 1 puff into the lungs daily 7 each 98     vancomycin (VANCOCIN) 125 MG capsule Take 125 mg by mouth every 6 hours       Vitamin D3 (CHOLECALCIFEROL) 25 mcg (1000 units) tablet Take 1 tablet (25 mcg) by mouth daily 30 tablet 98     REVIEW OF SYSTEMS:  4 point ROS including Respiratory, CV, GI and , other than that noted in the HPI,  is negative    Objective:  /89   Pulse 65   Temp 97.2  F (36.2  C)   Resp 23   Ht 1.524 m (5')   Wt 51.3 kg (113 lb)   SpO2 96%   BMI 22.07 kg/m    Exam:  GENERAL APPEARANCE:  Alert, in no distress, pleasant, cooperative, well groomed seated in arm chair with rolling table in front of her.  EYES: sclera clear and conjunctiva normal, no discharge   ENT:  Mouth normal, moist mucous membranes  RESP:  Non-labored breathing, palpation of chest normal, no chest wall tenderness, no respiratory distress, Lung sounds clear, patient is on room air  CV:  Palpation - no murmur/non-displaced PMI, Auscultation - rate and rhythm regular, left CW pacemaker no murmur, no rub or gallop appreciated.  VASCULAR: 1+ edema bilateral lower extremities L > R.    ABDOMEN:  Normal bowel sounds, soft, nontender, no grimacing or guarding with palpation.  M/S:   Left sided weakness with UE flexion contracture.  SKIN:  Inspection - about 2 cm in diameter circumscribed open area to anterior tibial area - see photo. Palpation- no increased warmth, skin is dry and non-tender.  PSYCH: awake and alert, speech fluent,  insight and judgement fair, memory fair, without depressed or anxious affect, calm and cooperative    Left Lower Leg:      Labs:   Recent labs in [facility] reviewed by me today.    CBC RESULTS: Recent Labs   Lab Test 10/07/21  0615 09/29/21  0810   WBC 10.5 6.2   RBC 3.49* 4.23   HGB 9.9* 11.8   HCT 31.2* 38.5   MCV 89 91   MCH 28.4 27.9   MCHC 31.7 30.6*   RDW 16.8* 15.9*    255     Last Basic Metabolic Panel:  Recent Labs   Lab Test 10/07/21  0615 10/04/21  0621    140   POTASSIUM 3.4 4.0   CHLORIDE 103 108   GOLDY 7.7* 8.6   CO2 21 24   BUN 13 8   CR 0.86 0.83   GLC 66* 84     GFR Estimate   Date Value Ref Range Status   10/07/2021 60 (L) >60 mL/min/1.73m2 Final     Comment:     As of July 11, 2021, eGFR is calculated by the CKD-EPI creatinine equation, without race adjustment. eGFR can be influenced by muscle mass, exercise, and diet. The reported eGFR is an estimation only and is only applicable if the renal function is stable.   04/28/2021 71 >60 mL/min/[1.73_m2] Final     Comment:     Non  GFR Calc  Starting 12/18/2018, serum creatinine based estimated GFR (eGFR) will be   calculated using the Chronic Kidney Disease Epidemiology Collaboration   (CKD-EPI) equation.       Liver Function Studies -   Recent Labs   Lab Test 09/17/21  0603 08/30/21  1538   PROTTOTAL 6.3* 8.5   ALBUMIN 2.3* 3.5   BILITOTAL 0.7 0.5   ALKPHOS 90 142   AST 26 19   ALT 17 23       TSH   Date Value Ref Range Status   04/15/2021 1.44 0.40 - 4.00 mU/L Final   10/12/2016 1.00 0.40 - 4.00 mU/L Final       CT CHEST WITH CONTRAST  8/30/2021 4:58 PM                                                           IMPRESSION:   1. Again noted moderate size left pneumothorax. No  right pneumothorax.  Small left pleural effusion. Bibasilar confluent pulmonary opacities,  could be infectious.  2. Multiple prominent mediastinal and hilar lymph nodes, could be  reactive.  3. Diffuse dilatation of the main pancreatic duct with approximately  2.5 cm hypoattenuating focus in the pancreatic head/uncinate process  of the pancreas, indeterminate, could represent side branch  intraductal papillary mucinous neoplasm versus other pancreatic  lesions. Recommend further evaluation with contrast-enhanced MRI/MRCP  on nonemergent basis.     CHEL CASTILLO MD     ASSESSMENT/PLAN:  (J18.9) Pneumonia of both lungs due to infectious organism  (primary encounter diagnosis)  (Z87.09) H/O pneumothorax  (J43.9) Pulmonary emphysema  Treated with oral abx in early August due to infiltrate on CXR, which resolved on interval imaging. Started on Incruse Ellipta and rescue inhaler due to hx of chronic lung disease. Emphysema on imaging and left lung nodules, 35 pack-year smoking history. Failed outpatient mgmt and developed BL pneumonia and left pneumothorax which was successfully managed with chest tube and IV and oral abx. Good progression with therapy in TCU and able to return back to [facility] apartment.  Plan:  - HH PT/OT/NSG  - Continue daily Incruse Ellipta  - Continue PRN Combivent recuse inhaler     (A04.72) C.diff  Comment: Stable - still with some loose stools  Plan:  - Complete 2nd 10 day course of oral vancomycin  - maintain contact precautions    (G81.94) Left hemiparesis (H)  (Z86.73) History of CVA (cerebrovascular accident)  (R26.9) Abnormal gait  Comment: Stable/chronic. Hx of CVA x2 with residual left sided weakness. Uses cane and wheelchair. Only has transport chair which she cannot lock independently.   Plan:   - HH PT/OT  - Request eval for WC  - Secondary prevention with atorvastatin 40 mg daily and Plavix, also on apixaban for afib   - Falls precautions   - PRN Tylenol for joint pain  - Lipid panel  "annually April 2022    (K86.9) Pancreatic lesion  Comment: Diffuse dilatation of the main pancreatic duct with approximately  2.5 cm hypoattenuating focus in the pancreatic head/uncinate process  of the pancreas, indeterminate, could represent side branch  intraductal papillary mucinous neoplasm versus other pancreatic  lesions. Recommend further evaluation with contrast-enhanced MRI/MRCP  on nonemergent basis.  Plan:   - Once stable in [facility] setting consider further imaging, may not be consistent with care goals     (I48.0) Paroxysmal atrial fibrillation (H)  (Z95.0) Presence of permanent cardiac pacemaker  Comment: Stable. Second-degree heart block with PPM placement, device shows occasional PAF per cardiology notes.  Plan:   - Continue apixaban per cardiology, if recurrent falls may need to discuss risk/benefit   - Continue Coreg 18.75 mg BID for rate control  - Device check quarterly   - Cardiology follow-up 10/2021 with PA     (I10) Essential hypertension, benign  Comment: Stable. Elevated reading in office, order for 24-hour BP monitor does not seem to have been completed.  BP goals are <150/90 mm Hg.This is higher than ACC and AHA recommendations due to goals of care and risk of dizziness and falls. Patient is stable with current plan of care and routine assessment.  BP Readings from Last 3 Encounters:   10/19/21 125/66   10/12/21 115/89   10/06/21 121/70   Plan:   - Continue current regimen: losartan 25 mg daily (switch from ACE-I due to cough), Coreg 18.75 mg BID, amlodipine 10 mg daily   - Monitor routine VS and labs    (I25.10) ASCVD (arteriosclerotic cardiovascular disease)  (E78.5) Hyperlipidemia LDL goal <100  Comment: Stable, no angina, chronic COKER. Per cardiology: \"coronary artery disease with a non-STEMI in 07/2019 at which time she was found to have multivessel disease.  She underwent stenting to the mid LAD which was unfortunately complicated by a guidewire dissection requiring additional stenting " "to the proximal LAD and left main.  Her circumflex and RCA were not intervened upon.\" EF normalized after stenting of mid-LAD.  Plan:   - Continue Plavix per cardiology - will need close monitoring for bleeding risk as also on DOAC  - Continue statin  - Continue PRN SL nitroglycerin  - HR and BP control as above  - Labs q 6 months  ~ April 2022  - Cardiology follow-up Oct 2021     (G31.84) Mild cognitive impairment  Comment: Mild memory concerns, SLUMS 22/30 on 3/18/21 and 21/20 on TCU discharge  Plan:   - Continues to benefit from supportive care in longterm setting for medications, meals, ADL support, safety, socialization   - Daughter supportive     (N32.81) OAB (overactive bladder)  Comment: Chronic. + urinary incontinence.   Plan:   - Look to wean oxybutynin ER to 5 mg daily when stable    (D64.9) Normocytic anemia  Comment: Chronic, Last Hgb 9.9 on 10/7, MCV 89  High risk for bleeding given DOAC and Plavix.  7/22: iron 34 L  No s/s of acute bleed reported today.  Plan:   - Check CBC with iron studies 10/14    (K21.00) GERD  Comment: Stable. Possible hx of peptic ulcer disease and GIB.  Plan:  - Continue omeprazole and simethicone   - Zofran in place from TCU, if not using discontinue    (H35.30) ARMD  Comment: Low vision, was getting injections again   Plan:   - Continue eye vitamin, use home supply   - Follow with ophthalmology in community, family assists with appointments    (E87.6) Hypokalemia  Comment: No offending medications    Plan:  - Continue KCl supplement for now  - Check BMP 10/14/21    (K59.01) Constipation  Comment: stable  Plan:   - Monitor off bowel regimen, stopped due to recent C.diff    (U64.228Z) Wound left leg  Comment: Traumatic wound to left lower leg  Plan:   -  nursing to follow and order dressing supplies  - Foam dressing daily for now    (Z71.89) Advance Care Planning  Comment: DNR/DNI selective treatment, no tube feeding, IV abx/fluids okay  Plan:  - No change to POLST    - Reviewed " plan of care with daughter, Maryjane.      Electronically signed by:  STEFFANY Yusuf CNP             Sincerely,        STEFFANY Yusuf CNP

## 2021-10-19 NOTE — LETTER
10/19/2021        RE: Yoselyn Cui  Ox[place] Al  [address]  [address]  [facility] [address]        [facility]    Chief Complaint   Patient presents with     Diarrhea     known C.diff, follow-up s/p treatment     HPI:  Yoselyn Cui is a 89 year old  (1/28/1932), who is being seen today for an episodic care visit at: [facility] [[id number]].     Today's concern is:   Follow-up today for C.diff, completed second course of oral vancomycin.  No loose stools. BMs have completely normalized.  OT is also recommending WC.   Currently only has transport chair with very small wheels and she is unable to reach breaks. Due to left hemiparesis can ambulate short distances and stand to transfer, but requires a wheelchair to get to meals, wash room, activities due to impaired gait, balance, and activity tolerance.    HH nursing following for wound to left shin note last week.  Seen with HH PT.  Complains of swelling to left foot.  Nursing reports new small skin tear near previously open area leaking copious fluid, now bandaged with coban which is pushing fluid down into foot. Unable to wear her left shoe.    Allergies, and PMH/PSH reviewed in EPIC today.    REVIEW OF SYSTEMS:  4 point ROS including Respiratory, CV, GI and , other than that noted in the HPI,  is negative    Objective:   /66   Pulse 74   Temp (!) 96.4  F (35.8  C)   Resp 18   Ht 1.524 m (5')   Wt 50.7 kg (111 lb 12.8 oz)   SpO2 96%   BMI 21.83 kg/m    GENERAL APPEARANCE:  Alert, in no distress, pleasant, cooperative, well groomed seated in arm chair with rolling table in front of her.  EYES: Sclera clear and conjunctiva normal, no discharge, wearing glasses  RESP:  Non-labored breathing, no respiratory distress, lungs clear BL without adventitious breath sounds.  CV:  Palpation - no murmur/non-displaced PMI, L CW pacer. Auscultation - Rate and rhythm regular, no murmur, no rub or gallop  appreciated.   VASCULAR: 2+ edema left foot only, 1+ right lower leg >> chronic assymetry. Calves are supple and non-tender.  ABDOMEN:  Normal bowel sounds, soft, nontender, no grimacing or guarding with palpation.  SKIN: CDI pressure dressing to L lower leg no visible erythema or warmth.  M/S:  Left sided weakness with UE flexion contracture. Ambulates with unsteady gait and 4-point cane in right hand, then needs to sit and rest.  PSYCH: Awake and alert, speech fluent, insight and judgement fair, memory fair, without depressed or anxious affect, calm and cooperative.    Recent labs in [facility] reviewed by me today.   CBC RESULTS: Recent Labs   Lab Test 10/14/21  0945 10/07/21  0615   WBC 9.6 10.5   RBC 4.06 3.49*   HGB 11.2* 9.9*   HCT 36.1 31.2*   MCV 89 89   MCH 27.6 28.4   MCHC 31.0* 31.7   RDW 17.3* 16.8*    199     Ferritin   Date Value Ref Range Status   10/14/2021 84 8 - 252 ng/mL Final   04/15/2021 30 8 - 252 ng/mL Final     Iron   Date Value Ref Range Status   10/14/2021 44 35 - 180 ug/dL Final   04/15/2021 26 (L) 35 - 180 ug/dL Final     Iron Binding Cap   Date Value Ref Range Status   04/15/2021 250 240 - 430 ug/dL Final     Iron Binding Capacity   Date Value Ref Range Status   10/14/2021 236 (L) 240 - 430 ug/dL Final     Lab Results   Component Value Date    B12 354 04/15/2021     Last Basic Metabolic Panel:  Recent Labs   Lab Test 10/14/21  0945 10/07/21  0615    134   POTASSIUM 3.5 3.4   CHLORIDE 101 103   GOLDY 8.6 7.7*   CO2 26 21   BUN 8 13   CR 0.72 0.86   * 66*     Liver Function Studies -   Recent Labs   Lab Test 10/14/21  0945 09/17/21  0603   PROTTOTAL 7.0 6.3*   ALBUMIN 2.8* 2.3*   BILITOTAL 0.3 0.7   ALKPHOS 110 90   AST 25 26   ALT 28 17     TSH   Date Value Ref Range Status   04/15/2021 1.44 0.40 - 4.00 mU/L Final   10/12/2016 1.00 0.40 - 4.00 mU/L Final     ULTRASOUND VENOUS LEFT LOWER EXTREMITY   Apr 20, 2011 4:05:00 PM       HISTORY: Left leg pain and swelling.        FINDINGS: The deep veins in the left lower extremity are compressible   throughout. The deep veins demonstrate normal venous augmentation,   waveforms and color Doppler flow.       IMPRESSION: No evidence of DVT.       Findings called to Dr. Kumar at the time of the exam by the ultrasound   technologist, 4:06 p.m. on 2011.      Assessment/Plan:  (A04.72) C. difficile diarrhea  (primary encounter diagnosis)  Comment: Loose stools resolved after course of oral vancomycin   Plan:   - Monitor for clinical recurrence    - Okay to discontinue contact precautions     (R26.9) Abnormal gait  (I69.398,  R26.89) Impaired balance as late effect of cerebrovascular accident (CVA)  (G81.94) Left hemiparesis (H)  Comment: Able to ambulate short distances with cane in apartment, but needs WC for longer distances. Using transport chair at present, which is unsafe due to inability to lock breaks. Staff assist with pushing wheelchair to meals/activites.   Plan:   - F2F completed for wheelchair (see below)  - HH PT/OT continue w/ [facility]   - Falls precautions    (S81.802D) Open wound of left lower leg, subsequent encounter  Comment: Traumatic injury to left anterior tibial area in setting of chronic BL LE L > R.   Plan:   - Will call HH nursing to clarify wound care plan  - Would benefit from HH lymph therapy for compression    Electronically signed by: STEFFANY Yusuf CNP       Face to Face and Medical Necessity Statement for DME Provider visit    Demographic Information on Yoselyn Cui:  Gender: female  : 1932  [facility]  [address]    [facility] [address]  [phone] (home)     Medical Record: [id number]  Social Security Number: [id number]  Primary Care Provider: Brittany Florez  Insurance: Payor: [employer] / Plan: [employer] FEDERAL / Product Type: HMO /     HPI:   Yoselyn Cui is a 89 year old  (1932), who is being seen today for a face to face provider visit at [place]  [facility]; medical necessity statement for DME included. This patient requires the following:  DME Ordered and Medical Necessity Statement     Wheelchair Documentation  Size: standard 16 x 16, jamshid/height manual wheelchair  Corresponding cushion: Yes: 16\" x 16\" general use cushion  Standard foot rests: Yes  Elevating leg rests: No  Arm rests: Yes: standard arm rests   Lap tray: No  Brake extenders: yes   Dose the patient use oxygen? No   Is the patient able to propel wheelchair? Yes If no why not? NA And is there someone who can? Resides in [facility] with 24-7 staff.    1. The patient has mobility limitations that impairs their ability to participate in one or more mobility related activities: Toileting, Feeding, Grooming and Bathing.  The wheelchair is suitable and necessary for use in the patient's home.  2. The patient's mobility limitations cannot be safely resolved by using a cane/walker:Yes    Reason why a cane or walker will not meet the patient's needs. (ie: balance, tolerance, level of assistance) due to left hemiparesis is not able to ambulate long distances, thus to get to dining room, bathroom, activities, out to appointments, bathroom resident will need wheelchair for safe transportation to reduce fall risk and maintain current level of independence.    3. The patients home has adequate access to use a manual wheelchair:Yes  4. The use of a manual wheelchair on a regular basis will improve the patients ability to participate in mobility related ADL's at home:Yes  5. The patient is willing to use a manual wheelchair at home:Yes  6. The patient has adequate upper body strength and the mental capability to safely use a manual wheelchair and/or has a caregiver that is able to assist: Yes - can use right hand and feet, also has 24-7 staff to assist  7. Does the patient have a lower extremity injury or edema?Yes  Reason for Type of Wheelchair  Patient weight:   Wt Readings from Last 5 Encounters:   10/19/21 50.7 kg " (111 lb 12.8 oz)   Height.  Ht Readings from Last 2 Encounters:   10/19/21 1.524 m (5')     Pt needing above DME with expected length of need 99 years due to medical necessity associated with following diagnosis:    Impaired balance as late effect of cerebrovascular accident (CVA)    Left hemiparesis (H)    PMH   has a past medical history of AV block, 2nd degree (5/16/2016), Cerebrovascular accident (H) (8/22/2016), COKER (dyspnea on exertion) (8/22/2016), Generalized weakness (10/12/2016), GIB (gastrointestinal bleeding), History of colonic polyps (8/22/2016), HTN (hypertension) (8/22/2016), Iron deficiency anemia, Melanoma of skin (H)-rt arm (8/22/2016), Mixed hyperlipidemia (8/22/2016), Pacemaker, PAF (paroxysmal atrial fibrillation) (H), and SSS (sick sinus syndrome) (H) (8/22/2016).    ROS: See above    EXAM  Vitals: /66   Pulse 74   Temp (!) 96.4  F (35.8  C)   Resp 18   Ht 1.524 m (5')   Wt 50.7 kg (111 lb 12.8 oz)   SpO2 96%   BMI 21.83 kg/m  ;BMI= Body mass index is 21.83 kg/m .   See above.    ASSESSMENT/PLAN:  1. C. difficile diarrhea    2. Abnormal gait    3. Impaired balance as late effect of cerebrovascular accident (CVA)    4. Left hemiparesis (H)    5. Open wound of left lower leg, subsequent encounter    See above    Orders:  1. Facility staff/TC to contact DME company to get their order form for provider to fill out.  [facility] Medical: [phone]     ELECTRONICALLY SIGNED BY LORRIEOS CERTIFIED PROVIDER:  STEFFANY Yusuf CNP   NPI: [id number]  [facility]  [address] [place] [address], [address]  [place] [address]                      Sincerely,        STEFFANY Yusuf CNP

## 2021-10-19 NOTE — PROGRESS NOTES
[facility] GERIATRIC SERVICES    Chief Complaint   Patient presents with     Diarrhea     known C.diff, follow-up s/p treatment     HPI:  Yoselyn Cui is a 89 year old  (1/28/1932), who is being seen today for an episodic care visit at: [facility] ([facility]) [[id number]].     Today's concern is:   Follow-up today for C.diff, completed second course of oral vancomycin.  No loose stools. BMs have completely normalized.  OT is also recommending WC.   Currently only has transport chair with very small wheels and she is unable to reach breaks. Due to left hemiparesis can ambulate short distances and stand to transfer, but requires a wheelchair to get to meals, wash room, activities due to impaired gait, balance, and activity tolerance.    HH nursing following for wound to left shin note last week.  Seen with HH PT.  Complains of swelling to left foot.  Nursing reports new small skin tear near previously open area leaking copious fluid, now bandaged with coban which is pushing fluid down into foot. Unable to wear her left shoe.    Allergies, and PMH/PSH reviewed in [facility] today.    REVIEW OF SYSTEMS:  4 point ROS including Respiratory, CV, GI and , other than that noted in the HPI,  is negative    Objective:   /66   Pulse 74   Temp (!) 96.4  F (35.8  C)   Resp 18   Ht 1.524 m (5')   Wt 50.7 kg (111 lb 12.8 oz)   SpO2 96%   BMI 21.83 kg/m    GENERAL APPEARANCE:  Alert, in no distress, pleasant, cooperative, well groomed seated in arm chair with rolling table in front of her.  EYES: Sclera clear and conjunctiva normal, no discharge, wearing glasses  RESP:  Non-labored breathing, no respiratory distress, lungs clear BL without adventitious breath sounds.  CV:  Palpation - no murmur/non-displaced PMI, L CW pacer. Auscultation - Rate and rhythm regular, no murmur, no rub or gallop appreciated.   VASCULAR: 2+ edema left foot only, 1+ right lower leg >> chronic assymetry. Calves are supple and  non-tender.  ABDOMEN:  Normal bowel sounds, soft, nontender, no grimacing or guarding with palpation.  SKIN: CDI pressure dressing to L lower leg no visible erythema or warmth.  M/S:  Left sided weakness with UE flexion contracture. Ambulates with unsteady gait and 4-point cane in right hand, then needs to sit and rest.  PSYCH: Awake and alert, speech fluent, insight and judgement fair, memory fair, without depressed or anxious affect, calm and cooperative.    Recent labs in [facility] reviewed by me today.   CBC RESULTS: Recent Labs   Lab Test 10/14/21  0945 10/07/21  0615   WBC 9.6 10.5   RBC 4.06 3.49*   HGB 11.2* 9.9*   HCT 36.1 31.2*   MCV 89 89   MCH 27.6 28.4   MCHC 31.0* 31.7   RDW 17.3* 16.8*    199     Ferritin   Date Value Ref Range Status   10/14/2021 84 8 - 252 ng/mL Final   04/15/2021 30 8 - 252 ng/mL Final     Iron   Date Value Ref Range Status   10/14/2021 44 35 - 180 ug/dL Final   04/15/2021 26 (L) 35 - 180 ug/dL Final     Iron Binding Cap   Date Value Ref Range Status   04/15/2021 250 240 - 430 ug/dL Final     Iron Binding Capacity   Date Value Ref Range Status   10/14/2021 236 (L) 240 - 430 ug/dL Final     Lab Results   Component Value Date    B12 354 04/15/2021     Last Basic Metabolic Panel:  Recent Labs   Lab Test 10/14/21  0945 10/07/21  0615    134   POTASSIUM 3.5 3.4   CHLORIDE 101 103   GOLDY 8.6 7.7*   CO2 26 21   BUN 8 13   CR 0.72 0.86   * 66*     Liver Function Studies -   Recent Labs   Lab Test 10/14/21  0945 09/17/21  0603   PROTTOTAL 7.0 6.3*   ALBUMIN 2.8* 2.3*   BILITOTAL 0.3 0.7   ALKPHOS 110 90   AST 25 26   ALT 28 17     TSH   Date Value Ref Range Status   04/15/2021 1.44 0.40 - 4.00 mU/L Final   10/12/2016 1.00 0.40 - 4.00 mU/L Final     ULTRASOUND VENOUS LEFT LOWER EXTREMITY   Apr 20, 2011 4:05:00 PM       HISTORY: Left leg pain and swelling.       FINDINGS: The deep veins in the left lower extremity are compressible   throughout. The deep veins demonstrate normal  venous augmentation,   waveforms and color Doppler flow.       IMPRESSION: No evidence of DVT.       Findings called to Dr. Kumar at the time of the exam by the ultrasound   technologist, 4:06 p.m. on 2011.      Assessment/Plan:  (A04.72) C. difficile diarrhea  (primary encounter diagnosis)  Comment: Loose stools resolved after course of oral vancomycin   Plan:   - Monitor for clinical recurrence    - Okay to discontinue contact precautions     (R26.9) Abnormal gait  (I69.398,  R26.89) Impaired balance as late effect of cerebrovascular accident (CVA)  (G81.94) Left hemiparesis (H)  Comment: Able to ambulate short distances with cane in apartment, but needs WC for longer distances. Using transport chair at present, which is unsafe due to inability to lock breaks. Staff assist with pushing wheelchair to meals/activites.   Plan:   - F2F completed for wheelchair (see below)  - HH PT/OT continue w/ [facility]   - Falls precautions    (S81.802D) Open wound of left lower leg, subsequent encounter  Comment: Traumatic injury to left anterior tibial area in setting of chronic BL LE L > R.   Plan:   - Will call HH nursing to clarify wound care plan  - Would benefit from HH lymph therapy for compression    Electronically signed by: STEFFANY Yusuf CNP       Face to Face and Medical Necessity Statement for DME Provider visit    Demographic Information on Yoselyn Cui:  Gender: female  : 1932  [facility]  [address]    [facility] [address]  [phone] (home)     Medical Record: [id number]  Social Security Number: [id number]  Primary Care Provider: Brittany Florez  Insurance: Payor: [email or web address] / Plan: [email or web address] FEDERAL / Product Type: HMO /     HPI:   Yoselyn Cui is a 89 year old  (1932), who is being seen today for a face to face provider visit at [facility]; medical necessity statement for DME included. This patient requires the following:  DME Ordered and Medical  "Necessity Statement     Wheelchair Documentation  Size: standard 16 x 16, jamshid/height manual wheelchair  Corresponding cushion: Yes: 16\" x 16\" general use cushion  Standard foot rests: Yes  Elevating leg rests: No  Arm rests: Yes: standard arm rests   Lap tray: No  Brake extenders: yes   Dose the patient use oxygen? No   Is the patient able to propel wheelchair? Yes If no why not? NA And is there someone who can? Resides in [facility] with 24-7 staff.    1. The patient has mobility limitations that impairs their ability to participate in one or more mobility related activities: Toileting, Feeding, Grooming and Bathing.  The wheelchair is suitable and necessary for use in the patient's home.  2. The patient's mobility limitations cannot be safely resolved by using a cane/walker:Yes    Reason why a cane or walker will not meet the patient's needs. (ie: balance, tolerance, level of assistance) due to left hemiparesis is not able to ambulate long distances, thus to get to dining room, bathroom, activities, out to appointments, bathroom resident will need wheelchair for safe transportation to reduce fall risk and maintain current level of independence.    3. The patients home has adequate access to use a manual wheelchair:Yes  4. The use of a manual wheelchair on a regular basis will improve the patients ability to participate in mobility related ADL's at home:Yes  5. The patient is willing to use a manual wheelchair at home:Yes  6. The patient has adequate upper body strength and the mental capability to safely use a manual wheelchair and/or has a caregiver that is able to assist: Yes - can use right hand and feet, also has 24-7 staff to assist  7. Does the patient have a lower extremity injury or edema?Yes  Reason for Type of Wheelchair  Patient weight:   Wt Readings from Last 5 Encounters:   10/19/21 50.7 kg (111 lb 12.8 oz)   Height.  Ht Readings from Last 2 Encounters:   10/19/21 1.524 m (5')     Pt needing above DME with " expected length of need 99 years due to medical necessity associated with following diagnosis:    Impaired balance as late effect of cerebrovascular accident (CVA)    Left hemiparesis (H)    PMH   has a past medical history of AV block, 2nd degree (5/16/2016), Cerebrovascular accident (H) (8/22/2016), COKER (dyspnea on exertion) (8/22/2016), Generalized weakness (10/12/2016), GIB (gastrointestinal bleeding), History of colonic polyps (8/22/2016), HTN (hypertension) (8/22/2016), Iron deficiency anemia, Melanoma of skin (H)-rt arm (8/22/2016), Mixed hyperlipidemia (8/22/2016), Pacemaker, PAF (paroxysmal atrial fibrillation) (H), and SSS (sick sinus syndrome) (H) (8/22/2016).    ROS: See above    EXAM  Vitals: /66   Pulse 74   Temp (!) 96.4  F (35.8  C)   Resp 18   Ht 1.524 m (5')   Wt 50.7 kg (111 lb 12.8 oz)   SpO2 96%   BMI 21.83 kg/m  ;BMI= Body mass index is 21.83 kg/m .   See above.    ASSESSMENT/PLAN:  1. C. difficile diarrhea    2. Abnormal gait    3. Impaired balance as late effect of cerebrovascular accident (CVA)    4. Left hemiparesis (H)    5. Open wound of left lower leg, subsequent encounter    See above    Orders:  1. Facility staff/TC to contact DME company to get their order form for provider to fill out.  [facility] Medical: [phone]     ELECTRONICALLY SIGNED BY MADDY CERTIFIED PROVIDER:  STEFFANY Yusuf CNP   NPI: [id number]  [facility]  [address] [place] [address], [address]  [place] [address]

## 2021-10-21 NOTE — PATIENT INSTRUCTIONS
Yoselyn Cui  1/28/1932  ORDERS:  - Please fax Face Sheet to [facility] [phone]   - Discontinue contact precautions   Electronically signed by:   STEFFANY Yusuf CNP  10/21/21 1:06 PM

## 2021-10-22 PROBLEM — K86.9 PANCREATIC LESION: Status: ACTIVE | Noted: 2021-01-01

## 2021-11-02 NOTE — LETTER
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     Today's concern is:  Follow-up today to assess healing progress, left lower extremity wound.  Main areas are scabbed over per nursing assessment yesterday.  [facility] following.  No longer draining serous fluid.  No pain. Resident seen resting in bed, took phone call during visit, provided limited history today.  Does not seem to have new wheelchair in room despite prescription being sent over at week ago.    Allergies, and PMH/PSH reviewed in [facility] today.    REVIEW OF SYSTEMS: See HPI.    Objective:   BP (!) 145/66   Pulse 72   Temp 97.2  F (36.2  C)   Resp 18   Ht 1.524 m (5')   Wt 50.7 kg (111 lb 12.8 oz)   SpO2 94%   BMI 21.83 kg/m    GENERAL APPEARANCE:  Alert, in no distress  EYES: Sclera clear and conjunctiva normal, no discharge, wearing glasses  RESP:  Non-labored breathing  VASCULAR: 1+ edema BL LE today. Calves are supple and non-tender.  SKIN: Previously draining wound to L anterior tibial area is now scabbed and closed with erythema, see below.  M/S:  Left sided weakness with UE flexion contracture.  PSYCH: Awake and alert, speech fluent on phone call.          Assessment/Plan:  (O92.287P) Open wound of left lower leg, subsequent encounter  Comment: Traumatic injury to left anterior tibial area now closed and healed  Plan:   -  nursing  to discontinue dressing, no longer needed   -  lymph therapy to follow for compression mgmt for chronic LE edema     Will touch base with HH OT regarding WC.    Electronically signed by: STEFFANY Yusuf CNP               Sincerely,        STEFFANY Yusuf CNP

## 2021-11-02 NOTE — PROGRESS NOTES
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     Today's concern is:  Follow-up today to assess healing progress, left lower extremity wound.  Main areas are scabbed over per nursing assessment yesterday.  [facility] following.  No longer draining serous fluid.  No pain. Resident seen resting in bed, took phone call during visit, provided limited history today.  Does not seem to have new wheelchair in room despite prescription being sent over at week ago.    Allergies, and PMH/PSH reviewed in [facility] today.    REVIEW OF SYSTEMS: See HPI.    Objective:   BP (!) 145/66   Pulse 72   Temp 97.2  F (36.2  C)   Resp 18   Ht 1.524 m (5')   Wt 50.7 kg (111 lb 12.8 oz)   SpO2 94%   BMI 21.83 kg/m    GENERAL APPEARANCE:  Alert, in no distress  EYES: Sclera clear and conjunctiva normal, no discharge, wearing glasses  RESP:  Non-labored breathing  VASCULAR: 1+ edema BL LE today. Calves are supple and non-tender.  SKIN: Previously draining wound to L anterior tibial area is now scabbed and closed with erythema, see below.  M/S:  Left sided weakness with UE flexion contracture.  PSYCH: Awake and alert, speech fluent on phone call.          Assessment/Plan:  (T50.922J) Open wound of left lower leg, subsequent encounter  Comment: Traumatic injury to left anterior tibial area now closed and healed  Plan:   -  nursing to discontinue dressing, no longer needed   -  lymph therapy to follow for compression mgmt for chronic LE  edema     Will touch base with HH OT regarding WC.    Electronically signed by: STEFFANY Yusuf CNP

## 2021-11-06 NOTE — TELEPHONE ENCOUNTER
[facility] Nurse Triage Telephone Note    Provider: STEFFANY Yusuf CNP  Facility: [facility] Type:  AL    Caller: Britni  Call Back Number: [phone]    Allergies:  No Known Allergies     Reason for call: Found pt on floor on her L side between her bed & chair. She said she hit the L side of her head. No bumps or bruises. 115/56 81 T:97.5 sat 97RA. Neuros intact. ROM intact, (L side weak baseline). One hour later VSS & neuros intact.    Verbal Order/Direction given by Provider: Continue to monitor.    Provider giving Order:  STEFFANY Yusuf CNP    Verbal Order given to: Britni Darling RN

## 2021-11-09 NOTE — TELEPHONE ENCOUNTER
Yoselyn Cui  1/28/1932  ORDERS:  - BMP dx I10, CBC with diff dx cough on 11/11/21  - Swab for COVID-19 and influenza due to cough reported by home care   Electronically signed by:   STEFFANY Yusuf CNP  11/09/21 12:07 PM

## 2021-11-10 NOTE — PROGRESS NOTES
"[facility] GERIATRIC SERVICES    Chief Complaint   Patient presents with     RECHECK     fall, cough, polypharmacy     HPI:  Yoselyn uCi is a 89 year old  (1/28/1932), who is being seen today for an episodic care visit at: [facility] ([facility]) [[id number]].     Today's concern is:   Follow-up today regarding several concern brought to light by nursing staff.    Seen in new .   Using feet to propel around apartment.  Discussed her upcoming 89th birthday.   Resident had fall on 11/05/21.  First states she backed out of bathroom when walking and lost balance. No injury.  Fell second time into lamp.  \"Slammed the left side of face.\"  No LOC, \"I got tangled up.\"   Falls occurred prior to getting new wheelchair.  Per nursing notes had not locked transport chair, which likely contributed to fall.     Reported dry cough to  nurse from [facility].   \"Just the edges of a cold.\"  Currently using daily Incruse Ellipta and has PRN Combivent but has not needed it.  In past managed with Arnuity Ellipta (fluticasone).   Would like some cough syrup.  No SOB. No sputum.  No rhinitis. No fever/chills.  + COKER with walking long distances in the [place] with therapy.   No seasonal allergies.     Concerned about amount of potassium supplement she takes.  Last serum potassium 3.5 on 10/14/21.   Per chart review has required potassium supplement for a couple years, started by her previous PCP Dr. Meza around Feb 2020.  Treated for C.diff last month, but no longer having diarrhea.   Repeat labs are pending for today.   Also reported \"painful nailbeds\" to nursing and relates this to a side effect of potassium.   Paronychia to right first finger and finger tips are quite dry.   Only right hand with this issues. Left sided hemiparesis post stroke, unable to use left hand much due to chronic contracture.  Also feels potassium makes her go to the bathroom more frequently.   \"Everytime I eat I have to get rid of " "it.\"  Solid stools, small amounts.  Does not feel constipated.  Feels strongly this is related to potassium and wants to take 20 mEq pill or powder.   No abd pain or nausea. \"Very good\" appetite.     Allergies, and PMH/PSH reviewed in [facility] today.    REVIEW OF SYSTEMS:  4 point ROS including Respiratory, CV, GI and , other than that noted in the HPI,  is negative    Objective:   /56   Pulse 81   Temp 97.7  F (36.5  C)   Resp 18   Ht 1.524 m (5')   Wt 50.7 kg (111 lb 12.8 oz)   SpO2 91%   BMI 21.83 kg/m   95% 84  GENERAL APPEARANCE:  Alert, in no distress, pleasant, cooperative, well groomed seated new wheelchair.   EYES: Sclera clear and conjunctiva normal, no discharge, wearing glasses  RESP:  Non-labored breathing, no respiratory distress, lungs clear BL without adventitious breath sounds.  CV:  Palpation - no murmur/non-displaced PMI, L CW pacer. Auscultation - Rate and rhythm regular, no murmur, no rub or gallop appreciated.   VASCULAR: Trace edema BL LE. Calves are supple and non-tender. Wearing TG shape compression socks.   ABDOMEN:  Normal bowel sounds, soft, nontender, no grimacing or guarding with palpation.  SKIN: Two small purple/brown irregular scabs to left anterior tibial area. Skin around fingernails of right hand without erythema, swelling, fissure lateral to nail on R second digit.   M/S:  Left sided weakness with UE flexion contracture. Propels in wheelchair around apartment using feet.   PSYCH: Awake and alert, speech fluent, insight and judgement fair, memory fair, without depressed or anxious affect, calm and cooperative.    Recent labs in [facility] reviewed by me today.    CBC RESULTS: Recent Labs   Lab Test 10/14/21  0945 10/07/21  0615   WBC 9.6 10.5   RBC 4.06 3.49*   HGB 11.2* 9.9*   HCT 36.1 31.2*   MCV 89 89   MCH 27.6 28.4   MCHC 31.0* 31.7   RDW 17.3* 16.8*    199       Last Basic Metabolic Panel:  Recent Labs   Lab Test 10/14/21  0945 10/07/21  0615    134 "   POTASSIUM 3.5 3.4   CHLORIDE 101 103   GOLDY 8.6 7.7*   CO2 26 21   BUN 8 13   CR 0.72 0.86   * 66*   Estimated Creatinine Clearance: 42.4 mL/min (based on SCr of 0.72 mg/dL).     Liver Function Studies -   Recent Labs   Lab Test 10/14/21  0945 09/17/21  0603   PROTTOTAL 7.0 6.3*   ALBUMIN 2.8* 2.3*   BILITOTAL 0.3 0.7   ALKPHOS 110 90   AST 25 26   ALT 28 17     TSH   Date Value Ref Range Status   04/15/2021 1.44 0.40 - 4.00 mU/L Final   10/12/2016 1.00 0.40 - 4.00 mU/L Final     Assessment/Plan:  (E87.6) Hypokalemia  Comment: Chronic, no offending medications, no extensive work-up completed, managed with oral supplement for several years, feels 10 mEq x 3 pills (30 mEq daily) contributing to symptoms.   Plan:  - Discontinue Potassium Chloride 30 mEq in 10 mEq tablets and start potassium packet 20 mEq daily  - BMP pending for today   - Follow labs  - Further work-up likely not consistent with care goals     (J43.9) Pulmonary emphysema  Treated with oral abx in early August due to infiltrate on CXR, which resolved on interval imaging. Started on Incruse Ellipta and rescue inhaler due to hx of chronic lung disease. Emphysema on imaging and left lung nodules, 35 pack-year smoking history. Failed outpatient mgmt and developed BL pneumonia and left pneumothorax which was successfully managed with chest tube and IV and oral abx. Good progression with therapy in TCU and able to return back to [facility] apartment. Now with very mild dry cough.  Plan:  - Continue daily Incruse Ellipta  - Continue PRN Combivent recuse inhaler   - Add BID guaifenesin and BID PRN for dry cough, instructed to notify nursing staff if cough is at all worse or any sputum    (K59.01) Constipation  Comment: Chronic issue, off laxatives due to recent C.diff, no loose stools, but small formed stools after meals feels related to higher KCl dose.  Plan:   - Decrease potassium dose  - Monitor off bowel regimen  - Consider fiber supplement    (R26.9)  Abnormal gait  (I69.398,  R26.89) Impaired balance as late effect of cerebrovascular accident (CVA)  (G81.94) Left hemiparesis (H)  Comment: Able to ambulate short distances with cane in apartment, but needs WC for longer distances. Fall 11/5 due to unsafe use of transport chair, now has brand-new WC with handles to look brakes.  Plan:   - Falls precautions  -  PT/OT continue to follow     Electronically signed by: STEFFANY Yusuf CNP

## 2021-11-11 NOTE — LETTER
"    11/11/2021        RE: Yoselyn Cui  Annabella Al  [address]  [address]  [facility] [address]        [facility] GERIATRIC [facility]    Chief Complaint   Patient presents with     RECHECK     fall, cough, polypharmacy     HPI:  Yoselyn Cui is a 89 year old  (1/28/1932), who is being seen today for an episodic care visit at: [facility] ([facility]) [[id number]].     Today's concern is:   Follow-up today regarding several concern brought to light by nursing staff.    Seen in [facility].   Using feet to propel around apartment.  Discussed her upcoming 89th birthday.   Resident had fall on 11/05/21.  First states she backed out of bathroom when walking and lost balance. No injury.  Fell second time into lamp.  \"Slammed the left side of face.\"  No LOC, \"I got tangled up.\"   Falls occurred prior to getting new wheelchair.  Per nursing notes had not locked transport chair, which likely contributed to fall.     Reported dry cough to  nurse from [facility].   \"Just the edges of a cold.\"  Currently using daily Incruse Ellipta and has PRN Combivent but has not needed it.  In past managed with Arnuity Ellipta (fluticasone).   Would like some cough syrup.  No SOB. No sputum.  No rhinitis. No fever/chills.  + COKER with walking long distances in the [place] with therapy.   No seasonal allergies.     Concerned about amount of potassium supplement she takes.  Last serum potassium 3.5 on 10/14/21.   Per chart review has required potassium supplement for a couple years, started by her previous PCP Dr. Meza around Feb 2020.  Treated for C.diff last month, but no longer having diarrhea.   Repeat labs are pending for today.   Also reported \"painful nailbeds\" to nursing and relates this to a side effect of potassium.   Paronychia to right first finger and finger tips are quite dry.   Only right hand with this issues. Left sided hemiparesis post stroke, unable to use left hand much due to chronic " "contracture.  Also feels potassium makes her go to the bathroom more frequently.   \"Everytime I eat I have to get rid of it.\"  Solid stools, small amounts.  Does not feel constipated.  Feels strongly this is related to potassium and wants to take 20 mEq pill or powder.   No abd pain or nausea. \"Very good\" appetite.     Allergies, and PMH/PSH reviewed in [facility] today.    REVIEW OF SYSTEMS:  4 point ROS including Respiratory, CV, GI and , other than that noted in the HPI,  is negative    Objective:   /56   Pulse 81   Temp 97.7  F (36.5  C)   Resp 18   Ht 1.524 m (5')   Wt 50.7 kg (111 lb 12.8 oz)   SpO2 91%   BMI 21.83 kg/m   95% 84  GENERAL APPEARANCE:  Alert, in no distress, pleasant, cooperative, well groomed seated new wheelchair.   EYES: Sclera clear and conjunctiva normal, no discharge, wearing glasses  RESP:  Non-labored breathing, no respiratory distress, lungs clear BL without adventitious breath sounds.  CV:  Palpation - no murmur/non-displaced PMI, L CW pacer. Auscultation - Rate and rhythm regular, no murmur, no rub or gallop appreciated.   VASCULAR: Trace edema BL LE. Calves are supple and non-tender. Wearing TG shape compression socks.   ABDOMEN:  Normal bowel sounds, soft, nontender, no grimacing or guarding with palpation.  SKIN: Two small purple/brown irregular scabs to left anterior tibial area. Skin around fingernails of right hand without erythema, swelling, fissure lateral to nail on R second digit.   M/S:  Left sided weakness with UE flexion contracture. Propels in wheelchair around apartment using feet.   PSYCH: Awake and alert, speech fluent, insight and judgement fair, memory fair, without depressed or anxious affect, calm and cooperative.    Recent labs in [facility] reviewed by me today.    CBC RESULTS: Recent Labs   Lab Test 10/14/21  0945 10/07/21  0615   WBC 9.6 10.5   RBC 4.06 3.49*   HGB 11.2* 9.9*   HCT 36.1 31.2*   MCV 89 89   MCH 27.6 28.4   MCHC 31.0* 31.7   RDW 17.3* 16.8* "    199       Last Basic Metabolic Panel:  Recent Labs   Lab Test 10/14/21  0945 10/07/21  0615    134   POTASSIUM 3.5 3.4   CHLORIDE 101 103   GOLDY 8.6 7.7*   CO2 26 21   BUN 8 13   CR 0.72 0.86   * 66*   Estimated Creatinine Clearance: 42.4 mL/min (based on SCr of 0.72 mg/dL).     Liver Function Studies -   Recent Labs   Lab Test 10/14/21  0945 09/17/21  0603   PROTTOTAL 7.0 6.3*   ALBUMIN 2.8* 2.3*   BILITOTAL 0.3 0.7   ALKPHOS 110 90   AST 25 26   ALT 28 17     TSH   Date Value Ref Range Status   04/15/2021 1.44 0.40 - 4.00 mU/L Final   10/12/2016 1.00 0.40 - 4.00 mU/L Final     Assessment/Plan:  (E87.6) Hypokalemia  Comment: Chronic, no offending medications, no extensive work-up completed, managed with oral supplement for several years, feels 10 mEq x 3 pills (30 mEq daily) contributing to symptoms.   Plan:  - Discontinue Potassium Chloride 30 mEq in 10 mEq tablets and start potassium packet 20 mEq daily  - BMP pending for today   - Follow labs  - Further work-up likely not consistent with care goals     (J43.9) Pulmonary emphysema  Treated with oral abx in early August due to infiltrate on CXR, which resolved on interval imaging. Started on Incruse Ellipta and rescue inhaler due to hx of chronic lung disease. Emphysema on imaging and left lung nodules, 35 pack-year smoking history. Failed outpatient mgmt and developed BL pneumonia and left pneumothorax which was successfully managed with chest tube and IV and oral abx. Good progression with therapy in TCU and [facility]. Now with very mild dry cough.  Plan:  - Continue daily Incruse Ellipta  - Continue PRN Combivent recuse inhaler   - Add BID guaifenesin and BID PRN for dry cough, instructed to notify nursing staff if cough is at all worse or any sputum    (K59.01) Constipation  Comment: Chronic issue, off laxatives due to recent C.diff, no loose stools, but small formed stools after meals feels related to higher  KCl dose.  Plan:   - Decrease potassium dose  - Monitor off bowel regimen  - Consider fiber supplement    (R26.9) Abnormal gait  (I69.398,  R26.89) Impaired balance as late effect of cerebrovascular accident (CVA)  (G81.94) Left hemiparesis (H)  Comment: Able to ambulate short distances with cane in apartment, but needs WC for longer distances. Fall 11/5 due to unsafe use of transport chair, now has brand-new WC with handles to look brakes.  Plan:   - Falls precautions  -  PT/OT continue to follow     Electronically signed by: STEFFANY Yusuf CNP             Sincerely,        STEFFANY Yusuf CNP

## 2021-11-11 NOTE — PATIENT INSTRUCTIONS
Yoselyn Cui  1/28/1932  ORDERS:  - When potassium packet arrives, discontinue potassium pills and start following:  - potassium chloride (KLOR-CON) 20 MEQ packet; Take 20 mEq by mouth daily Give in 4 oz of fluid dx Hypokalemia  - guaiFENesin (ROBITUSSIN) 100 MG/5ML liquid; Take 10 mLs (200 mg) by mouth 2 times daily. May also take 10 mLs (200 mg) 2 times daily as needed for cough. Dx cough   Electronically signed by:   STEFFANY Yusuf CNP  11/11/21 2:05 PM

## 2021-11-22 NOTE — PROGRESS NOTES
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      HPI information obtained from: facility chart records, facility staff, patient report, [facility] chart review and family/first contact daughter Maryjane report. As well as, PT and RN from [facility]    Today's concern is:  Follow-up today to assess leg swelling as requested by  lymph OT.  Just prior to visit nurse from home care called to report legs are looking excellent, tolerating Velcro wraps, no open areas. Does have concern about potassium causing frequent stools. No improvement after dose reduction.    Seen in room, eating treat her daughter Maryjane brought. Well groomed and alert.    Resident reports she is having formed stools several times per day.  Gets herself to the bathroom.  Feels this occurs after each time she eats, \"it goes right through me!\"  No diarrhea. Does not feel constipated, but hx of constipation.  Drinks plenty of water, keeps large water bottle with straw near her chair.  Takes iron supplement, which turns stools dark color.   No new foods. No N/V.   No fever/chills.  No abd pain.   No weight loss.  No melena or hematochezia.   No dysuria or hematuria.  Chronic anemia.      Reviewed visit from 8/3 for constipation/diarrhea, possible IBS?   Treated for C.diff over one month ago.  On laxatives and taking prune juice at this time, but also having " loose stools.  Given advance age and mobility deficits would like to limit off  visits.    Discussion at last visit regarding contribution of potassium supplement symptoms above. Dose decreased and changed to powder formulation, but does not like taste of powder medication. Not sure dose reduction from 30 mEq to 20 mEq has helped GI symptoms.    Chronic dry cough.   No SOB.  No wheeze.  Daughter has not noticed coughing while speaking with resident on phone.    Past Medical and Surgical History reviewed in Epic today.    MEDICATIONS:  Current Outpatient Medications   Medication Sig Dispense Refill     acetaminophen (TYLENOL) 325 MG tablet Take 650 mg by mouth every 6 hours as needed for mild pain        amLODIPine (NORVASC) 10 MG tablet TAKE 1 TABLET BY MOUTH ONCE DAILY 28 tablet 11     atorvastatin (LIPITOR) 40 MG tablet TAKE 1 TABLET BY MOUTH AT BEDTIME 35 tablet PRN     carvedilol (COREG) 12.5 MG tablet Take 12.5 mg by mouth 2 times daily (with meals) (TAKE WITH 6.25MG FOR A TOTAL OF 18.75MG)        carvedilol (COREG) 6.25 MG tablet TAKE 1 TABLET BY MOUTH TWICE DAILY WITH MEALS (TAKE WITH 12.5MG FOR A TOTAL OF 18.75MG)  70 tablet PRN     clopidogrel (PLAVIX) 75 MG tablet TAKE 1 TABLET BY MOUTH ONCE DAILY 35 tablet PRN     ELIQUIS ANTICOAGULANT 2.5 MG tablet TAKE 1 TABLET BY MOUTH TWICE DAILY 70 tablet PRN     ferrous sulfate (FE TABS) 325 (65 Fe) MG EC tablet Take 1 tablet (325 mg) by mouth Every Mon, Wed, Fri Morning 12 tablet 98     guaiFENesin (ROBITUSSIN) 100 MG/5ML liquid Take 10 mLs (200 mg) by mouth 2 times daily. May also take 10 mLs (200 mg) 2 times daily as needed for cough. 180 mL 11     ipratropium-albuterol (COMBIVENT RESPIMAT)  MCG/ACT inhaler Inhale 1 puff into the lungs 4 times daily as needed for shortness of breath / dyspnea or wheezing 4 g 11     losartan (COZAAR) 25 MG tablet TAKE 1 TABLET BY MOUTH ONCE DAILY 28 tablet 11     Multiple Vitamins-Minerals (SYSTANE ICAPS  AREDS2) CAPS TAKE 1 CAPSULE BY MOUTH TWICE DAILY 56 capsule PRN     nitroGLYcerin (NITROSTAT) 0.4 MG sublingual tablet For chest pain place 1 tablet under the tongue every 5 minutes for 3 doses. If symptoms persist 5 minutes after 1st dose call 911. 30 tablet 0     omeprazole (PRILOSEC) 20 MG DR capsule TAKE 1 CAPSULE BY MOUTH EVERY MORNING 37 capsule PRN     ondansetron (ZOFRAN) 4 MG tablet Take 4 mg by mouth every 6 hours as needed for nausea       oxybutynin ER (DITROPAN-XL) 10 MG 24 hr tablet TAKE 1 TABLET BY MOUTH ONCE DAILY 28 tablet 11     potassium chloride ER (KLOR-CON M) 10 MEQ CR tablet Take 1 tablet (10 mEq) by mouth daily 30 tablet 98     psyllium (METAMUCIL/KONSYL) 58.6 % powder Take 6 g (1 teaspoonful) by mouth daily In 8 oz fluid, drink promptly 283 g 98     simethicone (MYLICON) 125 MG chewable tablet CHEW AND SWALLOW 1 TABLET BY MOUTH EVERY NIGHT AT BEDTIME 38 tablet PRN     umeclidinium (INCRUSE ELLIPTA) 62.5 MCG/INH inhaler Inhale 1 puff into the lungs daily 7 each 98     Vitamin D3 (CHOLECALCIFEROL) 25 mcg (1000 units) tablet Take 1 tablet (25 mcg) by mouth daily 30 tablet 98     REVIEW OF SYSTEMS:  Limited secondary to cognitive impairment but today pt reports 4 point ROS including Respiratory, CV, GI and , other than that noted in the HPI,  is negative. Daughter assists in providing history.     Objective:  Temp 97  F (36.1  C)   Ht 1.524 m (5')   Wt 50.7 kg (111 lb 12.8 oz)   SpO2 98%   BMI 21.83 kg/m    Exam:  GENERAL APPEARANCE:  Alert, in no distress, pleasant, cooperative, well groomed seated new wheelchair.   EYES: Sclera clear and conjunctiva normal, no discharge, wearing glasses  RESP:  Non-labored breathing, no respiratory distress, lungs clear BL without adventitious breath sounds.  CV:  Palpation - no murmur/non-displaced PMI, L CW pacer. Auscultation - Rate and rhythm regular, no murmur, no rub or gallop appreciated.   VASCULAR: Trace edema BL LE. Limited exam as wearing new  Velcro compression socks.   ABDOMEN:  Normal bowel sounds, soft, nontender, no grimacing or guarding with palpation.  : No suprapubic tenderness  M/S:  Left sided weakness with UE flexion contracture. Propels in wheelchair around apartment using feet.   PSYCH: Awake and alert, speech fluent, insight and judgement fair, memory fair, without depressed or anxious affect, calm and cooperative.    Labs:   [facility] me today.    CBC RESULTS: Recent Labs   Lab Test 11/11/21  0700 10/14/21  0945   WBC 9.8 9.6   RBC 3.82 4.06   HGB 10.6* 11.2*   HCT 34.9* 36.1   MCV 91 89   MCH 27.7 27.6   MCHC 30.4* 31.0*   RDW 17.9* 17.3*    418     Ferritin   Date Value Ref Range Status   10/14/2021 84 8 - 252 ng/mL Final   04/15/2021 30 8 - 252 ng/mL Final     Iron   Date Value Ref Range Status   10/14/2021 44 35 - 180 ug/dL Final   04/15/2021 26 (L) 35 - 180 ug/dL Final     Iron Binding Cap   Date Value Ref Range Status   04/15/2021 250 240 - 430 ug/dL Final     Iron Binding Capacity   Date Value Ref Range Status   10/14/2021 236 (L) 240 - 430 ug/dL Final     Last Basic Metabolic Panel:  Recent Labs   Lab Test 11/11/21  0700 10/14/21  0945    133   POTASSIUM 4.0 3.5   CHLORIDE 106 101   GOLDY 8.0* 8.6   CO2 24 26   BUN 13 8   CR 0.87 0.72   GLC 87 144*     GFR Estimate   Date Value Ref Range Status   11/11/2021 59 (L) >60 mL/min/1.73m2 Final     Comment:     As of July 11, 2021, eGFR is calculated by the CKD-EPI creatinine equation, without race adjustment. eGFR can be influenced by muscle mass, exercise, and diet. The reported eGFR is an estimation only and is only applicable if the renal function is stable.   04/28/2021 71 >60 mL/min/[1.73_m2] Final     Comment:     Non  GFR Calc  Starting 12/18/2018, serum creatinine based estimated GFR (eGFR) will be   calculated using the Chronic Kidney Disease Epidemiology Collaboration   (CKD-EPI) equation.       ASSESSMENT/PLAN:  (K58.2)  Irritable bowel syndrome with both constipation and diarrhea  Comment: Concern for frequent formed stools, feels potassium contributes, can cause GI irritation. Used laxatives and prune juice in past. Hx of C.diff. Possible incomplete bowel evacuation?   Plan:  - Therapeutic trial of psyllium (METAMUCIL/KONSYL) daily with 8 oz fluid  - Encourage PO fluids t/o the day  - Would not likely want GI follow-up off site, but could consider. Dietary modification may prove difficult given memory loss.    (E87.6) Hypokalemia  Comment: Chronic, no offending medications, no extensive work-up completed, managed with oral supplement for several years, feels 10 mEq x 3 pills (30 mEq daily) contributing to symptoms, but with dose reduction to 20 mEq have not abated, does no like powder. Potassium 4.0 WNL s/p dose reduction.  Plan:  - Discontinue Potassium Chloride packet due to taste  - Start potassium 10 mEq daily  - [facility] 12/9 with clinical follow-up  - Further work-up likely not consistent with care goals     (J43.9) Pulmonary emphysema  Treated with oral abx in early August due to infiltrate on CXR, which resolved on interval imaging. Started on Incruse Ellipta and rescue inhaler due to hx of chronic lung disease. Emphysema on imaging and left lung nodules, 35 pack-year smoking history. Failed outpatient mgmt and developed BL pneumonia and left pneumothorax which was successfully managed with chest tube and IV and oral abx. Good progression with therapy in TCU and able to return back to [facility] apartment. Now with very mild dry cough.  Plan:  - Continue daily Incruse Ellipta  - Continue PRN Combivent recuse inhaler   - Consider pulmonology follow-up  - Continue BID guaifenesin and BID PRN for dry cough  - Reviewed with daughter, will update provider if any change in symptoms    Electronically signed by:  STEFFANY Yusuf CNP

## 2021-11-23 NOTE — LETTER
"    11/23/2021        RE: Yoselyn Shah Al  [address]  [address]  [facility] [address]        [facility]  [facility] Record Number:  [id number]  Place of Service where encounter took place:  [facility] [[id number]]  Chief Complaint   Patient presents with     RECHECK     leg swelling       HPI:    Yoselyn Cui  is a 89 year old (1/28/1932)  female with PMH significant for HTN, OAB, GERD, hx of CVA x2, second-degree heart block s/p PPM (2016), paroxysmal atrial fibrillation (on apixaban), hyperlipidemia, CAD s/ps non-STEMI in July 20219 with multi-vessel disease s/p stenting of LAD 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      HPI information obtained from: facility chart records, facility staff, patient report, [facility] chart review and family/first contact daughter Maryjane report. As well as, PT and RN from [facility].    Today's concern is:  Follow-up today to assess leg swelling as requested by HH lymph OT.  Just prior to visit nurse from home care called to report legs are looking excellent, tolerating Velcro wraps, no open areas. Does have concern about potassium causing frequent stools. No improvement after dose reduction.    Seen in room, eating treat her daughter Maryjane brought. Well groomed and alert.    Resident reports she is having formed stools several times per day.  Gets herself to the bathroom.  Feels this occurs after each time she eats, \"it goes right through me!\"  No diarrhea. Does not feel constipated, but hx of constipation.  Drinks plenty of water, keeps large water bottle with straw near her chair.  Takes iron supplement, which turns stools dark color.   No new foods. No N/V.   No fever/chills.  No abd pain.   No weight loss.  No melena or hematochezia.   No dysuria or hematuria.  Chronic anemia.      Reviewed visit from 8/3 for constipation/diarrhea, possible " IBS?   Treated for C.diff over one month ago.  On laxatives and taking prune juice at this time, but also having loose stools.  Given advance age and mobility deficits would like to limit off  visits.    Discussion at last visit regarding contribution of potassium supplement symptoms above. Dose decreased and changed to powder formulation, but does not like taste of powder medication. Not sure dose reduction from 30 mEq to 20 mEq has helped GI symptoms.    Chronic dry cough.   No SOB.  No wheeze.  Daughter has not noticed coughing while speaking with resident on phone.    Past Medical and Surgical History reviewed in Epic today.    MEDICATIONS:  Current Outpatient Medications   Medication Sig Dispense Refill     acetaminophen (TYLENOL) 325 MG tablet Take 650 mg by mouth every 6 hours as needed for mild pain        amLODIPine (NORVASC) 10 MG tablet TAKE 1 TABLET BY MOUTH ONCE DAILY 28 tablet 11     atorvastatin (LIPITOR) 40 MG tablet TAKE 1 TABLET BY MOUTH AT BEDTIME 35 tablet PRN     carvedilol (COREG) 12.5 MG tablet Take 12.5 mg by mouth 2 times daily (with meals) (TAKE WITH 6.25MG FOR A TOTAL OF 18.75MG)        carvedilol (COREG) 6.25 MG tablet TAKE 1 TABLET BY MOUTH TWICE DAILY WITH MEALS (TAKE WITH 12.5MG FOR A TOTAL OF 18.75MG)  70 tablet PRN     clopidogrel (PLAVIX) 75 MG tablet TAKE 1 TABLET BY MOUTH ONCE DAILY 35 tablet PRN     ELIQUIS ANTICOAGULANT 2.5 MG tablet TAKE 1 TABLET BY MOUTH TWICE DAILY 70 tablet PRN     ferrous sulfate (FE TABS) 325 (65 Fe) MG EC tablet Take 1 tablet (325 mg) by mouth Every Mon, Wed, Fri Morning 12 tablet 98     guaiFENesin (ROBITUSSIN) 100 MG/5ML liquid Take 10 mLs (200 mg) by mouth 2 times daily. May also take 10 mLs (200 mg) 2 times daily as needed for cough. 180 mL 11     ipratropium-albuterol (COMBIVENT RESPIMAT)  MCG/ACT inhaler Inhale 1 puff into the lungs 4 times daily as needed for shortness of breath / dyspnea or wheezing 4 g 11     losartan  (COZAAR) 25 MG tablet TAKE 1 TABLET BY MOUTH ONCE DAILY 28 tablet 11     Multiple Vitamins-Minerals (SYSTANE ICAPS AREDS2) CAPS TAKE 1 CAPSULE BY MOUTH TWICE DAILY 56 capsule PRN     nitroGLYcerin (NITROSTAT) 0.4 MG sublingual tablet For chest pain place 1 tablet under the tongue every 5 minutes for 3 doses. If symptoms persist 5 minutes after 1st dose call 911. 30 tablet 0     omeprazole (PRILOSEC) 20 MG DR capsule TAKE 1 CAPSULE BY MOUTH EVERY MORNING 37 capsule PRN     ondansetron (ZOFRAN) 4 MG tablet Take 4 mg by mouth every 6 hours as needed for nausea       oxybutynin ER (DITROPAN-XL) 10 MG 24 hr tablet TAKE 1 TABLET BY MOUTH ONCE DAILY 28 tablet 11     potassium chloride ER (KLOR-CON M) 10 MEQ CR tablet Take 1 tablet (10 mEq) by mouth daily 30 tablet 98     psyllium (METAMUCIL/KONSYL) 58.6 % powder Take 6 g (1 teaspoonful) by mouth daily In 8 oz fluid, drink promptly 283 g 98     simethicone (MYLICON) 125 MG chewable tablet CHEW AND SWALLOW 1 TABLET BY MOUTH EVERY NIGHT AT BEDTIME 38 tablet PRN     umeclidinium (INCRUSE ELLIPTA) 62.5 MCG/INH inhaler Inhale 1 puff into the lungs daily 7 each 98     Vitamin D3 (CHOLECALCIFEROL) 25 mcg (1000 units) tablet Take 1 tablet (25 mcg) by mouth daily 30 tablet 98     REVIEW OF SYSTEMS:  Limited secondary to cognitive impairment but today pt reports 4 point ROS including Respiratory, CV, GI and , other than that noted in the HPI,  is negative. Daughter assists in providing history.     Objective:  Temp 97  F (36.1  C)   Ht 1.524 m (5')   Wt 50.7 kg (111 lb 12.8 oz)   SpO2 98%   BMI 21.83 kg/m    Exam:  GENERAL APPEARANCE:  Alert, in no distress, pleasant, cooperative, well groomed seated new wheelchair.   EYES: Sclera clear and conjunctiva normal, no discharge, wearing glasses  RESP:  Non-labored breathing, no respiratory distress, lungs clear BL without adventitious breath sounds.  CV:  Palpation - no murmur/non-displaced PMI, L CW pacer. Auscultation - Rate and  rhythm regular, no murmur, no rub or gallop appreciated.   VASCULAR: Trace edema BL LE. Limited exam as wearing new Velcro compression socks.   ABDOMEN:  Normal bowel sounds, soft, nontender, no grimacing or guarding with palpation.  : No suprapubic tenderness  M/S:  Left sided weakness with UE flexion contracture. Propels in wheelchair around apartment using feet.   PSYCH: Awake and alert, speech fluent, insight and judgement fair, memory fair, without depressed or anxious affect, calm and cooperative.    Labs:   Recent labs in [facility] reviewed by me today.    CBC RESULTS: Recent Labs   Lab Test 11/11/21  0700 10/14/21  0945   WBC 9.8 9.6   RBC 3.82 4.06   HGB 10.6* 11.2*   HCT 34.9* 36.1   MCV 91 89   MCH 27.7 27.6   MCHC 30.4* 31.0*   RDW 17.9* 17.3*    418     Ferritin   Date Value Ref Range Status   10/14/2021 84 8 - 252 ng/mL Final   04/15/2021 30 8 - 252 ng/mL Final     Iron   Date Value Ref Range Status   10/14/2021 44 35 - 180 ug/dL Final   04/15/2021 26 (L) 35 - 180 ug/dL Final     Iron Binding Cap   Date Value Ref Range Status   04/15/2021 250 240 - 430 ug/dL Final     Iron Binding Capacity   Date Value Ref Range Status   10/14/2021 236 (L) 240 - 430 ug/dL Final     Last Basic Metabolic Panel:  Recent Labs   Lab Test 11/11/21  0700 10/14/21  0945    133   POTASSIUM 4.0 3.5   CHLORIDE 106 101   GOLDY 8.0* 8.6   CO2 24 26   BUN 13 8   CR 0.87 0.72   GLC 87 144*     GFR Estimate   Date Value Ref Range Status   11/11/2021 59 (L) >60 mL/min/1.73m2 Final     Comment:     As of July 11, 2021, eGFR is calculated by the CKD-EPI creatinine equation, without race adjustment. eGFR can be influenced by muscle mass, exercise, and diet. The reported eGFR is an estimation only and is only applicable if the renal function is stable.   04/28/2021 71 >60 mL/min/[1.73_m2] Final     Comment:     Non  GFR Calc  Starting 12/18/2018, serum creatinine based estimated GFR (eGFR) will be   calculated using  the Chronic Kidney Disease Epidemiology Collaboration   (CKD-EPI) equation.       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 Used laxatives and prune juice in past. Hx of C.diff. Possible incomplete bowel evacuation?   Plan:  - Therapeutic trial of psyllium (METAMUCIL/KONSYL) daily with 8 oz fluid  - Encourage PO fluids t/o the day  - Would not likely want GI follow-up off site, but could consider. Dietary modification may prove difficult given memory loss.    (E87.6) Hypokalemia  Comment: Chronic, no offending medications, no extensive work-up completed, managed with oral supplement for several years, feels 10 mEq x 3 pills (30 mEq daily) contributing to symptoms, but with dose reduction to 20 mEq have not abated, does no like powder. Potassium 4.0 WNL s/p dose reduction.  Plan:  - Discontinue Potassium Chloride packet due to taste  - Start potassium 10 mEq daily  - [facility] 12/9 with clinical follow-up  - Further work-up likely not consistent with care goals     (J43.9) Pulmonary emphysema  Treated with oral abx in early August due to infiltrate on CXR, which resolved on interval imaging. Started on Incruse Ellipta and rescue inhaler due to hx of chronic lung disease. Emphysema on imaging and left lung nodules, 35 pack-year smoking history. Failed outpatient mgmt and developed BL pneumonia and left pneumothorax which was successfully managed with chest tube and IV and oral abx. Good progression with therapy in TCU and able to return back to [facility] apartment. Now with very mild dry cough.  Plan:  - Continue daily Incruse Ellipta  - Continue PRN Combivent recuse inhaler   - Consider pulmonology follow-up  - Continue BID guaifenesin and BID PRN for dry cough  - Reviewed with daughter, will update provider if any change in symptoms    Electronically signed by:  STEFFANY Yusuf  CNP              Sincerely,        STEFFANY Yusuf CNP

## 2021-11-24 NOTE — PATIENT INSTRUCTIONS
Yoselyn Cui  1/28/1932  ADDITIONAL ORDERS:  - Discontinue psyllium packet  - Start psyllium (METAMUCIL/KONSYL) 58.6 % powder; Take 6 g (1 teaspoonful) by mouth daily at noon time meal In 8 oz fluid, drink promptly  Dispense: 283 g; Refill: 98 dx IBS  Electronically signed by:   STEFFANY Yusuf CNP  11/24/21 6:32 AM

## 2021-12-08 NOTE — PROGRESS NOTES
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     Today's concern is:   Follow-up today to assess bowel habits after decreasing potassium dose and starting fiber supplement.    Resident reports, \"The stools are better because they cut back the potassium.\"  Having 2 BMs per day.  No longer having stools after each meals.  Also nail bed are not sore, which she attributes to dose reduction of potassium.  Labs drawn today and pending.   Apparently has not been getting fiber supplement, ordered apart from other medications at noon, but due to increased cost of additional medication pass was not started.    Allergies, and PMH/PSH reviewed in [facility] today.    REVIEW OF SYSTEMS:  4 point ROS including Respiratory, CV, GI and , other than that noted in the HPI,  is negative    Objective:   /80   Pulse 65   Temp 97.5  F (36.4  C)   Resp 24   Ht 1.524 m (5')   Wt 50.7 kg (111 lb 12.8 oz)   SpO2 98%   BMI 21.83 kg/m    GENERAL APPEARANCE:  Alert, in no distress, pleasant, cooperative, well groomed seated in wheelchair.   EYES: Sclera clear and conjunctiva normal, no discharge, wearing glasses  RESP:  Non-labored breathing, no respiratory distress, lungs clear BL without adventitious breath sounds. No cough during visit.  CV:  Palpation - no murmur/non-displaced PMI, L CW pacer. Auscultation - Rate and rhythm regular, no murmur, no rub or gallop appreciated.   VASCULAR: Trace edema BL LE. Limited exam as wearing new Velcro compression socks. "   ABDOMEN:  Normal bowel sounds, soft, nontender, no grimacing or guarding with palpation.  M/S:  Left sided weakness with UE flexion contracture.   PSYCH: Awake and alert, speech fluent, insight and judgement fair, memory fair, without depressed or anxious affect, calm and cooperative.    Recent labs in [facility] reviewed by me today.    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 Tolerating KCl 10 mEq daily, serum potassium today 3.6 WNL.  Plan:  - Continue potassium 10 mEq daily  - Follow BMP q 6 months ~ June 2022  - Further work-up likely not consistent with care goals     (D64.9) Normocytic anemia  Comment: Chronic, Hgb 9.9 on 10/7 >> 11.3 today 12/9/21, MCV 91  High risk for bleeding given DOAC and Plavix.  10/14/21: iron 44 WNL  No s/s of acute bleed reported today.  Plan:   - Follow serial CBC at least q 6 months ~ June 2022    Electronically signed by: STEFFANY Yusuf CNP

## 2021-12-09 NOTE — LETTER
"    12/9/2021        RE: Yoselyn Cui  [place]  [address]  [address]  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     Today's concern is:   Follow-up today to assess bowel habits after decreasing potassium dose and starting fiber supplement.    Resident reports, \"The stools are better because they cut back the potassium.\"  Having 2 BMs per day.  No longer having stools after each meals.  Also nail bed are not sore, which she attributes to dose reduction of potassium.  Labs drawn today and pending.   Apparently has not been getting fiber supplement, ordered apart from other medications at noon, but due to increased cost of additional medication pass was not started.    Allergies, and PMH/PSH reviewed in [facility] today.    REVIEW OF SYSTEMS:  4 point ROS including Respiratory, CV, GI and , other than that noted in the HPI,  is negative    Objective:   /80   Pulse 65   Temp 97.5  F (36.4  C)   Resp 24   Ht 1.524 m (5')   Wt 50.7 kg (111 lb 12.8 oz)   SpO2 98%   BMI 21.83 kg/m    GENERAL APPEARANCE:  Alert, in no distress, pleasant, cooperative, well groomed seated in wheelchair.   EYES: Sclera clear and conjunctiva normal, no discharge, wearing glasses  RESP:  Non-labored breathing, no respiratory distress, lungs clear BL without adventitious breath sounds. No cough during visit.  CV:  Palpation - no murmur/non-displaced PMI, L CW pacer. Auscultation - Rate and rhythm regular, no murmur, no " rub or gallop appreciated.   VASCULAR: Trace edema BL LE. Limited exam as wearing new Velcro compression socks.   ABDOMEN:  Normal bowel sounds, soft, nontender, no grimacing or guarding with palpation.  M/S:  Left sided weakness with UE flexion contracture.   PSYCH: Awake and alert, speech fluent, insight and judgement fair, memory fair, without depressed or anxious affect, calm and cooperative.    Recent labs in [facility] reviewed by me today.    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 Tolerating KCl 10 mEq daily, serum potassium today 3.6 WNL.  Plan:  - Continue potassium 10 mEq daily  - Follow BMP q 6 months ~ June 2022  - Further work-up likely not consistent with care goals     (D64.9) Normocytic anemia  Comment: Chronic, Hgb 9.9 on 10/7 >> 11.3 today 12/9/21, MCV 91  High risk for bleeding given DOAC and Plavix.  10/14/21: iron 44 WNL  No s/s of acute bleed reported today.  Plan:   - Follow serial CBC at least q 6  months ~ June 2022    Electronically signed by: STEFFANY Yusuf CNP             Sincerely,        STEFFANY Yusuf CNP

## 2022-01-01 ENCOUNTER — TELEPHONE (OUTPATIENT)
Dept: GERIATRICS | Facility: CLINIC | Age: 87
End: 2022-01-01
Payer: COMMERCIAL

## 2022-01-01 ENCOUNTER — LAB REQUISITION (OUTPATIENT)
Dept: LAB | Facility: CLINIC | Age: 87
End: 2022-01-01

## 2022-01-01 ENCOUNTER — TRANSITIONAL CARE UNIT VISIT (OUTPATIENT)
Dept: GERIATRICS | Facility: CLINIC | Age: 87
End: 2022-01-01
Payer: COMMERCIAL

## 2022-01-01 ENCOUNTER — APPOINTMENT (OUTPATIENT)
Dept: GENERAL RADIOLOGY | Facility: CLINIC | Age: 87
DRG: 196 | End: 2022-01-01
Attending: INTERNAL MEDICINE
Payer: MEDICARE

## 2022-01-01 ENCOUNTER — APPOINTMENT (OUTPATIENT)
Dept: NUCLEAR MEDICINE | Facility: CLINIC | Age: 87
DRG: 196 | End: 2022-01-01
Attending: INTERNAL MEDICINE
Payer: MEDICARE

## 2022-01-01 ENCOUNTER — DOCUMENTATION ONLY (OUTPATIENT)
Dept: CARDIOLOGY | Facility: CLINIC | Age: 87
End: 2022-01-01
Payer: COMMERCIAL

## 2022-01-01 ENCOUNTER — APPOINTMENT (OUTPATIENT)
Dept: CT IMAGING | Facility: CLINIC | Age: 87
DRG: 280 | End: 2022-01-01
Attending: EMERGENCY MEDICINE
Payer: MEDICARE

## 2022-01-01 ENCOUNTER — APPOINTMENT (OUTPATIENT)
Dept: CARDIOLOGY | Facility: CLINIC | Age: 87
DRG: 196 | End: 2022-01-01
Attending: INTERNAL MEDICINE
Payer: MEDICARE

## 2022-01-01 ENCOUNTER — PATIENT OUTREACH (OUTPATIENT)
Dept: CARE COORDINATION | Facility: CLINIC | Age: 87
End: 2022-01-01

## 2022-01-01 ENCOUNTER — LAB REQUISITION (OUTPATIENT)
Dept: LAB | Facility: CLINIC | Age: 87
End: 2022-01-01
Payer: COMMERCIAL

## 2022-01-01 ENCOUNTER — PATIENT OUTREACH (OUTPATIENT)
Dept: CARE COORDINATION | Facility: CLINIC | Age: 87
End: 2022-01-01
Payer: COMMERCIAL

## 2022-01-01 ENCOUNTER — TRANSFERRED RECORDS (OUTPATIENT)
Dept: HEALTH INFORMATION MANAGEMENT | Facility: CLINIC | Age: 87
End: 2022-01-01
Payer: COMMERCIAL

## 2022-01-01 ENCOUNTER — APPOINTMENT (OUTPATIENT)
Dept: CT IMAGING | Facility: CLINIC | Age: 87
DRG: 196 | End: 2022-01-01
Attending: HOSPITALIST
Payer: MEDICARE

## 2022-01-01 ENCOUNTER — ASSISTED LIVING VISIT (OUTPATIENT)
Dept: GERIATRICS | Facility: CLINIC | Age: 87
End: 2022-01-01
Payer: COMMERCIAL

## 2022-01-01 ENCOUNTER — DOCUMENTATION ONLY (OUTPATIENT)
Dept: OTHER | Facility: CLINIC | Age: 87
End: 2022-01-01
Payer: COMMERCIAL

## 2022-01-01 ENCOUNTER — PATIENT OUTREACH (OUTPATIENT)
Dept: CARE COORDINATION | Facility: CLINIC | Age: 87
End: 2022-01-01
Payer: OTHER MISCELLANEOUS

## 2022-01-01 ENCOUNTER — APPOINTMENT (OUTPATIENT)
Dept: GENERAL RADIOLOGY | Facility: CLINIC | Age: 87
DRG: 196 | End: 2022-01-01
Attending: EMERGENCY MEDICINE
Payer: MEDICARE

## 2022-01-01 ENCOUNTER — HOSPITAL ENCOUNTER (INPATIENT)
Facility: CLINIC | Age: 87
LOS: 2 days | Discharge: NURSING, REHAB OR LONG-TERM CARE | DRG: 280 | End: 2022-03-11
Attending: EMERGENCY MEDICINE | Admitting: HOSPITALIST
Payer: MEDICARE

## 2022-01-01 ENCOUNTER — APPOINTMENT (OUTPATIENT)
Dept: SPEECH THERAPY | Facility: CLINIC | Age: 87
DRG: 196 | End: 2022-01-01
Attending: INTERNAL MEDICINE
Payer: MEDICARE

## 2022-01-01 ENCOUNTER — APPOINTMENT (OUTPATIENT)
Dept: SPEECH THERAPY | Facility: CLINIC | Age: 87
DRG: 196 | End: 2022-01-01
Payer: MEDICARE

## 2022-01-01 ENCOUNTER — HOSPITAL ENCOUNTER (INPATIENT)
Facility: CLINIC | Age: 87
LOS: 4 days | Discharge: NURSING, REHAB OR LONG-TERM CARE | DRG: 196 | End: 2022-02-16
Attending: EMERGENCY MEDICINE | Admitting: HOSPITALIST
Payer: MEDICARE

## 2022-01-01 ENCOUNTER — MEDICAL CORRESPONDENCE (OUTPATIENT)
Dept: HEALTH INFORMATION MANAGEMENT | Facility: CLINIC | Age: 87
End: 2022-01-01

## 2022-01-01 ENCOUNTER — DOCUMENTATION ONLY (OUTPATIENT)
Dept: OTHER | Facility: CLINIC | Age: 87
End: 2022-01-01

## 2022-01-01 VITALS
WEIGHT: 111.2 LBS | HEART RATE: 80 BPM | RESPIRATION RATE: 18 BRPM | OXYGEN SATURATION: 91 % | SYSTOLIC BLOOD PRESSURE: 126 MMHG | HEIGHT: 60 IN | TEMPERATURE: 98.2 F | BODY MASS INDEX: 21.83 KG/M2 | DIASTOLIC BLOOD PRESSURE: 69 MMHG

## 2022-01-01 VITALS
TEMPERATURE: 97.5 F | HEART RATE: 70 BPM | RESPIRATION RATE: 20 BRPM | HEIGHT: 60 IN | WEIGHT: 110 LBS | DIASTOLIC BLOOD PRESSURE: 70 MMHG | SYSTOLIC BLOOD PRESSURE: 110 MMHG | BODY MASS INDEX: 21.6 KG/M2 | OXYGEN SATURATION: 94 %

## 2022-01-01 VITALS
SYSTOLIC BLOOD PRESSURE: 121 MMHG | OXYGEN SATURATION: 93 % | TEMPERATURE: 98.4 F | BODY MASS INDEX: 22.18 KG/M2 | HEIGHT: 59 IN | DIASTOLIC BLOOD PRESSURE: 63 MMHG | WEIGHT: 110 LBS | HEART RATE: 87 BPM | RESPIRATION RATE: 24 BRPM

## 2022-01-01 VITALS
WEIGHT: 107.4 LBS | DIASTOLIC BLOOD PRESSURE: 73 MMHG | HEIGHT: 60 IN | OXYGEN SATURATION: 96 % | HEART RATE: 69 BPM | RESPIRATION RATE: 18 BRPM | BODY MASS INDEX: 21.09 KG/M2 | SYSTOLIC BLOOD PRESSURE: 147 MMHG | TEMPERATURE: 97.5 F

## 2022-01-01 VITALS
HEART RATE: 86 BPM | SYSTOLIC BLOOD PRESSURE: 123 MMHG | RESPIRATION RATE: 18 BRPM | BODY MASS INDEX: 21.45 KG/M2 | TEMPERATURE: 97.7 F | WEIGHT: 106.4 LBS | DIASTOLIC BLOOD PRESSURE: 69 MMHG | OXYGEN SATURATION: 90 % | HEIGHT: 59 IN

## 2022-01-01 VITALS
DIASTOLIC BLOOD PRESSURE: 61 MMHG | TEMPERATURE: 97.9 F | OXYGEN SATURATION: 93 % | BODY MASS INDEX: 21.4 KG/M2 | WEIGHT: 109 LBS | HEART RATE: 76 BPM | RESPIRATION RATE: 18 BRPM | HEIGHT: 60 IN | SYSTOLIC BLOOD PRESSURE: 118 MMHG

## 2022-01-01 VITALS
RESPIRATION RATE: 20 BRPM | OXYGEN SATURATION: 97 % | DIASTOLIC BLOOD PRESSURE: 79 MMHG | SYSTOLIC BLOOD PRESSURE: 155 MMHG | BODY MASS INDEX: 21.6 KG/M2 | WEIGHT: 110 LBS | HEIGHT: 60 IN | HEART RATE: 83 BPM | TEMPERATURE: 97.7 F

## 2022-01-01 VITALS
DIASTOLIC BLOOD PRESSURE: 55 MMHG | TEMPERATURE: 97.7 F | SYSTOLIC BLOOD PRESSURE: 110 MMHG | OXYGEN SATURATION: 92 % | RESPIRATION RATE: 16 BRPM | WEIGHT: 112.8 LBS | BODY MASS INDEX: 22.03 KG/M2 | HEART RATE: 70 BPM

## 2022-01-01 VITALS
OXYGEN SATURATION: 95 % | SYSTOLIC BLOOD PRESSURE: 138 MMHG | RESPIRATION RATE: 20 BRPM | WEIGHT: 105.6 LBS | HEIGHT: 60 IN | TEMPERATURE: 98.2 F | DIASTOLIC BLOOD PRESSURE: 66 MMHG | BODY MASS INDEX: 20.73 KG/M2 | HEART RATE: 90 BPM

## 2022-01-01 DIAGNOSIS — Z11.59 ENCOUNTER FOR SCREENING FOR OTHER VIRAL DISEASES: ICD-10-CM

## 2022-01-01 DIAGNOSIS — Z95.0 CARDIAC PACEMAKER IN SITU: ICD-10-CM

## 2022-01-01 DIAGNOSIS — I50.22 CHRONIC SYSTOLIC HEART FAILURE (H): Primary | ICD-10-CM

## 2022-01-01 DIAGNOSIS — E87.1 HYPONATREMIA: ICD-10-CM

## 2022-01-01 DIAGNOSIS — J96.01 ACUTE RESPIRATORY FAILURE WITH HYPOXIA (H): Primary | ICD-10-CM

## 2022-01-01 DIAGNOSIS — Z11.1 ENCOUNTER FOR SCREENING FOR RESPIRATORY TUBERCULOSIS: ICD-10-CM

## 2022-01-01 DIAGNOSIS — D64.9 NORMOCYTIC ANEMIA: ICD-10-CM

## 2022-01-01 DIAGNOSIS — I25.10 ASCVD (ARTERIOSCLEROTIC CARDIOVASCULAR DISEASE): ICD-10-CM

## 2022-01-01 DIAGNOSIS — K21.9 GASTROESOPHAGEAL REFLUX DISEASE, UNSPECIFIED WHETHER ESOPHAGITIS PRESENT: ICD-10-CM

## 2022-01-01 DIAGNOSIS — I69.398 IMPAIRED BALANCE AS LATE EFFECT OF CEREBROVASCULAR ACCIDENT: ICD-10-CM

## 2022-01-01 DIAGNOSIS — G31.84 MILD COGNITIVE IMPAIRMENT: ICD-10-CM

## 2022-01-01 DIAGNOSIS — J43.9 PULMONARY EMPHYSEMA, UNSPECIFIED EMPHYSEMA TYPE (H): ICD-10-CM

## 2022-01-01 DIAGNOSIS — E78.5 HYPERLIPIDEMIA, UNSPECIFIED HYPERLIPIDEMIA TYPE: ICD-10-CM

## 2022-01-01 DIAGNOSIS — J18.9 PNEUMONIA DUE TO INFECTIOUS ORGANISM, UNSPECIFIED LATERALITY, UNSPECIFIED PART OF LUNG: Primary | ICD-10-CM

## 2022-01-01 DIAGNOSIS — J18.8 OTHER PNEUMONIA, UNSPECIFIED ORGANISM: ICD-10-CM

## 2022-01-01 DIAGNOSIS — I50.9 ACUTE ON CHRONIC CONGESTIVE HEART FAILURE, UNSPECIFIED HEART FAILURE TYPE (H): ICD-10-CM

## 2022-01-01 DIAGNOSIS — Z86.19 HX OF CLOSTRIDIUM DIFFICILE INFECTION: ICD-10-CM

## 2022-01-01 DIAGNOSIS — J44.1 COPD EXACERBATION (H): ICD-10-CM

## 2022-01-01 DIAGNOSIS — R26.89 IMPAIRED BALANCE AS LATE EFFECT OF CEREBROVASCULAR ACCIDENT: ICD-10-CM

## 2022-01-01 DIAGNOSIS — Z71.89 ACP (ADVANCE CARE PLANNING): ICD-10-CM

## 2022-01-01 DIAGNOSIS — G81.94 LEFT HEMIPARESIS (H): ICD-10-CM

## 2022-01-01 DIAGNOSIS — R53.81 PHYSICAL DECONDITIONING: ICD-10-CM

## 2022-01-01 DIAGNOSIS — R13.10 DYSPHAGIA, UNSPECIFIED TYPE: ICD-10-CM

## 2022-01-01 DIAGNOSIS — J44.9 CHRONIC OBSTRUCTIVE PULMONARY DISEASE, UNSPECIFIED COPD TYPE (H): ICD-10-CM

## 2022-01-01 DIAGNOSIS — K86.9 PANCREATIC LESION: ICD-10-CM

## 2022-01-01 DIAGNOSIS — I48.0 PAROXYSMAL ATRIAL FIBRILLATION (H): ICD-10-CM

## 2022-01-01 DIAGNOSIS — G31.84 MCI (MILD COGNITIVE IMPAIRMENT): ICD-10-CM

## 2022-01-01 DIAGNOSIS — E87.6 HYPOKALEMIA: ICD-10-CM

## 2022-01-01 DIAGNOSIS — I48.91 ATRIAL FIBRILLATION, UNSPECIFIED TYPE (H): ICD-10-CM

## 2022-01-01 DIAGNOSIS — J18.9 COMMUNITY ACQUIRED BILATERAL LOWER LOBE PNEUMONIA: Primary | ICD-10-CM

## 2022-01-01 DIAGNOSIS — I10 ESSENTIAL HYPERTENSION, BENIGN: ICD-10-CM

## 2022-01-01 DIAGNOSIS — Z95.0 PRESENCE OF PERMANENT CARDIAC PACEMAKER: ICD-10-CM

## 2022-01-01 DIAGNOSIS — H35.30 ARMD (AGE RELATED MACULAR DEGENERATION): ICD-10-CM

## 2022-01-01 DIAGNOSIS — S81.811A SKIN TEAR OF LOWER LEG WITHOUT COMPLICATION, RIGHT, INITIAL ENCOUNTER: ICD-10-CM

## 2022-01-01 DIAGNOSIS — J84.10 PULMONARY FIBROSIS (H): ICD-10-CM

## 2022-01-01 DIAGNOSIS — I50.22 CHRONIC SYSTOLIC HEART FAILURE (H): ICD-10-CM

## 2022-01-01 DIAGNOSIS — R26.9 ABNORMAL GAIT: ICD-10-CM

## 2022-01-01 DIAGNOSIS — I50.9 HEART FAILURE, UNSPECIFIED (H): ICD-10-CM

## 2022-01-01 DIAGNOSIS — N32.81 OAB (OVERACTIVE BLADDER): ICD-10-CM

## 2022-01-01 DIAGNOSIS — K58.2 IRRITABLE BOWEL SYNDROME WITH BOTH CONSTIPATION AND DIARRHEA: ICD-10-CM

## 2022-01-01 DIAGNOSIS — K58.2 IRRITABLE BOWEL SYNDROME WITH CONSTIPATION AND DIARRHEA: ICD-10-CM

## 2022-01-01 DIAGNOSIS — Z95.0 PACEMAKER: ICD-10-CM

## 2022-01-01 DIAGNOSIS — J18.9 PNEUMONIA OF BOTH LOWER LOBES DUE TO INFECTIOUS ORGANISM: Primary | ICD-10-CM

## 2022-01-01 DIAGNOSIS — R79.89 ELEVATED TROPONIN: ICD-10-CM

## 2022-01-01 DIAGNOSIS — J44.1 COPD EXACERBATION (H): Primary | ICD-10-CM

## 2022-01-01 DIAGNOSIS — J96.01 ACUTE RESPIRATORY FAILURE WITH HYPOXIA (H): ICD-10-CM

## 2022-01-01 DIAGNOSIS — D64.9 ANEMIA, UNSPECIFIED TYPE: ICD-10-CM

## 2022-01-01 DIAGNOSIS — Z71.89 OTHER SPECIFIED COUNSELING: ICD-10-CM

## 2022-01-01 LAB
ALBUMIN SERPL-MCNC: 2.6 G/DL (ref 3.4–5)
ALP SERPL-CCNC: 140 U/L (ref 40–150)
ALT SERPL W P-5'-P-CCNC: 13 U/L (ref 0–50)
ANION GAP SERPL CALCULATED.3IONS-SCNC: 3 MMOL/L (ref 3–14)
ANION GAP SERPL CALCULATED.3IONS-SCNC: 6 MMOL/L (ref 3–14)
ANION GAP SERPL CALCULATED.3IONS-SCNC: 7 MMOL/L (ref 3–14)
ANION GAP SERPL CALCULATED.3IONS-SCNC: 8 MMOL/L (ref 3–14)
APTT PPP: 31 SECONDS (ref 22–38)
APTT PPP: 45 SECONDS (ref 22–38)
APTT PPP: 47 SECONDS (ref 22–38)
APTT PPP: 49 SECONDS (ref 22–38)
APTT PPP: 65 SECONDS (ref 22–38)
APTT PPP: 69 SECONDS (ref 22–38)
APTT PPP: 88 SECONDS (ref 22–38)
APTT PPP: 88 SECONDS (ref 22–38)
AST SERPL W P-5'-P-CCNC: 17 U/L (ref 0–45)
ATRIAL RATE - MUSE: 129 BPM
ATRIAL RATE - MUSE: 88 BPM
BACTERIA BLD CULT: NO GROWTH
BACTERIA BLD CULT: NO GROWTH
BASOPHILS # BLD AUTO: 0 10E3/UL (ref 0–0.2)
BASOPHILS NFR BLD AUTO: 0 %
BILIRUB SERPL-MCNC: 0.3 MG/DL (ref 0.2–1.3)
BUN SERPL-MCNC: 10 MG/DL (ref 7–30)
BUN SERPL-MCNC: 11 MG/DL (ref 7–30)
BUN SERPL-MCNC: 14 MG/DL (ref 7–30)
BUN SERPL-MCNC: 18 MG/DL (ref 7–30)
BUN SERPL-MCNC: 20 MG/DL (ref 7–30)
BUN SERPL-MCNC: 28 MG/DL (ref 7–30)
BUN SERPL-MCNC: 31 MG/DL (ref 7–30)
BUN SERPL-MCNC: 9 MG/DL (ref 7–30)
CALCIUM SERPL-MCNC: 7.8 MG/DL (ref 8.5–10.1)
CALCIUM SERPL-MCNC: 7.9 MG/DL (ref 8.5–10.1)
CALCIUM SERPL-MCNC: 8.3 MG/DL (ref 8.5–10.1)
CALCIUM SERPL-MCNC: 8.5 MG/DL (ref 8.5–10.1)
CALCIUM SERPL-MCNC: 8.5 MG/DL (ref 8.5–10.1)
CALCIUM SERPL-MCNC: 8.6 MG/DL (ref 8.5–10.1)
CALCIUM SERPL-MCNC: 8.7 MG/DL (ref 8.5–10.1)
CALCIUM SERPL-MCNC: 8.8 MG/DL (ref 8.5–10.1)
CHLORIDE BLD-SCNC: 101 MMOL/L (ref 94–109)
CHLORIDE BLD-SCNC: 108 MMOL/L (ref 94–109)
CHLORIDE BLD-SCNC: 94 MMOL/L (ref 94–109)
CHLORIDE BLD-SCNC: 95 MMOL/L (ref 94–109)
CHLORIDE BLD-SCNC: 95 MMOL/L (ref 94–109)
CHLORIDE BLD-SCNC: 96 MMOL/L (ref 94–109)
CHLORIDE BLD-SCNC: 97 MMOL/L (ref 94–109)
CHLORIDE BLD-SCNC: 99 MMOL/L (ref 94–109)
CO2 SERPL-SCNC: 21 MMOL/L (ref 20–32)
CO2 SERPL-SCNC: 23 MMOL/L (ref 20–32)
CO2 SERPL-SCNC: 23 MMOL/L (ref 20–32)
CO2 SERPL-SCNC: 24 MMOL/L (ref 20–32)
CO2 SERPL-SCNC: 24 MMOL/L (ref 20–32)
CO2 SERPL-SCNC: 25 MMOL/L (ref 20–32)
CREAT SERPL-MCNC: 0.55 MG/DL (ref 0.52–1.04)
CREAT SERPL-MCNC: 0.61 MG/DL (ref 0.52–1.04)
CREAT SERPL-MCNC: 0.69 MG/DL (ref 0.52–1.04)
CREAT SERPL-MCNC: 0.69 MG/DL (ref 0.52–1.04)
CREAT SERPL-MCNC: 0.7 MG/DL (ref 0.52–1.04)
CREAT SERPL-MCNC: 0.73 MG/DL (ref 0.52–1.04)
CREAT SERPL-MCNC: 0.84 MG/DL (ref 0.52–1.04)
CREAT SERPL-MCNC: 0.85 MG/DL (ref 0.52–1.04)
CV STRESS MAX HR HE: 93
D DIMER PPP FEU-MCNC: 0.98 UG/ML FEU (ref 0–0.5)
DIASTOLIC BLOOD PRESSURE - MUSE: NORMAL MMHG
DIASTOLIC BLOOD PRESSURE - MUSE: NORMAL MMHG
EOSINOPHIL # BLD AUTO: 0 10E3/UL (ref 0–0.7)
EOSINOPHIL # BLD AUTO: 0.1 10E3/UL (ref 0–0.7)
EOSINOPHIL # BLD AUTO: 0.1 10E3/UL (ref 0–0.7)
EOSINOPHIL NFR BLD AUTO: 0 %
EOSINOPHIL NFR BLD AUTO: 2 %
EOSINOPHIL NFR BLD AUTO: 2 %
ERYTHROCYTE [DISTWIDTH] IN BLOOD BY AUTOMATED COUNT: 14.3 % (ref 10–15)
ERYTHROCYTE [DISTWIDTH] IN BLOOD BY AUTOMATED COUNT: 14.4 % (ref 10–15)
ERYTHROCYTE [DISTWIDTH] IN BLOOD BY AUTOMATED COUNT: 14.5 % (ref 10–15)
ERYTHROCYTE [DISTWIDTH] IN BLOOD BY AUTOMATED COUNT: 14.6 % (ref 10–15)
ERYTHROCYTE [DISTWIDTH] IN BLOOD BY AUTOMATED COUNT: 15.5 % (ref 10–15)
ERYTHROCYTE [DISTWIDTH] IN BLOOD BY AUTOMATED COUNT: 15.9 % (ref 10–15)
FLUAV RNA SPEC QL NAA+PROBE: NEGATIVE
FLUBV RNA RESP QL NAA+PROBE: NEGATIVE
GAMMA INTERFERON BACKGROUND BLD IA-ACNC: 0.07 IU/ML
GAMMA INTERFERON BACKGROUND BLD IA-ACNC: 0.16 IU/ML
GFR SERPL CREATININE-BSD FRML MDRD: 65 ML/MIN/1.73M2
GFR SERPL CREATININE-BSD FRML MDRD: 66 ML/MIN/1.73M2
GFR SERPL CREATININE-BSD FRML MDRD: 78 ML/MIN/1.73M2
GFR SERPL CREATININE-BSD FRML MDRD: 82 ML/MIN/1.73M2
GFR SERPL CREATININE-BSD FRML MDRD: 84 ML/MIN/1.73M2
GFR SERPL CREATININE-BSD FRML MDRD: 87 ML/MIN/1.73M2
GLUCOSE BLD-MCNC: 106 MG/DL (ref 70–99)
GLUCOSE BLD-MCNC: 108 MG/DL (ref 70–99)
GLUCOSE BLD-MCNC: 111 MG/DL (ref 70–99)
GLUCOSE BLD-MCNC: 149 MG/DL (ref 70–99)
GLUCOSE BLD-MCNC: 162 MG/DL (ref 70–99)
GLUCOSE BLD-MCNC: 88 MG/DL (ref 70–99)
GLUCOSE BLD-MCNC: 89 MG/DL (ref 70–99)
GLUCOSE BLD-MCNC: 99 MG/DL (ref 70–99)
HCT VFR BLD AUTO: 35.2 % (ref 35–47)
HCT VFR BLD AUTO: 35.6 % (ref 35–47)
HCT VFR BLD AUTO: 36 % (ref 35–47)
HCT VFR BLD AUTO: 36 % (ref 35–47)
HCT VFR BLD AUTO: 36.3 % (ref 35–47)
HCT VFR BLD AUTO: 36.3 % (ref 35–47)
HCT VFR BLD AUTO: 36.8 % (ref 35–47)
HCT VFR BLD AUTO: 39.8 % (ref 35–47)
HGB BLD-MCNC: 11 G/DL (ref 11.7–15.7)
HGB BLD-MCNC: 11.3 G/DL (ref 11.7–15.7)
HGB BLD-MCNC: 11.3 G/DL (ref 11.7–15.7)
HGB BLD-MCNC: 11.5 G/DL (ref 11.7–15.7)
HGB BLD-MCNC: 11.7 G/DL (ref 11.7–15.7)
HGB BLD-MCNC: 11.7 G/DL (ref 11.7–15.7)
HGB BLD-MCNC: 11.8 G/DL (ref 11.7–15.7)
HGB BLD-MCNC: 11.8 G/DL (ref 11.7–15.7)
HOLD SPECIMEN: NORMAL
HOLD SPECIMEN: NORMAL
IMM GRANULOCYTES # BLD: 0 10E3/UL
IMM GRANULOCYTES # BLD: 0 10E3/UL
IMM GRANULOCYTES # BLD: 0.1 10E3/UL
IMM GRANULOCYTES NFR BLD: 0 %
IMM GRANULOCYTES NFR BLD: 1 %
IMM GRANULOCYTES NFR BLD: 1 %
INTERPRETATION ECG - MUSE: NORMAL
INTERPRETATION ECG - MUSE: NORMAL
LACTATE SERPL-SCNC: 0.9 MMOL/L (ref 0.7–2)
LACTATE SERPL-SCNC: 1.7 MMOL/L (ref 0.7–2)
LVEF ECHO: NORMAL
LYMPHOCYTES # BLD AUTO: 1.4 10E3/UL (ref 0.8–5.3)
LYMPHOCYTES # BLD AUTO: 1.5 10E3/UL (ref 0.8–5.3)
LYMPHOCYTES # BLD AUTO: 1.7 10E3/UL (ref 0.8–5.3)
LYMPHOCYTES NFR BLD AUTO: 18 %
LYMPHOCYTES NFR BLD AUTO: 19 %
LYMPHOCYTES NFR BLD AUTO: 26 %
M TB IFN-G BLD-IMP: ABNORMAL
M TB IFN-G BLD-IMP: NEGATIVE
M TB IFN-G CD4+ BCKGRND COR BLD-ACNC: 0.48 IU/ML
M TB IFN-G CD4+ BCKGRND COR BLD-ACNC: 1.5 IU/ML
MCH RBC QN AUTO: 25.5 PG (ref 26.5–33)
MCH RBC QN AUTO: 25.6 PG (ref 26.5–33)
MCH RBC QN AUTO: 25.8 PG (ref 26.5–33)
MCH RBC QN AUTO: 25.9 PG (ref 26.5–33)
MCH RBC QN AUTO: 26 PG (ref 26.5–33)
MCH RBC QN AUTO: 26.1 PG (ref 26.5–33)
MCH RBC QN AUTO: 26.2 PG (ref 26.5–33)
MCH RBC QN AUTO: 26.9 PG (ref 26.5–33)
MCHC RBC AUTO-ENTMCNC: 29.6 G/DL (ref 31.5–36.5)
MCHC RBC AUTO-ENTMCNC: 30.9 G/DL (ref 31.5–36.5)
MCHC RBC AUTO-ENTMCNC: 31.4 G/DL (ref 31.5–36.5)
MCHC RBC AUTO-ENTMCNC: 31.7 G/DL (ref 31.5–36.5)
MCHC RBC AUTO-ENTMCNC: 31.8 G/DL (ref 31.5–36.5)
MCHC RBC AUTO-ENTMCNC: 32.1 G/DL (ref 31.5–36.5)
MCHC RBC AUTO-ENTMCNC: 32.5 G/DL (ref 31.5–36.5)
MCHC RBC AUTO-ENTMCNC: 32.5 G/DL (ref 31.5–36.5)
MCV RBC AUTO: 80 FL (ref 78–100)
MCV RBC AUTO: 82 FL (ref 78–100)
MCV RBC AUTO: 83 FL (ref 78–100)
MCV RBC AUTO: 88 FL (ref 78–100)
MITOGEN IGNF BCKGRD COR BLD-ACNC: 0 IU/ML
MITOGEN IGNF BCKGRD COR BLD-ACNC: 0.02 IU/ML
MITOGEN IGNF BCKGRD COR BLD-ACNC: 0.1 IU/ML
MITOGEN IGNF BCKGRD COR BLD-ACNC: 0.32 IU/ML
MONOCYTES # BLD AUTO: 1.1 10E3/UL (ref 0–1.3)
MONOCYTES # BLD AUTO: 1.2 10E3/UL (ref 0–1.3)
MONOCYTES # BLD AUTO: 1.3 10E3/UL (ref 0–1.3)
MONOCYTES NFR BLD AUTO: 14 %
MONOCYTES NFR BLD AUTO: 15 %
MONOCYTES NFR BLD AUTO: 18 %
NEUTROPHILS # BLD AUTO: 3.4 10E3/UL (ref 1.6–8.3)
NEUTROPHILS # BLD AUTO: 5.1 10E3/UL (ref 1.6–8.3)
NEUTROPHILS # BLD AUTO: 5.6 10E3/UL (ref 1.6–8.3)
NEUTROPHILS NFR BLD AUTO: 53 %
NEUTROPHILS NFR BLD AUTO: 64 %
NEUTROPHILS NFR BLD AUTO: 67 %
NRBC # BLD AUTO: 0 10E3/UL
NRBC BLD AUTO-RTO: 0 /100
NT-PROBNP SERPL-MCNC: 2691 PG/ML (ref 0–1800)
NT-PROBNP SERPL-MCNC: ABNORMAL PG/ML (ref 0–1800)
P AXIS - MUSE: 67 DEGREES
P AXIS - MUSE: NORMAL DEGREES
PLATELET # BLD AUTO: 225 10E3/UL (ref 150–450)
PLATELET # BLD AUTO: 285 10E3/UL (ref 150–450)
PLATELET # BLD AUTO: 289 10E3/UL (ref 150–450)
PLATELET # BLD AUTO: 298 10E3/UL (ref 150–450)
PLATELET # BLD AUTO: 301 10E3/UL (ref 150–450)
PLATELET # BLD AUTO: 305 10E3/UL (ref 150–450)
PLATELET # BLD AUTO: 313 10E3/UL (ref 150–450)
PLATELET # BLD AUTO: 370 10E3/UL (ref 150–450)
POTASSIUM BLD-SCNC: 3.1 MMOL/L (ref 3.4–5.3)
POTASSIUM BLD-SCNC: 3.5 MMOL/L (ref 3.4–5.3)
POTASSIUM BLD-SCNC: 3.6 MMOL/L (ref 3.4–5.3)
POTASSIUM BLD-SCNC: 3.7 MMOL/L (ref 3.4–5.3)
POTASSIUM BLD-SCNC: 3.8 MMOL/L (ref 3.4–5.3)
POTASSIUM BLD-SCNC: 3.8 MMOL/L (ref 3.4–5.3)
PR INTERVAL - MUSE: 192 MS
PR INTERVAL - MUSE: NORMAL MS
PROCALCITONIN SERPL-MCNC: <0.05 NG/ML
PROT SERPL-MCNC: 6.8 G/DL (ref 6.8–8.8)
QRS DURATION - MUSE: 144 MS
QRS DURATION - MUSE: 150 MS
QT - MUSE: 426 MS
QT - MUSE: 426 MS
QTC - MUSE: 460 MS
QTC - MUSE: 515 MS
QUANTIFERON MITOGEN: 0.55 IU/ML
QUANTIFERON MITOGEN: 1.66 IU/ML
QUANTIFERON NIL TUBE: 0.07 IU/ML
QUANTIFERON NIL TUBE: 0.16 IU/ML
QUANTIFERON TB1 TUBE: 0.09 IU/ML
QUANTIFERON TB1 TUBE: 0.48 IU/ML
QUANTIFERON TB2 TUBE: 0.16
QUANTIFERON TB2 TUBE: 0.17
R AXIS - MUSE: 265 DEGREES
R AXIS - MUSE: 267 DEGREES
RATE PRESSURE PRODUCT: NORMAL
RBC # BLD AUTO: 4.29 10E6/UL (ref 3.8–5.2)
RBC # BLD AUTO: 4.35 10E6/UL (ref 3.8–5.2)
RBC # BLD AUTO: 4.35 10E6/UL (ref 3.8–5.2)
RBC # BLD AUTO: 4.38 10E6/UL (ref 3.8–5.2)
RBC # BLD AUTO: 4.44 10E6/UL (ref 3.8–5.2)
RBC # BLD AUTO: 4.51 10E6/UL (ref 3.8–5.2)
RBC # BLD AUTO: 4.52 10E6/UL (ref 3.8–5.2)
RBC # BLD AUTO: 4.59 10E6/UL (ref 3.8–5.2)
SARS-COV-2 RNA RESP QL NAA+PROBE: NEGATIVE
SARS-COV-2 RNA RESP QL NAA+PROBE: NORMAL
SARS-COV-2 RNA RESP QL NAA+PROBE: NOT DETECTED
SARS-COV-2 RNA RESP QL NAA+PROBE: NOT DETECTED
SODIUM SERPL-SCNC: 125 MMOL/L (ref 133–144)
SODIUM SERPL-SCNC: 125 MMOL/L (ref 133–144)
SODIUM SERPL-SCNC: 126 MMOL/L (ref 133–144)
SODIUM SERPL-SCNC: 126 MMOL/L (ref 133–144)
SODIUM SERPL-SCNC: 127 MMOL/L (ref 133–144)
SODIUM SERPL-SCNC: 128 MMOL/L (ref 133–144)
SODIUM SERPL-SCNC: 134 MMOL/L (ref 133–144)
SODIUM SERPL-SCNC: 136 MMOL/L (ref 133–144)
STRESS ECHO BASELINE DIASTOLIC HE: 60
STRESS ECHO BASELINE HR: 77 BPM
STRESS ECHO BASELINE SYSTOLIC BP: 120
STRESS ECHO CALCULATED PERCENT HR: 72 %
STRESS ECHO LAST STRESS DIASTOLIC BP: 64
STRESS ECHO LAST STRESS SYSTOLIC BP: 134
STRESS ECHO TARGET HR: 130
SYSTOLIC BLOOD PRESSURE - MUSE: NORMAL MMHG
SYSTOLIC BLOOD PRESSURE - MUSE: NORMAL MMHG
T AXIS - MUSE: 69 DEGREES
T AXIS - MUSE: 72 DEGREES
TROPONIN I SERPL HS-MCNC: 111 NG/L
TROPONIN I SERPL HS-MCNC: 1809 NG/L
TROPONIN I SERPL HS-MCNC: 564 NG/L
TROPONIN I SERPL HS-MCNC: 6339 NG/L
TROPONIN I SERPL HS-MCNC: 7900 NG/L
TROPONIN I SERPL HS-MCNC: 984 NG/L
VENTRICULAR RATE- MUSE: 70 BPM
VENTRICULAR RATE- MUSE: 88 BPM
WBC # BLD AUTO: 14.7 10E3/UL (ref 4–11)
WBC # BLD AUTO: 3.5 10E3/UL (ref 4–11)
WBC # BLD AUTO: 6.4 10E3/UL (ref 4–11)
WBC # BLD AUTO: 6.6 10E3/UL (ref 4–11)
WBC # BLD AUTO: 6.8 10E3/UL (ref 4–11)
WBC # BLD AUTO: 6.9 10E3/UL (ref 4–11)
WBC # BLD AUTO: 7.6 10E3/UL (ref 4–11)
WBC # BLD AUTO: 8.6 10E3/UL (ref 4–11)

## 2022-01-01 PROCEDURE — 87635 SARS-COV-2 COVID-19 AMP PRB: CPT | Performed by: EMERGENCY MEDICINE

## 2022-01-01 PROCEDURE — 86481 TB AG RESPONSE T-CELL SUSP: CPT | Performed by: NURSE PRACTITIONER

## 2022-01-01 PROCEDURE — 250N000011 HC RX IP 250 OP 636: Performed by: INTERNAL MEDICINE

## 2022-01-01 PROCEDURE — 250N000013 HC RX MED GY IP 250 OP 250 PS 637: Performed by: INTERNAL MEDICINE

## 2022-01-01 PROCEDURE — 96374 THER/PROPH/DIAG INJ IV PUSH: CPT

## 2022-01-01 PROCEDURE — 83605 ASSAY OF LACTIC ACID: CPT | Performed by: EMERGENCY MEDICINE

## 2022-01-01 PROCEDURE — 250N000011 HC RX IP 250 OP 636: Performed by: HOSPITALIST

## 2022-01-01 PROCEDURE — 92610 EVALUATE SWALLOWING FUNCTION: CPT | Mod: GN

## 2022-01-01 PROCEDURE — 999N000157 HC STATISTIC RCP TIME EA 10 MIN

## 2022-01-01 PROCEDURE — 85730 THROMBOPLASTIN TIME PARTIAL: CPT | Performed by: HOSPITALIST

## 2022-01-01 PROCEDURE — 85027 COMPLETE CBC AUTOMATED: CPT | Performed by: NURSE PRACTITIONER

## 2022-01-01 PROCEDURE — 93018 CV STRESS TEST I&R ONLY: CPT | Performed by: INTERNAL MEDICINE

## 2022-01-01 PROCEDURE — 94640 AIRWAY INHALATION TREATMENT: CPT | Mod: 76

## 2022-01-01 PROCEDURE — 84484 ASSAY OF TROPONIN QUANT: CPT | Performed by: EMERGENCY MEDICINE

## 2022-01-01 PROCEDURE — 82310 ASSAY OF CALCIUM: CPT | Performed by: INTERNAL MEDICINE

## 2022-01-01 PROCEDURE — 93306 TTE W/DOPPLER COMPLETE: CPT | Mod: 26 | Performed by: INTERNAL MEDICINE

## 2022-01-01 PROCEDURE — 71045 X-RAY EXAM CHEST 1 VIEW: CPT

## 2022-01-01 PROCEDURE — 99233 SBSQ HOSP IP/OBS HIGH 50: CPT | Performed by: INTERNAL MEDICINE

## 2022-01-01 PROCEDURE — 85730 THROMBOPLASTIN TIME PARTIAL: CPT | Performed by: INTERNAL MEDICINE

## 2022-01-01 PROCEDURE — 343N000001 HC RX 343: Performed by: HOSPITALIST

## 2022-01-01 PROCEDURE — 36415 COLL VENOUS BLD VENIPUNCTURE: CPT | Performed by: NURSE PRACTITIONER

## 2022-01-01 PROCEDURE — 36415 COLL VENOUS BLD VENIPUNCTURE: CPT | Performed by: INTERNAL MEDICINE

## 2022-01-01 PROCEDURE — 87040 BLOOD CULTURE FOR BACTERIA: CPT | Performed by: EMERGENCY MEDICINE

## 2022-01-01 PROCEDURE — 99222 1ST HOSP IP/OBS MODERATE 55: CPT | Mod: 25 | Performed by: INTERNAL MEDICINE

## 2022-01-01 PROCEDURE — U0005 INFEC AGEN DETEC AMPLI PROBE: HCPCS | Mod: ORL | Performed by: FAMILY MEDICINE

## 2022-01-01 PROCEDURE — 93005 ELECTROCARDIOGRAM TRACING: CPT

## 2022-01-01 PROCEDURE — 250N000013 HC RX MED GY IP 250 OP 250 PS 637: Performed by: HOSPITALIST

## 2022-01-01 PROCEDURE — 120N000001 HC R&B MED SURG/OB

## 2022-01-01 PROCEDURE — 71275 CT ANGIOGRAPHY CHEST: CPT

## 2022-01-01 PROCEDURE — 99310 SBSQ NF CARE HIGH MDM 45: CPT | Performed by: NURSE PRACTITIONER

## 2022-01-01 PROCEDURE — P9603 ONE-WAY ALLOW PRORATED MILES: HCPCS | Performed by: NURSE PRACTITIONER

## 2022-01-01 PROCEDURE — 36415 COLL VENOUS BLD VENIPUNCTURE: CPT | Performed by: HOSPITALIST

## 2022-01-01 PROCEDURE — 85027 COMPLETE CBC AUTOMATED: CPT | Performed by: INTERNAL MEDICINE

## 2022-01-01 PROCEDURE — 999N000208 ECHOCARDIOGRAM COMPLETE

## 2022-01-01 PROCEDURE — 250N000009 HC RX 250: Performed by: INTERNAL MEDICINE

## 2022-01-01 PROCEDURE — 255N000002 HC RX 255 OP 636: Performed by: HOSPITALIST

## 2022-01-01 PROCEDURE — A9502 TC99M TETROFOSMIN: HCPCS | Performed by: HOSPITALIST

## 2022-01-01 PROCEDURE — 210N000002 HC R&B HEART CARE

## 2022-01-01 PROCEDURE — 250N000011 HC RX IP 250 OP 636: Performed by: EMERGENCY MEDICINE

## 2022-01-01 PROCEDURE — P9604 ONE-WAY ALLOW PRORATED TRIP: HCPCS | Performed by: NURSE PRACTITIONER

## 2022-01-01 PROCEDURE — 93016 CV STRESS TEST SUPVJ ONLY: CPT | Performed by: INTERNAL MEDICINE

## 2022-01-01 PROCEDURE — 80048 BASIC METABOLIC PNL TOTAL CA: CPT | Performed by: INTERNAL MEDICINE

## 2022-01-01 PROCEDURE — 99239 HOSP IP/OBS DSCHRG MGMT >30: CPT | Performed by: INTERNAL MEDICINE

## 2022-01-01 PROCEDURE — 85379 FIBRIN DEGRADATION QUANT: CPT | Performed by: EMERGENCY MEDICINE

## 2022-01-01 PROCEDURE — 82310 ASSAY OF CALCIUM: CPT | Performed by: HOSPITALIST

## 2022-01-01 PROCEDURE — 80048 BASIC METABOLIC PNL TOTAL CA: CPT | Mod: ORL | Performed by: NURSE PRACTITIONER

## 2022-01-01 PROCEDURE — 258N000003 HC RX IP 258 OP 636: Performed by: INTERNAL MEDICINE

## 2022-01-01 PROCEDURE — 85025 COMPLETE CBC W/AUTO DIFF WBC: CPT | Mod: ORL | Performed by: NURSE PRACTITIONER

## 2022-01-01 PROCEDURE — 99223 1ST HOSP IP/OBS HIGH 75: CPT | Performed by: HOSPITALIST

## 2022-01-01 PROCEDURE — 84145 PROCALCITONIN (PCT): CPT | Performed by: EMERGENCY MEDICINE

## 2022-01-01 PROCEDURE — 250N000009 HC RX 250: Performed by: EMERGENCY MEDICINE

## 2022-01-01 PROCEDURE — 71250 CT THORAX DX C-: CPT

## 2022-01-01 PROCEDURE — 84484 ASSAY OF TROPONIN QUANT: CPT | Performed by: INTERNAL MEDICINE

## 2022-01-01 PROCEDURE — 93017 CV STRESS TEST TRACING ONLY: CPT | Performed by: REHABILITATION PRACTITIONER

## 2022-01-01 PROCEDURE — C9803 HOPD COVID-19 SPEC COLLECT: HCPCS

## 2022-01-01 PROCEDURE — 74230 X-RAY XM SWLNG FUNCJ C+: CPT

## 2022-01-01 PROCEDURE — 250N000013 HC RX MED GY IP 250 OP 250 PS 637: Performed by: NURSE PRACTITIONER

## 2022-01-01 PROCEDURE — 36415 COLL VENOUS BLD VENIPUNCTURE: CPT | Performed by: EMERGENCY MEDICINE

## 2022-01-01 PROCEDURE — 92526 ORAL FUNCTION THERAPY: CPT | Mod: GN | Performed by: SPEECH-LANGUAGE PATHOLOGIST

## 2022-01-01 PROCEDURE — 85025 COMPLETE CBC W/AUTO DIFF WBC: CPT | Performed by: EMERGENCY MEDICINE

## 2022-01-01 PROCEDURE — 80048 BASIC METABOLIC PNL TOTAL CA: CPT | Performed by: NURSE PRACTITIONER

## 2022-01-01 PROCEDURE — U0003 INFECTIOUS AGENT DETECTION BY NUCLEIC ACID (DNA OR RNA); SEVERE ACUTE RESPIRATORY SYNDROME CORONAVIRUS 2 (SARS-COV-2) (CORONAVIRUS DISEASE [COVID-19]), AMPLIFIED PROBE TECHNIQUE, MAKING USE OF HIGH THROUGHPUT TECHNOLOGIES AS DESCRIBED BY CMS-2020-01-R: HCPCS | Mod: ORL | Performed by: FAMILY MEDICINE

## 2022-01-01 PROCEDURE — 94640 AIRWAY INHALATION TREATMENT: CPT

## 2022-01-01 PROCEDURE — 99223 1ST HOSP IP/OBS HIGH 75: CPT | Mod: AI | Performed by: HOSPITALIST

## 2022-01-01 PROCEDURE — 84484 ASSAY OF TROPONIN QUANT: CPT | Performed by: HOSPITALIST

## 2022-01-01 PROCEDURE — 96374 THER/PROPH/DIAG INJ IV PUSH: CPT | Mod: 59

## 2022-01-01 PROCEDURE — 99285 EMERGENCY DEPT VISIT HI MDM: CPT | Mod: 25

## 2022-01-01 PROCEDURE — 87636 SARSCOV2 & INF A&B AMP PRB: CPT | Performed by: EMERGENCY MEDICINE

## 2022-01-01 PROCEDURE — 99232 SBSQ HOSP IP/OBS MODERATE 35: CPT | Performed by: INTERNAL MEDICINE

## 2022-01-01 PROCEDURE — 83880 ASSAY OF NATRIURETIC PEPTIDE: CPT | Performed by: EMERGENCY MEDICINE

## 2022-01-01 PROCEDURE — 36415 COLL VENOUS BLD VENIPUNCTURE: CPT | Mod: ORL | Performed by: NURSE PRACTITIONER

## 2022-01-01 PROCEDURE — 85027 COMPLETE CBC AUTOMATED: CPT | Performed by: HOSPITALIST

## 2022-01-01 PROCEDURE — 78452 HT MUSCLE IMAGE SPECT MULT: CPT | Mod: 26 | Performed by: INTERNAL MEDICINE

## 2022-01-01 PROCEDURE — 99233 SBSQ HOSP IP/OBS HIGH 50: CPT | Mod: 25 | Performed by: INTERNAL MEDICINE

## 2022-01-01 PROCEDURE — 92611 MOTION FLUOROSCOPY/SWALLOW: CPT | Mod: GN | Performed by: SPEECH-LANGUAGE PATHOLOGIST

## 2022-01-01 PROCEDURE — 80053 COMPREHEN METABOLIC PANEL: CPT | Performed by: EMERGENCY MEDICINE

## 2022-01-01 PROCEDURE — 86481 TB AG RESPONSE T-CELL SUSP: CPT | Mod: ORL | Performed by: NURSE PRACTITIONER

## 2022-01-01 PROCEDURE — 80048 BASIC METABOLIC PNL TOTAL CA: CPT | Performed by: EMERGENCY MEDICINE

## 2022-01-01 PROCEDURE — 78452 HT MUSCLE IMAGE SPECT MULT: CPT

## 2022-01-01 PROCEDURE — 99309 SBSQ NF CARE MODERATE MDM 30: CPT | Performed by: NURSE PRACTITIONER

## 2022-01-01 PROCEDURE — 999N000049 HC STATISTIC ECHO STRESS OR NM NPI

## 2022-01-01 PROCEDURE — P9604 ONE-WAY ALLOW PRORATED TRIP: HCPCS | Mod: ORL | Performed by: NURSE PRACTITIONER

## 2022-01-01 RX ORDER — ONDANSETRON 4 MG/1
4 TABLET, ORALLY DISINTEGRATING ORAL EVERY 6 HOURS PRN
Status: DISCONTINUED | OUTPATIENT
Start: 2022-01-01 | End: 2022-01-01

## 2022-01-01 RX ORDER — ATORVASTATIN CALCIUM 40 MG/1
40 TABLET, FILM COATED ORAL AT BEDTIME
Status: DISCONTINUED | OUTPATIENT
Start: 2022-01-01 | End: 2022-01-01 | Stop reason: HOSPADM

## 2022-01-01 RX ORDER — AMOXICILLIN 250 MG
1 CAPSULE ORAL DAILY
COMMUNITY

## 2022-01-01 RX ORDER — ACETAMINOPHEN 325 MG/1
650 TABLET ORAL EVERY 6 HOURS PRN
Status: DISCONTINUED | OUTPATIENT
Start: 2022-01-01 | End: 2022-01-01 | Stop reason: HOSPADM

## 2022-01-01 RX ORDER — LOSARTAN POTASSIUM 25 MG/1
25 TABLET ORAL DAILY
Status: DISCONTINUED | OUTPATIENT
Start: 2022-01-01 | End: 2022-01-01 | Stop reason: HOSPADM

## 2022-01-01 RX ORDER — FUROSEMIDE 10 MG/ML
20 INJECTION INTRAMUSCULAR; INTRAVENOUS ONCE
Status: COMPLETED | OUTPATIENT
Start: 2022-01-01 | End: 2022-01-01

## 2022-01-01 RX ORDER — PROCHLORPERAZINE MALEATE 5 MG
5 TABLET ORAL EVERY 6 HOURS PRN
Status: DISCONTINUED | OUTPATIENT
Start: 2022-01-01 | End: 2022-01-01 | Stop reason: HOSPADM

## 2022-01-01 RX ORDER — ACYCLOVIR 200 MG/1
0-1 CAPSULE ORAL
Status: DISCONTINUED | OUTPATIENT
Start: 2022-01-01 | End: 2022-01-01

## 2022-01-01 RX ORDER — VANCOMYCIN HYDROCHLORIDE 125 MG/1
125 CAPSULE ORAL DAILY
Qty: 14 CAPSULE | Refills: 0 | Status: ON HOLD | OUTPATIENT
Start: 2022-01-01 | End: 2022-01-01

## 2022-01-01 RX ORDER — ONDANSETRON 4 MG/1
4 TABLET, ORALLY DISINTEGRATING ORAL EVERY 6 HOURS PRN
DISCHARGE
Start: 2022-01-01 | End: 2022-01-01

## 2022-01-01 RX ORDER — OXYBUTYNIN CHLORIDE 10 MG/1
10 TABLET, EXTENDED RELEASE ORAL DAILY
Status: DISCONTINUED | OUTPATIENT
Start: 2022-01-01 | End: 2022-01-01 | Stop reason: HOSPADM

## 2022-01-01 RX ORDER — DOXYCYCLINE 100 MG/1
100 CAPSULE ORAL EVERY 12 HOURS
Qty: 12 CAPSULE | Refills: 0 | DISCHARGE
Start: 2022-01-01 | End: 2022-01-01

## 2022-01-01 RX ORDER — FUROSEMIDE 40 MG
40 TABLET ORAL DAILY
Status: DISCONTINUED | OUTPATIENT
Start: 2022-01-01 | End: 2022-01-01 | Stop reason: HOSPADM

## 2022-01-01 RX ORDER — PANTOPRAZOLE SODIUM 40 MG/1
40 TABLET, DELAYED RELEASE ORAL
Status: DISCONTINUED | OUTPATIENT
Start: 2022-01-01 | End: 2022-01-01 | Stop reason: HOSPADM

## 2022-01-01 RX ORDER — OXYBUTYNIN CHLORIDE 5 MG/1
5 TABLET, EXTENDED RELEASE ORAL DAILY
Status: ON HOLD | COMMUNITY
End: 2022-01-01

## 2022-01-01 RX ORDER — FERROUS SULFATE 325(65) MG
325 TABLET ORAL
Status: DISCONTINUED | OUTPATIENT
Start: 2022-01-01 | End: 2022-01-01 | Stop reason: HOSPADM

## 2022-01-01 RX ORDER — LORAZEPAM 1 MG/1
1 TABLET ORAL
Status: DISCONTINUED | OUTPATIENT
Start: 2022-01-01 | End: 2022-01-01 | Stop reason: HOSPADM

## 2022-01-01 RX ORDER — NITROGLYCERIN 40 MG/1
1 PATCH TRANSDERMAL DAILY
Status: DISCONTINUED | OUTPATIENT
Start: 2022-01-01 | End: 2022-01-01

## 2022-01-01 RX ORDER — SIMETHICONE 125 MG
125 TABLET,CHEWABLE ORAL AT BEDTIME
Status: DISCONTINUED | OUTPATIENT
Start: 2022-01-01 | End: 2022-01-01

## 2022-01-01 RX ORDER — HALOPERIDOL 2 MG/ML
0.5 SOLUTION ORAL EVERY 6 HOURS PRN
Qty: 10 ML | Refills: 11 | Status: SHIPPED | OUTPATIENT
Start: 2022-01-01 | End: 2022-01-01

## 2022-01-01 RX ORDER — MORPHINE SULFATE 20 MG/ML
5-10 SOLUTION ORAL
Status: DISCONTINUED | OUTPATIENT
Start: 2022-01-01 | End: 2022-01-01 | Stop reason: HOSPADM

## 2022-01-01 RX ORDER — CARVEDILOL 6.25 MG/1
6.25 TABLET ORAL 2 TIMES DAILY WITH MEALS
Status: ON HOLD | COMMUNITY
End: 2022-01-01

## 2022-01-01 RX ORDER — IPRATROPIUM BROMIDE AND ALBUTEROL SULFATE 2.5; .5 MG/3ML; MG/3ML
3 SOLUTION RESPIRATORY (INHALATION) EVERY 4 HOURS PRN
Status: DISCONTINUED | OUTPATIENT
Start: 2022-01-01 | End: 2022-01-01 | Stop reason: HOSPADM

## 2022-01-01 RX ORDER — REGADENOSON 0.08 MG/ML
0.4 INJECTION, SOLUTION INTRAVENOUS ONCE
Status: COMPLETED | OUTPATIENT
Start: 2022-01-01 | End: 2022-01-01

## 2022-01-01 RX ORDER — CAFFEINE CITRATE 20 MG/ML
60 SOLUTION INTRAVENOUS
Status: DISCONTINUED | OUTPATIENT
Start: 2022-01-01 | End: 2022-01-01

## 2022-01-01 RX ORDER — BACITRACIN ZINC 500 [USP'U]/G
OINTMENT TOPICAL DAILY
Qty: 30 G | Refills: 3 | Status: SHIPPED | OUTPATIENT
Start: 2022-01-01 | End: 2022-01-01

## 2022-01-01 RX ORDER — FUROSEMIDE 40 MG
40 TABLET ORAL DAILY
Qty: 30 TABLET | Refills: 1 | DISCHARGE
Start: 2022-01-01 | End: 2022-01-01

## 2022-01-01 RX ORDER — PROCHLORPERAZINE MALEATE 10 MG
10 TABLET ORAL EVERY 4 HOURS PRN
COMMUNITY

## 2022-01-01 RX ORDER — MORPHINE SULFATE 100 MG/5ML
2.5 SOLUTION ORAL
Qty: 90 ML | Refills: 0 | Status: SHIPPED | OUTPATIENT
Start: 2022-01-01 | End: 2022-01-01

## 2022-01-01 RX ORDER — FUROSEMIDE 40 MG
40 TABLET ORAL DAILY
Status: DISCONTINUED | OUTPATIENT
Start: 2022-01-01 | End: 2022-01-01

## 2022-01-01 RX ORDER — LORAZEPAM 2 MG/ML
1 INJECTION INTRAMUSCULAR
Status: DISCONTINUED | OUTPATIENT
Start: 2022-01-01 | End: 2022-01-01 | Stop reason: HOSPADM

## 2022-01-01 RX ORDER — AMLODIPINE BESYLATE 10 MG/1
10 TABLET ORAL DAILY
Status: DISCONTINUED | OUTPATIENT
Start: 2022-01-01 | End: 2022-01-01 | Stop reason: HOSPADM

## 2022-01-01 RX ORDER — CLOPIDOGREL BISULFATE 75 MG/1
75 TABLET ORAL DAILY
Status: DISCONTINUED | OUTPATIENT
Start: 2022-01-01 | End: 2022-01-01 | Stop reason: HOSPADM

## 2022-01-01 RX ORDER — PROCHLORPERAZINE 25 MG
12.5 SUPPOSITORY, RECTAL RECTAL EVERY 12 HOURS PRN
Status: DISCONTINUED | OUTPATIENT
Start: 2022-01-01 | End: 2022-01-01 | Stop reason: HOSPADM

## 2022-01-01 RX ORDER — IPRATROPIUM BROMIDE AND ALBUTEROL SULFATE 2.5; .5 MG/3ML; MG/3ML
3 SOLUTION RESPIRATORY (INHALATION) 4 TIMES DAILY
Qty: 90 ML | Refills: 0 | DISCHARGE
Start: 2022-01-01 | End: 2022-01-01

## 2022-01-01 RX ORDER — NITROGLYCERIN 0.4 MG/1
0.4 TABLET SUBLINGUAL EVERY 5 MIN PRN
Status: DISCONTINUED | OUTPATIENT
Start: 2022-01-01 | End: 2022-01-01 | Stop reason: HOSPADM

## 2022-01-01 RX ORDER — METHYLPREDNISOLONE SODIUM SUCCINATE 125 MG/2ML
125 INJECTION, POWDER, LYOPHILIZED, FOR SOLUTION INTRAMUSCULAR; INTRAVENOUS ONCE
Status: COMPLETED | OUTPATIENT
Start: 2022-01-01 | End: 2022-01-01

## 2022-01-01 RX ORDER — FUROSEMIDE 10 MG/ML
40 INJECTION INTRAMUSCULAR; INTRAVENOUS
Status: DISCONTINUED | OUTPATIENT
Start: 2022-01-01 | End: 2022-01-01

## 2022-01-01 RX ORDER — SIMETHICONE 80 MG
80 TABLET,CHEWABLE ORAL EVERY 6 HOURS PRN
Status: DISCONTINUED | OUTPATIENT
Start: 2022-01-01 | End: 2022-01-01 | Stop reason: HOSPADM

## 2022-01-01 RX ORDER — MORPHINE SULFATE 100 MG/5ML
5 SOLUTION ORAL EVERY 6 HOURS
COMMUNITY

## 2022-01-01 RX ORDER — ACETAMINOPHEN 650 MG/1
650 SUPPOSITORY RECTAL EVERY 4 HOURS PRN
Qty: 4 SUPPOSITORY | Refills: 11 | Status: SHIPPED | OUTPATIENT
Start: 2022-01-01 | End: 2022-01-01

## 2022-01-01 RX ORDER — ATROPINE SULFATE 10 MG/ML
2 SOLUTION/ DROPS OPHTHALMIC EVERY 4 HOURS PRN
Status: DISCONTINUED | OUTPATIENT
Start: 2022-01-01 | End: 2022-01-01 | Stop reason: HOSPADM

## 2022-01-01 RX ORDER — POTASSIUM CHLORIDE 750 MG/1
10 TABLET, EXTENDED RELEASE ORAL ONCE
Status: COMPLETED | OUTPATIENT
Start: 2022-01-01 | End: 2022-01-01

## 2022-01-01 RX ORDER — ONDANSETRON 2 MG/ML
4 INJECTION INTRAMUSCULAR; INTRAVENOUS EVERY 6 HOURS PRN
Status: DISCONTINUED | OUTPATIENT
Start: 2022-01-01 | End: 2022-01-01

## 2022-01-01 RX ORDER — CARVEDILOL 12.5 MG/1
12.5 TABLET ORAL 2 TIMES DAILY WITH MEALS
Status: DISCONTINUED | OUTPATIENT
Start: 2022-01-01 | End: 2022-01-01 | Stop reason: HOSPADM

## 2022-01-01 RX ORDER — CARBOXYMETHYLCELLULOSE SODIUM 5 MG/ML
1-2 SOLUTION/ DROPS OPHTHALMIC
Status: DISCONTINUED | OUTPATIENT
Start: 2022-01-01 | End: 2022-01-01 | Stop reason: HOSPADM

## 2022-01-01 RX ORDER — SIMETHICONE 80 MG
80 TABLET,CHEWABLE ORAL 4 TIMES DAILY PRN
Status: DISCONTINUED | OUTPATIENT
Start: 2022-01-01 | End: 2022-01-01 | Stop reason: HOSPADM

## 2022-01-01 RX ORDER — AMINOPHYLLINE 25 MG/ML
50-100 INJECTION, SOLUTION INTRAVENOUS
Status: DISCONTINUED | OUTPATIENT
Start: 2022-01-01 | End: 2022-01-01

## 2022-01-01 RX ORDER — MORPHINE SULFATE 10 MG/5ML
5-10 SOLUTION ORAL
Status: DISCONTINUED | OUTPATIENT
Start: 2022-01-01 | End: 2022-01-01 | Stop reason: HOSPADM

## 2022-01-01 RX ORDER — LIDOCAINE 40 MG/G
CREAM TOPICAL
Status: DISCONTINUED | OUTPATIENT
Start: 2022-01-01 | End: 2022-01-01 | Stop reason: HOSPADM

## 2022-01-01 RX ORDER — BISACODYL 10 MG
10 SUPPOSITORY, RECTAL RECTAL
Status: DISCONTINUED | OUTPATIENT
Start: 2022-01-01 | End: 2022-01-01 | Stop reason: HOSPADM

## 2022-01-01 RX ORDER — LANOLIN ALCOHOL/MO/W.PET/CERES
3 CREAM (GRAM) TOPICAL
Status: DISCONTINUED | OUTPATIENT
Start: 2022-01-01 | End: 2022-01-01 | Stop reason: HOSPADM

## 2022-01-01 RX ORDER — CEFDINIR 300 MG/1
300 CAPSULE ORAL 2 TIMES DAILY
Qty: 14 CAPSULE | Refills: 0 | Status: SHIPPED | OUTPATIENT
Start: 2022-01-01 | End: 2022-01-01

## 2022-01-01 RX ORDER — PROCHLORPERAZINE MALEATE 5 MG
5 TABLET ORAL EVERY 6 HOURS PRN
Status: DISCONTINUED | OUTPATIENT
Start: 2022-01-01 | End: 2022-01-01

## 2022-01-01 RX ORDER — HEPARIN SODIUM 10000 [USP'U]/100ML
0-5000 INJECTION, SOLUTION INTRAVENOUS CONTINUOUS
Status: DISCONTINUED | OUTPATIENT
Start: 2022-01-01 | End: 2022-01-01

## 2022-01-01 RX ORDER — ISOSORBIDE MONONITRATE 30 MG/1
15 TABLET, EXTENDED RELEASE ORAL DAILY
Qty: 30 TABLET | Refills: 0 | Status: ON HOLD | DISCHARGE
Start: 2022-01-01 | End: 2022-01-01

## 2022-01-01 RX ORDER — CARVEDILOL 12.5 MG/1
12.5 TABLET ORAL 2 TIMES DAILY WITH MEALS
Qty: 60 TABLET | Refills: 0 | Status: ON HOLD | DISCHARGE
Start: 2022-01-01 | End: 2022-01-01

## 2022-01-01 RX ORDER — IPRATROPIUM BROMIDE AND ALBUTEROL SULFATE 2.5; .5 MG/3ML; MG/3ML
3 SOLUTION RESPIRATORY (INHALATION) 4 TIMES DAILY
Status: DISCONTINUED | OUTPATIENT
Start: 2022-01-01 | End: 2022-01-01 | Stop reason: HOSPADM

## 2022-01-01 RX ORDER — AMOXICILLIN 250 MG
1 CAPSULE ORAL 2 TIMES DAILY
Status: DISCONTINUED | OUTPATIENT
Start: 2022-01-01 | End: 2022-01-01

## 2022-01-01 RX ORDER — ACETAMINOPHEN 325 MG/1
975 TABLET ORAL EVERY 8 HOURS
Status: DISCONTINUED | OUTPATIENT
Start: 2022-01-01 | End: 2022-01-01 | Stop reason: HOSPADM

## 2022-01-01 RX ORDER — METHYLPREDNISOLONE SODIUM SUCCINATE 125 MG/2ML
60 INJECTION, POWDER, LYOPHILIZED, FOR SOLUTION INTRAMUSCULAR; INTRAVENOUS EVERY 8 HOURS
Status: DISCONTINUED | OUTPATIENT
Start: 2022-01-01 | End: 2022-01-01

## 2022-01-01 RX ORDER — FUROSEMIDE 40 MG
40 TABLET ORAL
Status: DISCONTINUED | OUTPATIENT
Start: 2022-01-01 | End: 2022-01-01

## 2022-01-01 RX ORDER — ONDANSETRON 2 MG/ML
4 INJECTION INTRAMUSCULAR; INTRAVENOUS EVERY 6 HOURS PRN
Status: DISCONTINUED | OUTPATIENT
Start: 2022-01-01 | End: 2022-01-01 | Stop reason: HOSPADM

## 2022-01-01 RX ORDER — ALBUTEROL SULFATE 90 UG/1
2 AEROSOL, METERED RESPIRATORY (INHALATION) EVERY 5 MIN PRN
Status: DISCONTINUED | OUTPATIENT
Start: 2022-01-01 | End: 2022-01-01

## 2022-01-01 RX ORDER — CARVEDILOL 3.12 MG/1
3.12 TABLET ORAL 2 TIMES DAILY WITH MEALS
Status: DISCONTINUED | OUTPATIENT
Start: 2022-01-01 | End: 2022-01-01 | Stop reason: HOSPADM

## 2022-01-01 RX ORDER — ONDANSETRON 4 MG/1
4 TABLET, ORALLY DISINTEGRATING ORAL EVERY 6 HOURS PRN
Status: DISCONTINUED | OUTPATIENT
Start: 2022-01-01 | End: 2022-01-01 | Stop reason: HOSPADM

## 2022-01-01 RX ORDER — DOXYCYCLINE HYCLATE 100 MG
100 TABLET ORAL 2 TIMES DAILY
Qty: 14 TABLET | Refills: 0 | Status: SHIPPED | OUTPATIENT
Start: 2022-01-01 | End: 2022-01-01

## 2022-01-01 RX ORDER — NITROGLYCERIN 0.4 MG/1
0.4 TABLET SUBLINGUAL ONCE
Status: DISCONTINUED | OUTPATIENT
Start: 2022-01-01 | End: 2022-01-01

## 2022-01-01 RX ORDER — IOPAMIDOL 755 MG/ML
54 INJECTION, SOLUTION INTRAVASCULAR ONCE
Status: COMPLETED | OUTPATIENT
Start: 2022-01-01 | End: 2022-01-01

## 2022-01-01 RX ORDER — DOXYCYCLINE 100 MG/1
100 CAPSULE ORAL EVERY 12 HOURS SCHEDULED
Status: DISCONTINUED | OUTPATIENT
Start: 2022-01-01 | End: 2022-01-01 | Stop reason: HOSPADM

## 2022-01-01 RX ORDER — FUROSEMIDE 10 MG/ML
40 INJECTION INTRAMUSCULAR; INTRAVENOUS ONCE
Status: COMPLETED | OUTPATIENT
Start: 2022-01-01 | End: 2022-01-01

## 2022-01-01 RX ORDER — ATROPINE SULFATE 10 MG/ML
1 SOLUTION/ DROPS OPHTHALMIC EVERY 4 HOURS PRN
Qty: 5 ML | Refills: 11 | Status: SHIPPED | OUTPATIENT
Start: 2022-01-01

## 2022-01-01 RX ORDER — PROCHLORPERAZINE 25 MG
12.5 SUPPOSITORY, RECTAL RECTAL EVERY 12 HOURS PRN
Status: DISCONTINUED | OUTPATIENT
Start: 2022-01-01 | End: 2022-01-01

## 2022-01-01 RX ORDER — PREDNISONE 10 MG/1
40 TABLET ORAL DAILY
Status: DISCONTINUED | OUTPATIENT
Start: 2022-01-01 | End: 2022-01-01 | Stop reason: HOSPADM

## 2022-01-01 RX ORDER — CARVEDILOL 6.25 MG/1
6.25 TABLET ORAL 2 TIMES DAILY WITH MEALS
Status: DISCONTINUED | OUTPATIENT
Start: 2022-01-01 | End: 2022-01-01

## 2022-01-01 RX ORDER — ACETAMINOPHEN 650 MG/1
650 SUPPOSITORY RECTAL EVERY 6 HOURS PRN
Status: DISCONTINUED | OUTPATIENT
Start: 2022-01-01 | End: 2022-01-01 | Stop reason: HOSPADM

## 2022-01-01 RX ORDER — OXYBUTYNIN CHLORIDE 5 MG/1
5 TABLET, EXTENDED RELEASE ORAL DAILY
Status: DISCONTINUED | OUTPATIENT
Start: 2022-01-01 | End: 2022-01-01

## 2022-01-01 RX ORDER — AMOXICILLIN 250 MG
1 CAPSULE ORAL 2 TIMES DAILY PRN
Status: DISCONTINUED | OUTPATIENT
Start: 2022-01-01 | End: 2022-01-01 | Stop reason: HOSPADM

## 2022-01-01 RX ORDER — LORAZEPAM 2 MG/ML
0.25 CONCENTRATE ORAL EVERY 4 HOURS PRN
Qty: 30 ML | Refills: 5 | Status: SHIPPED | OUTPATIENT
Start: 2022-01-01

## 2022-01-01 RX ORDER — NALOXONE HYDROCHLORIDE 0.4 MG/ML
0.2 INJECTION, SOLUTION INTRAMUSCULAR; INTRAVENOUS; SUBCUTANEOUS
Status: DISCONTINUED | OUTPATIENT
Start: 2022-01-01 | End: 2022-01-01 | Stop reason: HOSPADM

## 2022-01-01 RX ORDER — MORPHINE SULFATE 2 MG/ML
1-2 INJECTION, SOLUTION INTRAMUSCULAR; INTRAVENOUS
Status: DISCONTINUED | OUTPATIENT
Start: 2022-01-01 | End: 2022-01-01 | Stop reason: HOSPADM

## 2022-01-01 RX ORDER — POTASSIUM CHLORIDE 1500 MG/1
20 TABLET, EXTENDED RELEASE ORAL DAILY
Status: DISCONTINUED | OUTPATIENT
Start: 2022-01-01 | End: 2022-01-01 | Stop reason: HOSPADM

## 2022-01-01 RX ORDER — SIMETHICONE 80 MG
80 TABLET,CHEWABLE ORAL AT BEDTIME
Status: DISCONTINUED | OUTPATIENT
Start: 2022-01-01 | End: 2022-01-01 | Stop reason: HOSPADM

## 2022-01-01 RX ORDER — CARVEDILOL 12.5 MG/1
12.5 TABLET ORAL 2 TIMES DAILY WITH MEALS
Status: DISCONTINUED | OUTPATIENT
Start: 2022-01-01 | End: 2022-01-01

## 2022-01-01 RX ORDER — BARIUM SULFATE 400 MG/ML
SUSPENSION ORAL ONCE
Status: COMPLETED | OUTPATIENT
Start: 2022-01-01 | End: 2022-01-01

## 2022-01-01 RX ORDER — MORPHINE SULFATE 100 MG/5ML
5 SOLUTION ORAL
COMMUNITY

## 2022-01-01 RX ORDER — ACETAMINOPHEN 325 MG/10.15ML
650 LIQUID ORAL EVERY 6 HOURS PRN
Status: DISCONTINUED | OUTPATIENT
Start: 2022-01-01 | End: 2022-01-01 | Stop reason: HOSPADM

## 2022-01-01 RX ORDER — BISACODYL 10 MG
10 SUPPOSITORY, RECTAL RECTAL DAILY PRN
Qty: 2 SUPPOSITORY | Refills: 11 | Status: SHIPPED | OUTPATIENT
Start: 2022-01-01

## 2022-01-01 RX ORDER — PREDNISONE 10 MG/1
TABLET ORAL
Qty: 30 TABLET | Refills: 0 | DISCHARGE
Start: 2022-01-01 | End: 2022-01-01

## 2022-01-01 RX ORDER — PANTOPRAZOLE SODIUM 20 MG/1
40 TABLET, DELAYED RELEASE ORAL
Status: DISCONTINUED | OUTPATIENT
Start: 2022-01-01 | End: 2022-01-01 | Stop reason: HOSPADM

## 2022-01-01 RX ORDER — SENNOSIDES A AND B 8.6 MG/1
1 TABLET, FILM COATED ORAL 2 TIMES DAILY PRN
Qty: 100 TABLET | Refills: 11 | Status: SHIPPED | OUTPATIENT
Start: 2022-01-01 | End: 2022-01-01

## 2022-01-01 RX ORDER — NALOXONE HYDROCHLORIDE 0.4 MG/ML
0.1 INJECTION, SOLUTION INTRAMUSCULAR; INTRAVENOUS; SUBCUTANEOUS
Status: DISCONTINUED | OUTPATIENT
Start: 2022-01-01 | End: 2022-01-01 | Stop reason: HOSPADM

## 2022-01-01 RX ADMIN — AMLODIPINE BESYLATE 10 MG: 10 TABLET ORAL at 08:29

## 2022-01-01 RX ADMIN — APIXABAN 2.5 MG: 2.5 TABLET, FILM COATED ORAL at 20:49

## 2022-01-01 RX ADMIN — ISOSORBIDE MONONITRATE 15 MG: 30 TABLET, EXTENDED RELEASE ORAL at 08:39

## 2022-01-01 RX ADMIN — IPRATROPIUM BROMIDE AND ALBUTEROL SULFATE 3 ML: .5; 3 SOLUTION RESPIRATORY (INHALATION) at 14:13

## 2022-01-01 RX ADMIN — TETROFOSMIN 27 MCI.: 1.38 INJECTION, POWDER, LYOPHILIZED, FOR SOLUTION INTRAVENOUS at 14:01

## 2022-01-01 RX ADMIN — OXYBUTYNIN CHLORIDE 10 MG: 10 TABLET, EXTENDED RELEASE ORAL at 08:39

## 2022-01-01 RX ADMIN — HEPARIN SODIUM 900 UNITS/HR: 1000 INJECTION INTRAVENOUS; SUBCUTANEOUS at 11:30

## 2022-01-01 RX ADMIN — Medication 5 MG: at 21:18

## 2022-01-01 RX ADMIN — BARIUM SULFATE 25 ML: 400 SUSPENSION ORAL at 09:26

## 2022-01-01 RX ADMIN — ACETAMINOPHEN 975 MG: 325 TABLET, FILM COATED ORAL at 17:18

## 2022-01-01 RX ADMIN — PANTOPRAZOLE SODIUM 40 MG: 20 TABLET, DELAYED RELEASE ORAL at 08:39

## 2022-01-01 RX ADMIN — AMLODIPINE BESYLATE 10 MG: 10 TABLET ORAL at 08:46

## 2022-01-01 RX ADMIN — OXYBUTYNIN CHLORIDE 10 MG: 10 TABLET, EXTENDED RELEASE ORAL at 08:46

## 2022-01-01 RX ADMIN — IPRATROPIUM BROMIDE AND ALBUTEROL SULFATE 3 ML: .5; 3 SOLUTION RESPIRATORY (INHALATION) at 07:25

## 2022-01-01 RX ADMIN — CLOPIDOGREL BISULFATE 75 MG: 75 TABLET ORAL at 08:44

## 2022-01-01 RX ADMIN — METHYLPREDNISOLONE SODIUM SUCCINATE 125 MG: 125 INJECTION, POWDER, FOR SOLUTION INTRAMUSCULAR; INTRAVENOUS at 12:09

## 2022-01-01 RX ADMIN — FUROSEMIDE 40 MG: 10 INJECTION, SOLUTION INTRAVENOUS at 08:20

## 2022-01-01 RX ADMIN — METHYLPREDNISOLONE SODIUM SUCCINATE 62.5 MG: 125 INJECTION, POWDER, FOR SOLUTION INTRAMUSCULAR; INTRAVENOUS at 04:42

## 2022-01-01 RX ADMIN — ACETAMINOPHEN 975 MG: 325 TABLET, FILM COATED ORAL at 08:19

## 2022-01-01 RX ADMIN — LOSARTAN POTASSIUM 25 MG: 25 TABLET, FILM COATED ORAL at 08:46

## 2022-01-01 RX ADMIN — GUAIFENESIN 10 ML: 200 SOLUTION ORAL at 08:44

## 2022-01-01 RX ADMIN — GUAIFENESIN 10 ML: 200 SOLUTION ORAL at 08:40

## 2022-01-01 RX ADMIN — SIMETHICONE 80 MG: 80 TABLET, CHEWABLE ORAL at 21:20

## 2022-01-01 RX ADMIN — ACETAMINOPHEN 975 MG: 325 TABLET, FILM COATED ORAL at 17:01

## 2022-01-01 RX ADMIN — FUROSEMIDE 40 MG: 10 INJECTION, SOLUTION INTRAVENOUS at 17:01

## 2022-01-01 RX ADMIN — GUAIFENESIN 10 ML: 200 SOLUTION ORAL at 20:47

## 2022-01-01 RX ADMIN — FUROSEMIDE 40 MG: 40 TABLET ORAL at 11:03

## 2022-01-01 RX ADMIN — ACETAMINOPHEN 650 MG: 325 TABLET, FILM COATED ORAL at 11:09

## 2022-01-01 RX ADMIN — METHYLPREDNISOLONE SODIUM SUCCINATE 62.5 MG: 125 INJECTION, POWDER, FOR SOLUTION INTRAMUSCULAR; INTRAVENOUS at 20:48

## 2022-01-01 RX ADMIN — LORAZEPAM 1 MG: 1 TABLET ORAL at 22:44

## 2022-01-01 RX ADMIN — SODIUM CHLORIDE 82 ML: 900 INJECTION INTRAVENOUS at 12:37

## 2022-01-01 RX ADMIN — ACETAMINOPHEN 975 MG: 325 TABLET, FILM COATED ORAL at 23:20

## 2022-01-01 RX ADMIN — CARVEDILOL 12.5 MG: 12.5 TABLET, FILM COATED ORAL at 17:18

## 2022-01-01 RX ADMIN — CARVEDILOL 12.5 MG: 12.5 TABLET, FILM COATED ORAL at 08:46

## 2022-01-01 RX ADMIN — POTASSIUM CHLORIDE 20 MEQ: 1500 TABLET, EXTENDED RELEASE ORAL at 20:35

## 2022-01-01 RX ADMIN — LOSARTAN POTASSIUM 25 MG: 25 TABLET, FILM COATED ORAL at 08:30

## 2022-01-01 RX ADMIN — APIXABAN 2.5 MG: 2.5 TABLET, FILM COATED ORAL at 08:40

## 2022-01-01 RX ADMIN — GUAIFENESIN 10 ML: 200 SOLUTION ORAL at 21:20

## 2022-01-01 RX ADMIN — CLOPIDOGREL BISULFATE 75 MG: 75 TABLET ORAL at 08:30

## 2022-01-01 RX ADMIN — CLOPIDOGREL BISULFATE 75 MG: 75 TABLET ORAL at 08:20

## 2022-01-01 RX ADMIN — CLOPIDOGREL BISULFATE 75 MG: 75 TABLET ORAL at 08:39

## 2022-01-01 RX ADMIN — FLUTICASONE FUROATE AND VILANTEROL TRIFENATATE 1 PUFF: 200; 25 POWDER RESPIRATORY (INHALATION) at 08:46

## 2022-01-01 RX ADMIN — DOXYCYCLINE 100 MG: 100 CAPSULE ORAL at 08:39

## 2022-01-01 RX ADMIN — FUROSEMIDE 40 MG: 40 TABLET ORAL at 08:40

## 2022-01-01 RX ADMIN — FUROSEMIDE 40 MG: 40 TABLET ORAL at 14:31

## 2022-01-01 RX ADMIN — OXYBUTYNIN CHLORIDE 5 MG: 5 TABLET, EXTENDED RELEASE ORAL at 11:03

## 2022-01-01 RX ADMIN — IPRATROPIUM BROMIDE AND ALBUTEROL SULFATE 3 ML: .5; 3 SOLUTION RESPIRATORY (INHALATION) at 13:56

## 2022-01-01 RX ADMIN — SIMETHICONE 80 MG: 80 TABLET, CHEWABLE ORAL at 20:48

## 2022-01-01 RX ADMIN — CARVEDILOL 12.5 MG: 12.5 TABLET, FILM COATED ORAL at 18:29

## 2022-01-01 RX ADMIN — DOXYCYCLINE 100 MG: 100 CAPSULE ORAL at 20:48

## 2022-01-01 RX ADMIN — IPRATROPIUM BROMIDE AND ALBUTEROL SULFATE 3 ML: .5; 3 SOLUTION RESPIRATORY (INHALATION) at 19:58

## 2022-01-01 RX ADMIN — PANTOPRAZOLE SODIUM 40 MG: 40 TABLET, DELAYED RELEASE ORAL at 11:03

## 2022-01-01 RX ADMIN — LOSARTAN POTASSIUM 25 MG: 25 TABLET, FILM COATED ORAL at 08:39

## 2022-01-01 RX ADMIN — SIMETHICONE 80 MG: 80 TABLET, CHEWABLE ORAL at 21:12

## 2022-01-01 RX ADMIN — APIXABAN 2.5 MG: 2.5 TABLET, FILM COATED ORAL at 12:12

## 2022-01-01 RX ADMIN — GUAIFENESIN 10 ML: 200 SOLUTION ORAL at 03:40

## 2022-01-01 RX ADMIN — ACETAMINOPHEN 975 MG: 325 TABLET, FILM COATED ORAL at 16:46

## 2022-01-01 RX ADMIN — UMECLIDINIUM 1 PUFF: 62.5 AEROSOL, POWDER ORAL at 08:32

## 2022-01-01 RX ADMIN — POTASSIUM CHLORIDE 10 MEQ: 750 TABLET, EXTENDED RELEASE ORAL at 13:01

## 2022-01-01 RX ADMIN — SIMETHICONE 80 MG: 80 TABLET, CHEWABLE ORAL at 23:20

## 2022-01-01 RX ADMIN — TETROFOSMIN 9.4 MCI.: 1.38 INJECTION, POWDER, LYOPHILIZED, FOR SOLUTION INTRAVENOUS at 12:05

## 2022-01-01 RX ADMIN — FERROUS SULFATE TAB 325 MG (65 MG ELEMENTAL FE) 325 MG: 325 (65 FE) TAB at 08:39

## 2022-01-01 RX ADMIN — SIMETHICONE 80 MG: 80 TABLET, CHEWABLE ORAL at 01:55

## 2022-01-01 RX ADMIN — Medication 5 MG: at 02:40

## 2022-01-01 RX ADMIN — ISOSORBIDE MONONITRATE 15 MG: 30 TABLET, EXTENDED RELEASE ORAL at 10:17

## 2022-01-01 RX ADMIN — DOXYCYCLINE 100 MG: 100 CAPSULE ORAL at 12:09

## 2022-01-01 RX ADMIN — HEPARIN SODIUM 650 UNITS/HR: 1000 INJECTION INTRAVENOUS; SUBCUTANEOUS at 08:12

## 2022-01-01 RX ADMIN — UMECLIDINIUM 1 PUFF: 62.5 AEROSOL, POWDER ORAL at 13:00

## 2022-01-01 RX ADMIN — ATORVASTATIN CALCIUM 40 MG: 40 TABLET, FILM COATED ORAL at 21:20

## 2022-01-01 RX ADMIN — NITROGLYCERIN 0.4 MG: 0.4 TABLET SUBLINGUAL at 22:58

## 2022-01-01 RX ADMIN — UMECLIDINIUM 1 PUFF: 62.5 AEROSOL, POWDER ORAL at 09:02

## 2022-01-01 RX ADMIN — FUROSEMIDE 40 MG: 10 INJECTION, SOLUTION INTRAVENOUS at 08:29

## 2022-01-01 RX ADMIN — ACETAMINOPHEN 975 MG: 325 TABLET, FILM COATED ORAL at 08:30

## 2022-01-01 RX ADMIN — PANTOPRAZOLE SODIUM 40 MG: 20 TABLET, DELAYED RELEASE ORAL at 08:20

## 2022-01-01 RX ADMIN — ATORVASTATIN CALCIUM 40 MG: 40 TABLET, FILM COATED ORAL at 23:21

## 2022-01-01 RX ADMIN — CARVEDILOL 12.5 MG: 12.5 TABLET, FILM COATED ORAL at 08:19

## 2022-01-01 RX ADMIN — GUAIFENESIN 10 ML: 200 SOLUTION ORAL at 08:29

## 2022-01-01 RX ADMIN — APIXABAN 2.5 MG: 2.5 TABLET, FILM COATED ORAL at 23:20

## 2022-01-01 RX ADMIN — ATORVASTATIN CALCIUM 40 MG: 40 TABLET, FILM COATED ORAL at 21:12

## 2022-01-01 RX ADMIN — PANTOPRAZOLE SODIUM 40 MG: 20 TABLET, DELAYED RELEASE ORAL at 06:39

## 2022-01-01 RX ADMIN — OXYBUTYNIN CHLORIDE 10 MG: 10 TABLET, EXTENDED RELEASE ORAL at 08:30

## 2022-01-01 RX ADMIN — LORAZEPAM 1 MG: 2 INJECTION INTRAMUSCULAR; INTRAVENOUS at 05:18

## 2022-01-01 RX ADMIN — PANTOPRAZOLE SODIUM 40 MG: 20 TABLET, DELAYED RELEASE ORAL at 08:29

## 2022-01-01 RX ADMIN — ATORVASTATIN CALCIUM 40 MG: 40 TABLET, FILM COATED ORAL at 20:48

## 2022-01-01 RX ADMIN — CARVEDILOL 6.25 MG: 6.25 TABLET, FILM COATED ORAL at 17:06

## 2022-01-01 RX ADMIN — OXYBUTYNIN CHLORIDE 5 MG: 5 TABLET, EXTENDED RELEASE ORAL at 20:35

## 2022-01-01 RX ADMIN — AMLODIPINE BESYLATE 10 MG: 10 TABLET ORAL at 08:19

## 2022-01-01 RX ADMIN — POTASSIUM CHLORIDE 20 MEQ: 1500 TABLET, EXTENDED RELEASE ORAL at 10:11

## 2022-01-01 RX ADMIN — ACETAMINOPHEN 975 MG: 325 TABLET, FILM COATED ORAL at 00:32

## 2022-01-01 RX ADMIN — IPRATROPIUM BROMIDE AND ALBUTEROL SULFATE 3 ML: .5; 3 SOLUTION RESPIRATORY (INHALATION) at 11:05

## 2022-01-01 RX ADMIN — ACETAMINOPHEN 975 MG: 325 TABLET, FILM COATED ORAL at 08:46

## 2022-01-01 RX ADMIN — REGADENOSON 0.4 MG: 0.08 INJECTION, SOLUTION INTRAVENOUS at 14:01

## 2022-01-01 RX ADMIN — CARVEDILOL 6.25 MG: 6.25 TABLET, FILM COATED ORAL at 08:20

## 2022-01-01 RX ADMIN — FUROSEMIDE 20 MG: 10 INJECTION, SOLUTION INTRAVENOUS at 13:49

## 2022-01-01 RX ADMIN — AMLODIPINE BESYLATE 10 MG: 10 TABLET ORAL at 08:39

## 2022-01-01 RX ADMIN — ACETAMINOPHEN 975 MG: 325 TABLET, FILM COATED ORAL at 08:40

## 2022-01-01 RX ADMIN — FUROSEMIDE 40 MG: 10 INJECTION, SOLUTION INTRAVENOUS at 20:59

## 2022-01-01 RX ADMIN — CARVEDILOL 6.25 MG: 6.25 TABLET, FILM COATED ORAL at 08:29

## 2022-01-01 RX ADMIN — CARVEDILOL 12.5 MG: 12.5 TABLET, FILM COATED ORAL at 08:40

## 2022-01-01 RX ADMIN — IOPAMIDOL 54 ML: 755 INJECTION, SOLUTION INTRAVENOUS at 12:36

## 2022-01-01 RX ADMIN — OXYBUTYNIN CHLORIDE 10 MG: 10 TABLET, EXTENDED RELEASE ORAL at 08:20

## 2022-01-01 RX ADMIN — LOSARTAN POTASSIUM 25 MG: 25 TABLET, FILM COATED ORAL at 08:20

## 2022-01-01 RX ADMIN — FERROUS SULFATE TAB 325 MG (65 MG ELEMENTAL FE) 325 MG: 325 (65 FE) TAB at 08:30

## 2022-01-01 RX ADMIN — HUMAN ALBUMIN MICROSPHERES AND PERFLUTREN 9 ML: 10; .22 INJECTION, SOLUTION INTRAVENOUS at 11:08

## 2022-01-01 RX ADMIN — MORPHINE SULFATE 5 MG: 10 SOLUTION ORAL at 11:48

## 2022-01-01 RX ADMIN — FUROSEMIDE 40 MG: 40 TABLET ORAL at 08:46

## 2022-01-01 RX ADMIN — Medication 10 MG: at 22:38

## 2022-01-01 ASSESSMENT — ACTIVITIES OF DAILY LIVING (ADL)
ADLS_ACUITY_SCORE: 17
ADLS_ACUITY_SCORE: 19
ADLS_ACUITY_SCORE: 21
ADLS_ACUITY_SCORE: 22
ADLS_ACUITY_SCORE: 21
ADLS_ACUITY_SCORE: 22
ADLS_ACUITY_SCORE: 21
ADLS_ACUITY_SCORE: 20
ADLS_ACUITY_SCORE: 22
ADLS_ACUITY_SCORE: 20
ADLS_ACUITY_SCORE: 21
ADLS_ACUITY_SCORE: 19
ADLS_ACUITY_SCORE: 20
ADLS_ACUITY_SCORE: 21
ADLS_ACUITY_SCORE: 21
ADLS_ACUITY_SCORE: 22
ADLS_ACUITY_SCORE: 19
ADLS_ACUITY_SCORE: 21
ADLS_ACUITY_SCORE: 20
ADLS_ACUITY_SCORE: 22
ADLS_ACUITY_SCORE: 21
ADLS_ACUITY_SCORE: 22
ADLS_ACUITY_SCORE: 19
ADLS_ACUITY_SCORE: 17
ADLS_ACUITY_SCORE: 19
ADLS_ACUITY_SCORE: 22
ADLS_ACUITY_SCORE: 19
ADLS_ACUITY_SCORE: 19
ADLS_ACUITY_SCORE: 14
ADLS_ACUITY_SCORE: 17
ADLS_ACUITY_SCORE: 21
ADLS_ACUITY_SCORE: 22
ADLS_ACUITY_SCORE: 14
ADLS_ACUITY_SCORE: 22
ADLS_ACUITY_SCORE: 19
ADLS_ACUITY_SCORE: 22
ADLS_ACUITY_SCORE: 19
ADLS_ACUITY_SCORE: 14
ADLS_ACUITY_SCORE: 17
ADLS_ACUITY_SCORE: 19
ADLS_ACUITY_SCORE: 21
ADLS_ACUITY_SCORE: 21
ADLS_ACUITY_SCORE: 17
ADLS_ACUITY_SCORE: 22
ADLS_ACUITY_SCORE: 17
ADLS_ACUITY_SCORE: 22
ADLS_ACUITY_SCORE: 22
ADLS_ACUITY_SCORE: 21
ADLS_ACUITY_SCORE: 21
ADLS_ACUITY_SCORE: 19
ADLS_ACUITY_SCORE: 21
ADLS_ACUITY_SCORE: 22
ADLS_ACUITY_SCORE: 17
ADLS_ACUITY_SCORE: 22
ADLS_ACUITY_SCORE: 19
ADLS_ACUITY_SCORE: 14
ADLS_ACUITY_SCORE: 21
ADLS_ACUITY_SCORE: 14
ADLS_ACUITY_SCORE: 21
ADLS_ACUITY_SCORE: 20
ADLS_ACUITY_SCORE: 17
ADLS_ACUITY_SCORE: 19
ADLS_ACUITY_SCORE: 21
ADLS_ACUITY_SCORE: 22
ADLS_ACUITY_SCORE: 21
ADLS_ACUITY_SCORE: 21
ADLS_ACUITY_SCORE: 22
ADLS_ACUITY_SCORE: 17
ADLS_ACUITY_SCORE: 21
ADLS_ACUITY_SCORE: 21
ADLS_ACUITY_SCORE: 14
ADLS_ACUITY_SCORE: 21
ADLS_ACUITY_SCORE: 22
ADLS_ACUITY_SCORE: 22
ADLS_ACUITY_SCORE: 21
ADLS_ACUITY_SCORE: 21
ADLS_ACUITY_SCORE: 19
ADLS_ACUITY_SCORE: 18
ADLS_ACUITY_SCORE: 20
ADLS_ACUITY_SCORE: 22
ADLS_ACUITY_SCORE: 17
ADLS_ACUITY_SCORE: 21
ADLS_ACUITY_SCORE: 22
ADLS_ACUITY_SCORE: 21
ADLS_ACUITY_SCORE: 14
ADLS_ACUITY_SCORE: 19
ADLS_ACUITY_SCORE: 19
ADLS_ACUITY_SCORE: 14
ADLS_ACUITY_SCORE: 21
ADLS_ACUITY_SCORE: 21
ADLS_ACUITY_SCORE: 16
ADLS_ACUITY_SCORE: 14
ADLS_ACUITY_SCORE: 21
ADLS_ACUITY_SCORE: 20
ADLS_ACUITY_SCORE: 22
ADLS_ACUITY_SCORE: 14
ADLS_ACUITY_SCORE: 21
ADLS_ACUITY_SCORE: 22
ADLS_ACUITY_SCORE: 20
ADLS_ACUITY_SCORE: 21
ADLS_ACUITY_SCORE: 21
ADLS_ACUITY_SCORE: 14
ADLS_ACUITY_SCORE: 21
ADLS_ACUITY_SCORE: 14
DEPENDENT_IADLS:: MEDICATION MANAGEMENT
ADLS_ACUITY_SCORE: 21
ADLS_ACUITY_SCORE: 19
ADLS_ACUITY_SCORE: 17
ADLS_ACUITY_SCORE: 19
ADLS_ACUITY_SCORE: 19
ADLS_ACUITY_SCORE: 21
ADLS_ACUITY_SCORE: 22
ADLS_ACUITY_SCORE: 17
ADLS_ACUITY_SCORE: 19
ADLS_ACUITY_SCORE: 14
DEPENDENT_IADLS:: MEDICATION MANAGEMENT
ADLS_ACUITY_SCORE: 19
ADLS_ACUITY_SCORE: 21
ADLS_ACUITY_SCORE: 17
ADLS_ACUITY_SCORE: 22
ADLS_ACUITY_SCORE: 21
ADLS_ACUITY_SCORE: 21
ADLS_ACUITY_SCORE: 19
ADLS_ACUITY_SCORE: 21
ADLS_ACUITY_SCORE: 19
ADLS_ACUITY_SCORE: 17
ADLS_ACUITY_SCORE: 17

## 2022-01-01 ASSESSMENT — ENCOUNTER SYMPTOMS
COUGH: 1
NAUSEA: 0
ABDOMINAL PAIN: 0
VOMITING: 0
SPUTUM PRODUCTION: 0
LIGHT-HEADEDNESS: 0
SHORTNESS OF BREATH: 1
SHORTNESS OF BREATH: 1
DIZZINESS: 0

## 2022-01-01 ASSESSMENT — MIFFLIN-ST. JEOR
SCORE: 840.46
SCORE: 840.46

## 2022-01-27 NOTE — PROGRESS NOTES
Patient missed remote device check on 1/19/2022.  Missed appointment letter sent 1/20/2022. No response.  1 week missed appointment letter sent 1/27/2022.    Brandon Martinez,CVT  [facility]

## 2022-01-28 NOTE — TELEPHONE ENCOUNTER
[facility] TELEPHONE NOTE    CC: Abnormal CXR - order due to COKER, see triage note    Hx of pneumonia with left pneumothorax and developed C.diff after treatment.    Estimated Creatinine Clearance: 42.4 mL/min (based on SCr of 0.72 mg/dL).    Reviewed imaging:      ORDERS:   1. Community acquired bilateral lower lobe pneumonia  - cefdinir (OMNICEF) 300 MG capsule; Take 1 capsule (300 mg) by mouth 2 times daily for 7 days  Dispense: 14 capsule; Refill: 0  - doxycycline hyclate (VIBRA-TABS) 100 MG tablet; Take 1 tablet (100 mg) by mouth 2 times daily for 7 days  Dispense: 14 tablet; Refill: 0  2. Hx of Clostridium difficile infection  - vancomycin (VANCOCIN) 125 MG capsule; Take 1 capsule (125 mg) by mouth daily for 14 days  Dispense: 14 capsule; Refill: 0  3. Daily VS x 3 days, notify nurse and then provider of abnormal   4. Give 240 mL PO fluids with each medication pass  5. Draw following labs next lab day 2/3/22: CBC with diff, BMP dx PNA   6. Follow-up 1/31 or 2/1 based in clinical status  Electronically signed by:   STEFFANY Yusuf CNP  01/28/22 2:48 PM

## 2022-01-28 NOTE — TELEPHONE ENCOUNTER
"----- Message from STEFFANY Merritt CNP sent at 1/28/2022 11:03 AM CST -----  Regarding: RE: respiratory assessment  When did this start?  Does she have a fever?   What was her HR? Repeat BP?   Recent weights? Any leg swelling?  Increase cough or sputum production?     I think options are:    1. CXR STAT to see what is going on with lungs but may not be able to see results until tomorrow AM/schedule Combivent TID x 3 days, call back if fever worsening symptoms and we could empirically start abx, I could see Monday. Won't be able to get labs until 2/3.    2. Family taking her to urgent care for rapid CXR, possibly labs, physical exam today    Let me know what family would like.    ThanksBrittany    ----- Message -----  From: Phuong Zelaya RN  Sent: 1/28/2022  10:58 AM CST  To: STEFFANY Merritt CNP  Subject: respiratory assessment                           Nurse called to report that patient has been having increased SOB with exertion.  Patient was in the bathroom when nurse came to apartment.  O2 was 88% when walking and then went up to 95% after rest.  /72.  Facility does not take patient's vitals often so they are unsure of her baseline.  Crackles noted to bilateral lower lobes.  Rapid COVID (-) and PCR pending.  Patient states that she is feeling \"tired\".  Patient was given her PRN Combivent.  Nurse stated that the last time this happened patient ended up with pneumonia and a pneumothorax.      Thoughts?      "

## 2022-01-28 NOTE — CONFIDENTIAL NOTE
"----- Message from STEFFANY Merritt CNP sent at 1/28/2022 11:03 AM CST -----  Regarding: RE: respiratory assessment  When did this start?  Does she have a fever?   What was her HR? Repeat BP?   Recent weights? Any leg swelling?  Increase cough or sputum production?     I think options are:    1. CXR STAT to see what is going on with lungs but may not be able to see results until tomorrow AM/schedule Combivent TID x 3 days, call back if fever worsening symptoms and we could empirically start abx, I could see Monday. Won't be able to get labs until 2/3.    Let me know what family would like.    Thanks,    Brittany    ----- Message -----  From: Phuong Zelaya RN  Sent: 1/28/2022  10:58 AM CST  To: STEFFANY Merritt CNP  Subject: respiratory assessment                           Nurse called to report that patient has been having increased SOB with exertion.  Patient was in the bathroom when nurse came to apartment.  O2 was 88% when walking and then went up to 95% after rest.  /72.  Facility does not take patient's vitals often so they are unsure of her baseline.  Crackles noted to bilateral lower lobes.  Rapid COVID (-) and PCR pending.  Patient states that she is feeling \"tired\".  Patient was given her PRN Combivent.  Nurse stated that the last time this happened patient ended up with pneumonia and a pneumothorax.      Thoughts?        "

## 2022-02-01 PROBLEM — G81.94 LEFT HEMIPARESIS (H): Status: ACTIVE | Noted: 2022-01-01

## 2022-02-01 PROBLEM — I71.019 AORTIC DISSECTION, THORACIC (H): Status: RESOLVED | Noted: 2021-01-01 | Resolved: 2022-01-01

## 2022-02-01 NOTE — PATIENT INSTRUCTIONS
Yoselyn Cui  1/28/1932  ORDERS:  - Discontinue Zofran  - Schedule combivent inhaler one puff into lungs TID scheduled x 5 days   -  [facility], please fax face sheet to [phone]   Electronically signed by:   STEFFANY Yusuf CNP  02/01/22 3:41 PM

## 2022-02-01 NOTE — PROGRESS NOTES
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) [61].     Today's concern is:   Follow-up today due pneumonia.    Treated with oral abx in early August 2021 due to infiltrate on CXR, which resolved on interval imaging. Started on Incruse Ellipta and rescue inhaler due to hx of chronic lung disease. Emphysema on imaging and left lung nodules, 35 pack-year smoking history. Failed outpatient mgmt and developed recurrent BL pneumonia and left pneumothorax which was successfully managed with chest tube and IV and oral abx in hospital. Recovered at [facility] and able to return back to [facility] apartment. Recovered complicated by C.diff.    On her 89th birthday [birth year] presented with cough and mild hypoxia to 88% on RA with activity. CXR (as below) showed BL lower lober infiltrates and small effusions. Ordered doxycycline and Cefdinir x 7 days which was started 1/28/22.  Also started on prophylactic oral vancomycin due to history of recurrent C.diff after antibiotic use in the past.     Today reports, \"I've had a cold ever since I got here.\"  Unable to differentiate current symptoms previous illness.    No fever/chills.  + cough, \"a lot!\"  No sputum  Not keeping up at night.  + SOB and + COKER.   No chest.  Good appetite.  Increased fatigue, limits ADLs.  Normal BMs.  No diarrhea.   Wearing compression wraps, unsure if legs are more swollen.  Sleep in bed with 1-3 pillows.   Not feeling much better since starting anti-bitoics.     Allergies, and " PMH/PSH reviewed in EPIC today.    REVIEW OF SYSTEMS:  Limited secondary to cognitive impairment but today pt reports and 4 point ROS including Respiratory, CV, GI and , other than that noted in the HPI,  is negative    Objective:   /70   Temp 97.5  F (36.4  C)   Resp 23   Ht 1.524 m (5')   Wt 49.9 kg (110 lb)   SpO2 96%   BMI 21.48 kg/m    GENERAL APPEARANCE:  Alert, in no distress, pleasant, cooperative, well groomed, hair is longer  EYES: Sclera clear and conjunctiva normal, no discharge, wearing glasses  EAR: small amount of dry cerumen BL ears, TMs pearly grey   RESP:  Non-labored breathing, no respiratory distress, BL anterior and upper lung fields are clear, lower lobs with rales BL. No cough during visit. SpO2 90-94% on RA, with activity 91% on RA. Briefly to 89% on RA while at rest and not deep breathing.   CV:  Palpation - no murmur/non-displaced PMI, L CW pacer. Auscultation - Rate and rhythm regular, no murmur, no rub or gallop appreciated. HR 74.  VASCULAR: No edema BL LE. Limited exam as wearing new Velcro compression socks, loose fitting..   ABDOMEN:  Normal bowel sounds, soft, nontender, no grimacing or guarding with palpation.  M/S:  Left sided weakness with UE flexion contracture. Stands to transfer from recliner to wheelchair with SBA.   PSYCH: Awake and alert, speech fluent, insight and judgement fair, memory fair, without depressed or anxious affect, calm and cooperative.    Recent labs in [facility] reviewed by me today.      CXR 1/28/22        Echocardiogram 7/17/19  Interpretation Summary     The left ventricle is normal in size.  Left ventricular systolic function is normal.  The visual ejection fraction is estimated at 55-60%.  No regional wall motion abnormalities noted.  The aortic valve is trileaflet with aortic valve sclerosis.  There is trace aortic regurgitation.  The aortic root is normal size.  Thickening of the left coronary cusp and aortic root identified, but unchanged  compared to the previous study.     Overall, no change. 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    Assessment/Plan:  (J18.9) Pneumonia of both lower lobes due to infectious organism  (primary encounter diagnosis)  (J43.9) Pulmonary emphysema  Comment: Resident noted with cough and dyspnea 4 days ago, CXR positive for BL LE pneumonia. Decreased activity tolerance with acute illness. Non-toxic appearing, good appetite.  Plan:  - Discussed TCU vs HH, elected home health to follow, will refer back to [facility]  - Nursing to send HH order and facesheet, called to confirm HH would take her on again  - cefdinir (OMNICEF) 300 MG capsule; Take 1 capsule (300 mg) by mouth 2 times daily for 7 days and doxycycline hyclate (VIBRA-TABS) 100 MG tablet; Take 1 tablet (100 mg) by mouth 2 times daily for 7 days (started 1/28)    - vancomycin (VANCOCIN) 125 MG capsule; Take 1 capsule (125 mg) by mouth daily for 14 days (started 1/28)  - Labs on 2/3 (next lab day)  - Forgets to use PRN Combivent recuse inhaler, will schedule TID for five days  - Continue BID guaifenesin and BID PRN for dry cough  - Continue daily Incruse Ellipta  - Consider pulmonology follow-up, but difficulty getting out to specialists  - Patient and family will update provider if any change in symptoms    (R26.9) Abnormal gait  (I69.398,  R26.89) Impaired balance as late effect of cerebrovascular accident (CVA)  (G81.94) Left hemiparesis (H)  Comment: Now uses WC for mobility, more trouble with transfers after pavan pneumonia due to fatigue.  Plan:   - [facility], called to confirm they will take referral, fax to [phone]  - Falls precautions    (K86.9) Pancreatic lesion  Comment: Diffuse dilatation of the main pancreatic duct with approximately 2.5 cm hypoattenuating focus in the pancreatic head/uncinate process of the pancreas, indeterminate, could represent side branch intraductal papillary mucinous  "neoplasm versus other pancreatic lesions. Recommend further evaluation with contrast-enhanced MRI/MRCP on nonemergent basis.  Plan:   - Discussed with daughter, would not want MRI at this time given age and goals of care, may not be able to get study due to pacemaker. Would only revisit if concern for symptoms.    (I48.0) Paroxysmal atrial fibrillation (H)  (Z95.0) Presence of permanent cardiac pacemaker  Comment: Stable. Second-degree heart block with PPM placement, last check 10/12/21.  Plan:   - Continue apixaban per cardiology  - Continue Coreg 18.75 mg BID for rate control  - Device check quarterly, missed check on 1/27/2022, daughter to call to reschedule   - Plan for annual cardiology follow-up in April 2022     (I10) Essential hypertension, benign  Comment: Order for 24-hour BP monitor does not seem to have been completed.  BP Readings from Last 3 Encounters:   02/01/22 110/70   12/08/21 120/80   11/10/21 115/56   Plan:   - Continue current regimen: losartan 25 mg daily (switch from ACE-I due to cough), Coreg 18.75 mg BID, amlodipine 10 mg daily   - Due to small effusion could consider lasix if status not improved  - Monitor routine VS and labs    (I25.10) ASCVD (arteriosclerotic cardiovascular disease)  (E78.5) Hyperlipidemia LDL goal <100  Comment: Stable, no angina, chronic COKER. Per cardiology: \"coronary artery disease with a non-STEMI in 07/2019 at which time she was found to have multivessel disease.  She underwent stenting to the mid LAD which was unfortunately complicated by a guidewire dissection requiring additional stenting to the proximal LAD and left main.  Her circumflex and RCA were not intervened upon.\" EF normalized after stenting of mid-LAD.  Plan:   - Continue Plavix per cardiology - will need close monitoring for bleeding risk as also on DOAC  - Continue statin >> lipid panel April 2022   - Continue PRN SL nitroglycerin  - HR and BP control as above  - Check BMP  - Missed cardiology " follow-up Oct 2021, daughter to call cardiology, ivan for annual visit in April    (G31.84) Mild cognitive impairment  Comment: Mild memory concerns, SLUMS 22/30 on 3/18/21 and 21/30 on TCU discharge  Plan:   - Continues to benefit from supportive care in [place] setting for medications, meals, ADL support, safety, socialization   - Daughters supportive     (N32.81) OAB (overactive bladder)  Comment: Chronic. + urinary incontinence.   Plan:   - Look to wean oxybutynin ER to 5 mg daily when stable, will not change today    (D64.9) Normocytic anemia  Comment: Chronic, Hgb 9.9 on 10/7 >> 11.3 12/9/21, MCV 91  High risk for bleeding given DOAC and Plavix.  10/14/21: iron 44 WNL  No s/s of acute bleed reported today.  Plan:   - Check CBC 2/3    (K21.00) GERD  Comment: Stable. Possible hx of peptic ulcer disease and GIB.  Plan:  - Continue omeprazole and simethicone   - Discontinue Zofran in place from TCU    (H35.30) ARMD  Comment: Low vision  Plan:   - Continue eye vitamin, use home supply   - Follow with ophthalmology in community, family assists with appointments, was getting injections     (E87.6) Hypokalemia  Comment: Chronic, no offending medications, no extensive work-up completed, managed with oral supplement for several years. Tolerating KCl 10 mEq daily, serum potassium today 3.6 WNL.  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  Follow-up in one week.    Electronically signed by: STEFFANY Yusuf CNP         Documentation of Face-to-Face and Certification for Home Health Services     Patient: Yoselyn Cui   YOB: 1932  MR  Number: [id number]  Today's Date: 2/1/2022    I certify that patient: Yoselyn Cui is under my care and that I, or a nurse practitioner or physician's assistant working with me, had a face-to-face encounter that meets the physician face-to-face encounter requirements with this patient on: 2/1/2022.    This encounter with the patient was in whole, or in part, for the following medical condition, which is the primary reason for home health care:    Pneumonia of both lower lobes due to infectious organism    Pulmonary emphysema, unspecified emphysema type (H)    Left hemiparesis (H)    Impaired balance as late effect of cerebrovascular accident    Physical deconditioning     I certify that, based on my findings, the following services are medically necessary home health services: Nursing and Physical Therapy.    My clinical findings support the need for the above services because: Nurse is needed: To provide assessment and oversight required in the home to assure adherence to the medical plan due to: acute BL lower lung pneumonia to provided assessment of respiratory status and Physical Therapy Services are needed to assess and treat the following functional impairments: gait and strength in setting of acute pnuemonia.    Further, I certify that my clinical findings support that this patient is homebound (i.e. absences from home require considerable and taxing effort and are for medical reasons or [employer] services or infrequently or of short duration when for other reasons) because: Requires assistance of another person or specialized equipment to access medical services because patient: Is prone to wander/get lost without assistance. and Is unable to walk greater than ~ 10 feet feet without rest.    Based on the above findings. I certify that this patient is confined to the home and needs intermittent skilled nursing care, physical therapy and/or speech therapy.  The patient is under my care, and I have initiated  the establishment of the plan of care.  This patient will be followed by a physician who will periodically review the plan of care.  Physician/Provider to provide follow up care: Brittany Florez    Responsible Medicare certified PECOS Physician: Dr.Gretchen Vincenzo MD  Physician Signature: See electronic signature associated with these discharge orders.  Date: 2/1/2022

## 2022-02-01 NOTE — LETTER
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     Today's concern is:   Follow-up today due pneumonia.    Treated with oral abx in early August 2021 due to infiltrate on CXR, which resolved on interval imaging. Started on Incruse Ellipta and rescue inhaler due to hx of chronic lung disease. Emphysema on imaging and left lung nodules, 35 pack-year smoking history. Failed outpatient mgmt and developed recurrent BL pneumonia and left pneumothorax which was successfully managed with chest tube and IV and oral abx in hospital. Recovered at [facility] TCU and able to return back to [facility] apartment. Recovered complicated by C.diff.    On her 89th birthday 1[birth year] presented with cough and mild hypoxia to 88% on RA with activity. CXR (as below) showed BL lower lober infiltrates and small effusions. Ordered doxycycline and Cefdinir x 7 days which was started 1/28/22.  Also started on prophylactic oral vancomycin due to history of recurrent C.diff after antibiotic use in the past.     Today reports, \"I've had a cold ever since I got here.\"  Unable to differentiate current symptoms previous illness.    No fever/chills.  + cough, \"a lot!\"  No sputum  Not keeping up at night.  + SOB and + COKER.   No chest.  Good appetite.  Increased fatigue, limits ADLs.  Normal BMs.  No diarrhea.   Wearing compression wraps, unsure if legs are more " swollen.  Sleep in bed with 1-3 pillows.   Not feeling much better since starting anti-bitoics.     Allergies, and PMH/PSH reviewed in [facility] today.    REVIEW OF SYSTEMS:  Limited secondary to cognitive impairment but today pt reports and 4 point ROS including Respiratory, CV, GI and , other than that noted in the HPI,  is negative    Objective:   /70   Temp 97.5  F (36.4  C)   Resp 23   Ht 1.524 m (5')   Wt 49.9 kg (110 lb)   SpO2 96%   BMI 21.48 kg/m    GENERAL APPEARANCE:  Alert, in no distress, pleasant, cooperative, well groomed, hair is longer  EYES: Sclera clear and conjunctiva normal, no discharge, wearing glasses  EAR: small amount of dry cerumen BL ears, TMs pearly grey   RESP:  Non-labored breathing, no respiratory distress, BL anterior and upper lung fields are clear, lower lobs with rales BL. No cough during visit. SpO2 90-94% on RA, with activity 91% on RA. Briefly to 89% on RA while at rest and not deep breathing.   CV:  Palpation - no murmur/non-displaced PMI, L CW pacer. Auscultation - Rate and rhythm regular, no murmur, no rub or gallop appreciated. HR 74.  VASCULAR: No edema BL LE. Limited exam as wearing new Velcro compression socks, loose fitting..   ABDOMEN:  Normal bowel sounds, soft, nontender, no grimacing or guarding with palpation.  M/S:  Left sided weakness with UE flexion contracture. Stands to transfer from recliner to wheelchair with SBA.   PSYCH: Awake and alert, speech fluent, insight and judgement fair, memory fair, without depressed or anxious affect, calm and cooperative.    Recent labs in [facility] reviewed by me today.      CXR 1/28/22        Echocardiogram 7/17/19  Interpretation Summary     The left ventricle is normal in size.  Left ventricular systolic function is normal.  The visual ejection fraction is estimated at 55-60%.  No regional wall motion abnormalities noted.  The aortic valve is trileaflet with aortic valve sclerosis.  There is trace aortic  regurgitation.  The aortic root is normal size.  Thickening of the left coronary cusp and aortic root identified, but unchanged compared to the previous study.     Overall, no change. 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    Assessment/Plan:  (J18.9) Pneumonia of both lower lobes due to infectious organism  (primary encounter diagnosis)  (J43.9) Pulmonary emphysema  Comment: Resident noted with cough and dyspnea 4 days ago, CXR positive for BL LE pneumonia. Decreased activity tolerance with acute illness. Non-toxic appearing, good appetite.  Plan:  - Discussed TCU vs HH, elected home health to follow, will refer back to [facility]  - Nursing to send HH order and facesheet, called to confirm HH would take her on again  - cefdinir (OMNICEF) 300 MG capsule; Take 1 capsule (300 mg) by mouth 2 times daily for 7 days and doxycycline hyclate (VIBRA-TABS) 100 MG tablet; Take 1 tablet (100 mg) by mouth 2 times daily for 7 days (started 1/28)    - vancomycin (VANCOCIN) 125 MG capsule; Take 1 capsule (125 mg) by mouth daily for 14 days (started 1/28)  - Labs on 2/3 (next lab day)  - Forgets to use PRN Combivent recuse inhaler, will schedule TID for five days  - Continue BID guaifenesin and BID PRN for dry cough  - Continue daily Incruse Ellipta  - Consider pulmonology follow-up, but difficulty getting out to specialists  - Patient and family will update provider if any change in symptoms    (R26.9) Abnormal gait  (I69.398,  R26.89) Impaired balance as late effect of cerebrovascular accident (CVA)  (G81.94) Left hemiparesis (H)  Comment: Now uses WC for mobility, more trouble with transfers after pavan pneumonia due to fatigue.  Plan:   - [facility], called to confirm they will take referral, fax to [phone]  - Falls precautions    (K86.9) Pancreatic lesion  Comment: Diffuse dilatation of the main pancreatic duct with approximately 2.5 cm hypoattenuating focus in  "the pancreatic head/uncinate process of the pancreas, indeterminate, could represent side branch intraductal papillary mucinous neoplasm versus other pancreatic lesions. Recommend further evaluation with contrast-enhanced MRI/MRCP on nonemergent basis.  Plan:   - Discussed with daughter, would not want MRI at this time given age and goals of care, may not be able to get study due to pacemaker. Would only revisit if concern for symptoms.    (I48.0) Paroxysmal atrial fibrillation (H)  (Z95.0) Presence of permanent cardiac pacemaker  Comment: Stable. Second-degree heart block with PPM placement, last check 10/12/21.  Plan:   - Continue apixaban per cardiology  - Continue Coreg 18.75 mg BID for rate control  - Device check quarterly, missed check on 1/27/2022, daughter to call to reschedule   - Plan for annual cardiology follow-up in April 2022     (I10) Essential hypertension, benign  Comment: Order for 24-hour BP monitor does not seem to have been completed.  BP Readings from Last 3 Encounters:   02/01/22 110/70   12/08/21 120/80   11/10/21 115/56   Plan:   - Continue current regimen: losartan 25 mg daily (switch from ACE-I due to cough), Coreg 18.75 mg BID, amlodipine 10 mg daily   - Due to small effusion could consider lasix if status not improved  - Monitor routine VS and labs    (I25.10) ASCVD (arteriosclerotic cardiovascular disease)  (E78.5) Hyperlipidemia LDL goal <100  Comment: Stable, no angina, chronic COKER. Per cardiology: \"coronary artery disease with a non-STEMI in 07/2019 at which time she was found to have multivessel disease.  She underwent stenting to the mid LAD which was unfortunately complicated by a guidewire dissection requiring additional stenting to the proximal LAD and left main.  Her circumflex and RCA were not intervened upon.\" EF normalized after stenting of mid-LAD.  Plan:   - Continue Plavix per cardiology - will need close monitoring for bleeding risk as also on DOAC  - Continue statin >> " lipid panel April 2022   - Continue PRN SL nitroglycerin  - HR and BP control as above  - Check BMP  - Missed cardiology follow-up Oct 2021, daughter to call cardiology, ivan for annual visit in April    (G31.84) Mild cognitive impairment  Comment: Mild memory concerns, SLUMS 22/30 on 3/18/21 and 21/30 on TCU discharge  Plan:   - Continues to benefit from supportive care in [place] setting for medications, meals, ADL support, safety, socialization   - Daughters supportive     (N32.81) OAB (overactive bladder)  Comment: Chronic. + urinary incontinence.   Plan:   - Look to wean oxybutynin ER to 5 mg daily when stable, will not change today    (D64.9) Normocytic anemia  Comment: Chronic, Hgb 9.9 on 10/7 >> 11.3 12/9/21, MCV 91  High risk for bleeding given DOAC and Plavix.  10/14/21: iron 44 WNL  No s/s of acute bleed reported today.  Plan:   - Check CBC 2/3    (K21.00) GERD  Comment: Stable. Possible hx of peptic ulcer disease and GIB.  Plan:  - Continue omeprazole and simethicone   - Discontinue Zofran in place from TCU    (H35.30) ARMD  Comment: Low vision  Plan:   - Continue eye vitamin, use home supply   - Follow with ophthalmology in community, family assists with appointments, was getting injections     (E87.6) Hypokalemia  Comment: Chronic, no offending medications, no extensive work-up completed, managed with oral supplement for several years. Tolerating KCl 10 mEq daily, serum potassium today 3.6 WNL.  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  Follow-up in one week.    Electronically signed by: STEFFANY Yusuf CNP          Documentation of Face-to-Face and Certification for Home Health Services     Patient: Yoselyn Cui   YOB: 1932  MR Number: [id number]  Today's Date: 2/1/2022    I certify that patient: Yoselyn Cui is under my care and that I, or a nurse practitioner or physician's assistant working with me, had a face-to-face encounter that meets the physician face-to-face encounter requirements with this patient on: 2/1/2022.    This encounter with the patient was in whole, or in part, for the following medical condition, which is the primary reason for home health care:    Pneumonia of both lower lobes due to infectious organism    Pulmonary emphysema, unspecified emphysema type (H)    Left hemiparesis (H)    Impaired balance as late effect of cerebrovascular accident    Physical deconditioning     I certify that, based on my findings, the following services are medically necessary home health services: Nursing and Physical Therapy.    My clinical findings support the need for the above services because: Nurse is needed: To provide assessment and oversight required in the home to assure adherence to the medical plan due to: acute BL lower lung pneumonia to provided assessment of respiratory status and Physical Therapy Services are needed to assess and treat the following functional impairments: gait and strength in setting of acute pnuemonia.    Further, I certify that my clinical findings support that this patient is homebound (i.e. absences from home require considerable and taxing effort and are for medical reasons or Christianity services or infrequently or of short duration when for other reasons) because: Requires assistance of another person or specialized equipment to access medical services because patient: Is prone to wander/get lost without assistance. and Is unable to walk greater than ~ 10 feet feet without rest.    Based on the above findings. I certify that this patient is confined to the  home and needs intermittent skilled nursing care, physical therapy and/or speech therapy.  The patient is under my care, and I have initiated the establishment of the plan of care.  This patient will be followed by a physician who will periodically review the plan of care.  Physician/Provider to provide follow up care: Brittany Florez    Responsible Medicare certified PECOS Physician: Dr.Gretchen Vincenzo MD  Physician Signature: See electronic signature associated with these discharge orders.  Date: 2/1/2022            Sincerely,        STEFFANY Yusuf CNP

## 2022-02-07 NOTE — PROGRESS NOTES
"[facility] GERIATRICS    Chief Complaint   Patient presents with     RECHECK     PNA     HPI:  Yoselyn Cui is a 89 year old  (1/28/1932)  female with PMH significant for HTN, OAB, GERD, hx of CVA x2, second-degree heart block s/p PPM (2016), paroxysmal atrial fibrillation (on apixaban), hyperlipidemia, CAD s/ps non-STEMI in July 20219 with multi-vessel disease s/p stenting of LAD complicated by guidewire dissection (on Plavix), who is being seen today for an episodic care visit at: [facility]) [61].     Today's concern is:   Follow-up today due pneumonia.    Treated with oral abx in early August 2021 due to infiltrate on CXR, which resolved on interval imaging. Started on Incruse Ellipta and rescue inhaler due to hx of chronic lung disease. Emphysema on imaging and left lung nodules, 35 pack-year smoking history. Failed outpatient mgmt and developed recurrent BL pneumonia and left pneumothorax which was successfully managed with chest tube and IV and oral abx in hospital. Recovered at [facility] TCU and able to return back to [facility] apartment. Recovered complicated by C.diff.    On her 89th birthday [birth year] presented with cough and mild hypoxia to 88% on RA with activity. CXR (as below) showed BL lower lober infiltrates and small effusions. Ordered doxycycline and Cefdinir x 7 days which was started 1/28/22.  Also started on prophylactic oral vancomycin due to history of recurrent C.diff after antibiotic use in the past.     Completed course of Cefdinir and doxycycline 1/28/22 to 2/4/22.   Of the cough states, \"its still with me\" - cough is not productive, dry cough, getting better.  Discussed warming blanket, feel this interferes with pacemaker and it makes her short of breath.  Occurred one time this week.  \"I was getting real SOB\" - with laying flat in bed or with exertion.   Needs to rest after going to the bathroom.   No increased leg swelling, \"they have clamed down quite a bit\", feels " legs are not swollen.  Weight has been between 110-115# over last year.  No SOB at rest.  Just had shower.   Staff have not reported any change in functional mobility, but family notes increased trouble with transfers.  Eating okay and going out dinning room with escorts.  Bandage noted to right lower leg, not sure what happend.   No abd pain.  No constipation or diarrhea.  No dysuria.  Does not think that [facility] started services yet.     Allergies, and PMH/PSH reviewed in [facility] today.    REVIEW OF SYSTEMS:  Limited secondary to aphasia impairment but today pt reports 4 point ROS including Respiratory, CV, GI and , other than that noted in the HPI,  is negative    Objective:   BP (!) 155/79   Pulse 83   Temp 97.7  F (36.5  C)   Resp 20   Ht 1.524 m (5')   Wt 49.9 kg (110 lb)   SpO2 97%   BMI 21.48 kg/m    GENERAL APPEARANCE:  Alert, in no distress, pleasant, cooperative, well groomed.  EYES: Sclera clear and conjunctiva normal, no discharge, wearing glasses  MOUTH: dry MMM and lips  RESP:  Non-labored breathing, no respiratory distress, BL anterior and upper lung fields are clear, lower lobs with faint rales BL. No cough during visit. SpO2 97% on RA.  CV:  Palpation - no murmur/non-displaced PMI, L CW pacer. Auscultation - Rate and rhythm regular, no murmur, no rub or gallop appreciated.   VASCULAR: No edema BL LE, wrinkled skin to legs.  ABDOMEN:  Normal bowel sounds, soft, nontender, no grimacing or guarding with palpation.  M/S:  Left sided weakness with UE flexion contracture.   SKIN: Shear injury to right anterior tibial area - see photo below  PSYCH: Awake and alert, speech fluent, insight and judgement fair, memory fair, without depressed or anxious affect, calm and cooperative.      Right Alfonso Wound:       Recent labs in [facility] reviewed by me today.    CBC RESULTS: Recent Labs   Lab Test 02/03/22  0726 12/09/21  0703 11/11/21  0700   WBC 6.4  --  9.8   RBC 4.35  --  3.82   HGB 11.7 11.3* 10.6*   HCT  36.0  --  34.9*   MCV 83  --  91   MCH 26.9  --  27.7   MCHC 32.5  --  30.4*   RDW 14.3  --  17.9*     --  285     Last Basic Metabolic Panel:  Recent Labs   Lab Test 02/03/22  0726 12/09/21  0703   * 135   POTASSIUM 3.5 3.6   CHLORIDE 99 104   GOLDY 8.5 8.4*   CO2 23 26   BUN 9 11   CR 0.61 0.72   GLC 89 91       Liver Function Studies -   Recent Labs   Lab Test 10/14/21  0945 09/17/21  0603   PROTTOTAL 7.0 6.3*   ALBUMIN 2.8* 2.3*   BILITOTAL 0.3 0.7   ALKPHOS 110 90   AST 25 26   ALT 28 17         Assessment/Plan:  (J18.9) Pneumonia of both lower lobes due to infectious organism  (primary encounter diagnosis)  (J43.9) Pulmonary emphysema  Comment: + cough, 1/28/22 CXR positive for BL LE pneumonia and small effusion. Decreased activity tolerance with acute illness. Non-toxic appearing, good appetite. Completed Cefdinir and Doxycycline on 2/4/22. Cough improving. No hypoxia. Continued physical deconditioning and some COKER. Low sodium and appears dry on exam.   Plan:  - Discussed TCU vs HH, elected home health to follow, refered back to [facility], called today to confirm services will start at end of week.  - Unclear if COKER related to volume overload, but appears dry on exam, low sodium, only able to get labs once weekly in [place] setting, will not start lasix today, follow closely on weekly exam and enlist  nursing to assist, reviewed with daughter, consider interval x-ray if SOB   - Labs on 2/17 (next lab day)  - vancomycin (VANCOCIN) 125 MG capsule; Take 1 capsule (125 mg) by mouth daily for 14 days (started 1/28)  - PRN Combivent recuse inhaler  - Continue BID guaifenesin and BID PRN for dry cough  - Continue daily Incruse Ellipta  - Consider pulmonology follow-up, but difficulty getting out to specialists  - Patient and family will update facility nursing and provider if any change in symptoms    (R26.9) Abnormal gait  (G81.94) Left hemiparesis (H)  Comment: Now uses WC for mobility, more trouble with  "transfers after pavan pneumonia due to fatigue.  Plan:   -  PT/OT to start by week's end, will help with DME   - Falls precautions    (I48.0) Paroxysmal atrial fibrillation (H)  (Z95.0) Presence of permanent cardiac pacemaker  Comment: Stable. Second-degree heart block with PPM placement  Plan:   - Continue apixaban per cardiology  - Continue Coreg 18.75 mg BID for rate control  - Device check quarterly, missed check on 1/27/2022, daughter to call to reschedule   - Plan for annual cardiology follow-up in April 2022, missed several follow-up visit over last year    (I10) Essential hypertension, benign  Comment: Order for 24-hour BP monitor not completed, having been monitoring as able int retirement setting.  02/07/22 (!) 155/79   02/01/22 110/70   Plan:   - Continue current regimen: losartan 25 mg daily (switch from ACE-I due to cough), Coreg 18.75 mg BID, amlodipine 10 mg daily   - Due to small effusion could consider lasix and interval CXR if status not improved, but looks dry today  - Monitor routine VS   - Labs and clinical f/u within one week    (I25.10) ASCVD (arteriosclerotic cardiovascular disease)  (E78.5) Hyperlipidemia LDL goal <100  Comment: Stable, no angina, chronic COKER. Per cardiology: \"coronary artery disease with a non-STEMI in 07/2019 at which time she was found to have multivessel disease.  She underwent stenting to the mid LAD which was unfortunately complicated by a guidewire dissection requiring additional stenting to the proximal LAD and left main.  Her circumflex and RCA were not intervened upon.\" EF normalized after stenting of mid-LAD.  Plan:   - Continue Plavix per cardiology - will need close monitoring for bleeding risk as also on DOAC  - Continue statin >> lipid panel April 2022   - Continue PRN SL nitroglycerin  - HR and BP control as above  - Check BMP 2/17  - Missed cardiology follow-up Oct 2021, daughter to call cardiology, okay for annual visit in April if remains " stable    (G31.84) Mild cognitive impairment  Comment: Mild memory concerns, SLUMS 22/30 on 3/18/21 and 21/30 on TCU discharge  Plan:   - Continues to benefit from supportive care in [place] setting for medications, meals, ADL support, safety, socialization   -  PT/OT/NSG    (Z81.811A) Skin Tear Lower Leg  Comment: New skin tear to right lower leg, no s/s of infection or complication  Plan:  - Bacitracin daily to wound   -  nursing to follow    Orders:  - Bacitracin daily to wound   - 2/17/22: BMP and CBC     4:21 PM - Called [facility], did not receive referral from facility. Able to obtain e-mail [email or web address] and sent again with chart notes. Will call in AM to confirm.  4:30 PM - Called daughter Maryjane reviewed clinical status and plan of care, reviewed CXR findings, plan of care, will follow closely given continued physical deconditioning and COKER. Next visit 2/15.    Electronically signed by: STEFFANY Yusuf CNP

## 2022-02-08 NOTE — LETTER
"    2/8/2022        RE: Yoselyn Cui  Clarence Al  [address]  [address]  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     Today's concern is:   Follow-up today due pneumonia.    Treated with oral abx in early August 2021 due to infiltrate on CXR, which resolved on interval imaging. Started on Incruse Ellipta and rescue inhaler due to hx of chronic lung disease. Emphysema on imaging and left lung nodules, 35 pack-year smoking history. Failed outpatient mgmt and developed recurrent BL pneumonia and left pneumothorax which was successfully managed with chest tube and IV and oral abx in hospital. Recovered at [facility] and able to return back to [facility] apartment. Recovered complicated by C.diff.    On her 89th birthday [birth year] presented with cough and mild hypoxia to 88% on RA with activity. CXR (as below) showed BL lower lober infiltrates and small effusions. Ordered doxycycline and Cefdinir x 7 days which was started 1/28/22.  Also started on prophylactic oral vancomycin due to history of recurrent C.diff after antibiotic use in the past.     Completed course of Cefdinir and doxycycline 1/28/22 to 2/4/22.   Of the cough states, \"its still with me\" - cough is not productive, dry cough, getting better.  Discussed warming blanket, feel this interferes with pacemaker and it makes her short of breath.  Occurred one time this week.  \"I was getting real SOB\" - with laying flat in bed or with exertion.   Needs " "to rest after going to the bathroom.   No increased leg swelling, \"they have clamed down quite a bit\", feels legs are not swollen.  Weight has been between 110-115# over last year.  No SOB at rest.  Just had shower.   Staff have not reported any change in functional mobility, but family notes increased trouble with transfers.  Eating okay and going out dinning room with escorts.  Bandage noted to right lower leg, not sure what happend.   No abd pain.  No constipation or diarrhea.  No dysuria.  Does not think that [facility] started services yet.     Allergies, and PMH/PSH reviewed in [facility] today.    REVIEW OF SYSTEMS:  Limited secondary to aphasia impairment but today pt reports 4 point ROS including Respiratory, CV, GI and , other than that noted in the HPI,  is negative    Objective:   BP (!) 155/79   Pulse 83   Temp 97.7  F (36.5  C)   Resp 20   Ht 1.524 m (5')   Wt 49.9 kg (110 lb)   SpO2 97%   BMI 21.48 kg/m    GENERAL APPEARANCE:  Alert, in no distress, pleasant, cooperative, well groomed.  EYES: Sclera clear and conjunctiva normal, no discharge, wearing glasses  MOUTH: dry MMM and lips  RESP:  Non-labored breathing, no respiratory distress, BL anterior and upper lung fields are clear, lower lobs with faint rales BL. No cough during visit. SpO2 97% on RA.  CV:  Palpation - no murmur/non-displaced PMI, L CW pacer. Auscultation - Rate and rhythm regular, no murmur, no rub or gallop appreciated.   VASCULAR: No edema BL LE, wrinkled skin to legs.  ABDOMEN:  Normal bowel sounds, soft, nontender, no grimacing or guarding with palpation.  M/S:  Left sided weakness with UE flexion contracture.   SKIN: Shear injury to right anterior tibial area - see photo below  PSYCH: Awake and alert, speech fluent, insight and judgement fair, memory fair, without depressed or anxious affect, calm and cooperative.      Right Alfonso Wound:       Recent labs in [facility] reviewed by me today.    CBC RESULTS: Recent Labs   Lab Test " 02/03/22  0726 12/09/21  0703 11/11/21  0700   WBC 6.4  --  9.8   RBC 4.35  --  3.82   HGB 11.7 11.3* 10.6*   HCT 36.0  --  34.9*   MCV 83  --  91   MCH 26.9  --  27.7   MCHC 32.5  --  30.4*   RDW 14.3  --  17.9*     --  285     Last Basic Metabolic Panel:  Recent Labs   Lab Test 02/03/22  0726 12/09/21  0703   * 135   POTASSIUM 3.5 3.6   CHLORIDE 99 104   GOLDY 8.5 8.4*   CO2 23 26   BUN 9 11   CR 0.61 0.72   GLC 89 91       Liver Function Studies -   Recent Labs   Lab Test 10/14/21  0945 09/17/21  0603   PROTTOTAL 7.0 6.3*   ALBUMIN 2.8* 2.3*   BILITOTAL 0.3 0.7   ALKPHOS 110 90   AST 25 26   ALT 28 17         Assessment/Plan:  (J18.9) Pneumonia of both lower lobes due to infectious organism  (primary encounter diagnosis)  (J43.9) Pulmonary emphysema  Comment: + cough, 1/28/22 CXR positive for BL LE pneumonia and small effusion. Decreased activity tolerance with acute illness. Non-toxic appearing, good appetite. Completed Cefdinir and Doxycycline on 2/4/22. Cough improving. No hypoxia. Continued physical deconditioning and some COKER. Low sodium and appears dry on exam.   Plan:  - Discussed TCU vs HH, elected home health to follow, refered back to [facility], called today to confirm services will start at end of week.  - Unclear if COKER related to volume overload, but appears dry on exam, low sodium, only able to get labs once weekly in correction setting, will not start lasix today, follow closely on weekly exam and enlist  nursing to assist, reviewed with daughter, consider interval x-ray if SOB   - Labs on 2/17 (next lab day)  - vancomycin (VANCOCIN) 125 MG capsule; Take 1 capsule (125 mg) by mouth daily for 14 days (started 1/28)  - PRN Combivent recuse inhaler  - Continue BID guaifenesin and BID PRN for dry cough  - Continue daily Incruse Ellipta  - Consider pulmonology follow-up, but difficulty getting out to specialists  - Patient and family will update facility nursing and provider if any change in  "symptoms    (R26.9) Abnormal gait  (G81.94) Left hemiparesis (H)  Comment: Now uses WC for mobility, more trouble with transfers after pavan pneumonia due to fatigue.  Plan:   - HH PT/OT to start by week's end, will help with DME   - Falls precautions    (I48.0) Paroxysmal atrial fibrillation (H)  (Z95.0) Presence of permanent cardiac pacemaker  Comment: Stable. Second-degree heart block with PPM placement  Plan:   - Continue apixaban per cardiology  - Continue Coreg 18.75 mg BID for rate control  - Device check quarterly, missed check on 1/27/2022, daughter to call to reschedule   - Plan for annual cardiology follow-up in April 2022, missed several follow-up visit over last year    (I10) Essential hypertension, benign  Comment: Order for 24-hour BP monitor not completed, having been monitoring as able int [place] setting.  02/07/22 (!) 155/79   02/01/22 110/70   Plan:   - Continue current regimen: losartan 25 mg daily (switch from ACE-I due to cough), Coreg 18.75 mg BID, amlodipine 10 mg daily   - Due to small effusion could consider lasix and interval CXR if status not improved, but looks dry today  - Monitor routine VS   - Labs and clinical f/u within one week    (I25.10) ASCVD (arteriosclerotic cardiovascular disease)  (E78.5) Hyperlipidemia LDL goal <100  Comment: Stable, no angina, chronic COKER. Per cardiology: \"coronary artery disease with a non-STEMI in 07/2019 at which time she was found to have multivessel disease.  She underwent stenting to the mid LAD which was unfortunately complicated by a guidewire dissection requiring additional stenting to the proximal LAD and left main.  Her circumflex and RCA were not intervened upon.\" EF normalized after stenting of mid-LAD.  Plan:   - Continue Plavix per cardiology - will need close monitoring for bleeding risk as also on DOAC  - Continue statin >> lipid panel April 2022   - Continue PRN SL nitroglycerin  - HR and BP control as above  - Check BMP 2/17  - Missed " cardiology follow-up Oct 2021, daughter to call cardiologyivan for annual visit in April if remains stable    (G31.84) Mild cognitive impairment  Comment: Mild memory concerns, SLUMS 22/30 on 3/18/21 and 21/30 on TCU discharge  Plan:   - Continues to benefit from supportive care in [place] setting for medications, meals, ADL support, safety, socialization   -  PT/OT/NSG    (Z81.811A) Skin Tear Lower Leg  Comment: New skin tear to right lower leg, no s/s of infection or complication  Plan:  - Bacitracin daily to wound   -  nursing to follow    Orders:  - Bacitracin daily to wound   - 2/17/22: BMP and CBC     4:21 PM - Called [facility], did not receive referral from facility. Able to obtain e-mail [email or web address] and sent again with chart notes. Will call in AM to confirm.  4:30 PM - Called daughter Maryjane reviewed clinical status and plan of care, reviewed CXR findings, plan of care, will follow closely given continued physical deconditioning and COKER. Next visit 2/15.    Electronically signed by: STEFFANY Yusuf CNP                 Sincerely,        STEFFANY Yusuf CNP

## 2022-02-10 NOTE — TELEPHONE ENCOUNTER
Triage Home Care/Hospice Order Request    Provider: STEFFANY Yusuf CNP  Facility: [place]  Facility Type:  AL    Agency: [facility] Type: HC - SN  Caller: [place]  Call Back Number: [phone]      Order Request: Delay SOC for RN,PT,HHA    Verbal orders approved and given to [place]    Provider giving order: STEFFANY Yusuf CNP, RN

## 2022-02-12 PROBLEM — J96.01 ACUTE RESPIRATORY FAILURE WITH HYPOXIA (H): Status: ACTIVE | Noted: 2022-01-01

## 2022-02-12 PROBLEM — I50.9 ACUTE ON CHRONIC CONGESTIVE HEART FAILURE, UNSPECIFIED HEART FAILURE TYPE (H): Status: ACTIVE | Noted: 2022-01-01

## 2022-02-13 NOTE — PROVIDER NOTIFICATION
Xcoverage: paged by RN regarding elevated troponin 1,809 up from 111 on admission; she was admitted for acute CHF and NSTEMI; hold PTA Eliquis and start heparin drip; serial troponins; tele, cardiology consult and echo already ordered.  [place] [facility].    Deyanira Lr MD

## 2022-02-13 NOTE — PHARMACY-ADMISSION MEDICATION HISTORY
Pharmacy Medication History  Admission medication history interview status for the 2/12/2022  admission is complete. See EPIC admission navigator for prior to admission medications     Location of Interview: Phone  Medication history sources: Angy w/ Aron CARRANZA [phone]    Significant changes made to the medication list:      In the past week, patient estimated taking medication this percent of the time: greater than 90%    Additional medication history information:       Medication reconciliation completed by provider prior to medication history? Yes    Time spent in this activity: 12min    Prior to Admission medications    Medication Sig Last Dose Taking? Auth Provider   acetaminophen (TYLENOL) 325 MG tablet Take 650 mg by mouth every 6 hours as needed for mild pain   Yes Unknown, Entered By History   amLODIPine (NORVASC) 10 MG tablet TAKE 1 TABLET BY MOUTH ONCE DAILY 2/12/2022 at am Yes Brittany Florez APRN CNP   atorvastatin (LIPITOR) 40 MG tablet TAKE 1 TABLET BY MOUTH AT BEDTIME  Yes Brittany Florez APRN CNP   bacitracin 500 UNIT/GM external ointment Apply topically daily Apply to open area right shin after cleaning and drying well 2/12/2022 at Unknown time Yes Brittany Florez APRN CNP   carvedilol (COREG) 12.5 MG tablet Take 12.5 mg by mouth 2 times daily (with meals) (TAKE WITH 6.25MG FOR A TOTAL OF 18.75MG) **NOTE DOSAGE/STRENGTH** 2/12/2022 at am Yes Unknown, Entered By History   carvedilol (COREG) 6.25 MG tablet Take 6.25 mg by mouth 2 times daily (with meals) Total daily dose= 18.75mg 2/12/2022 at am Yes Unknown, Entered By History   clopidogrel (PLAVIX) 75 MG tablet TAKE 1 TABLET BY MOUTH ONCE DAILY 2/12/2022 at am Yes Brittany Florez APRN CNP   ELIQUIS ANTICOAGULANT 2.5 MG tablet TAKE 1 TABLET BY MOUTH TWICE DAILY 2/12/2022 at am Yes Brittany Florez APRN CNP   ferrous sulfate (FE TABS) 325 (65 Fe) MG EC tablet Take 1 tablet (325 mg) by mouth Every Mon, Wed, Fri Morning 2/12/2022  at am Yes Brittany Florez APRN CNP   guaiFENesin (ROBITUSSIN) 100 MG/5ML liquid Take 10 mLs (200 mg) by mouth 2 times daily. May also take 10 mLs (200 mg) 2 times daily as needed for cough. prn Yes Brittany Florez APRN CNP   ipratropium-albuterol (COMBIVENT RESPIMAT)  MCG/ACT inhaler Inhale 1 puff into the lungs 4 times daily as needed for shortness of breath / dyspnea or wheezing prn Yes Brittany Florez APRN CNP   losartan (COZAAR) 25 MG tablet TAKE 1 TABLET BY MOUTH ONCE DAILY 2/12/2022 at am Yes Brittany Florez APRN CNP   Multiple Vitamins-Minerals (SYSTANE ICAPS AREDS2) CAPS TAKE 1 CAPSULE BY MOUTH TWICE DAILY 2/12/2022 at am Yes Brittany Florez APRN CNP   nitroGLYcerin (NITROSTAT) 0.4 MG sublingual tablet For chest pain place 1 tablet under the tongue every 5 minutes for 3 doses. If symptoms persist 5 minutes after 1st dose call 911. prn Yes Alex Ramsay MD   omeprazole (PRILOSEC) 20 MG DR capsule TAKE 1 CAPSULE BY MOUTH EVERY MORNING 2/12/2022 at am Yes Brittany Florez APRN CNP   oxybutynin ER (DITROPAN-XL) 10 MG 24 hr tablet TAKE 1 TABLET BY MOUTH ONCE DAILY 2/12/2022 at am Yes Brittany Florez APRN CNP   potassium chloride ER (KLOR-CON M) 10 MEQ CR tablet Take 1 tablet (10 mEq) by mouth daily 2/12/2022 at am Yes Brittany Florez APRN CNP   psyllium (METAMUCIL/KONSYL) 58.6 % powder Take 6 g (1 teaspoonful) by mouth daily In 8 oz fluid, drink promptly 2/12/2022 at am Yes Brittany Florez APRN CNP   simethicone (MYLICON) 125 MG chewable tablet CHEW AND SWALLOW 1 TABLET BY MOUTH EVERY NIGHT AT BEDTIME 2/11/2022 Yes Brittany Florez APRN CNP   umeclidinium (INCRUSE ELLIPTA) 62.5 MCG/INH inhaler Inhale 1 puff into the lungs daily 2/12/2022 at am Yes Brittany Florez APRN CNP   Vitamin D3 (CHOLECALCIFEROL) 25 mcg (1000 units) tablet Take 1 tablet (25 mcg) by mouth daily 2/12/2022 at am Yes Brittany Florez APRN CNP       The information provided in this note is  only as accurate as the sources available at the time of update(s)

## 2022-02-13 NOTE — ED NOTES
[facility]  ED Nurse Handoff Report    ED Chief complaint: Shortness of Breath      ED Diagnosis:   Final diagnoses:   Acute on chronic congestive heart failure, unspecified heart failure type (H)   Acute respiratory failure with hypoxia (H)       Code Status: DNR / DNI    Allergies: No Known Allergies    Patient Story: Pt arrives via EMS for SOB. Pt O2 sat 88% on RA for EMS and placed on 2L O2.  Focused Assessment:  Pt reports increased SOB. Activity increases her SOB. Cough that she reports has been present since her lung collapse. BNP elevated and given 40mg lasix.    Treatments and/or interventions provided: see above  Patient's response to treatments and/or interventions: see above    To be done/followed up on inpatient unit:  none pending    Does this patient have any cognitive concerns?: none    Activity level - Baseline/Home:  Wheelchair  Activity Level - Current:   Has not been up in the ED.    Patient's Preferred language: English   Needed?: No    Isolation: None  Infection: Not Applicable  Patient tested for COVID 19 prior to admission: YES  Bariatric?: No    Vital Signs:   Vitals:    02/12/22 1850 02/12/22 1951 02/12/22 2000   BP: (!) 143/80  135/65   Pulse: 85  82   Resp: 20  22   Temp:  97.6  F (36.4  C)    TempSrc:  Oral    SpO2: 94%  94%   Weight: 53 kg (116 lb 13.5 oz)         Cardiac Rhythm:     Was the PSS-3 completed:   Yes  What interventions are required if any?               Family Comments: Daughter updated.  OBS brochure/video discussed/provided to patient/family: N/A              Name of person given brochure if not patient: N/A              Relationship to patient: N/A    For the majority of the shift this patient's behavior was Green.   Behavioral interventions performed were information.    ED NURSE PHONE NUMBER: [phone]

## 2022-02-13 NOTE — PLAN OF CARE
A+Ox3, forgetful to place at times. Poor vision and [place]. Up with 1-2 gailt belt to chair. Baseline L  side UE absent, weak LLE. Paced rhythm. VSS on 3 L O2, RA 88%. Some SOB but improved. Voids per purwick, pt is unable to tell when she needs to go, unable to get sample. Heparin gtt infusing, following PTT. No appetite, encouraging foods and fluids and IS. No chest pain.

## 2022-02-13 NOTE — PROGRESS NOTES
"[facility]    Hospitalist Progress Note    Date of Service (when I saw the patient): 02/13/2022    Assessment & Plan   Yoselyn Cui is a 89 year old female who was admitted on 2/12/2022.  Yoselyn Cui is a 89 year old female with a history of a permanent pacemaker and intermittent atrial fibrillation for which she is anticoagulated.she also is on treatment for chronic obstructive lung disease with inhaled bronchodilators but is not on home oxygen.  She has no history of ischemic heart disease or congestive heart failure.  She lives at an assisted living facility.  Today 911 was called because she has been increasingly short of breath with activity over the past day or 2.  In the emergency room she was requiring 2 L of oxygen by nasal cannula, was afebrile and hemodynamically stable.  On exam she has bilateral rails and trace lower extremity edema.  EKG shows a paced rhythm.  Chest x-ray shows bilateral lower lobe interstitial and airspace opacities with bilateral effusions.  Labs are noted for an elevated BNP elevated troponin, normal white blood cell count and negative procalcitonin.  She was given IV furosemide and is being admitted to the hospital for acute heart failure, possible non-ST elevation MI and acute respiratory failure.     Acute congestive heart failure, \"new\"  Hypoxic acute respiratory failure   Possible non-ST-elevation myocardial infarction   Status post permanent pacemaker for sick sinus syndrome   Intermittent atrial fibrillation on anticoagulation   She is stable on 2L of oxygen by nasal canula.  She has no history of congestive heart failure or ischemic heart disease.  She has been short of breath for just a day or two- no chest pain.  Chest X-ray consistent with heart failure. BNP and troponin are elevated.  No fever or other signs of infection.  She does have a history of pneumothorax, bilateral lower lobe lung infiltrate and effusions for which she was " admitted to [facility] last fall and treated with antibiotics and a chest tube.  See below.    Admit to inpatient with telemetry     Continue oxygen as needed by nasal canula     During diuresis with Lasix 40 mg IV twice daily    Serial troponins trended up at 1809     Started on IV heparin drip for possible non-STEMI with a troponin elevation    Continue intravenous furosemide and monitor urine output, weight, renal function and electrolytes    Transthoracic echocardiogram ordered results are currently pending    Continue prior to admission amlodipine, statin, carvedilol, clopidogrel, apixaban, losartan    Cardiology consultation requested and evaluation is currently pending    Her procalcitonin returned at less than 0.05 so pneumonia is less likely     Hyponatremia   Serum Na 126- ikely from heart failure.    Sodium stable at 126 today     History of left pneumothorax in the setting of emphysema and treatment for bilateral lower lobe pneumonia in August and September 2021.  History of chronic obstructive lung disease/emphysema  The patient was admitted to [facility] with dyspnea cough and fever.  CT of the chest showed a left pneumothorax small left pleural effusion and bibasilar pulmonary opacities.  The patient was treated with antibiotics and chest tube placement.    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  This was detected on CT scan last summer.  It appears that the patient did not want any further relation.    Discuss whether further evaluation is appropriate with the patient after she is feeling better in a day or two     History of stroke     Continue statin and anticoagulation for  atrial fibrillation      Hyperlipidemia     Continue prior to admission statin         Diet:  cardiac   DVT Prophylaxis: DOAC  Garcia Catheter: Not present  Central Lines: None  Cardiac Monitoring: None  Code Status:   NCB        Clinically Significant Risk Factors Present on Admission            # Hyponatremia: Na = 126 mmol/L (Ref range: 133 - 144 mmol/L) on admission, will monitor as appropriate      # Coagulation Defect: home medication list includes an anticoagulant medication  # Platelet Defect: home medication list includes an antiplatelet medication             Disposition Plan     Expected Discharge:    In the next 2 to 3 days once respiratory status improves    Maribeth Carson MD  [phone] (P)      Interval History   Patient complains of shortness of breath.  Echo is being done currently.  Troponin initial 1 at 111 went up to 1809.  Started on IV heparin drip for possible non-STEMI.  Continue Lasix to help with diuresis.  Complains of dry cough.  No other acute issues since admission    -Data reviewed today: I reviewed all new labs and imaging results over the last 24 hours. I personally reviewed all the labs and imaging since admission    Physical Exam   Temp: 98.1  F (36.7  C) Temp src: Oral BP: 116/63 Pulse: 79   Resp: 22 SpO2: 96 % O2 Device: Nasal cannula Oxygen Delivery: 2.5 LPM  Vitals:    02/12/22 1850 02/12/22 2245   Weight: 53 kg (116 lb 13.5 oz) 53 kg (116 lb 13.5 oz)     Vital Signs with Ranges  Temp:  [97.6  F (36.4  C)-98.1  F (36.7  C)] 98.1  F (36.7  C)  Pulse:  [79-86] 79  Resp:  [18-22] 22  BP: (116-146)/(63-89) 116/63  SpO2:  [92 %-96 %] 96 %  No intake/output data recorded.    Constitutional: Awake, alert, cooperative, no apparent distress  Respiratory: Bilateral crackles heard on auscultation, no wheezing  Cardiovascular: Regular rate and rhythm, normal S1 and S2, and no murmur noted  GI: Normal bowel sounds, soft, non-distended, non-tender  Skin/Integumen: No rashes, no cyanosis,  trace lower extremity edema present  Other:     Medications     heparin 650 Units/hr (02/13/22 0812)     - MEDICATION INSTRUCTIONS -         acetaminophen  975 mg Oral Q8H     amLODIPine  10 mg Oral Daily     atorvastatin  40 mg Oral At Bedtime     carvedilol  12.5 mg Oral BID w/meals     carvedilol  6.25 mg Oral BID w/meals     clopidogrel  75 mg Oral Daily     [START ON 2/14/2022] ferrous sulfate  325 mg Oral Q Mon Wed Fri AM     furosemide  40 mg Intravenous BID     guaiFENesin  10 mL Oral BID     losartan  25 mg Oral Daily     oxybutynin ER  10 mg Oral Daily     pantoprazole  40 mg Oral QAM AC     potassium chloride  10 mEq Oral Once     simethicone  80 mg Oral At Bedtime     sodium chloride (PF)  3 mL Intracatheter Q8H     umeclidinium  1 puff Inhalation Daily       Data   Recent Labs   Lab 02/13/22  0642 02/13/22  0520 02/12/22  1853   WBC 6.9 6.8 8.6   HGB 11.8 11.3* 11.3*   MCV 80 80 83    289 301   NA  --  126* 126*   POTASSIUM  --  3.5 3.8   CHLORIDE  --  97 95   CO2  --  21 23   BUN  --  10 11   CR  --  0.70 0.55   ANIONGAP  --  8 8   GOLDY  --  7.9* 8.5   GLC  --  88 108*   ALBUMIN  --   --  2.6*   PROTTOTAL  --   --  6.8   BILITOTAL  --   --  0.3   ALKPHOS  --   --  140   ALT  --   --  13   AST  --   --  17       Recent Results (from the past 24 hour(s))   XR Chest Port 1 View    Narrative    EXAM: XR CHEST PORT 1 VIEW  LOCATION: [facility]  DATE/TIME: 2/12/2022 7:27 PM    INDICATION: Fever.  COMPARISON: 1/28/2022.      Impression    IMPRESSION: Interstitial and airspace opacities in both lower lungs have increased since the previous exam, and could be related to infection and/or edema. There are also likely small bilateral pleural effusions. No pneumothorax. Dual-lead cardiac   device, leads unchanged. Aortic calcification. Heart size stable. Pulmonary vascularity is within normal limits. Degenerative changes both glenohumeral joints.   CT Chest w/o Contrast    Narrative     EXAM: CT CHEST WITHOUT CONTRAST  LOCATION: [facility]  DATE/TIME: 02/13/2022, 9:27 AM    INDICATION: Respiratory illness, nondiagnostic x-ray.  COMPARISON: 8/30/2021  TECHNIQUE: CT chest without IV contrast. Multiplanar reformats were obtained. Dose reduction techniques were used.  CONTRAST: None.    FINDINGS:   LUNGS AND PLEURA: Emphysema. Possible underlying fibrosis and bronchiectasis probably with superimposed infectious or inflammatory infiltrates, fibrosis, and bronchiectasis, all appear significantly worse and/or new since comparison.    MEDIASTINUM/AXILLAE: There are extensive atherosclerotic changes of the visualized aorta and its branches. There is no evidence of aortic aneurysm. Mildly prominent lymph nodes are noted which are nonspecific and may be reactive in this setting.    CORONARY ARTERY CALCIFICATION: Severe.    UPPER ABDOMEN: Probable cholelithiasis without cholecystitis.    MUSCULOSKELETAL: No frankly destructive bony lesions.      Impression    IMPRESSION:   1.  Progressive infiltrates, bronchiectasis, and possible underlying fibrosis since the comparison study.

## 2022-02-13 NOTE — PROVIDER NOTIFICATION
DATE:  2/13/2022   TIME OF RECEIPT FROM LAB:  0616  LAB TEST:  Troponin   LAB VALUE:  1809  RESULTS GIVEN WITH READ-BACK TO (PROVIDER):  Paged Dr. Lr and notified bedside RN  TIME LAB VALUE REPORTED TO PROVIDER:   Paged at [phone]

## 2022-02-13 NOTE — ED NOTES
Bed: ED26  Expected date:   Expected time:   Means of arrival:   Comments:   89f increased sob with exertion, 94% on 2LNC

## 2022-02-13 NOTE — PLAN OF CARE
Shift Summary [id number]    Admitting Diagnosis: Acute respiratory failure with hypoxia (H) [J96.01]  Acute on chronic congestive heart failure, unspecified heart failure type (H) [I50.9]   Vitals SOB with activity, HTN otherwise VSS  Pain 1-2/10. Taking no PRN.   A&Ox4, may be forgetful at times    Voiding incontinent, purewick in place  Mobility Ast x 2, lift  Tele   CMS intact  Lung Sounds coarse crackles On 4L via NC  GI No BM on shift  Dressing wound to right shin, dressing changed, small amount of drainage.    Orders Placed This Encounter      Combination Diet Low Saturated Fat Na <2400mg Diet, No Caffeine Diet     New admit this evening, admitted from living facility for SOB.  Patient desated with activity,. O2 increased from 2 - 4 L via NC.  O2 seemed to be stable.  Tele showed paced rhythym.    Received critical lab value for troponin, MD notified.  [facility].      Plan: Continue to monitor.

## 2022-02-13 NOTE — H&P
"[facility]    History and Physical - Hospitalist Service       Date of Admission:  2/12/2022    Assessment & Plan    Yoselyn Cui is a 89 year old female with a history of a permanent pacemaker and intermittent atrial fibrillation for which she is anticoagulated.she also is on treatment for chronic obstructive lung disease with inhaled bronchodilators but is not on home oxygen.  She has no history of ischemic heart disease or congestive heart failure.  She lives at an assisted living facility.  Today 911 was called because she has been increasingly short of breath with activity over the past day or 2.  In the emergency room she was requiring 2 L of oxygen by nasal cannula, was afebrile and hemodynamically stable.  On exam she has bilateral rails and trace lower extremity edema.  EKG shows a paced rhythm.  Chest x-ray shows bilateral lower lobe interstitial and airspace opacities with bilateral effusions.  Labs are noted for an elevated BNP elevated troponin, normal white blood cell count and negative procalcitonin.  She was given IV furosemide and is being admitted to the hospital for acute heart failure, possible non-ST elevation MI and acute respiratory failure.    Acute congestive heart failure, \"new\"  Hypoxic acute respiratory failure   Possible non-ST-elevation myocardial infarction   Status post permanent pacemaker for sick sinus syndrome   Intermittent atrial fibrillation on anticoagulation   She is stable on 2L of oxygen by nasal canula.  She has no history of congestive heart failure or ischemic heart disease.  She has been short of breath for just a day or two- no chest pain.  Chest X-ray consistent with heart failure. BNP and troponin are elevated.  No fever or other signs of infection.  She does have a history of pneumothorax, bilateral lower lobe lung infiltrate and effusions for which she was admitted to [facility] last fall and treated with antibiotics and a chest " tube.  See below.    Admit to inpatient with telemetry     Continue oxygen as needed by nasal canula     Continue intravenous furosemide and monitor urine output, weight, renal function and electrolytes    Transthoracic echocardiogram tomorrow and repeat troponin     Continue prior to admission amlodipine, statin, carvedilol, clopidogrel, apixaban, losartan    Will ask cardiology team to see her    Hyponatremia   Serum Na 126- ikely from heart failure.    Repeat in the morning     History of left pneumothorax in the setting of emphysema and treatment for bilateral lower lobe pneumonia in August and September 2021.  History of chronic obstructive lung disease/emphysema  The patient was admitted to [facility] with dyspnea cough and fever.  CT of the chest showed a left pneumothorax small left pleural effusion and bibasilar pulmonary opacities.  The patient was treated with antibiotics and chest tube placement.    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  This was detected on CT scan last summer.  It appears that the patient did not want any further relation.    Discuss whether further evaluation is appropriate with the patient after she is feeling better in a day or two    History of stroke     Continue statin and anticoagulation for atrial fibrillation     Hyperlipidemia     Continue prior to admission statin      Diet:  cardiac   DVT Prophylaxis: DOAC  Garcia Catheter: Not present  Central Lines: None  Cardiac Monitoring: None  Code Status:   NCB    Clinically Significant Risk Factors Present on Admission         # Hyponatremia: Na = 126 mmol/L (Ref range: 133 - 144 mmol/L) on admission, will monitor as appropriate      # Coagulation Defect: home medication list includes an anticoagulant medication  # Platelet Defect: home medication list includes  an antiplatelet medication       Disposition Plan   Expected Discharge:    Anticipated discharge location:  Awaiting care coordination huddle  Delays:          The patient's care was discussed with the Patient.    Chuck Almaraz MD  Hospitalist Service  [facility]  Securely message with the Vocera Web Console (learn more here)  Text page via [facility] Paging/Directory     ____________________________________________________________________    Chief Complaint   Shortness of breath     History is obtained from the patient, electronic health record and emergency department physician    History of Present Illness   Yoselyn Cui is a 89 year old female who resides at an assisted living facility.  She has a history of a permanent pacemaker for sick sinus syndrome, atrial fibrillation for which she is anticoagulated.  She did have a stroke.  She also has hypertension.  Apparently she has COPD since she is on inhaled bronchodilator therapy.  There does not appear to be a history of ischemic heart disease or congestive heart failure.  911 was called to her facility today because she has been increasingly short of breath for the past 2 days.  There has been no reported fever, cough, cold, chest pain.  No abdominal pain, nausea, vomiting, diarrhea or obvious bleeding.  The patient tells me that she has just been short of breath when she tries to do anything for the last day or 2.  When EMS arrived her oxygen saturation was 88% on room air she was placed on 2 L nasal cannula with subsequent increase in her oxygen saturation greater than 90%.  In the emergency room her blood pressure was 143/80 heart rate 85 respiratory rate 20 oxygen saturation 94% on 2 L a minute by nasal cannula temperature 97.6  F.  Physical exam not available.  Labs were notable for serum sodium 126 creatinine 0.55 BNP 2700 procalcitonin undetectable troponin I 111 CBC essentially normal other than a mild decrease in  hemoglobin at 11.3 g.  EKG showed a paced rhythm and inferior Q waves.  Chest x-ray shows interstitial and airspace opacities bilaterally increased to prior exam.  There were small bilateral effusions.  COVID-19 PCR was negative.  The patient received 40 mg of IV furosemide and continues on 2 L of oxygen by nasal cannula.    Review of Systems    The 10 point Review of Systems is negative other than noted in the HPI or here.     Past Medical History    I have reviewed this patient's medical history and updated it with pertinent information if needed.   Past Medical History:   Diagnosis Date     AV block, 2nd degree 5/16/2016     Cerebrovascular accident (H) 8/22/2016     COKER (dyspnea on exertion) 8/22/2016     Generalized weakness 10/12/2016     GIB (gastrointestinal bleeding)      History of colonic polyps 8/22/2016     HTN (hypertension) 8/22/2016     Iron deficiency anemia      Melanoma of skin (H)-rt arm 8/22/2016     Mixed hyperlipidemia 8/22/2016     Pacemaker     implanted 5-     PAF (paroxysmal atrial fibrillation) (H)      SSS (sick sinus syndrome) (H) 8/22/2016   History of left pneumothorax in the setting of emphysema and treatment for bilateral lower lobe pneumonia in August and September 2021.  History of chronic obstructive lung disease/emphysema  History of dilatation of the main pancreatic duct and a 2.5 cm hypoattenuating lesion in the head/uncinate process of the pancreas.    Past Surgical History   I have reviewed this patient's surgical history and updated it with pertinent information if needed.  Past Surgical History:   Procedure Laterality Date     APPENDECTOMY  1960s     CV CORONARY ANGIOGRAM N/A 7/17/2019    Procedure: Coronary Angiogram;  Surgeon: Irvin Hutson MD;  Location:  HEART CARDIAC CATH LAB     CV HEART CATHETERIZATION WITH POSSIBLE INTERVENTION N/A 7/18/2019    Procedure: Heart Catheterization with Possible Intervention;  Surgeon: Jayleen Torres MD;   Location:  HEART CARDIAC CATH LAB     CV LEFT HEART CATH N/A 7/17/2019    Procedure: Left Heart Cath;  Surgeon: Irvin Hutson MD;  Location:  HEART CARDIAC CATH LAB     CV LEFT VENTRICULOGRAM N/A 7/17/2019    Procedure: Left Ventriculogram;  Surgeon: Irvin Hutson MD;  Location:  HEART CARDIAC CATH LAB     CV PCI STENT DRUG ELUTING N/A 7/18/2019    Procedure: PCI Stent Drug Eluting;  Surgeon: Jayleen Torres MD;  Location:  HEART CARDIAC CATH LAB     ESOPHAGOSCOPY, GASTROSCOPY, DUODENOSCOPY (EGD), COMBINED N/A 11/20/2018    Procedure: ESOPHAGOSCOPY, GASTROSCOPY, DUODENOSCOPY (EGD)  Room 523 with biopsies using cold biopsy forceps;  Surgeon: Ayala Soto MD;  Location:  GI     HC REMV CATARACT EXTRACAP,INSERT LENS, W/O ECP Left 10/04/2017     IMPLANT PACEMAKER  05/17/2016     IR CHEST TUBE PLACEMENT NON-TUNNELLED LEFT  8/31/2021     ZZC LIGATE FALLOPIAN TUBE  1960s     ZZC REMV CATARACT INTRACAP,INSERT LENS Right 09/20/2017       Social History   I have reviewed this patient's social history and updated it with pertinent information if needed.  Social History     Tobacco Use     Smoking status: Never Smoker     Smokeless tobacco: Never Used   Substance Use Topics     Alcohol use: No     Alcohol/week: 0.0 standard drinks     Drug use: No       Family History   I have reviewed this patient's family history and updated it with pertinent information if needed.  Family History   Problem Relation Age of Onset     Coronary Artery Disease Father      Coronary Artery Disease Brother      Coronary Artery Disease Sister      Colon Cancer No family hx of        Prior to Admission Medications   Prior to Admission Medications   Prescriptions Last Dose Informant Patient Reported? Taking?   ELIQUIS ANTICOAGULANT 2.5 MG tablet  Nursing Home No No   Sig: TAKE 1 TABLET BY MOUTH TWICE DAILY   Multiple Vitamins-Minerals (SYSTANE ICAPS AREDS2) CAPS  Nursing Home No No   Sig: TAKE 1 CAPSULE BY  MOUTH TWICE DAILY   Vitamin D3 (CHOLECALCIFEROL) 25 mcg (1000 units) tablet  Nursing Home No No   Sig: Take 1 tablet (25 mcg) by mouth daily   acetaminophen (TYLENOL) 325 MG tablet  Nursing Home Yes No   Sig: Take 650 mg by mouth every 6 hours as needed for mild pain    amLODIPine (NORVASC) 10 MG tablet   No No   Sig: TAKE 1 TABLET BY MOUTH ONCE DAILY   atorvastatin (LIPITOR) 40 MG tablet  Nursing Home No No   Sig: TAKE 1 TABLET BY MOUTH AT BEDTIME   bacitracin 500 UNIT/GM external ointment   No No   Sig: Apply topically daily Apply to open area right shin after cleaning and drying well   carvedilol (COREG) 12.5 MG tablet  Nursing Home Yes No   Sig: Take 12.5 mg by mouth 2 times daily (with meals) (TAKE WITH 6.25MG FOR A TOTAL OF 18.75MG) **NOTE DOSAGE/STRENGTH**   carvedilol (COREG) 6.25 MG tablet  Nursing Home No No   Sig: TAKE 1 TABLET BY MOUTH TWICE DAILY WITH MEALS (TAKE WITH 12.5MG FOR A TOTAL OF 18.75MG) NOTE DOSAGE/STRENGTH   clopidogrel (PLAVIX) 75 MG tablet  Nursing Home No No   Sig: TAKE 1 TABLET BY MOUTH ONCE DAILY   ferrous sulfate (FE TABS) 325 (65 Fe) MG EC tablet  Nursing Home No No   Sig: Take 1 tablet (325 mg) by mouth Every Mon, Wed, Fri Morning   guaiFENesin (ROBITUSSIN) 100 MG/5ML liquid   No No   Sig: Take 10 mLs (200 mg) by mouth 2 times daily. May also take 10 mLs (200 mg) 2 times daily as needed for cough.   ipratropium-albuterol (COMBIVENT RESPIMAT)  MCG/ACT inhaler  Nursing Home No No   Sig: Inhale 1 puff into the lungs 4 times daily as needed for shortness of breath / dyspnea or wheezing   losartan (COZAAR) 25 MG tablet   No No   Sig: TAKE 1 TABLET BY MOUTH ONCE DAILY   nitroGLYcerin (NITROSTAT) 0.4 MG sublingual tablet  Nursing Home No No   Sig: For chest pain place 1 tablet under the tongue every 5 minutes for 3 doses. If symptoms persist 5 minutes after 1st dose call 911.   omeprazole (PRILOSEC) 20 MG DR capsule  Nursing Home No No   Sig: TAKE 1 CAPSULE BY MOUTH EVERY MORNING    oxybutynin ER (DITROPAN-XL) 10 MG 24 hr tablet   No No   Sig: TAKE 1 TABLET BY MOUTH ONCE DAILY   potassium chloride ER (KLOR-CON M) 10 MEQ CR tablet   No No   Sig: Take 1 tablet (10 mEq) by mouth daily   psyllium (METAMUCIL/KONSYL) 58.6 % powder   No No   Sig: Take 6 g (1 teaspoonful) by mouth daily In 8 oz fluid, drink promptly   simethicone (MYLICON) 125 MG chewable tablet  Nursing Home No No   Sig: CHEW AND SWALLOW 1 TABLET BY MOUTH EVERY NIGHT AT BEDTIME   umeclidinium (INCRUSE ELLIPTA) 62.5 MCG/INH inhaler  Nursing Home No No   Sig: Inhale 1 puff into the lungs daily   vancomycin (VANCOCIN) 125 MG capsule   No No   Sig: Take 1 capsule (125 mg) by mouth daily for 14 days      Facility-Administered Medications: None     Allergies   No Known Allergies    Physical Exam   Vital Signs: Temp: 97.6  F (36.4  C) Temp src: Oral BP: 135/65 Pulse: 82   Resp: 22 SpO2: 94 % O2 Device: Nasal cannula Oxygen Delivery: 2 LPM  Weight: 116 lbs 13.5 oz    Constitutional: awake, alert, cooperative, no apparent distress  Eyes: Lids and lashes normal, pupils equal, round, sclera clear, conjunctiva normal  ENT: Normocephalic, without obvious abnormality, atraumatic  Respiratory: No increased work of breathing, bilateral rales 1/3 the way up from the bases  Cardiovascular:  regular rate and rhythm, normal S1 and S2, no S3 or S4, and no murmur noted; trace lower extremity edema  GI: normal bowel sounds, soft, non-distended, non-tender, no masses palpated  Skin: no rashes and no jaundice  Musculoskeletal: There is no redness, warmth, or swelling of the joints.  Full range of motion noted.  Motor strength is 5 out of 5 all extremities bilaterally.  Tone is normal.  Neurologic: Awake, alert, oriented to name, place and time.  Cranial nerves II-XII are grossly intact.  Motor is 5 out of 5 bilaterally.    Neuropsychiatric: General: normal, calm and normal eye contact    Data   Data reviewed today: I reviewed all medications, new labs and  imaging results over the last 24 hours. I personally reviewed the EKG tracing showing paced, inferior Q's.    Recent Labs   Lab 02/12/22  1853   WBC 8.6   HGB 11.3*   MCV 83      *   POTASSIUM 3.8   CHLORIDE 95   CO2 23   BUN 11   CR 0.55   ANIONGAP 8   GOLDY 8.5   *   ALBUMIN 2.6*   PROTTOTAL 6.8   BILITOTAL 0.3   ALKPHOS 140   ALT 13   AST 17     8.6    \    11.3 (L)    /    301   N 64    L N/A    126 (L)    95    11 /   ------------------------------------ 108 (H)   ALT 13   AST 17      ALB 2.6 (L)   Ca 8.5  3.8    23    0.55 \    % RETIC N/A    LDH N/A  Troponin N/A    BNP N/A    CK N/A  INR N/A   PTT N/A    D-dimer N/A    Fibrinogen N/A    Antithrombin N/A  Ferritin N/A  CRP N/A    IL-6 N/A  Recent Results (from the past 24 hour(s))   XR Chest Port 1 View    Narrative    EXAM: XR CHEST PORT 1 VIEW  LOCATION: [facility]  DATE/TIME: 2/12/2022 7:27 PM    INDICATION: Fever.  COMPARISON: 1/28/2022.      Impression    IMPRESSION: Interstitial and airspace opacities in both lower lungs have increased since the previous exam, and could be related to infection and/or edema. There are also likely small bilateral pleural effusions. No pneumothorax. Dual-lead cardiac   device, leads unchanged. Aortic calcification. Heart size stable. Pulmonary vascularity is within normal limits. Degenerative changes both glenohumeral joints.

## 2022-02-13 NOTE — PROGRESS NOTES
[facility]  Progress Note  [facility]  259    Attempted visit per request but pt had just been moved in to this unit and was quite tired.    I let her know a  would come tomorrow.        Ilana Gore    Pager [phone]

## 2022-02-13 NOTE — ED PROVIDER NOTES
History     Chief Complaint:  Shortness of Breath       HPI   Yoselyn Cui is a 89 year old female with history of sick sinus syndrome currently on anticoagulation with Eliquis who presents to the emergency department with shortness of breath.  EMS found her with oxygen saturations of 88% on room air.  They placed her on 2 L and now her oxygen level is at 94%.  She has noted increased shortness of breath over the past 2 days.  She notes that she chronically coughs ever since having her collapsed lung.  She denies any fevers or chills.  No nausea vomiting or diarrhea.  She denies any chest pain.  She does note when she tries to get up and move around she does go back to her room because she feels short of breath.    Allergies:  No Known Allergies     Medications:    Current Facility-Administered Medications   Medication     sodium chloride (PF) 0.9% PF flush 3 mL     sodium chloride (PF) 0.9% PF flush 3 mL     Current Outpatient Medications   Medication     acetaminophen (TYLENOL) 325 MG tablet     amLODIPine (NORVASC) 10 MG tablet     atorvastatin (LIPITOR) 40 MG tablet     bacitracin 500 UNIT/GM external ointment     carvedilol (COREG) 12.5 MG tablet     carvedilol (COREG) 6.25 MG tablet     clopidogrel (PLAVIX) 75 MG tablet     ELIQUIS ANTICOAGULANT 2.5 MG tablet     ferrous sulfate (FE TABS) 325 (65 Fe) MG EC tablet     guaiFENesin (ROBITUSSIN) 100 MG/5ML liquid     ipratropium-albuterol (COMBIVENT RESPIMAT)  MCG/ACT inhaler     losartan (COZAAR) 25 MG tablet     Multiple Vitamins-Minerals (SYSTANE ICAPS AREDS2) CAPS     nitroGLYcerin (NITROSTAT) 0.4 MG sublingual tablet     omeprazole (PRILOSEC) 20 MG DR capsule     oxybutynin ER (DITROPAN-XL) 10 MG 24 hr tablet     potassium chloride ER (KLOR-CON M) 10 MEQ CR tablet     psyllium (METAMUCIL/KONSYL) 58.6 % powder     simethicone (MYLICON) 125 MG chewable tablet     umeclidinium (INCRUSE ELLIPTA) 62.5 MCG/INH inhaler     Vitamin D3 (CHOLECALCIFEROL) 25  mcg (1000 units) tablet        Past Medical History:    Past Medical History:   Diagnosis Date     AV block, 2nd degree 5/16/2016     Cerebrovascular accident (H) 8/22/2016     COKER (dyspnea on exertion) 8/22/2016     Generalized weakness 10/12/2016     GIB (gastrointestinal bleeding)      History of colonic polyps 8/22/2016     HTN (hypertension) 8/22/2016     Iron deficiency anemia      Melanoma of skin (H)-rt arm 8/22/2016     Mixed hyperlipidemia 8/22/2016     Pacemaker     implanted 5-     PAF (paroxysmal atrial fibrillation) (H)      SSS (sick sinus syndrome) (H) 8/22/2016        Past Surgical History:    Past Surgical History:   Procedure Laterality Date     APPENDECTOMY  1960s     CV CORONARY ANGIOGRAM N/A 7/17/2019    Procedure: Coronary Angiogram;  Surgeon: Irvin Hutson MD;  Location:  HEART CARDIAC CATH LAB     CV HEART CATHETERIZATION WITH POSSIBLE INTERVENTION N/A 7/18/2019    Procedure: Heart Catheterization with Possible Intervention;  Surgeon: Jayleen Torres MD;  Location: [facility] CARDIAC CATH LAB     CV LEFT HEART CATH N/A 7/17/2019    Procedure: Left Heart Cath;  Surgeon: Irvin Hutson MD;  Location:  HEART CARDIAC CATH LAB     CV LEFT VENTRICULOGRAM N/A 7/17/2019    Procedure: Left Ventriculogram;  Surgeon: Irvin Hutson MD;  Location:  HEART CARDIAC CATH LAB     CV PCI STENT DRUG ELUTING N/A 7/18/2019    Procedure: PCI Stent Drug Eluting;  Surgeon: Jayleen Torres MD;  Location:  HEART CARDIAC CATH LAB     ESOPHAGOSCOPY, GASTROSCOPY, DUODENOSCOPY (EGD), COMBINED N/A 11/20/2018    Procedure: ESOPHAGOSCOPY, GASTROSCOPY, DUODENOSCOPY (EGD)  Room 523 with biopsies using cold biopsy forceps;  Surgeon: Ayala Soto MD;  Location:  GI     HC REMV CATARACT EXTRACAP,INSERT LENS, W/O ECP Left 10/04/2017     IMPLANT PACEMAKER  05/17/2016     IR CHEST TUBE PLACEMENT NON-TUNNELLED LEFT  8/31/2021     ZZC LIGATE FALLOPIAN TUBE  1960s     ZZC  REMV CATARACT INTRACAP,INSERT LENS Right 09/20/2017        Family History:    Family History   Problem Relation Age of Onset     Coronary Artery Disease Father      Coronary Artery Disease Brother      Coronary Artery Disease Sister      Colon Cancer No family hx of         Social History:  Lives in care facility    Review of Systems   Review of Systems   Respiratory: Positive for cough and shortness of breath. Negative for sputum production.    Cardiovascular: Negative for chest pain and leg swelling.   Gastrointestinal: Negative for abdominal pain, nausea and vomiting.   All other systems reviewed and are negative.       Physical Exam   Vitals:   ED Triage Vitals [02/12/22 1850]   Enc Vitals Group      BP (!) 143/80      Pulse 85      Resp 20      Temp       Temp src       SpO2 94 %      Weight 53 kg (116 lb 13.5 oz)      Height       Head Circumference       Peak Flow       Pain Score       Pain Loc       Pain Edu?       Excl. in GC?         Physical Exam  Eyes:  The pupils are equal and round    Conjunctivae and sclerae are normal  ENT:    The nose is normal    Pinnae are normal  CV:  Regular rate and rhythm     No edema  Resp:  Lungs are clear. No wheezing  GI:  Abdomen is soft, there is no rigidity    No distension    No rebound tenderness   MS:  Normal muscular tone    No asymmetric leg swelling  Skin:  No rash or acute skin lesions noted  Neuro:   Awake, alert.      Speech is normal and fluent.    Face is symmetric.     Moves all extremities    Emergency Department Course   ECG   Ventricular Rate BPM 88      Atrial Rate BPM 88      MA Interval ms 192      QRS Duration ms 150      QT ms 426      QTc ms 515      P Axis degrees 67      R AXIS degrees 267      T Axis degrees 69      Interpretation ECG  Ventricular paced rhythm  Inferior infarct , age undetermined   Abnormal ECG   When compared with ECG of 30-AUG-2021 15:48,   Sinus rhythm has replaced Electronic ventricular pacemaker   Confirmed by GENERATED  REPORT, COMPUTER ([id number]),  AlineVitor coughlinelle ([id number]) on 2/12/2022 8:45:18 PM            Imaging:  Radiology findings were communicated with the patient who voiced understanding of the findings.  XR Chest Port 1 View   Preliminary Result   IMPRESSION: Interstitial and airspace opacities in both lower lungs have increased since the previous exam, and could be related to infection and/or edema. There are also likely small bilateral pleural effusions. No pneumothorax. Dual-lead cardiac    device, leads unchanged. Aortic calcification. Heart size stable. Pulmonary vascularity is within normal limits. Degenerative changes both glenohumeral joints.           Laboratory:  Laboratory findings were communicated with the patient who voiced understanding of the findings.    Labs Ordered and Resulted from Time of ED Arrival to Time of ED Departure   COMPREHENSIVE METABOLIC PANEL - Abnormal       Result Value    Sodium 126 (*)     Potassium 3.8      Chloride 95      Carbon Dioxide (CO2) 23      Anion Gap 8      Urea Nitrogen 11      Creatinine 0.55      Calcium 8.5      Glucose 108 (*)     Alkaline Phosphatase 140      AST 17      ALT 13      Protein Total 6.8      Albumin 2.6 (*)     Bilirubin Total 0.3      GFR Estimate 87     CBC WITH PLATELETS AND DIFFERENTIAL - Abnormal    WBC Count 8.6      RBC Count 4.35      Hemoglobin 11.3 (*)     Hematocrit 36.0      MCV 83      MCH 26.0 (*)     MCHC 31.4 (*)     RDW 14.4      Platelet Count 301      % Neutrophils 64      % Lymphocytes 18      % Monocytes 15      % Eosinophils 2      % Basophils 0      % Immature Granulocytes 1      NRBCs per 100 WBC 0      Absolute Neutrophils 5.6      Absolute Lymphocytes 1.5      Absolute Monocytes 1.3      Absolute Eosinophils 0.1      Absolute Basophils 0.0      Absolute Immature Granulocytes 0.1      Absolute NRBCs 0.0     NT PROBNP INPATIENT - Abnormal    N terminal Pro BNP Inpatient 2,691 (*)    TROPONIN I - Abnormal    Troponin I High  Sensitivity 111 (*)    LACTIC ACID WHOLE BLOOD - Normal    Lactic Acid 0.9     INFLUENZA A/B & SARS-COV2 PCR MULTIPLEX - Normal    Influenza A PCR Negative      Influenza B PCR Negative      SARS CoV2 PCR Negative     PROCALCITONIN - Normal    Procalcitonin <0.05     BLOOD CULTURE   BLOOD CULTURE        Interventions:  Medications   sodium chloride (PF) 0.9% PF flush 3 mL (has no administration in time range)   sodium chloride (PF) 0.9% PF flush 3 mL (has no administration in time range)   furosemide (LASIX) injection 40 mg (has no administration in time range)         Emergency Department Course:  Nursing notes and vitals reviewed.  I performed an exam of the patient as documented above.     I discussed findings with patient and daughter.  Plan for admission.    Impression & Plan      Medical Decision Making:  Yoselyn Cui is a 89 year old female with history of sick sinus syndrome who presents to the emergency department with shortness of breath and dyspnea on exertion.  She is noted the symptoms worsening over the past couple of days.  She was found to be hypoxic on room air by EMS and brought here to the emergency department for evaluation.  Work-up here is concerning for possible volume overload.  Her BNP level is high and there is some suggestion of edema on her chest x-ray.  Her troponin is also slightly elevated.  Procalcitonin level is low making pneumonia less likely.  In addition her white blood cell count is normal.  Given the suspected edema, Lasix were administered.  Patient discussed with hospitalist agreed accept patient as an admission.    Diagnosis:  1. Acute on chronic congestive heart failure, unspecified heart failure type (H)    2. Acute respiratory failure with hypoxia (H)         Disposition:   Admit    2/12/2022    EMERGENCY DEPARTMENT       Cali Hong MD  05/03/17 [id number]        Cali Hong MD  02/12/22 [id number]

## 2022-02-13 NOTE — PLAN OF CARE
Pt transferred to ED CT, then will transfer to [facility] [place], [place]. Report given to JEFF Thompson. Pt A&Ox4, denies chest pain/ SOB at rest, on 2L O2 NC, Hep drip started.

## 2022-02-13 NOTE — CONSULTS
[facility]    Cardiology Consultation     Date of Admission:  2/12/2022    Assessment & Plan   Yoselyn Cui is a 89 year old female who was admitted on 2/12/2022. I was asked to see the patient for new heart failure.    1. NSTEMI (troponin peak 1800, now downtrending)  2. Coronary artery disease, history of multivessel coronary artery disease thought to be a poor candidate for surgery, with complex stenting complicated by dissection to the LAD and left main (July 2019).  Medical management of the circumflex and right coronary artery disease.  3. Paroxysmal atrial fibrillation on therapeutic anticoagulation  4. History of sick sinus syndrome status post pacemaker implantation.  5. COPD  6. CVA with residual left hemiparesis  7. Hyperlipidemia    Transthoracic echocardiogram notes mildly reduced left ventricular ejection fraction at 40 to 45% with septal wall motion abnormalities that is difficult to interpret in the setting of pacemaker.  Normal right ventricular chamber size and systolic function.  No significant valvular heart disease noted.    Difficult to evaluate her noncontrast CT imaging given her underlying COPD/emphysema along with recent treated pneumonia.  However she does feel improved with IV diuresis.  I did discuss the elevated troponin with the patient and the daughter.  Indicated that a coronary angiogram could be pursued to reassess her CAD.  However, I did highlight the overall complex nature of her CAD and the associated risks with an invasive procedure.  The daughter agreed, that she favored more medical therapy unless absolutely necessary.  She did have a significant troponin elevation to 1800 that is now downtrending.  Patient denies chest pain.  Shortness of breath is improving.  We will continue to monitor her her over the next 24 hours with IV diuresis and reassess the risks/benefits with proceeding with a coronary angiogram versus continued medical  Dr Holcomb Dr. Holcomb management.    Recommendations:    Continue heparin drip, hold oral anticoagulation    Continue Plavix 75 mg daily    Agree with IV Lasix diuresis with 40 mg twice daily    Following changes were made to her cardiac regimen, borderline low blood pressures.    Decrease Coreg from her home dose of 18.75 to 6.25 mg twice daily    Continue amlodipine 10 mg daily    Continue losartan 25 mg daily    We will reassess her symptoms and decide on continuing with medical therapy with IV Lasix and heparin drip versus a coronary angiogram in the next 24 to 48 hours.  Keep the patient n.p.o. at midnight for reassessment tomorrow.    Brian Pineda MD    Code Status    No CPR- Do NOT Intubate    Reason for Consult   Reason for consult: NSTEMI    Primary Care Physician   Brittany Florez    Chief Complaint   Shortness of breath    History is obtained from the patient and review of the past medical records    History of Present Illness   Yoselyn Cui is a 89 year old female who presents with the above past medical history.  Patient normally resides in an assisted living facility.  Yesterday evening she was brought into the emergency room due to progressively worsening shortness of breath over the past 48 hours.  She otherwise denied any fevers, chills, productive cough or chest pain.  She reported progressively worsening dyspnea with minimal exertion over the past 2 days.  Her oxygen saturations were noted in the high 80s with 2 L nasal cannula on arrival to the emergency room.  She was afebrile with normal blood pressure at 143/80.  Troponin was noted to be elevated significantly elevated to 1809 and now downtrending.  NT proBNP is elevated to 2600.    On my evaluation, the patient denies any chest pain.  Indicates that her respiratory symptoms are improved with diuresis.  Her daughter gives history of recent pneumonia that was diagnosed by her primary care physician for which she recently completed antibiotic therapy.  She now  denies a productive cough.    She was last seen in the cardiology clinic clinic in April 2021.  Last coronary intervention was in 2019 with left main and LAD stenting that was complicated by dissection with a repeat intervention in July 2019.  Was noted to be doing well from a cardiovascular perspective on her last visit.    Past Medical History   I have reviewed this patient's medical history and updated it with pertinent information if needed.   Past Medical History:   Diagnosis Date     AV block, 2nd degree 5/16/2016     Cerebrovascular accident (H) 8/22/2016     COKER (dyspnea on exertion) 8/22/2016     Generalized weakness 10/12/2016     GIB (gastrointestinal bleeding)      History of colonic polyps 8/22/2016     HTN (hypertension) 8/22/2016     Iron deficiency anemia      Melanoma of skin (H)-rt arm 8/22/2016     Mixed hyperlipidemia 8/22/2016     Pacemaker     implanted 5-     PAF (paroxysmal atrial fibrillation) (H)      SSS (sick sinus syndrome) (H) 8/22/2016       Past Surgical History   I have reviewed this patient's surgical history and updated it with pertinent information if needed.  Past Surgical History:   Procedure Laterality Date     APPENDECTOMY  1960s     CV CORONARY ANGIOGRAM N/A 7/17/2019    Procedure: Coronary Angiogram;  Surgeon: Irvin Hutson MD;  Location:  HEART CARDIAC CATH LAB     CV HEART CATHETERIZATION WITH POSSIBLE INTERVENTION N/A 7/18/2019    Procedure: Heart Catheterization with Possible Intervention;  Surgeon: Jayleen Torres MD;  Location: [facility] CARDIAC CATH LAB     CV LEFT HEART CATH N/A 7/17/2019    Procedure: Left Heart Cath;  Surgeon: Irvin Hutson MD;  Location:  HEART CARDIAC CATH LAB     CV LEFT VENTRICULOGRAM N/A 7/17/2019    Procedure: Left Ventriculogram;  Surgeon: Irvin Hutson MD;  Location:  HEART CARDIAC CATH LAB     CV PCI STENT DRUG ELUTING N/A 7/18/2019    Procedure: PCI Stent Drug Eluting;  Surgeon: Jayleen Torres  MD Carlos;  Location:  HEART CARDIAC CATH LAB     ESOPHAGOSCOPY, GASTROSCOPY, DUODENOSCOPY (EGD), COMBINED N/A 11/20/2018    Procedure: ESOPHAGOSCOPY, GASTROSCOPY, DUODENOSCOPY (EGD)  Room 523 with biopsies using cold biopsy forceps;  Surgeon: Ayala Soto MD;  Location: [facility] GI     HC REMV CATARACT EXTRACAP,INSERT LENS, W/O ECP Left 10/04/2017     IMPLANT PACEMAKER  05/17/2016     IR CHEST TUBE PLACEMENT NON-TUNNELLED LEFT  8/31/2021     ZZC LIGATE FALLOPIAN TUBE  1960s     ZZC REMV CATARACT INTRACAP,INSERT LENS Right 09/20/2017       Prior to Admission Medications   Prior to Admission Medications   Prescriptions Last Dose Informant Patient Reported? Taking?   ELIQUIS ANTICOAGULANT 2.5 MG tablet 2/12/2022 at am Nursing Home No Yes   Sig: TAKE 1 TABLET BY MOUTH TWICE DAILY   Multiple Vitamins-Minerals (SYSTANE ICAPS AREDS2) CAPS 2/12/2022 at am Nursing Home No Yes   Sig: TAKE 1 CAPSULE BY MOUTH TWICE DAILY   Vitamin D3 (CHOLECALCIFEROL) 25 mcg (1000 units) tablet 2/12/2022 at am Nursing Home No Yes   Sig: Take 1 tablet (25 mcg) by mouth daily   acetaminophen (TYLENOL) 325 MG tablet  Nursing Home Yes Yes   Sig: Take 650 mg by mouth every 6 hours as needed for mild pain    amLODIPine (NORVASC) 10 MG tablet 2/12/2022 at am  No Yes   Sig: TAKE 1 TABLET BY MOUTH ONCE DAILY   atorvastatin (LIPITOR) 40 MG tablet  Nursing Home No Yes   Sig: TAKE 1 TABLET BY MOUTH AT BEDTIME   bacitracin 500 UNIT/GM external ointment 2/12/2022 at Unknown time  No Yes   Sig: Apply topically daily Apply to open area right shin after cleaning and drying well   carvedilol (COREG) 12.5 MG tablet 2/12/2022 at am Nursing Home Yes Yes   Sig: Take 12.5 mg by mouth 2 times daily (with meals) (TAKE WITH 6.25MG FOR A TOTAL OF 18.75MG) **NOTE DOSAGE/STRENGTH**   carvedilol (COREG) 6.25 MG tablet 2/12/2022 at am  Yes Yes   Sig: Take 6.25 mg by mouth 2 times daily (with meals) Total daily dose= 18.75mg   clopidogrel (PLAVIX) 75 MG tablet  2/12/2022 at am Nursing Home No Yes   Sig: TAKE 1 TABLET BY MOUTH ONCE DAILY   ferrous sulfate (FE TABS) 325 (65 Fe) MG EC tablet 2/12/2022 at am Nursing Home No Yes   Sig: Take 1 tablet (325 mg) by mouth Every Mon, Wed, Fri Morning   guaiFENesin (ROBITUSSIN) 100 MG/5ML liquid prn  No Yes   Sig: Take 10 mLs (200 mg) by mouth 2 times daily. May also take 10 mLs (200 mg) 2 times daily as needed for cough.   ipratropium-albuterol (COMBIVENT RESPIMAT)  MCG/ACT inhaler prn Nursing Home No Yes   Sig: Inhale 1 puff into the lungs 4 times daily as needed for shortness of breath / dyspnea or wheezing   losartan (COZAAR) 25 MG tablet 2/12/2022 at am  No Yes   Sig: TAKE 1 TABLET BY MOUTH ONCE DAILY   nitroGLYcerin (NITROSTAT) 0.4 MG sublingual tablet prn Nursing Home No Yes   Sig: For chest pain place 1 tablet under the tongue every 5 minutes for 3 doses. If symptoms persist 5 minutes after 1st dose call 911.   omeprazole (PRILOSEC) 20 MG DR capsule 2/12/2022 at am Nursing Home No Yes   Sig: TAKE 1 CAPSULE BY MOUTH EVERY MORNING   oxybutynin ER (DITROPAN-XL) 10 MG 24 hr tablet 2/12/2022 at am  No Yes   Sig: TAKE 1 TABLET BY MOUTH ONCE DAILY   potassium chloride ER (KLOR-CON M) 10 MEQ CR tablet 2/12/2022 at am  No Yes   Sig: Take 1 tablet (10 mEq) by mouth daily   psyllium (METAMUCIL/KONSYL) 58.6 % powder 2/12/2022 at am  No Yes   Sig: Take 6 g (1 teaspoonful) by mouth daily In 8 oz fluid, drink promptly   simethicone (MYLICON) 125 MG chewable tablet 2/11/2022 Nursing Home No Yes   Sig: CHEW AND SWALLOW 1 TABLET BY MOUTH EVERY NIGHT AT BEDTIME   umeclidinium (INCRUSE ELLIPTA) 62.5 MCG/INH inhaler 2/12/2022 at am Nursing Home No Yes   Sig: Inhale 1 puff into the lungs daily      Facility-Administered Medications: None     Allergies   No Known Allergies    Social History   I have reviewed this patient's social history and updated it with pertinent information if needed. Yoselyn Cui  reports that she has never smoked.  She has never used smokeless tobacco. She reports that she does not drink alcohol and does not use drugs.    Family History   I have reviewed this patient's family history and updated it with pertinent information if needed.   Family History   Problem Relation Age of Onset     Coronary Artery Disease Father      Coronary Artery Disease Brother      Coronary Artery Disease Sister      Colon Cancer No family hx of        Review of Systems   The 10 point Review of Systems is negative other than noted in the HPI or here.     Physical Exam   Temp: 98.1  F (36.7  C) Temp src: Oral BP: 116/63 Pulse: 79   Resp: 22 SpO2: 94 % O2 Device: Nasal cannula Oxygen Delivery: 3 LPM  Vital Signs with Ranges  Temp:  [97.6  F (36.4  C)-98.1  F (36.7  C)] 98.1  F (36.7  C)  Pulse:  [79-86] 79  Resp:  [18-22] 22  BP: (116-146)/(63-89) 116/63  SpO2:  [88 %-96 %] 94 %  116 lbs 13.5 oz    Alert awake and oriented x3, hard of hearing.  Mild respiratory discomfort no acute distress  JVP is difficult to assess  Regular rate and rhythm  Abdomen soft and nontender  Lower extremities are warm well perfused.  Only trace lower extremity edema    Data      ECG: Atrial sensed ventricular paced rhythm.    Troponin peak 1800, now downtrending to 900    Most Recent 3 CBC's:Recent Labs   Lab Test 02/13/22  0642 02/13/22  0520 02/12/22 1853   WBC 6.9 6.8 8.6   HGB 11.8 11.3* 11.3*   MCV 80 80 83    289 301     Most Recent 3 BMP's:Recent Labs   Lab Test 02/13/22  0520 02/12/22 1853 02/03/22  0726   * 126* 128*   POTASSIUM 3.5 3.8 3.5   CHLORIDE 97 95 99   CO2 21 23 23   BUN 10 11 9   CR 0.70 0.55 0.61   ANIONGAP 8 8 6   GOLDY 7.9* 8.5 8.5   GLC 88 108* 89     Most Recent 2 LFT's:Recent Labs   Lab Test 02/12/22  1853 10/14/21  0945   AST 17 25   ALT 13 28   ALKPHOS 140 110   BILITOTAL 0.3 0.3     Most Recent 3 INR's:Recent Labs   Lab Test 08/31/21  0634 08/04/19  1240 07/18/19  1615   INR 1.31* 1.27* Canceled, Test credited, specimen discarded      Most Recent Cholesterol Panel:Recent Labs   Lab Test 04/28/21  1002   CHOL 118   LDL 50   HDL 53   TRIG 74     Most Recent TSH and T4:Recent Labs   Lab Test 04/15/21  0620   TSH 1.44                Dr. Holcomb.

## 2022-02-13 NOTE — PROGRESS NOTES
RECEIVING UNIT ED HANDOFF REVIEW    ED Nurse Handoff Report was reviewed by: Brandyn Rosales RN on February 12, 2022 at 9:32 PM     I reviewed RN Eloise shay.  Ready for arrival

## 2022-02-14 NOTE — PROGRESS NOTES
"SPIRITUAL HEALTH SERVICES  SPIRITUAL ASSESSMENT Progress Note  [facility]     REFERRAL SOURCE: Admission Request    Reviewed documentation. Reflective conversation shared with Yoselyn which integrated elements of illness and family narratives.     Yoselyn names her raffaele, family (4 daughters and spouses, 7 grandchildren, and 5 great grandchildren), and friends as strong sources of support.   Yoselyn's  of 62 yrs  in 2019. When they were  she became [employer]ism. Following his death she shared that she \"returned to my [facility].\" She spoke about participating in the very small [employer] and being \"the soloist or the Yazidism at age 10.\"  While no longer able to leave her room at [facility] she is listening to Mu-ism services on the TV multiple times a week.  Yoselyn welcomes [facility] visits and appreciated receiving an olive wood cross.    I offered spiritual and emotional support through reflective listening that affirmed emotions, experience, and meaning. Offered assurance through prayer which incorporated conversational themes.    PLAN: Delivered an olive wood cross. [facility] remains available for support.    Casie Olmos  Associate   Pager [phone]  Phone [phone]    "

## 2022-02-14 NOTE — PLAN OF CARE
Date: February 14, 2022  Shift: 0700-1930  Name: Yoselyn Cui  Age: 89 year old  YOB: 1932    Reason for Admission: Acute respiratory failure with hypoxia (H) [J96.01]  Acute on chronic congestive heart failure, unspecified heart failure type (H) [I50.9]     Cognitive/Mentation: A&Ox4  Neuros/CMS: L sided weakness (baseline), [place] and blind    VS: Stable on 1L  Cardiac: V-paced   GI: BS+,-Flatus, no bm  : purewick, adequate UOP  Pulmonary: LS dim/fine crackles  Pain: denies, scheduled tylenol     Drains: None  Skin: mepilex to LLE  Activity: Ax2 GB/W    Diet: Cardiac     Therapies recs: None  Discharge: Pending     Shift summary: Hep gtt running at 900u/hr, recheck at 0045

## 2022-02-14 NOTE — PROGRESS NOTES
"[facility]    Hospitalist Progress Note    Date of Service (when I saw the patient): 02/14/2022    Assessment & Plan   Yoselyn Cui is a 89 year old female who was admitted on 2/12/2022.  Yoselyn Cui is a 89 year old female with a history of a permanent pacemaker and intermittent atrial fibrillation for which she is anticoagulated.she also is on treatment for chronic obstructive lung disease with inhaled bronchodilators but is not on home oxygen.  She has no history of ischemic heart disease or congestive heart failure.  She lives at an assisted living facility.  Today 911 was called because she has been increasingly short of breath with activity over the past day or 2.  In the emergency room she was requiring 2 L of oxygen by nasal cannula, was afebrile and hemodynamically stable.  On exam she has bilateral rails and trace lower extremity edema.  EKG shows a paced rhythm.  Chest x-ray shows bilateral lower lobe interstitial and airspace opacities with bilateral effusions.  Labs are noted for an elevated BNP elevated troponin, normal white blood cell count and negative procalcitonin.  She was given IV furosemide and is being admitted to the hospital for acute heart failure, possible non-ST elevation MI and acute respiratory failure.     Acute congestive heart failure, \"new\"  Hypoxic acute respiratory failure   Possible non-ST-elevation myocardial infarction   Status post permanent pacemaker for sick sinus syndrome   Intermittent atrial fibrillation on anticoagulation   She is stable on 2L of oxygen by nasal canula.  She has no history of congestive heart failure or ischemic heart disease.  She has been short of breath for just a day or two- no chest pain.  Chest X-ray consistent with heart failure. BNP and troponin are elevated.  No fever or other signs of infection.  She does have a history of pneumothorax, bilateral lower lobe lung infiltrate and effusions for which she was " admitted to [facility] last fall and treated with antibiotics and a chest tube.  See below.    Admit to inpatient with telemetry     Continue oxygen as needed by nasal canula     During diuresis with Lasix 40 mg IV twice daily    Serial troponins trended up at 1809     Started on IV heparin drip for possible non-STEMI with a troponin elevation    Continue intravenous furosemide and monitor urine output, weight, renal function and electrolytes    Transthoracic echocardiogram ordered results showed EF 40-45% lower than EF before with 1+ MR     Continue prior to admission amlodipine, statin, carvedilol, clopidogrel, apixaban, losartan    Cardiology consultation requested and are following     Her procalcitonin returned at less than 0.05 so pneumonia is less likely     Hyponatremia   Serum Na 126- ikely from heart failure.    Sodium stable at 126     Bmp from today pending      History of left pneumothorax in the setting of emphysema and treatment for bilateral lower lobe pneumonia in August and September 2021.  History of chronic obstructive lung disease/emphysema  The patient was admitted to [facility] with dyspnea cough and fever.  CT of the chest showed a left pneumothorax small left pleural effusion and bibasilar pulmonary opacities.  The patient was treated with antibiotics and chest tube placement.    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  This was detected on CT scan last summer.  It appears that the patient did not want any further relation.  Out pt follow up with PCP     History of stroke     Continue statin and anticoagulation for atrial fibrillation      Hyperlipidemia     Continue  prior to admission statin         Diet:  cardiac   DVT Prophylaxis: DOAC  Garcia Catheter: Not present  Central Lines: None  Cardiac Monitoring: None  Code Status:   NCB        Clinically Significant Risk Factors Present on Admission            # Hyponatremia: Na = 126 mmol/L (Ref range: 133 - 144 mmol/L) on admission, will monitor as appropriate      # Coagulation Defect: home medication list includes an anticoagulant medication  # Platelet Defect: home medication list includes an antiplatelet medication             Disposition Plan     Expected Discharge:    In the next 2 to 3 days once respiratory status improves    Maribeth Carson MD  [phone] (P)      Interval History   Patient complains of shortness of breath slightly better since admission .    Complains of dry cough.  No other acute issues since admission. Resting comfortably in bed     -Data reviewed today: I reviewed all new labs and imaging results over the last 24 hours. I personally reviewed all the labs and imaging since admission    Physical Exam   Temp: 98.1  F (36.7  C) Temp src: Oral BP: (!) 145/68 Pulse: 81   Resp: 16 SpO2: 92 % O2 Device: Nasal cannula Oxygen Delivery: 1 LPM  Vitals:    02/12/22 1850 02/12/22 2245 02/14/22 0603   Weight: 53 kg (116 lb 13.5 oz) 53 kg (116 lb 13.5 oz) 53.6 kg (118 lb 3.2 oz)     Vital Signs with Ranges  Temp:  [97.5  F (36.4  C)-98.1  F (36.7  C)] 98.1  F (36.7  C)  Pulse:  [71-81] 81  Resp:  [16-18] 16  BP: ()/(43-71) 145/68  SpO2:  [88 %-96 %] 92 %  I/O last 3 completed shifts:  In: 300 [P.O.:300]  Out: 1275 [Urine:1275]    Constitutional: Awake, alert, cooperative, no apparent distress  Respiratory: Bilateral crackles heard on auscultation, no wheezing  Cardiovascular: Regular rate and rhythm, normal S1 and S2, and no murmur noted  GI: Normal bowel sounds, soft, non-distended, non-tender  Skin/Integumen: No rashes, no cyanosis, trace lower extremity edema present  Other:     Medications     heparin 750  Units/hr (02/14/22 0418)     - MEDICATION INSTRUCTIONS -         acetaminophen  975 mg Oral Q8H     amLODIPine  10 mg Oral Daily     atorvastatin  40 mg Oral At Bedtime     carvedilol  6.25 mg Oral BID w/meals     clopidogrel  75 mg Oral Daily     ferrous sulfate  325 mg Oral Q Mon Wed Fri AM     furosemide  40 mg Intravenous BID     guaiFENesin  10 mL Oral BID     losartan  25 mg Oral Daily     oxybutynin ER  10 mg Oral Daily     pantoprazole  40 mg Oral QAM AC     simethicone  80 mg Oral At Bedtime     sodium chloride (PF)  3 mL Intracatheter Q8H     umeclidinium  1 puff Inhalation Daily       Data   Recent Labs   Lab 02/13/22  0642 02/13/22  0520 02/12/22  1853   WBC 6.9 6.8 8.6   HGB 11.8 11.3* 11.3*   MCV 80 80 83    289 301   NA  --  126* 126*   POTASSIUM  --  3.5 3.8   CHLORIDE  --  97 95   CO2  --  21 23   BUN  --  10 11   CR  --  0.70 0.55   ANIONGAP  --  8 8   GOLDY  --  7.9* 8.5   GLC  --  88 108*   ALBUMIN  --   --  2.6*   PROTTOTAL  --   --  6.8   BILITOTAL  --   --  0.3   ALKPHOS  --   --  140   ALT  --   --  13   AST  --   --  17       Recent Results (from the past 24 hour(s))   CT Chest w/o Contrast    Narrative    EXAM: CT CHEST WITHOUT CONTRAST  LOCATION: [facility]  DATE/TIME: 02/13/2022, 9:27 AM    INDICATION: Respiratory illness, nondiagnostic x-ray.  COMPARISON: 8/30/2021  TECHNIQUE: CT chest without IV contrast. Multiplanar reformats were obtained. Dose reduction techniques were used.  CONTRAST: None.    FINDINGS:   LUNGS AND PLEURA: Emphysema. Possible underlying fibrosis and bronchiectasis probably with superimposed infectious or inflammatory infiltrates, fibrosis, and bronchiectasis, all appear significantly worse and/or new since comparison.    MEDIASTINUM/AXILLAE: There are extensive atherosclerotic changes of the visualized aorta and its branches. There is no evidence of aortic aneurysm. Mildly prominent lymph nodes are noted which are nonspecific and may  be reactive in this setting.    CORONARY ARTERY CALCIFICATION: Severe.    UPPER ABDOMEN: Probable cholelithiasis without cholecystitis.    MUSCULOSKELETAL: No frankly destructive bony lesions.      Impression    IMPRESSION:   1.  Progressive infiltrates, bronchiectasis, and possible underlying fibrosis since the comparison study.     Echocardiogram Complete   Result Value    LVEF  40-45%    [facility]    [id number]  [id number]  [id number]  [id number]^BRANDON^ALISIA^BAIRON     [facility]  Echocardiography Laboratory  [address] [place] [address]     Name: ANNE MARIE AMBROSE  MRN: [id number]  : 1932  Study Date: 2022 10:45 AM  Age: 89 yrs  Gender: Female  Patient Location: [facility]  Reason For Study: CHF  Ordering Physician: ALISIA TAYLOR  Referring Physician: Brittany Florez  Performed By: Rikki Edwards RDCS     BSA: 1.5 m2  Height: 61 in  Weight: 116 lb  HR: 71  BP: 97/44 mmHg  ______________________________________________________________________________  Procedure  Complete Portable Echo Adult. Optison (NDC #[id number]) given intravenously.  ______________________________________________________________________________  Interpretation Summary     1. Left ventricular systolic function is mildly reduced. The visual ejection  fraction is 40-45%.  2. Septal wall motion abnormality may reflect pacemaker activation.  3. There is mild concentric left ventricular hypertrophy.  4. The right ventricle is normal in structure, function and size.  5. There is mild (1+) mitral regurgitation.  6. In comparison with the prior study, there has been an interval reduction in  LV function.  ______________________________________________________________________________  Left Ventricle  The left ventricle is normal in size. There is mild concentric left  ventricular hypertrophy. Left ventricular systolic function is mildly reduced.  The visual ejection fraction is 40-45%. Grade I or early  diastolic  dysfunction. Septal wall motion abnormality may reflect pacemaker activation.     Right Ventricle  The right ventricle is normal in structure, function and size. There is a  catheter/pacemaker lead seen in the right ventricle.     Atria  Normal left atrial size. Right atrial size is normal. There is no color  Doppler evidence of an atrial shunt.     Mitral Valve  There is mild mitral annular calcification. There is mild (1+) mitral  regurgitation.     Tricuspid Valve  The tricuspid valve is normal in structure and function.     Aortic Valve  The aortic valve is trileaflet with aortic valve sclerosis. The aortic valve  is not well visualized.     Pulmonic Valve  The pulmonic valve is not well seen, but is grossly normal.     Vessels  Normal size aorta. IVC diameter <2.1 cm collapsing >50% with sniff suggests a  normal RA pressure of 3 mmHg.     Pericardium  There is no pericardial effusion.     Rhythm  Sinus rhythm was noted. The rhythm was paced.  ______________________________________________________________________________  MMode/2D Measurements & Calculations  IVSd: 1.3 cm     LVIDd: 3.7 cm  LVIDs: 2.5 cm  LVPWd: 0.94 cm  FS: 33.1 %  LV mass(C)d: 131.3 grams  LV mass(C)dI: 87.6 grams/m2  Ao root diam: 3.0 cm  LA dimension: 2.4 cm  LA/Ao: 0.80  LVOT diam: 1.9 cm  LVOT area: 2.9 cm2  LA Volume (BP): 19.6 ml  LA Volume Index (BP): 13.1 ml/m2  RWT: 0.51     Doppler Measurements & Calculations  MV E max arabella: 60.6 cm/sec  MV A max arabella: 86.3 cm/sec  MV E/A: 0.70  PA acc time: 0.08 sec  TR max arabella: 279.6 cm/sec  TR max P.3 mmHg  E/E' av.3  Lateral E/e': 14.3  Medial E/e': 24.2     ______________________________________________________________________________  Report approved by: Yanique Ledezma 2022 12:21 PM

## 2022-02-14 NOTE — CONSULTS
Care Management Initial Consult    General Information  Assessment completed with: Care Team Member,  (Key AGUILAR)  Type of CM/SW Visit: Initial Assessment    Primary Care Provider verified and updated as needed: Yes   Readmission within the last 30 days: no previous admission in last 30 days         Advance Care Planning: Advance Care Planning Reviewed: present on chart,no concerns identified   (daughter Maryjane is HCA)       Communication Assessment  Patient's communication style: spoken language (English or Bilingual)    Hearing Difficulty or Deaf: yes   Wear Glasses or Blind: yes    Cognitive  Cognitive/Neuro/Behavioral: .WDL except  Level of Consciousness: alert  Arousal Level: opens eyes spontaneously  Orientation: oriented x 4  Mood/Behavior: calm,cooperative  Best Language: 0 - No aphasia  Speech: clear    Living Environment:   People in home:       Current living Arrangements: assisted living  Name of Facility:  [facility])   Able to return to prior arrangements:  ([facility])       Family/Social Support:  Care provided by:  Self. staff  Provides care for: no one     Children          Description of Support System:   supportive        Current Resources:   Patient receiving home care services: No (see note below)     Community Resources:    Equipment currently used at home: wheelchair, manual  Supplies currently used at home:      Employment/Financial:  Employment Status:          Financial Concerns: No concerns identified           Lifestyle & Psychosocial Needs:  Social Determinants of Health     Tobacco Use: Low Risk      Smoking Tobacco Use: Never Smoker     Smokeless Tobacco Use: Never Used   Alcohol Use: Not on file   Financial Resource Strain: Not on file   Food Insecurity: Not on file   Transportation Needs: Not on file   Physical Activity: Not on file   Stress: Not on file   Social Connections: Not on file   Intimate Partner Violence: Not on file   Depression: Not on file   Housing Stability: Not on file        Functional Status:  Prior to admission patient needed assistance:   Dependent ADLs:: Bathing,Toileting  Dependent IADLs:: Medication Management       Mental Health Status:  Chemical Dependency Status:  Values/Beliefs:  Spiritual, Cultural Beliefs, Baptism Practices, Values that affect care:    Description of Beliefs that Will Affect Care: [employer]            Additional Information:  Spoke w/ Key @ [facility]. Patient mostly up independently in apartment- has a walker but doesn't usually use it. She has call button. She gets assist w/ bath 2/week. Escort to meals via w/c  They do Medication management. Uses [facility]- chart updated.    She was supposed to start w/ [facility] but not initiated yet before admission to hospital  Writer confirmed w. [facility] [phone]  They are able to accept, will need New orders and face to face for HHC RN/PT. She has a wound on shin needing dressing changes  Facesheet & H&P faxed per request  Upon discharge, orders to be faxed to: [phone]    Transportation tbd- not discussed w/ patient at this time  CC/SW will continue to follow for any discharge needs

## 2022-02-14 NOTE — PROGRESS NOTES
Lexiscan Stress: Pt tolerated well. VSS. Pt denied chest pain or pressure prior to injection. Pt does c/o of feeling SOB.  But no change from today.  During injection pt c/o feeling more SOB but no changes in VSS or O2sats.  After injection pt back to baseline.      ~1411 Pt transferred back to Rm 259-1 per w/c.

## 2022-02-14 NOTE — PLAN OF CARE
VSS, on 1L NC. Difficult to get fully accurate Is&Os due to urinary incontinence, purewick in place. Heparin drip infusing per order instructions, next PTT check at 1015. V-paced per tele monitor. Denies pain including chest pain. Possible angiogram today versus medical management.

## 2022-02-14 NOTE — PROGRESS NOTES
Cardiology Progress Note  Connor Au MD         Assessment and Plan:      Shortness of breath  Likely multifactorial.  I suspect element of lung fibrosis and bronchiectasis is contributing to her shortness of breath more than a congestive heart failure.  Clinically, I do not appreciate any increase in JVP or leg edema.  She has extensive lower zone and mid zone rales related to her underlying lung disease and fibrosis.  We will switch to p.o. Lasix.  She may benefit from pulmonary consult.  Defer to the able hands of the hospitalist.    Acute congestive heart failure with preserved ejection  In the past EF was greater than 40%.  She may have had a mild element of fluid retention on admission but clinically, does not appear to be hypervolemic at this time.  N-terminal proBNP was mildly elevated for her age.    Elevated troponin  Patient troponin I 111 and then increased to 1809 and has now decreased to 564.  No chest pain.  EKG is nondiagnostic with underlying paced rhythm.  It is unclear if this elevated troponin represents myocardial infarction.  It could be type II myocardial leak related to underlying lung disease and mild heart failure.  Per discussion with her daughter and my colleague, she wanted to pursue conservative management and so does the patient.  We will do a Lexiscan stress nuclear study for stratification.  I agree that she is not a surgical candidate and proceeding with angiography and intervention would be high risk especially given she had complications in the past but stenting in 2019.    Coronary artery disease  Complex stenting of LAD and left main in July 2019 complicated by dissection extending into the aorta.  Medical management of circumflex and right coronary artery recommended at that time.  Continue long-term Plavix.  We will optimize medical therapy and therefore will increase Coreg.                     Interval History:   Feels well.  Shortness of breath has improved some.   No chest pain.  Is hungry          Review of Systems:   The 5 point Review of Systems is negative other than noted in the HPI          Physical Exam:        Blood pressure (!) 145/68, pulse 81, temperature 98.1  F (36.7  C), temperature source Oral, resp. rate 16, weight 53.6 kg (118 lb 3.2 oz), SpO2 92 %, not currently breastfeeding.  Vitals:    02/12/22 1850 02/12/22 2245 02/14/22 0603   Weight: 53 kg (116 lb 13.5 oz) 53 kg (116 lb 13.5 oz) 53.6 kg (118 lb 3.2 oz)     Weights since admission  Vitals:    02/12/22 1850 02/12/22 2245 02/14/22 0603   Weight: 53 kg (116 lb 13.5 oz) 53 kg (116 lb 13.5 oz) 53.6 kg (118 lb 3.2 oz)     Vital Signs with Ranges  Temp:  [97.5  F (36.4  C)-98.1  F (36.7  C)] 98.1  F (36.7  C)  Pulse:  [71-81] 81  Resp:  [16-18] 16  BP: ()/(43-71) 145/68  SpO2:  [92 %-96 %] 92 %  I/O's Last 24 hours  I/O last 3 completed shifts:  In: 300 [P.O.:300]  Out: 1275 [Urine:1275]  Net I/O since admission  02/09 0700 - 02/14 0659  In: 300 [P.O.:300]  Out: 1275 [Urine:1275]  Net: -975    EXAM:    Constitutional:   in no apparent distress     Neck:   no JVD     Lungs:   bilateral rales extensive     Cardiovascular:   normal S1 and S2 and no murmur noted     Extremities and Back:   No edema            Medications:          acetaminophen  975 mg Oral Q8H     amLODIPine  10 mg Oral Daily     atorvastatin  40 mg Oral At Bedtime     carvedilol  6.25 mg Oral BID w/meals     clopidogrel  75 mg Oral Daily     ferrous sulfate  325 mg Oral Q Mon Wed Fri AM     furosemide  40 mg Intravenous BID     guaiFENesin  10 mL Oral BID     losartan  25 mg Oral Daily     oxybutynin ER  10 mg Oral Daily     pantoprazole  40 mg Oral QAM AC     simethicone  80 mg Oral At Bedtime     sodium chloride (PF)  3 mL Intracatheter Q8H     umeclidinium  1 puff Inhalation Daily            Data:      All new lab and imaging data was reviewed.   Recent Labs   Lab Test 02/14/22  1007 02/13/22  0642 02/13/22  0520 09/10/21  0539  08/31/21  0634 08/04/19  1812 08/04/19  1240 07/18/19  2152 07/18/19  1615   WBC 6.6 6.9 6.8   < > 11.6*   < > 18.3*   < > Canceled, Test credited, specimen discarded   HGB 11.7 11.8 11.3*   < > 12.5   < > 9.2*   < > Canceled, Test credited, specimen discarded   MCV 80 80 80   < > 88   < > 94   < > Canceled, Test credited, specimen discarded    285 289   < > 299   < > 388   < > Canceled, Test credited, specimen discarded   INR  --   --   --   --  1.31*  --  1.27*  --  Canceled, Test credited, specimen discarded    < > = values in this interval not displayed.      Recent Labs   Lab Test 02/14/22  1007 02/13/22  0520 02/12/22  1853   * 126* 126*   POTASSIUM 3.5 3.5 3.8   CHLORIDE 96 97 95   CO2 24 21 23   BUN 14 10 11   CR 0.69 0.70 0.55   ANIONGAP 7 8 8   GOLDY 8.3* 7.9* 8.5   GLC 99 88 108*     Recent Labs   Lab Test 08/30/21  1538 08/04/19  1240 07/19/19  1854 07/19/19  1412   TROPI  --  0.030 2.940* 3.113*   TROPONIN <0.015  --   --   --               Imaging:   No results found for this or any previous visit (from the past 24 hour(s)).

## 2022-02-14 NOTE — CONSULTS
PULMONARY NOTE    I'm aware of the request for a Pulmonary consultation on this patient. I will be by later today or tomorrow morning to complete the consultation evaluation. If concerns of an immediate nature arise earlier, please give me a call.    Thank you.    Crescencio Hadley MD  Pulmonary & Critical Care Medicine  [facility]   Pager: [phone]  Office:[phone]

## 2022-02-15 NOTE — PLAN OF CARE
Pt here with increase shortness of breath, unspecified heart failure. A&Ox3, disoriented to place and forgetful. Neuros are L sided weakness at baseline from previous. 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 VSS. Heprain at 9 ml/hr, next hep Xa at 0600 2/16/22. Tele v-paced. cardiac diet, thin liquids. Takes pills whole with water. Up with A2/GB. Denies any chest pain. Pure wick in place. Pt scoring yellow on the Aggression Stop Light Tool. Plan is to be seen by pumology . Discharge pending.

## 2022-02-15 NOTE — PROGRESS NOTES
Care Management Follow Up    Length of Stay (days): 3    Expected Discharge Date: 02/16/2022     Concerns to be Addressed:       Patient plan of care discussed at interdisciplinary rounds: Yes    Anticipated Discharge Disposition:       Anticipated Discharge Services:  TCU  Anticipated Discharge DME:  None    Patient/family educated on Medicare website which has current facility and service quality ratings:  No  Education Provided on the Discharge Plan: Yes   Patient/Family in Agreement with the Plan:  Yes, Dyan Wesley    Referrals Placed by CM/SW:  TCU  Private pay costs discussed: transportation costs    Additional Information:  [facility] called [facility] to figure out bed availability time. SW was informed anytime 02/16/22 after 1400. SW called "[employer] Transport and scheduled transport for 1500. SW called [facility] and left a message updating them on discharge time. Sw called Daughter Maryjane and updated her on discharge plans who is in agreement with plans.      Bucky Heaton, LUISA    [facility]

## 2022-02-15 NOTE — PROGRESS NOTES
Care Management Follow Up    Length of Stay (days): 3    Expected Discharge Date: 02/16/2022     Concerns to be Addressed:       Patient plan of care discussed at interdisciplinary rounds: Yes    Anticipated Discharge Disposition:  02/16-02/17     Anticipated Discharge Services:  TCU  Anticipated Discharge DME:  None    Patient/family educated on Medicare website which has current facility and service quality ratings:  No  Education Provided on the Discharge Plan:  N/A  Patient/Family in Agreement with the Plan:  Yes    Referrals Placed by CM/SW:  TCU  Private pay costs discussed: transportation costs    Additional Information:  SW called daughter Haider to discuss discharge plans. Haider's [facility]. SW discussed transportation upon discharge and was informed Haider would like  to set up  [facility]. Cost of transportation was discussed and Haider is in agreement with costs. SW made referral to [facility] which was accepted.      LUISA Hayward    [facility]

## 2022-02-15 NOTE — PROGRESS NOTES
Clinical Swallow Evaluation:    OK to continue regular/thin diet with general safe swallow precautions. Plan for VFSS tomorrow for objective assessment re: oropharyngeal swallow function/ aspiration risk in the setting of acute respiratory failure with PMHx risk factors including CVA with L hemiparesis, former smoker and chronic obstructive lung disease     02/15/22 1143   General Information   Onset of Illness/Injury or Date of Surgery 02/12/22   Referring Physician Dr. Carson   Patient/Family Therapy Goal Statement (SLP) To improve respiratory status   Pertinent History of Current Problem   Pt with PMHx of CVA's with L hemiparesis, permanent pacemaker, intermittent atrial fibrillation, chronic obstructive lung disease with inhaled bronchodilators admitted from [facility] with increased SOB x2 days. Admitted with acute congestive heart failure, acute hypoxic respiratory failure, possible non-ST elevation myocardial infarction. CT chest showed progressive infiltrates, bronchiectasis and possible underlying fibrosis since the comparison study. Pulmonary also consulted this admission and recommended SLP consult for swallow assessment.      General Observations   Pt initially asleep upon arrival though easily awakens to achieve full alertness, positioned fully upright, excellent historian and answering all questions without difficulty. Baseline [place] and clinically blind.      Past History of Dysphagia   None per EMR or pt report. She denies all dysphagia/aspiration symptoms when asked. Regular/thin diet per baseline facility paperwork      Pain Assessment   Patient Currently in Pain No   Type of Evaluation   Type of Evaluation Swallow Evaluation   Oral Motor   Oral Musculature generally intact   Structural Abnormalities none present   Mucosal Quality good   Dentition (Oral Motor)   Dentition (Oral Motor) dental appliance/dentures   Comment, Dentition (Oral Motor) good fitting   Dental Appliance/Denture (Oral Motor) upper;lower    Facial Symmetry (Oral Motor)   Facial Symmetry (Oral Motor) WNL   Lip Function (Oral Motor)   Lip Range of Motion (Oral Motor) WNL   Tongue Function (Oral Motor)   Tongue ROM (Oral Motor) WNL   Jaw Function (Oral Motor)   Jaw Function (Oral Motor) WNL   Cough/Swallow/Gag Reflex (Oral Motor)   Soft Palate/Velum (Oral Motor) WNL   Volitional Throat Clear/Cough (Oral Motor) WNL   Volitional Swallow (Oral Motor) WNL   Vocal Quality/Secretion Management (Oral Motor)   Vocal Quality (Oral Motor) WNL   Secretion Management (Oral Motor) WNL   General Swallowing Observations   Current Diet/Method of Nutritional Intake (General Swallowing Observations, NIS) regular diet;thin liquids (level 0)   Respiratory Support (General Swallowing Observations)   (1.5 L)   Swallowing Evaluation Clinical swallow evaluation   Clinical Swallow Evaluation   Clinical Swallow Evaluation Textures Trialed thin liquids;solid foods   Clinical Swallow Eval: Thin Liquid Texture Trial   Mode of Presentation, Thin Liquids straw;fed by clinician   Volume of Liquid or Food Presented 6 oz   Oral Phase of Swallow WFL   Pharyngeal Phase of Swallow intact   Diagnostic Statement no overt clinical signs/sx aspiration noted   Clinical Swallow Evaluation: Solid Food Texture Trial   Mode of Presentation self-fed   Volume Presented 1/2 angelic cracker   Oral Phase WFL   Pharyngeal Phase intact   Diagnostic Statement no overt clinical signs/sx aspiration noted   Esophageal Phase of Swallow   Patient reports or presents with symptoms of esophageal dysphagia No   Swallowing Recommendations   Diet Consistency Recommendations regular diet;thin liquids (level 0)   Medication Administration Recommendations, Swallowing (SLP) whole as tolerated   Instrumental Assessment Recommendations VFSS (videofluroscopic swallowing study)   General Therapy Interventions   Intervention Comments further POC pending VFSS   SLP Therapy Assessment/Plan   Criteria for Skilled Therapeutic  Interventions Met (SLP Eval)   (pending VFSS)   SLP Diagnosis aspiration risk   Therapy Frequency (SLP Eval)   (TBD pending VFSS)   Predicted Duration of Therapy Intervention (SLP Eval) TBD pending VFSS   Comment, Therapy Assessment/Plan (SLP)   Clinical swallow evaluation completed. Oromotor exam WFL. Assessed with 1/2 angelic cracker of regular solids, 6 oz thin liquids via straw. Pt demonstrated functional labial seal/oral containment, timely oral transit, functional mastication, no notable pharyngeal swallow delay, laryngeal elevation to palpation x1 and no overt clinical signs/sx aspiration noted.    Noted pulmonary note and MD concerns with dysphagia/ aspiration given CT scan and respiratory function -  given pt's hx of CVA, former smoker and chronic obstructive lung disease she may be at an increased risk for silent aspiration. Educated pt on possibility of VFSS pending MD order, educated on rationale for testing, overall procedure and pt verbalized agreement with testing if needed. Messaged MD who did order assessment, plan to complete tomorrow.      Therapy Plan Review/Discharge Plan (SLP)   Therapy Plan Review (SLP) evaluation/treatment results reviewed;care plan/treatment goals reviewed;risks/benefits reviewed;current/potential barriers reviewed;participants voiced agreement with care plan;participants included;patient   SLP Discharge Planning    SLP Discharge Recommendation (DC Rec)   (TBD pending VFSS)   SLP Rationale for DC Rec TBD pending VFSS   SLP Brief overview of current status    OK to continue regular/thin diet. Plan for VFSS tomorrow for objective assessment re: oropharyngeal swallow function/ aspiration risk in the setting of acute respiratory failure with PMHx risk factors including CVA with L hemiparesis, former smoker and chronic obstructive lung disease      Total Evaluation Time   Total Evaluation Time (Minutes) 21

## 2022-02-15 NOTE — PROGRESS NOTES
"[facility]    Hospitalist Progress Note    Date of Service (when I saw the patient): 02/15/2022    Assessment & Plan   Yoselyn Cui is a 89 year old female who was admitted on 2/12/2022.  Yoselyn Cui is a 89 year old female with a history of a permanent pacemaker and intermittent atrial fibrillation for which she is anticoagulated.she also is on treatment for chronic obstructive lung disease with inhaled bronchodilators but is not on home oxygen.  She has no history of ischemic heart disease or congestive heart failure.  She lives at an assisted living facility.  Today 911 was called because she has been increasingly short of breath with activity over the past day or 2.  In the emergency room she was requiring 2 L of oxygen by nasal cannula, was afebrile and hemodynamically stable.  On exam she has bilateral rails and trace lower extremity edema.  EKG shows a paced rhythm.  Chest x-ray shows bilateral lower lobe interstitial and airspace opacities with bilateral effusions.  Labs are noted for an elevated BNP elevated troponin, normal white blood cell count and negative procalcitonin.  She was given IV furosemide and is being admitted to the hospital for acute heart failure, possible non-ST elevation MI and acute respiratory failure.     Acute congestive heart failure, \"new\"  Hypoxic acute respiratory failure   Possible non-ST-elevation myocardial infarction   Status post permanent pacemaker for sick sinus syndrome   Intermittent atrial fibrillation on anticoagulation   Shortness of breath felt to be multifactorial with underlying COPD and pulmonary fibrosis contributing as well as underlying CHF  She is stable on 2L of oxygen by nasal canula.  She has no history of congestive heart failure or ischemic heart disease.  She has been short of breath for just a day or two- no chest pain.  Chest X-ray consistent with heart failure. BNP and troponin are elevated.  No fever or other signs of " infection.  She does have a history of pneumothorax, bilateral lower lobe lung infiltrate and effusions for which she was admitted to [facility] last fall and treated with antibiotics and a chest tube.  See below  Patient has history of COPD and was a previous long-term smoker so her symptoms were thought to be secondary to underlying COPD and pulmonary fibrosis female seen on CT chest  Her troponins went up to 1800 so was started on heparin drip  Diuresed with IV Lasix 40 mg twice daily and transition to Lasix 40 mg p.o. daily for acute systolic congestive heart failure exacerbation  Echo showed EF at 40 to 45% lower than usual prior EF of 60%  Lexiscan stress test was done to evaluate for ischemia and it showed small area of mild ischemia in the lateral segment of the left ventricle.  LVEF is normal greater than 65%.  There is no prior study for comparison  Cardiology recommended medical management and continued Lasix 40 mg p.o. daily other current regimen include Coreg 12.5 mg p.o. twice daily and Imdur 15 mg p.o. daily started during this hospitalization, continue PTA Plavix daily and Lipitor 40 mg at bedtime and Norvasc 10 mg p.o. daily and losartan 25 mg p.o. daily  Stop the heparin drip today will restart Eliquis today    Acute COPD exacerbation  Underlying bilateral pulmonary fibrosis  Patient was a previous long-term smoker with history of COPD  CT chest without contrast on admission showed bilateral progressive infiltrates and bronchiectasis and possible underlying pulmonary fibrosis since the comparison study  Patient tells me that she has not felt any better with diuresis  Pulmonary consultation requested  We will start her on IV Solu-Medrol to help with possible COPD exacerbation  DuoNebs 4 times daily ordered  Doxycycline 100 mg p.o. twice daily for 7 days per pulmonary recommendation  Pulmonary recommended speech pathology consultation which was requested bedside swallow was okay and the  recommended video swallow study to evaluate for silent aspiration  Patient is on Protonix p.o. daily to help with GERD    Hyponatremia   Serum Na 126- ikely from heart failure.    Sodium stable at 126     Sodium stable at 127 today    Fluid restriction at 1800 mL ordered     History of left pneumothorax in the setting of emphysema and treatment for bilateral lower lobe pneumonia in August and September 2021.  History of chronic obstructive lung disease/emphysema  The patient was admitted to [facility] with dyspnea cough and fever.  CT of the chest showed a left pneumothorax small left pleural effusion and bibasilar pulmonary opacities.  The patient was treated with antibiotics and chest tube placement.    Continue inhaled bronchodilators     CT chest showed progressive infiltrates, bronchiectasis and possible underlying fibrosis since the comparison study    Started on IV Solu-Medrol, duo nebs and continue Breo Ellipta inhaler and doxycycline p.o. 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  This was detected on CT scan last summer.  It appears that the patient did not want any further relation.  Out pt follow up with PCP     History of stroke     Continue statin and anticoagulation for atrial fibrillation      Hyperlipidemia     Continue prior to admission statin         Diet:  cardiac   DVT Prophylaxis: DOAC  Garcia Catheter: Not present  Central Lines: None  Cardiac Monitoring: None  Code Status:   NCB        Clinically Significant Risk Factors Present on Admission            # Hyponatremia: Na = 126 mmol/L (Ref range: 133 - 144 mmol/L) on admission, will monitor as appropriate      # Coagulation Defect: home medication list includes an anticoagulant medication  # Platelet Defect: home medication list includes an antiplatelet medication      Goals of care discussion  was done with the daughter and she wants to continue current medical management and she does not want invasive procedures for her mother.  She wants her mother to be discharged to TCU at this point with her CODE STATUS continue to be DNR/DNI           Disposition Plan     Expected Discharge:    In the next 1-2days to TCU after shortness of breath improves and swallow video swallow study is done and TCU bed is available    Maribeth Carson MD  [phone] (P)      Interval History   Patient complains of shortness of breath almost the same since admission.    Complains of dry cough.  No other acute issues since admission. Resting comfortably in bed .  Had a Lexiscan yesterday medical management was recommended by cardiology.  Heparin drip stopped today.  Pulmonary consultation requested start her on steroids and doxycycline    -Data reviewed today: I reviewed all new labs and imaging results over the last 24 hours. I personally reviewed all the labs and imaging since admission    Physical Exam   Temp: 98  F (36.7  C) Temp src: Oral BP: 127/60 Pulse: 74   Resp: 16 SpO2: (!) 87 % O2 Device: Nasal cannula Oxygen Delivery: 1.5 LPM  Vitals:    02/12/22 2245 02/14/22 0603 02/15/22 0649   Weight: 53 kg (116 lb 13.5 oz) 53.6 kg (118 lb 3.2 oz) 53.3 kg (117 lb 6.4 oz)     Vital Signs with Ranges  Temp:  [98  F (36.7  C)-98.2  F (36.8  C)] 98  F (36.7  C)  Pulse:  [71-85] 74  Resp:  [16] 16  BP: (116-138)/(51-72) 127/60  SpO2:  [87 %-96 %] 87 %  I/O last 3 completed shifts:  In: -   Out: 750 [Urine:750]    Constitutional: Awake, alert, cooperative, no apparent distress  Respiratory: Bilateral crackles heard on auscultation, no wheezing  Cardiovascular: Regular rate and rhythm, normal S1 and S2, and no murmur noted  GI: Normal bowel sounds, soft, non-distended, non-tender  Skin/Integumen: No rashes, no cyanosis, trace lower extremity edema present  Other:     Medications     - MEDICATION INSTRUCTIONS -         acetaminophen  975  mg Oral Q8H     amLODIPine  10 mg Oral Daily     atorvastatin  40 mg Oral At Bedtime     carvedilol  12.5 mg Oral BID w/meals     clopidogrel  75 mg Oral Daily     doxycycline hyclate  100 mg Oral Q12H ALISON     ferrous sulfate  325 mg Oral Q Mon Wed Fri AM     fluticasone-vilanterol  1 puff Inhalation Daily     furosemide  40 mg Oral Daily     guaiFENesin  10 mL Oral BID     ipratropium - albuterol 0.5 mg/2.5 mg/3 mL  3 mL Nebulization 4x Daily     isosorbide mononitrate  15 mg Oral Daily     losartan  25 mg Oral Daily     methylPREDNISolone  125 mg Intravenous Once     methylPREDNISolone  62.5 mg Intravenous Q8H     oxybutynin ER  10 mg Oral Daily     pantoprazole  40 mg Oral QAM AC     simethicone  80 mg Oral At Bedtime     sodium chloride (PF)  3 mL Intracatheter Q8H       Data   Recent Labs   Lab 02/14/22  1007 02/13/22  0642 02/13/22  0520 02/12/22  1853   WBC 6.6 6.9 6.8 8.6   HGB 11.7 11.8 11.3* 11.3*   MCV 80 80 80 83    285 289 301   *  --  126* 126*   POTASSIUM 3.5  --  3.5 3.8   CHLORIDE 96  --  97 95   CO2 24  --  21 23   BUN 14  --  10 11   CR 0.69  --  0.70 0.55   ANIONGAP 7  --  8 8   GOLDY 8.3*  --  7.9* 8.5   GLC 99  --  88 108*   ALBUMIN  --   --   --  2.6*   PROTTOTAL  --   --   --  6.8   BILITOTAL  --   --   --  0.3   ALKPHOS  --   --   --  140   ALT  --   --   --  13   AST  --   --   --  17       Recent Results (from the past 24 hour(s))   NM Lexiscan stress test (nuc card)   Result Value    Target     Baseline Systolic     Baseline Diastolic BP 60    Last Stress Systolic     Last Stress Diastolic BP 64    Baseline HR 77    Max HR  93    Max Predicted HR  72    Rate Pressure Product 12,462.0    Narrative       The nuclear stress test is abnormal.     There is a small area of mild ischemia in the lateral segment(s) of the   left ventricle.     Left ventricular function is normal, EF >65%.     There is no prior study for comparison.

## 2022-02-15 NOTE — CONSULTS
PULMONOLOGY CONSULTATION  Date of service: 2/15/2022    [facility]    _____________________________________________________    Yoselyn Cui  89 year old female  [id number]  [facility] [place]  [address]    [facility] [address]    Primary Care Provider:  Brittany Florez  Admission Date: 2/12/2022  Hospital Attending Physician:  Chuck Almaraz, *  ________________________________________    CHIEF COMPLAINT : I was asked to see this patient by Dr. Carson for evaluation of COPD with CPFE, hypoxia, acute NSTEMI and acute chf  Informant: ED Doctor, patient and daughter    HISTORY OF PRESENT ILLNESS      Yoselyn Cui is a 89 year old female with a history of a permanent pacemaker and intermittent atrial fibrillation for which she is anticoagulated.she also is on treatment for chronic obstructive lung disease with inhaled bronchodilators but is not on home oxygen.  She has history of cva with left sided weakness and is legally blind. Home regimen is incruse and prn combivent. Denies dysphagia.    She lives at an assisted living facility and on day of admit, 911 was called because she has been increasingly short of breath with activity over the past day or 2.  In the emergency room she was requiring 2 L of oxygen by nasal cannula, was afebrile and hemodynamically stable.  On exam she has bilateral rails and trace lower extremity edema.  EKG shows a paced rhythm.  Chest x-ray shows bilateral lower lobe interstitial and airspace opacities with bilateral effusions.  Labs are noted for an elevated BNP, significantly elevated troponin, normal white blood cell count and negative procalcitonin.  On heparin, diuretics, and antihypertensives. Feeling much better this am, o2 weaning. Has a dry cough, denies fevers or chills.    HOME MEDICATIONS     Medications Prior to Admission   Medication Sig Dispense Refill Last Dose     acetaminophen (TYLENOL) 325 MG tablet Take 650 mg by mouth every 6  hours as needed for mild pain         amLODIPine (NORVASC) 10 MG tablet TAKE 1 TABLET BY MOUTH ONCE DAILY 28 tablet 11 2/12/2022 at am     atorvastatin (LIPITOR) 40 MG tablet TAKE 1 TABLET BY MOUTH AT BEDTIME 35 tablet PRN      bacitracin 500 UNIT/GM external ointment Apply topically daily Apply to open area right shin after cleaning and drying well 30 g 3 2/12/2022 at Unknown time     carvedilol (COREG) 12.5 MG tablet Take 12.5 mg by mouth 2 times daily (with meals) (TAKE WITH 6.25MG FOR A TOTAL OF 18.75MG) **NOTE DOSAGE/STRENGTH**   2/12/2022 at am     carvedilol (COREG) 6.25 MG tablet Take 6.25 mg by mouth 2 times daily (with meals) Total daily dose= 18.75mg   2/12/2022 at am     clopidogrel (PLAVIX) 75 MG tablet TAKE 1 TABLET BY MOUTH ONCE DAILY 35 tablet PRN 2/12/2022 at am     ELIQUIS ANTICOAGULANT 2.5 MG tablet TAKE 1 TABLET BY MOUTH TWICE DAILY 70 tablet PRN 2/12/2022 at am     ferrous sulfate (FE TABS) 325 (65 Fe) MG EC tablet Take 1 tablet (325 mg) by mouth Every Mon, Wed, Fri Morning 12 tablet 98 2/12/2022 at am     guaiFENesin (ROBITUSSIN) 100 MG/5ML liquid Take 10 mLs (200 mg) by mouth 2 times daily. May also take 10 mLs (200 mg) 2 times daily as needed for cough. 180 mL 11 prn     ipratropium-albuterol (COMBIVENT RESPIMAT)  MCG/ACT inhaler Inhale 1 puff into the lungs 4 times daily as needed for shortness of breath / dyspnea or wheezing 4 g 11 prn     losartan (COZAAR) 25 MG tablet TAKE 1 TABLET BY MOUTH ONCE DAILY 28 tablet 11 2/12/2022 at am     Multiple Vitamins-Minerals (SYSTANE ICAPS AREDS2) CAPS TAKE 1 CAPSULE BY MOUTH TWICE DAILY 56 capsule PRN 2/12/2022 at am     nitroGLYcerin (NITROSTAT) 0.4 MG sublingual tablet For chest pain place 1 tablet under the tongue every 5 minutes for 3 doses. If symptoms persist 5 minutes after 1st dose call 911. 30 tablet 0 prn     omeprazole (PRILOSEC) 20 MG DR capsule TAKE 1 CAPSULE BY MOUTH EVERY MORNING 37 capsule PRN 2/12/2022 at am     oxybutynin ER  (DITROPAN-XL) 10 MG 24 hr tablet TAKE 1 TABLET BY MOUTH ONCE DAILY 28 tablet 11 2/12/2022 at am     potassium chloride ER (KLOR-CON M) 10 MEQ CR tablet Take 1 tablet (10 mEq) by mouth daily 30 tablet 98 2/12/2022 at am     psyllium (METAMUCIL/KONSYL) 58.6 % powder Take 6 g (1 teaspoonful) by mouth daily In 8 oz fluid, drink promptly 283 g 98 2/12/2022 at am     simethicone (MYLICON) 125 MG chewable tablet CHEW AND SWALLOW 1 TABLET BY MOUTH EVERY NIGHT AT BEDTIME 38 tablet PRN 2/11/2022     umeclidinium (INCRUSE ELLIPTA) 62.5 MCG/INH inhaler Inhale 1 puff into the lungs daily 7 each 98 2/12/2022 at am     Vitamin D3 (CHOLECALCIFEROL) 25 mcg (1000 units) tablet Take 1 tablet (25 mcg) by mouth daily 30 tablet 98 2/12/2022 at am       PAST MEDICAL HISTORY      Past Medical History:   Diagnosis Date     AV block, 2nd degree 5/16/2016     Cerebrovascular accident (H) 8/22/2016     COKER (dyspnea on exertion) 8/22/2016     Generalized weakness 10/12/2016     GIB (gastrointestinal bleeding)      History of colonic polyps 8/22/2016     HTN (hypertension) 8/22/2016     Iron deficiency anemia      Melanoma of skin (H)-rt arm 8/22/2016     Mixed hyperlipidemia 8/22/2016     Pacemaker     implanted 5-     PAF (paroxysmal atrial fibrillation) (H)      SSS (sick sinus syndrome) (H) 8/22/2016     Past Surgical History:   Procedure Laterality Date     APPENDECTOMY  1960s     CV CORONARY ANGIOGRAM N/A 7/17/2019    Procedure: Coronary Angiogram;  Surgeon: Irvin Hutson MD;  Location:  HEART CARDIAC CATH LAB     CV HEART CATHETERIZATION WITH POSSIBLE INTERVENTION N/A 7/18/2019    Procedure: Heart Catheterization with Possible Intervention;  Surgeon: Jayleen Torres MD;  Location:  HEART CARDIAC CATH LAB     CV LEFT HEART CATH N/A 7/17/2019    Procedure: Left Heart Cath;  Surgeon: Irvin Hutson MD;  Location:  HEART CARDIAC CATH LAB     CV LEFT VENTRICULOGRAM N/A 7/17/2019    Procedure: Left  Ventriculogram;  Surgeon: Irvin Hutson MD;  Location:  HEART CARDIAC CATH LAB     CV PCI STENT DRUG ELUTING N/A 7/18/2019    Procedure: PCI Stent Drug Eluting;  Surgeon: Jayleen Torres MD;  Location:  HEART CARDIAC CATH LAB     ESOPHAGOSCOPY, GASTROSCOPY, DUODENOSCOPY (EGD), COMBINED N/A 11/20/2018    Procedure: ESOPHAGOSCOPY, GASTROSCOPY, DUODENOSCOPY (EGD)  Room 523 with biopsies using cold biopsy forceps;  Surgeon: Ayala Soto MD;  Location:  GI     HC REMV CATARACT EXTRACAP,INSERT LENS, W/O ECP Left 10/04/2017     IMPLANT PACEMAKER  05/17/2016     IR CHEST TUBE PLACEMENT NON-TUNNELLED LEFT  8/31/2021     ZZC LIGATE FALLOPIAN TUBE  1960s     ZZC REMV CATARACT INTRACAP,INSERT LENS Right 09/20/2017       ALLERGIES   No Known Allergies    SOCIAL / SUBSTANCE HISTORY     Social History     Socioeconomic History     Marital status:      Spouse name: Not on file     Number of children: Not on file     Years of education: Not on file     Highest education level: Not on file   Occupational History     Not on file   Tobacco Use     Smoking status: Never Smoker     Smokeless tobacco: Never Used   Substance and Sexual Activity     Alcohol use: No     Alcohol/week: 0.0 standard drinks     Drug use: No     Sexual activity: Not on file   Other Topics Concern     Parent/sibling w/ CABG, MI or angioplasty before 65F 55M? Not Asked      Service Not Asked     Blood Transfusions Not Asked     Caffeine Concern No     Comment: 3+ cups daily     Occupational Exposure Not Asked     Hobby Hazards Not Asked     Sleep Concern Not Asked     Stress Concern Not Asked     Weight Concern Not Asked     Special Diet No     Comment: trying to cut down on sodium, watching cholesterol     Back Care Not Asked     Exercise No     Comment: limited     Bike Helmet Not Asked     Seat Belt Not Asked     Self-Exams Not Asked   Social History Narrative     Not on file     Social Determinants of Health      Financial Resource Strain: Not on file   Food Insecurity: Not on file   Transportation Needs: Not on file   Physical Activity: Not on file   Stress: Not on file   Social Connections: Not on file   Intimate Partner Violence: Not on file   Housing Stability: Not on file       FAMILY HISTORY     Family History   Problem Relation Age of Onset     Coronary Artery Disease Father      Coronary Artery Disease Brother      Coronary Artery Disease Sister      Colon Cancer No family hx of        REVIEW OF SYSTEMS   A comprehensive review of systems was negative except for items noted in HPI/Subjective.    PHYSICAL EXAMINATION   Temp (24hrs), Av.2  F (36.8  C), Min:98.1  F (36.7  C), Max:98.2  F (36.8  C)    Vital signs:  Temp: 98.2  F (36.8  C) Temp src: Oral BP: 116/51 Pulse: 71   Resp: 16 SpO2: 95 % O2 Device: Nasal cannula Oxygen Delivery: 1 LPM   Weight: 53.3 kg (117 lb 6.4 oz)  Estimated body mass index is 22.93 kg/m  as calculated from the following:    Height as of 22: 1.524 m (5').    Weight as of this encounter: 53.3 kg (117 lb 6.4 oz).      I/O last 3 completed shifts:  In: -   Out: 750 [Urine:750]    CONSTITUTIONAL/GENERAL APPEARANCE: Alert female. No Apparent Distress.  PSYCHIATRIC: Pleasant and appropriate mood and affect.   EARS, NOSE,THROAT,MOUTH: External ears and nose overall normal.   NECK: Neck appearance normal. No neck masses and the thyroid not enlarged.   RESPIRATORY: Non-labored effort. Crackles at bases, no wheezing  CARDIOVASCULAR: S1, S2, regular rate and rhythm  ABDOMEN: round, soft, non-tender  LYMPHATIC: no cervical lymphadenopathy.  SKIN: Skin color was normal.    LABORATORY ASSESSMENT    Arterial Blood GasNo lab results found in last 7 days.  CBC  Recent Labs   Lab 22  1007 22  0642 22  0520 22  1853   WBC 6.6 6.9 6.8 8.6   RBC 4.59 4.52 4.38 4.35   HGB 11.7 11.8 11.3* 11.3*   HCT 36.8 36.3 35.2 36.0   MCV 80 80 80 83   MCH 25.5* 26.1* 25.8* 26.0*   MCHC 31.8  32.5 32.1 31.4*   RDW 14.6 14.6 14.5 14.4    285 289 301     BMP  Recent Labs   Lab 02/14/22  1007 02/13/22  0520 02/12/22  1853   * 126* 126*   POTASSIUM 3.5 3.5 3.8   CHLORIDE 96 97 95   GOLDY 8.3* 7.9* 8.5   CO2 24 21 23   BUN 14 10 11   CR 0.69 0.70 0.55   GLC 99 88 108*     INRNo lab results found in last 7 days.   BNPNo lab results found in last 7 days.  VENOUS BLOOD GASESNo lab results found in last 7 days.      Additional labs and/or comments:     IMAGING      CT chest 2/12/22 - Progressive infiltrates, bronchiectasis, and possible underlying fibrosis since the comparison study.    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 Admitted with nstemi, being managed medically and CT chest with inc bibasilar infiltrates and bronchiectasis. Denies aspiration but seems possible. PCT not elevated and o2 needs improving with current regimen. Troponin trending down. No wheezing on exam. Of note, was hopsitalized 8/30-9/3 with pna and left sided ptx requiring chest tube. Suspect multifactorial and unsure precipitating event for nstemi.may be getting close to her baseline.      PLAN:     Continue bronchodilators    Wean o2 as able, goal saturation ~ 92%    Continue diuresis    Suggest doxy 100 bid for 7d    Suggest swallow eval    Will follow      Crescencio Hadley  [facility]  Office: [phone]  Pager: [phone]

## 2022-02-15 NOTE — PROGRESS NOTES
Scheduled nebs were started this afternoon per MD order. Pt is on 1.5L NC with SpO2 in the low 90's. Skin intact under oxygen device. BS diminished.  Will cont to follow.  2/15/2022  Pily Regan, RT

## 2022-02-15 NOTE — PROGRESS NOTES
Talked with Sherita Tyler, today who stated she will be able to do in person pacemaker device check today, 02/15/2022, while patient is inpatient.

## 2022-02-15 NOTE — PROGRESS NOTES
Cardiology Progress Note  Connor Au MD         Assessment and Plan:      Shortness of breath  Likely multifactorial.  I suspect element of lung fibrosis and bronchiectasis is contributing to her shortness of breath more than a congestive heart failure.  Clinically, I do not appreciate any increase in JVP or leg edema.  She has extensive lower zone and mid zone rales related to her underlying lung disease and fibrosis.  -switch to p.o. Lasix 40mg daily yesterday  -appreciate pulmonary consult  On one liter with sats 96%    Acute congestive heart failure with preserved ejection  In the past EF was greater than 40%.  She may have had a mild element of fluid retention on admission but clinically, does not appear to be hypervolemic at this time.  N-terminal proBNP was mildly elevated for her age.  Wt stable 117  Net -1700cc  Continue lasix 40mg daily  Hyponatremic--baseline in December 135; now 126-128--follow at assisted living      Coronary artery disease  Complex stenting of LAD and left main in July 2019 complicated by dissection extending into the aorta.  Medical management of circumflex and right coronary artery recommended at that time.  Continue long-term Plavix.   Elevated troponin  Patient troponin I 111 and then increased to 1809 and has now decreased to 564.  No chest pain.  EKG is nondiagnostic with underlying paced rhythm.  It is unclear if this elevated troponin represents myocardial infarction.  It could be type II myocardial leak related to underlying lung disease and mild heart failure.    -Per discussion with her daughter and my colleague, she wanted to pursue conservative management and so does the patient.    I had a long discussion with daughter again today and she prefers ongoing conservative management.    Lexiscan stress nuclear study shows a small area of lateral wall ischemia which we will treat medically--EF there greater than 65%, echo 40%   continue amlodipine an coreg  Add low  dose Imdur at 15mg daily and titrate (-127)  Continue long term plavix   I had a long discussion with her daughter who prefers not to bring her into clinic as transportation is difficult  She has an NP there who manages her (Annabelle Everett who I will message)  We are certainly happy to see her at any point if needed as OP      SSS with P afib and pacemaker  Sinus now  on Heparin   Admitted on apixiban and plavix    PLAN:  From our perspective, could stop Heparin and restart Apixaban-will leave to the Hospitalist.  Due for a remote check; I have messaged the device clinic to arrange and they will have [facility] rep come and do a check here and then remote follow up to avoid trips to clinic.     CVA with residual L sided weakness-chronic and stable    We will sign off; please call with any further questions                     Interval History:   Feels well.  Shortness of breath has improved some.  No chest pain.            Review of Systems:   The 5 point Review of Systems is negative other than noted in the HPI          Physical Exam:        Blood pressure 127/60, pulse 74, temperature 98  F (36.7  C), temperature source Oral, resp. rate 16, weight 53.3 kg (117 lb 6.4 oz), SpO2 96 %, not currently breastfeeding.  Vitals:    02/12/22 2245 02/14/22 0603 02/15/22 0649   Weight: 53 kg (116 lb 13.5 oz) 53.6 kg (118 lb 3.2 oz) 53.3 kg (117 lb 6.4 oz)     Weights since admission  Vitals:    02/12/22 1850 02/12/22 2245 02/14/22 0603 02/15/22 0649   Weight: 53 kg (116 lb 13.5 oz) 53 kg (116 lb 13.5 oz) 53.6 kg (118 lb 3.2 oz) 53.3 kg (117 lb 6.4 oz)     Vital Signs with Ranges  Temp:  [98  F (36.7  C)-98.2  F (36.8  C)] 98  F (36.7  C)  Pulse:  [71-85] 74  Resp:  [16] 16  BP: (116-138)/(51-72) 127/60  SpO2:  [92 %-96 %] 96 %  I/O's Last 24 hours  I/O last 3 completed shifts:  In: -   Out: 750 [Urine:750]  Net I/O since admission  02/10 0700 - 02/15 0659  In: 300 [P.O.:300]  Out: 2025 [Urine:2025]  Net:  -1725    EXAM:    Constitutional:   in no apparent distress     Neck:   no JVD     Lungs:   bilateral rales-sound fibrotic     Cardiovascular:   normal S1 and S2 and no murmur noted     Extremities and Back:   No edema     Chronic left sided weakness       Medications:          acetaminophen  975 mg Oral Q8H     amLODIPine  10 mg Oral Daily     atorvastatin  40 mg Oral At Bedtime     carvedilol  12.5 mg Oral BID w/meals     clopidogrel  75 mg Oral Daily     ferrous sulfate  325 mg Oral Q Mon Wed Fri AM     furosemide  40 mg Oral Daily     guaiFENesin  10 mL Oral BID     losartan  25 mg Oral Daily     oxybutynin ER  10 mg Oral Daily     pantoprazole  40 mg Oral QAM AC     simethicone  80 mg Oral At Bedtime     sodium chloride (PF)  3 mL Intracatheter Q8H     umeclidinium  1 puff Inhalation Daily            Data:      All new lab and imaging data was reviewed.   Recent Labs   Lab Test 02/14/22  1007 02/13/22  0642 02/13/22  0520 09/10/21  0539 08/31/21  0634 08/04/19  1812 08/04/19  1240 07/18/19  2152 07/18/19  1615   WBC 6.6 6.9 6.8   < > 11.6*   < > 18.3*   < > Canceled, Test credited, specimen discarded   HGB 11.7 11.8 11.3*   < > 12.5   < > 9.2*   < > Canceled, Test credited, specimen discarded   MCV 80 80 80   < > 88   < > 94   < > Canceled, Test credited, specimen discarded    285 289   < > 299   < > 388   < > Canceled, Test credited, specimen discarded   INR  --   --   --   --  1.31*  --  1.27*  --  Canceled, Test credited, specimen discarded    < > = values in this interval not displayed.      Recent Labs   Lab Test 02/14/22  1007 02/13/22  0520 02/12/22  1853   * 126* 126*   POTASSIUM 3.5 3.5 3.8   CHLORIDE 96 97 95   CO2 24 21 23   BUN 14 10 11   CR 0.69 0.70 0.55   ANIONGAP 7 8 8   GOLDY 8.3* 7.9* 8.5   GLC 99 88 108*     Recent Labs   Lab Test 08/30/21  1538 08/04/19  1240 07/19/19  1854 07/19/19  1412   TROPI  --  0.030 2.940* 3.113*   TROPONIN <0.015  --   --   --               Imaging:   No  results found for this or any previous visit (from the past 24 hour(s)).

## 2022-02-16 NOTE — PROGRESS NOTES
02/16/22 0943   General Information   Onset of Illness/Injury or Date of Surgery 02/12/22   Referring Physician Dr. Carson   Patient/Family Therapy Goal Statement (SLP) Patient did not state.   Pertinent History of Current Problem Pt with PMHx of CVA's with L hemiparesis, permanent pacemaker, intermittent atrial fibrillation, chronic obstructive lung disease with inhaled bronchodilators admitted from [facility] with increased SOB x2 days. Admitted with acute congestive heart failure, acute hypoxic respiratory failure, possible non-ST elevation myocardial infarction. CT chest showed progressive infiltrates, bronchiectasis and possible underlying fibrosis since the comparison study. Pulmonary also consulted this admission and recommended SLP consult for swallow assessment.    Type of Evaluation   Type of Evaluation Swallow Evaluation   General Swallowing Observations   Swallowing Evaluation Videofluoroscopic swallow study (VFSS)   VFSS Evaluation   Radiologist Dr. Mcgee   Views Taken left lateral   Physical Location of Procedure [facility]   VFSS Textures Trialed thin liquids;slightly thick liquids;mildly thick liquids;pureed;soft & bite-sized;solid foods   VFSS Eval: Thin Liquid Texture Trial   Mode of Presentation, Thin Liquid cup;spoon;self-fed;fed by clinician   Order of Presentation 1 2 3 7 11 12    Preparatory Phase WFL   Oral Phase, Thin Liquid Premature pharyngeal entry   Pharyngeal Phase, Thin Liquid Delayed swallow reflex   Rosenbek's Penetration Aspiration Scale: Thin Liquid Trial Results 7 - contrast passes glottis, visible subglottic residue remains despite patient's response (aspiration)   Diagnostic Statement Delayed weak cough. Most swallows there was premature entry delay and last 2 swallows after a solid there was penetration to the bottom of the cords with trace aspiration   VFSS Eval: Slightly Thick Liquids   Mode of Presentation cup;self-fed   Order of Presentation 13   Preparatory Phase  WFL   Oral Phase premature pharyngeal entry   Pharyngeal Phase Delayed swallow reflex   Rosenbek's Penetration Aspiration Scale 1 - no aspiration, contrast does not enter airway   Diagnostic Statement No penetration on a consecutive swallow.   VFSS Eval: Mildly Thick Liquids   Mode of Presentation cup;self-fed   Order of Presentation 4 5    Preparatory Phase WFL   Oral Phase Premature pharyngeal entry   Pharyngeal Phase Delayed swallow reflex   Rosenbek's Penetration Aspiration Scale 1 - no aspiration, contrast does not enter airway   Diagnostic Statement Spills to the pyriform sinuses at times. No penetration.   VFSS Evaluation: Puree Solid Texture Trial   Mode of Presentation, Puree spoon;fed by clinician   Order of Presentation 6   Preparatory Phase WFL   Oral Phase, Puree Delayed AP movement   Pharyngeal Phase, Puree WFL   Rosenbek's Penetration Aspiration Scale: Puree Food Trial Results 1 - no aspiration, contrast does not enter airway   VFSS Eval: Soft & Bite Sized   Mode of Presentation spoon;fed by clinician   Order of presentation 8   Preparatory Phase WFL   Oral Phase Delayed AP movement;Premature phrayngeal entry   Pharyngeal Phase WFL   Rosenbek's Penetration Aspiration Scale 1 - no aspiration, contrast does not enter airway   VFSS Evaluation: Solid Food Texture Trial   Mode of Presentation, Solid spoon;fed by clinician   Order of Presentation 9   Preparatory Phase WFL   Oral Phase, Solid Delayed AP movement;Premature pharyngeal entry   Pharyngeal Phase, Solid WFL   Rosenbek's Penetration Aspiration Scale: Solid Food Trial Results 1 - no aspiration, contrast does not enter airway   Swallowing Recommendations   Diet Consistency Recommendations regular diet  (Slightly thick liquids)   Supervision Level for Intake close supervision needed   Mode of Delivery Recommendations bolus size, small;no straws;slow rate of intake   Postural Recommendations none   Swallowing Maneuver Recommendations alternate food and  liquid intake;extra swallow   Monitoring/Assistance Required (Eating/Swallowing) check mouth frequently for oral residue/pocketing;stop eating activities when fatigue is present;monitor for cough or change in vocal quality with intake   Recommended Feeding/Eating Techniques (Swallow Eval) maintain upright sitting position for eating;maintain upright posture during/after eating for 30 minutes;minimize distractions during oral intake;set-up and prepare tray   Medication Administration Recommendations, Swallowing (SLP) Whole in apple sauce as tolerated.   General Therapy Interventions   Planned Therapy Interventions Dysphagia Treatment   Dysphagia treatment Modified diet education;Instruction of safe swallow strategies   Clinical Impression   Criteria for Skilled Therapeutic Interventions Met (SLP Eval) Yes, treatment indicated   SLP Diagnosis Mild to moderate oral and pharyngeal dysphagia   Predicted Duration of Therapy Intervention (SLP Eval) 1 week   Risks & Benefits of therapy have been explained evaluation/treatment results reviewed;care plan/treatment goals reviewed;risks/benefits reviewed;current/potential barriers reviewed;participants voiced agreement with care plan;participants included;patient   Clinical Impression Comments Patient presents with mild to moderate oral and pharyngeal dysphagia on today's study. Deficits include; decreased bolus control during oral transit with premature entry and delayed swallow with all liquids. Decreased laryngeal closure. Thin liquids there was premature spillage to the pyriform sinuses with deep laryngeal penetration to the bottom of the cords with trace aspiration after a solid. Premature spillage of slightly thick and mildly thick liquids without penetration or aspiration. Mildly decreased AP movement with semi-solids and solids without penetration/aspiration and no pharyngeal residue after the swallow.  Patient is at an elevated risk for aspiration with all liquids due  to spilling past the open airway. Recommend: 1. Regular diet and slightly thick liquids. 2. Upright, no straws, small bites/sips, alternate liquids/solids. 3. Ok to have ice chips and sips of water between meals after good oral hygiene.    SLP Discharge Planning   SLP Discharge Recommendation Transitional Care Facility   SLP Rationale for DC Rec Will need short term ST needs for dysphagia management.    SLP Brief overview of current status  Patient with mild to moderate dysphagia with penetration to the cords and trace aspiration with thin liquids and delayed cough.     Total Evaluation Time   Total Evaluation Time (Minutes) 17   SLP Goals   Therapy Frequency (SLP Eval) 3 times/wk   SLP Predicated Duration/Target Date for Goal Attainment 02/23/22   SLP Goals Swallow;SLP Goal 1   SLP: Safely tolerate diet without signs/symptoms of aspiration Regular diet;Slightly thick liquids   SLP: Goal 1 Patient will be able to tolerate regular textures and slightly thick liquids without overt Sx of aspiration.

## 2022-02-16 NOTE — DISCHARGE SUMMARY
"[facility]  Discharge Summary        Yoselyn Cui MRN# [id number]   YOB: 1932 Age: 89 year old     Date of Admission:  2/12/2022  Date of Discharge:  2/16/2022  Admitting Physician:  Chuck Almaraz MD  Discharge Physician: Maribeth Carson MD  Discharging Service: Hospitalist     Primary Provider: Brittany Florez  Primary Care Physician Phone Number: [phone]         Discharge Diagnoses/Problem Oriented Hospital Course (Providers):    Yoselyn Cui was admitted on 2/12/2022 by Chuck Almaraz MD and I would refer you to their history and physical.  The following problems were addressed during her hospitalization:    Yoselyn Cui is a 89 year old female who was admitted on 2/12/2022.  Yoselyn Cui is a 89 year old female with a history of a permanent pacemaker and intermittent atrial fibrillation for which she is anticoagulated.she also is on treatment for chronic obstructive lung disease with inhaled bronchodilators but is not on home oxygen.  She has no history of ischemic heart disease or congestive heart failure.  She lives at an assisted living facility.  Today 911 was called because she has been increasingly short of breath with activity over the past day or 2.  In the emergency room she was requiring 2 L of oxygen by nasal cannula, was afebrile and hemodynamically stable.  On exam she has bilateral rails and trace lower extremity edema.  EKG shows a paced rhythm.  Chest x-ray shows bilateral lower lobe interstitial and airspace opacities with bilateral effusions.  Labs are noted for an elevated BNP elevated troponin, normal white blood cell count and negative procalcitonin.  She was given IV furosemide and is being admitted to the hospital for acute heart failure, possible non-ST elevation MI and acute respiratory failure.     Acute congestive heart failure, \"new\"  Hypoxic acute respiratory failure   Possible non-ST-elevation myocardial infarction "   Status post permanent pacemaker for sick sinus syndrome   Intermittent atrial fibrillation on anticoagulation   Shortness of breath felt to be multifactorial with underlying COPD and pulmonary fibrosis contributing as well as underlying CHF  She is stable on 2L of oxygen by nasal canula.  She has no history of congestive heart failure or ischemic heart disease.  She has been short of breath for just a day or two- no chest pain.  Chest X-ray consistent with heart failure. BNP and troponin are elevated.  No fever or other signs of infection.  She does have a history of pneumothorax, bilateral lower lobe lung infiltrate and effusions for which she was admitted to [facility] last fall and treated with antibiotics and a chest tube.  See below  Patient has history of COPD and was a previous long-term smoker so her symptoms were thought to be secondary to underlying COPD and pulmonary fibrosis seen on CT chest  Her troponins went up to 1800 so was started on heparin drip  Diuresed with IV Lasix 40 mg twice daily and transition to Lasix 40 mg p.o. daily for acute systolic congestive heart failure exacerbation  Echo showed EF at 40 to 45% lower than usual prior EF of 60%  Lexiscan stress test was done to evaluate for ischemia and it showed small area of mild ischemia in the lateral segment of the left ventricle.  LVEF is normal greater than 65%.  There is no prior study for comparison  Cardiology recommended medical management and continued Lasix 40 mg p.o. daily other current regimen include Coreg 12.5 mg p.o. twice daily and Imdur 15 mg p.o. daily started during this hospitalization, continue PTA Plavix daily and Lipitor 40 mg at bedtime and Norvasc 10 mg p.o. daily and losartan 25 mg p.o. daily  Stopped heparin drip on 2/15 and  restarted Eliquis     Acute COPD exacerbation  Underlying bilateral pulmonary fibrosis  Patient was a previous long-term smoker with history of COPD  CT chest without contrast on  admission showed bilateral progressive infiltrates and bronchiectasis and possible underlying pulmonary fibrosis since the comparison study  Patient tells me that she has not felt any better with diuresis  Pulmonary consultation requested  We will start her on IV Solu-Medrol to help with possible COPD exacerbation  DuoNebs 4 times daily ordered  Doxycycline 100 mg p.o. twice daily for 7 days per pulmonary recommendation  Pulmonary recommended speech pathology consultation which was requested bedside swallow was okay and the recommended video swallow study to evaluate for silent aspiration  Video swallow done and duet ordered by Speech path   Patient is on Protonix p.o. daily to help with GERD     Hyponatremia   Serum Na 126- ikely from heart failure.    Sodium [phone]    Fluid restriction at 1500 mL ordered     History of left pneumothorax in the setting of emphysema and treatment for bilateral lower lobe pneumonia in August and September 2021.  History of chronic obstructive lung disease/emphysema  The patient was admitted to [facility] with dyspnea cough and fever.  CT of the chest showed a left pneumothorax small left pleural effusion and bibasilar pulmonary opacities.  The patient was treated with antibiotics and chest tube placement.    Continue inhaled bronchodilators     CT chest showed progressive infiltrates, bronchiectasis and possible underlying fibrosis since the comparison study    Started on IV Solu-Medrol, duo nebs and continue Breo Ellipta inhaler and doxycycline p.o. 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  This was detected on CT scan last summer.  It appears that the patient did not want any further relation.  Out pt follow up with PCP      History of stroke     Continue statin and  anticoagulation for atrial fibrillation      Hyperlipidemia     Continue prior to admission statin         Diet:  cardiac   DVT Prophylaxis: DOAC  Garcia Catheter: Not present  Central Lines: None  Cardiac Monitoring: None  Code Status:   NCB        Clinically Significant Risk Factors Present on Admission             # Hyponatremia: Na = 126 mmol/L (Ref range: 133 - 144 mmol/L) on admission, will monitor as appropriate      # Coagulation Defect: home medication list includes an anticoagulant medication  # Platelet Defect: home medication list includes an antiplatelet medication       Goals of care discussion was done with the daughter and she wants to continue current medical management and she does not want invasive procedures for her mother.  She wants her mother to be discharged to TCU at this point with her CODE STATUS continue to be DNR/DNI           Disposition Plan     Expected Discharge:    TCU today      Maribeth Carson MD  [phone] (P)                Code Status:      DNR / DNI         Important Results:      See below          Pending Results:        Unresulted Labs Ordered in the Past 30 Days of this Admission     Date and Time Order Name Status Description    2/12/2022  6:48 PM Blood Culture Peripheral Blood Preliminary     2/12/2022  6:48 PM Blood Culture Peripheral Blood Preliminary                Discharge Instructions and Follow-Up:      Follow-up Appointments     Follow Up and recommended labs and tests      Follow up with residential physician.  The following labs/tests are   recommended: CBC, BMP in 1week.                  Discharge Disposition:      Discharged to short-term care facility         Discharge Medications:        Current Discharge Medication List      START taking these medications    Details   doxycycline hyclate (VIBRAMYCIN) 100 MG capsule Take 1 capsule (100 mg) by mouth every 12 hours for 6 days  Qty: 12 capsule, Refills: 0    Associated Diagnoses: COPD exacerbation (H)       fluticasone-vilanterol (BREO ELLIPTA) 200-25 MCG/INH inhaler Inhale 1 puff into the lungs daily  Qty: 1 each, Refills: 0    Associated Diagnoses: COPD exacerbation (H)      furosemide (LASIX) 40 MG tablet Take 1 tablet (40 mg) by mouth daily  Qty: 30 tablet, Refills: 1    Associated Diagnoses: Acute on chronic congestive heart failure, unspecified heart failure type (H)      ipratropium - albuterol 0.5 mg/2.5 mg/3 mL (DUONEB) 0.5-2.5 (3) MG/3ML neb solution Take 1 vial (3 mLs) by nebulization 4 times daily  Qty: 90 mL, Refills: 0    Associated Diagnoses: COPD exacerbation (H)      isosorbide mononitrate (IMDUR) 30 MG 24 hr tablet Take 0.5 tablets (15 mg) by mouth daily  Qty: 30 tablet, Refills: 0    Associated Diagnoses: Acute on chronic congestive heart failure, unspecified heart failure type (H)      predniSONE (DELTASONE) 10 MG tablet 4 tabs daily for 3 days, then 3 tabs daily for 3 days, then 2 tabs daily for 3 days, then 1 tab daily for 3 days, then stop  Qty: 30 tablet, Refills: 0    Associated Diagnoses: COPD exacerbation (H)         CONTINUE these medications which have CHANGED    Details   carvedilol (COREG) 12.5 MG tablet Take 1 tablet (12.5 mg) by mouth 2 times daily (with meals)  Qty: 60 tablet, Refills: 0    Associated Diagnoses: Acute on chronic congestive heart failure, unspecified heart failure type (H)         CONTINUE these medications which have NOT CHANGED    Details   acetaminophen (TYLENOL) 325 MG tablet Take 650 mg by mouth every 6 hours as needed for mild pain       amLODIPine (NORVASC) 10 MG tablet TAKE 1 TABLET BY MOUTH ONCE DAILY  Qty: 28 tablet, Refills: 11    Associated Diagnoses: Hypertension, unspecified type      atorvastatin (LIPITOR) 40 MG tablet TAKE 1 TABLET BY MOUTH AT BEDTIME  Qty: 35 tablet, Refills: PRN    Associated Diagnoses: Coronary artery disease involving native coronary artery of native heart without angina pectoris      bacitracin 500 UNIT/GM external ointment Apply  topically daily Apply to open area right shin after cleaning and drying well  Qty: 30 g, Refills: 3    Associated Diagnoses: Skin tear of lower leg without complication, right, initial encounter      clopidogrel (PLAVIX) 75 MG tablet TAKE 1 TABLET BY MOUTH ONCE DAILY  Qty: 35 tablet, Refills: PRN    Associated Diagnoses: Coronary artery disease involving native coronary artery of native heart without angina pectoris      ELIQUIS ANTICOAGULANT 2.5 MG tablet TAKE 1 TABLET BY MOUTH TWICE DAILY  Qty: 70 tablet, Refills: PRN    Associated Diagnoses: Paroxysmal atrial fibrillation (H)      ferrous sulfate (FE TABS) 325 (65 Fe) MG EC tablet Take 1 tablet (325 mg) by mouth Every Mon, Wed, Fri Morning  Qty: 12 tablet, Refills: 98    Associated Diagnoses: Iron deficiency      guaiFENesin (ROBITUSSIN) 100 MG/5ML liquid Take 10 mLs (200 mg) by mouth 2 times daily. May also take 10 mLs (200 mg) 2 times daily as needed for cough.  Qty: 180 mL, Refills: 11    Associated Diagnoses: Pulmonary emphysema, unspecified emphysema type (H)      losartan (COZAAR) 25 MG tablet TAKE 1 TABLET BY MOUTH ONCE DAILY  Qty: 28 tablet, Refills: 11    Associated Diagnoses: Hypertension, unspecified type      Multiple Vitamins-Minerals (SYSTANE ICAPS AREDS2) CAPS TAKE 1 CAPSULE BY MOUTH TWICE DAILY  Qty: 56 capsule, Refills: PRN    Associated Diagnoses: Takes dietary supplements      nitroGLYcerin (NITROSTAT) 0.4 MG sublingual tablet For chest pain place 1 tablet under the tongue every 5 minutes for 3 doses. If symptoms persist 5 minutes after 1st dose call 911.  Qty: 30 tablet, Refills: 0    Associated Diagnoses: Coronary artery disease involving native coronary artery of native heart without angina pectoris      omeprazole (PRILOSEC) 20 MG DR capsule TAKE 1 CAPSULE BY MOUTH EVERY MORNING  Qty: 37 capsule, Refills: PRN    Associated Diagnoses: Gastroesophageal reflux disease with esophagitis without hemorrhage      oxybutynin ER (DITROPAN-XL) 10 MG  24 hr tablet TAKE 1 TABLET BY MOUTH ONCE DAILY  Qty: 28 tablet, Refills: 11    Comments: PLEASE AUTHORIZE REFILLS FOR CYCLE STARTING 11/4/21  Associated Diagnoses: OAB (overactive bladder)      potassium chloride ER (KLOR-CON M) 10 MEQ CR tablet Take 1 tablet (10 mEq) by mouth daily  Qty: 30 tablet, Refills: 98    Associated Diagnoses: Hypokalemia      psyllium (METAMUCIL/KONSYL) 58.6 % powder Take 6 g (1 teaspoonful) by mouth daily In 8 oz fluid, drink promptly  Qty: 283 g, Refills: 98    Associated Diagnoses: Irritable bowel syndrome with both constipation and diarrhea      simethicone (MYLICON) 125 MG chewable tablet CHEW AND SWALLOW 1 TABLET BY MOUTH EVERY NIGHT AT BEDTIME  Qty: 38 tablet, Refills: PRN    Associated Diagnoses: Flatulence, eructation and gas pain      Vitamin D3 (CHOLECALCIFEROL) 25 mcg (1000 units) tablet Take 1 tablet (25 mcg) by mouth daily  Qty: 30 tablet, Refills: 98    Associated Diagnoses: Vitamin D deficiency         STOP taking these medications       ipratropium-albuterol (COMBIVENT RESPIMAT)  MCG/ACT inhaler Comments:   Reason for Stopping:         umeclidinium (INCRUSE ELLIPTA) 62.5 MCG/INH inhaler Comments:   Reason for Stopping:                    Allergies:       No Known Allergies         Consultations This Hospital Stay:      Consultation during this admission received from cardiology and pulmonary medicine         Condition and Physical Exam on Discharge:      Discharge condition: Stable   Discharge vitals: Blood pressure 126/65, pulse 77, temperature 98.2  F (36.8  C), temperature source Oral, resp. rate 16, weight 51.2 kg (112 lb 12.8 oz), SpO2 92 %, not currently breastfeeding.     Constitutional: Alert, awake, NAD    Lungs: Bilateral crackles heard from underlying pulmonary fibrosis    Cardiovascular: RRR   Abdomen: Soft,NT, ND, BS+   Skin: Warm and dry    Other:            Discharge Orders for Skilled Facility (from Discharge Orders):        After Care Instructions      Activity - Up with nursing assistance      Diet      Follow this diet upon discharge: Orders Placed This Encounter      Fluid restriction 1500 ML FLUID      Combination Diet Regular Diet; Slightly Thick (level 1) (No straws);         General info for SNF      Length of Stay Estimate: Short Term Care: Estimated # of Days 31-90  Condition at Discharge: Stable  Level of care:skilled   Rehabilitation Potential: Good  Admission H&P remains valid and up-to-date: Yes  Recent Chemotherapy: N/A  Use Nursing Home Standing Orders: Yes         Mantoux instructions      Give two-step Mantoux (PPD) Per Facility Policy Yes                    Rehab orders for Skilled Facility (from Discharge Orders):      Referrals     Future Labs/Procedures    Occupational Therapy Adult Consult     Comments:    Evaluate and treat as clinically indicated.    Reason:  CHF and COPD    Physical Therapy Adult Consult     Comments:    Evaluate and treat as clinically indicated.    Reason:  CHF and COPD             Discharge Time:      Greater than 30 minutes.        Image Results From This Hospital Stay (For Non-EPIC Providers):        Results for orders placed or performed during the hospital encounter of 02/12/22   XR Chest Port 1 View    Narrative    EXAM: XR CHEST PORT 1 VIEW  LOCATION: [facility]  DATE/TIME: 2/12/2022 7:27 PM    INDICATION: Fever.  COMPARISON: 1/28/2022.      Impression    IMPRESSION: Interstitial and airspace opacities in both lower lungs have increased since the previous exam, and could be related to infection and/or edema. There are also likely small bilateral pleural effusions. No pneumothorax. Dual-lead cardiac   device, leads unchanged. Aortic calcification. Heart size stable. Pulmonary vascularity is within normal limits. Degenerative changes both glenohumeral joints.   CT Chest w/o Contrast    Narrative    EXAM: CT CHEST WITHOUT CONTRAST  LOCATION: [facility]  DATE/TIME: 02/13/2022, 9:27 AM    INDICATION: Respiratory illness, nondiagnostic x-ray.  COMPARISON: 8/30/2021  TECHNIQUE: CT chest without IV contrast. Multiplanar reformats were obtained. Dose reduction techniques were used.  CONTRAST: None.    FINDINGS:   LUNGS AND PLEURA: Emphysema. Possible underlying fibrosis and bronchiectasis probably with superimposed infectious or inflammatory infiltrates, fibrosis, and bronchiectasis, all appear significantly worse and/or new since comparison.    MEDIASTINUM/AXILLAE: There are extensive atherosclerotic changes of the visualized aorta and its branches. There is no evidence of aortic aneurysm. Mildly prominent lymph nodes are noted which are nonspecific and may be reactive in this setting.    CORONARY ARTERY CALCIFICATION: Severe.    UPPER ABDOMEN: Probable cholelithiasis without cholecystitis.    MUSCULOSKELETAL: No frankly destructive bony lesions.      Impression    IMPRESSION:   1.  Progressive infiltrates, bronchiectasis, and possible underlying fibrosis since the comparison study.     XR Video Swallow with SLP or OT    Narrative    VIDEO SWALLOWING EVALUATION   2/16/2022 9:36 AM     HISTORY: r/o aspiration    COMPARISON: None.    FLUOROSCOPY TIME: 2 minutes 2 seconds     Number of cine runs: 12    FINDINGS:    Thin: Teaspoon and cup sips demonstrated spillage with coating of the  vallecula and piriform sinuses. Most swallows did not demonstrate  penetration or aspiration. However, after swallowing a cookie the  patient did demonstrate a small amount of aspiration. This elicited a  small cough.    Mildly thick: Early spillage with coating of the vallecula and  piriform sinus. No penetration or aspiration.    Pudding: Early spillage into the vallecula. Otherwise normal.    Semisolid: Early spillage into the vallecula. Otherwise normal.    Solid: Early spillage into the vallecula. Otherwise normal.    This study only includes the cervical esophagus.    MARTA SUMMERS  MD CYNTHIA         SYSTEM ID:  [id number]   Echocardiogram Complete     Value    LVEF  40-45%    Narrative    [id number]  [id number]  [id number]  [id number]^BRANDON^ALISIA^BAIRON     [facility]  Echocardiography Laboratory  [address] [place] [address], [place] [address]     Name: ANNE MARIE AMBROSE  MRN: [id number]  : 1932  Study Date: 2022 10:45 AM  Age: 89 yrs  Gender: Female  Patient Location: [facility]  Reason For Study: CHF  Ordering Physician: ALISIA TAYLOR  Referring Physician: Brittany Florez  Performed By: Rikki Edwards RDCS     BSA: 1.5 m2  Height: 61 in  Weight: 116 lb  HR: 71  BP: 97/44 mmHg  ______________________________________________________________________________  Procedure  Complete Portable Echo Adult. Optison (NDC #[id number]) given intravenously.  ______________________________________________________________________________  Interpretation Summary     1. Left ventricular systolic function is mildly reduced. The visual ejection  fraction is 40-45%.  2. Septal wall motion abnormality may reflect pacemaker activation.  3. There is mild concentric left ventricular hypertrophy.  4. The right ventricle is normal in structure, function and size.  5. There is mild (1+) mitral regurgitation.  6. In comparison with the prior study, there has been an interval reduction in  LV function.  ______________________________________________________________________________  Left Ventricle  The left ventricle is normal in size. There is mild concentric left  ventricular hypertrophy. Left ventricular systolic function is mildly reduced.  The visual ejection fraction is 40-45%. Grade I or early diastolic  dysfunction. Septal wall motion abnormality may reflect pacemaker activation.     Right Ventricle  The right ventricle is normal in structure, function and size. There is a  catheter/pacemaker lead seen in the right ventricle.     Atria  Normal left atrial size. Right atrial size is normal. There is no  color  Doppler evidence of an atrial shunt.     Mitral Valve  There is mild mitral annular calcification. There is mild (1+) mitral  regurgitation.     Tricuspid Valve  The tricuspid valve is normal in structure and function.     Aortic Valve  The aortic valve is trileaflet with aortic valve sclerosis. The aortic valve  is not well visualized.     Pulmonic Valve  The pulmonic valve is not well seen, but is grossly normal.     Vessels  Normal size aorta. IVC diameter <2.1 cm collapsing >50% with sniff suggests a  normal RA pressure of 3 mmHg.     Pericardium  There is no pericardial effusion.     Rhythm  Sinus rhythm was noted. The rhythm was paced.  ______________________________________________________________________________  MMode/2D Measurements & Calculations  IVSd: 1.3 cm     LVIDd: 3.7 cm  LVIDs: 2.5 cm  LVPWd: 0.94 cm  FS: 33.1 %  LV mass(C)d: 131.3 grams  LV mass(C)dI: 87.6 grams/m2  Ao root diam: 3.0 cm  LA dimension: 2.4 cm  LA/Ao: 0.80  LVOT diam: 1.9 cm  LVOT area: 2.9 cm2  LA Volume (BP): 19.6 ml  LA Volume Index (BP): 13.1 ml/m2  RWT: 0.51     Doppler Measurements & Calculations  MV E max arabella: 60.6 cm/sec  MV A max arabella: 86.3 cm/sec  MV E/A: 0.70  PA acc time: 0.08 sec  TR max arabella: 279.6 cm/sec  TR max P.3 mmHg  E/E' av.3  Lateral E/e': 14.3  Medial E/e': 24.2     ______________________________________________________________________________  Report approved by: Yanique Ledezma 2022 12:21 PM         NM Lexiscan stress test (nuc card)     Value    Target     Baseline Systolic     Baseline Diastolic BP 60    Last Stress Systolic     Last Stress Diastolic BP 64    Baseline HR 77    Max HR  93    Max Predicted HR  72    Rate Pressure Product 12,462.0    Narrative       The nuclear stress test is abnormal.     There is a small area of mild ischemia in the lateral segment(s) of the   left ventricle.     Left ventricular function is normal, EF >65%.     There is no  prior study for comparison.             Most Recent Lab Results In EPIC (For Non-EPIC Providers):    Most Recent 3 CBC's:  Recent Labs   Lab Test 02/16/22  0617 02/14/22  1007 02/13/22  0642   WBC 3.5* 6.6 6.9   HGB 11.5* 11.7 11.8   MCV 82 80 80    298 285      Most Recent 3 BMP's:  Recent Labs   Lab Test 02/16/22  0617 02/15/22  1051 02/14/22  1007   * 125* 127*   POTASSIUM 3.8 3.6 3.5   CHLORIDE 94 95 96   CO2 25 24 24   BUN 28 20 14   CR 0.73 0.85 0.69   ANIONGAP 6 6 7   GOLDY 8.8 8.7 8.3*   * 162* 99     Most Recent 3 Troponin's:  Recent Labs   Lab Test 08/30/21  1538 08/04/19  1240 07/19/19  1854 07/19/19  1412   TROPI  --  0.030 2.940* 3.113*   TROPONIN <0.015  --   --   --      Most Recent 3 INR's:  Recent Labs   Lab Test 08/31/21  0634 08/04/19  1240 07/18/19  1615   INR 1.31* 1.27* Canceled, Test credited, specimen discarded     Most Recent 2 LFT's:  Recent Labs   Lab Test 02/12/22  1853 10/14/21  0945   AST 17 25   ALT 13 28   ALKPHOS 140 110   BILITOTAL 0.3 0.3     Most Recent Cholesterol Panel:  Recent Labs   Lab Test 04/28/21  1002   CHOL 118   LDL 50   HDL 53   TRIG 74     Most Recent 6 Bacteria Isolates From Any Culture (See EPIC Reports for Culture Details):  Recent Labs   Lab Test 08/06/19  1005 08/06/19  0955 08/04/19  1346 08/04/19  1305 08/04/19  1241 03/02/18  1140   CULT No growth No growth >100,000 colonies/mL  Escherichia coli  *  >100,000 colonies/mL  Strain 2  Escherichia coli  * No growth Cultured on the 1st day of incubation:  Escherichia coli  *  Critical Value/Significant Value, preliminary result only, called to and read back by  Olivier Cobian RN [id number] @ 1520 8/5/19. NAP    (Note)  POSITIVE for E.COLI by Verigene multiplex nucleic acid test. Final  identification and antimicrobial susceptibility testing will be  verified by standard methods. Verigene test will not distinguish  E.coli from Shigella species including S.dysenteriae, S.flexneri,  S.boydii, and  S.sonnei. Specimens containing Shigella species or  E.coli will be reported as Positive for E.coli.    Specimen tested with Eteceigene multiplex, gram-negative blood culture  nucleic acid test for the following targets: Acinetobacter sp.,  Citrobacter sp., Enterobacter sp., Proteus sp., E. coli, K.  pneumoniae/oxytoca, P. aeruginosa, and the following resistance  markers: CTXM, KPC, NDM, VIM, IMP and OXA.    Critical Value/Significant Value called to and read back by Madelin Franklin RN at 1758 8.5.19 [id number]   No growth     Most Recent TSH, T4 and HgbA1c:  Recent Labs   Lab Test 04/15/21  0620   TSH 1.44

## 2022-02-16 NOTE — PROGRESS NOTES
Clinic Care Coordination Contact  Care Team Conversations    Situation: RN Clinical Product Navigator following patient through TCU care progression.     Background: Patient was inpatient at [facility] 2/12/22-2/16/22 due to new acute congestive heart failure, hypoxic acute respiratory failure, possible non-ST-elevation myocardial infarction, status post permanent pacemaker for sick sinus syndrome, intermittent atrial fibrillation on anticoagulation, acute COPD exacerbation, underlying bilateral pulmonary fibrosis, hyponatremia.  Patient was discharged to [facility] TCU.    Assessment: Prior to hospitalization patient was a resident at [facility].  Patient was mostly independent, has a walker, but was not using it.  Patient was receiving assistance with a bath 2 times weekly, escorts to meals 2 times daily.   Has call button.  [facility] performs medication management.  Patient was previously supposed to start home care through [facility], however, this was not started prior to hospital admission.  Patient is to be on a 1500 ml fluid restriction, and slightly thickened liquids.  Patient was started on oxygen while inpatient and is to continue oxygen at 2 LPM to keep oxygen saturation >90%.    Plan/Recommendations: RN Clinical Product Navigator will send TCU Care Team Care Management hand in communication and request involvement in discharge planning and coordination of care.      Melissa Behl BSN, RN, PHN, CCM  RN Clinical Product Navigator  [phone]

## 2022-02-16 NOTE — PLAN OF CARE
Yoselyn is discharging to TCU. SaO2 stable on room air or 1.5L (at rest, while sleeping). Denies discomfort, chest pain, nausea. Baseline left side deficit, no other overt neuro changes. She reports her hearing aids are at her AL facility. Discharged with clothing and glasses.     Plan of Care Reviewed With: patient, daughter Maryjane

## 2022-02-16 NOTE — PLAN OF CARE
PT:  Orders received and chart reviewed. Per chart review and discussion with SW, plan in place for patient to discharge to TCU with transport scheduled for today at 1500. Defer PT evaluation to next level of care, completing orders.

## 2022-02-16 NOTE — CONSULTS
Please see Care Management/Social Work note dated 2/15 at 12:59 for further information.      MAURICE Alvarenga, [facility]    [phone]  [facility]

## 2022-02-16 NOTE — PLAN OF CARE
Yoselyn is alert, calm, cooperative, [place]. She was able to answer her orientation questions correctly but reports forgetfulness and short term loss. SaO2 stable on room air while sitting up in chair, needed 1.5 L via nasal cannula while in bed. Heparin infusion stopped. She denies discomfort. Infrequent dry and non-productive cough. She started PO antibiotics today. Family in process of switching from AL to full nursing cares at her facility. Plan for video swallow study tomorrow. Protective/preventative dressing place on her heels and sacrum. Pacemaker interrogation done today.

## 2022-02-16 NOTE — PROGRESS NOTES
Care Management Discharge Note    Discharge Date: 02/16/2022       Discharge Disposition: Transitional Care    Discharge Services: Other (see comment) (TCU PT/OT/Speech)    Discharge DME:  (Oxygen for transport)    Discharge Transportation:  [facility] Wheelchair transport at 1500    Private pay costs discussed: transportation costs    PAS Confirmation Code: [id number]  Patient/family educated on Medicare website which has current facility and service quality ratings:  No    Education Provided on the Discharge Plan:  Yes  Persons Notified of Discharge Plans: Daughter Maryjane  Patient/Family in Agreement with the Plan:  Yes    Handoff Referral Completed: Yes    Additional Information:  SW received discharge orders for patient. Patient will discharge to [facility] (TCU)  with [facility] wheelchair and oxygent transport at 1500 today. SW sent discharge orders and completed PAS. YONI called [facility] and confirmed plan of discharge. YONI called Daughter Maryjane and confirmed Discharge plans. Maryjane is in agreement with plans.    LUISA Hayward    [facility]

## 2022-02-16 NOTE — PROGRESS NOTES
PULMONOLOGY PROGRESS NOTE    Date of Admission: 2/12/2022    CC/Reason for Hospital visit: copd, CPFE, hypoxia, nstemi  SUBJECTIVE      Overnight events reviewed. Feeling better this am, o2 weaning. Still feels more fatigued than baseline.       ROS: A Problem Pertinent review of systems was negative except for items noted in HPI.  Past Medical, Family, and Social/Substance History has been reviewed: No interval changes.   OBJECTIVE   Vital signs:  Temp: 98.2  F (36.8  C) Temp src: Oral BP: 126/65 Pulse: 77   Resp: 16 SpO2: 92 % O2 Device: Nasal cannula Oxygen Delivery: 1.5 LPM   Weight: 51.2 kg (112 lb 12.8 oz)  Estimated body mass index is 22.03 kg/m  as calculated from the following:    Height as of 2/7/22: 1.524 m (5').    Weight as of this encounter: 51.2 kg (112 lb 12.8 oz).      I/O last 3 completed shifts:  In: 480 [P.O.:480]  Out: 400 [Urine:400]    CONSTITUTIONAL/GENERAL APPEARANCE: Alert female, frail, no Apparent Distress.  PSYCHIATRIC: Pleasant and appropriate mood and affect. Oriented x 3.  EARS, NOSE,THROAT,MOUTH: External ears and nose overall normal.  NECK: Neck appearance normal.  RESPIRATORY: Non-labored effort. Coarse at bases, no wheezing  CARDIOVASCULAR: S1, S2, regular    LABORATORY ASSESSMENT    Arterial Blood GasNo lab results found in last 7 days.  CBC  Recent Labs   Lab 02/16/22  0617 02/14/22  1007 02/13/22  0642 02/13/22  0520   WBC 3.5* 6.6 6.9 6.8   RBC 4.44 4.59 4.52 4.38   HGB 11.5* 11.7 11.8 11.3*   HCT 36.3 36.8 36.3 35.2   MCV 82 80 80 80   MCH 25.9* 25.5* 26.1* 25.8*   MCHC 31.7 31.8 32.5 32.1   RDW 14.6 14.6 14.6 14.5    298 285 289     BMP  Recent Labs   Lab 02/16/22  0617 02/15/22  1051 02/14/22  1007 02/13/22  0520   * 125* 127* 126*   POTASSIUM 3.8 3.6 3.5 3.5   CHLORIDE 94 95 96 97   GOLDY 8.8 8.7 8.3* 7.9*   CO2 25 24 24 21   BUN 28 20 14 10   CR 0.73 0.85 0.69 0.70   * 162* 99 88     INRNo lab results found in last 7 days.   BNPNo lab results found in  last 7 days.  VENOUS BLOOD GASESNo lab results found in last 7 days.      Additional labs and/or comments:     IMAGING         CT chest 2/12/22 - Progressive infiltrates, bronchiectasis, and possible underlying fibrosis since the comparison study.    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 Admitted with nstemi, being managed medically and CT chest with inc bibasilar infiltrates and bronchiectasis. Denies aspiration but seems possible. PCT not elevated and o2 needs improving with current regimen. Troponin trending down. No wheezing on exam. Of note, was hopsitalized 8/30-9/3 with pna and left sided ptx requiring chest tube. Suspect multifactorial and unsure precipitating event for nstemi. May be getting close to her baseline. Mildly hypoxic on low flow o2. Suspect that o2 may be logistically challenging and increase fall risk at home given limited mobility and legally blind. Agree with daughters plan to minimize interventions and hospitalizations.         PLAN:     Continue bronchodilators    Wean o2 as able, goal saturation ~ 90-92%. Would like to see how she does on room air given goals of care discussions and fall risk.     Continue diuresis    Suggest doxy 100 bid for 7d    Short course of 40mg/d of pred reasonable    Suggest swallow eval    Likely discharge soon. Will sign off. Please call if needed      Crescencio Hadley  [facility]  Office: [phone]  Pager: [phone]

## 2022-02-17 NOTE — PLAN OF CARE
Speech Language Therapy Discharge Summary    Reason for therapy discharge:    Discharged to transitional care facility.    Progress towards therapy goal(s). See goals on Care Plan in [facility] electronic health record for goal details.  Goals partially met.  Barriers to achieving goals:   discharge from facility.    Therapy recommendation(s):    Continued therapy is recommended.  Rationale/Recommendations:  VFSS completed 2/16 which revealed: Patient presents with mild to moderate oral and pharyngeal dysphagia on today's study. Deficits include; decreased bolus control during oral transit with premature entry and delayed swallow with all liquids. Decreased laryngeal closure. Thin liquids there was premature spillage to the pyriform sinuses with deep laryngeal penetration to the bottom of the cords with trace aspiration after a solid. Premature spillage of slightly thick and mildly thick liquids without penetration or aspiration. Mildly decreased AP movement with semi-solids and solids without penetration/aspiration and no pharyngeal residue after the swallow.  Patient is at an elevated risk for aspiration with all liquids due to spilling past the open airway. Recommend: 1. Regular diet and slightly thick liquids. 2. Upright, no straws, small bites/sips, alternate liquids/solids. 3. Ok to have ice chips and sips of water between meals after good oral hygiene. Recommend ongoing SLP services at discharge for dysphagia.    Goal Outcome Evaluation:    Plan of Care Reviewed With: patient, daughter

## 2022-02-17 NOTE — LETTER
[facility] CARE COORDINATION  ***  February 22, 2022    [place]  [address]  [place] [address]      Dear Yoselyn,    I am a {clinic role:[id number]}

## 2022-02-17 NOTE — PROGRESS NOTES
Clinic Care Coordination Contact    Background: Care Coordination referral placed from [facility] discharge report for reason of patient meeting criteria for a TCM outreach call by [facility] team.    Assessment: Upon chart review, [facility] Team member will cancel/close the referral for TCM outreach due to reason below:    Patient has discharged to a Group home, Memory Care or Nursing Home    Plan: Care Coordination referral for TCM outreach canceled.    Rosy Lemons MA  [facility], [facility]

## 2022-02-18 PROBLEM — I50.9 ACUTE ON CHRONIC CONGESTIVE HEART FAILURE, UNSPECIFIED HEART FAILURE TYPE (H): Status: RESOLVED | Noted: 2022-01-01 | Resolved: 2022-01-01

## 2022-02-18 NOTE — PROGRESS NOTES
[facility]  PRIMARY CARE PROVIDER AND CLINIC:  Brittany Florez, APRN CNP, [address] / [place] [address]  Chief Complaint   Patient presents with     [facility]     [place] Medical Record Number:  [id number]  Place of Service where encounter took place:  [facility] ([facility]) [[id number]]    Yoselyn Cui  is a 89 year old  (1/28/1932)  female with PMH significant for HTN, OAB, GERD, hx of CVA x2, second-degree heart block s/p PPM (2016), paroxysmal atrial fibrillation (on apixaban), hyperlipidemia, CAD s/ps non-STEMI in July 20219 with multi-vessel disease s/p stenting of LAD complicated by guidewire dissection (on Plavix), HFrEF (dx Feb 2022), admitted to the above facility from  [facility]. Hospital stay 2/12/22 through 2/16/22.     Admitted to this facility for  rehab, medical management and nursing care.    HPI:    HPI information obtained from: facility chart records, facility staff, patient report, [facility] chart review and Care Everywhere [facility] chart review.     Brief Summary of Hospital Course:   Resident admitted to [facility] from 2/12 to 2/16/22 with increased SOB over two day period with new hypoxia requiring supplemental oxygen via NC. Afebrile and hemodynamically stable. Exam significant for rales BL bases and BL LE edema. CXR showed BL lower lobe interstitial/airspace opacities with BL effusions. Labs significant for elevated BNP, troponin, normal WBC and normal procalcitonin. Blood cultures negative. Admitted with acute respiratory failure in setting of chronic lung disease, suspected new heart failure diagnosis with acute exacerbation and possible NSTEMI.    Due to elevated troponin started on heparin gtt. Treated with IV lasix 40 mg BID for suspected HF exacerbation. Echo showed EF 40-45%, which is reduced previous EF of 60% on last Echo. Lexiscan stress test showed small area of mild ischemia in the lateral segment of the left  ventricle with improved LVEF > 65%. Of note, hx of complex stenting of LAD and L main in July 2019 complicated by dissection extending into the aorta, to this point had been managed medically.  Cardiology (Dr. Au) consulted and recommended medical mgmt with oral Lasix 40 mg daily, Coreg 12.5 mg BID (dose reduction from PTA), Imdur 15 mg PO daily (new medication), Plavix 75 mg daily, Lipitor 40 mg daily, Losartan 25 mg daily, and Norvasc 10 mg daily. Heparin gtt stopped and PTA Eliquis resumed. Hyponatremia continued during hospitalization in setting of HF with Na+ stable ~125 at discharge, fluid restriction of 1500 mL/day ordered.    Dyspnea is likely multifactorial. Patient with previous long-term smoking history and COPD. CT chest showed BL progressive infiltrates and bronchiectasis, possible underlying pulmonary fibrosis. Pulmonology (Dr. Hadley) consulted recommend IV Solu-Medrol, Bero Ellipta started and Incruse Ellipta stopped as put on scheduled DuoNebs QID, and doxycycline for COPD exacerbation. Also suggested SLP consult and video swallow study to evaluate for silent aspiration. + dysphagia, discharged on regular cardiac diet with nectar thick liquids.    Of note, resident previously hospitalized at [facility] from 8/30 to 9/3/21 with increasing dyspnea, hypoxia, cough, fever. Diagnosed with BL pneumonia and left sided pneumothorax on chest CT. Thoracic surgery consulted and on 8/31 chest tube was placed and subsequently removed and 9/2 and weaned to room air at time of discharge. Blood cultures negative. Initially treated with IV Zosyn, then Ceftriaxone and Azithromycin.    Other chronic health issues include incidental finding of dilatation of the main pancreatic duct with approximately 2.5 cm of hypo-attenuating focus in the pancreatic head/uncinate process in Sept 2021; pt/family declined further imaging due to advanced age and care goals. Hx of CVA x2 at age 50 and age 75 with residual left sided weakness.  "Hx of coronary artery disease with a non-STEMI in 07/2019 at which time she was found to have multivessel disease. Underwent stenting to the mid-LAD which was unfortunately complicated by a guidewire dissection requiring additional stenting to the proximal LAD and left main, circumflex and RCA were not intervened upon. Afib on DOAC. Second-degree heart block with PPM bedside bedside monitor for remote checks. Peptic ulcer in the past on PPI. Low vision due BL macular degeneration.     Updates on Status Since Skilled nursing Admission:   Reports \"I'm coming along.\"  Breathing is \"bad.\"  Continued COKER, even will minimal activity.  Remains on supplemental oxygen 2L/min via NC.  Cough has improved.  Has thicken liquids on tray, \"I hate this stuff!\" SLP following, may trial free water.  Wearing compression wraps, minimal leg swelling.  Chronic constipation, afraid of loose stools.  + dysuria. No fever/chills.  Goal to get back to residential.    CODE STATUS/ADVANCE DIRECTIVES DISCUSSION:   DNR / DNI  Patient's living condition: lives in an assisted living facility  ALLERGIES: Patient has no known allergies.  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  PAST SURGICAL HISTORY:   has a past surgical history that includes LIGATE FALLOPIAN TUBE (1960s); appendectomy (1960s); Implant pacemaker (05/17/2016); REMV CATARACT INTRACAP,INSERT LENS (Right, 09/20/2017); REMV CATARACT EXTRACAP,INSERT LENS, W/O ECP (Left, 10/04/2017); Esophagoscopy, gastroscopy, duodenoscopy (EGD), combined " (N/A, 11/20/2018); Coronary Angiogram (N/A, 7/17/2019); Left Ventriculogram (N/A, 7/17/2019); Left Heart Cath (N/A, 7/17/2019); Heart Catheterization with Possible Intervention (N/A, 7/18/2019); Percutaneous Coronary Intervention Stent Drug Eluting (N/A, 7/18/2019); and IR Chest Tube Place Non Tunneled Left (8/31/2021).  FAMILY HISTORY: family history includes Coronary Artery Disease in her brother, father, and sister.  SOCIAL HISTORY:   Former smoker 0.7 PPD for 50 years  Daughter Maryjane is [facility] [employer] [facility]  [phone]  Daughters Argelia and Jasbir also supportive.  Resident of [place] since 3/16/21. Moved from daughter's home due to increased care needs.  Former smoker.    Post Discharge Medication Reconciliation Status: discharge medications reconciled and changed, per note/orders.    Current Outpatient Medications   Medication Sig Dispense Refill     acetaminophen (TYLENOL) 325 MG tablet Take 650 mg by mouth every 6 hours as needed for mild pain        amLODIPine (NORVASC) 10 MG tablet TAKE 1 TABLET BY MOUTH ONCE DAILY 28 tablet 11     atorvastatin (LIPITOR) 40 MG tablet TAKE 1 TABLET BY MOUTH AT BEDTIME 35 tablet PRN     bacitracin 500 UNIT/GM external ointment Apply topically daily Apply to open area right shin after cleaning and drying well 30 g 3     carvedilol (COREG) 12.5 MG tablet Take 1 tablet (12.5 mg) by mouth 2 times daily (with meals) 60 tablet 0     clopidogrel (PLAVIX) 75 MG tablet TAKE 1 TABLET BY MOUTH ONCE DAILY 35 tablet PRN     doxycycline hyclate (VIBRAMYCIN) 100 MG capsule Take 1 capsule (100 mg) by mouth every 12 hours for 6 days 12 capsule 0     ELIQUIS ANTICOAGULANT 2.5 MG tablet TAKE 1 TABLET BY MOUTH TWICE DAILY 70 tablet PRN     ferrous sulfate (FE TABS) 325 (65 Fe) MG EC tablet Take 1 tablet (325 mg) by mouth Every Mon, Wed, Fri Morning 12 tablet 98     fluticasone-vilanterol (BREO ELLIPTA) 200-25 MCG/INH inhaler Inhale 1 puff into the lungs daily 1 each 0     furosemide (LASIX) 40 MG tablet  Take 1 tablet (40 mg) by mouth daily 30 tablet 1     guaiFENesin (ROBITUSSIN) 100 MG/5ML liquid Take 10 mLs (200 mg) by mouth 2 times daily. May also take 10 mLs (200 mg) 2 times daily as needed for cough. 180 mL 11     ipratropium - albuterol 0.5 mg/2.5 mg/3 mL (DUONEB) 0.5-2.5 (3) MG/3ML neb solution Take 1 vial (3 mLs) by nebulization 4 times daily 90 mL 0     isosorbide mononitrate (IMDUR) 30 MG 24 hr tablet Take 0.5 tablets (15 mg) by mouth daily 30 tablet 0     losartan (COZAAR) 25 MG tablet TAKE 1 TABLET BY MOUTH ONCE DAILY 28 tablet 11     Multiple Vitamins-Minerals (SYSTANE ICAPS AREDS2) CAPS TAKE 1 CAPSULE BY MOUTH TWICE DAILY 56 capsule PRN     nitroGLYcerin (NITROSTAT) 0.4 MG sublingual tablet For chest pain place 1 tablet under the tongue every 5 minutes for 3 doses. If symptoms persist 5 minutes after 1st dose call 911. 30 tablet 0     omeprazole (PRILOSEC) 20 MG DR capsule TAKE 1 CAPSULE BY MOUTH EVERY MORNING 37 capsule PRN     oxybutynin ER (DITROPAN-XL) 10 MG 24 hr tablet TAKE 1 TABLET BY MOUTH ONCE DAILY 28 tablet 11     potassium chloride ER (KLOR-CON M) 10 MEQ CR tablet Take 1 tablet (10 mEq) by mouth daily 30 tablet 98     predniSONE (DELTASONE) 10 MG tablet 4 tabs daily for 3 days, then 3 tabs daily for 3 days, then 2 tabs daily for 3 days, then 1 tab daily for 3 days, then stop 30 tablet 0     psyllium (METAMUCIL/KONSYL) 58.6 % powder Take 6 g (1 teaspoonful) by mouth daily In 8 oz fluid, drink promptly 283 g 98     simethicone (MYLICON) 125 MG chewable tablet CHEW AND SWALLOW 1 TABLET BY MOUTH EVERY NIGHT AT BEDTIME 38 tablet PRN     Vitamin D3 (CHOLECALCIFEROL) 25 mcg (1000 units) tablet Take 1 tablet (25 mcg) by mouth daily 30 tablet 98     ROS:  Limited secondary to cognitive impairment but today pt reports history as per HPI.     Vitals:  /61   Pulse 76   Temp 97.9  F (36.6  C)   Resp 18   Ht 1.524 m (5')   Wt 49.4 kg (109 lb)   SpO2 93%   BMI 21.29 kg/m    Exam:  GENERAL  APPEARANCE:  Alert, in no distress, pleasant, cooperative, well groomed, sitting up in wheelchair   EYES: Sclera clear and conjunctiva normal, no discharge, wearing glasses  RESP:  Non-labored breathing, no respiratory distress, BL anterior and upper lung fields are clear, no rales, wheeze, or rhonchi. No cough during visit. SpO2 94% 2 L/min via NC.  CV:  Palpation - no murmur/non-displaced PMI, L CW pacer. Auscultation - Rate and rhythm regular, no murmur, no rub or gallop appreciated. 70  VASCULAR: No edema BL LE. Limited exam as wearing new Velcro compression socks leg portion only, loose fitting.  ABDOMEN:  Normal bowel sounds, soft, nontender, no grimacing or guarding with palpation.  M/S:  Left sided weakness with UE flexion contracture.  SKIN: no open areas to lower legs BL  PSYCH: Awake and alert, speech fluent, insight and judgement fair, memory fair, without depressed or anxious affect, calm and cooperative.    Lab/Diagnostic data:  Recent labs in [facility] reviewed by me today.   CBC RESULTS: Recent Labs   Lab Test 02/16/22  0617 02/14/22  1007   WBC 3.5* 6.6   RBC 4.44 4.59   HGB 11.5* 11.7   HCT 36.3 36.8   MCV 82 80   MCH 25.9* 25.5*   MCHC 31.7 31.8   RDW 14.6 14.6    298     Ferritin   Date Value Ref Range Status   10/14/2021 84 8 - 252 ng/mL Final   04/15/2021 30 8 - 252 ng/mL Final     Iron   Date Value Ref Range Status   10/14/2021 44 35 - 180 ug/dL Final   04/15/2021 26 (L) 35 - 180 ug/dL Final     Iron Binding Cap   Date Value Ref Range Status   04/15/2021 250 240 - 430 ug/dL Final     Iron Binding Capacity   Date Value Ref Range Status   10/14/2021 236 (L) 240 - 430 ug/dL Final     Last Basic Metabolic Panel:  Recent Labs   Lab Test 02/16/22  0617 02/15/22  1051   * 125*   POTASSIUM 3.8 3.6   CHLORIDE 94 95   GOLDY 8.8 8.7   CO2 25 24   BUN 28 20   CR 0.73 0.85   * 162*     Liver Function Studies -   Recent Labs   Lab Test 02/12/22  1853 10/14/21  0945   PROTTOTAL 6.8 7.0   ALBUMIN  2.6* 2.8*   BILITOTAL 0.3 0.3   ALKPHOS 140 110   AST 17 25   ALT 13 28       TSH   Date Value Ref Range Status   04/15/2021 1.44 0.40 - 4.00 mU/L Final   10/12/2016 1.00 0.40 - 4.00 mU/L Final     EXAM: CT CHEST WITHOUT CONTRAST  LOCATION: [facility]  DATE/TIME: 02/13/2022, 9:27 AM     INDICATION: Respiratory illness, nondiagnostic x-ray.  COMPARISON: 8/30/2021  TECHNIQUE: CT chest without IV contrast. Multiplanar reformats were obtained. Dose reduction techniques were used.  CONTRAST: None.     FINDINGS:   LUNGS AND PLEURA: Emphysema. Possible underlying fibrosis and bronchiectasis probably with superimposed infectious or inflammatory infiltrates, fibrosis, and bronchiectasis, all appear significantly worse and/or new since comparison.     MEDIASTINUM/AXILLAE: There are extensive atherosclerotic changes of the visualized aorta and its branches. There is no evidence of aortic aneurysm. Mildly prominent lymph nodes are noted which are nonspecific and may be reactive in this setting.     CORONARY ARTERY CALCIFICATION: Severe.     UPPER ABDOMEN: Probable cholelithiasis without cholecystitis.     MUSCULOSKELETAL: No frankly destructive bony lesions.                                                    IMPRESSION:   1.  Progressive infiltrates, bronchiectasis, and possible underlying fibrosis since the comparison study.     Echo 2/12/22  Interpretation Summary     1. Left ventricular systolic function is mildly reduced. The visual ejection fraction is 40-45%.  2. Septal wall motion abnormality may reflect pacemaker activation.  3. There is mild concentric left ventricular hypertrophy.  4. The right ventricle is normal in structure, function and size.  5. There is mild (1+) mitral regurgitation.  6. In comparison with the prior study, there has been an interval reduction in  LV function.    SLP Eval 2/16/22  Patient presents with mild to moderate oral and pharyngeal dysphagia on today's study.  Deficits include; decreased bolus control during oral transit with premature entry and delayed swallow with all liquids. Decreased laryngeal closure. Thin liquids there was premature spillage to the pyriform sinuses with deep laryngeal penetration to the bottom of the cords with trace aspiration after a solid. Premature spillage of slightly thick and mildly thick liquids without penetration or aspiration. Mildly decreased AP movement with semi-solids and solids without penetration/aspiration and no pharyngeal residue after the swallow.  Patient is at an elevated risk for aspiration with all liquids due to spilling past the open airway. Recommend: 1. Regular diet and slightly thick liquids. 2. Upright, no straws, small bites/sips, alternate liquids/solids. 3. Ok to have ice chips and sips of water between meals after good oral hygiene.    VIDEO SWALLOWING EVALUATION   2/16/2022 9:36 AM      FINDINGS:    Thin: Teaspoon and cup sips demonstrated spillage with coating of the  vallecula and piriform sinuses. Most swallows did not demonstrate  penetration or aspiration. However, after swallowing a cookie the  patient did demonstrate a small amount of aspiration. This elicited a  small cough.     Mildly thick: Early spillage with coating of the vallecula and  piriform sinus. No penetration or aspiration.     Pudding: Early spillage into the vallecula. Otherwise normal.     Semisolid: Early spillage into the vallecula. Otherwise normal.     Solid: Early spillage into the vallecula. Otherwise normal.     This study only includes the cervical esophagus.     MARTA MARIE MD     ASSESSMENT/PLAN:  (J96.01) Acute respiratory failure with hypoxia (H)  (primary encounter diagnosis)  Comment: Deteriorated, now requiring supplemental oxygen. Etiology multifactorial due to cardiovascular disease, COPD, and possible underlying pulmonary fibrosis.   Plan:   - Continue supplemental oxygen, wean as able  - Treat COPD and HF  exacerbations as below and medical mgmt of CAD   - Will benefit from TCU level care and PT/OT supports with goal to return to [facility]    (J44.1) COPD exacerbation (H)  (J43.9) Pulmonary emphysema (H)  (J84.10) Possible Pulmonary fibrosis (H)  Comment: Stable, continued COKER  Plan:   - Doxycycline 100 mg BID for six more days, last dose 2/22/22  - Complete Prednisone taper last dose 2/28/22  - Continue Duonebs QID, switch to inhaler once stable due to global pandemic >> PTA on Incruse Ellipta  - Continue Bero Ellipta (started in hospital)  - Continue guaifenesin BID and BID PRN    (R13.10) Dysphagia  Comment: Deteriorated, new dx in hospital, silent aspiration could contribute to recurrent exacerbation of chronic lung disease  Plan:  -  SLP to follow  - Regular diet with nectar thick liquids    (I25.10) ASCVD (arteriosclerotic cardiovascular disease)  (E78.5) Hyperlipidemia LDL goal <100  Comment: Deteriorated. Previous NSTEMI in July 2019 with stenting. Lexiscan stress test showed small area of mild ischemia in the lateral segment of the left ventricle. No angina, + COKER. Plan for medication mgmt per cardiology.  Plan:   - Continue Imdur ER 15 mg daily (started in hospital)  - Continue Plavix 75 mg daily  - Continue Atorvastatin>> lipid panel due April 2022  - Continue Coreg 12.5 mg BID (dose reduced in hospital)   - Continue PRN SL nitroglycerin  - Check BMP 2/21  - Cardiology follow-up PRN, too difficult to get to clinic for routine care    (I50.22) Chronic systolic heart failure (H)  (E87.1) Hyponatremia   Comment: Deteriorated. Clinical appearance of HF exacerbation on hospital admission and Echo showed EF 40-45%, which was reduced previous EF of 60%. Good response to IV Lasix. Cardiology feels COKER more related to lung disease. Last sodium 125 on hospital discharge   Plan:   - 1500 mL fluid restriction  - BMP 2/21  - Lasix 40 mg daily  - Add daily weights  - Continue Losartan, Coreg, and Lasix    (I48.0) Paroxysmal  atrial fibrillation (H)  (Z95.0) Presence of permanent cardiac pacemaker  Comment: Stable. Second-degree heart block with PPM placement.  Plan:   - Continue apixaban 2.5 mg PO BID per cardiology  - Continue Coreg 12.5 mg BID (dose reduction from PTA 18.75 mg BID)  - Device check quarterly  - Routine VS    (I10) Essential hypertension, benign  Comment: Stable.  BP Readings from Last 3 Encounters:   02/18/22 118/61   02/16/22 110/55   02/07/22 (!) 155/79   Plan:   - Continue Losartan 25 mg daily   - Coreg 12.5 mg BID   - Continue amlodipine 10 mg daily   - Monitor routine VS   - BMP 2/21    (G31.84) Mild cognitive impairment  (R26.9) Abnormal gait  (G81.94) Left hemiparesis (H)  Comment:  Mild memory concerns, last SLUMS 21/30. Now uses WC for mobility, left hemiparesis. Physical deconditioning s/p hospitalization.  Plan:   - PT/OT  - Goal to return to [facility] with increased supports  - Falls precautions  - PRN Tylenol     (D64.9) Normocytic Anemia  Comment: Stable, last Hgb 11.7 on 2/16/22, iron 44 WNL on 10/14/21.  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    (K58.2) IBS  Comment: Hx of fluctuating between diarrhea and constipation  Plan:  - Continue fiber supplement  - Continue simethicone q HS     (Z71.89) Advance Care Planning  Comment: POLST signed by provider yesterday, DNR/DNI selective treatment. State goal to get back to [facility] apartment [facility].  Plan:   - Spoke with daughter Maryjane, feels resident may not make it back to [facility] due to advanced age and multiple chronic health conditions, which is a realistic view point. Plan to see how resident does with therapy. LTC would be option if not safe to go  back to [facility]. Discussed hospice when appropriate and family would be open to this. Focus on quality of life remains central.    Total time spent with patient visit at the skilled nursing facility was 35 min including patient visit, review of past records, discussion with facility nursing staff, SLP, PT, and call to daughter. Greater than 50% of total time spent with counseling and coordinating care due to resident with resident hospitalization new dx of acute respiratory failure, HFrEF, and pulmonary fibrosis. 18/35 minutes on counseling and care coordination review of medication changes, prognosis, and care goals, as well as plan with PT, nursing, and SLP.     Electronically signed by:  STEFFANY Yusuf CNP

## 2022-02-18 NOTE — PATIENT INSTRUCTIONS
Yoselyn Cui  1/28/1932  ORDERS:  - CBC and BMP on 2/21   - Discontinue Bacitracin, no open area to legs  - Attempt to wean supplemental oxygen for SpO2 > 90% on RA q shift     - Daily weights, notify NP/MD of weight gain of 2# in 3 days or 5# in one week  Electronically signed by:   STEFFANY Yusuf CNP  02/18/22 3:03 PM

## 2022-02-18 NOTE — LETTER
2/18/2022        RE: Yoselyn Cui  [place]  [address]  [address]  [place] [address]        [facility]  PRIMARY CARE PROVIDER AND CLINIC:  STEFFANY Yusuf CNP, [address] / [place] [address]  Chief Complaint   Patient presents with     [facility]     [facility] Record Number:  [id number]  Place of Service where encounter took place:  [facility] [[id number]]    Yoselyn Cui  is a 89 year old  (1/28/1932)  female with PMH significant for HTN, OAB, GERD, hx of CVA x2, second-degree heart block s/p PPM (2016), paroxysmal atrial fibrillation (on apixaban), hyperlipidemia, CAD s/ps non-STEMI in July 20219 with multi-vessel disease s/p stenting of LAD complicated by guidewire dissection (on Plavix), HFrEF (dx Feb 2022), admitted to the above facility from  [facility]. Hospital stay 2/12/22 through 2/16/22.     Admitted to this facility for  rehab, medical management and nursing care.    HPI:    HPI information obtained from: facility chart records, facility staff, patient report, [facility] chart review and Care Everywhere [facility] chart review.     Brief Summary of Hospital Course:   Resident admitted to [facility] from 2/12 to 2/16/22 with increased SOB over two day period with new hypoxia requiring supplemental oxygen via NC. Afebrile and hemodynamically stable. Exam significant for rales BL bases and BL LE edema. CXR showed BL lower lobe interstitial/airspace opacities with BL effusions. Labs significant for elevated BNP, troponin, normal WBC and normal procalcitonin. Blood cultures negative. Admitted with acute respiratory failure in setting of chronic lung disease, suspected new heart failure diagnosis with acute exacerbation and possible NSTEMI.    Due to elevated troponin started on heparin gtt. Treated with IV lasix 40 mg BID for suspected HF exacerbation. Echo showed EF 40-45%, which is reduced previous EF of 60%  on last Echo. Lexiscan stress test showed small area of mild ischemia in the lateral segment of the left ventricle with improved LVEF > 65%. Of note, hx of complex stenting of LAD and L main in July 2019 complicated by dissection extending into the aorta, to this point had been managed medically.  Cardiology (Dr. Au) consulted and recommended medical mgmt with oral Lasix 40 mg daily, Coreg 12.5 mg BID (dose reduction from PTA), Imdur 15 mg PO daily (new medication), Plavix 75 mg daily, Lipitor 40 mg daily, Losartan 25 mg daily, and Norvasc 10 mg daily. Heparin gtt stopped and PTA Eliquis resumed. Hyponatremia continued during hospitalization in setting of HF with Na+ stable ~125 at discharge, fluid restriction of 1500 mL/day ordered.    Dyspnea is likely multifactorial. Patient with previous long-term smoking history and COPD. CT chest showed BL progressive infiltrates and bronchiectasis, possible underlying pulmonary fibrosis. Pulmonology (Dr. Hadley) consulted recommend IV Solu-Medrol, Bero Ellipta started and Incruse Ellipta stopped as put on scheduled DuoNebs QID, and doxycycline for COPD exacerbation. Also suggested SLP consult and video swallow study to evaluate for silent aspiration. + dysphagia, discharged on regular cardiac diet with nectar thick liquids.    Of note, resident previously hospitalized at [facility] from 8/30 to 9/3/21 with increasing dyspnea, hypoxia, cough, fever. Diagnosed with BL pneumonia and left sided pneumothorax on chest CT. Thoracic surgery consulted and on 8/31 chest tube was placed and subsequently removed and 9/2 and weaned to room air at time of discharge. Blood cultures negative. Initially treated with IV Zosyn, then Ceftriaxone and Azithromycin.    Other chronic health issues include incidental finding of dilatation of the main pancreatic duct with approximately 2.5 cm of hypo-attenuating focus in the pancreatic head/uncinate process in Sept 2021; pt/family declined further imaging  "due to advanced age and care goals. Hx of CVA x2 at age 50 and age 75 with residual left sided weakness. Hx of coronary artery disease with a non-STEMI in 07/2019 at which time she was found to have multivessel disease. Underwent stenting to the mid-LAD which was unfortunately complicated by a guidewire dissection requiring additional stenting to the proximal LAD and left main, circumflex and RCA were not intervened upon. Afib on DOAC. Second-degree heart block with PPM bedside bedside monitor for remote checks. Peptic ulcer in the past on PPI. Low vision due BL macular degeneration.     Updates on Status Since Skilled nursing Admission:   Reports \"I'm coming along.\"  Breathing is \"bad.\"  Continued COKER, even will minimal activity.  Remains on supplemental oxygen 2L/min via NC.  Cough has improved.  Has thicken liquids on tray, \"I hate this stuff!\" SLP following, may trial free water.  Wearing compression wraps, minimal leg swelling.  Chronic constipation, afraid of loose stools.  + dysuria. No fever/chills.  Goal to get back to group home.    CODE STATUS/ADVANCE DIRECTIVES DISCUSSION:   DNR / DNI  Patient's living condition: lives in an assisted living facility  ALLERGIES: Patient has no known allergies.  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  PAST SURGICAL HISTORY:   has a past surgical history that includes LIGATE FALLOPIAN TUBE (1960s); appendectomy (1960s); Implant pacemaker (05/17/2016); REMV CATARACT INTRACAP,INSERT LENS (Right, 09/20/2017); REMV CATARACT " EXTRACAP,INSERT LENS, W/O ECP (Left, 10/04/2017); Esophagoscopy, gastroscopy, duodenoscopy (EGD), combined (N/A, 11/20/2018); Coronary Angiogram (N/A, 7/17/2019); Left Ventriculogram (N/A, 7/17/2019); Left Heart Cath (N/A, 7/17/2019); Heart Catheterization with Possible Intervention (N/A, 7/18/2019); Percutaneous Coronary Intervention Stent Drug Eluting (N/A, 7/18/2019); and IR Chest Tube Place Non Tunneled Left (8/31/2021).  FAMILY HISTORY: family history includes Coronary Artery Disease in her brother, father, and sister.  SOCIAL HISTORY:   Former smoker 0.7 PPD for 50 years  Daughter Maryjane is [facility]  [phone]  Daughters Argelia and Jasbir also supportive.  Resident of [facility] since 3/16/21. Moved from daughter's home due to increased care needs.  Former smoker.    Post Discharge Medication Reconciliation Status: discharge medications reconciled and changed, per note/orders.    Current Outpatient Medications   Medication Sig Dispense Refill     acetaminophen (TYLENOL) 325 MG tablet Take 650 mg by mouth every 6 hours as needed for mild pain        amLODIPine (NORVASC) 10 MG tablet TAKE 1 TABLET BY MOUTH ONCE DAILY 28 tablet 11     atorvastatin (LIPITOR) 40 MG tablet TAKE 1 TABLET BY MOUTH AT BEDTIME 35 tablet PRN     bacitracin 500 UNIT/GM external ointment Apply topically daily Apply to open area right shin after cleaning and drying well 30 g 3     carvedilol (COREG) 12.5 MG tablet Take 1 tablet (12.5 mg) by mouth 2 times daily (with meals) 60 tablet 0     clopidogrel (PLAVIX) 75 MG tablet TAKE 1 TABLET BY MOUTH ONCE DAILY 35 tablet PRN     doxycycline hyclate (VIBRAMYCIN) 100 MG capsule Take 1 capsule (100 mg) by mouth every 12 hours for 6 days 12 capsule 0     ELIQUIS ANTICOAGULANT 2.5 MG tablet TAKE 1 TABLET BY MOUTH TWICE DAILY 70 tablet PRN     ferrous sulfate (FE TABS) 325 (65 Fe) MG EC tablet Take 1 tablet (325 mg) by mouth Every Mon, Wed, Fri Morning 12 tablet 98     fluticasone-vilanterol (BREO  ELLIPTA) 200-25 MCG/INH inhaler Inhale 1 puff into the lungs daily 1 each 0     furosemide (LASIX) 40 MG tablet Take 1 tablet (40 mg) by mouth daily 30 tablet 1     guaiFENesin (ROBITUSSIN) 100 MG/5ML liquid Take 10 mLs (200 mg) by mouth 2 times daily. May also take 10 mLs (200 mg) 2 times daily as needed for cough. 180 mL 11     ipratropium - albuterol 0.5 mg/2.5 mg/3 mL (DUONEB) 0.5-2.5 (3) MG/3ML neb solution Take 1 vial (3 mLs) by nebulization 4 times daily 90 mL 0     isosorbide mononitrate (IMDUR) 30 MG 24 hr tablet Take 0.5 tablets (15 mg) by mouth daily 30 tablet 0     losartan (COZAAR) 25 MG tablet TAKE 1 TABLET BY MOUTH ONCE DAILY 28 tablet 11     Multiple Vitamins-Minerals (SYSTANE ICAPS AREDS2) CAPS TAKE 1 CAPSULE BY MOUTH TWICE DAILY 56 capsule PRN     nitroGLYcerin (NITROSTAT) 0.4 MG sublingual tablet For chest pain place 1 tablet under the tongue every 5 minutes for 3 doses. If symptoms persist 5 minutes after 1st dose call 911. 30 tablet 0     omeprazole (PRILOSEC) 20 MG DR capsule TAKE 1 CAPSULE BY MOUTH EVERY MORNING 37 capsule PRN     oxybutynin ER (DITROPAN-XL) 10 MG 24 hr tablet TAKE 1 TABLET BY MOUTH ONCE DAILY 28 tablet 11     potassium chloride ER (KLOR-CON M) 10 MEQ CR tablet Take 1 tablet (10 mEq) by mouth daily 30 tablet 98     predniSONE (DELTASONE) 10 MG tablet 4 tabs daily for 3 days, then 3 tabs daily for 3 days, then 2 tabs daily for 3 days, then 1 tab daily for 3 days, then stop 30 tablet 0     psyllium (METAMUCIL/KONSYL) 58.6 % powder Take 6 g (1 teaspoonful) by mouth daily In 8 oz fluid, drink promptly 283 g 98     simethicone (MYLICON) 125 MG chewable tablet CHEW AND SWALLOW 1 TABLET BY MOUTH EVERY NIGHT AT BEDTIME 38 tablet PRN     Vitamin D3 (CHOLECALCIFEROL) 25 mcg (1000 units) tablet Take 1 tablet (25 mcg) by mouth daily 30 tablet 98     ROS:  Limited secondary to cognitive impairment but today pt reports history as per HPI.     Vitals:  /61   Pulse 76   Temp 97.9  F  (36.6  C)   Resp 18   Ht 1.524 m (5')   Wt 49.4 kg (109 lb)   SpO2 93%   BMI 21.29 kg/m    Exam:  GENERAL APPEARANCE:  Alert, in no distress, pleasant, cooperative, well groomed, sitting up in wheelchair   EYES: Sclera clear and conjunctiva normal, no discharge, wearing glasses  RESP:  Non-labored breathing, no respiratory distress, BL anterior and upper lung fields are clear, no rales, wheeze, or rhonchi. No cough during visit. SpO2 94% 2 L/min via NC.  CV:  Palpation - no murmur/non-displaced PMI, L CW pacer. Auscultation - Rate and rhythm regular, no murmur, no rub or gallop appreciated. 70  VASCULAR: No edema BL LE. Limited exam as wearing new Velcro compression socks leg portion only, loose fitting.  ABDOMEN:  Normal bowel sounds, soft, nontender, no grimacing or guarding with palpation.  M/S:  Left sided weakness with UE flexion contracture.  SKIN: no open areas to lower legs BL  PSYCH: Awake and alert, speech fluent, insight and judgement fair, memory fair, without depressed or anxious affect, calm and cooperative.    Lab/Diagnostic data:  Recent labs in [facility] reviewed by me today.   CBC RESULTS: Recent Labs   Lab Test 02/16/22  0617 02/14/22  1007   WBC 3.5* 6.6   RBC 4.44 4.59   HGB 11.5* 11.7   HCT 36.3 36.8   MCV 82 80   MCH 25.9* 25.5*   MCHC 31.7 31.8   RDW 14.6 14.6    298     Ferritin   Date Value Ref Range Status   10/14/2021 84 8 - 252 ng/mL Final   04/15/2021 30 8 - 252 ng/mL Final     Iron   Date Value Ref Range Status   10/14/2021 44 35 - 180 ug/dL Final   04/15/2021 26 (L) 35 - 180 ug/dL Final     Iron Binding Cap   Date Value Ref Range Status   04/15/2021 250 240 - 430 ug/dL Final     Iron Binding Capacity   Date Value Ref Range Status   10/14/2021 236 (L) 240 - 430 ug/dL Final     Last Basic Metabolic Panel:  Recent Labs   Lab Test 02/16/22  0617 02/15/22  1051   * 125*   POTASSIUM 3.8 3.6   CHLORIDE 94 95   GOLDY 8.8 8.7   CO2 25 24   BUN 28 20   CR 0.73 0.85   * 162*      Liver Function Studies -   Recent Labs   Lab Test 02/12/22  1853 10/14/21  0945   PROTTOTAL 6.8 7.0   ALBUMIN 2.6* 2.8*   BILITOTAL 0.3 0.3   ALKPHOS 140 110   AST 17 25   ALT 13 28       TSH   Date Value Ref Range Status   04/15/2021 1.44 0.40 - 4.00 mU/L Final   10/12/2016 1.00 0.40 - 4.00 mU/L Final     EXAM: CT CHEST WITHOUT CONTRAST  LOCATION: [facility]  DATE/TIME: 02/13/2022, 9:27 AM     INDICATION: Respiratory illness, nondiagnostic x-ray.  COMPARISON: 8/30/2021  TECHNIQUE: CT chest without IV contrast. Multiplanar reformats were obtained. Dose reduction techniques were used.  CONTRAST: None.     FINDINGS:   LUNGS AND PLEURA: Emphysema. Possible underlying fibrosis and bronchiectasis probably with superimposed infectious or inflammatory infiltrates, fibrosis, and bronchiectasis, all appear significantly worse and/or new since comparison.     MEDIASTINUM/AXILLAE: There are extensive atherosclerotic changes of the visualized aorta and its branches. There is no evidence of aortic aneurysm. Mildly prominent lymph nodes are noted which are nonspecific and may be reactive in this setting.     CORONARY ARTERY CALCIFICATION: Severe.     UPPER ABDOMEN: Probable cholelithiasis without cholecystitis.     MUSCULOSKELETAL: No frankly destructive bony lesions.                                                    IMPRESSION:   1.  Progressive infiltrates, bronchiectasis, and possible underlying fibrosis since the comparison study.     Echo 2/12/22  Interpretation Summary     1. Left ventricular systolic function is mildly reduced. The visual ejection fraction is 40-45%.  2. Septal wall motion abnormality may reflect pacemaker activation.  3. There is mild concentric left ventricular hypertrophy.  4. The right ventricle is normal in structure, function and size.  5. There is mild (1+) mitral regurgitation.  6. In comparison with the prior study, there has been an interval reduction in  LV  function.    SLP Eval 2/16/22  Patient presents with mild to moderate oral and pharyngeal dysphagia on today's study. Deficits include; decreased bolus control during oral transit with premature entry and delayed swallow with all liquids. Decreased laryngeal closure. Thin liquids there was premature spillage to the pyriform sinuses with deep laryngeal penetration to the bottom of the cords with trace aspiration after a solid. Premature spillage of slightly thick and mildly thick liquids without penetration or aspiration. Mildly decreased AP movement with semi-solids and solids without penetration/aspiration and no pharyngeal residue after the swallow.  Patient is at an elevated risk for aspiration with all liquids due to spilling past the open airway. Recommend: 1. Regular diet and slightly thick liquids. 2. Upright, no straws, small bites/sips, alternate liquids/solids. 3. Ok to have ice chips and sips of water between meals after good oral hygiene.    VIDEO SWALLOWING EVALUATION   2/16/2022 9:36 AM      FINDINGS:    Thin: Teaspoon and cup sips demonstrated spillage with coating of the  vallecula and piriform sinuses. Most swallows did not demonstrate  penetration or aspiration. However, after swallowing a cookie the  patient did demonstrate a small amount of aspiration. This elicited a  small cough.     Mildly thick: Early spillage with coating of the vallecula and  piriform sinus. No penetration or aspiration.     Pudding: Early spillage into the vallecula. Otherwise normal.     Semisolid: Early spillage into the vallecula. Otherwise normal.     Solid: Early spillage into the vallecula. Otherwise normal.     This study only includes the cervical esophagus.     MARTA MARIE MD     ASSESSMENT/PLAN:  (J96.01) Acute respiratory failure with hypoxia (H)  (primary encounter diagnosis)  Comment: Deteriorated, now requiring supplemental oxygen. Etiology multifactorial due to cardiovascular disease, COPD, and possible  underlying pulmonary fibrosis.   Plan:   - Continue supplemental oxygen, wean as able  - Treat COPD and HF exacerbations as below and medical mgmt of CAD   - Will benefit from TCU level care and PT/OT supports with goal to return to [facility]    (J44.1) COPD exacerbation (H)  (J43.9) Pulmonary emphysema (H)  (J84.10) Possible Pulmonary fibrosis (H)  Comment: Stable, continued COKER  Plan:   - Doxycycline 100 mg BID for six more days, last dose 2/22/22  - Complete Prednisone taper last dose 2/28/22  - Continue Duonebs QID, switch to inhaler once stable due to global pandemic >> PTA on Incruse Ellipta  - Continue Bero Ellipta (started in hospital)  - Continue guaifenesin BID and BID PRN    (R13.10) Dysphagia  Comment: Deteriorated, new dx in hospital, silent aspiration could contribute to recurrent exacerbation of chronic lung disease  Plan:  -  SLP to follow  - Regular diet with nectar thick liquids    (I25.10) ASCVD (arteriosclerotic cardiovascular disease)  (E78.5) Hyperlipidemia LDL goal <100  Comment: Deteriorated. Previous NSTEMI in July 2019 with stenting. Lexiscan stress test showed small area of mild ischemia in the lateral segment of the left ventricle. No angina, + COKER. Plan for medication mgmt per cardiology.  Plan:   - Continue Imdur ER 15 mg daily (started in hospital)  - Continue Plavix 75 mg daily  - Continue Atorvastatin>> lipid panel due April 2022  - Continue Coreg 12.5 mg BID (dose reduced in hospital)   - Continue PRN SL nitroglycerin  - Check BMP 2/21  - Cardiology follow-up PRN, too difficult to get to clinic for routine care    (I50.22) Chronic systolic heart failure (H)  (E87.1) Hyponatremia   Comment: Deteriorated. Clinical appearance of HF exacerbation on hospital admission and Echo showed EF 40-45%, which was reduced previous EF of 60%. Good response to IV Lasix. Cardiology feels COKER more related to lung disease. Last sodium 125 on hospital discharge   Plan:   - 1500 mL fluid restriction  - BMP  2/21  - Lasix 40 mg daily  - Add daily weights  - Continue Losartan, Coreg, and Lasix    (I48.0) Paroxysmal atrial fibrillation (H)  (Z95.0) Presence of permanent cardiac pacemaker  Comment: Stable. Second-degree heart block with PPM placement.  Plan:   - Continue apixaban 2.5 mg PO BID per cardiology  - Continue Coreg 12.5 mg BID (dose reduction from PTA 18.75 mg BID)  - Device check quarterly  - Routine VS    (I10) Essential hypertension, benign  Comment: Stable.  BP Readings from Last 3 Encounters:   02/18/22 118/61   02/16/22 110/55   02/07/22 (!) 155/79   Plan:   - Continue Losartan 25 mg daily   - Coreg 12.5 mg BID   - Continue amlodipine 10 mg daily   - Monitor routine VS   - BMP 2/21    (G31.84) Mild cognitive impairment  (R26.9) Abnormal gait  (G81.94) Left hemiparesis (H)  Comment:  Mild memory concerns, last SLUMS 21/30. Now uses WC for mobility, left hemiparesis. Physical deconditioning s/p hospitalization.  Plan:   - PT/OT  - Goal to return to [facility] with increased supports  - Falls precautions  - PRN Tylenol     (D64.9) Normocytic Anemia  Comment: Stable, last Hgb 11.7 on 2/16/22, iron 44 WNL on 10/14/21.  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    (K58.2) IBS  Comment: Hx of fluctuating between diarrhea and constipation  Plan:  - Continue fiber supplement  - Continue simethicone q HS     (Z71.89) Advance Care Planning  Comment: POLST signed by provider yesterday, DNR/DNI selective treatment. State goal to get back to [facility] apartment at [facility].  Plan:   - Called daughter Maryjane, messaged left requesting return call to review plan of care    Total time spent with patient visit at the [facility]  nursing facility was 35 min including patient visit, review of past records, discussion with facility nursing staff, SLP, PT, and call to daughter. Greater than 50% of total time spent with counseling and coordinating care due to resident with resident hospitalization new dx of acute respiratory failure, HFrEF, and pulmonary fibrosis.     Electronically signed by:  STEFFANY Yusuf CNP                     Sincerely,        STEFFANY Yusuf CNP

## 2022-02-21 NOTE — PROGRESS NOTES
[place] [facility]  [place] Medical Record Number:  [id number]  Place of Service where encounter took place:  [facility] (TCU) [[id number]]  Chief Complaint   Patient presents with     Nursing Home Acute       HPI:    Yoselyn Cui  is a 89 year old (1/28/1932), who is being seen today for an episodic care visit.  HPI information obtained from: facility chart records, facility staff and patient report. Today's concern is:    Patient Yoselyn Ray is a 89 yr old female admitted to [facility] for rehabilitation s/p hospitalization [facility] 2/12/22-2/16/22 for CHF exacerbation, possible nonST elevation myocardial infarction and acute respiratory failure. Patient diuresed and discharged on 2L nasal cannula   history left pneumothorax in setting of emphysema and treatment of bilateral lower lobe pneumonia Aug and Sept 2021  Treated with antibiotics and chest tube at the time  CT chest recent hospitalization show infiltrates, bronchiectasis and possible underlying fibrosis since comparison studay    PMHx afib s/p pacemaker on anticoagulation, COPD (not on oxygen at baseline), and cognitive impairment  Lives in [facility]     TODAY  Patient reports she has some shortness of breath still on 2L NC  Participating in therapies and wishes to return to [facility]  Reports she is eating, however, decrease appetite  Reports regular elimination  Has short term memory loss    Per consult with speech therapy patient refuse to take out dentures due to fear staff will loose them  Reminder to staff to make sure to take out and clean daily    Per consult therapies patient working transfer 1 assist and strengthening. Vitals stable with exercise    Per consult with    Resident currently lives at [facility]. They assist with meds, meals, laundry, showers, toileting and escorts. She has a cane, walker, w/c, gb, and pendant. She has lifesprk homecare. Oxygen is new.  Plan is to return to [facility] when able. Daughters Ava are primary contact.          Past Medical and Surgical History reviewed in Epic today.    MEDICATIONS:    Current Outpatient Medications   Medication Sig Dispense Refill     acetaminophen (TYLENOL) 325 MG tablet Take 650 mg by mouth every 6 hours as needed for mild pain        amLODIPine (NORVASC) 10 MG tablet TAKE 1 TABLET BY MOUTH ONCE DAILY 28 tablet 11     atorvastatin (LIPITOR) 40 MG tablet TAKE 1 TABLET BY MOUTH AT BEDTIME 35 tablet PRN     carvedilol (COREG) 12.5 MG tablet Take 1 tablet (12.5 mg) by mouth 2 times daily (with meals) 60 tablet 0     clopidogrel (PLAVIX) 75 MG tablet TAKE 1 TABLET BY MOUTH ONCE DAILY 35 tablet PRN     doxycycline hyclate (VIBRAMYCIN) 100 MG capsule Take 1 capsule (100 mg) by mouth every 12 hours for 6 days 12 capsule 0     ELIQUIS ANTICOAGULANT 2.5 MG tablet TAKE 1 TABLET BY MOUTH TWICE DAILY 70 tablet PRN     ferrous sulfate (FE TABS) 325 (65 Fe) MG EC tablet Take 1 tablet (325 mg) by mouth Every Mon, Wed, Fri Morning 12 tablet 98     fluticasone-vilanterol (BREO ELLIPTA) 200-25 MCG/INH inhaler Inhale 1 puff into the lungs daily 1 each 0     furosemide (LASIX) 40 MG tablet Take 1 tablet (40 mg) by mouth daily 30 tablet 1     guaiFENesin (ROBITUSSIN) 100 MG/5ML liquid Take 10 mLs (200 mg) by mouth 2 times daily. May also take 10 mLs (200 mg) 2 times daily as needed for cough. 180 mL 11     ipratropium - albuterol 0.5 mg/2.5 mg/3 mL (DUONEB) 0.5-2.5 (3) MG/3ML neb solution Take 1 vial (3 mLs) by nebulization 4 times daily 90 mL 0     isosorbide mononitrate (IMDUR) 30 MG 24 hr tablet Take 0.5 tablets (15 mg) by mouth daily 30 tablet 0     losartan (COZAAR) 25 MG tablet TAKE 1 TABLET BY MOUTH ONCE DAILY 28 tablet 11     Multiple Vitamins-Minerals (SYSTANE ICAPS AREDS2) CAPS TAKE 1 CAPSULE BY MOUTH TWICE DAILY 56 capsule PRN     nitroGLYcerin (NITROSTAT) 0.4 MG sublingual tablet For chest pain place 1 tablet under the  tongue every 5 minutes for 3 doses. If symptoms persist 5 minutes after 1st dose call 911. 30 tablet 0     omeprazole (PRILOSEC) 20 MG DR capsule TAKE 1 CAPSULE BY MOUTH EVERY MORNING 37 capsule PRN     oxybutynin ER (DITROPAN-XL) 10 MG 24 hr tablet TAKE 1 TABLET BY MOUTH ONCE DAILY 28 tablet 11     potassium chloride ER (KLOR-CON M) 10 MEQ CR tablet Take 1 tablet (10 mEq) by mouth daily 30 tablet 98     predniSONE (DELTASONE) 10 MG tablet 4 tabs daily for 3 days, then 3 tabs daily for 3 days, then 2 tabs daily for 3 days, then 1 tab daily for 3 days, then stop 30 tablet 0     psyllium (METAMUCIL/KONSYL) 58.6 % powder Take 6 g (1 teaspoonful) by mouth daily In 8 oz fluid, drink promptly 283 g 98     simethicone (MYLICON) 125 MG chewable tablet CHEW AND SWALLOW 1 TABLET BY MOUTH EVERY NIGHT AT BEDTIME 38 tablet PRN     Vitamin D3 (CHOLECALCIFEROL) 25 mcg (1000 units) tablet Take 1 tablet (25 mcg) by mouth daily 30 tablet 98     REVIEW OF SYSTEMS:  10 point ROS of systems including Constitutional, Eyes, Respiratory, Cardiovascular, Gastroenterology, Genitourinary, Integumentary, Musculoskeletal, Psychiatric were all negative except for pertinent positives noted in my HPI.    Objective:  BP (!) 147/73   Pulse 69   Temp 97.5  F (36.4  C)   Resp 18   Ht 1.524 m (5')   Wt 48.7 kg (107 lb 6.4 oz)   SpO2 96%   BMI 20.98 kg/m    Exam:  GENERAL APPEARANCE:  Alert, in no distress  ENT:  Mouth and posterior oropharynx normal, moist mucous membranes  EYES:  EOM, conjunctivae, lids, pupils and irises normal  NECK:  No adenopathy,masses or thyromegaly  RESP:  respiratory effort and palpation of chest normal, lungs clear to auscultation , no respiratory distress  CV:  Palpation and auscultation of heart done , regular rate and rhythm, no murmur, rub, or gallop  ABDOMEN:  normal bowel sounds, soft, nontender, no hepatosplenomegaly or other masses  M/S:   sitting in bed  SKIN:  Inspection of skin and subcutaneous tissue  baseline  NEURO:   Cranial nerves 2-12 are normal tested and grossly at patient's baseline  PSYCH:  memory impaired , affect and mood normal    Labs:   Labs done in SNF are in [place] EPIC. Please refer to them using [employer]Care Everywhere.    ASSESSMENT/PLAN:    congestive heart failure (new)  Hypoxic acute respiratory failure   Possible non-ST-elevation myocardial infarction   Status post permanent pacemaker for sick sinus syndrome   Intermittent atrial fibrillation on anticoagulation   Shortness of breath felt to be multifactorial with underlying COPD and pulmonary fibrosis contributing as well as underlying   no history of congestive heart failure or ischemic heart disease.    Chest X-ray consistent with heart failure. BNP and troponin are elevated.  No fever or other signs of infection.    Echo showed EF at 40 to 45% lower than usual prior EF of 60%  Lexiscan stress test was done to evaluate for ischemia and it showed small area of mild ischemia in the lateral segment of the left ventricle. LVEF is normal greater than 65%.  There is no prior study for comparison  Patient diuresed and discharged on lasix 40mg daily  Continue Coreg 12.5m 2 x daily and imdur 15mg daily  Continue Norvasc 10mg daily and Losartan 25mg daily   Continue Plavix and lipitor 40mg daily   Continue Eliquis for Afib anticoagulation, no signs and symptoms bleeding,   Hr and blood pressure managed  Wt Readings from Last 2 Encounters:   02/21/22 48.7 kg (107 lb 6.4 oz)   02/18/22 49.4 kg (109 lb)   not appear fluid up  Wean off oxygen as able  Appears pacemaker check completed in hospital    HLD  Recent Labs   Lab Test 04/28/21  1002 09/29/20  1023   CHOL 118 118   HDL 53 45*   LDL 50 55   TRIG 74 88     Continue Lipitor 40mg daily    history CVA  Left sided weakness  Continue statin, plavix and Eliquis    Cognitive impairment  Pending cognitive testing    Dysphagia  Seen by speech therapy, bedside swallow and completed video swallow  Recommend:  1. Regular diet and slightly thick liquids. 2. Upright, no straws, small bites/sips, alternate liquids/solids. 3. Ok to have ice chips and sips of water between meals after good oral hygiene.   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  This was detected on CT scan last summer.  It appears that the patient did not want any further relation.    ARMD  Comment: Chronic, legally blind   Plan:  - Continue AREDS vitamin      OAB  Comment: Chronic  Consider dose reduction trial Oxybutynin     IBS  Comment: Hx of fluctuating between diarrhea and constipation  Plan:  - Continue fiber supplement and simethicone q HS      Advance Care Planning  DNR/DNI with selective treatment   Per Epic note with primary care provider Brittany Florez  Plan:   - Spoke with daughter Maryjane, feels resident may not make it back to [facility] due to advanced age and multiple chronic health conditions, which is a realistic view point. Plan to see how resident does with therapy. LTC would be option if not safe to go back to [facility]. Discussed hospice when appropriate and family would be open to this. Focus on quality of life remains central.         Total time spent with patient visit at the skilled nursing facility was 36 min including patient visit and review of past records. Greater than 50% of total time spent with counseling and coordinating care due to discussion on CHF management, functional goals and consult with therapies and  as noted above .  Electronically signed by:  STEFFANY Fritz CNP

## 2022-02-22 NOTE — PROGRESS NOTES
Clinic Care Coordination Contact  Care Conference    S-(situation): Patient currently at [facility] TCU for rehabilitation.    B-(background): Patient was inpatient at [facility] 2/12/22-2/16/22 due to new acute congestive heart failure, hypoxic acute respiratory failure, possible non-ST-elevation myocardial infarction, status post permanent pacemaker for sick sinus syndrome, intermittent atrial fibrillation on anticoagulation, acute COPD exacerbation, underlying bilateral pulmonary fibrosis, hyponatremia.     A-(assessment): Clinical Product Navigator attended TCU Care Conference via phone with patient, TCU interdisciplinary team and Sruthi from [facility], daughters Maryjane and Argelia .  Therapies: working on transfers, endurance, get on bike when able to get to gym.  Felt unsteady with walking at first.  Primary goals is to be independent at a wheelchair level.  Working on UB dressing CGA, LB min to max assist.  Needs help with wrapping.  Moderate assist with hygiene.  Wheelchair mobility is needing moderate to maximum assistance.  Primary goals are increase independent with toileting and stand up for longer periods of time.  Be able to move around in the chair better.  Speech - regular diet, liquids nectar thick.  Okay thin water between meals.  Recommend repeat swallow study to see how thin liquids are going, but nectar thick might be new baseline.  Patient stated during the care conference that she has no memory any more, so before she could say what she was thinking her thought was gone.  Sruthi PETERS stated they would be able to accommodate nectar thickened liquids, however, she was most concerned with how long patient would be able to stand during toileting.    Oxygen is at 2 LMP, running in mid 90's.  They tried to do some weaning and will check with C-NP.      R-(recommendations/plan): TCU will continue to work on endurance and strength and then plan will be to discharge back to [facility].    RN  CPN will contact patient within 1-2 business days of TCU discharge.    Melissa Behl BSN, RN, PHN, [facility]  Clinical Product Navigator  [phone]

## 2022-02-28 NOTE — PROGRESS NOTES
[place] [facility]  [place] Medical Record Number:  [id number]  Place of Service where encounter took place:  [facility] ([facility]) [[id number]]  Chief Complaint   Patient presents with     Nursing Home Acute       HPI:    Yoselyn Cui  is a 89 year old (1/28/1932), who is being seen today for an episodic care visit.  HPI information obtained from: facility chart records, facility staff and patient report. Today's concern is:    Patient Yoselyn Ray is a 89 yr old female admitted to [facility] for rehabilitation s/p hospitalization [facility] 2/12/22-2/16/22 for CHF exacerbation, possible nonST elevation myocardial infarction and acute respiratory failure. Patient diuresed and discharged on 2L nasal cannula   history left pneumothorax in setting of emphysema and treatment of bilateral lower lobe pneumonia Aug and Sept 2021  Treated with antibiotics and chest tube at the time  CT chest recent hospitalization show infiltrates, bronchiectasis and possible underlying fibrosis since comparison studay    PMHx afib s/p pacemaker on anticoagulation, COPD (not on oxygen at baseline), and cognitive impairment  Lives in [facility]        Chronic systolic heart failure (H)  COPD exacerbation (H)  Atrial fibrillation, unspecified type (H)  Physical deconditioning     patient denies shortness of breath   Wt Readings from Last 2 Encounters:   02/28/22 47.9 kg (105 lb 9.6 oz)   02/21/22 48.7 kg (107 lb 6.4 oz)   not appear fluid up  Tolerating steroid taper  Remains on 2L nasal cannula, she is open to titrate down as tolerates  At times patient refuse nebs, encourage patient to use    Vitals stable  Hr stable, hr ~60-90    Per consult with therapies   Transfers: CGA FWW   Bed Mobility: CGA   SLUMS: 22/30   Grooming/Hygiene: Min   UB Dressing: Set up   LB Dressing: CGA - max with velcro wraps   Toileting: CGA   Speech: DD4, NTL, ok for free water protocol; decreased insight  and refusal to participate in oral cares at times, may discharge soon    Per  report  Patient desires to go home   Resident currently lives at [facility]. They assist with meds, meals, laundry, showers, toileting and escorts. She has a cane, walker, w/c, gb, and pendant. She has lifesprk homecare. Oxygen is new. Plan is to return to [facility] when able. Daughters Maryjane and Argelia are primary contact.    Past Medical and Surgical History reviewed in Epic today.    MEDICATIONS:    Current Outpatient Medications   Medication Sig Dispense Refill     acetaminophen (TYLENOL) 325 MG tablet Take 650 mg by mouth every 6 hours as needed for mild pain        amLODIPine (NORVASC) 10 MG tablet TAKE 1 TABLET BY MOUTH ONCE DAILY 28 tablet 11     atorvastatin (LIPITOR) 40 MG tablet TAKE 1 TABLET BY MOUTH AT BEDTIME 35 tablet PRN     carvedilol (COREG) 12.5 MG tablet Take 1 tablet (12.5 mg) by mouth 2 times daily (with meals) 60 tablet 0     clopidogrel (PLAVIX) 75 MG tablet TAKE 1 TABLET BY MOUTH ONCE DAILY 35 tablet PRN     ELIQUIS ANTICOAGULANT 2.5 MG tablet TAKE 1 TABLET BY MOUTH TWICE DAILY 70 tablet PRN     ferrous sulfate (FE TABS) 325 (65 Fe) MG EC tablet Take 1 tablet (325 mg) by mouth Every Mon, Wed, Fri Morning 12 tablet 98     fluticasone-vilanterol (BREO ELLIPTA) 200-25 MCG/INH inhaler Inhale 1 puff into the lungs daily 1 each 0     furosemide (LASIX) 40 MG tablet Take 1 tablet (40 mg) by mouth daily 30 tablet 1     guaiFENesin (ROBITUSSIN) 100 MG/5ML liquid Take 10 mLs (200 mg) by mouth 2 times daily. May also take 10 mLs (200 mg) 2 times daily as needed for cough. 180 mL 11     ipratropium - albuterol 0.5 mg/2.5 mg/3 mL (DUONEB) 0.5-2.5 (3) MG/3ML neb solution Take 1 vial (3 mLs) by nebulization 4 times daily 90 mL 0     isosorbide mononitrate (IMDUR) 30 MG 24 hr tablet Take 0.5 tablets (15 mg) by mouth daily 30 tablet 0     losartan (COZAAR) 25 MG tablet TAKE 1 TABLET BY MOUTH ONCE DAILY 28 tablet 11      Multiple Vitamins-Minerals (SYSTANE ICAPS AREDS2) CAPS TAKE 1 CAPSULE BY MOUTH TWICE DAILY 56 capsule PRN     nitroGLYcerin (NITROSTAT) 0.4 MG sublingual tablet For chest pain place 1 tablet under the tongue every 5 minutes for 3 doses. If symptoms persist 5 minutes after 1st dose call 911. 30 tablet 0     omeprazole (PRILOSEC) 20 MG DR capsule TAKE 1 CAPSULE BY MOUTH EVERY MORNING 37 capsule PRN     oxybutynin ER (DITROPAN-XL) 10 MG 24 hr tablet TAKE 1 TABLET BY MOUTH ONCE DAILY 28 tablet 11     potassium chloride ER (KLOR-CON M) 10 MEQ CR tablet Take 1 tablet (10 mEq) by mouth daily 30 tablet 98     predniSONE (DELTASONE) 10 MG tablet 4 tabs daily for 3 days, then 3 tabs daily for 3 days, then 2 tabs daily for 3 days, then 1 tab daily for 3 days, then stop 30 tablet 0     psyllium (METAMUCIL/KONSYL) 58.6 % powder Take 6 g (1 teaspoonful) by mouth daily In 8 oz fluid, drink promptly 283 g 98     simethicone (MYLICON) 125 MG chewable tablet CHEW AND SWALLOW 1 TABLET BY MOUTH EVERY NIGHT AT BEDTIME 38 tablet PRN     Vitamin D3 (CHOLECALCIFEROL) 25 mcg (1000 units) tablet Take 1 tablet (25 mcg) by mouth daily 30 tablet 98         REVIEW OF SYSTEMS:  10 point ROS of systems including Constitutional, Eyes, Respiratory, Cardiovascular, Gastroenterology, Genitourinary, Integumentary, Musculoskeletal, Psychiatric were all negative except for pertinent positives noted in my HPI.    Objective:  /66   Pulse 90   Temp 98.2  F (36.8  C)   Resp 20   Ht 1.524 m (5')   Wt 47.9 kg (105 lb 9.6 oz)   SpO2 95%   BMI 20.62 kg/m    Exam:  GENERAL APPEARANCE:  Alert, in no distress  ENT:  Mouth and posterior oropharynx normal, moist mucous membranes  EYES:  EOM, conjunctivae, lids, pupils and irises normal  NECK:  No adenopathy,masses or thyromegaly  RESP:  respiratory effort and palpation of chest normal, lungs clear to auscultation , no respiratory distress, diminished breath sounds bases bilateral, crackles slightly bases  bilateral  CV:  Palpation and auscultation of heart done , regular rate and rhythm, no murmur, rub, or gallop  ABDOMEN:  normal bowel sounds, soft, nontender, no hepatosplenomegaly or other masses  M/S:   sitting in WC  SKIN:  Inspection of skin and subcutaneous tissue baseline  NEURO:   Cranial nerves 2-12 are normal tested and grossly at patient's baseline  PSYCH:  oriented X 3    Labs:   Labs done in SNF are in [place] EPIC. Please refer to them using EPIC/Care Everywhere.    ASSESSMENT/PLAN:     Chronic systolic heart failure (H)  COPD exacerbation (H)  Atrial fibrillation, unspecified type (H)  Physical deconditioning    patient denies shortness of breath   Wt Readings from Last 2 Encounters:   02/28/22 47.9 kg (105 lb 9.6 oz)   02/21/22 48.7 kg (107 lb 6.4 oz)   not appear fluid up  Tolerating steroid taper for COPD   Remains on 2L nasal cannula, she is open to titrate down as tolerates. Update nursing staff to wean off oxygen as able  At times patient refuse nebs, encourage patient to use  Continue inhalers  Continue current dose of lasix     Vitals stable  Hr stable, hr ~60-90  Remains on Eliquis  Continue dose of Coreg    Per consult with therapies   Transfers: CGA FWW   Bed Mobility: [facility]   SLUMS: 22/30   Grooming/Hygiene: Min   UB Dressing: Set up   LB Dressing: CGA - max with velcro wraps   Toileting: CGA   Speech: DD4, NTL, ok for free water protocol; decreased insight and refusal to participate in oral cares at times, may discharge soon    Per  report  Patient desires to go home   Resident currently lives at [facility]. They assist with meds, meals, laundry, showers, toileting and escorts. She has a cane, walker, w/c, gb, and pendant. She has lifesprk homecare. Oxygen is new. Plan is to return to [facility] when able. Daughters Maryjane and Argelia are primary contact.      Total time spent with patient visit at the skilled nursing facility was 36 min including patient visit and review of past  records. Greater than 50% of total time spent with counseling and coordinating care due to discussion on COPD mgmt and consult with, nursing, therapies and  as noted above.    Electronically signed by:  STEFFANY Fritz CNP

## 2022-03-07 NOTE — PROGRESS NOTES
[facility]  [place] Medical Record Number:  [id number]  Place of Service where encounter took place:  [facility] [[id number]]  Chief Complaint   Patient presents with     Nursing Home Acute       HPI:    Yoselyn Cui  is a 89 year old (1/28/1932), who is being seen today for an episodic care visit.  HPI information obtained from: facility chart records, facility staff and patient report. Today's concern is:    Patient Yoselyn Ray is a 89 yr old female admitted to [facility] for rehabilitation s/p hospitalization [facility] 2/12/22-2/16/22 for CHF exacerbation, possible nonST elevation myocardial infarction and acute respiratory failure. Patient diuresed and discharged on 2L nasal cannula   history left pneumothorax in setting of emphysema and treatment of bilateral lower lobe pneumonia Aug and Sept 2021  Treated with antibiotics and chest tube at the time  CT chest recent hospitalization show infiltrates, bronchiectasis and possible underlying fibrosis since comparison study    PMHx afib s/p pacemaker on anticoagulation, COPD (not on oxygen at baseline), and cognitive impairment  Lives in [facility]        Chronic systolic heart failure (H)  Chronic obstructive pulmonary disease, unspecified COPD type (H)  Atrial fibrillation, unspecified type (H)  Physical deconditioning     Patient states she feels fine with no acute concerns  Remains on 3L nasal cannula  Patient reports she does not think she needs this     Hr and blood pressure managed  Weights fairly stable  Was 109lb on admission; 111 lb today, not appear fluid up  Patient report appetite improved    Per consult with    Patient now plans to stay long term care  She has a cane, walker, w/c    Per consult with therapies   Transfers: CGA FWW   Bed Mobility: CGA   SLUMS: 22/30   Grooming/Hygiene: Min   UB Dressing: Set Up   LB Dressing: CGA, Max for velcro Wraps    Toileting: CGA   Speech: DD4, NTL, ok for free water protocol      Past Medical and Surgical History reviewed in Epic today.    MEDICATIONS:    Current Outpatient Medications   Medication Sig Dispense Refill     oxybutynin ER (DITROPAN-XL) 5 MG 24 hr tablet Take 5 mg by mouth daily       acetaminophen (TYLENOL) 325 MG tablet Take 650 mg by mouth every 6 hours as needed for mild pain        amLODIPine (NORVASC) 10 MG tablet TAKE 1 TABLET BY MOUTH ONCE DAILY 28 tablet 11     atorvastatin (LIPITOR) 40 MG tablet TAKE 1 TABLET BY MOUTH AT BEDTIME 35 tablet PRN     carvedilol (COREG) 12.5 MG tablet Take 1 tablet (12.5 mg) by mouth 2 times daily (with meals) 60 tablet 0     clopidogrel (PLAVIX) 75 MG tablet TAKE 1 TABLET BY MOUTH ONCE DAILY 35 tablet PRN     ELIQUIS ANTICOAGULANT 2.5 MG tablet TAKE 1 TABLET BY MOUTH TWICE DAILY 70 tablet PRN     ferrous sulfate (FE TABS) 325 (65 Fe) MG EC tablet Take 1 tablet (325 mg) by mouth Every Mon, Wed, Fri Morning 12 tablet 98     fluticasone-vilanterol (BREO ELLIPTA) 200-25 MCG/INH inhaler Inhale 1 puff into the lungs daily 1 each 0     furosemide (LASIX) 40 MG tablet Take 1 tablet (40 mg) by mouth daily 30 tablet 1     guaiFENesin (ROBITUSSIN) 100 MG/5ML liquid Take 10 mLs (200 mg) by mouth 2 times daily. May also take 10 mLs (200 mg) 2 times daily as needed for cough. 180 mL 11     ipratropium - albuterol 0.5 mg/2.5 mg/3 mL (DUONEB) 0.5-2.5 (3) MG/3ML neb solution Take 1 vial (3 mLs) by nebulization 4 times daily 90 mL 0     isosorbide mononitrate (IMDUR) 30 MG 24 hr tablet Take 0.5 tablets (15 mg) by mouth daily 30 tablet 0     losartan (COZAAR) 25 MG tablet TAKE 1 TABLET BY MOUTH ONCE DAILY 28 tablet 11     Multiple Vitamins-Minerals (SYSTANE ICAPS AREDS2) CAPS TAKE 1 CAPSULE BY MOUTH TWICE DAILY 56 capsule PRN     nitroGLYcerin (NITROSTAT) 0.4 MG sublingual tablet For chest pain place 1 tablet under the tongue every 5 minutes for 3 doses. If symptoms persist 5 minutes after  1st dose call 911. 30 tablet 0     omeprazole (PRILOSEC) 20 MG DR capsule TAKE 1 CAPSULE BY MOUTH EVERY MORNING 37 capsule PRN     potassium chloride ER (KLOR-CON M) 10 MEQ CR tablet Take 1 tablet (10 mEq) by mouth daily 30 tablet 98     predniSONE (DELTASONE) 10 MG tablet 4 tabs daily for 3 days, then 3 tabs daily for 3 days, then 2 tabs daily for 3 days, then 1 tab daily for 3 days, then stop 30 tablet 0     psyllium (METAMUCIL/KONSYL) 58.6 % powder Take 6 g (1 teaspoonful) by mouth daily In 8 oz fluid, drink promptly 283 g 98     simethicone (MYLICON) 125 MG chewable tablet CHEW AND SWALLOW 1 TABLET BY MOUTH EVERY NIGHT AT BEDTIME 38 tablet PRN     Vitamin D3 (CHOLECALCIFEROL) 25 mcg (1000 units) tablet Take 1 tablet (25 mcg) by mouth daily 30 tablet 98         REVIEW OF SYSTEMS:  4 point ROS including Respiratory, CV, GI and , other than that noted in the HPI,  is negative    Objective:  /69   Pulse 80   Temp 98.2  F (36.8  C)   Resp 18   Ht 1.524 m (5')   Wt 50.4 kg (111 lb 3.2 oz)   SpO2 91%   BMI 21.72 kg/m    Exam:  GENERAL APPEARANCE:  Alert, in no distress  RESP:  respiratory effort and palpation of chest normal, lungs clear to auscultation, slightly diminished bases with mild crackles , no respiratory distress, on 3L nasal cannula   CV:  Palpation and auscultation of heart done , regular rate and rhythm, no murmur, rub, or gallop  ABDOMEN:  normal bowel sounds, soft, nontender, no hepatosplenomegaly or other masses  M/S:   sitting in WC  SKIN:  Inspection of skin and subcutaneous tissue lymphedema wraps on LE bilateral  PSYCH:  oriented X 3    Labs:   Labs done in SNF are in [place] EPIC. Please refer to them using EPIC/Care Everywhere.  Last Comprehensive Metabolic Panel:  Sodium   Date Value Ref Range Status   02/25/2022 136 133 - 144 mmol/L Final   04/28/2021 133 133 - 144 mmol/L Final     Potassium   Date Value Ref Range Status   02/25/2022 3.7 3.4 - 5.3 mmol/L Final   04/28/2021 4.3  3.4 - 5.3 mmol/L Final     Chloride   Date Value Ref Range Status   02/25/2022 108 94 - 109 mmol/L Final   04/28/2021 101 94 - 109 mmol/L Final     Carbon Dioxide   Date Value Ref Range Status   04/28/2021 27 20 - 32 mmol/L Final     Carbon Dioxide (CO2)   Date Value Ref Range Status   02/25/2022 25 20 - 32 mmol/L Final     Anion Gap   Date Value Ref Range Status   02/25/2022 3 3 - 14 mmol/L Final   04/28/2021 5 3 - 14 mmol/L Final     Glucose   Date Value Ref Range Status   02/25/2022 111 (H) 70 - 99 mg/dL Final   04/28/2021 99 70 - 99 mg/dL Final     Comment:     Fasting specimen     Urea Nitrogen   Date Value Ref Range Status   02/25/2022 31 (H) 7 - 30 mg/dL Final   04/28/2021 13 7 - 30 mg/dL Final     Creatinine   Date Value Ref Range Status   02/25/2022 0.69 0.52 - 1.04 mg/dL Final   04/28/2021 0.75 0.52 - 1.04 mg/dL Final     GFR Estimate   Date Value Ref Range Status   02/25/2022 82 >60 mL/min/1.73m2 Final     Comment:     Effective December 21, 2021 eGFRcr in adults is calculated using the 2021 CKD-EPI creatinine equation which includes age and gender (Aziza hamilton al., NEJ, DOI: 10.1056/PNZRtn9978991)   04/28/2021 71 >60 mL/min/[1.73_m2] Final     Comment:     Non  GFR Calc  Starting 12/18/2018, serum creatinine based estimated GFR (eGFR) will be   calculated using the Chronic Kidney Disease Epidemiology Collaboration   (CKD-EPI) equation.       Calcium   Date Value Ref Range Status   02/25/2022 8.6 8.5 - 10.1 mg/dL Final   04/28/2021 8.7 8.5 - 10.1 mg/dL Final       Lab Results   Component Value Date    WBC 14.7 02/25/2022    WBC 5.8 04/15/2021     Lab Results   Component Value Date    RBC 4.51 02/25/2022    RBC 3.83 04/15/2021     Lab Results   Component Value Date    HGB 11.8 02/25/2022    HGB 11.0 04/15/2021     Lab Results   Component Value Date    HCT 39.8 02/25/2022    HCT 34.9 04/15/2021     No components found for: MCT  Lab Results   Component Value Date    MCV 88 02/25/2022    MCV 91  04/15/2021     Lab Results   Component Value Date    MCH 26.2 02/25/2022    MCH 28.7 04/15/2021     Lab Results   Component Value Date    MCHC 29.6 02/25/2022    MCHC 31.5 04/15/2021     Lab Results   Component Value Date    RDW 15.5 02/25/2022    RDW 14.5 04/15/2021     Lab Results   Component Value Date     02/25/2022     04/15/2021       ASSESSMENT/PLAN:     Chronic systolic heart failure (H)  Chronic obstructive pulmonary disease, unspecified COPD type (H)  Atrial fibrillation, unspecified type (H)  Physical deconditioning     Patient states she feels fine with no acute concerns  Has possible pulmonary fibrous; mild diminished and mild crackles in lung sounds. Improved lung sounds from prior visit  Completed steroids  Remains on 3L nasal cannula. Reminder to nursing staff to wean off as able  Patient reports she does not think she needs this     Hr and blood pressure managed  Weights fairly stable  Was 109lb on admission; 111 lb today, not appear fluid up  Patient report appetite improved  Continue with lymphedema garment bilateral lower extremities     Per consult with    Patient now plans to stay long term care    Per consult with therapies   Transfers: CGA FWW   Bed Mobility: CGA   SLUMS: 22/30   Grooming/Hygiene: Min   UB Dressing: Set Up   LB Dressing: CGA, Max for velcro Wraps   Toileting: CGA   Speech: DD4, NTL, ok for free water protocol  Continue therapies at this time    Med review; decrease oxybutynin to 5mg daily per pharmacy recommendation due to side effect profile, monitor     Electronically signed by:  STEFFANY Fritz CNP

## 2022-03-09 PROBLEM — I50.9 ACUTE ON CHRONIC CONGESTIVE HEART FAILURE, UNSPECIFIED HEART FAILURE TYPE (H): Status: ACTIVE | Noted: 2022-01-01

## 2022-03-09 PROBLEM — R79.89 ELEVATED TROPONIN: Status: ACTIVE | Noted: 2022-01-01

## 2022-03-09 NOTE — ED PROVIDER NOTES
"  History   Chief Complaint:  Hypoxia    The history is provided by the EMS personnel and the patient.      Yoselyn Cui is a 89 year old female, on Eliquis and Plavix, with history of stroke, COPD, CAD, NSTEMI, hyperlipidemia, hypertension, atrial fibrillation, atherosclerosis, and melanoma, who [facility] with hypoxia this morning. The patient reportedly lives in TCU because \"she can't live on her own\" and is searching for long-term placement. She has history of COPD and at her TCU, receives 4 nebulizer treatments daily and is provided baseline supplemental oxygenation at 3 L. This morning, staff entered her room for administration of her routine nebulizer, and found the patient hypoxia in the mid-80%. They called EMS, who arrived and transported the patient to the ED today. En route her saturation to the mid-90s. An initial blood pressure at her TCU read 88/50 and repeat by EMS was improved at 97/54. She had paced rhythm with heart rate of 70 bpm. No rales were detected on their exam. On arrival to ED, the patient reports chronic shortness of breath but no exacerbation of this. Specifically, she notes feeling no different from her baseline. Notably, the patient had recent admission last month, on 2/12/22, for COPD exacerbation and with findings of possible NSTEMI. She was later discharged on the 3 L oxygen she currently takes via nasal cannula. She has other history of left pneumothorax, CAD, and atrial fibrillation. The patient is anticoagulated with both Plavix and Eliquis. Notably, her care facility has recent outbreak of COVID-19, and the patient tested negative for this yesterday. At baseline, she is hard of hearing and legally blind. She denies any chest pain, lightheadedness, dizziness, or other complaints.    Review of Systems   Respiratory: Positive for shortness of breath (no worse from baseline).    Cardiovascular: Negative for chest pain.   Neurological: Negative " "for dizziness and light-headedness.   All other systems reviewed and are negative.    Allergies:  No known drug allergies    Medications:  Amlodipine  Atorvastatin  Carvedilol  Clopidogrel  Eliquis  Ferrous sulfate  Breo Ellipta  Furosemide  Robitussin  DuoNeb  Imdur  Losartan  Lisinopril  Nitroglycerin  Omeprazole  Oxybutynin  Potassium chloride ER  Prednisone  Psyllium  Simethicone  Cholecalciferol    Past Medical History:     AV block, 2nd degree  Cerebrovascular accident  Generalized weakness  Gastrointestinal bleeding  Colonic polyps  Hypertension  Iron deficiency  Anemia  Melanoma of skin, right arm  Hyperlipidemia  Pacemaker in place  Paroxysmal atrial fibrillation  Sick sinus syndrome  CAD  Pulmonary nodules  Pulmonary emphysema  Dysphagia  Pneumothorax of left lobe  Pneumonia  AMD  Carotid stenosis  Cerebral atherosclerosis  OAB  Pseudophakia  Presbyopia  Psoriasis  Stroke  Reduced vision  Regular astigmatism, bilateral  Pancreatic lesion  Left hemiparesis  Acute respiratory failure with hypoxia    Past Surgical History:    Appendectomy  Coronary angiogram  Heart catheterization with possible intervention  Left ventriculogram  PI stent drug eluting  EGD, combined  Remv cataract extracap, insert lens, w/o ECP, bilateral  Implant pacemaker  Chest tube placement non-tunneled, bilateral  Ligate fallopian tube    Family History:    CAD x3    Social History:  The patient presented alone.  The patient arrived via EMS.  She lives in [facility].    Physical Exam     Patient Vitals for the past 24 hrs:   BP Temp Temp src Pulse Resp SpO2 Height Weight   03/09/22 1300 106/58 -- -- 76 22 92 % -- --   03/09/22 1230 104/60 -- -- 76 21 90 % -- --   03/09/22 1200 104/56 -- -- 78 22 92 % -- --   03/09/22 1100 99/56 -- -- 70 22 97 % -- --   03/09/22 1032 -- 97.8  F (36.6  C) Temporal -- -- -- -- --   03/09/22 0933 96/56 -- -- 70 20 93 % 1.499 m (4' 11\") 49.9 kg (110 lb)     Physical Exam  General/Appearance: " appears stated age, well-groomed, appears comfortable  Eyes: EOMI, no scleral injection, no icterus  ENT: MMM  Neck: supple, nl ROM, no stiffness  Cardiovascular: RRR, nl S1S2, no m/r/g, 2+ pulses in all 4 extremities, cap refill <2sec  Respiratory: CTAB, good air movement throughout, no wheezes/rhonchi/rales, no increased WOB, no retractions  Back: no lesions  GI: abd soft, non-distended, nttp,  no HSM, no rebound, no guarding, nl BS  MSK: LEON, good tone, no bony abnormality  Skin: warm and well-perfused, no rash, no edema, no ecchymosis, nl turgor  Neuro: GCS 15, alert and oriented, no gross focal neuro deficits  Psych: interacts appropriately  Heme: no petechia, no purpura, no active bleeding    Emergency Department Course     ECG  ECG obtained at 0949, ECG read at 0954  Ventricular-paced rhythm.  Abnormal ECG.   No significant change as compared to prior, dated 8/30/21.  Rate 70 bpm. AL interval * ms. QRS duration 144 ms. QT/QTc 426/460 ms. P-R-T axes * 265 72.     Imaging:  CT Chest Pulmonary Embolism w Contrast   Final Result   IMPRESSION:   1.  Worsening bilateral airspace opacities and mucous plugging in   severely emphysematous lungs.   2.  No evidence of pulmonary embolism.   3.  Severe coronary atherosclerosis.      ÁNGELA PRETTY MD            SYSTEM ID:  [id number]        Report per radiology    Laboratory:  Labs Ordered and Resulted from Time of ED Arrival to Time of ED Departure   D DIMER QUANTITATIVE - Abnormal       Result Value    D-Dimer Quantitative 0.98 (*)    BASIC METABOLIC PANEL - Abnormal    Sodium 134      Potassium 3.1 (*)     Chloride 101      Carbon Dioxide (CO2) 25      Anion Gap 8      Urea Nitrogen 18      Creatinine 0.84      Calcium 7.8 (*)     Glucose 106 (*)     GFR Estimate 66     TROPONIN I - Abnormal    Troponin I High Sensitivity 6,339 (*)    NT PROBNP INPATIENT - Abnormal    N terminal Pro BNP Inpatient 11,461 (*)    CBC WITH PLATELETS AND DIFFERENTIAL - Abnormal    WBC Count  7.6      RBC Count 4.29      Hemoglobin 11.0 (*)     Hematocrit 35.6      MCV 83      MCH 25.6 (*)     MCHC 30.9 (*)     RDW 15.9 (*)     Platelet Count 225      % Neutrophils 67      % Lymphocytes 19      % Monocytes 14      % Eosinophils 0      % Basophils 0      % Immature Granulocytes 0      NRBCs per 100 WBC 0      Absolute Neutrophils 5.1      Absolute Lymphocytes 1.4      Absolute Monocytes 1.1      Absolute Eosinophils 0.0      Absolute Basophils 0.0      Absolute Immature Granulocytes 0.0      Absolute NRBCs 0.0     TROPONIN I - Abnormal    Troponin I High Sensitivity 7,900 (*)    LACTIC ACID WHOLE BLOOD - Normal    Lactic Acid 1.7     COVID-19 VIRUS (CORONAVIRUS) BY PCR - Normal    SARS CoV2 PCR Negative       Emergency Department Course:     Reviewed:  I reviewed nursing notes, vitals, past medical history and Care Everywhere.    Assessments:  0924 I obtained history and examined the patient as noted above.   1305 I rechecked the patient and explained findings. I discussed plan for admission and she is in agreement with this.     Consults:  1327 I consulted with Dr. Talbert, hospitalist, regarding the patient's history and presentation here in the emergency department who accepted the patient for admission.    Interventions:  1340 Lasix 20 mg IV    Disposition:  The patient was admitted to the hospital under the care of Dr. Talbert.     Impression & Plan     Medical Decision Making:  This patient is a pleasant 89-year-old female on both Eliquis and Plavix with multiple medical comorbidities including COPD who presents today after she had a hypoxic reading and hypotensive reading at the TCU she is currently staying at.  Here her troponin as well as her BNP are both elevated.  Clinically she is not fluid overloaded but I am able to look at her weights and it does appear that between now in a week ago she is gone up about 6 pounds so I am going to give her a dose of Lasix in the ER.  She is not having any sense  of shortness of breath or chest pain so I suspect her troponin bump is more secondary to demand ischemia and less secondary to true NSTEMI.  EKG does not show any clear ST or T wave changes.  Here her blood pressure has been approximately 105/60 which, for her size, appears appropriate.  We did get a D-dimer which was mildly elevated for age range so this was followed by CT.  This did not show any evidence of fluid overload but does show some bilateral pulmonary opacities.  I am not exactly clear what to make of this as she does not have evidence of infection without cough, shortness of breath, fever, white count.  It is possible this is just atelectasis secondary to mucous plugging.  I spoken with the patient and at this point in time she is feeling comfortable with coming into admission.  She will be admitted to the hospitalist service for further work-up and management.      Diagnosis:    ICD-10-CM    1. Acute on chronic congestive heart failure, unspecified heart failure type (H)  I50.9    2. Elevated troponin  R77.8      Scribe Disclosure:  I, Lima Islas, am serving as a scribe at 9:26 AM on 3/9/2022 to document services personally performed by Deena Farias MD based on my observations and the provider's statements to me.     Deena Farias MD  03/09/22 [id number]

## 2022-03-09 NOTE — PHARMACY-ADMISSION MEDICATION HISTORY
Pharmacy Medication History  Admission medication history interview status for the 3/9/2022  admission is complete. See EPIC admission navigator for prior to admission medications     Location of Interview: Outside patient room but on unit  Medication history sources: [facility] ([facility])    Significant changes made to the medication list:  None    In the past week, patient estimated taking medication this percent of the time: greater than 90%    Additional medication history information:   None    Medication reconciliation completed by provider prior to medication history? No    Time spent in this activity: 20 min    Prior to Admission medications    Medication Sig Last Dose Taking? Auth Provider   acetaminophen (TYLENOL) 325 MG tablet Take 650 mg by mouth every 6 hours as needed for mild pain  3/9/2022 at Unknown time Yes Unknown, Entered By History   amLODIPine (NORVASC) 10 MG tablet TAKE 1 TABLET BY MOUTH ONCE DAILY 3/8/2022 at Unknown time Yes Brittany Florez APRN CNP   atorvastatin (LIPITOR) 40 MG tablet TAKE 1 TABLET BY MOUTH AT BEDTIME 3/8/2022 at Unknown time Yes Brittany Florez APRN CNP   carvedilol (COREG) 12.5 MG tablet Take 1 tablet (12.5 mg) by mouth 2 times daily (with meals) 3/8/2022 at Unknown time Yes Maribeth Carson MD   clopidogrel (PLAVIX) 75 MG tablet TAKE 1 TABLET BY MOUTH ONCE DAILY 3/8/2022 at Unknown time Yes Brittany Florez APRN CNP   ELIQUIS ANTICOAGULANT 2.5 MG tablet TAKE 1 TABLET BY MOUTH TWICE DAILY 3/8/2022 at Unknown time Yes Brittany Florez APRN CNP   ferrous sulfate (FE TABS) 325 (65 Fe) MG EC tablet Take 1 tablet (325 mg) by mouth Every Mon, Wed, Fri Morning 3/7/2022 Yes Brittany Florez APRN CNP   fluticasone-vilanterol (BREO ELLIPTA) 200-25 MCG/INH inhaler Inhale 1 puff into the lungs daily 3/9/2022 at Unknown time Yes Maribeth Carson MD   furosemide (LASIX) 40 MG tablet Take 1 tablet (40 mg) by mouth daily 3/8/2022 at Unknown time Yes Maribeth Carson  MD   guaiFENesin (ROBITUSSIN) 100 MG/5ML liquid Take 10 mLs (200 mg) by mouth 2 times daily. May also take 10 mLs (200 mg) 2 times daily as needed for cough. 3/8/2022 at Unknown time Yes Brittany Florez APRN CNP   ipratropium - albuterol 0.5 mg/2.5 mg/3 mL (DUONEB) 0.5-2.5 (3) MG/3ML neb solution Take 1 vial (3 mLs) by nebulization 4 times daily 3/9/2022 at Unknown time Yes Maribeth Carson MD   isosorbide mononitrate (IMDUR) 30 MG 24 hr tablet Take 0.5 tablets (15 mg) by mouth daily 3/8/2022 at Unknown time Yes Maribeth Carson MD   losartan (COZAAR) 25 MG tablet TAKE 1 TABLET BY MOUTH ONCE DAILY 3/8/2022 at Unknown time Yes Brittany Florez APRN CNP   nitroGLYcerin (NITROSTAT) 0.4 MG sublingual tablet For chest pain place 1 tablet under the tongue every 5 minutes for 3 doses. If symptoms persist 5 minutes after 1st dose call 911.  Yes Alex Ramsay MD   omeprazole (PRILOSEC) 20 MG DR capsule TAKE 1 CAPSULE BY MOUTH EVERY MORNING 3/8/2022 at Unknown time Yes Brittany Florez APRN CNP   oxybutynin ER (DITROPAN-XL) 5 MG 24 hr tablet Take 5 mg by mouth daily 3/8/2022 at Unknown time Yes Reported, Patient   potassium chloride ER (KLOR-CON M) 10 MEQ CR tablet Take 1 tablet (10 mEq) by mouth daily 3/8/2022 at Unknown time Yes Brittany Florez APRN CNP   psyllium (METAMUCIL/KONSYL) 58.6 % powder Take 6 g (1 teaspoonful) by mouth daily In 8 oz fluid, drink promptly 3/8/2022 at Unknown time Yes Brittany Florez APRN CNP   simethicone (MYLICON) 125 MG chewable tablet CHEW AND SWALLOW 1 TABLET BY MOUTH EVERY NIGHT AT BEDTIME 3/8/2022 at Unknown time Yes Gautam, Brittany Everett, APRN CNP   Vitamin D3 (CHOLECALCIFEROL) 25 mcg (1000 units) tablet Take 1 tablet (25 mcg) by mouth daily 3/8/2022 at Unknown time Yes Brittany Florez, STEFFANY FERNANDEZ       The information provided in this note is only as accurate as the sources available at the time of update(s)

## 2022-03-09 NOTE — PROGRESS NOTES
Clinic Care Coordination Contact  Ambulatory Care Coordination to Inpatient Care Management   Hand-In Communication    Date:  March 9, 2022  Name: Yoselyn Cui is enrolled in Ambulatory Care Coordination program and I am the Lead Care Coordinator.  CC Contact Information: Epic InTransMedicssket + phone  Payor Source: Payor: [employer] / Plan: [employer] FEDERAL / Product Type: HMO /   Current services in place:     Please see the CC Snaphot and Care Management Flowsheets for specific  details of this Yoselyn Cui care plan.   Additional details/specific concerns r/t this admission:    Discharge Disposition Prior to [facility] TCU stay and prior hospitalization, patient was a resident at [facility]. and Readmission Risk 83%    I will follow this admission in Epic. Please feel free to contact me with questions or for further collaboration in discharge planning.    Melissa Behl BSN, RN, PHN, CCM  RN Clinical Product Navigator  [phone]

## 2022-03-09 NOTE — H&P
[facility]    History and Physical  Hospitalist       Date of Admission:  3/9/2022    Assessment & Plan   Yoselyn Cui is a 89 year old female who presents with hypoxia, patient desires to be be comfortable and daughter in agreement for comfort based approach without further work-up.    Comfort care  Patient desires no further work-up for elevated troponin, BNP or respiratory complaints. Doesn't want to be on limited diet, to take lots of medications and wants to be comfortable  - admit inpatient  - SW consult to pursue hospice, she will need supervised setting so hopeful for facility placement  - Comfort care order set with morphine/ativan  - oxygen for comfort  - discussed with daughter Maryjane, including visiting restrictions. Also discussed that with her mucous plugging that patient could pass quickly/unexpectedly, but she could also live for a few weeks    Acute on chronic systolic congestive heart failure  Acute on chronic hypoxic respiratory failure   Non-ST-elevation myocardial infarction, likely type 2 with demand with CHF  Status post permanent pacemaker for sick sinus syndrome   Intermittent atrial fibrillation on anticoagulation   Multifactorial SOB due to underlying COPD, CHF and pulmonary fibrosis  Hx pneumothorax, bilateral lower lobe lung infiltrate and effusions for which she was admitted to [facility] Fall 2021 and treated with antibiotics and a chest tube. History of COPD and was a previous long-term smoker and she was noted to have pulmonary fibrosis on chest CT. During last admit Feb 2022 she was noted to have findings of heart failure on CXR and had elevated BNP 2000s, elevated trop to 1800, was diuresed with IV lasix 40mg BID and then discharged on 40mg PO lasix daily.  * Feb 2022 Echo: EF at 40 to 45% lower than usual prior EF of 60%  * Feb 2022 Lexiscan stress test was done to evaluate for ischemia and it showed small area of mild ischemia in the lateral  segment of the left ventricle.  LVEF is normal greater than 65%.  While at TCU weight has increased 5 lbs in past 2 weeks and O2 use went from 2LPM to 3LPM. Sounds like she was started on free water due to poor tolerance of nectar thickened liquids.  On ED arrival, BNP 36132. Trop 6339-->7900. EKG V paced. CT chest PE study: no PE, bilateral airspace opacities worsening and mucous plugging noted in emphysematous lungs, severe coronary atherosclerosis. Given 20mg IV lasix in the ED. Initial O2 sat in 80s at facility, improved to mid 90s with neb by the time EMS picked her up  PTA coreg 12.5mg BID, Imdur 15mg daily, plavix, lipitor, norvasc 10mg daily, losartan 25mg daily, 40mg PO lasix daily  - CONTINUE Lasix/potassium supplement for comfort.  - could consider resumption of coreg if palpitations causing discomfort     Underlying bilateral pulmonary fibrosis  COPD  Mucous plugging  - PRN duonebs  - continue breo    Hypokalemia  Will be worsened with ongoing diuresis  - potassium chloride 20meq daily supplement instead of 10meq daily supplement  - may discontinue pending further discussion with patient regarding lasix    Mild-moderate oral pharyngeal dysphagia  Regular diet with thin liquids per comfort    History of left pneumothorax in the setting of emphysema and treatment for bilateral lower lobe pneumonia in August and September 2021.   - noted    History of dilatation of the main pancreatic duct and a 2.5 cm hypoattenuating lesion in the head/uncinate process of the pancreas.  This was detected on CT scan August 2021. No workup desired    History of stroke   - noted    Chronic anemia  Hgb stable near 11     Hyperlipidemia   - discontinue statin    Clinically Significant Risk Factors Present on Admission              # Coagulation Defect: home medication list includes an anticoagulant medication  # Platelet Defect: home medication list includes an antiplatelet medication         DVT Prophylaxis: no prophylaxis  needed  Code Status: Comfort Care, DNR/DNI  Expected Discharge: 2-3 days   Anticipated discharge location:  Awaiting care coordination huddle  Delays:     placement      Phuong Talbert,     Primary Care Physician   Brittany Florez    Chief Complaint   Hypoxia    History is obtained from the patient, prior record review    History of Present Illness   Yoselyn Cui is a 89 year old female who presents with hypoxia. O2 Found to be in the 80s at her facility and EMS called. She was given neb and O2 sat in mid 90s rest of ride. CT shows mucous plugging and bilateral opacities. Her weight has gone up 5 lbs in past month. She reports just wanting to be comfortable. She reports worsening cough and feeling like her breathing just doesn't fill up all the way and stops early. Doesn't like thickened liquids. Doesn't want to keep going through hospitalization and work-up. Discussed options for aggressive management (heart work-up and respiratory meds) and that they may make things a little better for a short time, but would not stop her from being readmitted for same in the future. She again reiterates she wants to be comfortable and doesn't find much use in the more aggressive plan. Daughter Maryjane was called and she states that they have wanted to pursue hospice since learning about it last month after her admission. They want her to be made comfortable. Discussed what comfort care looks like in the hospital and that most of her medications will be discontinued. Discussed that it is possible patient may diet in hospital or may live for weeks. They are interested in discharging to hospice at a facility that can provide supervision for patient.    Past Medical History    I have reviewed this patient's medical history and updated it with pertinent information if needed.   Past Medical History:   Diagnosis Date     AV block, 2nd degree 5/16/2016     Cerebrovascular accident (H) 8/22/2016     COKER (dyspnea on exertion) 8/22/2016      Generalized weakness 10/12/2016     GIB (gastrointestinal bleeding)      History of colonic polyps 8/22/2016     HTN (hypertension) 8/22/2016     Iron deficiency anemia      Melanoma of skin (H)-rt arm 8/22/2016     Mixed hyperlipidemia 8/22/2016     Pacemaker     implanted 5-     PAF (paroxysmal atrial fibrillation) (H)      SSS (sick sinus syndrome) (H) 8/22/2016       Past Surgical History   I have reviewed this patient's surgical history and updated it with pertinent information if needed.  Past Surgical History:   Procedure Laterality Date     APPENDECTOMY  1960s     CV CORONARY ANGIOGRAM N/A 7/17/2019    Procedure: Coronary Angiogram;  Surgeon: Irvin Hutson MD;  Location:  HEART CARDIAC CATH LAB     CV HEART CATHETERIZATION WITH POSSIBLE INTERVENTION N/A 7/18/2019    Procedure: Heart Catheterization with Possible Intervention;  Surgeon: Jayleen Torres MD;  Location: [facility] CARDIAC CATH LAB     CV LEFT HEART CATH N/A 7/17/2019    Procedure: Left Heart Cath;  Surgeon: Irvin Hutson MD;  Location:  HEART CARDIAC CATH LAB     CV LEFT VENTRICULOGRAM N/A 7/17/2019    Procedure: Left Ventriculogram;  Surgeon: Irvin Hutson MD;  Location:  HEART CARDIAC CATH LAB     CV PCI STENT DRUG ELUTING N/A 7/18/2019    Procedure: PCI Stent Drug Eluting;  Surgeon: Jayleen Torres MD;  Location: [facility] CARDIAC CATH LAB     ESOPHAGOSCOPY, GASTROSCOPY, DUODENOSCOPY (EGD), COMBINED N/A 11/20/2018    Procedure: ESOPHAGOSCOPY, GASTROSCOPY, DUODENOSCOPY (EGD)  Room 523 with biopsies using cold biopsy forceps;  Surgeon: Ayala Soto MD;  Location:  GI     HC REMV CATARACT EXTRACAP,INSERT LENS, W/O ECP Left 10/04/2017     IMPLANT PACEMAKER  05/17/2016     IR CHEST TUBE PLACEMENT NON-TUNNELLED LEFT  8/31/2021     ZZC LIGATE FALLOPIAN TUBE  1960s     ZZC REMV CATARACT INTRACAP,INSERT LENS Right 09/20/2017       Prior to Admission Medications   Prior to Admission  Medications   Prescriptions Last Dose Informant Patient Reported? Taking?   ELIQUIS ANTICOAGULANT 2.5 MG tablet 3/8/2022 at Unknown time skilled nursing No Yes   Sig: TAKE 1 TABLET BY MOUTH TWICE DAILY   Vitamin D3 (CHOLECALCIFEROL) 25 mcg (1000 units) tablet 3/8/2022 at Unknown time skilled nursing No Yes   Sig: Take 1 tablet (25 mcg) by mouth daily   acetaminophen (TYLENOL) 325 MG tablet 3/9/2022 at Unknown time Nursing Home Yes Yes   Sig: Take 650 mg by mouth every 6 hours as needed for mild pain    amLODIPine (NORVASC) 10 MG tablet 3/8/2022 at Unknown time skilled nursing No Yes   Sig: TAKE 1 TABLET BY MOUTH ONCE DAILY   atorvastatin (LIPITOR) 40 MG tablet 3/8/2022 at Unknown time skilled nursing No Yes   Sig: TAKE 1 TABLET BY MOUTH AT BEDTIME   carvedilol (COREG) 12.5 MG tablet 3/8/2022 at Unknown time skilled nursing No Yes   Sig: Take 1 tablet (12.5 mg) by mouth 2 times daily (with meals)   clopidogrel (PLAVIX) 75 MG tablet 3/8/2022 at Unknown time skilled nursing No Yes   Sig: TAKE 1 TABLET BY MOUTH ONCE DAILY   ferrous sulfate (FE TABS) 325 (65 Fe) MG EC tablet 3/7/2022 Nursing Home No Yes   Sig: Take 1 tablet (325 mg) by mouth Every Mon, Wed, Fri Morning   fluticasone-vilanterol (BREO ELLIPTA) 200-25 MCG/INH inhaler 3/9/2022 at Unknown time skilled nursing No Yes   Sig: Inhale 1 puff into the lungs daily   furosemide (LASIX) 40 MG tablet 3/8/2022 at Unknown time skilled nursing No Yes   Sig: Take 1 tablet (40 mg) by mouth daily   guaiFENesin (ROBITUSSIN) 100 MG/5ML liquid 3/8/2022 at Unknown time skilled nursing No Yes   Sig: Take 10 mLs (200 mg) by mouth 2 times daily. May also take 10 mLs (200 mg) 2 times daily as needed for cough.   ipratropium - albuterol 0.5 mg/2.5 mg/3 mL (DUONEB) 0.5-2.5 (3) MG/3ML neb solution 3/9/2022 at Unknown time skilled nursing No Yes   Sig: Take 1 vial (3 mLs) by nebulization 4 times daily   isosorbide mononitrate (IMDUR) 30 MG 24 hr tablet 3/8/2022 at Unknown time skilled nursing No Yes   Sig: Take 0.5  tablets (15 mg) by mouth daily   losartan (COZAAR) 25 MG tablet 3/8/2022 at Unknown time longterm No Yes   Sig: TAKE 1 TABLET BY MOUTH ONCE DAILY   nitroGLYcerin (NITROSTAT) 0.4 MG sublingual tablet  Nursing Home No Yes   Sig: For chest pain place 1 tablet under the tongue every 5 minutes for 3 doses. If symptoms persist 5 minutes after 1st dose call 911.   omeprazole (PRILOSEC) 20 MG DR capsule 3/8/2022 at Unknown time longterm No Yes   Sig: TAKE 1 CAPSULE BY MOUTH EVERY MORNING   oxybutynin ER (DITROPAN-XL) 5 MG 24 hr tablet 3/8/2022 at Unknown time Nursing Home Yes Yes   Sig: Take 5 mg by mouth daily   potassium chloride ER (KLOR-CON M) 10 MEQ CR tablet 3/8/2022 at Unknown time longterm No Yes   Sig: Take 1 tablet (10 mEq) by mouth daily   psyllium (METAMUCIL/KONSYL) 58.6 % powder 3/8/2022 at Unknown time longterm No Yes   Sig: Take 6 g (1 teaspoonful) by mouth daily In 8 oz fluid, drink promptly   simethicone (MYLICON) 125 MG chewable tablet 3/8/2022 at Unknown time longterm No Yes   Sig: CHEW AND SWALLOW 1 TABLET BY MOUTH EVERY NIGHT AT BEDTIME      Facility-Administered Medications: None     Allergies   No Known Allergies    Social History   I have reviewed this patient's social history and updated it with pertinent information if needed. Yoselyn Dorantesmaya  reports that she has quit smoking. Her smoking use included cigarettes. She has a 35.00 pack-year smoking history. She has never used smokeless tobacco. She reports that she does not drink alcohol and does not use drugs.    Family History   I have reviewed this patient's family history and updated it with pertinent information if needed.   Family History   Problem Relation Age of Onset     Coronary Artery Disease Father      Coronary Artery Disease Brother      Coronary Artery Disease Sister      Colon Cancer No family hx of        Review of Systems   The 10 point Review of Systems is negative other than noted in the HPI    Physical Exam    Temp: 97.8  F (36.6  C) Temp src: Temporal BP: 106/58 Pulse: 76   Resp: 22 SpO2: 92 % O2 Device: Nasal cannula Oxygen Delivery: 6 LPM  Vital Signs with Ranges  Temp:  [97.8  F (36.6  C)] 97.8  F (36.6  C)  Pulse:  [70-78] 76  Resp:  [20-22] 22  BP: ()/(56-60) 106/58  SpO2:  [90 %-97 %] 92 %  110 lbs 0 oz    Constitutional: Awake, alert, cooperative, no apparent distress, thin  Eyes: Conjunctiva and pupils examined and normal.  HEENT: Moist mucous membranes, normal dentition. Hard of hearing  Respiratory: Clear to auscultation bilaterally, no crackles or wheezing.   Cardiovascular: Regular rate and rhythm, normal S1 and S2, and no murmur noted.  GI: Soft, non-distended, non-tender, normal bowel sounds.  Lymph/Hematologic: No anterior cervical or supraclavicular adenopathy.  Skin: No rashes, no cyanosis, no edema.  Musculoskeletal: No joint swelling, erythema or tenderness.  Neurologic: Cranial nerves 2-12 intact, normal strength and sensation.  Psychiatric: Alert, oriented to person, place and time, no obvious anxiety or depression.    Data   Data reviewed today:  I personally reviewed EKG V paced. CT chest PE study: no PE, bilateral airspace opacities worsening and mucous plugging noted in emphysematous lungs, severe coronary atherosclerosis.  Recent Labs   Lab 03/09/22  1025 03/09/22  0947   WBC  --  7.6   HGB  --  11.0*   MCV  --  83   PLT  --  225     --    POTASSIUM 3.1*  --    CHLORIDE 101  --    CO2 25  --    BUN 18  --    CR 0.84  --    ANIONGAP 8  --    GOLDY 7.8*  --    *  --        Recent Results (from the past 24 hour(s))   CT Chest Pulmonary Embolism w Contrast    Narrative    CT CHEST PULMONARY EMBOLISM WITH CONTRAST March 9, 2022 12:51 PM    CLINICAL HISTORY: Pulmonary embolus suspected, low/intermediate  probability, positive D-dimer. Chest pain, evaluate pulmonary  embolism.    TECHNIQUE: CT angiogram chest during arterial phase injection IV  contrast. 2D and 3D MIP reconstructions  were performed by the CT  technologist. Dose reduction techniques were used.   CONTRAST: 54 mL Isovue-370.    COMPARISON: 2/13/2022.    FINDINGS:  ANGIOGRAM CHEST: Pulmonary arteries are normal caliber and negative  for pulmonary emboli. Thoracic aorta is negative for dissection.     LUNGS AND PLEURA: Severely emphysematous lungs. Worsening basilar  predominant airspace opacities. Underlying bronchiectasis noted with  worsening mucous plugging. There are small effusions, increased from  prior. No pneumothorax.    MEDIASTINUM/AXILLAE: Prominent but not pathologically enlarged  mediastinal lymph nodes are unchanged. No hilar or axillary  adenopathy. There is a left-sided pacer device.    CORONARY ARTERY CALCIFICATION: Severe.    UPPER ABDOMEN: Significant aortic calcifications. There is a  nonobstructing 3 mm calculus in the upper pole of the right kidney.  Gallstones are present.    MUSCULOSKELETAL: No destructive bone lesions.      Impression    IMPRESSION:  1.  Worsening bilateral airspace opacities and mucous plugging in  severely emphysematous lungs.  2.  No evidence of pulmonary embolism.  3.  Severe coronary atherosclerosis.    ÁNGELA PRETTY MD         SYSTEM ID:  [id number]

## 2022-03-09 NOTE — ED NOTES
Bed: ED04  Expected date:   Expected time:   Means of arrival:   Comments:  Rajesh - 511 - low O2 sats eta 0961

## 2022-03-09 NOTE — ED TRIAGE NOTES
Brought in by EMS from home. Patient has COPD on 2L of oxygen at baseline. Lives in facility where patient was seen by staff this morning and was found to be hypoxic with O2 in mid 80's. After neb treatment remained upper 80's. Was found to be hypotensive bp 88/50.

## 2022-03-09 NOTE — ED NOTES
"[facility]  ED Nurse Handoff Report    ED Chief complaint: Shortness of Breath      ED Diagnosis:   Final diagnoses:   None       Code Status: Not addressed at this time    Allergies: No Known Allergies    Patient Story: Brought in by EMS from home. Patient has COPD on 2L of oxygen at baseline. Lives in facility where patient was seen by staff this morning and was found to be hypoxic with O2 in mid 80's. After neb treatment remained upper 80's. Was found to be hypotensive bp 88/50.  Focused Assessment:  Denies chest pain, use of abdominal muscles, COKER, increased O2 needs.     Treatments and/or interventions provided: oxygen, blood draws, blood culture  Patient's response to treatments and/or interventions: relief of SOB    To be done/followed up on inpatient unit:  continue to monitor    Does this patient have any cognitive concerns?: A/O    Activity level - Baseline/Home:  Stand with Assist  Activity Level - Current:   Stand with Assist    Patient's Preferred language: English   Needed?: No    Isolation: None  Infection: Not Applicable  Patient tested for COVID 19 prior to admission: YES  Bariatric?: No    Vital Signs:   Vitals:    03/09/22 0933 03/09/22 1032   BP: 96/56    Pulse: 70    Resp: 20    Temp:  97.8  F (36.6  C)   TempSrc:  Temporal   SpO2: 93%    Weight: 49.9 kg (110 lb)    Height: 1.499 m (4' 11\")        Cardiac Rhythm:     Was the PSS-3 completed:   Yes  What interventions are required if any?               Family Comments: n/a  OBS brochure/video discussed/provided to patient/family: N/A              Name of person given brochure if not patient: n/a              Relationship to patient: n/a    For the majority of the shift this patient's behavior was Green.   Behavioral interventions performed were None.    ED NURSE PHONE NUMBER: [phone]       "

## 2022-03-10 NOTE — PLAN OF CARE
Goal Outcome Evaluation:  1930-0730  A/Ox4. Comfort care. Vitals and Assessment deferred. Denies pain. Pt currently on 4L sating 91%. Baseline o2 @ 2.5L. Long history COPD. Tachypneic, accessory muscle breathing noted. PRN morphine x1, ativan x1. Trop: 7900.  Severe Left sided weakness noted john LLE. Ax2 gb/w. Pivot Tx from chair to bed. T/R. Regular diet- great appetite last night,  Incontinent b/b. Purewick in place through night. PIV SL SW and Hospice consult. Discharge pending longterm care faciility.

## 2022-03-10 NOTE — PROVIDER NOTIFICATION
RECEIVING UNIT ED HANDOFF REVIEW    ED Nurse Handoff Report was reviewed by: Rosendo Ridley RN on March 9, 2022 at 6:03 PM

## 2022-03-10 NOTE — CONSULTS
Care Management Initial Consult    General Information  Assessment completed with: Family, Maryjane, Dyan  Type of CM/SW Visit: Offer D/C Planning    Primary Care Provider verified and updated as needed:     Readmission within the last 30 days:        Reason for Consult: discharge planning  Advance Care Planning: Advance Care Planning Reviewed: present on chart, no concerns identified  Maryjane Zaidi       Communication Assessment  Patient's communication style: spoken language (English or Bilingual)             Cognitive  Cognitive/Neuro/Behavioral: WDL                      Living Environment:   People in home: facility resident     Current living Arrangements: assisted living  Name of Facility: [facility]   Able to return to prior arrangements: yes  Living Arrangement Comments: [facility]    Family/Social Support:  Care provided by: other (see comments) (Facility Staff)  Provides care for: no one     Children, Facility resident(s)/Staff          Description of Support System: Supportive, Involved    Support Assessment: Adequate family and caregiver support, Adequate social supports    Current Resources:   Patient receiving home care services:  None     Community Resources:  [employer] snf  Equipment currently used at home:    Supplies currently used at home:      Employment/Financial:  Employment Status: retired        Financial Concerns: No concerns identified           Lifestyle & Psychosocial Needs:  Social Determinants of Health     Tobacco Use: Medium Risk     Smoking Tobacco Use: Former Smoker     Smokeless Tobacco Use: Never Used   Alcohol Use: Not on file   Financial Resource Strain: Not on file   Food Insecurity: Not on file   Transportation Needs: Not on file   Physical Activity: Not on file   Stress: Not on file   Social Connections: Not on file   Intimate Partner Violence: Not on file   Depression: Not on file   Housing Stability: Not on file       Functional Status:  Prior to admission  patient needed assistance:    Patient admitted to the hospital from [facility] TCU. Patient resides at [facility]. Plan is for patient to discharge with Hospice care.      Mental Health Status:      None indicated    Chemical Dependency Status:      None indicated          Values/Beliefs:  Spiritual, Cultural Beliefs, Sikhism Practices, Values that affect care:                 Additional Information:  SW placed call to [facility] to see if they would be able to accept patient back to their facility with hospice care and confirmed that they can. They will place patient on their TCU at this time as they do not currently have a LTC bed for patient. SW spoke with patient's daughter Maryjane and discussed that patient was accepted at [facility]. Daughter in agreement with placement.  SW discussed hospice options and daughter would like to check with [facility] and if they do not have availbility, [facility]. SW sent referral to [facility] and received call back from liaisonEmily, stating that they will not have availability for patient tomorrow. YONI placed call to [facility] with [facility] and they are able to accept patient tomorrow. Faxed referral to [facility]. Nighat with [facility] stated they could accept patient after 2:00 tomorrow. Per bedside nurse, patient able to transport via wheelchair.  SW discussed costs for transport with daughter and she is in understanding. YONI placed call to  Transport for wheelchair ride with oxygen at 2:30 tomorrow. Updated facility and hospice liaison. Hospice nurse will be at the facility at 3:00 tomorrow. Updated daughter.     LUISA Brannon

## 2022-03-10 NOTE — PROGRESS NOTES
Pt arrived to station 88 at 1830 in stable condition on comfort cares.  Set up in chair for dinner, transferred with assist of 2, left sided hemiparesis. Alert and oriented.  Denies pain.  Currently on 4 liters nasal canula. Plan for hospice placement tomorrow.

## 2022-03-10 NOTE — PROGRESS NOTES
[facility]  Hospitalist Progress Note    Admit Date:  3/9/2022  Date of Service (when I saw the patient): 03/10/2022   Provider:  Corry Nicole, DO    Assessment & Plan   Yoselyn Cui is a 89 year old female who was admitted on 3/9/2022. She presented to the ED with mid-sternal chest pain and SOB.  Ct scan of the chest with no evidence of PE; there was findings of severe coronary artherosclerosis and mucous plugging with bilateral opacities.  Troponin and BNP elevated.  On admission, the patient and family decided to transition to comfort cares with no additional lab work or hospital testing.     Problem List:    1.  Mid sternal CP       Elevated troponin consistent with NSTEMI       Unstable angina suspected     Ms. Cui is a still c/o midsternal squeezing when I am in the room.  She is requesting tylenol.    I think that we should try a SL nitroglycerin and, if that improves her chest discomfort then we can consider a nitroglycerin patch.  On med rec review - it appears that she has imdur listed - will resume    I have also asked RN to utilize SL morphine and see if this helps her symptoms.      She certainly should be more comfortable.   Hospice consult requested and is pending for today    2.  Acute on chronic systolic congestive heart failure      Acute hypoxic respiratory failure - multifactorial d/t     pulmonary fibrosis, COPD, CHF     BNP was elevated  CT with bilateral infiltrates and mucous plugging    Was given IV lasix x 1 in ED  Will continue with lasix 40mg po daily (PTA dose) for now  Continue with prn duonebs    Sodium, creat wnl on 3/9/22    3.  Status post perm/anent pacemaker for sick sinus syndrome   Intermittent atrial fibrillation on anticoagulation (eliquis)    Will resume home bid coreg to prevent withdrawal tachycardia  Will continue with eliquis, for now, until hospice plan becomes more clear    4. Hypokalemia  Will check mag add-on, if  able  Family desires no further lab draws  20meq KCL ordered daily, for now, with her daily lasix     5. Mild-moderate oral pharyngeal dysphagia  Regular diet with thin liquids per comfort     6. History of left pneumothorax in the setting of emphysema and treatment for bilateral lower lobe pneumonia in August and September 2021.   - noted     7. History of dilatation of the main pancreatic duct and a 2.5 cm hypoattenuating lesion in the head/uncinate process of the pancreas.  This was detected on CT scan August 2021. No workup desired     8. History of stroke   - noted     9. Chronic anemia  Hgb stable near 11     10. Hyperlipidemia   - discontinue statin        Diet: Combination Diet Regular Diet Adult    DVT Prophylaxis: DOAC  Garcia Catheter: Not present  Code Status: No CPR- Do NOT Intubate      Disposition Plan   Expected Discharge: pending hospice and SW consult for safe disposition        Entered: Corry Nicole DO 03/10/2022, 7:18 AM       The patient's care was discussed with the Bedside Nurse and Patient.    We are operating under sub optimal conditions in the setting of a world wide pandemic, hospitals are running at full capacity with limited bed availability. We are providing the best possible patient care with limited resources.    Interval History   She reported mid-sternal squeezing.  No improvement yet in her breathing, still feeling SOB.  No HA, no abd pain. No F/C, N/V overnight.       -Data reviewed today: I reviewed all new labs and imaging results over the last 24 hours. I personally reviewed no images or EKG's today.    Physical Exam   Temp: 98.6  F (37  C) Temp src: Oral BP: 119/65 Pulse: 90   Resp: 28 SpO2: 91 % O2 Device: Nasal cannula Oxygen Delivery: 4 LPM  Vitals:    03/09/22 0933   Weight: 49.9 kg (110 lb)     Vital Signs with Ranges  Temp:  [97.8  F (36.6  C)-98.6  F (37  C)] 98.6  F (37  C)  Pulse:  [70-90] 90  Resp:  [20-28] 28  BP: ()/(56-65) 119/65  SpO2:  [90 %-97  %] 91 %  I/O last 3 completed shifts:  In: 360 [P.O.:360]  Out: 100 [Urine:100]    GEN:  Elderly female, slightly [place], very mild conversational dyspnea at this time  HEENT:  Normocephalic/atraumatic, no scleral icterus, no nasal discharge, mouth and membranes slightly dry.   NECK:  No clear thyromegaly or JVD  CV:  Somewhat distant but sounds fairly regular rate and rhythm, no loud murmur to ausc.  S1 + S2 noted, no S3 or S4.  LUNGS:  Clear to auscultation ant/lat bilaterally.  No clear rales/rhonchi/wheezing auscultated ant/lat bilaterally; limited posterior exam this am.   No significant costal retractions bilaterally.  Symmetric chest rise on inhalation noted.  ABD:  Active bowel sounds, soft, non-tender/non-distended.  No rebound/guarding/rigidity.  No masses palpated.  No obvious HSM to exam.  EXT:  No significant pretibial edema or cyanosis bilaterally. No joint synovitis noted.  No calf-tenderness or asymmetry noted.  SKIN:  Dry to touch, no rashes or jaundice noted.  PSYCH:  Mood appropriate, Not tearful or depressed.  Maintains direct eye contact.  NEURO:  No tremors at rest, awake, speech is clear and appropriate.      Data   Labs:  Recent Labs   Lab 03/09/22  1025      POTASSIUM 3.1*   CHLORIDE 101   CO2 25   ANIONGAP 8   *   BUN 18   CR 0.84   GFRESTIMATED 66   GOLDY 7.8*     Recent Labs   Lab 03/09/22  0947   WBC 7.6   HGB 11.0*   HCT 35.6   MCV 83        Recent Labs   Lab 03/09/22  1025      POTASSIUM 3.1*   CHLORIDE 101   CO2 25   ANIONGAP 8   *   BUN 18   CR 0.84   GFRESTIMATED 66   GOLDY 7.8*      Recent Imaging:   Recent Results (from the past 24 hour(s))   CT Chest Pulmonary Embolism w Contrast    Narrative    CT CHEST PULMONARY EMBOLISM WITH CONTRAST March 9, 2022 12:51 PM    CLINICAL HISTORY: Pulmonary embolus suspected, low/intermediate  probability, positive D-dimer. Chest pain, evaluate pulmonary  embolism.    TECHNIQUE: CT angiogram chest during arterial phase  injection IV  contrast. 2D and 3D MIP reconstructions were performed by the CT  technologist. Dose reduction techniques were used.   CONTRAST: 54 mL Isovue-370.    COMPARISON: 2/13/2022.    FINDINGS:  ANGIOGRAM CHEST: Pulmonary arteries are normal caliber and negative  for pulmonary emboli. Thoracic aorta is negative for dissection.     LUNGS AND PLEURA: Severely emphysematous lungs. Worsening basilar  predominant airspace opacities. Underlying bronchiectasis noted with  worsening mucous plugging. There are small effusions, increased from  prior. No pneumothorax.    MEDIASTINUM/AXILLAE: Prominent but not pathologically enlarged  mediastinal lymph nodes are unchanged. No hilar or axillary  adenopathy. There is a left-sided pacer device.    CORONARY ARTERY CALCIFICATION: Severe.    UPPER ABDOMEN: Significant aortic calcifications. There is a  nonobstructing 3 mm calculus in the upper pole of the right kidney.  Gallstones are present.    MUSCULOSKELETAL: No destructive bone lesions.      Impression    IMPRESSION:  1.  Worsening bilateral airspace opacities and mucous plugging in  severely emphysematous lungs.  2.  No evidence of pulmonary embolism.  3.  Severe coronary atherosclerosis.    ÁNGELA PRETTY MD         SYSTEM ID:  [id number]       Medications     - MEDICATION INSTRUCTIONS -         fluticasone-vilanterol  1 puff Inhalation Daily     furosemide  40 mg Oral Daily     oxybutynin ER  5 mg Oral Daily     pantoprazole  40 mg Oral QAM AC     potassium chloride ER  20 mEq Oral Daily     senna-docusate  1 tablet Oral BID     sodium chloride (PF)  3 mL Intracatheter Q8H

## 2022-03-10 NOTE — PROGRESS NOTES
SPIRITUAL HEALTH SERVICES Progress Note  Hospital: [facility] Unit: Palliative Care    Visit Summary: Consult request today. Met with Yoselyn Argelia her daughter, Rosendo her son-in-law. I offered spiritual and emotional support through listening that affirmed emotions, experience, and meaning for patient and family. We all prayed together. Daughter requested that I return tomorrow.    Plan: [facility] remains available for support in person and in prayer.    Emmy Melara   Intern   Pager: [phone]

## 2022-03-11 NOTE — PROGRESS NOTES
Clinic Care Coordination Contact  Care Team Conversations    Received notification from [facility] that patient would be returning to the facility today, under the services of [facility].     At this time, no further care coordination outreaches will be scheduled unless patient initiates or new referral for care coordination is placed.     Chasity Guzman RN  Clinical Product Navigator   Ambulatory Care Coordination  [phone]

## 2022-03-11 NOTE — PLAN OF CARE
Pt remains on comfort care.  Full assessment deferred, one set of vitals done per MD request.  Oxygen stable/providing comfort on 3 liters nasal canula.  Tylenol given for right great toe pain.  Morphine given x1 for chest pain.  Some home meds provided for comfort.  Pt appears to be an aspiration risk, must be upright for all intake.  Left sided weakness.  Up in chair for meals, A1-2 gait belt and walker.  Preventative mepilex applied to heels and coccyx.  Incontinent of urine x2, purewick in place.  PIV saline locked.  Plan to discharge at 1430 tomorrow back to [facility] on hospice.

## 2022-03-11 NOTE — PLAN OF CARE
Goal Outcome Evaluation:  1930-0730  A/Ox4. Comfort care. Daily vitals per MD request. Set obtained tonight. Pt c/o chest pain. Tachypneic, labored. Managed with PRN oral roxanol  x2. Sublingual ativan x1, sublingual nitroglycerine x1. Pain much improved after nitroglycerin.Pt appeared comfortable, resting through night, denies pain.  Daughter Maryjane updated. Regular diet. Pt appears to be an aspiration risk, must be upright for all intake.  Left sided weakness john LLE. Up in chair for meals, Ax1-2 gb/w pivot transfer. Incontinent b/b - purewick in place.  Preventative mepilex intact to heels and coccyx. T/R q2h.  IV access loss. [facility] today [facility] 14[facility]

## 2022-03-11 NOTE — PROGRESS NOTES
SPIRITUAL HEALTH SERVICES Progress Note  Hospital: [facility] Unit: 88    Visit Summary: Follow up visit per family request. I offered spiritual support through prayer and singing, and emotional support through reflective listening. Supported Argelia and Rosendo with affirming emotions.    Plan: [facility] remains available for support and prayer.    Emmy Melara   Intern   Pager: [phone]

## 2022-03-11 NOTE — PLAN OF CARE
1930 -0730: A/Ox4. Comfort care. Daily vitals per MD request. Set obtained tonight. Pt c/o chest pain. Tachypneic, labored. Managed with PRN oral roxanol  x2. Sublingual ativan x1, sublingual nitroglycerine x1. Pain much improved after nitroglycerin. Pt able to sleep through night. Denied pain through night. Daughter Maryjane updated. Regular diet. Pt appears to be an aspiration risk, must be upright for all intake.  Left sided weakness john LLE. Up in chair for meals, Ax1-2 gb/w pivot transfer. Incontinent b/b - purewick in place.  Preventative mepilex intact to heels and coccyx. T/R q2h.  IV access loss. Plan to discharge today @ 1430 back to [facility] hospice.

## 2022-03-11 NOTE — DISCHARGE SUMMARY
[facility]  Hospitalist Discharge Summary      Date of Admission:  3/9/2022  Date of Discharge:  3/11/2022  Discharging Provider: Matt Navarrete MD, MD  Discharge Service: Hospitalist Service    Discharge Diagnoses -Transitioned to comfort care/hospice    Mid sternal CP  Elevated troponin consistent with NSTEMI  Unstable angina suspected      Acute on chronic systolic congestive heart failure  Acute hypoxic respiratory failure - multifactorial d/t     pulmonary fibrosis, COPD, CHF     Status post permanent pacemaker for sick sinus syndrome   Intermittent atrial fibrillation on anticoagulation (eliquis)      Hypokalemia    Mild-moderate oral pharyngeal dysphagia    History of left pneumothorax in the setting of emphysema and treatment for bilateral lower lobe pneumonia in August and September 2021.      History of dilatation of the main pancreatic duct and a 2.5 cm hypoattenuating lesion in the head/uncinate process of the pancreas.     History of stroke        Chronic anemia  Hyperlipidemia     Follow-ups Needed After Discharge   Follow-up Appointments     Follow Up and recommended labs and tests      Follow up with correction physician.  The following labs/tests are   recommended:   Follow-up as per hospice team.           Unresulted Labs Ordered in the Past 30 Days of this Admission     No orders found from 2/7/2022 to 3/10/2022.        Discharge Disposition   Discharged to nursing home  Condition at discharge: Terminal    Hospital Course   Yoselyn Cui is a 89 year old female who was admitted on 3/9/2022. She presented to the ED with mid-sternal chest pain and SOB.  Ct scan of the chest with no evidence of PE; there was findings of severe coronary artherosclerosis and mucous plugging with bilateral opacities.  Troponin and BNP elevated.  On admission, the patient and family decided to transition to comfort cares with no additional lab work or hospital testing.        "facilitated hospice in the nursing home.    Consultations This Hospital Stay   SPIRITUAL HEALTH SERVICES IP CONSULT  SOCIAL WORK IP CONSULT  CARE MANAGEMENT / SOCIAL WORK IP CONSULT    Code Status   No CPR- Do NOT Intubate    Time Spent on this Encounter   I, Matt Navarrete MD, MD, personally saw the patient today and spent greater than 30 minutes discharging this patient.       Matt Navarrete MD, MD  [place] [address] [facility]  [address] [place], [place] [address], [address]  [place] [address]  Phone: [phone]  ______________________________________________________________________    Physical Exam   /63 (BP Location: Left arm)   Pulse 87   Temp 98.4  F (36.9  C) (Axillary)   Resp 24   Ht 1.499 m (4' 11\")   Wt 49.9 kg (110 lb)   SpO2 93%   BMI 22.22 kg/m    Gen- pleasant elderly laying in bed  Neck- supple  CVS- I+II+ no m/r/g  RS- CTAB, decreased at base   Abdo- soft, no tenderness. No g/r/r   Ext- no edema     Primary Care Physician   Brittany Florez    Discharge Orders      General info for SNF    Length of Stay Estimate: Short Term Care: Estimated # of Days 31-90  Condition at Discharge: Terminal  Level of care:skilled   Rehabilitation Potential: Poor  Admission H&P remains valid and up-to-date: Yes  Recent Chemotherapy: N/A  Use Nursing Home Standing Orders: Yes     Mantoux instructions    Give two-step Mantoux (PPD) Per Facility Policy Yes     Follow Up and recommended labs and tests    Follow up with senior care physician.  The following labs/tests are recommended:   Follow-up as per hospice team.     Reason for your hospital stay    Yoselyn Cui is a 89 year old female who was admitted on 3/9/2022. She presented to the ED with mid-sternal chest pain and SOB.  Ct scan of the chest with no evidence of PE; there was findings of severe coronary artherosclerosis and mucous plugging with bilateral opacities.  Troponin and BNP elevated.  On admission, the patient and family decided to transition to " comfort cares with no additional lab work or hospital testing.     Bladder scan    X 2 for post void residual     Activity - Up with nursing assistance     No CPR- Do NOT Intubate     Fall precautions     Oxygen Adult/Peds    Comfort care patient requiring oxygen     Diet    Follow this diet upon discharge: Orders Placed This Encounter      Combination Diet Regular Diet Adult       Significant Results and Procedures   Results for orders placed or performed during the hospital encounter of 03/09/22   CT Chest Pulmonary Embolism w Contrast    Narrative    CT CHEST PULMONARY EMBOLISM WITH CONTRAST March 9, 2022 12:51 PM    CLINICAL HISTORY: Pulmonary embolus suspected, low/intermediate  probability, positive D-dimer. Chest pain, evaluate pulmonary  embolism.    TECHNIQUE: CT angiogram chest during arterial phase injection IV  contrast. 2D and 3D MIP reconstructions were performed by the CT  technologist. Dose reduction techniques were used.   CONTRAST: 54 mL Isovue-370.    COMPARISON: 2/13/2022.    FINDINGS:  ANGIOGRAM CHEST: Pulmonary arteries are normal caliber and negative  for pulmonary emboli. Thoracic aorta is negative for dissection.     LUNGS AND PLEURA: Severely emphysematous lungs. Worsening basilar  predominant airspace opacities. Underlying bronchiectasis noted with  worsening mucous plugging. There are small effusions, increased from  prior. No pneumothorax.    MEDIASTINUM/AXILLAE: Prominent but not pathologically enlarged  mediastinal lymph nodes are unchanged. No hilar or axillary  adenopathy. There is a left-sided pacer device.    CORONARY ARTERY CALCIFICATION: Severe.    UPPER ABDOMEN: Significant aortic calcifications. There is a  nonobstructing 3 mm calculus in the upper pole of the right kidney.  Gallstones are present.    MUSCULOSKELETAL: No destructive bone lesions.      Impression    IMPRESSION:  1.  Worsening bilateral airspace opacities and mucous plugging in  severely emphysematous lungs.  2.   No evidence of pulmonary embolism.  3.  Severe coronary atherosclerosis.    ÁNGELA PRETTY MD         SYSTEM ID:  [id number]       Discharge Medications   Current Discharge Medication List      START taking these medications    Details   acetaminophen (TYLENOL) 650 MG suppository Place 1 suppository (650 mg) rectally every 4 hours as needed for fever  Qty: 4 suppository, Refills: 11    Associated Diagnoses: Left hemiparesis (H)      atropine 1 % ophthalmic solution Take 1 drop by mouth, place under tongue or place inside cheek every 4 hours as needed for secretions  Qty: 5 mL, Refills: 11    Associated Diagnoses: Left hemiparesis (H)      bisacodyl (DULCOLAX) 10 MG suppository Place 1 suppository (10 mg) rectally daily as needed for constipation  Qty: 2 suppository, Refills: 11    Associated Diagnoses: Left hemiparesis (H)      haloperidol (HALDOL) 2 MG/ML (HIGH CONC) solution Take 0.25 mLs (0.5 mg) by mouth every 6 hours as needed for agitation or other (nausea)  Qty: 10 mL, Refills: 11    Associated Diagnoses: Left hemiparesis (H)      LORazepam (ATIVAN) 2 MG/ML (HIGH CONC) oral solution Take 0.125 mLs (0.25 mg) by mouth or place under tongue every 4 hours as needed for anxiety (restlessness)  Qty: 30 mL, Refills: 5    Associated Diagnoses: Left hemiparesis (H)      morphine sulfate, high concentrate, (ROXANOL-CONCENTRATED) 20 MG/ML concentrated solution Take 0.125 mLs (2.5 mg) by mouth or place under tongue every 2 hours as needed for shortness of breath / dyspnea or pain  Qty: 90 mL, Refills: 0    Comments: Hospice patient. Dispense in quantities of 30 mL.  Associated Diagnoses: Left hemiparesis (H)      ondansetron (ZOFRAN-ODT) 4 MG ODT tab Take 1 tablet (4 mg) by mouth every 6 hours as needed for nausea or vomiting    Associated Diagnoses: Acute respiratory failure with hypoxia (H)      senna (SENNA LAXATIVE) 8.6 MG tablet Take 1 tablet by mouth 2 times daily as needed for constipation  Qty: 100 tablet,  Refills: 11    Associated Diagnoses: Left hemiparesis (H)         CONTINUE these medications which have NOT CHANGED    Details   fluticasone-vilanterol (BREO ELLIPTA) 200-25 MCG/INH inhaler Inhale 1 puff into the lungs daily  Qty: 1 each, Refills: 0    Associated Diagnoses: COPD exacerbation (H)      furosemide (LASIX) 40 MG tablet Take 1 tablet (40 mg) by mouth daily  Qty: 30 tablet, Refills: 1    Associated Diagnoses: Acute on chronic congestive heart failure, unspecified heart failure type (H)      guaiFENesin (ROBITUSSIN) 100 MG/5ML liquid Take 10 mLs (200 mg) by mouth 2 times daily. May also take 10 mLs (200 mg) 2 times daily as needed for cough.  Qty: 180 mL, Refills: 11    Associated Diagnoses: Pulmonary emphysema, unspecified emphysema type (H)      ipratropium - albuterol 0.5 mg/2.5 mg/3 mL (DUONEB) 0.5-2.5 (3) MG/3ML neb solution Take 1 vial (3 mLs) by nebulization 4 times daily  Qty: 90 mL, Refills: 0    Associated Diagnoses: COPD exacerbation (H)      nitroGLYcerin (NITROSTAT) 0.4 MG sublingual tablet For chest pain place 1 tablet under the tongue every 5 minutes for 3 doses. If symptoms persist 5 minutes after 1st dose call 911.  Qty: 30 tablet, Refills: 0    Associated Diagnoses: Coronary artery disease involving native coronary artery of native heart without angina pectoris         STOP taking these medications       acetaminophen (TYLENOL) 325 MG tablet Comments:   Reason for Stopping:         amLODIPine (NORVASC) 10 MG tablet Comments:   Reason for Stopping:         atorvastatin (LIPITOR) 40 MG tablet Comments:   Reason for Stopping:         carvedilol (COREG) 12.5 MG tablet Comments:   Reason for Stopping:         clopidogrel (PLAVIX) 75 MG tablet Comments:   Reason for Stopping:         ELIQUIS ANTICOAGULANT 2.5 MG tablet Comments:   Reason for Stopping:         ferrous sulfate (FE TABS) 325 (65 Fe) MG EC tablet Comments:   Reason for Stopping:         isosorbide mononitrate (IMDUR) 30 MG 24 hr  tablet Comments:   Reason for Stopping:         losartan (COZAAR) 25 MG tablet Comments:   Reason for Stopping:         omeprazole (PRILOSEC) 20 MG DR capsule Comments:   Reason for Stopping:         oxybutynin ER (DITROPAN-XL) 5 MG 24 hr tablet Comments:   Reason for Stopping:         potassium chloride ER (KLOR-CON M) 10 MEQ CR tablet Comments:   Reason for Stopping:         psyllium (METAMUCIL/KONSYL) 58.6 % powder Comments:   Reason for Stopping:         simethicone (MYLICON) 125 MG chewable tablet Comments:   Reason for Stopping:         Vitamin D3 (CHOLECALCIFEROL) 25 mcg (1000 units) tablet Comments:   Reason for Stopping:             Allergies   No Known Allergies

## 2022-03-11 NOTE — PROGRESS NOTES
Care Management Discharge Note    Discharge Date: 03/11/2022       Discharge Disposition:  [facility].     Discharge Services:  [facility]    Discharge DME:      Discharge Transportation:   Wheelchair Transport with oxygen.     Private pay costs discussed: transportation costs    PAS Confirmation Code:  N/A  Patient/family educated on Medicare website which has current facility and service quality ratings:      Education Provided on the Discharge Plan:    Persons Notified of Discharge Plans: Patient's daughter  Patient/Family in Agreement with the Plan:  Yes    Handoff Referral Completed: No    Additional Information:  Received discharge orders for patient to discharge to [facility] today [facility]. Ride arranged for 2:30 today via  Wheelchair. Faxed discharge orders to [facility]. Hospital to fill meds and send with patient. Updated physician.     LUISA Brannon

## 2022-03-11 NOTE — PLAN OF CARE
Goal Outcome Evaluation:    Discharge Note    Patient discharged to Nursing Home via EMS/BLS accompanied by no family/friend .  IV: Discontinued  Prescriptions filled and given to patient/family and sent with patient to discharge facility .   Belongings reviewed and sent with patient.   Home medications returned to patient: NA  Equipment sent with: N/A.   patient verbalizes understanding of discharge instructions. AVS given to  EMS .

## 2022-03-14 NOTE — PROGRESS NOTES
[place] [facility]  PRIMARY CARE PROVIDER AND CLINIC:  Brittany Florez, APRN CNP, [address] / [place] [address]  Chief Complaint   Patient presents with     Hospital F/U     [place] Medical Record Number:  [id number]  Place of Service where encounter took place:  [facility] ([facility]) [[id number]]    Yoselyn Cui  is a 89 year old  (1/28/1932), admitted to the above facility from  [facility]. Hospital stay 3/9/22 through 3/11/22..  Admitted to this facility for  medical management, nursing care and hospice.    HPI:    HPI information obtained from: facility chart records, facility staff and patient report.   Brief Summary of Hospital Course:   Updates on Status Since Skilled nursing Admission:     Per hospital note:  Yoselyn Cui is a 89 yr old female re-admitted to [facility] for hospice s/p hospitalization [facility] 3/9-3/11/22 mid-sternal chest pain and SOB.  Ct scan of the chest with no evidence of PE; there was findings of severe coronary artherosclerosis and mucous plugging with bilateral opacities.  Troponin and BNP elevated.  On admission, the patient and family decided to transition to comfort cares with no additional lab work or hospital testing.   Plans to stay at [facility] and receive comfort cares with [facility] team. Does not plan to return to [facility]         TODAY  Patient states she does have shortness of breath, however, MS and oxygen making her comfortable. Patient not interested in increase scheduled morphine at this time. Patient on 5L NC     Patient only eating few bites, Needs help with eating due to weakness  States she likes vanilla ice cream and ice chips    Reports regular elimination, however, can not recall last bowel movement, Denies gastrointestinal upset    Patient spending most of the day in bed    Family visiting this weekend.   Patient is blind and hard of hearing,  however, expresses she likes visitors and talking to family and visitors and staff  States she enjoys dog therapy and . Also, expressed interest in music therapy  Daughter Maryjane present at visit             CODE STATUS/ADVANCE DIRECTIVES DISCUSSION:   DNR / DNI  Patient's living condition: lives in an assisted living facility  ALLERGIES: Patient has no known allergies.  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  PAST SURGICAL HISTORY:   has a past surgical history that includes LIGATE FALLOPIAN TUBE (1960s); appendectomy (1960s); Implant pacemaker (05/17/2016); REMV CATARACT INTRACAP,INSERT LENS (Right, 09/20/2017); REMV CATARACT EXTRACAP,INSERT LENS, W/O ECP (Left, 10/04/2017); Esophagoscopy, gastroscopy, duodenoscopy (EGD), combined (N/A, 11/20/2018); Coronary Angiogram (N/A, 7/17/2019); Left Ventriculogram (N/A, 7/17/2019); Left Heart Cath (N/A, 7/17/2019); Heart Catheterization with Possible Intervention (N/A, 7/18/2019); Percutaneous Coronary Intervention Stent Drug Eluting (N/A, 7/18/2019); and IR Chest Tube Place Non Tunneled Left (8/31/2021).  FAMILY HISTORY: family history includes Coronary Artery Disease in her brother, father, and sister.  SOCIAL HISTORY:   reports that she has quit smoking. Her smoking use included cigarettes. She has a 35.00 pack-year smoking history. She has never used smokeless tobacco. She reports that she does not drink alcohol and does not use drugs.    Post Discharge Medication Reconciliation Status: discharge  "medications reconciled and changed, per note/orders    Current Outpatient Medications   Medication Sig Dispense Refill     atropine 1 % ophthalmic solution Take 1 drop by mouth, place under tongue or place inside cheek every 4 hours as needed for secretions 5 mL 11     bisacodyl (DULCOLAX) 10 MG suppository Place 1 suppository (10 mg) rectally daily as needed for constipation 2 suppository 11     LORazepam (ATIVAN) 2 MG/ML (HIGH CONC) oral solution Take 0.125 mLs (0.25 mg) by mouth or place under tongue every 4 hours as needed for anxiety (restlessness) 30 mL 5     morphine sulfate, high concentrate, (ROXANOL-CONCENTRATED) 20 MG/ML concentrated solution Take 5 mg by mouth every 6 hours       morphine sulfate, high concentrate, (ROXANOL-CONCENTRATED) 20 MG/ML concentrated solution Take 5 mg by mouth every hour as needed for shortness of breath / dyspnea or breakthrough pain       prochlorperazine (COMPAZINE) 10 MG tablet Take 10 mg by mouth every 4 hours as needed for nausea or vomiting       senna-docusate (SENOKOT-S/PERICOLACE) 8.6-50 MG tablet Take 1 tablet by mouth daily 2 TABS 2 x daily as needed         ROS:  10 point ROS of systems including Constitutional, Eyes, Respiratory, Cardiovascular, Gastroenterology, Genitourinary, Integumentary, Musculoskeletal, Psychiatric were all negative except for pertinent positives noted in my HPI.    Vitals:  /69   Pulse 86   Temp 97.7  F (36.5  C)   Resp 18   Ht 1.499 m (4' 11\")   Wt 48.3 kg (106 lb 6.4 oz)   SpO2 90%   BMI 21.49 kg/m    Exam:  GENERAL APPEARANCE:  in no distress, thin  ENT:  Mouth and posterior oropharynx normal, moist mucous membranes  EYES:  EOM, conjunctivae, lids, pupils and irises normal  NECK:  No adenopathy,masses or thyromegaly  RESP:  respiratory effort and palpation of chest normal, lungs clear to auscultation , no respiratory distress, diminished breath sounds bilateral bases, and slightly course bilateral bases, on 5 L nc  CV:  " Palpation and auscultation of heart done , regular rate and rhythm, no murmur, rub, or gallop  ABDOMEN:  normal bowel sounds, soft, nontender, no hepatosplenomegaly or other masses  M/S:   lying in bed  SKIN:  Inspection of skin and subcutaneous tissue baseline  NEURO:   Cranial nerves 2-12 are normal tested and grossly at patient's baseline  PSYCH:  oriented X 3    Lab/Diagnostic data:  Labs done in SNF are in [facility] EPIC. Please refer to them using [facility]/Care Everywhere.     Last Comprehensive Metabolic Panel:  Sodium   Date Value Ref Range Status   03/09/2022 134 133 - 144 mmol/L Final   04/28/2021 133 133 - 144 mmol/L Final     Potassium   Date Value Ref Range Status   03/09/2022 3.1 (L) 3.4 - 5.3 mmol/L Final   04/28/2021 4.3 3.4 - 5.3 mmol/L Final     Chloride   Date Value Ref Range Status   03/09/2022 101 94 - 109 mmol/L Final   04/28/2021 101 94 - 109 mmol/L Final     Carbon Dioxide   Date Value Ref Range Status   04/28/2021 27 20 - 32 mmol/L Final     Carbon Dioxide (CO2)   Date Value Ref Range Status   03/09/2022 25 20 - 32 mmol/L Final     Anion Gap   Date Value Ref Range Status   03/09/2022 8 3 - 14 mmol/L Final   04/28/2021 5 3 - 14 mmol/L Final     Glucose   Date Value Ref Range Status   03/09/2022 106 (H) 70 - 99 mg/dL Final   04/28/2021 99 70 - 99 mg/dL Final     Comment:     Fasting specimen     Urea Nitrogen   Date Value Ref Range Status   03/09/2022 18 7 - 30 mg/dL Final   04/28/2021 13 7 - 30 mg/dL Final     Creatinine   Date Value Ref Range Status   03/09/2022 0.84 0.52 - 1.04 mg/dL Final   04/28/2021 0.75 0.52 - 1.04 mg/dL Final     GFR Estimate   Date Value Ref Range Status   03/09/2022 66 >60 mL/min/1.73m2 Final     Comment:     Effective December 21, 2021 eGFRcr in adults is calculated using the 2021 CKD-EPI creatinine equation which includes age and gender (Aziza et al., NEJM, DOI: 10.1056/KNRTuo9548141)   04/28/2021 71 >60 mL/min/[1.73_m2] Final     Comment:     Non  GFR  Calc  Starting 12/18/2018, serum creatinine based estimated GFR (eGFR) will be   calculated using the Chronic Kidney Disease Epidemiology Collaboration   (CKD-EPI) equation.       Calcium   Date Value Ref Range Status   03/09/2022 7.8 (L) 8.5 - 10.1 mg/dL Final   04/28/2021 8.7 8.5 - 10.1 mg/dL Final     Lab Results   Component Value Date    WBC 7.6 03/09/2022    WBC 5.8 04/15/2021     Lab Results   Component Value Date    RBC 4.29 03/09/2022    RBC 3.83 04/15/2021     Lab Results   Component Value Date    HGB 11.0 03/09/2022    HGB 11.0 04/15/2021     Lab Results   Component Value Date    HCT 35.6 03/09/2022    HCT 34.9 04/15/2021     No components found for: MCT  Lab Results   Component Value Date    MCV 83 03/09/2022    MCV 91 04/15/2021     Lab Results   Component Value Date    MCH 25.6 03/09/2022    MCH 28.7 04/15/2021     Lab Results   Component Value Date    MCHC 30.9 03/09/2022    MCHC 31.5 04/15/2021     Lab Results   Component Value Date    RDW 15.9 03/09/2022    RDW 14.5 04/15/2021     Lab Results   Component Value Date     03/09/2022     04/15/2021         ASSESSMENT/PLAN:  CAD  Angina  Afib  Status post permanent pacemaker for sick sinus syndrome   CHF  COPD, pulmonary fibrosis, mucous plugging  Failure to thrive   Weakness  Hospice patient   History CVA, left sided weakness, dysphagis   Low vision  Pain managed with MS, continue morphine and as needed tylenol   Denies chest pain today   bm management -will schedule Senna S 1 tab.   No longer on heart medications or Eliquis, not appear fluid up  No plan for further labs or workup. Goal comfort   Updated staff to offer vanilla ice cream and ice chips 2 x daily, help with feeding  Oral cares 3 x daily  APM mattress, and Broda chair  Referral , dog therapy and music therapy           Total time spent with patient visit at the [facility] nursing facility was 36 min including patient visit, review of past records and discussion with  daughter. Greater than 50% of total time spent with counseling and coordinating care due to discussion with patient on pain management, and comfort medications and consult with daughter Maryjane on plan of care.     Electronically signed by:  STEFFANY Fritz CNP

## 2022-03-14 NOTE — LETTER
3/14/2022        RE: Yoselyn Cui  [place]  [address]  [address]  [facility] [address]        [place] [facility]  PRIMARY CARE PROVIDER AND CLINIC:  STEFFANY Yusuf CNP, [address] / [place] [address]  Chief Complaint   Patient presents with     Hospital F/U     [place] Medical Record Number:  [id number]  Place of Service where encounter took place:  [facility] ([facility]) [[id number]]    Yoselyn Cui  is a 89 year old  (1/28/1932), admitted to the above facility from  [facility]. Hospital stay 3/9/22 through 3/11/22..  Admitted to this facility for  medical management, nursing care and hospice.    HPI:    HPI information obtained from: facility chart records, facility staff and patient report.   Brief Summary of Hospital Course:   Updates on Status Since Skilled nursing Admission:     Per hospital note:  Yoselyn Cui is a 89 yr old female re-admitted to [facility] for hospice s/p hospitalization [facility] 3/9-3/11/22 mid-sternal chest pain and SOB.  Ct scan of the chest with no evidence of PE; there was findings of severe coronary artherosclerosis and mucous plugging with bilateral opacities.  Troponin and BNP elevated.  On admission, the patient and family decided to transition to comfort cares with no additional lab work or hospital testing.   Plans to stay at [facility] and receive comfort cares with [facility] team. Does not plan to return to [facility]         TODAY  Patient states she does have shortness of breath, however, MS and oxygen making her comfortable. Patient not interested in increase scheduled morphine at this time. Patient on 5L NC     Patient only eating few bites, Needs help with eating due to weakness  States she likes vanilla ice cream and ice chips    Reports regular elimination, however, can not recall last bowel movement, Denies gastrointestinal  upset    Patient spending most of the day in bed    Family visiting this weekend.   Patient is blind and hard of hearing, however, expresses she likes visitors and talking to family and visitors and staff  States she enjoys dog therapy and . Also, expressed interest in music therapy  Daughter Maryjane present at visit             CODE STATUS/ADVANCE DIRECTIVES DISCUSSION:   DNR / DNI  Patient's living condition: lives in an assisted living facility  ALLERGIES: Patient has no known allergies.  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  PAST SURGICAL HISTORY:   has a past surgical history that includes LIGATE FALLOPIAN TUBE (1960s); appendectomy (1960s); Implant pacemaker (05/17/2016); REMV CATARACT INTRACAP,INSERT LENS (Right, 09/20/2017); REMV CATARACT EXTRACAP,INSERT LENS, W/O ECP (Left, 10/04/2017); Esophagoscopy, gastroscopy, duodenoscopy (EGD), combined (N/A, 11/20/2018); Coronary Angiogram (N/A, 7/17/2019); Left Ventriculogram (N/A, 7/17/2019); Left Heart Cath (N/A, 7/17/2019); Heart Catheterization with Possible Intervention (N/A, 7/18/2019); Percutaneous Coronary Intervention Stent Drug Eluting (N/A, 7/18/2019); and IR Chest Tube Place Non Tunneled Left (8/31/2021).  FAMILY HISTORY: family history includes Coronary Artery Disease in her brother, father, and sister.  SOCIAL HISTORY:   reports that she has quit smoking. Her smoking use included cigarettes. She has a 35.00 pack-year smoking history. She has never used smokeless tobacco. She reports that  "she does not drink alcohol and does not use drugs.    Post Discharge Medication Reconciliation Status: discharge medications reconciled and changed, per note/orders    Current Outpatient Medications   Medication Sig Dispense Refill     atropine 1 % ophthalmic solution Take 1 drop by mouth, place under tongue or place inside cheek every 4 hours as needed for secretions 5 mL 11     bisacodyl (DULCOLAX) 10 MG suppository Place 1 suppository (10 mg) rectally daily as needed for constipation 2 suppository 11     LORazepam (ATIVAN) 2 MG/ML (HIGH CONC) oral solution Take 0.125 mLs (0.25 mg) by mouth or place under tongue every 4 hours as needed for anxiety (restlessness) 30 mL 5     morphine sulfate, high concentrate, (ROXANOL-CONCENTRATED) 20 MG/ML concentrated solution Take 5 mg by mouth every 6 hours       morphine sulfate, high concentrate, (ROXANOL-CONCENTRATED) 20 MG/ML concentrated solution Take 5 mg by mouth every hour as needed for shortness of breath / dyspnea or breakthrough pain       prochlorperazine (COMPAZINE) 10 MG tablet Take 10 mg by mouth every 4 hours as needed for nausea or vomiting       senna-docusate (SENOKOT-S/PERICOLACE) 8.6-50 MG tablet Take 1 tablet by mouth daily 2 TABS 2 x daily as needed         ROS:  10 point ROS of systems including Constitutional, Eyes, Respiratory, Cardiovascular, Gastroenterology, Genitourinary, Integumentary, Musculoskeletal, Psychiatric were all negative except for pertinent positives noted in my HPI.    Vitals:  /69   Pulse 86   Temp 97.7  F (36.5  C)   Resp 18   Ht 1.499 m (4' 11\")   Wt 48.3 kg (106 lb 6.4 oz)   SpO2 90%   BMI 21.49 kg/m    Exam:  GENERAL APPEARANCE:  in no distress, thin  ENT:  Mouth and posterior oropharynx normal, moist mucous membranes  EYES:  EOM, conjunctivae, lids, pupils and irises normal  NECK:  No adenopathy,masses or thyromegaly  RESP:  respiratory effort and palpation of chest normal, lungs clear to auscultation , no " respiratory distress, diminished breath sounds bilateral bases, and slightly course bilateral bases, on 5 L nc  CV:  Palpation and auscultation of heart done , regular rate and rhythm, no murmur, rub, or gallop  ABDOMEN:  normal bowel sounds, soft, nontender, no hepatosplenomegaly or other masses  M/S:   lying in bed  SKIN:  Inspection of skin and subcutaneous tissue baseline  NEURO:   Cranial nerves 2-12 are normal tested and grossly at patient's baseline  PSYCH:  oriented X 3    Lab/Diagnostic data:  [facility]F [facility]. Please refer to them using [employer]/Care Everywhere.     Last Comprehensive Metabolic Panel:  Sodium   Date Value Ref Range Status   03/09/2022 134 133 - 144 mmol/L Final   04/28/2021 133 133 - 144 mmol/L Final     Potassium   Date Value Ref Range Status   03/09/2022 3.1 (L) 3.4 - 5.3 mmol/L Final   04/28/2021 4.3 3.4 - 5.3 mmol/L Final     Chloride   Date Value Ref Range Status   03/09/2022 101 94 - 109 mmol/L Final   04/28/2021 101 94 - 109 mmol/L Final     Carbon Dioxide   Date Value Ref Range Status   04/28/2021 27 20 - 32 mmol/L Final     Carbon Dioxide (CO2)   Date Value Ref Range Status   03/09/2022 25 20 - 32 mmol/L Final     Anion Gap   Date Value Ref Range Status   03/09/2022 8 3 - 14 mmol/L Final   04/28/2021 5 3 - 14 mmol/L Final     Glucose   Date Value Ref Range Status   03/09/2022 106 (H) 70 - 99 mg/dL Final   04/28/2021 99 70 - 99 mg/dL Final     Comment:     Fasting specimen     Urea Nitrogen   Date Value Ref Range Status   03/09/2022 18 7 - 30 mg/dL Final   04/28/2021 13 7 - 30 mg/dL Final     Creatinine   Date Value Ref Range Status   03/09/2022 0.84 0.52 - 1.04 mg/dL Final   04/28/2021 0.75 0.52 - 1.04 mg/dL Final     GFR Estimate   Date Value Ref Range Status   03/09/2022 66 >60 mL/min/1.73m2 Final     Comment:     Effective December 21, 2021 eGFRcr in adults is calculated using the 2021 CKD-EPI creatinine equation which includes age and gender (Aziza hamilton al.,  MINDY, DOI: 10.1056/VUFNsh5988748)   04/28/2021 71 >60 mL/min/[1.73_m2] Final     Comment:     Non  GFR Calc  Starting 12/18/2018, serum creatinine based estimated GFR (eGFR) will be   calculated using the Chronic Kidney Disease Epidemiology Collaboration   (CKD-EPI) equation.       Calcium   Date Value Ref Range Status   03/09/2022 7.8 (L) 8.5 - 10.1 mg/dL Final   04/28/2021 8.7 8.5 - 10.1 mg/dL Final     Lab Results   Component Value Date    WBC 7.6 03/09/2022    WBC 5.8 04/15/2021     Lab Results   Component Value Date    RBC 4.29 03/09/2022    RBC 3.83 04/15/2021     Lab Results   Component Value Date    HGB 11.0 03/09/2022    HGB 11.0 04/15/2021     Lab Results   Component Value Date    HCT 35.6 03/09/2022    HCT 34.9 04/15/2021     No components found for: MCT  Lab Results   Component Value Date    MCV 83 03/09/2022    MCV 91 04/15/2021     Lab Results   Component Value Date    MCH 25.6 03/09/2022    MCH 28.7 04/15/2021     Lab Results   Component Value Date    MCHC 30.9 03/09/2022    MCHC 31.5 04/15/2021     Lab Results   Component Value Date    RDW 15.9 03/09/2022    RDW 14.5 04/15/2021     Lab Results   Component Value Date     03/09/2022     04/15/2021         ASSESSMENT/PLAN:  CAD  Angina  Afib  Status post permanent pacemaker for sick sinus syndrome   CHF  COPD, pulmonary fibrosis, mucous plugging  Failure to thrive   Weakness  Hospice patient   History CVA, left sided weakness, dysphagis   Low vision  Pain managed with MS, continue morphine and as needed tylenol   Denies chest pain today   bm management -will schedule Senna S 1 tab.   No longer on heart medications or Eliquis, not appear fluid up  No plan for further labs or workup. Goal comfort   Updated staff to offer vanilla ice cream and ice chips 2 x daily, help with feeding  Oral cares 3 x daily  APM mattress, and Broda chair  Referral , dog therapy and music therapy           Total time spent with patient  visit at the skilled nursing facility was 36 min including patient visit, review of past records and discussion with daughter. Greater than 50% of total time spent with counseling and coordinating care due to discussion with patient on pain management, and comfort medications and consult with daughter Maryjane on plan of care.     Electronically signed by:  STEFFANY Fritz CNP                           Sincerely,        STEFFANY Fritz CNP

## 2022-08-19 NOTE — PLAN OF CARE
Patient A&O- VSS- afebrile - Tele 100% paced rhythm- Up to chair for all meals, appetite good- IVAB changed to ceftriaxone. Ambulates with assist of 1 and use of her cane.  Voiding in adequate amounts.  No BM today, but did receive a stool softener in early AM. Left sided weakness d/t pervious stroke.  Family at bedside through out the day.   17/28: moderate fall risk

## 2023-03-13 NOTE — DISCHARGE SUMMARY
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 She was found to be febrile 102.5 F in the ED with a a marked leukocytosis and CXR findings concerning for pneumonia.  She was started on IV ceftriaxone/azithromycin for community acquired pneumonia.  She is a former smoker and likely has some degree of bronchospasm as well and was started on steroids/nebs.  She is clinically improving gradually and as of today she tells me she feels 100% better.  She has completed azithromycin, and will continue Ceftin for 3 days more at home to complete beta-lactam therapy for her pneumonia.  She has also completed a prednisone burst.  She remains on low rate supplemental oxygen only with activity which we are weaning.  This morning she was oxygenating in the low to mid 90s on room air at rest, but did desat to the low to mid 80s with activity and as such will discharge on 2 L of supplemental oxygen with activity.      She lives with her  and her daughter who will help her recover at home.        Sepsis secondary to community acquired pneumonia: Generalized weakness, productive cough, subjective fever, and chills with known sick contacts, at least one was definitively confirmed to have influenza on outpatient testing a week or two ago.  Febrile up to 102.5 F and leukocytosis 21.2 with absolute neutrophilia of 17.8.  Rapid influenza swab  negative. Blood cultures x 2 drawn and NGTD. CXR demonstrated ill-defined opacity projecting over right midlung; also showed ndular denisty projecting over left midlung new since 6/5/2017.  CT scan was obtained and showed bilateral infiltrates with recommended follow-up imaging to ensure complete resolution.  -Completed azithromycin and will continue Ceftin for 3 days more  -Blood cultures x 2 NGTD  -Influenza A/B PCR negative and procalcitonin elevated  -CT of chest to further characterize nodular findings on chest x-ray showed bilateral infiltrates w/ diffuse emphysematous changes w/ mild posterior LLL opacity c/w atelectasis w/ bronchiectasis.  I have recommended she recheck chest x-ray in 2-3 weeks  -Continue inhalers at home  -needs outpatient PFTs, discussed w/ family.      Dysuria:   She was on IV ceftriaxone w/ coverage for CAP. UA douglas.      History of high degree AV block s/p pacemaker and paroxysmal atrial fibrillation: S/p pacemaker implantation 5/2016.  She has known short paroxysms of atrial fibrillation which have always been asymptomatic.  Because of an elevated chads vascular score, she is on apixaban 2.5 mg twice daily.  She follows with [facility] cardiology annually. Continue on apixaban 2.5 mg BID and carvedilol 25 mg BID w/ parameters.      History of prior CVA x 2: Patient reports she had a stroke when she was 50 years old and another when she was 75.  Continue PTA aspirin 162 mg daily and apixaban 2.5 mg BID.      HTN: Continue on PTA lisinopril 40 mg daily, carvedilol 25 mg BID, and amlodipine 5 mg daily w/ parameters.      HLD: Continue on PTA atorvastatin 20 mg HS.      GERD: Continue omeprazole 20 mg BID.        # Discharge Pain Plan:   - Patient currently has NO PAIN and is not being prescribed pain medications on discharge.        Discharge Disposition:  Discharged to home     Allergies:  No Known Allergies     Discharge Medications:        Review of your medicines      START taking        Dose / Directions    * albuterol 108 (90 BASE) MCG/ACT Inhaler   Commonly known as:  PROAIR HFA/PROVENTIL HFA/VENTOLIN HFA   Used for:  Acute bronchospasm        Dose:  2 puff   Inhale 2 puffs into the lungs 4 times daily as needed for other (dyspnea)   Quantity:  6.7 g   Refills:  0       * albuterol 108 (90 BASE) MCG/ACT Inhaler   Commonly known as:  PROAIR HFA/PROVENTIL HFA/VENTOLIN HFA   Used for:  Pneumonia of right lung due to infectious organism, unspecified part of lung        Dose:  2 puff   Inhale 2 puffs into the lungs 4 times daily for 5 days   Quantity:  6.7 g   Refills:  0       cefuroxime 500 MG tablet   Commonly known as:  CEFTIN   Used for:  Pneumonia of right lung due to infectious organism, unspecified part of lung        Dose:  500 mg   Take 1 tablet (500 mg) by mouth 2 times daily for 3 days   Quantity:  6 tablet   Refills:  0       fluticasone furoate 200 MCG/ACT inhalation powder   Commonly known as:  ARNUITY ELLIPTA   Used for:  Acute bronchospasm, Pneumonia of right lung due to infectious organism, unspecified part of lung        Dose:  1 puff   Inhale 1 puff into the lungs daily   Quantity:  3 Inhaler   Refills:  0       * Notice:  This list has 2 medication(s) that are the same as other medications prescribed for you. Read the directions carefully, and ask your doctor or other care provider to review them with you.      CONTINUE these medicines which have NOT CHANGED       Dose / Directions    acetaminophen 325 MG tablet   Commonly known as:  TYLENOL        Dose:  650 mg   Take 650 mg by mouth every 6 hours as needed for mild pain   Refills:  0       amLODIPine 5 MG tablet   Commonly known as:  NORVASC        Dose:  5 mg   Take 5 mg by mouth daily   Refills:  0       apixaban ANTICOAGULANT 2.5 MG tablet   Commonly known as:  ELIQUIS   Used for:  Paroxysmal atrial fibrillation (H)        Dose:  2.5 mg   Take 1 tablet (2.5 mg) by mouth 2 times daily   Quantity:  180 tablet   Refills:  3        ASPIRIN EC PO        Dose:  162 mg   Take 162 mg by mouth daily (states is taking half of 325 mg tablet daily).   Refills:  0       carvedilol 25 MG tablet   Commonly known as:  COREG        Dose:  25 mg   Take 25 mg by mouth 2 times daily (with meals)   Refills:  0       DETROL LA 4 MG 24 hr capsule   Generic drug:  tolterodine        Dose:  4 mg   Take 4 mg by mouth every morning   Refills:  0       ferrous sulfate 325 (65 FE) MG tablet   Commonly known as:  IRON        Dose:  325 mg   Take 325 mg by mouth daily (with breakfast)   Refills:  0       ICAPS AREDS FORMULA Tabs        Dose:  1 tablet   Take 1 tablet by mouth 2 times daily   Refills:  0       LIPITOR 20 MG tablet   Generic drug:  atorvastatin        Dose:  20 mg   Take 20 mg by mouth At Bedtime   Refills:  0       lisinopril 40 MG tablet   Commonly known as:  PRINIVIL/ZESTRIL        Dose:  40 mg   Take 40 mg by mouth daily   Refills:  0       OMEPRAZOLE PO        Dose:  20 mg   Take 20 mg by mouth 2 times daily (before meals)   Refills:  0            Where to get your medicines      These medications were sent to [facility] [address] [address]     Phone:  [phone]      albuterol 108 (90 BASE) MCG/ACT Inhaler     albuterol 108 (90 BASE) MCG/ACT Inhaler     cefuroxime 500 MG tablet     fluticasone furoate 200 MCG/ACT inhalation powder             Condition on Discharge:  Discharge condition: Stable       Code status on discharge: Full Code     History of Illness:  See detailed admission note for full details.    Physical Exam:  Vital signs:  Temp: 97.9  F (36.6  C) Temp src: Temporal BP: 164/66 Pulse: 68 Heart Rate: 65 Resp: 18 SpO2: 92 % O2 Device: None (Room air) Oxygen Delivery: 1 LPM   Weight: 50.6 kg (111 lb 8 oz)  Estimated body mass index is 21.78 kg/(m^2) as calculated from the following:    Height as of 1/10/18: 1.524 m (5').    Weight as of this  encounter: 50.6 kg (111 lb 8 oz).    Wt Readings from Last 1 Encounters:   03/07/18 50.6 kg (111 lb 8 oz)     General: Alert, awake, no acute distress.  Frail elderly woman who looks well and bright today.  HEENT: NC/AT, eyes anicteric, external occular movements intact, face symmetric.  Dentition WNL, MM moist.  Cardiac: RRR, S1, S2.  No murmurs appreciated.  Pulmonary: left basilar crackles.  Normal chest rise, normal work of breathing.  Lungs CTA BL  Abdomen: soft, non-tender, non-distended.  Bowel Sounds Present.  No guarding.  Extremities: no deformities.  Warm, well perfused.  Skin: no rashes or lesions noted.  Warm and Dry.  Neuro: No focal deficits noted.  Speech clear.  Coordination and strength grossly normal.  Psych: Appropriate affect.    Procedures other than Imaging:  None.     Imaging:  Results for orders placed or performed during the hospital encounter of 03/02/18   XR Chest 2 Views    Narrative    XR CHEST 2 VW 3/2/2018 10:20 AM    COMPARISON: 6/5/2017    HISTORY: Fever      Impression    IMPRESSION: 2-lead implanted cardiac pacer over the left chest in  unchanged position. Ill-defined opacity projects over the right  midlung, possibly representing infection. Additionally, nodular  density projects over the left midlung is new since 6/5/2017. This may  represent a pulmonary nodule, versus overlapping tissue. Recommend  short interval follow-up chest x-ray versus further characterization  with CT. No pleural effusion or pneumothorax seen on either side.    LEVAR MARIE MD   CT Chest w Contrast    Narrative    CT CHEST WITH CONTRAST3/2/2018 3:09 PM    HISTORY: Pt presenting with pneumonia, nodular finding difficult to  characterize on CXR, recommended CT of chest for further  characterization;     TECHNIQUE: Axial images from thoracic inlet to diaphragm. 80mL  Isovue-370 IV contrast.  Radiation dose for this scan was reduced  using automated exposure control, adjustment of the mA and/or  kV  according to patient size, or iterative reconstruction technique.    COMPARISON: Chest x-ray 3/2/2018    FINDINGS:   CHEST: Prominent atherosclerotic vascular calcification. . No  mediastinal or hilar or axillary adenopathy. Mild subpleural posterior  left lower lobe opacity consistent with atelectasis with mild left  lower lobe bronchiectasis. No pleural effusion.    Bilateral diffuse emphysematous changes as well as patchy alveolar and  groundglass infiltrates in the mid lungs consistent with pneumonia.  Follow-up to ensure complete resolution is recommended.    Degenerative changes in the spine. No aggressive bone lesions.  Tortuous upper abdominal aorta.      Impression    IMPRESSION:   1. Bilateral infiltrates consistent with pneumonia superimposed on  diffuse emphysematous changes. Recommend follow-up to ensure complete  resolution.  2. There is mild posterior subpleural left lower lobe opacity  consistent with atelectasis with underlying bronchiectasis. No pleural  effusion.  3. Extensive atherosclerotic vascular calcification.    SOHAN CASTELAN MD        Consultations:  No consultations were requested during this admission.       Recent Lab Results:    Recent Labs  Lab 03/06/18  0700 03/04/18  0707 03/03/18  0712   WBC 11.0 23.9* 19.4*   HGB 12.1 11.0* 11.9   HCT 38.6 35.6 36.9   MCV 89 90 88    174 160          Lab Results   Component Value Date     03/06/2018     03/04/2018     03/03/2018    Lab Results   Component Value Date    CHLORIDE 105 03/06/2018    CHLORIDE 109 03/04/2018    CHLORIDE 110 03/03/2018    Lab Results   Component Value Date    BUN 19 03/06/2018    BUN 25 03/04/2018    BUN 27 03/03/2018      Lab Results   Component Value Date    POTASSIUM 3.3 03/06/2018    POTASSIUM 3.7 03/04/2018    POTASSIUM 4.0 03/03/2018    Lab Results   Component Value Date    CO2 30 03/06/2018    CO2 26 03/04/2018    CO2 20 03/03/2018    Lab Results   Component Value Date    CR 0.73  03/06/2018    CR 0.64 03/04/2018    CR 0.68 03/03/2018          Recent Labs  Lab 03/02/18  1140 03/02/18  0948 03/02/18  0940   CULT No growth No growth after 5 days No growth after 5 days          Pending Results:    Unresulted Labs Ordered in the Past 30 Days of this Admission     Date and Time Order Name Status Description    3/2/2018 0913 Blood culture Preliminary     3/2/2018 0913 Blood culture Preliminary            Discharge Instructions and Follow-Up:     Discharge Procedure Orders  Reason for your hospital stay   Order Comments: You were admitted for pneumonia with bronchospasm/wheezing.  You were treated with IV antibiotics, prednisone and breathing treatments.  At this point you are much better and will finish antibiotics at home.  We have started some inhalers as discussed.  Due to low oxygen saturations with activity we are arranging for home oxygen for use as needed with activity but hopefully you will not need this for long.     Activity   Order Comments: Your activity upon discharge: activity as tolerated   Order Specific Question Answer Comments   Is discharge order? Yes      Follow-up and recommended labs and tests    Order Comments: Follow up with primary care provider, Ira Barajas, within 7 days for hospital follow- up.  The following labs/tests are recommended: please recheck oxygen saturations with activity and at rest to determine if you need ongoing home oxygen.    Also, as discussed, please arrange for pulmonary function tests with your primary care doctor to screen for COPD.    Finally, please arrange for a follow-up chest x-ray in 2-3 weeks to make sure your pulmonary infiltrates are completely gone.     Full Code     Oxygen Adult   Order Comments: New Home Oxygen Order 2 liter(s) by nasal cannula with activity with use of portable tank. Expected treatment length is indefinite (99 months).. Test on conserving device as applicable.    Patients who qualify for home O2 coverage under  the CMS guidelines require ABG tests or O2 sat readings obtained closest to, but no earlier than 2 days prior to the discharge, as evidence of the need for home oxygen therapy. Testing must be performed while patient is in the chronic stable state. See notes for O2 sats.    I certify that this patient, Yoselyn Cui has been under my care and that I, or a nurse practitioner or physician's assistant working with me, had a face-to-face encounter that meets the face-to-face encounter requirements with this patient on 3/7/2018. The patient, Yoselyn Cui was evaluated or treated in whole, or in part, for the following medical condition, which necessitates the use of the ordered oxygen. Treatment Diagnosis: community acquired pneumonia, bronchospasm    Attending Provider: Ariel Bahena  Physician signature: See electronic signature associated with these discharge orders  Date of Order: March 7, 2018     Diet   Order Comments: Follow this diet upon discharge: Orders Placed This Encounter     Combination Diet Regular Diet Adult   Order Specific Question Answer Comments   Is discharge order? Yes          Total time spent in face to face contact with the patient and coordinating discharge was:  60 Minutes.         18

## (undated) DEVICE — DEFIB PRO-PADZ LVP LQD GEL ADULT 8900-2105-01

## (undated) DEVICE — INTRO GLIDESHEATH SLENDER 6FR 10X45CM 60-1060

## (undated) DEVICE — CATH ANGIO JUDKINS R4 6FRX100CM INFINITI 534621T

## (undated) DEVICE — MANIFOLD KIT ANGIO AUTOMATED 014613

## (undated) DEVICE — 1.5MM X 15MM TAKERU RX PTCA BALLOON CATHETER

## (undated) DEVICE — CATH ANGIO INFINITI PIGTAIL 145 6 SH 6FRX110CM  534-652S

## (undated) DEVICE — TOTE ANGIO CORP PC15AT SAN32CC83O

## (undated) DEVICE — Device

## (undated) DEVICE — INTRODUCER CATH VASC 5FRX10CM  MPIS-501-NT-U-SST

## (undated) DEVICE — INTRO SHEATH OBTRTR PNCL CARD CA XX601

## (undated) DEVICE — INFL DVC KIT W/10CC NITRO IN4530

## (undated) DEVICE — INTRO TERUMO 6FRX25CM W/MARKER RSB603

## (undated) DEVICE — CATH DUAL ACCESS TWIN PASS 5200

## (undated) DEVICE — CATH ANGIO JUDKINS JL4 6FRX100CM INFINITI 534620T

## (undated) DEVICE — WIRE GUIDE 0.035"X260CM SAFE-T-J EXCHANGE G00517

## (undated) DEVICE — CATH BALLOON EMERGE 2.25X15MMH7493918915220

## (undated) DEVICE — KIT HAND CONTROL ANGIOTOUCH ACIST 65CM AT-P65

## (undated) DEVICE — WIRE GLIDE 0.035"X150CM VASC GR3506

## (undated) DEVICE — CATH BALLOON EMERGE 2.5X12MM H7493918912250

## (undated) DEVICE — SLEEVE TR BAND RADIAL COMPRESSION DEVICE 24CM TRB24-REG

## (undated) DEVICE — CATH BALLOON EMERGE 3.5X12MM H7493918912350

## (undated) DEVICE — CATH ANGIO INFINITI 3DRC 6FRX100CM 534676T

## (undated) DEVICE — GUIDEWIRE VASC 0.014INX180CM RUNTHROUGH 25-1011

## (undated) DEVICE — ENDO SNARE EXACTO COLD 9MM LOOP 2.4MMX230CM 00711115

## (undated) DEVICE — ENDO TRAP POLYP QUICK CATCH 710201

## (undated) DEVICE — CATH BALLOON NC EMERGE 4.50X8MM H7493926708450

## (undated) DEVICE — INTRO SHEATH 6FRX10CM PINNACLE RSS602

## (undated) DEVICE — KIT ENDO TURNOVER/PROCEDURE W/CLEAN A SCOPE LINERS 103888

## (undated) DEVICE — CLIP HEMOCLIP ENDOSCOPIC INSTINCT 2.8X230CM INSC-7-230-SS

## (undated) DEVICE — GUIDEWIRE VASC 0.014"X300CM PLATINUM TIP 25-1013

## (undated) DEVICE — GUIDEWIRE HI-TORQUE VERSACORE MOD J 1

## (undated) DEVICE — CATH GUIDING  6F MACH 1 GUIDING CLS3.5

## (undated) DEVICE — VALVE HEMOSTASIS .096" COPILOT MECH 1003331

## (undated) DEVICE — CATH TURNPIKE LP 135CM

## (undated) DEVICE — CATH GUIDELINER 6FR 5571

## (undated) RX ORDER — CLOPIDOGREL 300 MG/1
TABLET, FILM COATED ORAL
Status: DISPENSED
Start: 2019-07-18

## (undated) RX ORDER — LIDOCAINE HYDROCHLORIDE 10 MG/ML
INJECTION, SOLUTION EPIDURAL; INFILTRATION; INTRACAUDAL; PERINEURAL
Status: DISPENSED
Start: 2019-07-18

## (undated) RX ORDER — AMINOPHYLLINE 25 MG/ML
INJECTION, SOLUTION INTRAVENOUS
Status: DISPENSED
Start: 2022-01-01

## (undated) RX ORDER — FENTANYL CITRATE 50 UG/ML
INJECTION, SOLUTION INTRAMUSCULAR; INTRAVENOUS
Status: DISPENSED
Start: 2018-11-21

## (undated) RX ORDER — NITROGLYCERIN 20 MG/100ML
INJECTION INTRAVENOUS
Status: DISPENSED
Start: 2019-07-18

## (undated) RX ORDER — HEPARIN SODIUM 1000 [USP'U]/ML
INJECTION, SOLUTION INTRAVENOUS; SUBCUTANEOUS
Status: DISPENSED
Start: 2019-07-18

## (undated) RX ORDER — FENTANYL CITRATE 50 UG/ML
INJECTION, SOLUTION INTRAMUSCULAR; INTRAVENOUS
Status: DISPENSED
Start: 2018-11-20

## (undated) RX ORDER — FENTANYL CITRATE 50 UG/ML
INJECTION, SOLUTION INTRAMUSCULAR; INTRAVENOUS
Status: DISPENSED
Start: 2019-07-18

## (undated) RX ORDER — LIDOCAINE HYDROCHLORIDE 10 MG/ML
INJECTION, SOLUTION INFILTRATION; PERINEURAL
Status: DISPENSED
Start: 2021-01-01

## (undated) RX ORDER — FENTANYL CITRATE 50 UG/ML
INJECTION, SOLUTION INTRAMUSCULAR; INTRAVENOUS
Status: DISPENSED
Start: 2021-01-01

## (undated) RX ORDER — REGADENOSON 0.08 MG/ML
INJECTION, SOLUTION INTRAVENOUS
Status: DISPENSED
Start: 2022-01-01

## (undated) RX ORDER — NITROGLYCERIN 5 MG/ML
VIAL (ML) INTRAVENOUS
Status: DISPENSED
Start: 2019-07-18

## (undated) RX ORDER — HEPARIN SODIUM 200 [USP'U]/100ML
INJECTION, SOLUTION INTRAVENOUS
Status: DISPENSED
Start: 2019-07-18